# Patient Record
Sex: FEMALE | Race: BLACK OR AFRICAN AMERICAN | Employment: OTHER | URBAN - METROPOLITAN AREA
[De-identification: names, ages, dates, MRNs, and addresses within clinical notes are randomized per-mention and may not be internally consistent; named-entity substitution may affect disease eponyms.]

---

## 2017-01-25 ENCOUNTER — OFFICE VISIT (OUTPATIENT)
Dept: SURGERY | Age: 67
End: 2017-01-25

## 2017-01-25 VITALS
DIASTOLIC BLOOD PRESSURE: 70 MMHG | BODY MASS INDEX: 33.72 KG/M2 | HEART RATE: 76 BPM | WEIGHT: 249 LBS | RESPIRATION RATE: 20 BRPM | SYSTOLIC BLOOD PRESSURE: 130 MMHG | OXYGEN SATURATION: 96 % | HEIGHT: 72 IN | TEMPERATURE: 98 F

## 2017-01-25 DIAGNOSIS — L02.215 PERINEAL ABSCESS: Primary | ICD-10-CM

## 2017-01-25 RX ORDER — METRONIDAZOLE 500 MG/1
500 TABLET ORAL 2 TIMES DAILY
Qty: 20 TAB | Refills: 0 | Status: SHIPPED | OUTPATIENT
Start: 2017-01-25 | End: 2017-02-01 | Stop reason: SDUPTHER

## 2017-01-25 RX ORDER — SULFAMETHOXAZOLE AND TRIMETHOPRIM 400; 80 MG/1; MG/1
TABLET ORAL
Refills: 0 | COMMUNITY
Start: 2017-01-17 | End: 2017-02-10

## 2017-01-25 RX ORDER — OXYCODONE AND ACETAMINOPHEN 7.5; 325 MG/1; MG/1
TABLET ORAL
Refills: 0 | COMMUNITY
Start: 2016-12-30 | End: 2017-02-21 | Stop reason: SDUPTHER
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
## 2017-01-25 NOTE — MR AVS SNAPSHOT
Visit Information Date & Time Provider Department Dept. Phone Encounter #  
 1/25/2017  2:20 PM Celso Coulter [phone] [id number] Your Appointments 2/1/2017  1:40 PM  
POST OP 10 MIN with MD Celso Coulter 137  (Quintin Desaiosh) Appt Note: EP 1 week follow up per Dr. Bradley Leader [address] [facility] [address] [facility] [id number]  
[address] [place] [address] Upcoming [facility] Maintenance Date Due  
 FOOT EXAM Q1 5/8/1959 MICROALBUMIN Q1 5/8/1959 EYE EXAM RETINAL OR DILATED Q1 5/8/1959 DTaP/Tdap/Td series (1 - Tdap) 5/8/1970 BREAST CANCER SCRN MAMMOGRAM 5/8/1999 FOBT Q 1 YEAR AGE 50-75 5/8/1999 ZOSTER VACCINE AGE 60> 5/8/2009 HEMOGLOBIN A1C Q6M 4/6/2010 LIPID PANEL Q1 10/6/2010 GLAUCOMA SCREENING Q2Y 5/8/2014 OSTEOPOROSIS SCREENING (DEXA) 5/8/2014 Pneumococcal 65+ Low/Medium Risk (1 of 2 - PCV13) 5/8/2014 MEDICARE YEARLY EXAM 5/8/2014 INFLUENZA AGE 9 TO ADULT 8/1/2016 Allergies as of 1/25/2017  Review Complete On: 1/25/2017 By: Rima Francois LPN Severity Noted Reaction Type Reactions Latex  01/03/2011    Itching Rash, sometimes difficult to breathe Celebrex [Celecoxib] High 01/06/2011    Anaphylaxis, Swelling TONGUE. LIPS AND EYES Keflex [Cephalexin] High 10/14/2010    Anaphylaxis, Swelling Tongue, lips, and eyes Levaquin [Levofloxacin] High 10/14/2010    Anaphylaxis, Swelling Tongue, lips, and eyes Pcn [Penicillins] High 10/14/2010    Anaphylaxis, Swelling Tongue, lips and eyes Iodine  07/17/2013    Other (comments) IV CONTRAST-LESIONS, AND SKIN SLUFFED OFF Morphine  10/14/2010    Other (comments) PT STATES IS JUST SENSITIVITY, GOT TOO MUCH ONE TIME. Opium  01/06/2011    Other (comments) SENSITIVITY Tylox [Oxycodone-acetaminophen] Low 01/06/2011   Side Effect Nausea Only No anaphlyxis as previously reported. Patient does take percocet Current Immunizations  Never Reviewed No immunizations on file. Not reviewed this visit You Were Diagnosed With   
  
 Codes Comments Perineal abscess    -  Primary ICD-10-CM: L02.215 ICD-9-CM: 579. 2 Vitals BP Pulse Temp Resp Height(growth percentile) Weight(growth percentile) 130/70 (BP 1 Location: Right arm, BP Patient Position: Sitting) 76 98 °F (36.7 °C) (Oral) 20 6' 1\" (1.854 m) 249 lb (112.9 kg) SpO2 BMI OB Status Smoking Status 96% 32.85 kg/m2 Hysterectomy Never Smoker BMI and BSA Data Body Mass Index Body Surface Area  
 32.85 kg/m 2 2.41 m 2 Preferred Pharmacy Pharmacy Name Phone [facility] [address], [address] AT [facility] OF  [facility] [phone] Your Updated Medication List  
  
   
This list is accurate as of: 1/25/17  3:43 PM.  Always use your most recent med list. ALLEGRA 180 mg tablet Generic drug:  fexofenadine Take 60 mg by mouth daily. ASPIRIN PO Take 81 mg by mouth daily. BENADRYL 25 mg capsule Generic drug:  diphenhydrAMINE Take 50 mg by mouth every six (6) hours as needed. CLONIDINE PO Take 0.1 mg by mouth three (3) times daily. COREG PO Take 6.25 mg by mouth two (2) times a day. diazePAM 5 mg tablet Commonly known as:  VALIUM Take 1 Tab by mouth every eight (8) hours as needed for Anxiety. Max Daily Amount: 15 mg.  
  
 DULCOLAX STOOL SOFTENER (DSS) 100 mg capsule Generic drug:  docusate sodium Take 100 mg by mouth two (2) times a day. furosemide 40 mg tablet Commonly known as:  LASIX Take  by mouth every other day. HUMALOG SC  
by SubCUTAneous route Before breakfast, lunch, and dinner. hydrALAZINE 25 mg tablet Commonly known as:  APRESOLINE Take 25 mg by mouth two (2) times a day. LANTUS SC  
by SubCUTAneous route. 11U EVERY MORNING  
  
 metroNIDAZOLE 500 mg tablet Commonly known as:  FLAGYL Take 1 Tab by mouth two (2) times a day for 10 days. MIRALAX 17 gram packet Generic drug:  polyethylene glycol Take 17 g by mouth daily. NORVASC PO Take 10 mg by mouth daily. oxyCODONE-acetaminophen 7.5-325 mg per tablet Commonly known as:  PERCOCET 7.5 TK 1 T PO TID PRN FOR 30 DAYS Prevacid 30 mg disintegrating tablet Generic drug:  lansoprazole Take  by mouth two (2) times a day. PROZAC PO Take 20 mg by mouth daily. RENAL SOFTGELS 1 mg capsule Generic drug:  b complex-vitamin c-folic acid Take 1 Cap by mouth daily. simvastatin 40 mg tablet Commonly known as:  ZOCOR Take 20 mg by mouth nightly. trimethoprim-sulfamethoxazole  mg per tablet Commonly known as:  Alex Edis TK 1 T PO  BID  
  
 VOLTAREN 1 % Gel Generic drug:  diclofenac Apply 4 g to affected area four (4) times daily. Prescriptions Sent to Pharmacy Refills  
 metroNIDAZOLE (FLAGYL) 500 mg tablet 0 Sig: Take 1 Tab by mouth two (2) times a day for 10 days. Class: Normal  
 Pharm[facility] [address] [place] [address] #: [phone] Route: Oral  
  
Introducing [facility] & HEALTH SERVICES! Selene Hughes introduces [employer] patient portal. Now you can access parts of your medical record, email your doctor's office, and request medication refills online. 1. In your internet browser, go to [email or web address] 2. Click on the First Time User? Click Here link in the Sign In box. You will see the New Member Sign Up page. 3. Enter your [employer] Access Code exactly as it appears below. You will not need to use this code after youve completed the sign-up process.  If you do not sign up before the expiration date, you must request a new code. · [employer] Access Code: [email or web address] Expires: 4/25/2017  3:43 PM 
 
4. Enter the last four digits of your Social Security Number (xxxx) and Date of Birth (mm/dd/yyyy) as indicated and click Submit. You will be taken to the next sign-up page. 5. Create a [employer] ID. This will be your [employer] login ID and cannot be changed, so think of one that is secure and easy to remember. 6. Create a [employer] password. You can change your password at any time. 7. Enter your Password Reset Question and Answer. This can be used at a later time if you forget your password. 8. Enter your e-mail address. You will receive e-mail notification when new information is available in [address] 9. Click Sign Up. You can now view and download portions of your medical record. 10. Click the Download Summary menu link to download a portable copy of your medical information. If you have questions, please visit the Frequently Asked Questions section of the [employer] website. Remember, [employer] is NOT to be used for urgent needs. For medical emergencies, dial 911. Now available from your iPhone and Android! Please provide this summary of care documentation to your next provider. Your primary care clinician is listed as Rowdy Ayoub. If you have any questions after today's visit, please call [phone].

## 2017-01-25 NOTE — PROGRESS NOTES
1. Have you been to the ER, urgent care clinic since your last visit? Hospitalized since your last visit? No    2. Have you seen or consulted any other health care providers outside of the [address] [place] [address] since your last visit? Include any pap smears or colon screening. Yes- Dr. Lorena Pringle - lst week to F/U perineal mass surgery on 11-3-16-wound infection.       Dialysis 3 days a week

## 2017-02-01 ENCOUNTER — OFFICE VISIT (OUTPATIENT)
Dept: SURGERY | Age: 67
End: 2017-02-01

## 2017-02-01 VITALS
DIASTOLIC BLOOD PRESSURE: 82 MMHG | WEIGHT: 248.6 LBS | HEIGHT: 72 IN | HEART RATE: 76 BPM | OXYGEN SATURATION: 95 % | TEMPERATURE: 98.6 F | BODY MASS INDEX: 33.67 KG/M2 | SYSTOLIC BLOOD PRESSURE: 150 MMHG | RESPIRATION RATE: 18 BRPM

## 2017-02-01 DIAGNOSIS — L02.215 PERINEAL ABSCESS: Primary | ICD-10-CM

## 2017-02-01 RX ORDER — METRONIDAZOLE 500 MG/1
500 TABLET ORAL 2 TIMES DAILY
Qty: 20 TAB | Refills: 0 | Status: SHIPPED | OUTPATIENT
Start: 2017-02-01 | End: 2017-02-11

## 2017-02-01 NOTE — PROGRESS NOTES
1. Have you been to the ER, urgent care clinic since your last visit? Hospitalized since your last visit? No    2. Have you seen or consulted any other health care providers outside of the [address] [place] [address] since your last visit? Include any pap smears or colon screening.  No

## 2017-02-01 NOTE — MR AVS SNAPSHOT
Visit Information Date & Time Provider Department Dept. Phone Encounter #  
 2/1/2017  1:40 PM David KamaljitCelso 137 [phone] [id number] Upcoming Health Maintenance Date Due  
 FOOT EXAM Q1 5/8/1959 MICROALBUMIN Q1 5/8/1959 EYE EXAM RETINAL OR DILATED Q1 5/8/1959 DTaP/Tdap/Td series (1 - Tdap) 5/8/1970 BREAST CANCER SCRN MAMMOGRAM 5/8/1999 FOBT Q 1 YEAR AGE 50-75 5/8/1999 ZOSTER VACCINE AGE 60> 5/8/2009 HEMOGLOBIN A1C Q6M 4/6/2010 LIPID PANEL Q1 10/6/2010 GLAUCOMA SCREENING Q2Y 5/8/2014 OSTEOPOROSIS SCREENING (DEXA) 5/8/2014 Pneumococcal 65+ Low/Medium Risk (1 of 2 - PCV13) 5/8/2014 MEDICARE YEARLY EXAM 5/8/2014 INFLUENZA AGE 9 TO ADULT 8/1/2016 Allergies as of 2/1/2017  Review Complete On: 2/1/2017 By: David Mari MD  
  
 Severity Noted Reaction Type Reactions Latex  01/03/2011    Itching Rash, sometimes difficult to breathe Celebrex [Celecoxib] High 01/06/2011    Anaphylaxis, Swelling TONGUE. LIPS AND EYES Keflex [Cephalexin] High 10/14/2010    Anaphylaxis, Swelling Tongue, lips, and eyes Levaquin [Levofloxacin] High 10/14/2010    Anaphylaxis, Swelling Tongue, lips, and eyes Pcn [Penicillins] High 10/14/2010    Anaphylaxis, Swelling Tongue, lips and eyes Iodine  07/17/2013    Other (comments) IV CONTRAST-LESIONS, AND SKIN SLUFFED OFF Morphine  10/14/2010    Other (comments) PT STATES IS JUST SENSITIVITY, GOT TOO MUCH ONE TIME. Opium  01/06/2011    Other (comments) SENSITIVITY Tylox [Oxycodone-acetaminophen] Low 01/06/2011   Side Effect Nausea Only No anaphlyxis as previously reported. Patient does take percocet Current Immunizations  Never Reviewed No immunizations on file. Not reviewed this visit You Were Diagnosed With   
  
 Codes Comments Perineal abscess    -  Primary ICD-10-CM: L02.215 ICD-9-CM: 777. 2 Vitals BP Pulse Temp Resp Height(growth percentile) Weight(growth percentile) 150/82 (BP 1 Location: Right arm, BP Patient Position: Sitting) 76 98.6 °F (37 °C) (Oral) 18 6' 1\" (1.854 m) 248 lb 9.6 oz (112.8 kg) SpO2 BMI OB Status Smoking Status 95% 32.8 kg/m2 Hysterectomy Never Smoker BMI and BSA Data Body Mass Index Body Surface Area  
 32.8 kg/m 2 2.41 m 2 Preferred Pharmacy Pharmacy Name Phone [facility] [address] [facility] OF  [place] [address] [phone] Your Updated Medication List  
  
   
This list is accurate as of: 2/1/17  2:35 PM.  Always use your most recent med list. ALLEGRA 180 mg tablet Generic drug:  fexofenadine Take 60 mg by mouth daily. ASPIRIN PO Take 81 mg by mouth daily. BENADRYL 25 mg capsule Generic drug:  diphenhydrAMINE Take 50 mg by mouth every six (6) hours as needed. CLONIDINE PO Take 0.1 mg by mouth three (3) times daily. COREG PO Take 6.25 mg by mouth two (2) times a day. diazePAM 5 mg tablet Commonly known as:  VALIUM Take 1 Tab by mouth every eight (8) hours as needed for Anxiety. Max Daily Amount: 15 mg.  
  
 DULCOLAX STOOL SOFTENER (DSS) 100 mg capsule Generic drug:  docusate sodium Take 100 mg by mouth two (2) times a day. furosemide 40 mg tablet Commonly known as:  LASIX Take  by mouth every other day. HUMALOG SC  
by SubCUTAneous route Before breakfast, lunch, and dinner. hydrALAZINE 25 mg tablet Commonly known as:  APRESOLINE Take 25 mg by mouth two (2) times a day. LANTUS SC  
by SubCUTAneous route. 11U EVERY MORNING  
  
 metroNIDAZOLE 500 mg tablet Commonly known as:  FLAGYL Take 1 Tab by mouth two (2) times a day for 10 days. MIRALAX 17 gram packet Generic drug:  polyethylene glycol Take 17 g by mouth daily. NORVASC PO Take 10 mg by mouth daily. oxyCODONE-acetaminophen 7.5-325 mg per tablet Commonly known as:  PERCOCET 7.5 TK 1 T PO TID PRN FOR 30 DAYS Prevacid 30 mg disintegrating tablet Generic drug:  lansoprazole Take  by mouth two (2) times a day. PROZAC PO Take 20 mg by mouth daily. RENAL SOFTGELS 1 mg capsule Generic drug:  b complex-vitamin c-folic acid Take 1 Cap by mouth daily. simvastatin 40 mg tablet Commonly known as:  ZOCOR Take 20 mg by mouth nightly. trimethoprim-sulfamethoxazole  mg per tablet Commonly known as:  Roney Imam TK 1 T PO  BID  
  
 VOLTAREN 1 % Gel Generic drug:  diclofenac Apply 4 g to affected area four (4) times daily. Prescriptions Sent to Pharmacy Refills  
 metroNIDAZOLE (FLAGYL) 500 mg tablet 0 Sig: Take 1 Tab by mouth two (2) times a day for 10 days. Class: Normal  
 Pharm[facility] [address] [place] [address] #: [phone] Route: Oral  
  
Introducing [facility] & HEALTH SERVICES! Teodoro Blackwell introduces [employer] patient portal. Now you can access parts of your medical record, email your doctor's office, and request medication refills online. 1. In your internet browser, go to [email or web address] 2. Click on the First Time User? Click Here link in the Sign In box. You will see the New Member Sign Up page. 3. Enter your [employer] Access Code exactly as it appears below. You will not need to use this code after youve completed the sign-up process. If you do not sign up before the expiration date, you must request a new code. · [employer] Access Code: [email or web address] Expires: 4/25/2017  3:43 PM 
 
4. Enter the last four digits of your Social Security Number (xxxx) and Date of Birth (mm/dd/yyyy) as indicated and click Submit. You will be taken to the next sign-up page. 5. Create a [employer] ID.  This will be your [employer] login ID and cannot be changed, so think of one that is secure and easy to remember. 6. Create a Palkion password. You can change your password at any time. 7. Enter your Password Reset Question and Answer. This can be used at a later time if you forget your password. 8. Enter your e-mail address. You will receive e-mail notification when new information is available in [address] 9. Click Sign Up. You can now view and download portions of your medical record. 10. Click the Download Summary menu link to download a portable copy of your medical information. If you have questions, please visit the Frequently Asked Questions section of the P[employer] website. Remember, P[employer] is NOT to be used for urgent needs. For medical emergencies, dial 911. Now available from your iPhone and Android! Please provide this summary of care documentation to your next provider. Your primary care clinician is listed as Neisha Christie. If you have any questions after today's visit, please call [phone].
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
## 2017-02-10 ENCOUNTER — HOSPITAL ENCOUNTER (OUTPATIENT)
Age: 67
Setting detail: OBSERVATION
Discharge: HOME | End: 2017-02-11
Attending: EMERGENCY MEDICINE | Admitting: SURGERY
Payer: MEDICARE

## 2017-02-10 ENCOUNTER — APPOINTMENT (OUTPATIENT)
Dept: CT IMAGING | Age: 67
End: 2017-02-10
Attending: EMERGENCY MEDICINE
Payer: MEDICARE

## 2017-02-10 ENCOUNTER — ANESTHESIA (OUTPATIENT)
Dept: SURGERY | Age: 67
End: 2017-02-10
Payer: MEDICARE

## 2017-02-10 ENCOUNTER — ANESTHESIA EVENT (OUTPATIENT)
Dept: SURGERY | Age: 67
End: 2017-02-10
Payer: MEDICARE

## 2017-02-10 DIAGNOSIS — K61.0 PERIANAL CELLULITIS: Primary | ICD-10-CM

## 2017-02-10 LAB
ALBUMIN SERPL BCP-MCNC: 3.3 G/DL (ref 3.5–5)
ALBUMIN/GLOB SERPL: 0.6 {RATIO} (ref 1.1–2.2)
ALP SERPL-CCNC: 185 U/L (ref 45–117)
ALT SERPL-CCNC: 19 U/L (ref 12–78)
ANION GAP BLD CALC-SCNC: 6 MMOL/L (ref 5–15)
AST SERPL W P-5'-P-CCNC: 25 U/L (ref 15–37)
BASOPHILS # BLD AUTO: 0 K/UL (ref 0–0.1)
BASOPHILS # BLD: 0 % (ref 0–1)
BILIRUB SERPL-MCNC: 0.4 MG/DL (ref 0.2–1)
BUN SERPL-MCNC: 25 MG/DL (ref 6–20)
BUN/CREAT SERPL: 6 (ref 12–20)
CALCIUM SERPL-MCNC: 9.3 MG/DL (ref 8.5–10.1)
CHLORIDE SERPL-SCNC: 95 MMOL/L (ref 97–108)
CO2 SERPL-SCNC: 34 MMOL/L (ref 21–32)
CREAT SERPL-MCNC: 4.54 MG/DL (ref 0.55–1.02)
EOSINOPHIL # BLD: 0.1 K/UL (ref 0–0.4)
EOSINOPHIL NFR BLD: 1 % (ref 0–7)
ERYTHROCYTE [DISTWIDTH] IN BLOOD BY AUTOMATED COUNT: 17.3 % (ref 11.5–14.5)
GLOBULIN SER CALC-MCNC: 5.7 G/DL (ref 2–4)
GLUCOSE BLD STRIP.AUTO-MCNC: 138 MG/DL (ref 65–100)
GLUCOSE SERPL-MCNC: 189 MG/DL (ref 65–100)
HCT VFR BLD AUTO: 37.9 % (ref 35–47)
HGB BLD-MCNC: 11.9 G/DL (ref 11.5–16)
LYMPHOCYTES # BLD AUTO: 30 % (ref 12–49)
LYMPHOCYTES # BLD: 2.1 K/UL (ref 0.8–3.5)
MCH RBC QN AUTO: 27.7 PG (ref 26–34)
MCHC RBC AUTO-ENTMCNC: 31.4 G/DL (ref 30–36.5)
MCV RBC AUTO: 88.3 FL (ref 80–99)
MONOCYTES # BLD: 0.5 K/UL (ref 0–1)
MONOCYTES NFR BLD AUTO: 7 % (ref 5–13)
NEUTS SEG # BLD: 4.4 K/UL (ref 1.8–8)
NEUTS SEG NFR BLD AUTO: 62 % (ref 32–75)
PLATELET # BLD AUTO: 311 K/UL (ref 150–400)
POTASSIUM SERPL-SCNC: 3.7 MMOL/L (ref 3.5–5.1)
PROT SERPL-MCNC: 9 G/DL (ref 6.4–8.2)
RBC # BLD AUTO: 4.29 M/UL (ref 3.8–5.2)
SERVICE CMNT-IMP: ABNORMAL
SODIUM SERPL-SCNC: 135 MMOL/L (ref 136–145)
WBC # BLD AUTO: 7 K/UL (ref 3.6–11)

## 2017-02-10 PROCEDURE — 80053 COMPREHEN METABOLIC PANEL: CPT | Performed by: EMERGENCY MEDICINE

## 2017-02-10 PROCEDURE — 77030032490 HC SLV COMPR SCD KNE COVD -B: Performed by: SURGERY

## 2017-02-10 PROCEDURE — 74011250636 HC RX REV CODE- 250/636

## 2017-02-10 PROCEDURE — 96375 TX/PRO/DX INJ NEW DRUG ADDON: CPT

## 2017-02-10 PROCEDURE — 76210000006 HC OR PH I REC 0.5 TO 1 HR: Performed by: SURGERY

## 2017-02-10 PROCEDURE — 76060000032 HC ANESTHESIA 0.5 TO 1 HR: Performed by: SURGERY

## 2017-02-10 PROCEDURE — 82962 GLUCOSE BLOOD TEST: CPT

## 2017-02-10 PROCEDURE — 85025 COMPLETE CBC W/AUTO DIFF WBC: CPT | Performed by: EMERGENCY MEDICINE

## 2017-02-10 PROCEDURE — 74011250636 HC RX REV CODE- 250/636: Performed by: EMERGENCY MEDICINE

## 2017-02-10 PROCEDURE — 96374 THER/PROPH/DIAG INJ IV PUSH: CPT

## 2017-02-10 PROCEDURE — 36415 COLL VENOUS BLD VENIPUNCTURE: CPT | Performed by: EMERGENCY MEDICINE

## 2017-02-10 PROCEDURE — 99218 HC RM OBSERVATION: CPT

## 2017-02-10 PROCEDURE — 74176 CT ABD & PELVIS W/O CONTRAST: CPT

## 2017-02-10 PROCEDURE — 77030018836 HC SOL IRR NACL ICUM -A: Performed by: SURGERY

## 2017-02-10 PROCEDURE — 77030011640 HC PAD GRND REM COVD -A: Performed by: SURGERY

## 2017-02-10 PROCEDURE — 99284 EMERGENCY DEPT VISIT MOD MDM: CPT

## 2017-02-10 PROCEDURE — 87040 BLOOD CULTURE FOR BACTERIA: CPT | Performed by: EMERGENCY MEDICINE

## 2017-02-10 PROCEDURE — 74011250636 HC RX REV CODE- 250/636: Performed by: SURGERY

## 2017-02-10 PROCEDURE — 76010000138 HC OR TIME 0.5 TO 1 HR: Performed by: SURGERY

## 2017-02-10 RX ORDER — ROPIVACAINE HYDROCHLORIDE 5 MG/ML
30 INJECTION, SOLUTION EPIDURAL; INFILTRATION; PERINEURAL AS NEEDED
Status: DISCONTINUED | OUTPATIENT
Start: 2017-02-10 | End: 2017-02-11 | Stop reason: HOSPADM

## 2017-02-10 RX ORDER — MIDAZOLAM HYDROCHLORIDE 1 MG/ML
1 INJECTION, SOLUTION INTRAMUSCULAR; INTRAVENOUS AS NEEDED
Status: DISCONTINUED | OUTPATIENT
Start: 2017-02-10 | End: 2017-02-11 | Stop reason: HOSPADM

## 2017-02-10 RX ORDER — PROCHLORPERAZINE EDISYLATE 5 MG/ML
5 INJECTION INTRAMUSCULAR; INTRAVENOUS
Status: COMPLETED | OUTPATIENT
Start: 2017-02-10 | End: 2017-02-10

## 2017-02-10 RX ORDER — HYDROMORPHONE HYDROCHLORIDE 1 MG/ML
1 INJECTION, SOLUTION INTRAMUSCULAR; INTRAVENOUS; SUBCUTANEOUS
Status: DISCONTINUED | OUTPATIENT
Start: 2017-02-10 | End: 2017-02-11 | Stop reason: HOSPADM

## 2017-02-10 RX ORDER — FENTANYL CITRATE 50 UG/ML
50 INJECTION, SOLUTION INTRAMUSCULAR; INTRAVENOUS AS NEEDED
Status: DISCONTINUED | OUTPATIENT
Start: 2017-02-10 | End: 2017-02-11 | Stop reason: HOSPADM

## 2017-02-10 RX ORDER — GLYCOPYRROLATE 0.2 MG/ML
0.2 INJECTION INTRAMUSCULAR; INTRAVENOUS
Status: DISCONTINUED | OUTPATIENT
Start: 2017-02-10 | End: 2017-02-11 | Stop reason: HOSPADM

## 2017-02-10 RX ORDER — LIDOCAINE HYDROCHLORIDE 10 MG/ML
0.1 INJECTION, SOLUTION EPIDURAL; INFILTRATION; INTRACAUDAL; PERINEURAL AS NEEDED
Status: DISCONTINUED | OUTPATIENT
Start: 2017-02-10 | End: 2017-02-11 | Stop reason: HOSPADM

## 2017-02-10 RX ORDER — HYDROMORPHONE HYDROCHLORIDE 1 MG/ML
0.5 INJECTION, SOLUTION INTRAMUSCULAR; INTRAVENOUS; SUBCUTANEOUS
Status: COMPLETED | OUTPATIENT
Start: 2017-02-10 | End: 2017-02-10

## 2017-02-10 RX ORDER — SODIUM CHLORIDE, SODIUM LACTATE, POTASSIUM CHLORIDE, CALCIUM CHLORIDE 600; 310; 30; 20 MG/100ML; MG/100ML; MG/100ML; MG/100ML
75 INJECTION, SOLUTION INTRAVENOUS CONTINUOUS
Status: DISCONTINUED | OUTPATIENT
Start: 2017-02-10 | End: 2017-02-11 | Stop reason: HOSPADM

## 2017-02-10 RX ORDER — VANCOMYCIN 2 GRAM/500 ML IN 0.9 % SODIUM CHLORIDE INTRAVENOUS
2000 ONCE
Status: COMPLETED | OUTPATIENT
Start: 2017-02-10 | End: 2017-02-10

## 2017-02-10 RX ORDER — CINACALCET 30 MG/1
30 TABLET, FILM COATED ORAL DAILY
COMMUNITY

## 2017-02-10 RX ORDER — CARVEDILOL 6.25 MG/1
12.5 TABLET ORAL 2 TIMES DAILY WITH MEALS
COMMUNITY
End: 2017-04-14

## 2017-02-10 RX ORDER — ONDANSETRON 2 MG/ML
4 INJECTION INTRAMUSCULAR; INTRAVENOUS
Status: DISCONTINUED | OUTPATIENT
Start: 2017-02-10 | End: 2017-02-11 | Stop reason: HOSPADM

## 2017-02-10 RX ORDER — SEVELAMER HYDROCHLORIDE 800 MG/1
1600 TABLET, FILM COATED ORAL
COMMUNITY
End: 2017-04-14

## 2017-02-10 RX ORDER — SODIUM CHLORIDE, SODIUM LACTATE, POTASSIUM CHLORIDE, CALCIUM CHLORIDE 600; 310; 30; 20 MG/100ML; MG/100ML; MG/100ML; MG/100ML
1000 INJECTION, SOLUTION INTRAVENOUS CONTINUOUS
Status: DISCONTINUED | OUTPATIENT
Start: 2017-02-11 | End: 2017-02-11 | Stop reason: HOSPADM

## 2017-02-10 RX ORDER — NORTRIPTYLINE HYDROCHLORIDE 25 MG/1
25 CAPSULE ORAL
COMMUNITY
End: 2018-07-06 | Stop reason: DRUGHIGH

## 2017-02-10 RX ORDER — SODIUM CHLORIDE 9 MG/ML
25 INJECTION, SOLUTION INTRAVENOUS CONTINUOUS
Status: DISCONTINUED | OUTPATIENT
Start: 2017-02-11 | End: 2017-02-11 | Stop reason: HOSPADM

## 2017-02-10 RX ADMIN — VANCOMYCIN HYDROCHLORIDE 2000 MG: 10 INJECTION, POWDER, LYOPHILIZED, FOR SOLUTION INTRAVENOUS at 20:56

## 2017-02-10 RX ADMIN — HYDROMORPHONE HYDROCHLORIDE 0.5 MG: 1 INJECTION, SOLUTION INTRAMUSCULAR; INTRAVENOUS; SUBCUTANEOUS at 16:51

## 2017-02-10 RX ADMIN — SODIUM CHLORIDE, SODIUM LACTATE, POTASSIUM CHLORIDE, AND CALCIUM CHLORIDE 125 ML/HR: 600; 310; 30; 20 INJECTION, SOLUTION INTRAVENOUS at 20:56

## 2017-02-10 RX ADMIN — PROCHLORPERAZINE EDISYLATE 5 MG: 5 INJECTION INTRAMUSCULAR; INTRAVENOUS at 16:54

## 2017-02-10 NOTE — PROGRESS NOTES
Admission Medication Reconciliation:    Information obtained from: Patient and patient's daughter    Significant PMH/Disease States:   Past Medical History   Diagnosis Date    Abscess of abdominal wall     Anal cryptitis 6/4/2012    Arthritis 10/14/2010     back, neck, knees, hands    Asthma      \"TOUCH OF\"    Axillary abscess      right axillary    Blood transfusion 1999     [facility], NO REACTION    Blood transfusion 1980'S     [place]. NO REACTION    Chronic kidney disease      dialysis-[place]    Chronic pain      BACK, NECK, HANDS, KNEES    Depression     Diabetes mellitus type 2, insulin dependent ([address] 10/14/2010    Dialysis patient ([address] Since 3/3/2010     M, W, F    GERD (gastroesophageal reflux disease)     Heart failure ([address] 2004     IN PAST-CHF    HTN (hypertension) 10/14/2010    IBS (irritable bowel syndrome)     Morbid obesity ([address] 10/14/2010    Nausea & vomiting     Neuropathy 10/14/2010    Other ill-defined conditions(799.89)      facial neuropathy STATES PN 1/17/11 HAS NEUROPATHY OF FEET/ LEGS     Other ill-defined conditions(799.89)      glaucoma and cateracts    Other ill-defined conditions(799.89)      ANEMIA    Perineal abscess 1/25/2017    Psychiatric disorder      ANXIETY AND DEPRESSION    s/p debridement of midline abd wound 6/24/2011    Serratia wound infection, 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 6/14/2011    Stroke ([address]      TIA, NO RESIDUAL    Thromboembolus ([address] 2007     LEFT LEG    Thyroid disease     Unspecified adverse effect of anesthesia      DIFFICULTY WAKING    Unspecified sleep apnea      USES C-PAP       Chief Complaint for this Admission:  abscess    Allergies:  Latex; Celebrex [celecoxib]; Keflex [cephalexin]; Levaquin [levofloxacin]; Pcn [penicillins]; Iodine; Morphine; Opium; and Tylox [oxycodone-acetaminophen]    Prior to Admission Medications:   Prior to Admission Medications   Prescriptions Last Dose Informant Patient Reported? Taking?    AMLODIPINE BESYLATE (NORVASC PO) 2/10/2017 at Unknown time Self Yes Yes   Sig: Take 10 mg by mouth daily. ASPIRIN PO 2/10/2017 at Unknown time Self Yes Yes   Sig: Take 81 mg by mouth daily. FLUOXETINE HCL (PROZAC PO) 2/10/2017 at Unknown time Self Yes Yes   Sig: Take 20 mg by mouth daily. INSULIN LISPRO (HUMALOG SC)  Self Yes Yes   Sig: by SubCUTAneous route Before breakfast, lunch, and dinner. b complex-vitamin c-folic acid (RENAL SOFTGELS) 1 mg capsule 2/10/2017 at Unknown time Self Yes Yes   Sig: Take 1 Cap by mouth daily. carvedilol (COREG) 6.25 mg tablet 2/10/2017 at Unknown time  Yes Yes   Sig: Take 12.5 mg by mouth two (2) times daily (with meals). cinacalcet (SENSIPAR) 30 mg tablet 2/10/2017 at Unknown time  Yes Yes   Sig: Take 30 mg by mouth daily. diazepam (VALIUM) 5 mg tablet   No Yes   Sig: Take 1 Tab by mouth every eight (8) hours as needed for Anxiety. Max Daily Amount: 15 mg.   diclofenac (VOLTAREN) 1 % topical gel 2/10/2017 at Unknown time  Yes Yes   Sig: Apply 4 g to affected area four (4) times daily. diphenhydrAMINE (BENADRYL) 25 mg capsule  Self Yes Yes   Sig: Take 50 mg by mouth every six (6) hours as needed. docusate sodium (DULCOLAX STOOL SOFTENER) 100 mg capsule   Yes Yes   Sig: Take 100 mg by mouth two (2) times a day. fexofenadine (ALLEGRA) 180 mg tablet   Yes Yes   Sig: Take 60 mg by mouth daily. insulin detemir (LEVEMIR FLEXPEN) 100 unit/mL (3 mL) inpn   Yes Yes   Si Units by SubCUTAneous route daily. lansoprazole (PREVACID) 30 mg disintegrating tablet 2/10/2017 at Unknown time  Yes Yes   Sig: Take  by mouth two (2) times a day. metroNIDAZOLE (FLAGYL) 500 mg tablet 2/10/2017 at Unknown time  No Yes   Sig: Take 1 Tab by mouth two (2) times a day for 10 days. nortriptyline (PAMELOR) 25 mg capsule 2017 at Unknown time  Yes Yes   Sig: Take 25 mg by mouth nightly.    oxyCODONE-acetaminophen (PERCOCET 7.5) 7.5-325 mg per tablet   Yes Yes   Sig: TK 1 T PO TID PRN FOR 30 DAYS polyethylene glycol (MIRALAX) 17 gram packet   Yes Yes   Sig: Take 17 g by mouth daily. sevelamer (RENAGEL) 800 mg tablet 2/10/2017 at Unknown time  Yes Yes   Sig: Take 1,600 mg by mouth three (3) times daily (with meals). simvastatin (ZOCOR) 40 mg tablet 2/9/2017 at Unknown time  Yes Yes   Sig: Take 20 mg by mouth nightly. Facility-Administered Medications: None         Comments/Recommendations:   Verified PTA medications/allergy with patient and patient's daughter. Patient said she had all her morning medications at noon today after her dialysis. Her dialysis schedule is Monday, Wednesday, and Friday. She recently finished a course of bactrim and is on her second round of flagyl.     Added nortriptyline, sensipar, and renveia  Deleted clonidine, furosemide, hydralazine  Changed: insulin glargin to levemir and 11 unites to 8 units, carvedilol 6.25mg BID to 12.5 mg BID      Thank you for allowing me to participate in the care of this patient  Ovi Scott, PharmD Candidate 2016

## 2017-02-10 NOTE — IP AVS SNAPSHOT
Current Discharge Medication List  
  
Take these medications at their scheduled times Dose & Instructions Dispensing Information Comments Morning Noon Evening Bedtime ALLEGRA 180 mg tablet Generic drug:  fexofenadine Your next dose is: Today, Tomorrow Other:  ____________ Dose:  60 mg Take 60 mg by mouth daily. Refills:  0  
     
   
   
   
  
 ASPIRIN PO Your next dose is: Today, Tomorrow Other:  ____________ Dose:  81 mg Take 81 mg by mouth daily. Refills:  0  
     
   
   
   
  
 carvedilol 6.25 mg tablet Commonly known as:  Cori Steele Your next dose is: Today, Tomorrow Other:  ____________ Dose:  12.5 mg Take 12.5 mg by mouth two (2) times daily (with meals). Refills:  0 DULCOLAX STOOL SOFTENER (DSS) 100 mg capsule Generic drug:  docusate sodium Your next dose is: Today, Tomorrow Other:  ____________ Dose:  100 mg Take 100 mg by mouth two (2) times a day. Refills:  0 HUMALOG SC Your next dose is: Today, Tomorrow Other:  ____________  
   
   
 by SubCUTAneous route Before breakfast, lunch, and dinner. Refills:  0 LEVEMIR FLEXPEN 100 unit/mL (3 mL) Inpn Generic drug:  insulin detemir Your next dose is: Today, Tomorrow Other:  ____________ Dose:  8 Units 8 Units by SubCUTAneous route daily. Refills:  0  
     
   
   
   
  
 metroNIDAZOLE 500 mg tablet Commonly known as:  FLAGYL Your next dose is: Today, Tomorrow Other:  ____________ Dose:  500 mg Take 1 Tab by mouth two (2) times a day for 10 days. Quantity:  20 Tab Refills:  0  
     
   
   
   
  
 mineral oil liquid Your next dose is: Today, Tomorrow Other:  ____________ Dose:  30 mL Take 30 mL by mouth daily. Quantity:  180 mL Refills:  0 MIRALAX 17 gram packet Generic drug:  polyethylene glycol Your next dose is: Today, Tomorrow Other:  ____________ Dose:  17 g Take 17 g by mouth daily. Refills:  0  
     
   
   
   
  
 nortriptyline 25 mg capsule Commonly known as:  PAMELOR Your next dose is: Today, Tomorrow Other:  ____________ Dose:  25 mg Take 25 mg by mouth nightly. Refills:  0 NORVASC PO Your next dose is: Today, Tomorrow Other:  ____________ Dose:  10 mg Take 10 mg by mouth daily. Refills:  0 Prevacid 30 mg disintegrating tablet Generic drug:  lansoprazole Your next dose is: Today, Tomorrow Other:  ____________ Take  by mouth two (2) times a day. Refills:  0 PROZAC PO Your next dose is: Today, Tomorrow Other:  ____________ Dose:  20 mg Take 20 mg by mouth daily. Refills:  0 RENAL SOFTGELS 1 mg capsule Generic drug:  b complex-vitamin c-folic acid Your next dose is: Today, Tomorrow Other:  ____________ Dose:  1 Cap Take 1 Cap by mouth daily. Refills:  0 SENSIPAR 30 mg tablet Generic drug:  cinacalcet Your next dose is: Today, Tomorrow Other:  ____________ Dose:  30 mg Take 30 mg by mouth daily. Refills:  0  
     
   
   
   
  
 sevelamer 800 mg tablet Commonly known as:  RENAGEL Your next dose is: Today, Tomorrow Other:  ____________ Dose:  1600 mg Take 1,600 mg by mouth three (3) times daily (with meals). Refills:  0  
     
   
   
   
  
 simvastatin 40 mg tablet Commonly known as:  ZOCOR Your next dose is: Today, Tomorrow Other:  ____________ Dose:  20 mg Take 20 mg by mouth nightly. Refills:  0 trimethoprim-sulfamethoxazole 160-800 mg per tablet Commonly known as:  BACTRIM DS, SEPTRA DS Your next dose is: Today, Tomorrow Other:  ____________ Dose:  1 Tab Take 1 Tab by mouth daily for 10 days. Quantity:  10 Tab Refills:  0 VOLTAREN 1 % Gel Generic drug:  diclofenac Your next dose is: Today, Tomorrow Other:  ____________ Dose:  4 g Apply 4 g to affected area four (4) times daily. Refills:  0 Take these medications as needed Dose & Instructions Dispensing Information Comments Morning Noon Evening Bedtime BENADRYL 25 mg capsule Generic drug:  diphenhydrAMINE Your next dose is: Today, Tomorrow Other:  ____________ Dose:  50 mg Take 50 mg by mouth every six (6) hours as needed. Refills:  0  
     
   
   
   
  
 diazePAM 5 mg tablet Commonly known as:  VALIUM Your next dose is: Today, Tomorrow Other:  ____________ Dose:  5 mg Take 1 Tab by mouth every eight (8) hours as needed for Anxiety. Max Daily Amount: 15 mg. Quantity:  12 Tab Refills:  0  
     
   
   
   
  
 * oxyCODONE-acetaminophen 7.5-325 mg per tablet Commonly known as:  PERCOCET 7.5 Your next dose is: Today, Tomorrow Other:  ____________ Dose:  1 Tab Take 1 Tab by mouth every four (4) hours as needed. Max Daily Amount: 6 Tabs. Quantity:  30 Tab Refills:  0  
     
   
   
   
  
 * Notice: This list has 1 medication(s) that are the same as other medications prescribed for you. Read the directions carefully, and ask your doctor or other care provider to review them with you. Take these medications as directed Dose & Instructions Dispensing Information Comments Morning Noon Evening Bedtime * oxyCODONE-acetaminophen 7.5-325 mg per tablet Commonly known as:  PERCOCET 7.5 Your next dose is: Today, Tomorrow Other:  ____________ TK 1 T PO TID PRN FOR 30 DAYS Refills:  0  
     
   
   
   
  
 * Notice: This list has 1 medication(s) that are the same as other medications prescribed for you. Read the directions carefully, and ask your doctor or other care provider to review them with you. Where to Get Your Medications Information about where to get these medications is not yet available ! Ask your nurse or doctor about these medications  
  mineral oil liquid  
 oxyCODONE-acetaminophen 7.5-325 mg per tablet  
 trimethoprim-sulfamethoxazole 160-800 mg per tablet

## 2017-02-10 NOTE — IP AVS SNAPSHOT
[address] [facility] access hospital 
[phone] Patient: Ronn La MRN: [id number] [facility]:[birth year] You are allergic to the following Allergen Reactions Latex Itching Rash, sometimes difficult to breathe Celebrex (Celecoxib) Anaphylaxis Swelling TONGUE. LIPS AND EYES Keflex (Cephalexin) Anaphylaxis Swelling Tongue, lips, and eyes Levaquin (Levofloxacin) Anaphylaxis Swelling Tongue, lips, and eyes Pcn (Penicillins) Anaphylaxis Swelling Tongue, lips and eyes Iodine Other (comments) IV CONTRAST-LESIONS, AND SKIN SLUFFED OFF Morphine Other (comments) PT STATES IS JUST SENSITIVITY, GOT TOO MUCH ONE TIME. Opium Other (comments) SENSITIVITY Recent Documentation Height Weight Breastfeeding? BMI OB Status Smoking Status 1.854 m 114 kg No 33.16 kg/m2 Hysterectomy Never Smoker Unresulted Labs Order Current Status CULTURE, ANAEROBIC In process CULTURE, BLOOD Preliminary result CULTURE, WOUND W GRAM STAIN Preliminary result Emergency Contacts Name Discharge Info Relation Home Work Mobile [address] CAREGIVER [3] Child [2] [phone] About your hospitalization You were admitted on:  February 10, 2017 You last received care in the:  [facility] [phone] You were discharged on:  February 11, 2017 Unit phone number:  [phone] Why you were hospitalized Your primary diagnosis was:  Not on File Providers Seen During Your Hospitalizations Provider Role Specialty Primary office phone Barbra Gar MD Attending Provider Emergency Medicine [phone] Laura De Jesus MD Attending Provider General Surgery [phone] Your Primary Care Physician (PCP) Primary Care Physician Office Phone Office Fax Eusebio Ervin [phone] Follow-up Information Follow up With Details Comments Contact Info Taqueria Interiano MD   [facility] [address] 
[phone] Your Appointments Monday February 13, 2017  1:20 PM EST  
ESTABLISHED PATIENT with MD Celso Coulter [phone] ([facility]) [address] [facility] [address] [place] [address] [id number] [phone] Current Discharge Medication List  
  
START taking these medications Dose & Instructions Dispensing Information Comments Morning Noon Evening Bedtime  
 mineral oil liquid Your next dose is: Today, Tomorrow Other:  _________ Dose:  30 mL Take 30 mL by mouth daily. Quantity:  180 mL Refills:  0  
     
   
   
   
  
 trimethoprim-sulfamethoxazole 160-800 mg per tablet Commonly known as:  BACTRIM DS, SEPTRA DS Your next dose is: Today, Tomorrow Other:  _________ Dose:  1 Tab Take 1 Tab by mouth daily for 10 days. Quantity:  10 Tab Refills:  0 CONTINUE these medications which have CHANGED Dose & Instructions Dispensing Information Comments Morning Noon Evening Bedtime * oxyCODONE-acetaminophen 7.5-325 mg per tablet Commonly known as:  PERCOCET 7.5 What changed:  Another medication with the same name was added. Make sure you understand how and when to take each. Your next dose is: Today, Tomorrow Other:  _________ TK 1 T PO TID PRN FOR 30 DAYS Refills:  0  
     
   
   
   
  
 * oxyCODONE-acetaminophen 7.5-325 mg per tablet Commonly known as:  PERCOCET 7.5 What changed: You were already taking a medication with the same name, and this prescription was added. Make sure you understand how and when to take each. Your next dose is: Today, Tomorrow Other:  _________ Dose:  1 Tab Take 1 Tab by mouth every four (4) hours as needed. Max Daily Amount: 6 Tabs. Quantity:  30 Tab Refills:  0  
     
   
   
   
  
 * Notice: This list has 2 medication(s) that are the same as other medications prescribed for you. Read the directions carefully, and ask your doctor or other care provider to review them with you. CONTINUE these medications which have NOT CHANGED Dose & Instructions Dispensing Information Comments Morning Noon Evening Bedtime ALLEGRA 180 mg tablet Generic drug:  fexofenadine Your next dose is: Today, Tomorrow Other:  _________ Dose:  60 mg Take 60 mg by mouth daily. Refills:  0  
     
   
   
   
  
 ASPIRIN PO Your next dose is: Today, Tomorrow Other:  _________ Dose:  81 mg Take 81 mg by mouth daily. Refills:  0  
     
   
   
   
  
 BENADRYL 25 mg capsule Generic drug:  diphenhydrAMINE Your next dose is: Today, Tomorrow Other:  _________ Dose:  50 mg Take 50 mg by mouth every six (6) hours as needed. Refills:  0  
     
   
   
   
  
 carvedilol 6.25 mg tablet Commonly known as:  Kathleene Cherie Your next dose is: Today, Tomorrow Other:  _________ Dose:  12.5 mg Take 12.5 mg by mouth two (2) times daily (with meals). Refills:  0  
     
   
   
   
  
 diazePAM 5 mg tablet Commonly known as:  VALIUM Your next dose is: Today, Tomorrow Other:  _________ Dose:  5 mg Take 1 Tab by mouth every eight (8) hours as needed for Anxiety. Max Daily Amount: 15 mg. Quantity:  12 Tab Refills:  0 DULCOLAX STOOL SOFTENER (DSS) 100 mg capsule Generic drug:  docusate sodium Your next dose is: Today, Tomorrow Other:  _________ Dose:  100 mg Take 100 mg by mouth two (2) times a day. Refills:  0 HUMALOG SC Your next dose is: Today, Tomorrow Other:  _________  
   
   
 by SubCUTAneous route Before breakfast, lunch, and dinner. Refills:  0 LEVEMIR FLEXPEN 100 unit/mL (3 mL) Inpn Generic drug:  insulin detemir Your next dose is: Today, Tomorrow Other:  _________ Dose:  8 Units 8 Units by SubCUTAneous route daily. Refills:  0  
     
   
   
   
  
 metroNIDAZOLE 500 mg tablet Commonly known as:  FLAGYL Your next dose is: Today, Tomorrow Other:  _________ Dose:  500 mg Take 1 Tab by mouth two (2) times a day for 10 days. Quantity:  20 Tab Refills:  0 MIRALAX 17 gram packet Generic drug:  polyethylene glycol Your next dose is: Today, Tomorrow Other:  _________ Dose:  17 g Take 17 g by mouth daily. Refills:  0  
     
   
   
   
  
 nortriptyline 25 mg capsule Commonly known as:  PAMELOR Your next dose is: Today, Tomorrow Other:  _________ Dose:  25 mg Take 25 mg by mouth nightly. Refills:  0 NORVASC PO Your next dose is: Today, Tomorrow Other:  _________ Dose:  10 mg Take 10 mg by mouth daily. Refills:  0 Prevacid 30 mg disintegrating tablet Generic drug:  lansoprazole Your next dose is: Today, Tomorrow Other:  _________ Take  by mouth two (2) times a day. Refills:  0 PROZAC PO Your next dose is: Today, Tomorrow Other:  _________ Dose:  20 mg Take 20 mg by mouth daily. Refills:  0 RENAL SOFTGELS 1 mg capsule Generic drug:  b complex-vitamin c-folic acid Your next dose is: Today, Tomorrow Other:  _________ Dose:  1 Cap Take 1 Cap by mouth daily. Refills:  0 SENSIPAR 30 mg tablet Generic drug:  cinacalcet Your next dose is: Today, Tomorrow Other:  _________ Dose:  30 mg Take 30 mg by mouth daily. Refills:  0  
     
   
   
   
  
 sevelamer 800 mg tablet Commonly known as:  RENAGEL Your next dose is: Today, Tomorrow Other:  _________ Dose:  1600 mg Take 1,600 mg by mouth three (3) times daily (with meals). Refills:  0  
     
   
   
   
  
 simvastatin 40 mg tablet Commonly known as:  ZOCOR Your next dose is: Today, Tomorrow Other:  _________ Dose:  20 mg Take 20 mg by mouth nightly. Refills:  0 VOLTAREN 1 % Gel Generic drug:  diclofenac Your next dose is: Today, Tomorrow Other:  _________ Dose:  4 g Apply 4 g to affected area four (4) times daily. Refills:  0 Where to Get Your Medications Information on where to get these meds will be given to you by the nurse or doctor. ! Ask your nurse or doctor about these medications  
  mineral oil liquid  
 oxyCODONE-acetaminophen 7.5-325 mg per tablet  
 trimethoprim-sulfamethoxazole 160-800 mg per tablet Discharge Instructions 1. Do not drive while on pain medication. You should take a stool softener while on pain medication to prevent constipation. 2.  Do not lift anything greater than 15 pounds for 2 weeks. 3.  You may resume your home medications. 4.  You may shower but do not swim or soak in tub for 2 weeks. 5.  You will need to pack the right buttock wound with saline moistened Nu gauze daily and cover with dry dressing. 6.  Please call [phone] to schedule a follow-up with Dr. Jessica Freeman within 2 weeks. Perineal Abscess: Care Instructions Your Care Instructions A perineal abscess is an infection that causes a painful lump in the perineum. The perineum is the area between the scrotum and the anus in a man. In a woman, it's the area between the vulva and the anus.  The area may look red and feel painful and be swollen. The abscess may form after surgery or after delivery of a baby. It can also be caused by an infection of the prostate gland. The prostate gland is an organ found inside a man's body, just below the bladder. Your doctor may have done minor surgery to open and drain the abscess. You may have had a sedative to help you relax. You may be unsteady after having sedation. It can take a few hours for the medicine's effects to wear off. Common side effects include nausea, vomiting, and feeling sleepy or tired. The doctor has checked you carefully, but problems can develop later. If you notice any problems or new symptoms, get medical treatment right away. Follow-up care is a key part of your treatment and safety. Be sure to make and go to all appointments, and call your doctor if you are having problems. It's also a good idea to know your test results and keep a list of the medicines you take. How can you care for yourself at home? · If your doctor gave you a sedative: ¨ For 24 hours, don't do anything that requires attention to detail. It takes time for the medicine's effects to completely wear off. ¨ For your safety, do not drive or operate any machinery that could be dangerous. Wait until the medicine wears off. You need to be able to think clearly and react easily. · Be safe with medicines. Read and follow all instructions on the label. ¨ If the doctor gave you a prescription medicine for pain, take it as prescribed. ¨ If you are not taking a prescription pain medicine, ask your doctor if you can take an over-the-counter medicine. · If your doctor prescribed antibiotics, take them as directed. Do not stop taking them just because you feel better. You need to take the full course of antibiotics. · Sit in a few inches of warm water (sitz bath) 3 times a day and after bowel movements. The warm water helps the area heal and eases discomfort. When should you call for help? Call 911 anytime you think you may need emergency care. For example, call if: 
· You have trouble breathing. · You passed out (lost consciousness). · You pass maroon or very bloody stools. Call your doctor now or seek immediate medical care if: 
· You have new or worse nausea or vomiting. · Your pain gets worse. · You have a new or higher fever. · You have new or increased swelling. · You have pus in your stool. Watch closely for changes in your health, and be sure to contact your doctor if: 
· You do not get better as expected. Where can you learn more? Go to http://brandan-liang.info/. Enter [id number] in the search box to learn more about \"Perineal Abscess: Care Instructions. \" Current as of: August 9, 2016 Content Version: 11.1 © 9082-1151 [employer]. Care instructions adapted under license by [employer] (which disclaims liability or warranty for this information). If you have questions about a medical condition or this instruction, always ask your healthcare professional. [place] [address] any warranty or liability for your use of this information. DISCHARGE SUMMARY from Nurse The following personal items are in your possession at time of discharge: 
 
Dental Appliances: None Visual Aid: Glasses Home Medications: None Jewelry: Sent home Clothing: Pajamas Other Valuables: None PATIENT INSTRUCTIONS: 
 
After general anesthesia or intravenous sedation, for 24 hours or while taking prescription Narcotics: · Limit your activities · Do not drive and operate hazardous machinery · Do not make important personal or business decisions · Do  not drink alcoholic beverages · If you have not urinated within 8 hours after discharge, please contact your surgeon on call. Report the following to your surgeon: 
· Excessive pain, swelling, redness or odor of or around the surgical area · Temperature over 100.5 · Nausea and vomiting lasting longer than 4 hours or if unable to take medications · Any signs of decreased circulation or nerve impairment to extremity: change in color, persistent  numbness, tingling, coldness or increase pain · Any questions What to do at Home: *  Please give a list of your current medications to your Primary Care Provider. *  Please update this list whenever your medications are discontinued, doses are 
    changed, or new medications (including over-the-counter products) are added. *  Please carry medication information at all times in case of emergency situations. These are general instructions for a healthy lifestyle: No smoking/ No tobacco products/ Avoid exposure to second hand smoke Surgeon General's Warning:  Quitting smoking now greatly reduces serious risk to your health. Obesity, smoking, and sedentary lifestyle greatly increases your risk for illness A healthy diet, regular physical exercise & weight monitoring are important for maintaining a healthy lifestyle You may be retaining fluid if you have a history of heart failure or if you experience any of the following symptoms:  Weight gain of 3 pounds or more overnight or 5 pounds in a week, increased swelling in our hands or feet or shortness of breath while lying flat in bed. Please call your doctor as soon as you notice any of these symptoms; do not wait until your next office visit. Recognize signs and symptoms of STROKE: 
 
F-face looks uneven A-arms unable to move or move unevenly S-speech slurred or non-existent T-time-call 911 as soon as signs and symptoms begin-DO NOT go Back to bed or wait to see if you get better-TIME IS BRAIN. Warning Signs of HEART ATTACK Call 911 if you have these symptoms: 
? Chest discomfort.  Most heart attacks involve discomfort in the center of the chest that lasts more than a few minutes, or that goes away and comes back. It can feel like uncomfortable pressure, squeezing, fullness, or pain. ? Discomfort in other areas of the upper body. Symptoms can include pain or discomfort in one or both arms, the back, neck, jaw, or stomach. ? Shortness of breath with or without chest discomfort. ? Other signs may include breaking out in a cold sweat, nausea, or lightheadedness. Don't wait more than five minutes to call [address]! Fast action can save your life. Calling 911 is almost always the fastest way to get lifesaving treatment. Emergency Medical Services staff can begin treatment when they arrive  up to an hour sooner than if someone gets to the hospital by car. The discharge information has been reviewed with the patient. The patient verbalized understanding. Discharge medications reviewed with the patient and appropriate educational materials and side effects teaching were provided. [employer] Activation Thank you for requesting access to [employer]. Please follow the instructions below to securely access and download your online medical record. [employer] allows you to send messages to your doctor, view your test results, renew your prescriptions, schedule appointments, and more. How Do I Sign Up? 1. In your internet browser, go to [email or web address]. 2. Click on the First Time User? Click Here link in the Sign In box. You will see the New Member Sign Up page. 3. Enter your [employer] Access Code exactly as it appears below. You will not need to use this code after youve completed the sign-up process. If you do not sign up before the expiration date, you must request a new code. [employer] Access Code: [email or web address] Expires: 2017  3:43 PM (This is the date your [employer] access code will ) 4.  Enter the last four digits of your Social Security Number (xxxx) and Date of Birth (mm/dd/yyyy) as indicated and click Submit. You will be taken to the next sign-up page. 5. Create a [employer] ID. This will be your [employer] login ID and cannot be changed, so think of one that is secure and easy to remember. 6. Create a [employer] password. You can change your password at any time. 7. Enter your Password Reset Question and Answer. This can be used at a later time if you forget your password. 8. Enter your e-mail address. You will receive e-mail notification when new information is available in [address] 9. Click Sign Up. You can now view and download portions of your medical record. 10. Click the Download Summary menu link to download a portable copy of your medical information. Additional Information If you have questions, please visit the Frequently Asked Questions section of the [employer] website at [email or web address] Remember, [employer] is NOT to be used for urgent needs. For medical emergencies, dial 911. Discharge Orders None Introducing [facility] SERVICES! Selene Hughes introduces [employer] patient portal. Now you can access parts of your medical record, email your doctor's office, and request medication refills online. 1. In your internet browser, go to [email or web address] 2. Click on the First Time User? Click Here link in the Sign In box. You will see the New Member Sign Up page. 3. Enter your [employer] Access Code exactly as it appears below. You will not need to use this code after youve completed the sign-up process. If you do not sign up before the expiration date, you must request a new code. · [employer] Access Code: [email or web address] Expires: 4/25/2017  3:43 PM 
 
4. Enter the last four digits of your Social Security Number (xxxx) and Date of Birth (mm/dd/yyyy) as indicated and click Submit. You will be taken to the next sign-up page. 5. Create a RxRevu ID. This will be your [employer] login ID and cannot be changed, so think of one that is secure and easy to remember. 6. Create a [employer] password. You can change your password at any time. 7. Enter your Password Reset Question and Answer. This can be used at a later time if you forget your password. 8. Enter your e-mail address. You will receive e-mail notification when new information is available in [address] 9. Click Sign Up. You can now view and download portions of your medical record. 10. Click the Download Summary menu link to download a portable copy of your medical information. If you have questions, please visit the Frequently Asked Questions section of the [employer] website. Remember, [employer] is NOT to be used for urgent needs. For medical emergencies, dial 911. Now available from your iPhone and Android! General Information Please provide this summary of care documentation to your next provider. Patient Signature:  ____________________________________________________________ Date:  ____________________________________________________________  
  
[employer] Provider Signature:  ____________________________________________________________ Date:  ____________________________________________________________

## 2017-02-10 NOTE — ED TRIAGE NOTES
Pt arrives with perineal abscess to bottom that is causing her a lot of pain. Pt had it operated on in November 2016 to drain it @ [facility].

## 2017-02-11 VITALS
RESPIRATION RATE: 18 BRPM | DIASTOLIC BLOOD PRESSURE: 65 MMHG | HEIGHT: 72 IN | TEMPERATURE: 99.1 F | HEART RATE: 80 BPM | BODY MASS INDEX: 34.04 KG/M2 | OXYGEN SATURATION: 95 % | WEIGHT: 251.32 LBS | SYSTOLIC BLOOD PRESSURE: 147 MMHG

## 2017-02-11 LAB
GLUCOSE BLD STRIP.AUTO-MCNC: 155 MG/DL (ref 65–100)
GLUCOSE BLD STRIP.AUTO-MCNC: 167 MG/DL (ref 65–100)
GLUCOSE BLD STRIP.AUTO-MCNC: 276 MG/DL (ref 65–100)
SERVICE CMNT-IMP: ABNORMAL

## 2017-02-11 PROCEDURE — 74011250637 HC RX REV CODE- 250/637: Performed by: NURSE PRACTITIONER

## 2017-02-11 PROCEDURE — 74011636637 HC RX REV CODE- 636/637: Performed by: SURGERY

## 2017-02-11 PROCEDURE — 88304 TISSUE EXAM BY PATHOLOGIST: CPT | Performed by: SURGERY

## 2017-02-11 PROCEDURE — 87075 CULTR BACTERIA EXCEPT BLOOD: CPT | Performed by: SURGERY

## 2017-02-11 PROCEDURE — 99218 HC RM OBSERVATION: CPT

## 2017-02-11 PROCEDURE — 87185 SC STD ENZYME DETCJ PER NZM: CPT | Performed by: SURGERY

## 2017-02-11 PROCEDURE — 77030008684 HC TU ET CUF COVD -B: Performed by: ANESTHESIOLOGY

## 2017-02-11 PROCEDURE — 82962 GLUCOSE BLOOD TEST: CPT

## 2017-02-11 PROCEDURE — 74011250637 HC RX REV CODE- 250/637: Performed by: SURGERY

## 2017-02-11 PROCEDURE — 74011250636 HC RX REV CODE- 250/636

## 2017-02-11 PROCEDURE — 87077 CULTURE AEROBIC IDENTIFY: CPT | Performed by: SURGERY

## 2017-02-11 PROCEDURE — 74011250636 HC RX REV CODE- 250/636: Performed by: SURGERY

## 2017-02-11 PROCEDURE — 74011000250 HC RX REV CODE- 250

## 2017-02-11 PROCEDURE — 74011000250 HC RX REV CODE- 250: Performed by: SURGERY

## 2017-02-11 PROCEDURE — 77030026438 HC STYL ET INTUB CARD -A: Performed by: ANESTHESIOLOGY

## 2017-02-11 PROCEDURE — 87205 SMEAR GRAM STAIN: CPT | Performed by: SURGERY

## 2017-02-11 RX ORDER — ONDANSETRON 2 MG/ML
INJECTION INTRAMUSCULAR; INTRAVENOUS AS NEEDED
Status: DISCONTINUED | OUTPATIENT
Start: 2017-02-11 | End: 2017-02-11 | Stop reason: HOSPADM

## 2017-02-11 RX ORDER — CARVEDILOL 12.5 MG/1
12.5 TABLET ORAL 2 TIMES DAILY WITH MEALS
Status: DISCONTINUED | OUTPATIENT
Start: 2017-02-11 | End: 2017-02-11 | Stop reason: HOSPADM

## 2017-02-11 RX ORDER — SEVELAMER CARBONATE 800 MG/1
1600 TABLET, FILM COATED ORAL
Status: DISCONTINUED | OUTPATIENT
Start: 2017-02-11 | End: 2017-02-11 | Stop reason: HOSPADM

## 2017-02-11 RX ORDER — DIPHENHYDRAMINE HCL 50 MG
50 CAPSULE ORAL
Status: DISCONTINUED | OUTPATIENT
Start: 2017-02-11 | End: 2017-02-11 | Stop reason: HOSPADM

## 2017-02-11 RX ORDER — ALBUTEROL SULFATE 0.83 MG/ML
2.5 SOLUTION RESPIRATORY (INHALATION) AS NEEDED
Status: DISCONTINUED | OUTPATIENT
Start: 2017-02-11 | End: 2017-02-11 | Stop reason: HOSPADM

## 2017-02-11 RX ORDER — MINERAL OIL
30 OIL (ML) ORAL DAILY
Qty: 180 ML | Refills: 0 | Status: SHIPPED | OUTPATIENT
Start: 2017-02-11 | End: 2018-02-13 | Stop reason: DRUGHIGH

## 2017-02-11 RX ORDER — OXYCODONE AND ACETAMINOPHEN 7.5; 325 MG/1; MG/1
1 TABLET ORAL
Status: DISCONTINUED | OUTPATIENT
Start: 2017-02-11 | End: 2017-02-11

## 2017-02-11 RX ORDER — OXYCODONE AND ACETAMINOPHEN 7.5; 325 MG/1; MG/1
1 TABLET ORAL
Qty: 30 TAB | Refills: 0 | Status: SHIPPED | OUTPATIENT
Start: 2017-02-11 | End: 2017-04-19

## 2017-02-11 RX ORDER — AMLODIPINE BESYLATE 5 MG/1
10 TABLET ORAL DAILY
Status: DISCONTINUED | OUTPATIENT
Start: 2017-02-11 | End: 2017-02-11 | Stop reason: HOSPADM

## 2017-02-11 RX ORDER — SODIUM CHLORIDE 9 MG/ML
25 INJECTION, SOLUTION INTRAVENOUS CONTINUOUS
Status: DISCONTINUED | OUTPATIENT
Start: 2017-02-11 | End: 2017-02-11 | Stop reason: HOSPADM

## 2017-02-11 RX ORDER — SIMVASTATIN 20 MG/1
20 TABLET, FILM COATED ORAL
Status: DISCONTINUED | OUTPATIENT
Start: 2017-02-11 | End: 2017-02-11 | Stop reason: HOSPADM

## 2017-02-11 RX ORDER — DIPHENHYDRAMINE HYDROCHLORIDE 50 MG/ML
12.5 INJECTION, SOLUTION INTRAMUSCULAR; INTRAVENOUS AS NEEDED
Status: DISCONTINUED | OUTPATIENT
Start: 2017-02-11 | End: 2017-02-11 | Stop reason: HOSPADM

## 2017-02-11 RX ORDER — LORATADINE 10 MG/1
10 TABLET ORAL EVERY OTHER DAY
Status: DISCONTINUED | OUTPATIENT
Start: 2017-02-11 | End: 2017-02-11 | Stop reason: HOSPADM

## 2017-02-11 RX ORDER — DEXTROSE 50 % IN WATER (D50W) INTRAVENOUS SYRINGE
12.5-25 AS NEEDED
Status: DISCONTINUED | OUTPATIENT
Start: 2017-02-11 | End: 2017-02-11 | Stop reason: HOSPADM

## 2017-02-11 RX ORDER — MINERAL OIL
30 OIL (ML) ORAL DAILY
Status: DISCONTINUED | OUTPATIENT
Start: 2017-02-11 | End: 2017-02-11 | Stop reason: HOSPADM

## 2017-02-11 RX ORDER — SODIUM CHLORIDE, SODIUM LACTATE, POTASSIUM CHLORIDE, CALCIUM CHLORIDE 600; 310; 30; 20 MG/100ML; MG/100ML; MG/100ML; MG/100ML
1000 INJECTION, SOLUTION INTRAVENOUS CONTINUOUS
Status: DISCONTINUED | OUTPATIENT
Start: 2017-02-11 | End: 2017-02-11

## 2017-02-11 RX ORDER — LIDOCAINE HYDROCHLORIDE 20 MG/ML
INJECTION, SOLUTION EPIDURAL; INFILTRATION; INTRACAUDAL; PERINEURAL AS NEEDED
Status: DISCONTINUED | OUTPATIENT
Start: 2017-02-11 | End: 2017-02-11 | Stop reason: HOSPADM

## 2017-02-11 RX ORDER — ONDANSETRON 2 MG/ML
4 INJECTION INTRAMUSCULAR; INTRAVENOUS AS NEEDED
Status: DISCONTINUED | OUTPATIENT
Start: 2017-02-11 | End: 2017-02-11 | Stop reason: HOSPADM

## 2017-02-11 RX ORDER — DEXAMETHASONE SODIUM PHOSPHATE 4 MG/ML
INJECTION, SOLUTION INTRA-ARTICULAR; INTRALESIONAL; INTRAMUSCULAR; INTRAVENOUS; SOFT TISSUE AS NEEDED
Status: DISCONTINUED | OUTPATIENT
Start: 2017-02-11 | End: 2017-02-11 | Stop reason: HOSPADM

## 2017-02-11 RX ORDER — DIAZEPAM 5 MG/1
5 TABLET ORAL
Status: DISCONTINUED | OUTPATIENT
Start: 2017-02-11 | End: 2017-02-11 | Stop reason: HOSPADM

## 2017-02-11 RX ORDER — SULFAMETHOXAZOLE AND TRIMETHOPRIM 800; 160 MG/1; MG/1
1 TABLET ORAL DAILY
Status: DISCONTINUED | OUTPATIENT
Start: 2017-02-11 | End: 2017-02-11 | Stop reason: HOSPADM

## 2017-02-11 RX ORDER — SULFAMETHOXAZOLE AND TRIMETHOPRIM 800; 160 MG/1; MG/1
1 TABLET ORAL EVERY 12 HOURS
Status: DISCONTINUED | OUTPATIENT
Start: 2017-02-11 | End: 2017-02-11

## 2017-02-11 RX ORDER — HYDROMORPHONE HYDROCHLORIDE 1 MG/ML
0.2 INJECTION, SOLUTION INTRAMUSCULAR; INTRAVENOUS; SUBCUTANEOUS
Status: DISCONTINUED | OUTPATIENT
Start: 2017-02-11 | End: 2017-02-11 | Stop reason: HOSPADM

## 2017-02-11 RX ORDER — MIDAZOLAM HYDROCHLORIDE 1 MG/ML
INJECTION, SOLUTION INTRAMUSCULAR; INTRAVENOUS AS NEEDED
Status: DISCONTINUED | OUTPATIENT
Start: 2017-02-11 | End: 2017-02-11 | Stop reason: HOSPADM

## 2017-02-11 RX ORDER — POLYETHYLENE GLYCOL 3350 17 G/17G
17 POWDER, FOR SOLUTION ORAL DAILY
Status: DISCONTINUED | OUTPATIENT
Start: 2017-02-11 | End: 2017-02-11 | Stop reason: HOSPADM

## 2017-02-11 RX ORDER — FENTANYL CITRATE 50 UG/ML
INJECTION, SOLUTION INTRAMUSCULAR; INTRAVENOUS AS NEEDED
Status: DISCONTINUED | OUTPATIENT
Start: 2017-02-11 | End: 2017-02-11 | Stop reason: HOSPADM

## 2017-02-11 RX ORDER — MIDAZOLAM HYDROCHLORIDE 1 MG/ML
0.5 INJECTION, SOLUTION INTRAMUSCULAR; INTRAVENOUS
Status: DISCONTINUED | OUTPATIENT
Start: 2017-02-11 | End: 2017-02-11 | Stop reason: HOSPADM

## 2017-02-11 RX ORDER — BUPIVACAINE HYDROCHLORIDE AND EPINEPHRINE 5; 5 MG/ML; UG/ML
30 INJECTION, SOLUTION EPIDURAL; INTRACAUDAL; PERINEURAL ONCE
Status: COMPLETED | OUTPATIENT
Start: 2017-02-11 | End: 2017-02-11

## 2017-02-11 RX ORDER — SULFAMETHOXAZOLE AND TRIMETHOPRIM 800; 160 MG/1; MG/1
1 TABLET ORAL DAILY
Qty: 10 TAB | Refills: 0 | Status: SHIPPED | OUTPATIENT
Start: 2017-02-11 | End: 2017-04-07 | Stop reason: SDUPTHER

## 2017-02-11 RX ORDER — ROCURONIUM BROMIDE 10 MG/ML
INJECTION, SOLUTION INTRAVENOUS AS NEEDED
Status: DISCONTINUED | OUTPATIENT
Start: 2017-02-11 | End: 2017-02-11 | Stop reason: HOSPADM

## 2017-02-11 RX ORDER — HYDROMORPHONE HYDROCHLORIDE 1 MG/ML
1 INJECTION, SOLUTION INTRAMUSCULAR; INTRAVENOUS; SUBCUTANEOUS
Status: DISCONTINUED | OUTPATIENT
Start: 2017-02-11 | End: 2017-02-11 | Stop reason: HOSPADM

## 2017-02-11 RX ORDER — SUCCINYLCHOLINE CHLORIDE 20 MG/ML
INJECTION INTRAMUSCULAR; INTRAVENOUS AS NEEDED
Status: DISCONTINUED | OUTPATIENT
Start: 2017-02-11 | End: 2017-02-11 | Stop reason: HOSPADM

## 2017-02-11 RX ORDER — NORTRIPTYLINE HYDROCHLORIDE 25 MG/1
25 CAPSULE ORAL
Status: DISCONTINUED | OUTPATIENT
Start: 2017-02-11 | End: 2017-02-11 | Stop reason: HOSPADM

## 2017-02-11 RX ORDER — METRONIDAZOLE 500 MG/100ML
500 INJECTION, SOLUTION INTRAVENOUS EVERY 8 HOURS
Status: DISCONTINUED | OUTPATIENT
Start: 2017-02-11 | End: 2017-02-11 | Stop reason: HOSPADM

## 2017-02-11 RX ORDER — FENTANYL CITRATE 50 UG/ML
25 INJECTION, SOLUTION INTRAMUSCULAR; INTRAVENOUS
Status: DISCONTINUED | OUTPATIENT
Start: 2017-02-11 | End: 2017-02-11 | Stop reason: HOSPADM

## 2017-02-11 RX ORDER — OXYCODONE AND ACETAMINOPHEN 7.5; 325 MG/1; MG/1
1 TABLET ORAL
Status: DISCONTINUED | OUTPATIENT
Start: 2017-02-11 | End: 2017-02-11 | Stop reason: HOSPADM

## 2017-02-11 RX ORDER — FLUOXETINE HYDROCHLORIDE 20 MG/1
20 CAPSULE ORAL DAILY
Status: DISCONTINUED | OUTPATIENT
Start: 2017-02-11 | End: 2017-02-11 | Stop reason: HOSPADM

## 2017-02-11 RX ORDER — GUAIFENESIN 100 MG/5ML
81 LIQUID (ML) ORAL DAILY
Status: DISCONTINUED | OUTPATIENT
Start: 2017-02-11 | End: 2017-02-11 | Stop reason: HOSPADM

## 2017-02-11 RX ORDER — HYDROCODONE BITARTRATE AND ACETAMINOPHEN 5; 325 MG/1; MG/1
1 TABLET ORAL AS NEEDED
Status: DISCONTINUED | OUTPATIENT
Start: 2017-02-11 | End: 2017-02-11 | Stop reason: HOSPADM

## 2017-02-11 RX ORDER — PROPOFOL 10 MG/ML
INJECTION, EMULSION INTRAVENOUS AS NEEDED
Status: DISCONTINUED | OUTPATIENT
Start: 2017-02-11 | End: 2017-02-11 | Stop reason: HOSPADM

## 2017-02-11 RX ORDER — CINACALCET 30 MG/1
30 TABLET, FILM COATED ORAL DAILY
Status: DISCONTINUED | OUTPATIENT
Start: 2017-02-11 | End: 2017-02-11 | Stop reason: HOSPADM

## 2017-02-11 RX ORDER — HYDROCODONE BITARTRATE AND ACETAMINOPHEN 5; 325 MG/1; MG/1
1-2 TABLET ORAL
Status: DISCONTINUED | OUTPATIENT
Start: 2017-02-11 | End: 2017-02-11 | Stop reason: HOSPADM

## 2017-02-11 RX ORDER — MAGNESIUM SULFATE 100 %
4 CRYSTALS MISCELLANEOUS AS NEEDED
Status: DISCONTINUED | OUTPATIENT
Start: 2017-02-11 | End: 2017-02-11 | Stop reason: HOSPADM

## 2017-02-11 RX ADMIN — ONDANSETRON 4 MG: 2 INJECTION INTRAMUSCULAR; INTRAVENOUS at 07:41

## 2017-02-11 RX ADMIN — ONDANSETRON 4 MG: 2 INJECTION INTRAMUSCULAR; INTRAVENOUS at 00:33

## 2017-02-11 RX ADMIN — PROPOFOL 200 MG: 10 INJECTION, EMULSION INTRAVENOUS at 00:13

## 2017-02-11 RX ADMIN — LIDOCAINE HYDROCHLORIDE 100 MG: 20 INJECTION, SOLUTION EPIDURAL; INFILTRATION; INTRACAUDAL; PERINEURAL at 00:09

## 2017-02-11 RX ADMIN — SUCCINYLCHOLINE CHLORIDE 120 MG: 20 INJECTION INTRAMUSCULAR; INTRAVENOUS at 00:13

## 2017-02-11 RX ADMIN — SEVELAMER CARBONATE 1600 MG: 800 TABLET, FILM COATED ORAL at 12:17

## 2017-02-11 RX ADMIN — LORATADINE 10 MG: 10 TABLET ORAL at 08:31

## 2017-02-11 RX ADMIN — HUMAN INSULIN 5 UNITS: 100 INJECTION, SOLUTION SUBCUTANEOUS at 12:29

## 2017-02-11 RX ADMIN — METRONIDAZOLE 500 MG: 500 INJECTION, SOLUTION INTRAVENOUS at 08:32

## 2017-02-11 RX ADMIN — SODIUM CHLORIDE, SODIUM LACTATE, POTASSIUM CHLORIDE, AND CALCIUM CHLORIDE 125 ML/HR: 600; 310; 30; 20 INJECTION, SOLUTION INTRAVENOUS at 07:40

## 2017-02-11 RX ADMIN — SULFAMETHOXAZOLE AND TRIMETHOPRIM 1 TABLET: 800; 160 TABLET ORAL at 09:50

## 2017-02-11 RX ADMIN — HUMAN INSULIN 2 UNITS: 100 INJECTION, SOLUTION SUBCUTANEOUS at 07:09

## 2017-02-11 RX ADMIN — FLUOXETINE 20 MG: 20 CAPSULE ORAL at 09:51

## 2017-02-11 RX ADMIN — POLYETHYLENE GLYCOL 3350 17 G: 17 POWDER, FOR SOLUTION ORAL at 09:49

## 2017-02-11 RX ADMIN — MIDAZOLAM HYDROCHLORIDE 2 MG: 1 INJECTION, SOLUTION INTRAMUSCULAR; INTRAVENOUS at 00:08

## 2017-02-11 RX ADMIN — HYDROMORPHONE HYDROCHLORIDE 1 MG: 1 INJECTION, SOLUTION INTRAMUSCULAR; INTRAVENOUS; SUBCUTANEOUS at 07:17

## 2017-02-11 RX ADMIN — CINACALCET HYDROCHLORIDE 30 MG: 30 TABLET, COATED ORAL at 12:30

## 2017-02-11 RX ADMIN — DEXAMETHASONE SODIUM PHOSPHATE 4 MG: 4 INJECTION, SOLUTION INTRA-ARTICULAR; INTRALESIONAL; INTRAMUSCULAR; INTRAVENOUS; SOFT TISSUE at 00:33

## 2017-02-11 RX ADMIN — AMLODIPINE BESYLATE 10 MG: 5 TABLET ORAL at 09:51

## 2017-02-11 RX ADMIN — ONDANSETRON 4 MG: 2 INJECTION INTRAMUSCULAR; INTRAVENOUS at 12:28

## 2017-02-11 RX ADMIN — CARVEDILOL 12.5 MG: 12.5 TABLET, FILM COATED ORAL at 08:31

## 2017-02-11 RX ADMIN — SEVELAMER CARBONATE 1600 MG: 800 TABLET, FILM COATED ORAL at 08:31

## 2017-02-11 RX ADMIN — ROCURONIUM BROMIDE 10 MG: 10 INJECTION, SOLUTION INTRAVENOUS at 00:13

## 2017-02-11 RX ADMIN — MINERAL OIL 30 ML: 99.9 LIQUID ORAL; TOPICAL at 12:30

## 2017-02-11 RX ADMIN — ASPIRIN 81 MG CHEWABLE TABLET 81 MG: 81 TABLET CHEWABLE at 09:50

## 2017-02-11 RX ADMIN — FENTANYL CITRATE 100 MCG: 50 INJECTION, SOLUTION INTRAMUSCULAR; INTRAVENOUS at 00:06

## 2017-02-11 RX ADMIN — HYDROMORPHONE HYDROCHLORIDE 1 MG: 1 INJECTION, SOLUTION INTRAMUSCULAR; INTRAVENOUS; SUBCUTANEOUS at 12:34

## 2017-02-11 NOTE — DISCHARGE INSTRUCTIONS
1.  Do not drive while on pain medication. You should take a stool softener while on pain medication to prevent constipation. 2.  Do not lift anything greater than 15 pounds for 2 weeks. 3.  You may resume your home medications. 4.  You may shower but do not swim or soak in tub for 2 weeks. 5.  You will need to pack the right buttock wound with saline moistened Nu gauze daily and cover with dry dressing. 6.  Please call [phone] to schedule a follow-up with Dr. Kelly Moran within 2 weeks. Perineal Abscess: Care Instructions  Your Care Instructions  A perineal abscess is an infection that causes a painful lump in the perineum. The perineum is the area between the scrotum and the anus in a man. In a woman, it's the area between the vulva and the anus. The area may look red and feel painful and be swollen. The abscess may form after surgery or after delivery of a baby. It can also be caused by an infection of the prostate gland. The prostate gland is an organ found inside a man's body, just below the bladder. Your doctor may have done minor surgery to open and drain the abscess. You may have had a sedative to help you relax. You may be unsteady after having sedation. It can take a few hours for the medicine's effects to wear off. Common side effects include nausea, vomiting, and feeling sleepy or tired. The doctor has checked you carefully, but problems can develop later. If you notice any problems or new symptoms, get medical treatment right away. Follow-up care is a key part of your treatment and safety. Be sure to make and go to all appointments, and call your doctor if you are having problems. It's also a good idea to know your test results and keep a list of the medicines you take. How can you care for yourself at home? · If your doctor gave you a sedative:  ¨ For 24 hours, don't do anything that requires attention to detail. It takes time for the medicine's effects to completely wear off.   ¨ For your safety, do not drive or operate any machinery that could be dangerous. Wait until the medicine wears off. You need to be able to think clearly and react easily. · Be safe with medicines. Read and follow all instructions on the label. ¨ If the doctor gave you a prescription medicine for pain, take it as prescribed. ¨ If you are not taking a prescription pain medicine, ask your doctor if you can take an over-the-counter medicine. · If your doctor prescribed antibiotics, take them as directed. Do not stop taking them just because you feel better. You need to take the full course of antibiotics. · Sit in a few inches of warm water (sitz bath) 3 times a day and after bowel movements. The warm water helps the area heal and eases discomfort. When should you call for help? Call 911 anytime you think you may need emergency care. For example, call if:  · You have trouble breathing. · You passed out (lost consciousness). · You pass maroon or very bloody stools. Call your doctor now or seek immediate medical care if:  · You have new or worse nausea or vomiting. · Your pain gets worse. · You have a new or higher fever. · You have new or increased swelling. · You have pus in your stool. Watch closely for changes in your health, and be sure to contact your doctor if:  · You do not get better as expected. Where can you learn more? Go to http://brandan-liang.info/. Enter [phone] [id number] in the search box to learn more about \"Perineal Abscess: Care Instructions. \"  Current as of: August 9, 2016  Content Version: 11.1  © 4300-6461 [employer]. Care instructions adapted under license by [employer] (which disclaims liability or warranty for this information). If you have questions about a medical condition or this instruction, always ask your healthcare professional. [place] [address] any warranty or liability for your use of this information.         DISCHARGE SUMMARY from Nurse    The following personal items are in your possession at time of discharge:    Dental Appliances: None  Visual Aid: Glasses     Home Medications: None  Jewelry: Sent home  Clothing: Pajamas  Other Valuables: None             PATIENT INSTRUCTIONS:    After general anesthesia or intravenous sedation, for 24 hours or while taking prescription Narcotics:  · Limit your activities  · Do not drive and operate hazardous machinery  · Do not make important personal or business decisions  · Do  not drink alcoholic beverages  · If you have not urinated within 8 hours after discharge, please contact your surgeon on call. Report the following to your surgeon:  · Excessive pain, swelling, redness or odor of or around the surgical area  · Temperature over 100.5  · Nausea and vomiting lasting longer than 4 hours or if unable to take medications  · Any signs of decreased circulation or nerve impairment to extremity: change in color, persistent  numbness, tingling, coldness or increase pain  · Any questions        What to do at Home:  *  Please give a list of your current medications to your Primary Care Provider. *  Please update this list whenever your medications are discontinued, doses are      changed, or new medications (including over-the-counter products) are added. *  Please carry medication information at all times in case of emergency situations. These are general instructions for a healthy lifestyle:    No smoking/ No tobacco products/ Avoid exposure to second hand smoke    Surgeon General's Warning:  Quitting smoking now greatly reduces serious risk to your health.     Obesity, smoking, and sedentary lifestyle greatly increases your risk for illness    A healthy diet, regular physical exercise & weight monitoring are important for maintaining a healthy lifestyle    You may be retaining fluid if you have a history of heart failure or if you experience any of the following symptoms: Weight gain of 3 pounds or more overnight or 5 pounds in a week, increased swelling in our hands or feet or shortness of breath while lying flat in bed. Please call your doctor as soon as you notice any of these symptoms; do not wait until your next office visit. Recognize signs and symptoms of STROKE:    F-face looks uneven    A-arms unable to move or move unevenly    S-speech slurred or non-existent    T-time-call 911 as soon as signs and symptoms begin-DO NOT go       Back to bed or wait to see if you get better-TIME IS BRAIN. Warning Signs of HEART ATTACK     Call 911 if you have these symptoms:   Chest discomfort. Most heart attacks involve discomfort in the center of the chest that lasts more than a few minutes, or that goes away and comes back. It can feel like uncomfortable pressure, squeezing, fullness, or pain.  Discomfort in other areas of the upper body. Symptoms can include pain or discomfort in one or both arms, the back, neck, jaw, or stomach.  Shortness of breath with or without chest discomfort.  Other signs may include breaking out in a cold sweat, nausea, or lightheadedness. Don't wait more than five minutes to call 911 - MINUTES MATTER! Fast action can save your life. Calling 911 is almost always the fastest way to get lifesaving treatment. Emergency Medical Services staff can begin treatment when they arrive -- up to an hour sooner than if someone gets to the hospital by car. The discharge information has been reviewed with the patient. The patient verbalized understanding. Discharge medications reviewed with the patient and appropriate educational materials and side effects teaching were provided. Haileo Activation    Thank you for requesting access to [facility]. Please follow the instructions below to securely access and download your online medical record.  Haileo allows you to send messages to your doctor, view your test results, renew your prescriptions, schedule appointments, and more. How Do I Sign Up? 1. In your internet browser, go to [email or web address]. 2. Click on the First Time User? Click Here link in the Sign In box. You will see the New Member Sign Up page. 3. Enter your Pickie Access Code exactly as it appears below. You will not need to use this code after youve completed the sign-up process. If you do not sign up before the expiration date, you must request a new code. Pickie Access Code: [email or web address]  Expires: 2017  3:43 PM (This is the date your Pickie access code will )    4. Enter the last four digits of your Social Security Number (xxxx) and Date of Birth (mm/dd/yyyy) as indicated and click Submit. You will be taken to the next sign-up page. 5. Create a Pickie ID. This will be your Pickie login ID and cannot be changed, so think of one that is secure and easy to remember. 6. Create a Pickie password. You can change your password at any time. 7. Enter your Password Reset Question and Answer. This can be used at a later time if you forget your password. 8. Enter your e-mail address. You will receive e-mail notification when new information is available in [address] 9. Click Sign Up. You can now view and download portions of your medical record. 10. Click the Download Summary menu link to download a portable copy of your medical information. Additional Information    If you have questions, please visit the Frequently Asked Questions section of the Pickie website at [email or web address] Remember, Pickie is NOT to be used for urgent needs. For medical emergencies, dial 911.

## 2017-02-11 NOTE — PERIOP NOTES
TRANSFER - OUT REPORT:    Verbal report given to Rossana(name) on Irvin Cao  being transferred to [facility](unit) for routine post - op       Report consisted of patients Situation, Background, Assessment and   Recommendations(SBAR). Information from the following report(s) SBAR, Kardex, OR Summary, Intake/Output and MAR was reviewed with the receiving nurse. Lines:   Peripheral IV 02/10/17 Right Forearm (Active)   Site Assessment Clean, dry, & intact 2/11/2017  1:31 AM   Phlebitis Assessment 0 2/11/2017  1:31 AM   Infiltration Assessment 1 2/11/2017  1:31 AM   Dressing Status Occlusive 2/11/2017  1:31 AM   Dressing Type Transparent 2/11/2017  1:31 AM   Hub Color/Line Status Pink; Infusing 2/11/2017  1:31 AM   Action Taken Blood drawn 2/10/2017  3:25 PM   Alcohol Cap Used Yes 2/10/2017  8:23 PM        Opportunity for questions and clarification was provided.       Patient transported with:   Registered Nurse

## 2017-02-11 NOTE — ROUTINE PROCESS
Patient: Lior Ordonez MRN: [id number]  SSN: [id number]   YOB: 1950  Age: 78 y.o. Sex: female     Patient is status post Procedure(s):  I&D PERIANAL ABSCESS .     Surgeon(s) and Role:     Walter Esteban MD - Primary    Local/Dose/Irrigation:  Marcaine 0.5% with epi 30 ml                  Peripheral IV 02/10/17 Right Forearm (Active)   Site Assessment Clean, dry, & intact 2/10/2017 11:10 PM   Phlebitis Assessment 0 2/10/2017 11:10 PM   Infiltration Assessment 0 2/10/2017 11:10 PM   Dressing Status Clean, dry, & intact 2/10/2017  8:23 PM   Dressing Type Transparent 2/10/2017  8:23 PM   Hub Color/Line Status Infusing 2/10/2017  8:56 PM   Action Taken Blood drawn 2/10/2017  3:25 PM   Alcohol Cap Used Yes 2/10/2017  8:23 PM            Airway - Endotracheal Tube 02/11/17 Oral (Active)                   Dressing/Packing:  Wound Buttocks Right-DRESSING TYPE: 4 x 4;Packing;Special tape (comment) (packing soaked in marcaine 0.5% 10 ml) (02/11/17 0052)  Splint/Cast:  ]    Other:  scd to right leg; skin tear to left leg pre-existing; AV fistula to left upper arm

## 2017-02-11 NOTE — PROGRESS NOTES
Primary Nurse Pelon Aneudy and Ari Guzman RN performed a dual skin assessment on this patient Impairment noted- see wound doc flow sheet    L leg wound  Incisional wound on R buttocks    Rob score is 19

## 2017-02-11 NOTE — ROUTINE PROCESS
TRANSFER - OUT REPORT:    Verbal report given to Corinne, RN(name) on Dock Falling  being transferred toE(unit) for routine progression of care       Report consisted of patients Situation, Background, Assessment and   Recommendations(SBAR). Information from the following report(s) SBAR, ED Summary, [facility] and Recent Results was reviewed with the receiving nurse. Lines:   Peripheral IV 02/10/17 Right Forearm (Active)   Site Assessment Clean, dry, & intact 2/10/2017  3:25 PM   Phlebitis Assessment 0 2/10/2017  3:25 PM   Infiltration Assessment 0 2/10/2017  3:25 PM   Dressing Status Clean, dry, & intact 2/10/2017  3:25 PM   Dressing Type Tape;Transparent 2/10/2017  3:25 PM   Hub Color/Line Status Pink;Capped;Flushed;Patent 2/10/2017  3:25 PM   Action Taken Blood drawn 2/10/2017  3:25 PM        Opportunity for questions and clarification was provided.       Patient transported with:   [employer]

## 2017-02-11 NOTE — ANESTHESIA POSTPROCEDURE EVALUATION
Post-Anesthesia Evaluation and Assessment    Patient: Digna Charles MRN: [id number]  SSN: [id number]    YOB: 1950  Age: 78 y.o. Sex: female       Cardiovascular Function/Vital Signs  Visit Vitals    /81 (BP 1 Location: Right arm, BP Patient Position: At rest)    Pulse 77    Temp 36.4 °C (97.6 °F)    Resp 16    Ht 6' 1\" (1.854 m)    Wt 114 kg (251 lb 5.2 oz)    SpO2 100%    Breastfeeding No    BMI 33.16 kg/m2       Patient is status post general anesthesia for Procedure(s):  I&D PERIANAL ABSCESS . Nausea/Vomiting: None    Postoperative hydration reviewed and adequate. Pain:  Pain Scale 1: Visual (02/11/17 0518)  Pain Intensity 1: 0 (02/11/17 0518)   Managed    Neurological Status:   Neuro (WDL): Exceptions to WDL (02/11/17 0112)  Neuro  Neurologic State: Drowsy (02/11/17 0220)   At baseline    Mental Status and Level of Consciousness: Arousable    Pulmonary Status:   O2 Device: Nasal cannula (02/11/17 0518)   Adequate oxygenation and airway patent    Complications related to anesthesia: None    Post-anesthesia assessment completed.  No concerns    Signed By: Henry Laura MD     February 11, 2017

## 2017-02-11 NOTE — PROGRESS NOTES
Care manager in to see pt and her daughter, per note appears that home care has been set up. Did not actually speak with care manager after she had been in to see pt. Written and verbal instructions given to pt and daughter along with three prescriptions, and written instructions for each. [t will have home care to provide wound care but supplies also provided for pt's daughter that was instructed on wound care by Dr Axel Carr. Daughter verbalizes comfort with instructions given.

## 2017-02-11 NOTE — PROGRESS NOTES
Daily Progress Note  [facility] at [address] Date: 2/10/2017  Post-Operative Day: 1 from Procedure(s):  I&D PERIANAL ABSCESS      Subjective:     Last 24 hrs: Feeling better than she has in 3 months. Pain well controlled with PO meds. C/o hoarse voice and feeling hungry. Has not been OOB yet. Gets dialysis MWF. She did have dialysis yesterday. On bactrim, afebrile. Objective:     Blood pressure 143/81, pulse 77, temperature 97.6 °F (36.4 °C), resp. rate 16, height 6' 1\" (1.854 m), weight 114 kg (251 lb 5.2 oz), SpO2 100 %, not currently breastfeeding. Temp (24hrs), Av.1 °F (36.7 °C), Min:97.5 °F (36.4 °C), Max:98.7 °F (37.1 °C)      _____________________  Physical Exam:     Alert and Oriented, lying in bed, in no acute distress. Cardiovascular: RRR, no peripheral edema  Lungs:CTAB   Perineum: wound with packing, moderate amount of dried ss drainage on dressing. Appropriately tender. Assessment:   Perianal abscess, s/p I&D    ESRD, dialysis dependent on MWF schedule        Plan:     Adjust bactrim q 24 hr dosing due to renal status per pharmacy recs  Diet as tolerated  Continue local wound care          Alfa Deleon, [address]  Surgery at [place] [address],  [facility], [address] [place] [address]  [phone]    Data Review:    Recent Labs      02/10/17   1527   WBC  7.0   HGB  11.9   HCT  37.9   PLT  311     Recent Labs      02/10/17   1527   NA  135*   K  3.7   CL  95*   CO2  34*   GLU  189*   BUN  25*   CREA  4.54*   CA  9.3   ALB  3.3*   TBILI  0.4   SGOT  25   ALT  19     No results for input(s): AML, LPSE in the last 72 hours.         ______________________  Medications:    Current Facility-Administered Medications   Medication Dose Route Frequency    amLODIPine (NORVASC) tablet 10 mg  10 mg Oral DAILY    aspirin chewable tablet 81 mg  81 mg Oral DAILY    carvedilol (COREG) tablet 12.5 mg  12.5 mg Oral BID WITH MEALS    cinacalcet (SENSIPAR) tablet 30 mg  30 mg Oral DAILY    diazePAM (VALIUM) tablet 5 mg  5 mg Oral Q8H PRN    diphenhydrAMINE (BENADRYL) capsule 50 mg  50 mg Oral Q6H PRN    FLUoxetine (PROzac) capsule 20 mg  20 mg Oral DAILY    loratadine (CLARITIN) tablet 10 mg  10 mg Oral EVERY OTHER DAY    . PHARMACY TO SUBSTITUTE PER PROTOCOL    Per Protocol    nortriptyline (PAMELOR) capsule 25 mg  25 mg Oral QHS    polyethylene glycol (MIRALAX) packet 17 g  17 g Oral DAILY    sevelamer carbonate (RENVELA) tab 1,600 mg  1,600 mg Oral TID WITH MEALS    simvastatin (ZOCOR) tablet 20 mg  20 mg Oral QHS    metroNIDAZOLE (FLAGYL) IVPB premix 500 mg  500 mg IntraVENous Q8H    mineral oil 30 mL  30 mL Oral DAILY    HYDROmorphone (PF) (DILAUDID) injection 1 mg  1 mg IntraVENous Q3H PRN    insulin regular (NOVOLIN R, HUMULIN R) injection   SubCUTAneous AC&HS    glucose chewable tablet 16 g  4 Tab Oral PRN    dextrose (D50W) injection syrg 12.5-25 g  12.5-25 g IntraVENous PRN    glucagon (GLUCAGEN) injection 1 mg  1 mg IntraMUSCular PRN    HYDROcodone-acetaminophen (NORCO) 5-325 mg per tablet 1-2 Tab  1-2 Tab Oral Q4H PRN    oxyCODONE-acetaminophen (PERCOCET 7.5) 7.5-325 mg per tablet 1 Tab  1 Tab Oral Q4H PRN    trimethoprim-sulfamethoxazole (BACTRIM DS, SEPTRA DS) 160-800 mg per tablet 1 Tab  1 Tab Oral DAILY    HYDROmorphone (PF) (DILAUDID) injection 1 mg  1 mg IntraVENous Q3H PRN    ondansetron (ZOFRAN) injection 4 mg  4 mg IntraVENous Q4H PRN    lactated ringers infusion  75 mL/hr IntraVENous CONTINUOUS         ADDENDUM:  Renee Kauffman MD    Pt seen and examined. No acute surgical issues. Wound care performed at bedside. Continue local wound care with saline moistened Nu Gauze packing daily. Plan DC home this afternoon.

## 2017-02-11 NOTE — PROGRESS NOTES
Patient has been accepted by [facility] and will be seen on Monday. Have left a vm for patient's daughter, Cole Apodaca. [phone].  Bhavik Hills RN
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
## 2017-02-11 NOTE — PROGRESS NOTES
Care manager paged multiple times as well as paged through the . Charge nurse asking if pt is going home, informed her of multiple pages placed to care management and she states \"oh, they have called back and talked to other nurses\" .  notified of call placed to care manager when first paged.

## 2017-02-11 NOTE — PERIOP NOTES
Received patient  From OR via bed 4A. Turned to left side to check dressing on rectum. VSS,suctioned orally for clear sputum.

## 2017-02-11 NOTE — PROGRESS NOTES
Care Management Interventions  PCP Verified by CM: Yes  Last Visit to PCP: 02/08/17  Palliative Care Consult (Criteria: CHF and RRAT>21): No  Reason for No Palliative Care Consult: Other (see comment) (Patient to return home with daughter and home health for wound care.)  Mode of Transport at Discharge: Other (see comment) (Family to transport.)  Transition of Care Consult (CM Consult): [address]: No  Reason Outside [place]:  (Patient and daughter have already been in contact with [facility].)  Physical Therapy Consult: No  Occupational Therapy Consult: No  Speech Therapy Consult: No  Current Support Network: Own Home  Confirm Follow Up Transport: Family  Plan discussed with Pt/Family/Caregiver: Yes  Freedom of Choice Offered: Yes  Discharge Location  Discharge Placement: Home with home health    Met with patient and her daughter Joycelyn Bueno at the bedside to assist with transition of care. Ms. Jared Chanel will need daily wound care and has a ramp to get into her home. Patient's daughter, Joycelyn Bueno has been in contact with [facility] and has asked CM to send clinical information to this agency. She also plans to reach out to [facility] to inform her of her mother's needs. Address and contact phone numbers have been verified. Referral to be made to [facility] via [employer]. Will update nursing. Left word to have nurse call me as she was in another patient's room.     Laci Mcdonough RN

## 2017-02-11 NOTE — BRIEF OP NOTE
BRIEF OPERATIVE NOTE    Date of Procedure: 2/10/2017   Preoperative Diagnosis: RIGHT PERIANAL ABSCESS   Postoperative Diagnosis: RIGHT PERIANAL ABSCESS     Procedure(s):  I&D PERIANAL ABSCESS   Surgeon(s) and Role:     * Obed Conley MD - Primary            Surgical Staff:  Circ-1: Rhianna Wilson  Circ-Relief: Meron Robbins RN  Scrub Tech-1: Oliviadeking Andria  Event Time In   Incision Start 0034   Incision Close 0056     Anesthesia: General   Estimated Blood Loss: Minimal  Specimens:   ID Type Source Tests Collected by Time Destination   1 : right gluteal ulcer Fresh Buttock  Obed Conley MD 2/11/2017 0041 Pathology   1 : right perianal abscess Wound Anus ANAEROBIC/AEROBIC/GRAM STAIN Obed Conley MD 2/11/2017 0041 Microbiology      Findings: There was a 3 cm abscess pocket with minimal seropurulent fluid and fat necrosis.    Complications: None  Implants: * No implants in log *

## 2017-02-11 NOTE — PROGRESS NOTES
Bedside shift change report given to [facility] (oncoming nurse) by Pavel Sumner RN (offgoing nurse). Report included the following information SBAR, Kardex, Intake/Output and MAR.

## 2017-02-11 NOTE — ANESTHESIA PREPROCEDURE EVALUATION
Anesthetic History     PONV          Review of Systems / Medical History  Patient summary reviewed, nursing notes reviewed and pertinent labs reviewed    Pulmonary            Asthma        Neuro/Psych              Cardiovascular    Hypertension              Exercise tolerance: <4 METS  Comments: Wheelchair bound   GI/Hepatic/Renal     GERD: well controlled    Renal disease: ESRD and dialysis      Comments: HD M,W,F, dialysis today Endo/Other    Diabetes: well controlled, type 2, using insulin  Hypothyroidism  Morbid obesity and arthritis     Other Findings              Physical Exam    Airway  Mallampati: II  TM Distance: > 6 cm  Neck ROM: normal range of motion   Mouth opening: Normal     Cardiovascular  Regular rate and rhythm,  S1 and S2 normal,  no murmur, click, rub, or gallop             Dental    Dentition: Lower partial plate and Upper partial plate     Pulmonary  Breath sounds clear to auscultation               Abdominal  GI exam deferred       Other Findings            Anesthetic Plan    ASA: 3  Anesthesia type: general          Induction: Intravenous  Anesthetic plan and risks discussed with: Patient

## 2017-02-13 LAB
BACTERIA SPEC CULT: NORMAL
GRAM STN SPEC: NORMAL
GRAM STN SPEC: NORMAL
SERVICE CMNT-IMP: NORMAL

## 2017-02-14 LAB
BACTERIA SPEC CULT: ABNORMAL
SERVICE CMNT-IMP: ABNORMAL

## 2017-02-16 LAB
BACTERIA SPEC CULT: NORMAL
SERVICE CMNT-IMP: NORMAL

## 2017-02-20 ENCOUNTER — APPOINTMENT (OUTPATIENT)
Dept: CT IMAGING | Age: 67
End: 2017-02-20
Attending: EMERGENCY MEDICINE
Payer: MEDICARE

## 2017-02-20 ENCOUNTER — HOSPITAL ENCOUNTER (EMERGENCY)
Age: 67
Discharge: HOME | End: 2017-02-21
Attending: EMERGENCY MEDICINE
Payer: MEDICARE

## 2017-02-20 DIAGNOSIS — S96.912A STRAIN OF ANKLE, LEFT, INITIAL ENCOUNTER: ICD-10-CM
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
## 2017-02-20 DIAGNOSIS — S46.912A STRAIN OF UPPER ARM, LEFT, INITIAL ENCOUNTER: ICD-10-CM

## 2017-02-20 DIAGNOSIS — S86.912A STRAIN OF KNEE, LEFT, INITIAL ENCOUNTER: ICD-10-CM

## 2017-02-20 PROCEDURE — 99283 EMERGENCY DEPT VISIT LOW MDM: CPT

## 2017-02-20 PROCEDURE — L4350 ANKLE CONTROL ORTHO PRE OTS: HCPCS

## 2017-02-20 PROCEDURE — L0172 CERV COL SR FOAM 2PC PRE OTS: HCPCS

## 2017-02-20 PROCEDURE — 72125 CT NECK SPINE W/O DYE: CPT

## 2017-02-20 PROCEDURE — 70450 CT HEAD/BRAIN W/O DYE: CPT

## 2017-02-20 NOTE — OP NOTES
[address] [facility] [address] [place] [address], [address]   OP NOTE       Name:  Mark Anthony Davis   MR#:  [id number]   :  1950   Account #:  [de-identified]    Surgery Date:  2017   Date of Adm:  02/10/2017       PREOPERATIVE DIAGNOSIS:   Right perianal abscess. POSTOPERATIVE DIAGNOSIS:   Right perianal abscess. PROCEDURES PERFORMED:   Incision and drainage of right perianal   abscess. PRIMARY SURGEON: Vladimir Garcai MD     ESTIMATED BLOOD LOSS:  Minimal.     SPECIMENS REMOVED:     1. Right gluteal ulcer tissue. 2. Right perianal abscess. ANESTHESIA:   General endotracheal anesthesia. FINDINGS: There was a 3 cm abscess pocket with minimal   seropurulent fluid and fat necrosis. COMPLICATIONS: None. DRAINS AND TUBES: None. INDICATION FOR OPERATION: The is a 59-year-old woman who was   recently seen at an outside hospital and had an incision and drainage   performed. The patient reported since the drainage she has been   continuing to have significant pain to her buttock area and feel   constipated. She was seen by Dr. Jimmy Gilliland, who started her on a course   of antibiotics, which initially improved; however, once the patient had   completed her course of antibiotic, the pain worsened. Given these   findings, the decision was made to take her to the operating room for   an incision and drainage of a recurrent abscess. DESCRIPTION OF PROCEDURE: After informed consent was   obtained from the patient, the patient was taken to the operating room   and placed in supine position on the operating room table. She   underwent general anesthesia with endotracheal intubation without any   complication. She was then turned over to the prone jackknife position. The lower back and buttock, including the perineum was then prepped   and draped in the usual sterile surgical fashion. A surgical time-out   was performed.  The patient received antibiotics 30 minutes prior to   skin incision. After the time-out was performed, a longitudinal incision was made   over the previous incision and drainage site. The incision was   approximately 3 cm in length. We then dissected down using   electrocautery. A tonsil was then used to enter the abscess pocket. There was seropurulent fluid expressed from this site. The patient was   noted to have some ulceration around the previous incision site. This   was sent to Pathology. We then sent cultures of the abscess pocket. The abscess pocket was then copiously irrigated with saline and   bacitracin. It was then infiltrated with 0.25% bupivacaine. The wound   was then packed with 1/4-inch Nu Gauze packing. It was then covered   with a dry dressing and secured with tape. The patient tolerated the procedure well. There were no complications   associated with the operation. Dr. Katarzyna Wick, the attending surgeon,   was present during the entirety of the case. All lap, needle, and   instrument counts were correct x2. The patient was then turned back to   the supine position. She was then extubated without any complication. The patient was transferred to the PACU in stable condition.         Prabhjot Conner MD      SS / MS   D:  02/20/2017   08:58   T:  02/20/2017   10:11   Job #:  [id number]

## 2017-02-21 ENCOUNTER — OFFICE VISIT (OUTPATIENT)
Dept: SURGERY | Age: 67
End: 2017-02-21

## 2017-02-21 ENCOUNTER — APPOINTMENT (OUTPATIENT)
Dept: GENERAL RADIOLOGY | Age: 67
End: 2017-02-21
Attending: PHYSICIAN ASSISTANT
Payer: MEDICARE

## 2017-02-21 VITALS
HEART RATE: 75 BPM | BODY MASS INDEX: 33.83 KG/M2 | RESPIRATION RATE: 18 BRPM | WEIGHT: 249.78 LBS | SYSTOLIC BLOOD PRESSURE: 155 MMHG | HEIGHT: 72 IN | DIASTOLIC BLOOD PRESSURE: 72 MMHG | OXYGEN SATURATION: 95 % | TEMPERATURE: 98.5 F

## 2017-02-21 VITALS
OXYGEN SATURATION: 94 % | HEIGHT: 72 IN | WEIGHT: 249 LBS | SYSTOLIC BLOOD PRESSURE: 130 MMHG | TEMPERATURE: 98.4 F | BODY MASS INDEX: 33.72 KG/M2 | DIASTOLIC BLOOD PRESSURE: 80 MMHG | RESPIRATION RATE: 20 BRPM | HEART RATE: 78 BPM

## 2017-02-21 DIAGNOSIS — L02.215 PERINEAL ABSCESS: Primary | ICD-10-CM

## 2017-02-21 PROCEDURE — 73562 X-RAY EXAM OF KNEE 3: CPT

## 2017-02-21 PROCEDURE — 74011250637 HC RX REV CODE- 250/637: Performed by: PHYSICIAN ASSISTANT

## 2017-02-21 PROCEDURE — 73060 X-RAY EXAM OF HUMERUS: CPT

## 2017-02-21 PROCEDURE — 73610 X-RAY EXAM OF ANKLE: CPT

## 2017-02-21 RX ORDER — DIPHENHYDRAMINE HCL 25 MG
25 CAPSULE ORAL
Status: COMPLETED | OUTPATIENT
Start: 2017-02-21 | End: 2017-02-21

## 2017-02-21 RX ORDER — FLUCONAZOLE 150 MG/1
150 TABLET ORAL DAILY
Qty: 1 TAB | Refills: 0 | Status: SHIPPED | OUTPATIENT
Start: 2017-02-21 | End: 2017-02-22

## 2017-02-21 RX ORDER — OXYCODONE AND ACETAMINOPHEN 7.5; 325 MG/1; MG/1
1 TABLET ORAL
Status: COMPLETED | OUTPATIENT
Start: 2017-02-21 | End: 2017-02-21

## 2017-02-21 RX ORDER — DIPHENHYDRAMINE HCL 25 MG
CAPSULE ORAL
Status: DISCONTINUED
Start: 2017-02-21 | End: 2017-02-21 | Stop reason: HOSPADM

## 2017-02-21 RX ORDER — ONDANSETRON 4 MG/1
TABLET, ORALLY DISINTEGRATING ORAL
Status: DISCONTINUED
Start: 2017-02-21 | End: 2017-02-21 | Stop reason: HOSPADM

## 2017-02-21 RX ORDER — ONDANSETRON 4 MG/1
4 TABLET, ORALLY DISINTEGRATING ORAL ONCE
Status: COMPLETED | OUTPATIENT
Start: 2017-02-21 | End: 2017-02-21

## 2017-02-21 RX ORDER — OXYCODONE AND ACETAMINOPHEN 7.5; 325 MG/1; MG/1
1 TABLET ORAL
Qty: 20 TAB | Refills: 0 | Status: SHIPPED | OUTPATIENT
Start: 2017-02-21 | End: 2017-04-14

## 2017-02-21 RX ADMIN — ONDANSETRON 4 MG: 4 TABLET, ORALLY DISINTEGRATING ORAL at 01:06

## 2017-02-21 RX ADMIN — DIPHENHYDRAMINE HYDROCHLORIDE 25 MG: 25 CAPSULE ORAL at 01:06

## 2017-02-21 RX ADMIN — OXYCODONE HYDROCHLORIDE AND ACETAMINOPHEN 1 TABLET: 7.5; 325 TABLET ORAL at 00:51

## 2017-02-21 NOTE — PROGRESS NOTES
1. Have you been to the ER, urgent care clinic since your last visit? Hospitalized since your last visit? No-not since surgery on 2-11-17 in [facility]    2. Have you seen or consulted any other health care providers outside of the [address] [place] [address] since your last visit? Include any pap smears or colon screening. No     Daughter states she is doing most of the wound changes as home health only comes out sporadically.

## 2017-02-21 NOTE — SENIOR SERVICES NOTE
Physical Therapy Follow Up    After hours referral to [facility] PT received and appreciated. Patient present to ED s/p fall at home with c/o L side pain. XR reveal no fx and patient d/c home with family. Writer contact patient ([phone]) and speak with patient f/b Sarah Price, patient's daughter. Patient appearing somewhat confused over the phone and unable to answer questions, so she passed the phone to her daughter. Patient and spouse live with Sarah Price; pt is never left home alone. She reports patient continued L ankle pain, making mobility difficult. Patient typically amb short household distances using RW  but since the fall early this morning, patient has been using her manual w/c for majority of mobility. \"She is having trouble bearing weight but I've been encouraging her to do so since the XR said it wasn't broken. \"  Sarah Price denies any swelling or discoloration; states patient with air splint from ED. They have not used any modalities because \"we had this appt so early this morning. \"  Patient had f/u appt from perianal abscess removal earlier this month. She reports they are on the way home now and she will elevate patient's LE with ice. This writer recommend notifying PCP if continued difficulty with mobility or swelling/discoloration for CT. Patient's daughter agrees. When asked if they need more assistance in the home, Sarah Price reports they have recently hired an aide for 3x/week due to patient's prior functional decline in status. \"If we find we need more help, we'll set it up. \"  Chart review mentions [facility]. This writer's contact information provided; pt's daughter appreciative of follow up and will contact if desire any further services. Anuja Washington, DPT

## 2017-02-21 NOTE — ED TRIAGE NOTES
TRIAGE NOTE: Pt fell off her platform bed on her head at ~1800. Pt landed on her LEFT side, c/o of neck pain, head pain, shoulder pain, and leg pain. Patient reports numbness in her left leg and a burning sensation on her entire LEFT side. Denies LOC.     C-collar applied in triage
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
## 2017-02-24 NOTE — PROGRESS NOTES
Reason for Visit:  Follow-up incision and drainage of right perianal abscess    Brief History:  77 yo woman with right perianal abscess s/p I&D presented for follow-up. Pt reported she has been doing better since I&D. She is still having some pain to right buttock. She is passing flatus and having BM after taking miralax and mineral oil. Pt denied any fever or chills. Daughter has been performing local wound care because wound care nurse no coming out on regular basis. Physical Exam:    Gen:  NAD  Pulm:  Unlabored  Right perianal wound:  Clean with minimal induration.  Wound base is pink and granulating    AP: 77 yo woman with right perianal abscess    - No acute surgical issues  - Continue local wound care with saline moistened gauze packing  - Bowel regiment as needed  - follow-up prn

## 2017-03-08 ENCOUNTER — OFFICE VISIT (OUTPATIENT)
Dept: SURGERY | Age: 67
End: 2017-03-08

## 2017-03-08 VITALS
DIASTOLIC BLOOD PRESSURE: 88 MMHG | RESPIRATION RATE: 16 BRPM | OXYGEN SATURATION: 96 % | HEART RATE: 82 BPM | TEMPERATURE: 98.9 F | SYSTOLIC BLOOD PRESSURE: 148 MMHG | HEIGHT: 72 IN | WEIGHT: 249 LBS | BODY MASS INDEX: 33.72 KG/M2

## 2017-03-08 DIAGNOSIS — L02.215 PERINEAL ABSCESS: Primary | ICD-10-CM

## 2017-03-08 RX ORDER — METRONIDAZOLE 500 MG/1
500 TABLET ORAL 3 TIMES DAILY
Qty: 30 TAB | Refills: 0 | Status: SHIPPED | OUTPATIENT
Start: 2017-03-08 | End: 2017-04-07 | Stop reason: SDUPTHER
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
## 2017-03-08 NOTE — MR AVS SNAPSHOT
Visit Information Date & Time Provider Department Dept. Phone Encounter #  
 3/8/2017  3:40 PM Salomón Alford, [address] [phone] [id number] Upcoming Health Maintenance Date Due  
 FOOT EXAM Q1 5/8/1959 MICROALBUMIN Q1 5/8/1959 EYE EXAM RETINAL OR DILATED Q1 5/8/1959 DTaP/Tdap/Td series (1 - Tdap) 5/8/1970 BREAST CANCER SCRN MAMMOGRAM 5/8/1999 FOBT Q 1 YEAR AGE 50-75 5/8/1999 ZOSTER VACCINE AGE 60> 5/8/2009 HEMOGLOBIN A1C Q6M 4/6/2010 LIPID PANEL Q1 10/6/2010 GLAUCOMA SCREENING Q2Y 5/8/2014 OSTEOPOROSIS SCREENING (DEXA) 5/8/2014 Pneumococcal 65+ Low/Medium Risk (1 of 2 - PCV13) 5/8/2014 MEDICARE YEARLY EXAM 5/8/2014 INFLUENZA AGE 9 TO ADULT 8/1/2016 Allergies as of 3/8/2017  Review Complete On: 3/8/2017 By: Salomón Alford MD  
  
 Severity Noted Reaction Type Reactions Latex  01/03/2011    Itching Rash, sometimes difficult to breathe Celebrex [Celecoxib] High 01/06/2011    Anaphylaxis, Swelling TONGUE. LIPS AND EYES Keflex [Cephalexin] High 10/14/2010    Anaphylaxis, Swelling Tongue, lips, and eyes Levaquin [Levofloxacin] High 10/14/2010    Anaphylaxis, Swelling Tongue, lips, and eyes Pcn [Penicillins] High 10/14/2010    Anaphylaxis, Swelling Tongue, lips and eyes Iodine  07/17/2013    Other (comments) IV CONTRAST-LESIONS, AND SKIN SLUFFED OFF Morphine  10/14/2010    Other (comments) PT STATES IS JUST SENSITIVITY, GOT TOO MUCH ONE TIME. Opium  01/06/2011    Other (comments) SENSITIVITY Current Immunizations  Never Reviewed No immunizations on file. Not reviewed this visit You Were Diagnosed With   
  
 Codes Comments Perineal abscess    -  Primary ICD-10-CM: L02.215 ICD-9-CM: 883. 2 Vitals BP Pulse Temp Resp Height(growth percentile) Weight(growth percentile)  148/88 (BP 1 Location: Right arm, BP Patient Position: Sitting) 82 98.9 °F (37.2 °C) (Oral) 16 6' (1.829 m) 249 lb (112.9 kg) SpO2 BMI OB Status Smoking Status 96% 33.77 kg/m2 Hysterectomy Never Smoker BMI and BSA Data Body Mass Index Body Surface Area  
 33.77 kg/m 2 2.39 m 2 Preferred Pharmacy Pharmacy Name Phone [facility] [address] AT [facility] OF  [facility] [phone] Your Updated Medication List  
  
   
This list is accurate as of: 3/8/17  4:18 PM.  Always use your most recent med list. ALLEGRA 180 mg tablet Generic drug:  fexofenadine Take 60 mg by mouth daily. ASPIRIN PO Take 81 mg by mouth daily. BENADRYL 25 mg capsule Generic drug:  diphenhydrAMINE Take 50 mg by mouth every six (6) hours as needed. carvedilol 6.25 mg tablet Commonly known as:  Gillermina Begun Take 12.5 mg by mouth two (2) times daily (with meals). diazePAM 5 mg tablet Commonly known as:  VALIUM Take 1 Tab by mouth every eight (8) hours as needed for Anxiety. Max Daily Amount: 15 mg.  
  
 DULCOLAX STOOL SOFTENER (DSS) 100 mg capsule Generic drug:  docusate sodium Take 100 mg by mouth two (2) times a day. HUMALOG SC  
by SubCUTAneous route Before breakfast, lunch, and dinner. LEVEMIR FLEXPEN 100 unit/mL (3 mL) Inpn Generic drug:  insulin detemir 8 Units by SubCUTAneous route daily. metroNIDAZOLE 500 mg tablet Commonly known as:  FLAGYL Take 1 Tab by mouth three (3) times daily for 10 days. mineral oil liquid Take 30 mL by mouth daily. MIRALAX 17 gram packet Generic drug:  polyethylene glycol Take 17 g by mouth daily. nortriptyline 25 mg capsule Commonly known as:  PAMELOR Take 25 mg by mouth nightly. NORVASC PO Take 10 mg by mouth daily. * oxyCODONE-acetaminophen 7.5-325 mg per tablet Commonly known as:  PERCOCET 7.5 Take 1 Tab by mouth every four (4) hours as needed.  Max Daily Amount: 6 Tabs.  
  
 * oxyCODONE-acetaminophen 7.5-325 mg per tablet Commonly known as:  PERCOCET 7.5 Take 1 Tab by mouth every six (6) hours as needed for Pain. Max Daily Amount: 4 Tabs. Prevacid 30 mg disintegrating tablet Generic drug:  lansoprazole Take  by mouth two (2) times a day. PROZAC PO Take 20 mg by mouth daily. RENAL SOFTGELS 1 mg capsule Generic drug:  b complex-vitamin c-folic acid Take 1 Cap by mouth daily. SENSIPAR 30 mg tablet Generic drug:  cinacalcet Take 30 mg by mouth daily. sevelamer 800 mg tablet Commonly known as:  RENAGEL Take 1,600 mg by mouth three (3) times daily (with meals). simvastatin 40 mg tablet Commonly known as:  ZOCOR Take 20 mg by mouth nightly. VOLTAREN 1 % Gel Generic drug:  diclofenac Apply 4 g to affected area four (4) times daily. * Notice: This list has 2 medication(s) that are the same as other medications prescribed for you. Read the directions carefully, and ask your doctor or other care provider to review them with you. Prescriptions Sent to Pharmacy Refills  
 metroNIDAZOLE (FLAGYL) 500 mg tablet 0 Sig: Take 1 Tab by mouth three (3) times daily for 10 days. Class: Normal  
 Pharmacy: [facility] [place], [place] [address] [place] [address] #: [phone] Route: Oral  
  
Introducing [facility] HE[facility] SERVICES! [facility] introduces Re[employer] patient portal. Now you can access parts of your medical record, email your doctor's office, and request medication refills online. 1. In your internet browser, go to [email or web address]". Ash Access Technology/"[employer]" 2. Click on the First Time User? Click Here link in the Sign In box. You will see the New Member Sign Up page. 3. Enter your Reasult Access Code exactly as it appears below. You will not need to use this code after youve completed the sign-up process.  If you do not sign up before the expiration date, you must request a new code. · [employer]oke [employer] Access Code: [email or web address] Expires: 4/25/2017  3:43 PM 
 
4. Enter the last four digits of your Social Security Number (xxxx) and Date of Birth (mm/dd/yyyy) as indicated and click Submit. You will be taken to the next sign-up page. 5. Create a Inv[employer] ID. This will be your [employer] login ID and cannot be changed, so think of one that is secure and easy to remember. 6. Create a [employer] password. You can change your password at any time. 7. Enter your Password Reset Question and Answer. This can be used at a later time if you forget your password. 8. Enter your e-mail address. You will receive e-mail notification when new information is available in [address] 9. Click Sign Up. You can now view and download portions of your medical record. 10. Click the Download Summary menu link to download a portable copy of your medical information. If you have questions, please visit the Frequently Asked Questions section of the [employer] website. Remember, [employer] is NOT to be used for urgent needs. For medical emergencies, dial 911. Now available from your iPhone and Android! Please provide this summary of care documentation to your next provider. Your primary care clinician is listed as German Godinez. If you have any questions after today's visit, please call [phone].

## 2017-03-08 NOTE — PROGRESS NOTES
1. Have you been to the ER, urgent care clinic since your last visit? Hospitalized since your last visit? No    2. Have you seen or consulted any other health care providers outside of the [address] [place] [address] since your last visit? Include any pap smears or colon screening.  No

## 2017-03-09 ENCOUNTER — TELEPHONE (OUTPATIENT)
Dept: SURGERY | Age: 67
End: 2017-03-09

## 2017-03-09 NOTE — TELEPHONE ENCOUNTER
I returned home health nurses call and gave verbal order, per office visit with Dr. Dc Neil yesterday, ok to stop packing wound.

## 2017-04-06 ENCOUNTER — TELEPHONE (OUTPATIENT)
Dept: SURGERY | Age: 67
End: 2017-04-06

## 2017-04-06 NOTE — TELEPHONE ENCOUNTER
I returned daughters phone call as well as home health nurse debra's ([phone]) phone call. They both state about 2 days ago the abscess got hard and drained some purulent, bloody drainage. Daughter is asking for an antibiotic until she can get in to see either Dr. Vinay Brandt or Dr. Yady Kirkland. I spoke with Lulu Joel NP, who is out of the office tomorrow, but said ARNAUD Freeman could see patient tomorrow for evaluation and that patient should be seen before getting an antibiotic. I tried to give daughter appointment for tomorrow but patient has dialysis so daughter said she would call PCP to see if they would give her an antibiotic or an evening appointment. I relayed this message to the home health nurse, Nate, who says they currently come out twice a week to access wound since it had not closed. Dr. Vinay Brandt had stopped the packing when he last saw her in the office on 3-8-17. She will await any further orders from PCP tomorrow. Daughter will make appt. with us for next week or per PCP's instructions. Both daughter and home health nurseNate, were in agreement with this plan of care.

## 2017-04-07 ENCOUNTER — OFFICE VISIT (OUTPATIENT)
Dept: SURGERY | Age: 67
End: 2017-04-07

## 2017-04-07 ENCOUNTER — TELEPHONE (OUTPATIENT)
Dept: SURGERY | Age: 67
End: 2017-04-07

## 2017-04-07 VITALS
WEIGHT: 249 LBS | HEART RATE: 89 BPM | HEIGHT: 72 IN | BODY MASS INDEX: 33.72 KG/M2 | TEMPERATURE: 98.9 F | RESPIRATION RATE: 18 BRPM | SYSTOLIC BLOOD PRESSURE: 124 MMHG | DIASTOLIC BLOOD PRESSURE: 86 MMHG | OXYGEN SATURATION: 96 %

## 2017-04-07 DIAGNOSIS — Z09 SURGICAL FOLLOW-UP CARE: Primary | ICD-10-CM

## 2017-04-07 DIAGNOSIS — L02.215 PERINEAL ABSCESS: ICD-10-CM

## 2017-04-07 DIAGNOSIS — Z51.89 VISIT FOR WOUND CHECK: ICD-10-CM

## 2017-04-07 RX ORDER — METRONIDAZOLE 500 MG/1
500 TABLET ORAL 3 TIMES DAILY
Qty: 30 TAB | Refills: 0 | Status: SHIPPED | OUTPATIENT
Start: 2017-04-07 | End: 2017-04-17

## 2017-04-07 RX ORDER — SULFAMETHOXAZOLE AND TRIMETHOPRIM 800; 160 MG/1; MG/1
1 TABLET ORAL DAILY
Qty: 10 TAB | Refills: 0 | Status: SHIPPED | OUTPATIENT
Start: 2017-04-07 | End: 2017-04-19

## 2017-04-07 NOTE — PATIENT INSTRUCTIONS
Learning About the Safe Use of Antibiotics  Introduction  Antibiotics are drugs used to kill bacteria. Bacteria can cause infections. These include strep throat, ear infections, and pneumonia. These medicines can't cure everything. They don't kill viruses or help with allergies. They don't help illnesses such as the common cold, the flu, or a runny nose. And they can cause side effects. There are many types of antibiotics. Your doctor will decide which one will work best for your infection. Examples include:  · Amoxicillin. · Cephalexin (Keflex). · Ciprofloxacin (Cipro). What are the possible side effects? Side effects can include:  · Nausea. · Diarrhea. · Skin rash. · Yeast infection. · A severe allergic reaction. It may cause itching, swelling, and breathing problems. This is rare. You may have other side effects or reactions not listed here. Check the information that comes with your medicine. Should you take antibiotics just in case? Don't take antibiotics when you don't need them. If you do that, they may not work when you do need them. Each time you take antibiotics, you are more likely to have some bacteria that survive and aren't killed by the medicine. Bacteria that don't die can change and become even harder to kill. These are called antibiotic-resistant bacteria. They can cause longer and more serious infections. To treat them, you may need different, stronger antibiotics that have more side effects and may cost more. So always ask your doctor if antibiotics are the best treatment. Explain that you do not want antibiotics unless you need them. Help protect the community  Using antibiotics when they're not needed leads to the development of antibiotic-resistant bacteria. These tougher bacteria can spread to family members, children, and coworkers. People in your community will have a risk of getting an infection that is harder to cure and that costs more to treat.   How can you take antibiotics safely? Be safe with medicine. Take your antibiotics as directed. Do not stop taking them just because you feel better. You need to take the full course of medicine. This will help make sure your infection is cured. It will also help prevent the growth of antibiotic-resistant bacteria. Always take the exact amount that the label says to take. If the label says to take the medicine at a certain time, follow those directions. You might feel better after you take an antibiotic for a few days. But it is important to keep taking it for as long as prescribed. That will help you get rid of those bacteria that are a bit stronger and that survive the first few days of treatment. Where can you learn more? Go to http://brandan-liang.info/. Enter F695 in the search box to learn more about \"Learning About the Safe Use of Antibiotics. \"  Current as of: November 22, 2016  Content Version: 11.2  © 5903-3941 [employer]. Care instructions adapted under license by [employer] (which disclaims liability or warranty for this information). If you have questions about a medical condition or this instruction, always ask your healthcare professional. [place] [address] any warranty or liability for your use of this information.

## 2017-04-07 NOTE — MR AVS SNAPSHOT
Visit Information Date & Time Provider Department Dept. Phone Encounter #  
 4/7/2017  1:00 PM David Belcher NP [facility] 22 699 [phone] [id number] Your Appointments 4/12/2017  1:40 PM  
POST OP 10 MIN with MD Celso Harrell [phone] ([address]) Appt Note: Po/ eval of the abscess, possible infected / daughter scheduled appt. [address] [facility] [address] [facility] [id number]  
[address] [place] [address] Upcoming [facility] Maintenance Date Due  
 FOOT EXAM Q1 5/8/1960 MICROALBUMIN Q1 5/8/1959 EYE EXAM RETINAL OR DILATED Q1 5/8/1959 DTaP/Tdap/Td series (1 - Tdap) 5/8/1970 BREAST CANCER SCRN MAMMOGRAM 5/8/1999 FOBT Q 1 YEAR AGE 50-75 5/8/1999 ZOSTER VACCINE AGE 60> 5/8/2009 HEMOGLOBIN A1C Q6M 4/6/2010 LIPID PANEL Q1 10/6/2010 GLAUCOMA SCREENING Q2Y 5/8/2014 OSTEOPOROSIS SCREENING (DEXA) 5/8/2014 Pneumococcal 65+ Low/Medium Risk (1 of 2 - PCV13) 5/8/2014 MEDICARE YEARLY EXAM 5/8/2014 INFLUENZA AGE 9 TO ADULT 8/1/2016 Allergies as of 4/7/2017  Review Complete On: 4/7/2017 By: Michelle Wilhelm LPN Severity Noted Reaction Type Reactions Latex  01/03/2011    Itching Rash, sometimes difficult to breathe Celebrex [Celecoxib] High 01/06/2011    Anaphylaxis, Swelling TONGUE. LIPS AND EYES Keflex [Cephalexin] High 10/14/2010    Anaphylaxis, Swelling Tongue, lips, and eyes Levaquin [Levofloxacin] High 10/14/2010    Anaphylaxis, Swelling Tongue, lips, and eyes Pcn [Penicillins] High 10/14/2010    Anaphylaxis, Swelling Tongue, lips and eyes Iodine  07/17/2013    Other (comments) IV CONTRAST-LESIONS, AND SKIN SLUFFED OFF Morphine  10/14/2010    Other (comments) PT STATES IS JUST SENSITIVITY, GOT TOO MUCH ONE TIME. Opium  01/06/2011    Other (comments) SENSITIVITY Current Immunizations  Never Reviewed No immunizations on file. Not reviewed this visit You Were Diagnosed With   
  
 Codes Comments Perineal abscess    -  Primary ICD-10-CM: L02.215 ICD-9-CM: 150. 2 Vitals BP Pulse Temp Resp Height(growth percentile) Weight(growth percentile) 124/86 (BP 1 Location: Right arm, BP Patient Position: Sitting) 89 98.9 °F (37.2 °C) (Oral) 18 6' (1.829 m) 249 lb (112.9 kg) SpO2 BMI OB Status Smoking Status 96% 33.77 kg/m2 Hysterectomy Never Smoker BMI and BSA Data Body Mass Index Body Surface Area  
 33.77 kg/m 2 2.39 m 2 Preferred Pharmacy Pharmacy Name Phone [facility] [address] AT [facility] [phone] Your Updated Medication List  
  
   
This list is accurate as of: 4/7/17  1:51 PM.  Always use your most recent med list. ALLEGRA 180 mg tablet Generic drug:  fexofenadine Take 60 mg by mouth daily. ASPIRIN PO Take 81 mg by mouth daily. BENADRYL 25 mg capsule Generic drug:  diphenhydrAMINE Take 50 mg by mouth every six (6) hours as needed. carvedilol 6.25 mg tablet Commonly known as:  Vashti Spinner Take 12.5 mg by mouth two (2) times daily (with meals). diazePAM 5 mg tablet Commonly known as:  VALIUM Take 1 Tab by mouth every eight (8) hours as needed for Anxiety. Max Daily Amount: 15 mg.  
  
 DULCOLAX STOOL SOFTENER (DSS) 100 mg capsule Generic drug:  docusate sodium Take 100 mg by mouth two (2) times a day. HUMALOG SC  
by SubCUTAneous route Before breakfast, lunch, and dinner. LEVEMIR FLEXPEN 100 unit/mL (3 mL) Inpn Generic drug:  insulin detemir 8 Units by SubCUTAneous route daily. metroNIDAZOLE 500 mg tablet Commonly known as:  FLAGYL Take 1 Tab by mouth three (3) times daily for 10 days. mineral oil liquid Take 30 mL by mouth daily. MIRALAX 17 gram packet Generic drug:  polyethylene glycol Take 17 g by mouth daily. nortriptyline 25 mg capsule Commonly known as:  PAMELOR Take 25 mg by mouth nightly. NORVASC PO Take 10 mg by mouth daily. OTHER  
0.9% Normal saline  Use as needed to affected area  Medical code: L02.215  
  
 * oxyCODONE-acetaminophen 7.5-325 mg per tablet Commonly known as:  PERCOCET 7.5 Take 1 Tab by mouth every four (4) hours as needed. Max Daily Amount: 6 Tabs. * oxyCODONE-acetaminophen 7.5-325 mg per tablet Commonly known as:  PERCOCET 7.5 Take 1 Tab by mouth every six (6) hours as needed for Pain. Max Daily Amount: 4 Tabs. Prevacid 30 mg disintegrating tablet Generic drug:  lansoprazole Take  by mouth two (2) times a day. PROZAC PO Take 20 mg by mouth daily. RENAL SOFTGELS 1 mg capsule Generic drug:  b complex-vitamin c-folic acid Take 1 Cap by mouth daily. SENSIPAR 30 mg tablet Generic drug:  cinacalcet Take 30 mg by mouth daily. sevelamer 800 mg tablet Commonly known as:  RENAGEL Take 1,600 mg by mouth three (3) times daily (with meals). simvastatin 40 mg tablet Commonly known as:  ZOCOR Take 20 mg by mouth nightly. trimethoprim-sulfamethoxazole 160-800 mg per tablet Commonly known as:  BACTRIM DS, SEPTRA DS Take 1 Tab by mouth daily for 10 days. VOLTAREN 1 % Gel Generic drug:  diclofenac Apply 4 g to affected area four (4) times daily. * Notice: This list has 2 medication(s) that are the same as other medications prescribed for you. Read the directions carefully, and ask your doctor or other care provider to review them with you. Prescriptions Printed Refills OTHER 3 Si.9% Normal saline Use as needed to affected area Medical code: L02.215 Class: Print Prescriptions Sent to Pharmacy  Refills  
 metroNIDAZOLE (FLAGYL) 500 mg tablet 0  
 Sig: Take 1 Tab by mouth three (3) times daily for 10 days. Class: Normal  
 [facility] [address] [place] [address] Ph #: [phone] Route: Oral  
 trimethoprim-sulfamethoxazole (BACTRIM DS, SEPTRA DS) 160-800 mg per tablet 0 Sig: Take 1 Tab by mouth daily for 10 days. Class: Normal  
 [facility] [address] Ph #: [phone] Route: Oral  
  
Patient Instructions Learning About the Safe Use of Antibiotics Introduction Antibiotics are drugs used to kill bacteria. Bacteria can cause infections. These include strep throat, ear infections, and pneumonia. These medicines can't cure everything. They don't kill viruses or help with allergies. They don't help illnesses such as the common cold, the flu, or a runny nose. And they can cause side effects. There are many types of antibiotics. Your doctor will decide which one will work best for your infection. Examples include: · Amoxicillin. · Cephalexin (Keflex). · Ciprofloxacin (Cipro). What are the possible side effects? Side effects can include: 
· Nausea. · Diarrhea. · Skin rash. · Yeast infection. · A severe allergic reaction. It may cause itching, swelling, and breathing problems. This is rare. You may have other side effects or reactions not listed here. Check the information that comes with your medicine. Should you take antibiotics just in case? Don't take antibiotics when you don't need them. If you do that, they may not work when you do need them. Each time you take antibiotics, you are more likely to have some bacteria that survive and aren't killed by the medicine. Bacteria that don't die can change and become even harder to kill. These are called antibiotic-resistant bacteria.  They can cause longer and more serious infections. To treat them, you may need different, stronger antibiotics that have more side effects and may cost more. So always ask your doctor if antibiotics are the best treatment. Explain that you do not want antibiotics unless you need them. Help protect the community Using antibiotics when they're not needed leads to the development of antibiotic-resistant bacteria. These tougher bacteria can spread to family members, children, and coworkers. People in your community will have a risk of getting an infection that is harder to cure and that costs more to treat. How can you take antibiotics safely? Be safe with medicine. Take your antibiotics as directed. Do not stop taking them just because you feel better. You need to take the full course of medicine. This will help make sure your infection is cured. It will also help prevent the growth of antibiotic-resistant bacteria. Always take the exact amount that the label says to take. If the label says to take the medicine at a certain time, follow those directions. You might feel better after you take an antibiotic for a few days. But it is important to keep taking it for as long as prescribed. That will help you get rid of those bacteria that are a bit stronger and that survive the first few days of treatment. Where can you learn more? Go to http://brandan-liang.info/. Enter F695 in the search box to learn more about \"Learning About the Safe Use of Antibiotics. \" Current as of: November 22, 2016 Content Version: 11.2 © 6182-6225 Rive Technology, [employer]. Care instructions adapted under license by [employer] (which disclaims liability or warranty for this information). If you have questions about a medical condition or this instruction, always ask your healthcare professional. [place] [address] any warranty or liability for your use of this information. Introducing [facility] & HEALTH SERVICES! [facility] introduces [employer] patient portal. Now you can access parts of your medical record, email your doctor's office, and request medication refills online. 1. In your internet browser, go to [email or web address] 2. Click on the First Time User? Click Here link in the Sign In box. You will see the New Member Sign Up page. 3. Enter your [employer] Access Code exactly as it appears below. You will not need to use this code after youve completed the sign-up process. If you do not sign up before the expiration date, you must request a new code. · [employer] Access Code: [email or web address] Expires: 4/25/2017  4:43 PM 
 
4. Enter the last four digits of your Social Security Number (xxxx) and Date of Birth (mm/dd/yyyy) as indicated and click Submit. You will be taken to the next sign-up page. 5. Create a [employer] ID. This will be your [employer] login ID and cannot be changed, so think of one that is secure and easy to remember. 6. Create a [employer] password. You can change your password at any time. 7. Enter your Password Reset Question and Answer. This can be used at a later time if you forget your password. 8. Enter your e-mail address. You will receive e-mail notification when new information is available in [address] 9. Click Sign Up. You can now view and download portions of your medical record. 10. Click the Download Summary menu link to download a portable copy of your medical information. If you have questions, please visit the Frequently Asked Questions section of the [employer] website. Remember, [employer] is NOT to be used for urgent needs. For medical emergencies, dial 911. Now available from your iPhone and Android! Please provide this summary of care documentation to your next provider. Your primary care clinician is listed as Malcolm De Jesus. If you have any questions after today's visit, please call [phone].

## 2017-04-07 NOTE — PROGRESS NOTES
Subjective:   Sarah Cespedes is a 78year old female that present today for wound check of right buttock. Patient underwent I&D of right perianal abscess in February 2017. Patient was seen in office March 8, 2017   For drainage of wound, patient was placed on antibiotics and followed by home health. Stated completed antibiotic therapy she was given. Stated home health just changed outer dressing, no packing of wound. Patient daughter stated that she will irrigate wound with saline. Patient stated area started to become more pain and started having drainage with sero-sang output. Taking Percocet for pain. Denies fever, no chills. Stated having some odor from area as well. No issues with urination or bowel habits. Objective:     Blood pressure 124/86, pulse 89, temperature 98.9 °F (37.2 °C), temperature source Oral, resp. rate 18, height 6' (1.829 m), weight 249 lb (112.9 kg), SpO2 96 %. Wound:  Location: right buttock, perianal area. Wound with very small open. Area below wound with some swelling. Area probed with q-tip, no tunneling, small approximately 1 inch. Very small amount of pustulant drainage, then presence of clots, then wound bleed. Presence of odor. Area irrigated with normal saline, unable to pack wound, covered with 4x4's, tape. Patient tolerated. Assessment:     Drainage of perianal wound. Plan:     1. Wound care discussed. Continue to irrigate wound with saline once daily and cover with dry dressing  2. Pt is to increase activities as tolerated. 3.Prescriptions for Flagyl and Bactrim given, advised patient to take with food. 4. Patient to follow up in office next week. Patient verbalized understanding and questions were answered to the best of my knowledge and ability. Antibiotic educational materials were provided.

## 2017-04-07 NOTE — PROGRESS NOTES
1. Have you been to the ER, urgent care clinic since your last visit? Hospitalized since your last visit? No    2. Have you seen or consulted any other health care providers outside of the [facility] since your last visit? Include any pap smears or colon screening.  No

## 2017-04-12 ENCOUNTER — OFFICE VISIT (OUTPATIENT)
Dept: SURGERY | Age: 67
End: 2017-04-12

## 2017-04-12 VITALS — HEIGHT: 72 IN | WEIGHT: 249 LBS | BODY MASS INDEX: 33.72 KG/M2

## 2017-04-12 DIAGNOSIS — L02.215 PERINEAL ABSCESS: Primary | ICD-10-CM

## 2017-04-12 NOTE — PROGRESS NOTES
1. Have you been to the ER, urgent care clinic since your last visit? Hospitalized since your last visit? Yes- [facility] left leg and foot swelling    2. Have you seen or consulted any other health care providers outside of the [address] [place] [address] since your last visit? Include any pap smears or colon screening.  No
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
## 2017-04-12 NOTE — MR AVS SNAPSHOT
Visit Information Date & Time Provider Department Dept. Phone Encounter #  
 4/12/2017  1:40 PM Snehal[facility]el[facility]in [phone] [id number] Follow-up Instructions Routing History Upcoming Health Maintenance Date Due  
 FOOT EXAM Q1 5/8/1959 MICROALBUMIN Q1 5/8/1959 EYE EXAM RETINAL OR DILATED Q1 5/8/1959 DTaP/Tdap/Td series (1 - Tdap) 5/8/1970 BREAST CANCER SCRN MAMMOGRAM 5/8/1999 FOBT Q 1 YEAR AGE 50-75 5/8/1999 ZOSTER VACCINE AGE 60> 5/8/2009 HEMOGLOBIN A1C Q6M 4/6/2010 LIPID PANEL Q1 10/6/2010 GLAUCOMA SCREENING Q2Y 5/8/2014 OSTEOPOROSIS SCREENING (DEXA) 5/8/2014 Pneumococcal 65+ Low/Medium Risk (1 of 2 - PCV13) 5/8/2014 MEDICARE YEARLY EXAM 5/8/2014 INFLUENZA AGE 9 TO ADULT 8/1/2016 Allergies as of 4/12/2017  Review Complete On: 4/12/2017 By: Snehal Christy MD  
  
 Severity Noted Reaction Type Reactions Latex  01/03/2011    Itching Rash, sometimes difficult to breathe Celebrex [Celecoxib] High 01/06/2011    Anaphylaxis, Swelling TONGUE. LIPS AND EYES Keflex [Cephalexin] High 10/14/2010    Anaphylaxis, Swelling Tongue, lips, and eyes Levaquin [Levofloxacin] High 10/14/2010    Anaphylaxis, Swelling Tongue, lips, and eyes Pcn [Penicillins] High 10/14/2010    Anaphylaxis, Swelling Tongue, lips and eyes Iodine  07/17/2013    Other (comments) IV CONTRAST-LESIONS, AND SKIN SLUFFED OFF Morphine  10/14/2010    Other (comments) PT STATES IS JUST SENSITIVITY, GOT TOO MUCH ONE TIME. Opium  01/06/2011    Other (comments) SENSITIVITY Current Immunizations  Never Reviewed No immunizations on file. Not reviewed this visit You Were Diagnosed With   
  
 Codes Comments Perineal abscess    -  Primary ICD-10-CM: L02.215 ICD-9-CM: 552. 2 Vitals Height(growth percentile) Weight(growth percentile) BMI OB Status Smoking Status 6' (1.829 m) 249 lb (112.9 kg) 33.77 kg/m2 Hysterectomy Never Smoker BMI and BSA Data Body Mass Index Body Surface Area  
 33.77 kg/m 2 2.39 m 2 Preferred Pharmacy Pharmacy Name Phone [facility] [address] [facility]  [place] [phone] Your Updated Medication List  
  
   
This list is accurate as of: 4/12/17  3:15 PM.  Always use your most recent med list. ALLEGRA 180 mg tablet Generic drug:  fexofenadine Take 60 mg by mouth daily. ASPIRIN PO Take 81 mg by mouth daily. BENADRYL 25 mg capsule Generic drug:  diphenhydrAMINE Take 50 mg by mouth every six (6) hours as needed. carvedilol 6.25 mg tablet Commonly known as:  Urban Ericka Take 12.5 mg by mouth two (2) times daily (with meals). diazePAM 5 mg tablet Commonly known as:  VALIUM Take 1 Tab by mouth every eight (8) hours as needed for Anxiety. Max Daily Amount: 15 mg.  
  
 DULCOLAX STOOL SOFTENER (DSS) 100 mg capsule Generic drug:  docusate sodium Take 100 mg by mouth two (2) times a day. HUMALOG SC  
by SubCUTAneous route Before breakfast, lunch, and dinner. LEVEMIR FLEXPEN 100 unit/mL (3 mL) Inpn Generic drug:  insulin detemir 8 Units by SubCUTAneous route daily. metroNIDAZOLE 500 mg tablet Commonly known as:  FLAGYL Take 1 Tab by mouth three (3) times daily for 10 days. mineral oil liquid Take 30 mL by mouth daily. MIRALAX 17 gram packet Generic drug:  polyethylene glycol Take 17 g by mouth daily. nortriptyline 25 mg capsule Commonly known as:  PAMELOR Take 25 mg by mouth nightly. NORVASC PO Take 10 mg by mouth daily. OTHER  
0.9% Normal saline  Use as needed to affected area  Medical code: L02.215  
  
 * oxyCODONE-acetaminophen 7.5-325 mg per tablet Commonly known as:  PERCOCET 7.5 Take 1 Tab by mouth every four (4) hours as needed. Max Daily Amount: 6 Tabs. * oxyCODONE-acetaminophen 7.5-325 mg per tablet Commonly known as:  PERCOCET 7.5 Take 1 Tab by mouth every six (6) hours as needed for Pain. Max Daily Amount: 4 Tabs. Prevacid 30 mg disintegrating tablet Generic drug:  lansoprazole Take  by mouth two (2) times a day. PROZAC PO Take 20 mg by mouth daily. RENAL SOFTGELS 1 mg capsule Generic drug:  b complex-vitamin c-folic acid Take 1 Cap by mouth daily. RENVELA PO Take  by mouth. SENSIPAR 30 mg tablet Generic drug:  cinacalcet Take 30 mg by mouth daily. sevelamer 800 mg tablet Commonly known as:  RENAGEL Take 1,600 mg by mouth three (3) times daily (with meals). simvastatin 40 mg tablet Commonly known as:  ZOCOR Take 20 mg by mouth nightly. trimethoprim-sulfamethoxazole 160-800 mg per tablet Commonly known as:  BACTRIM DS, SEPTRA DS Take 1 Tab by mouth daily for 10 days. VOLTAREN 1 % Gel Generic drug:  diclofenac Apply 4 g to affected area four (4) times daily. * Notice: This list has 2 medication(s) that are the same as other medications prescribed for you. Read the directions carefully, and ask your doctor or other care provider to review them with you. Introducing [facility]! Deo Vargas introduces [employer] patient portal. Now you can access parts of your medical record, email your doctor's office, and request medication refills online. 1. In your internet browser, go to [email or web address] 2. Click on the First Time User? Click Here link in the Sign In box. You will see the New Member Sign Up page. 3. Enter your [employer] Access Code exactly as it appears below. You will not need to use this code after youve completed the sign-up process. If you do not sign up before the expiration date, you must request a new code. · [employer] Access Code: [email or web address] Expires: 4/25/2017  4:43 PM 
 
4. Enter the last four digits of your Social Security Number (xxxx) and Date of Birth (mm/dd/yyyy) as indicated and click Submit. You will be taken to the next sign-up page. 5. Create a [employer] ID. This will be your [employer] login ID and cannot be changed, so think of one that is secure and easy to remember. 6. Create a [employer] password. You can change your password at any time. 7. Enter your Password Reset Question and Answer. This can be used at a later time if you forget your password. 8. Enter your e-mail address. You will receive e-mail notification when new information is available in [address] 9. Click Sign Up. You can now view and download portions of your medical record. 10. Click the Download Summary menu link to download a portable copy of your medical information. If you have questions, please visit the Frequently Asked Questions section of the [employer] website. Remember, [employer] is NOT to be used for urgent needs. For medical emergencies, dial 911. Now available from your iPhone and Android! Please provide this summary of care documentation to your next provider. Your primary care clinician is listed as Yeison July. If you have any questions after today's visit, please call [phone].

## 2017-04-12 NOTE — Clinical Note
Incision and drainage and debridement of chronic perineal abscess; general anesthesia; 1 hour; admit

## 2017-04-14 ENCOUNTER — TELEPHONE (OUTPATIENT)
Dept: SURGERY | Age: 67
End: 2017-04-14

## 2017-04-14 ENCOUNTER — HOSPITAL ENCOUNTER (OUTPATIENT)
Dept: GENERAL RADIOLOGY | Age: 67
Discharge: HOME | End: 2017-04-14
Payer: MEDICARE

## 2017-04-14 ENCOUNTER — HOSPITAL ENCOUNTER (OUTPATIENT)
Dept: PREADMISSION TESTING | Age: 67
Discharge: HOME | End: 2017-04-14
Payer: MEDICARE

## 2017-04-14 VITALS
SYSTOLIC BLOOD PRESSURE: 180 MMHG | WEIGHT: 249.12 LBS | TEMPERATURE: 99 F | BODY MASS INDEX: 33.74 KG/M2 | OXYGEN SATURATION: 96 % | HEIGHT: 72 IN | DIASTOLIC BLOOD PRESSURE: 82 MMHG

## 2017-04-14 DIAGNOSIS — Z01.811 PRE-OP CHEST EXAM: ICD-10-CM

## 2017-04-14 LAB
ALBUMIN SERPL BCP-MCNC: 3.7 G/DL (ref 3.5–5)
ALBUMIN/GLOB SERPL: 0.7 {RATIO} (ref 1.1–2.2)
ALP SERPL-CCNC: 160 U/L (ref 45–117)
ALT SERPL-CCNC: 35 U/L (ref 12–78)
ANION GAP BLD CALC-SCNC: 7 MMOL/L (ref 5–15)
AST SERPL W P-5'-P-CCNC: 29 U/L (ref 15–37)
ATRIAL RATE: 82 BPM
BASOPHILS # BLD AUTO: 0 K/UL (ref 0–0.1)
BASOPHILS # BLD: 0 % (ref 0–1)
BILIRUB SERPL-MCNC: 0.3 MG/DL (ref 0.2–1)
BUN SERPL-MCNC: 28 MG/DL (ref 6–20)
BUN/CREAT SERPL: 6 (ref 12–20)
CALCIUM SERPL-MCNC: 8.9 MG/DL (ref 8.5–10.1)
CALCULATED P AXIS, ECG09: 44 DEGREES
CALCULATED R AXIS, ECG10: -31 DEGREES
CALCULATED T AXIS, ECG11: 47 DEGREES
CHLORIDE SERPL-SCNC: 93 MMOL/L (ref 97–108)
CO2 SERPL-SCNC: 33 MMOL/L (ref 21–32)
CREAT SERPL-MCNC: 4.76 MG/DL (ref 0.55–1.02)
DIAGNOSIS, 93000: NORMAL
EOSINOPHIL # BLD: 0.1 K/UL (ref 0–0.4)
EOSINOPHIL NFR BLD: 1 % (ref 0–7)
ERYTHROCYTE [DISTWIDTH] IN BLOOD BY AUTOMATED COUNT: 14.8 % (ref 11.5–14.5)
GLOBULIN SER CALC-MCNC: 5.1 G/DL (ref 2–4)
GLUCOSE SERPL-MCNC: 170 MG/DL (ref 65–100)
HCT VFR BLD AUTO: 38.9 % (ref 35–47)
HGB BLD-MCNC: 12.4 G/DL (ref 11.5–16)
LYMPHOCYTES # BLD AUTO: 40 % (ref 12–49)
LYMPHOCYTES # BLD: 2.8 K/UL (ref 0.8–3.5)
MCH RBC QN AUTO: 28 PG (ref 26–34)
MCHC RBC AUTO-ENTMCNC: 31.9 G/DL (ref 30–36.5)
MCV RBC AUTO: 87.8 FL (ref 80–99)
MONOCYTES # BLD: 0.7 K/UL (ref 0–1)
MONOCYTES NFR BLD AUTO: 9 % (ref 5–13)
NEUTS SEG # BLD: 3.6 K/UL (ref 1.8–8)
NEUTS SEG NFR BLD AUTO: 50 % (ref 32–75)
P-R INTERVAL, ECG05: 184 MS
PLATELET # BLD AUTO: 221 K/UL (ref 150–400)
POTASSIUM SERPL-SCNC: 4.2 MMOL/L (ref 3.5–5.1)
PROT SERPL-MCNC: 8.8 G/DL (ref 6.4–8.2)
Q-T INTERVAL, ECG07: 420 MS
QRS DURATION, ECG06: 96 MS
QTC CALCULATION (BEZET), ECG08: 490 MS
RBC # BLD AUTO: 4.43 M/UL (ref 3.8–5.2)
SODIUM SERPL-SCNC: 133 MMOL/L (ref 136–145)
VENTRICULAR RATE, ECG03: 82 BPM
WBC # BLD AUTO: 7.2 K/UL (ref 3.6–11)

## 2017-04-14 PROCEDURE — 36415 COLL VENOUS BLD VENIPUNCTURE: CPT | Performed by: SURGERY

## 2017-04-14 PROCEDURE — 85025 COMPLETE CBC W/AUTO DIFF WBC: CPT | Performed by: SURGERY

## 2017-04-14 PROCEDURE — 71020 XR CHEST PA LAT: CPT

## 2017-04-14 PROCEDURE — 93005 ELECTROCARDIOGRAM TRACING: CPT

## 2017-04-14 PROCEDURE — 80053 COMPREHEN METABOLIC PANEL: CPT | Performed by: SURGERY

## 2017-04-14 RX ORDER — CARVEDILOL 12.5 MG/1
25 TABLET ORAL
COMMUNITY
End: 2017-10-25

## 2017-04-14 RX ORDER — CALC/MAG/B COMPLEX/D3/HERB 61
15 TABLET ORAL 2 TIMES DAILY
COMMUNITY
End: 2018-04-09

## 2017-04-14 RX ORDER — CARVEDILOL 25 MG/1
12.5 TABLET ORAL
COMMUNITY
End: 2017-10-25

## 2017-04-14 NOTE — PERIOP NOTES
INTERVIEW INCOMPLETE DUE TO LATENESS OF HOUR AND COMPLEXITY OF HISTORY. PT SENT TO RADIOLOGY FOR CXR AT 4:45pm.  WILL TRY TO COMPLETE HISTORY BY PHONE ON 4/17/16. INSTRUCTIONS FOR MEDICATIONS, GENERAL INSTRUCTION, AND CHG WIPES WITH INSTRUCTIONS GIVEN TO PT AND DAUGHTER.

## 2017-04-14 NOTE — TELEPHONE ENCOUNTER
Called to see if she needs to stop taking Asprin for surgery. Please call patients daughter back Baron Moran. Messaged can be left on phone if not reached.

## 2017-04-14 NOTE — TELEPHONE ENCOUNTER
Left message informing daughter listed on PHI form per ARNAUD Velazquez patient can hold aspirin until after surgery 4/18/17.

## 2017-04-17 ENCOUNTER — DOCUMENTATION ONLY (OUTPATIENT)
Dept: SURGERY | Age: 67
End: 2017-04-17

## 2017-04-17 ENCOUNTER — ANESTHESIA EVENT (OUTPATIENT)
Dept: SURGERY | Age: 67
End: 2017-04-17
Payer: MEDICARE

## 2017-04-17 NOTE — PERIOP NOTES
EMAIL SENT TO Ilene Hinton LPN IN DR. العراقي'S OFFICE ALERTING THEM TO PT'S MULTIPLE ABNORMALS ON CMP.

## 2017-04-18 ENCOUNTER — HOSPITAL ENCOUNTER (OUTPATIENT)
Age: 67
Setting detail: OBSERVATION
Discharge: HOME HEALTH | End: 2017-04-19
Attending: SURGERY | Admitting: SURGERY
Payer: MEDICARE

## 2017-04-18 ENCOUNTER — ANESTHESIA (OUTPATIENT)
Dept: SURGERY | Age: 67
End: 2017-04-18
Payer: MEDICARE

## 2017-04-18 ENCOUNTER — TELEPHONE (OUTPATIENT)
Dept: SURGERY | Age: 67
End: 2017-04-18

## 2017-04-18 LAB
ANION GAP BLD CALC-SCNC: 20 MMOL/L (ref 5–15)
BUN BLD-MCNC: 37 MG/DL (ref 9–20)
CA-I BLD-MCNC: 0.98 MMOL/L (ref 1.12–1.32)
CHLORIDE BLD-SCNC: 94 MMOL/L (ref 98–107)
CO2 BLD-SCNC: 30 MMOL/L (ref 21–32)
CREAT BLD-MCNC: 5.6 MG/DL (ref 0.6–1.3)
GLUCOSE BLD STRIP.AUTO-MCNC: 121 MG/DL (ref 65–100)
GLUCOSE BLD-MCNC: 142 MG/DL (ref 65–100)
HCT VFR BLD CALC: 38 % (ref 35–47)
HGB BLD-MCNC: 12.9 GM/DL (ref 11.5–16)
POTASSIUM BLD-SCNC: 4 MMOL/L (ref 3.5–5.1)
SERVICE CMNT-IMP: ABNORMAL
SERVICE CMNT-IMP: ABNORMAL
SODIUM BLD-SCNC: 138 MMOL/L (ref 136–145)

## 2017-04-18 PROCEDURE — 76010000138 HC OR TIME 0.5 TO 1 HR: Performed by: SURGERY

## 2017-04-18 PROCEDURE — 77030008684 HC TU ET CUF COVD -B: Performed by: ANESTHESIOLOGY

## 2017-04-18 PROCEDURE — 82962 GLUCOSE BLOOD TEST: CPT

## 2017-04-18 PROCEDURE — 74011250637 HC RX REV CODE- 250/637: Performed by: SURGERY

## 2017-04-18 PROCEDURE — 74011250636 HC RX REV CODE- 250/636

## 2017-04-18 PROCEDURE — 77030018836 HC SOL IRR NACL ICUM -A: Performed by: SURGERY

## 2017-04-18 PROCEDURE — 76210000016 HC OR PH I REC 1 TO 1.5 HR: Performed by: SURGERY

## 2017-04-18 PROCEDURE — 77030026438 HC STYL ET INTUB CARD -A: Performed by: ANESTHESIOLOGY

## 2017-04-18 PROCEDURE — 80047 BASIC METABLC PNL IONIZED CA: CPT

## 2017-04-18 PROCEDURE — 77030031139 HC SUT VCRL2 J&J -A: Performed by: SURGERY

## 2017-04-18 PROCEDURE — 76060000032 HC ANESTHESIA 0.5 TO 1 HR: Performed by: SURGERY

## 2017-04-18 PROCEDURE — 74011250636 HC RX REV CODE- 250/636: Performed by: SURGERY

## 2017-04-18 PROCEDURE — 77030002933 HC SUT MCRYL J&J -A: Performed by: SURGERY

## 2017-04-18 PROCEDURE — 74011000250 HC RX REV CODE- 250

## 2017-04-18 PROCEDURE — 77030011640 HC PAD GRND REM COVD -A: Performed by: SURGERY

## 2017-04-18 PROCEDURE — 74011250636 HC RX REV CODE- 250/636: Performed by: ANESTHESIOLOGY

## 2017-04-18 PROCEDURE — 99218 HC RM OBSERVATION: CPT

## 2017-04-18 RX ORDER — FENTANYL CITRATE 50 UG/ML
50 INJECTION, SOLUTION INTRAMUSCULAR; INTRAVENOUS AS NEEDED
Status: COMPLETED | OUTPATIENT
Start: 2017-04-18 | End: 2017-04-18

## 2017-04-18 RX ORDER — CARVEDILOL 12.5 MG/1
12.5 TABLET ORAL
Status: DISCONTINUED | OUTPATIENT
Start: 2017-04-18 | End: 2017-04-19 | Stop reason: HOSPADM

## 2017-04-18 RX ORDER — MIDAZOLAM HYDROCHLORIDE 1 MG/ML
0.5 INJECTION, SOLUTION INTRAMUSCULAR; INTRAVENOUS
Status: DISCONTINUED | OUTPATIENT
Start: 2017-04-18 | End: 2017-04-18 | Stop reason: HOSPADM

## 2017-04-18 RX ORDER — ALBUTEROL SULFATE 0.83 MG/ML
2.5 SOLUTION RESPIRATORY (INHALATION) AS NEEDED
Status: DISCONTINUED | OUTPATIENT
Start: 2017-04-18 | End: 2017-04-18 | Stop reason: HOSPADM

## 2017-04-18 RX ORDER — HYDROMORPHONE HYDROCHLORIDE 2 MG/1
1 TABLET ORAL
Status: DISCONTINUED | OUTPATIENT
Start: 2017-04-18 | End: 2017-04-19 | Stop reason: HOSPADM

## 2017-04-18 RX ORDER — FLUOXETINE HYDROCHLORIDE 20 MG/1
20 CAPSULE ORAL DAILY
Status: DISCONTINUED | OUTPATIENT
Start: 2017-04-19 | End: 2017-04-19 | Stop reason: HOSPADM

## 2017-04-18 RX ORDER — OXYCODONE AND ACETAMINOPHEN 7.5; 325 MG/1; MG/1
1 TABLET ORAL
Status: DISCONTINUED | OUTPATIENT
Start: 2017-04-18 | End: 2017-04-19 | Stop reason: HOSPADM

## 2017-04-18 RX ORDER — MIDAZOLAM HYDROCHLORIDE 1 MG/ML
1 INJECTION, SOLUTION INTRAMUSCULAR; INTRAVENOUS AS NEEDED
Status: DISCONTINUED | OUTPATIENT
Start: 2017-04-18 | End: 2017-04-18 | Stop reason: HOSPADM

## 2017-04-18 RX ORDER — PROPOFOL 10 MG/ML
INJECTION, EMULSION INTRAVENOUS AS NEEDED
Status: DISCONTINUED | OUTPATIENT
Start: 2017-04-18 | End: 2017-04-18 | Stop reason: HOSPADM

## 2017-04-18 RX ORDER — SULFAMETHOXAZOLE AND TRIMETHOPRIM 800; 160 MG/1; MG/1
1 TABLET ORAL EVERY 12 HOURS
Status: DISCONTINUED | OUTPATIENT
Start: 2017-04-18 | End: 2017-04-18 | Stop reason: DRUGHIGH

## 2017-04-18 RX ORDER — FENTANYL CITRATE 50 UG/ML
25 INJECTION, SOLUTION INTRAMUSCULAR; INTRAVENOUS
Status: DISCONTINUED | OUTPATIENT
Start: 2017-04-18 | End: 2017-04-18 | Stop reason: HOSPADM

## 2017-04-18 RX ORDER — GUAIFENESIN 100 MG/5ML
81 LIQUID (ML) ORAL DAILY
Status: DISCONTINUED | OUTPATIENT
Start: 2017-04-19 | End: 2017-04-19 | Stop reason: HOSPADM

## 2017-04-18 RX ORDER — DEXTROSE, SODIUM CHLORIDE, AND POTASSIUM CHLORIDE 5; .45; .075 G/100ML; G/100ML; G/100ML
50 INJECTION INTRAVENOUS CONTINUOUS
Status: DISCONTINUED | OUTPATIENT
Start: 2017-04-18 | End: 2017-04-19

## 2017-04-18 RX ORDER — CINACALCET 30 MG/1
30 TABLET, FILM COATED ORAL DAILY
Status: DISCONTINUED | OUTPATIENT
Start: 2017-04-19 | End: 2017-04-19 | Stop reason: HOSPADM

## 2017-04-18 RX ORDER — METRONIDAZOLE 250 MG/1
500 TABLET ORAL 3 TIMES DAILY
Status: DISCONTINUED | OUTPATIENT
Start: 2017-04-18 | End: 2017-04-19 | Stop reason: HOSPADM

## 2017-04-18 RX ORDER — NORTRIPTYLINE HYDROCHLORIDE 25 MG/1
25 CAPSULE ORAL
Status: DISCONTINUED | OUTPATIENT
Start: 2017-04-18 | End: 2017-04-19 | Stop reason: HOSPADM

## 2017-04-18 RX ORDER — DIPHENHYDRAMINE HYDROCHLORIDE 50 MG/ML
12.5 INJECTION, SOLUTION INTRAMUSCULAR; INTRAVENOUS AS NEEDED
Status: DISCONTINUED | OUTPATIENT
Start: 2017-04-18 | End: 2017-04-18 | Stop reason: HOSPADM

## 2017-04-18 RX ORDER — GLYCOPYRROLATE 0.2 MG/ML
0.2 INJECTION INTRAMUSCULAR; INTRAVENOUS
Status: DISCONTINUED | OUTPATIENT
Start: 2017-04-18 | End: 2017-04-18 | Stop reason: HOSPADM

## 2017-04-18 RX ORDER — SODIUM CHLORIDE 9 MG/ML
25 INJECTION, SOLUTION INTRAVENOUS CONTINUOUS
Status: DISCONTINUED | OUTPATIENT
Start: 2017-04-18 | End: 2017-04-18 | Stop reason: HOSPADM

## 2017-04-18 RX ORDER — MIDAZOLAM HYDROCHLORIDE 1 MG/ML
INJECTION, SOLUTION INTRAMUSCULAR; INTRAVENOUS AS NEEDED
Status: DISCONTINUED | OUTPATIENT
Start: 2017-04-18 | End: 2017-04-18 | Stop reason: HOSPADM

## 2017-04-18 RX ORDER — SODIUM CHLORIDE, SODIUM LACTATE, POTASSIUM CHLORIDE, CALCIUM CHLORIDE 600; 310; 30; 20 MG/100ML; MG/100ML; MG/100ML; MG/100ML
1000 INJECTION, SOLUTION INTRAVENOUS CONTINUOUS
Status: DISCONTINUED | OUTPATIENT
Start: 2017-04-18 | End: 2017-04-18 | Stop reason: HOSPADM

## 2017-04-18 RX ORDER — HYDROMORPHONE HYDROCHLORIDE 1 MG/ML
0.2 INJECTION, SOLUTION INTRAMUSCULAR; INTRAVENOUS; SUBCUTANEOUS
Status: DISCONTINUED | OUTPATIENT
Start: 2017-04-18 | End: 2017-04-18 | Stop reason: HOSPADM

## 2017-04-18 RX ORDER — SULFAMETHOXAZOLE AND TRIMETHOPRIM 800; 160 MG/1; MG/1
1 TABLET ORAL
Status: DISCONTINUED | OUTPATIENT
Start: 2017-04-18 | End: 2017-04-19 | Stop reason: HOSPADM

## 2017-04-18 RX ORDER — ROPIVACAINE HYDROCHLORIDE 5 MG/ML
30 INJECTION, SOLUTION EPIDURAL; INFILTRATION; PERINEURAL AS NEEDED
Status: DISCONTINUED | OUTPATIENT
Start: 2017-04-18 | End: 2017-04-18 | Stop reason: HOSPADM

## 2017-04-18 RX ORDER — HYDROCODONE BITARTRATE AND ACETAMINOPHEN 5; 325 MG/1; MG/1
1 TABLET ORAL AS NEEDED
Status: DISCONTINUED | OUTPATIENT
Start: 2017-04-18 | End: 2017-04-18 | Stop reason: HOSPADM

## 2017-04-18 RX ORDER — AMLODIPINE BESYLATE 5 MG/1
10 TABLET ORAL DAILY
Status: DISCONTINUED | OUTPATIENT
Start: 2017-04-19 | End: 2017-04-19 | Stop reason: HOSPADM

## 2017-04-18 RX ORDER — LIDOCAINE HYDROCHLORIDE 10 MG/ML
0.1 INJECTION, SOLUTION EPIDURAL; INFILTRATION; INTRACAUDAL; PERINEURAL AS NEEDED
Status: DISCONTINUED | OUTPATIENT
Start: 2017-04-18 | End: 2017-04-18 | Stop reason: HOSPADM

## 2017-04-18 RX ORDER — SUCCINYLCHOLINE CHLORIDE 20 MG/ML
INJECTION INTRAMUSCULAR; INTRAVENOUS AS NEEDED
Status: DISCONTINUED | OUTPATIENT
Start: 2017-04-18 | End: 2017-04-18 | Stop reason: HOSPADM

## 2017-04-18 RX ORDER — ONDANSETRON 2 MG/ML
INJECTION INTRAMUSCULAR; INTRAVENOUS AS NEEDED
Status: DISCONTINUED | OUTPATIENT
Start: 2017-04-18 | End: 2017-04-18 | Stop reason: HOSPADM

## 2017-04-18 RX ORDER — FENTANYL CITRATE 50 UG/ML
INJECTION, SOLUTION INTRAMUSCULAR; INTRAVENOUS AS NEEDED
Status: DISCONTINUED | OUTPATIENT
Start: 2017-04-18 | End: 2017-04-18 | Stop reason: HOSPADM

## 2017-04-18 RX ORDER — DIAZEPAM 5 MG/1
5 TABLET ORAL
Status: DISCONTINUED | OUTPATIENT
Start: 2017-04-18 | End: 2017-04-19 | Stop reason: HOSPADM

## 2017-04-18 RX ORDER — LIDOCAINE HYDROCHLORIDE 20 MG/ML
INJECTION, SOLUTION EPIDURAL; INFILTRATION; INTRACAUDAL; PERINEURAL AS NEEDED
Status: DISCONTINUED | OUTPATIENT
Start: 2017-04-18 | End: 2017-04-18 | Stop reason: HOSPADM

## 2017-04-18 RX ORDER — CARVEDILOL 12.5 MG/1
50 TABLET ORAL
Status: DISCONTINUED | OUTPATIENT
Start: 2017-04-19 | End: 2017-04-19 | Stop reason: HOSPADM

## 2017-04-18 RX ORDER — ROCURONIUM BROMIDE 10 MG/ML
INJECTION, SOLUTION INTRAVENOUS AS NEEDED
Status: DISCONTINUED | OUTPATIENT
Start: 2017-04-18 | End: 2017-04-18 | Stop reason: HOSPADM

## 2017-04-18 RX ORDER — ONDANSETRON 2 MG/ML
4 INJECTION INTRAMUSCULAR; INTRAVENOUS AS NEEDED
Status: DISCONTINUED | OUTPATIENT
Start: 2017-04-18 | End: 2017-04-18 | Stop reason: HOSPADM

## 2017-04-18 RX ORDER — SODIUM CHLORIDE, SODIUM LACTATE, POTASSIUM CHLORIDE, CALCIUM CHLORIDE 600; 310; 30; 20 MG/100ML; MG/100ML; MG/100ML; MG/100ML
INJECTION, SOLUTION INTRAVENOUS
Status: DISCONTINUED | OUTPATIENT
Start: 2017-04-18 | End: 2017-04-18 | Stop reason: HOSPADM

## 2017-04-18 RX ADMIN — SODIUM CHLORIDE 25 ML/HR: 900 INJECTION, SOLUTION INTRAVENOUS at 13:37

## 2017-04-18 RX ADMIN — SUCCINYLCHOLINE CHLORIDE 140 MG: 20 INJECTION INTRAMUSCULAR; INTRAVENOUS at 14:33

## 2017-04-18 RX ADMIN — MIDAZOLAM HYDROCHLORIDE 2 MG: 1 INJECTION, SOLUTION INTRAMUSCULAR; INTRAVENOUS at 14:22

## 2017-04-18 RX ADMIN — ONDANSETRON 4 MG: 2 INJECTION INTRAMUSCULAR; INTRAVENOUS at 15:00

## 2017-04-18 RX ADMIN — METRONIDAZOLE 500 MG: 250 TABLET ORAL at 17:47

## 2017-04-18 RX ADMIN — FENTANYL CITRATE 100 MCG: 50 INJECTION, SOLUTION INTRAMUSCULAR; INTRAVENOUS at 14:33

## 2017-04-18 RX ADMIN — PROPOFOL 150 MG: 10 INJECTION, EMULSION INTRAVENOUS at 14:33

## 2017-04-18 RX ADMIN — FENTANYL CITRATE 25 MCG: 50 INJECTION, SOLUTION INTRAMUSCULAR; INTRAVENOUS at 13:54

## 2017-04-18 RX ADMIN — SULFAMETHOXAZOLE AND TRIMETHOPRIM 1 TABLET: 800; 160 TABLET ORAL at 21:22

## 2017-04-18 RX ADMIN — ROCURONIUM BROMIDE 25 MG: 10 INJECTION, SOLUTION INTRAVENOUS at 14:42

## 2017-04-18 RX ADMIN — HYDROMORPHONE HYDROCHLORIDE 1 MG: 2 TABLET ORAL at 18:15

## 2017-04-18 RX ADMIN — SODIUM CHLORIDE, SODIUM LACTATE, POTASSIUM CHLORIDE, CALCIUM CHLORIDE: 600; 310; 30; 20 INJECTION, SOLUTION INTRAVENOUS at 14:22

## 2017-04-18 RX ADMIN — HYDROMORPHONE HYDROCHLORIDE 1 MG: 2 TABLET ORAL at 22:23

## 2017-04-18 RX ADMIN — CARVEDILOL 12.5 MG: 12.5 TABLET, FILM COATED ORAL at 21:22

## 2017-04-18 RX ADMIN — BENZOCAINE AND MENTHOL 1 LOZENGE: 15; 3.6 LOZENGE ORAL at 21:33

## 2017-04-18 RX ADMIN — LIDOCAINE HYDROCHLORIDE 60 MG: 20 INJECTION, SOLUTION EPIDURAL; INFILTRATION; INTRACAUDAL; PERINEURAL at 14:33

## 2017-04-18 RX ADMIN — DEXTROSE MONOHYDRATE, SODIUM CHLORIDE, AND POTASSIUM CHLORIDE 50 ML/HR: 50; 4.5; .745 INJECTION, SOLUTION INTRAVENOUS at 15:39

## 2017-04-18 RX ADMIN — ROCURONIUM BROMIDE 5 MG: 10 INJECTION, SOLUTION INTRAVENOUS at 14:33

## 2017-04-18 RX ADMIN — FENTANYL CITRATE 25 MCG: 50 INJECTION, SOLUTION INTRAMUSCULAR; INTRAVENOUS at 13:42

## 2017-04-18 RX ADMIN — NORTRIPTYLINE HYDROCHLORIDE 25 MG: 25 CAPSULE ORAL at 21:22

## 2017-04-18 RX ADMIN — METRONIDAZOLE 500 MG: 250 TABLET ORAL at 21:22

## 2017-04-18 RX ADMIN — DIPHENHYDRAMINE HYDROCHLORIDE 12.5 MG: 50 INJECTION, SOLUTION INTRAMUSCULAR; INTRAVENOUS at 15:44

## 2017-04-18 NOTE — PROGRESS NOTES
Note pt's arrival to [facility] post surgery. Met with pt's two adult daughters this pm. Pt soundly sleeping. Pt lives with her spouse and one adult daughter (works outside the home). Pt is currently receiving home health services via [facility]. They are requesting to change agencies due to inconstancy. [facility] is the preferred agency per order. Daughter confirmed this agency. She is aware they will need to contact [facility] in the am to release her services from this agency per medicare guidelines. Daughter will call. Referral sent to [facility]. Left message with referral coordinator re: above info. Dr. Eva Zapata is pt's PCP. Upon discharge, family plans to transport via car. Pt receives dialysis; thus, plan for treatment prior to discharge. Will follow. Laxmi Sadler LCSW    Care Management Interventions  PCP Verified by CM: Yes (Dr. Eva Zapata)  Mode of Transport at Discharge:  Other (see comment) (family)  Transition of Care Consult (CM Consult): [address]: Yes  MyChart Signup: No  Discharge Durable Medical Equipment: No  Physical Therapy Consult: No  Occupational Therapy Consult: No  Speech Therapy Consult: No  Current Support Network: Lives with Spouse, Relative's Home  Confirm Follow Up Transport: Family  Plan discussed with Pt/Family/Caregiver: Yes  Freedom of Choice Offered: Yes  Discharge Location  Discharge Placement: Home with home health

## 2017-04-18 NOTE — IP AVS SNAPSHOT
[address] [place] [address] Box [facility] 
[phone] Patient: Taco Geiger MRN: [id number] UXP:4/9/5289 You are allergic to the following Allergen Reactions Latex Itching Rash, sometimes difficult to breathe Celebrex (Celecoxib) Anaphylaxis Swelling TONGUE. LIPS AND EYES Iodine Other (comments) IV CONTRAST-LESIONS, AND SKIN SLUFFED OFF Keflex (Cephalexin) Anaphylaxis Swelling Tongue, lips, and eyes Levaquin (Levofloxacin) Anaphylaxis Swelling Tongue, lips, and eyes Pcn (Penicillins) Anaphylaxis Swelling Tongue, lips and eyes Morphine Other (comments) PT STATES IS JUST SENSITIVITY, GOT TOO MUCH ONE TIME. Opioids - Morphine Analogues Other (comments) SENSITIVE TO OPIOIDS Recent Documentation Height Weight BMI OB Status Smoking Status 1.854 m 124.6 kg 36.24 kg/m2 Hysterectomy Never Smoker Emergency Contacts Name Discharge Info Relation Home Work Mobile [address] CAREGIVER [3] Daughter [21] [phone] About your hospitalization You were admitted on:  April 18, 2017 You last received care in the:  [address] MED SURG You were discharged on:  April 19, 2017 Unit phone number:  [phone] Why you were hospitalized Your primary diagnosis was:  Not on File Providers Seen During Your Hospitalizations Provider Role Specialty Primary office phone Kaelyn Yeboah MD Attending Provider General Surgery [phone] Your Primary Care Physician (PCP) Primary Care Physician Office Phone Office Fax Briseida Moon [phone] Follow-up Information Follow up With Details Comments Contact Info Socrates Martinez MD   [address] [place] [address] [facility] [address] [place] [address] 
[phone] Jeana Calvin Batson 227 On 4/20/2017 skilled nursing care [address] [facility] [address] 
[phone] Current Discharge Medication List  
  
START taking these medications Dose & Instructions Dispensing Information Comments Morning Noon Evening Bedtime * metroNIDAZOLE 500 mg tablet Commonly known as:  FLAGYL Your last dose was: Your next dose is:    
   
   
 Dose:  500 mg Take 1 Tab by mouth three (3) times daily for 10 days. Quantity:  30 Tab Refills:  0  
     
   
   
   
  
 * Notice: This list has 1 medication(s) that are the same as other medications prescribed for you. Read the directions carefully, and ask your doctor or other care provider to review them with you. CONTINUE these medications which have CHANGED Dose & Instructions Dispensing Information Comments Morning Noon Evening Bedtime  
 oxyCODONE-acetaminophen 7.5-325 mg per tablet Commonly known as:  PERCOCET 7.5 What changed:  how much to take Your last dose was: Your next dose is:    
   
   
 Dose:  1-2 Tab Take 1-2 Tabs by mouth every four (4) hours as needed. Max Daily Amount: 12 Tabs. Quantity:  30 Tab Refills:  0  
     
   
   
   
  
 trimethoprim-sulfamethoxazole 160-800 mg per tablet Commonly known as:  BACTRIM DS, SEPTRA DS What changed:  when to take this Your last dose was: Your next dose is:    
   
   
 Dose:  1 Tab Take 1 Tab by mouth nightly for 10 days. Quantity:  10 Tab Refills:  0 CONTINUE these medications which have NOT CHANGED Dose & Instructions Dispensing Information Comments Morning Noon Evening Bedtime ALLEGRA 180 mg tablet Generic drug:  fexofenadine Your last dose was: Your next dose is:    
   
   
 Dose:  180 mg Take 180 mg by mouth daily. Refills:  0  
     
   
   
   
  
 ASPIRIN PO Your last dose was: Your next dose is:    
   
   
 Dose:  81 mg Take 81 mg by mouth daily. Refills:  0  
     
   
   
   
  
 BENADRYL 25 mg capsule Generic drug:  diphenhydrAMINE Your last dose was: Your next dose is:    
   
   
 Dose:  50 mg Take 50 mg by mouth every six (6) hours as needed for Itching. Refills:  0  
     
   
   
   
  
 * carvedilol 25 mg tablet Commonly known as:  Merry Schimke Your last dose was: Your next dose is:    
   
   
 Dose:  50 mg Take 50 mg by mouth every morning. Refills:  0  
     
   
   
   
  
 * carvedilol 12.5 mg tablet Commonly known as:  Merry Schimke Your last dose was: Your next dose is: Take  by mouth nightly. Refills:  0  
     
   
   
   
  
 diazePAM 5 mg tablet Commonly known as:  VALIUM Your last dose was: Your next dose is:    
   
   
 Dose:  5 mg Take 1 Tab by mouth every eight (8) hours as needed for Anxiety. Max Daily Amount: 15 mg. Quantity:  12 Tab Refills:  0 DULCOLAX STOOL SOFTENER (DSS) 100 mg capsule Generic drug:  docusate sodium Your last dose was: Your next dose is:    
   
   
 Dose:  100 mg Take 100 mg by mouth two (2) times a day. Refills:  0 HUMALOG SC Your last dose was: Your next dose is:    
   
   
 by SubCUTAneous route Before breakfast, lunch, and dinner. SLIDING SCALE 100-150-4u 151-200-5u ect (1 UNIT INCREASE FOR EVERY 50 POINTS) Refills:  0 LEVEMIR FLEXPEN 100 unit/mL (3 mL) Inpn Generic drug:  insulin detemir Your last dose was: Your next dose is:    
   
   
 Dose:  8 Units 8 Units by SubCUTAneous route daily. NOON DAILY Refills:  0  
     
   
   
   
  
 mineral oil liquid Your last dose was: Your next dose is:    
   
   
 Dose:  30 mL Take 30 mL by mouth daily. Quantity:  180 mL Refills:  0 MIRALAX 17 gram packet Generic drug:  polyethylene glycol Your last dose was: Your next dose is:    
   
   
 Dose:  17 g Take 17 g by mouth two (2) times a day. Refills:  0  
     
   
   
   
  
 nortriptyline 25 mg capsule Commonly known as:  PAMELOR Your last dose was: Your next dose is:    
   
   
 Dose:  25 mg Take 25 mg by mouth nightly. Refills:  0 NORVASC PO Your last dose was: Your next dose is:    
   
   
 Dose:  10 mg Take 10 mg by mouth daily. Refills:  0  
     
   
   
   
  
 OTHER Your last dose was: Your next dose is:    
   
   
 0.9% Normal saline  Use as needed to affected area  Medical code: L02.215 Quantity:  1 Bottle Refills:  3 OTHER(NON-FORMULARY) Your last dose was: Your next dose is:    
   
   
 Dose:  1 Tab 1 Tab three (3) times daily (with meals). [facility] Refills:  0 PREVACID 15 mg capsule Generic drug:  lansoprazole Your last dose was: Your next dose is: Take  by mouth two (2) times a day. Refills:  0 PROZAC PO Your last dose was: Your next dose is:    
   
   
 Dose:  20 mg Take 20 mg by mouth daily. Refills:  0 RENAL SOFTGELS 1 mg capsule Generic drug:  b complex-vitamin c-folic acid Your last dose was: Your next dose is:    
   
   
 Dose:  1 Cap Take 1 Cap by mouth daily. Refills:  0  
     
   
   
   
  
 RENVELA PO Your last dose was: Your next dose is:    
   
   
 Dose:  800 mg Take 800 mg by mouth three (3) times daily (with meals). Refills:  0 SENSIPAR 30 mg tablet Generic drug:  cinacalcet Your last dose was: Your next dose is:    
   
   
 Dose:  30 mg Take 30 mg by mouth daily. Refills:  0  
     
   
   
   
  
 simvastatin 40 mg tablet Commonly known as:  ZOCOR Your last dose was: Your next dose is:    
   
   
 Dose:  40 mg Take 40 mg by mouth nightly. Refills:  0 VOLTAREN 1 % Gel Generic drug:  diclofenac Your last dose was: Your next dose is:    
   
   
 Dose:  4 g Apply 4 g to affected area four (4) times daily. Refills:  0  
     
   
   
   
  
 * Notice: This list has 2 medication(s) that are the same as other medications prescribed for you. Read the directions carefully, and ask your doctor or other care provider to review them with you. ASK your doctor about these medications Dose & Instructions Dispensing Information Comments Morning Noon Evening Bedtime * metroNIDAZOLE 500 mg tablet Commonly known as:  FLAGYL Ask about: Should I take this medication? Your last dose was: Your next dose is:    
   
   
 Dose:  500 mg Take 1 Tab by mouth three (3) times daily for 10 days. Quantity:  30 Tab Refills:  0  
     
   
   
   
  
 * Notice: This list has 1 medication(s) that are the same as other medications prescribed for you. Read the directions carefully, and ask your doctor or other care provider to review them with you. Where to Get Your Medications Information on where to get these meds will be given to you by the nurse or doctor. ! Ask your nurse or doctor about these medications  
  metroNIDAZOLE 500 mg tablet  
 oxyCODONE-acetaminophen 7.5-325 mg per tablet  
 trimethoprim-sulfamethoxazole 160-800 mg per tablet Discharge Instructions [address] [facility] at [address] [place] [address] Discharge Instructions Patient ID: Stephen Britton [id number] 
78 y.o. 
1950 Admit Date: 4/18/2017 Discharge Date: 4/19/2017 Last Procedure: Procedure(s): 
INCISION AND DRAINAGE  AND DEBRIDEMENT CHRONIC PERINEAL ABSCESS     
 
 Patient Instructions:  
 
 
 
 
FOLLOW-UP:  Follow-up with Dr. Rody Valdes or Larisa Barahona NP as scheduled, or call office at [phone] to schedule an appointment at your convenience. Please arrive by 20 minutes before scheduled appointment time to complete paperwork. Make sure to bring your ID card and insurance information. We are located at [facility] in the [facility], [address] [facility]. Call your physician immediately if you have any fevers greater than 101, drainage from your wound that looks like pus or green/brown fluid, persistent bleeding, or worsening pain and the pain medication is no longer working. ACTIVITY:    You are encouraged to walk and engage in light activity for the next two weeks. · Most people are able to return to work within 1 to 2 weeks after surgery. · You may shower 24 hours after surgery. You may remove packing to take shower or bath, then replace packing and dry gauze top. · No driving while you are taking narcotics. DIET:  Resume your normal diet. It is a good idea to eat a high fiber diet and take in plenty of fluids to prevent constipation. If you do become constipated you may want to take a mild laxative or take ducolax tablets on a daily basis until your bowel habits are regular. Constipation can be very uncomfortable, along with straining, after recent abdominal surgery. Wound care: Per home health, change dressing daily and more often if needed. QUESTIONS:  Please feel free to call the office ([phone]) if you have any questions, and they will be glad to assist you. Signed: 
Benny Whittington NP 
4/19/2017 
11:15 AM 
 
 
What to Expect at [facility] Your Recovery You are likely to have pain for the next few days. You may also feel like you have the flu, and you may have a low fever and feel tired and nauseated. This is common. You should feel better after a few days and will probably feel much better in 7 days. For several weeks you may feel twinges or pulling in the surgical area when you move. This is normal.  
This care sheet gives you a general idea about how long it will take for you to recover. But each person recovers at a different pace. Follow the steps below to get better as quickly as possible. How can you care for yourself at home? Activity · Rest when you feel tired. Getting enough sleep will help you recover. Sleep with your head up by using three or four pillows. You can also try to sleep with your head up in a recliner chair. Do not sleep on your side or stomach. · Try to walk each day. Start by walking a little more than you did the day before. Bit by bit, increase the amount you walk. Walking boosts blood flow and helps prevent pneumonia and constipation. · Avoid strenuous activities, such as biking, jogging, weight lifting, or aerobic exercise, until your doctor says it is okay. · You may drive when you are no longer taking pain medicine and can quickly move your foot from the gas pedal to the brake. You must also be able to sit comfortably for a long period of time, even if you do not plan to go far. You might get caught in traffic. · You may shower 24 to 48 hours after surgery, if your doctor okays it. Diet · You can eat your normal diet. If your stomach is upset, try bland, low-fat foods like plain rice, broiled chicken, toast, and yogurt. · Drink plenty of fluids (unless your doctor tells you not to). · You may notice that your bowel movements are not regular right after your surgery. This is common. Avoid constipation and straining with bowel movements. You may want to take a fiber supplement every day. If you have not had a bowel movement after a couple of days, ask your doctor about taking a mild laxative. Medicines · Take pain medicines exactly as directed. ¨ If the doctor gave you a prescription medicine for pain, take it as prescribed. ¨ If you are not taking a prescription pain medicine, take an over-the-counter medicine such as acetaminophen (Tylenol), ibuprofen (Advil, Motrin), or naproxen (Aleve). Read and follow all instructions on the label. ¨ Do not take two or more pain medicines at the same time unless the doctor told you to. Many pain medicines have acetaminophen, which is Tylenol. Too much acetaminophen (Tylenol) can be harmful. · If your doctor prescribed antibiotics, take them as directed. Do not stop taking them just because you feel better. You need to take the full course of antibiotics. · If you think your pain medicine is making you sick to your stomach: 
¨ Take your medicine after meals (unless your doctor has told you not to). ¨ Ask your doctor for a different pain medicine. Follow-up care is a key part of your treatment and safety. Be sure to make and go to all appointments, and call your doctor if you are having problems. Its also a good idea to know your test results and keep a list of the medicines you take. When should you call for help? Call 911 anytime you think you may need emergency care. For example, call if: 
· You passed out (lost consciousness). · You have sudden chest pain and shortness of breath, or you cough up blood. · You have severe pain in your belly. Call your doctor now or seek immediate medical care if: 
· You are sick to your stomach and cannot keep fluids down. · You have signs of a blood clot, such as: 
¨ Pain in your calf, back of the knee, thigh, or groin. ¨ Redness and swelling in your leg or groin. · You have trouble passing urine or stool, especially if you have mild pain or swelling in your lower belly. · Bright red blood has soaked through the bandage over your incision. Watch closely for any changes in your health, and be sure to contact your doctor if: 
· Your swelling is getting worse. · Your swelling is not going down. Where can you learn more? Go to DealExplorer.be Enter Y512 in the search box to learn more about \"Hernia Repair: What to Expect at Home. \"  
© 4740-7237 [employer], [employer]. Care instructions adapted under license by Rosa Jacob (which disclaims liability or warranty for this information). This care instruction is for use with your licensed healthcare professional. If you have questions about a medical condition or this instruction, always ask your healthcare professional. [address] any warranty or liability for your use of this information. Content Version: 5.1.238554; Last Revised: January 21, 2011 Discharge Orders None Introducing [facility]! Rosa Jacob introduces [employer] patient portal. Now you can access parts of your medical record, email your doctor's office, and request medication refills online. 1. In your internet browser, go to [email or web address] 2. Click on the First Time User? Click Here link in the Sign In box. You will see the New Member Sign Up page. 3. Enter your Efield Access Code exactly as it appears below. You will not need to use this code after youve completed the sign-up process. If you do not sign up before the expiration date, you must request a new code. · [employer] Access Code: [email or web address] Expires: 4/25/2017  4:43 PM 
 
4. Enter the last four digits of your Social Security Number (xxxx) and Date of Birth (mm/dd/yyyy) as indicated and click Submit. You will be taken to the next sign-up page. 5. Create a Efield ID. This will be your Efield login ID and cannot be changed, so think of one that is secure and easy to remember. 6. Create a Efield password. You can change your password at any time. 7. Enter your Password Reset Question and Answer. This can be used at a later time if you forget your password. 8. Enter your e-mail address.  You will receive e-mail notification when new information is available in [employer]ang[employer]. 9. Click Sign Up. You can now view and download portions of your medical record. 10. Click the Download Summary menu link to download a portable copy of your medical information. If you have questions, please visit the Frequently Asked Questions section of the [employer] website. Remember, [employer] is NOT to be used for urgent needs. For medical emergencies, dial 911. Now available from your iPhone and Android! General Information Please provide this summary of care documentation to your next provider. Patient Signature:  ____________________________________________________________ Date:  ____________________________________________________________  
  
Anil Mariajose Provider Signature:  ____________________________________________________________ Date:  ____________________________________________________________

## 2017-04-18 NOTE — BRIEF OP NOTE
BRIEF OPERATIVE NOTE    Date of Procedure: 4/18/2017   Preoperative Diagnosis: PERINEAL ABSCESS  Postoperative Diagnosis: PERINEAL ABSCESS    Procedure(s):  INCISION AND DRAINAGE  AND DEBRIDEMENT CHRONIC PERINEAL ABSCESS    Surgeon(s) and Role:     * Jazmine Winkler MD - Primary            Surgical Staff:  Circ-1: Blayne Taylor RN  Circ-Relief: Isai Pedroza RN  Scrub RN-1: Hannah Burks  Scrub RN - Intern: Sola Razo RN  Surg Asst-1: Filiberto [place] RN  Event Time In   Incision Start 1449   Incision Close 1459     Anesthesia: General   Estimated Blood Loss: minimal  Specimens: * No specimens in log *   Findings: 5 cm abscess cavity   Complications: none  Implants: * No implants in log *

## 2017-04-18 NOTE — ANESTHESIA PREPROCEDURE EVALUATION
Anesthetic History     PONV          Review of Systems / Medical History  Patient summary reviewed, nursing notes reviewed and pertinent labs reviewed    Pulmonary            Asthma        Neuro/Psych              Cardiovascular    Hypertension              Exercise tolerance: <4 METS  Comments: Wheelchair bound   GI/Hepatic/Renal     GERD: well controlled    Renal disease: ESRD and dialysis      Comments: HD M,W,F,  Endo/Other    Diabetes: well controlled, type 2, using insulin  Hypothyroidism  Morbid obesity and arthritis     Other Findings              Physical Exam    Airway  Mallampati: II  TM Distance: > 6 cm  Neck ROM: normal range of motion   Mouth opening: Normal     Cardiovascular  Regular rate and rhythm,  S1 and S2 normal,  no murmur, click, rub, or gallop             Dental    Dentition: Lower partial plate and Upper partial plate     Pulmonary  Breath sounds clear to auscultation               Abdominal  GI exam deferred       Other Findings            Anesthetic Plan    ASA: 3  Anesthesia type: general          Induction: Intravenous  Anesthetic plan and risks discussed with: Patient

## 2017-04-18 NOTE — PROGRESS NOTES
TRANSFER - OUT REPORT:    Verbal report given to LONNY Melendez (name) on Sydelle Marking  being transferred to (unit) for routine post - op       Report consisted of patients Situation, Background, Assessment and   Recommendations(SBAR). Information from the following report(s) SBAR, Kardex, OR Summary, Procedure Summary, Intake/Output and MAR was reviewed with the receiving nurse. Lines:   Peripheral IV 04/18/17 Right Arm (Active)   Site Assessment Clean, dry, & intact 4/18/2017  3:25 PM   Phlebitis Assessment 0 4/18/2017  3:25 PM   Infiltration Assessment 0 4/18/2017  3:25 PM   Dressing Status Clean, dry, & intact 4/18/2017  3:25 PM   Dressing Type Tape;Transparent 4/18/2017  3:25 PM   Hub Color/Line Status Blue 4/18/2017  3:25 PM   Action Taken Open ports on tubing capped 4/18/2017  3:25 PM   Alcohol Cap Used Yes 4/18/2017  3:25 PM        Opportunity for questions and clarification was provided.       Patient transported with:   O2 @ 2 liters

## 2017-04-18 NOTE — IP AVS SNAPSHOT
Current Discharge Medication List  
  
START taking these medications Dose & Instructions Dispensing Information Comments Morning Noon Evening Bedtime * metroNIDAZOLE 500 mg tablet Commonly known as:  FLAGYL Your last dose was: Your next dose is:    
   
   
 Dose:  500 mg Take 1 Tab by mouth three (3) times daily for 10 days. Quantity:  30 Tab Refills:  0  
     
   
   
   
  
 * Notice: This list has 1 medication(s) that are the same as other medications prescribed for you. Read the directions carefully, and ask your doctor or other care provider to review them with you. CONTINUE these medications which have CHANGED Dose & Instructions Dispensing Information Comments Morning Noon Evening Bedtime  
 oxyCODONE-acetaminophen 7.5-325 mg per tablet Commonly known as:  PERCOCET 7.5 What changed:  how much to take Your last dose was: Your next dose is:    
   
   
 Dose:  1-2 Tab Take 1-2 Tabs by mouth every four (4) hours as needed. Max Daily Amount: 12 Tabs. Quantity:  30 Tab Refills:  0  
     
   
   
   
  
 trimethoprim-sulfamethoxazole 160-800 mg per tablet Commonly known as:  BACTRIM DS, SEPTRA DS What changed:  when to take this Your last dose was: Your next dose is:    
   
   
 Dose:  1 Tab Take 1 Tab by mouth nightly for 10 days. Quantity:  10 Tab Refills:  0 CONTINUE these medications which have NOT CHANGED Dose & Instructions Dispensing Information Comments Morning Noon Evening Bedtime ALLEGRA 180 mg tablet Generic drug:  fexofenadine Your last dose was: Your next dose is:    
   
   
 Dose:  180 mg Take 180 mg by mouth daily. Refills:  0  
     
   
   
   
  
 ASPIRIN PO Your last dose was: Your next dose is:    
   
   
 Dose:  81 mg Take 81 mg by mouth daily. Refills:  0 BENADRYL 25 mg capsule Generic drug:  diphenhydrAMINE Your last dose was: Your next dose is:    
   
   
 Dose:  50 mg Take 50 mg by mouth every six (6) hours as needed for Itching. Refills:  0  
     
   
   
   
  
 * carvedilol 25 mg tablet Commonly known as:  Nita Parhamer Your last dose was: Your next dose is:    
   
   
 Dose:  50 mg Take 50 mg by mouth every morning. Refills:  0  
     
   
   
   
  
 * carvedilol 12.5 mg tablet Commonly known as:  Nita Cocker Your last dose was: Your next dose is: Take  by mouth nightly. Refills:  0  
     
   
   
   
  
 diazePAM 5 mg tablet Commonly known as:  VALIUM Your last dose was: Your next dose is:    
   
   
 Dose:  5 mg Take 1 Tab by mouth every eight (8) hours as needed for Anxiety. Max Daily Amount: 15 mg. Quantity:  12 Tab Refills:  0 DULCOLAX STOOL SOFTENER (DSS) 100 mg capsule Generic drug:  docusate sodium Your last dose was: Your next dose is:    
   
   
 Dose:  100 mg Take 100 mg by mouth two (2) times a day. Refills:  0 HUMALOG SC Your last dose was: Your next dose is:    
   
   
 by SubCUTAneous route Before breakfast, lunch, and dinner. SLIDING SCALE 100-150-4u 151-200-5u ect (1 UNIT INCREASE FOR EVERY 50 POINTS) Refills:  0 LEVEMIR FLEXPEN 100 unit/mL (3 mL) Inpn Generic drug:  insulin detemir Your last dose was: Your next dose is:    
   
   
 Dose:  8 Units 8 Units by SubCUTAneous route daily. NOON DAILY Refills:  0  
     
   
   
   
  
 mineral oil liquid Your last dose was: Your next dose is:    
   
   
 Dose:  30 mL Take 30 mL by mouth daily. Quantity:  180 mL Refills:  0 MIRALAX 17 gram packet Generic drug:  polyethylene glycol Your last dose was: Your next dose is:    
   
   
 Dose:  17 g Take 17 g by mouth two (2) times a day. Refills:  0  
     
   
   
   
  
 nortriptyline 25 mg capsule Commonly known as:  PAMELOR Your last dose was: Your next dose is:    
   
   
 Dose:  25 mg Take 25 mg by mouth nightly. Refills:  0 NORVASC PO Your last dose was: Your next dose is:    
   
   
 Dose:  10 mg Take 10 mg by mouth daily. Refills:  0  
     
   
   
   
  
 OTHER Your last dose was: Your next dose is:    
   
   
 0.9% Normal saline  Use as needed to affected area  Medical code: L02.215 Quantity:  1 Bottle Refills:  3 OTHER(NON-FORMULARY) Your last dose was: Your next dose is:    
   
   
 Dose:  1 Tab 1 Tab three (3) times daily (with meals). [facility] Refills:  0 PREVACID 15 mg capsule Generic drug:  lansoprazole Your last dose was: Your next dose is: Take  by mouth two (2) times a day. Refills:  0 PROZAC PO Your last dose was: Your next dose is:    
   
   
 Dose:  20 mg Take 20 mg by mouth daily. Refills:  0 RENAL SOFTGELS 1 mg capsule Generic drug:  b complex-vitamin c-folic acid Your last dose was: Your next dose is:    
   
   
 Dose:  1 Cap Take 1 Cap by mouth daily. Refills:  0  
     
   
   
   
  
 RENVELA PO Your last dose was: Your next dose is:    
   
   
 Dose:  800 mg Take 800 mg by mouth three (3) times daily (with meals). Refills:  0 SENSIPAR 30 mg tablet Generic drug:  cinacalcet Your last dose was: Your next dose is:    
   
   
 Dose:  30 mg Take 30 mg by mouth daily. Refills:  0  
     
   
   
   
  
 simvastatin 40 mg tablet Commonly known as:  ZOCOR  
   
 Your last dose was: Your next dose is:    
   
   
 Dose:  40 mg Take 40 mg by mouth nightly. Refills:  0 VOLTAREN 1 % Gel Generic drug:  diclofenac Your last dose was: Your next dose is:    
   
   
 Dose:  4 g Apply 4 g to affected area four (4) times daily. Refills:  0  
     
   
   
   
  
 * Notice: This list has 2 medication(s) that are the same as other medications prescribed for you. Read the directions carefully, and ask your doctor or other care provider to review them with you. ASK your doctor about these medications Dose & Instructions Dispensing Information Comments Morning Noon Evening Bedtime * metroNIDAZOLE 500 mg tablet Commonly known as:  FLAGYL Ask about: Should I take this medication? Your last dose was: Your next dose is:    
   
   
 Dose:  500 mg Take 1 Tab by mouth three (3) times daily for 10 days. Quantity:  30 Tab Refills:  0  
     
   
   
   
  
 * Notice: This list has 1 medication(s) that are the same as other medications prescribed for you. Read the directions carefully, and ask your doctor or other care provider to review them with you. Where to Get Your Medications Information on where to get these meds will be given to you by the nurse or doctor. ! Ask your nurse or doctor about these medications  
  metroNIDAZOLE 500 mg tablet  
 oxyCODONE-acetaminophen 7.5-325 mg per tablet  
 trimethoprim-sulfamethoxazole 160-800 mg per tablet

## 2017-04-19 ENCOUNTER — HOME HEALTH ADMISSION (OUTPATIENT)
Dept: HOME HEALTH SERVICES | Facility: HOME HEALTH | Age: 67
End: 2017-04-19
Payer: MEDICARE

## 2017-04-19 VITALS
TEMPERATURE: 98.2 F | BODY MASS INDEX: 37.21 KG/M2 | WEIGHT: 274.69 LBS | SYSTOLIC BLOOD PRESSURE: 134 MMHG | HEART RATE: 65 BPM | OXYGEN SATURATION: 95 % | DIASTOLIC BLOOD PRESSURE: 58 MMHG | RESPIRATION RATE: 16 BRPM | HEIGHT: 72 IN

## 2017-04-19 LAB
ANION GAP BLD CALC-SCNC: 7 MMOL/L (ref 5–15)
BUN SERPL-MCNC: 44 MG/DL (ref 6–20)
BUN/CREAT SERPL: 6 (ref 12–20)
CALCIUM SERPL-MCNC: 8.2 MG/DL (ref 8.5–10.1)
CHLORIDE SERPL-SCNC: 96 MMOL/L (ref 97–108)
CO2 SERPL-SCNC: 31 MMOL/L (ref 21–32)
CREAT SERPL-MCNC: 7.04 MG/DL (ref 0.55–1.02)
GLUCOSE BLD STRIP.AUTO-MCNC: 209 MG/DL (ref 65–100)
GLUCOSE SERPL-MCNC: 160 MG/DL (ref 65–100)
POTASSIUM SERPL-SCNC: 4.1 MMOL/L (ref 3.5–5.1)
SERVICE CMNT-IMP: ABNORMAL
SODIUM SERPL-SCNC: 134 MMOL/L (ref 136–145)

## 2017-04-19 PROCEDURE — 90935 HEMODIALYSIS ONE EVALUATION: CPT

## 2017-04-19 PROCEDURE — 80048 BASIC METABOLIC PNL TOTAL CA: CPT | Performed by: SURGERY

## 2017-04-19 PROCEDURE — 74011250636 HC RX REV CODE- 250/636: Performed by: SURGERY

## 2017-04-19 PROCEDURE — 36415 COLL VENOUS BLD VENIPUNCTURE: CPT | Performed by: SURGERY

## 2017-04-19 PROCEDURE — 99218 HC RM OBSERVATION: CPT

## 2017-04-19 PROCEDURE — 74011250637 HC RX REV CODE- 250/637: Performed by: SURGERY

## 2017-04-19 PROCEDURE — 82962 GLUCOSE BLOOD TEST: CPT

## 2017-04-19 RX ORDER — METRONIDAZOLE 500 MG/1
500 TABLET ORAL 3 TIMES DAILY
Qty: 30 TAB | Refills: 0 | Status: SHIPPED | OUTPATIENT
Start: 2017-04-19 | End: 2017-04-29

## 2017-04-19 RX ORDER — SULFAMETHOXAZOLE AND TRIMETHOPRIM 800; 160 MG/1; MG/1
1 TABLET ORAL
Qty: 10 TAB | Refills: 0 | Status: SHIPPED | OUTPATIENT
Start: 2017-04-19 | End: 2017-04-29

## 2017-04-19 RX ORDER — ONDANSETRON 2 MG/ML
4 INJECTION INTRAMUSCULAR; INTRAVENOUS
Status: DISCONTINUED | OUTPATIENT
Start: 2017-04-19 | End: 2017-04-19 | Stop reason: HOSPADM

## 2017-04-19 RX ORDER — OXYCODONE AND ACETAMINOPHEN 7.5; 325 MG/1; MG/1
1-2 TABLET ORAL
Qty: 30 TAB | Refills: 0 | Status: SHIPPED | OUTPATIENT
Start: 2017-04-19 | End: 2018-02-13 | Stop reason: SDUPTHER

## 2017-04-19 RX ADMIN — METRONIDAZOLE 500 MG: 250 TABLET ORAL at 08:41

## 2017-04-19 RX ADMIN — OXYCODONE HYDROCHLORIDE AND ACETAMINOPHEN 1 TABLET: 7.5; 325 TABLET ORAL at 18:30

## 2017-04-19 RX ADMIN — ASPIRIN 81 MG CHEWABLE TABLET 81 MG: 81 TABLET CHEWABLE at 08:41

## 2017-04-19 RX ADMIN — DEXTROSE MONOHYDRATE, SODIUM CHLORIDE, AND POTASSIUM CHLORIDE 50 ML/HR: 50; 4.5; .745 INJECTION, SOLUTION INTRAVENOUS at 08:43

## 2017-04-19 RX ADMIN — BENZOCAINE AND MENTHOL 1 LOZENGE: 15; 3.6 LOZENGE ORAL at 07:19

## 2017-04-19 RX ADMIN — FLUOXETINE 20 MG: 20 CAPSULE ORAL at 08:41

## 2017-04-19 RX ADMIN — ONDANSETRON 4 MG: 2 INJECTION INTRAMUSCULAR; INTRAVENOUS at 13:52

## 2017-04-19 RX ADMIN — OXYCODONE HYDROCHLORIDE AND ACETAMINOPHEN 1 TABLET: 7.5; 325 TABLET ORAL at 10:49

## 2017-04-19 RX ADMIN — CARVEDILOL 25 MG: 12.5 TABLET, FILM COATED ORAL at 07:09

## 2017-04-19 RX ADMIN — AMLODIPINE BESYLATE 10 MG: 5 TABLET ORAL at 08:41

## 2017-04-19 RX ADMIN — ONDANSETRON 4 MG: 2 INJECTION INTRAMUSCULAR; INTRAVENOUS at 07:09

## 2017-04-19 RX ADMIN — CINACALCET HYDROCHLORIDE 30 MG: 30 TABLET, COATED ORAL at 08:41

## 2017-04-19 RX ADMIN — BENZOCAINE AND MENTHOL 1 LOZENGE: 15; 3.6 LOZENGE ORAL at 02:30

## 2017-04-19 RX ADMIN — HYDROMORPHONE HYDROCHLORIDE 1 MG: 2 TABLET ORAL at 02:11

## 2017-04-19 RX ADMIN — HYDROMORPHONE HYDROCHLORIDE 1 MG: 2 TABLET ORAL at 07:09

## 2017-04-19 NOTE — PROGRESS NOTES
Primary Nurse Jeff Holloway RN and Deisy Welch RN performed a dual skin assessment on this patient Impairment noted- see wound doc flow sheet (s/p I&D of perianal abscess).   Rob score is 19

## 2017-04-19 NOTE — CONSULTS
Nephrology Progress Note  Virginia Frank  Date of Admission : 4/18/2017    CC: Follow up for ESRD       Assessment and Plan     ESRD on HD:  - normally MWF  - HD planned for today    Volume:  - d/c IVF  - UF as tolerated    HTN:  - BP controlled  - cont current meds    Anemia:  - hgb stable  - hold ELTON today    Secondary HPT:  - cont sensipar    Perineal Abscess s/p I&D 4/18       Interval History: This is a 77 yo AAF with a hx of ESRD on HD MWF at [facility]. She was admitted for I&D of her perineal abscess. She is due for HD today. Last HD was Monday w/o issues. C/o some pain at her surgical site. No cp, sob, n/v/d reported. Current Medications: all current  Medications have been eviewed in EPIC  Review of Systems: Pertinent items are noted in HPI. Objective:  Vitals:    Vitals:    04/19/17 0709 04/19/17 0710 04/19/17 0850 04/19/17 0926   BP: 139/65  127/68 127/68   Pulse: 64  76 76   Resp:   16 16   Temp:   98.6 °F (37 °C) 98.6 °F (37 °C)   SpO2:   95% 95%   Weight:  124.6 kg (274 lb 11.1 oz)     Height:         Intake and Output:  04/19 0701 - 04/19 1900  In: 480 [P.O.:480]  Out: -   04/17 1901 - 04/19 0700  In: 490 [P.O.:240; I.V.:250]  Out: -     Physical Examination:  General: NAD,Conversant   Neck:  Supple, no mass  Resp:  Lungs CTA B/L, no wheezing , normal respiratory effort  CV:  RRR,  no murmur or rub, trace LE edema  GI:  Soft, NT, + Bowel sounds, no hepatosplenomegaly  Neurologic:  Non focal  Psych:             AAO x 3 appropriate affect   Skin:  No Rash  Access:           Left AVF +thrill/bruit    []    High complexity decision making was performed  []    Patient is at high-risk of decompensation with multiple organ involvement    Lab Data Personally Reviewed: I have reviewed all the pertinent labs, microbiology data and radiology studies during assessment.     Recent Labs      04/19/17   0216   NA  134*   K  4.1   CL  96*   CO2  31   GLU  160*   BUN  44*   CREA  7.04*   CA 8.2*     No results for input(s): WBC, HGB, HCT, PLT, HGBEXT, HCTEXT, PLTEXT in the last 72 hours. Lab Results   Component Value Date/Time    Specimen Description: URINE 10/27/2012 08:15 PM    Specimen Description: BLOOD 06/07/2011 07:40 AM    Specimen Description: URINE 08/30/2010 07:10 AM     Lab Results   Component Value Date/Time    Culture result: LIGHT  MIXED COMMENSAL PARIS   02/11/2017 12:40 AM    Culture result:  02/11/2017 12:40 AM     LIGHT  ANAEROBIC GRAM NEGATIVE RODS  BETA LACTAMASE POSITIVE      Culture result: NO GROWTH 6 DAYS 02/10/2017 04:43 PM     Recent Results (from the past 24 hour(s))   POC CHEM8    Collection Time: 04/18/17  1:36 PM   Result Value Ref Range    Calcium, ionized (POC) 0.98 (L) 1.12 - 1.32 MMOL/L    Sodium (POC) 138 136 - 145 MMOL/L    Potassium (POC) 4.0 3.5 - 5.1 MMOL/L    Chloride (POC) 94 (L) 98 - 107 MMOL/L    CO2 (POC) 30 21 - 32 MMOL/L    Anion gap (POC) 20 (H) 5 - 15 mmol/L    Glucose (POC) 142 (H) 65 - 100 MG/DL    BUN (POC) 37 (H) 9 - 20 MG/DL    Creatinine (POC) 5.6 (H) 0.6 - 1.3 MG/DL    GFR-AA (POC) 9 (L) >60 ml/min/1.73m2    GFR, non-AA (POC) 8 (L) >60 ml/min/1.73m2    Hemoglobin (POC) 12.9 11.5 - 16.0 GM/DL    Hematocrit (POC) 38 35.0 - 47.0 %    Comment Comment Not Indicated.      GLUCOSE, POC    Collection Time: 04/18/17  3:27 PM   Result Value Ref Range    Glucose (POC) 121 (H) 65 - 100 mg/dL    Performed by [address] [place] [address], [facility]    Collection Time: 04/19/17  2:16 AM   Result Value Ref Range    Sodium 134 (L) 136 - 145 mmol/L    Potassium 4.1 3.5 - 5.1 mmol/L    Chloride 96 (L) 97 - 108 mmol/L    CO2 31 21 - 32 mmol/L    Anion gap 7 5 - 15 mmol/L    Glucose 160 (H) 65 - 100 mg/dL    BUN 44 (H) 6 - 20 MG/DL    Creatinine 7.04 (H) 0.55 - 1.02 MG/DL    BUN/Creatinine ratio 6 (L) 12 - 20      GFR est AA 7 (L) >60 ml/min/1.73m2    GFR est non-AA 6 (L) >60 ml/min/1.73m2    Calcium 8.2 (L) 8.5 - 10.1 MG/DL             Libertad Donaldson MD  [facility]   [address] [facility] [address] [place] [address]  Phone - [phone]   Fax - [phone]  [email or web address]

## 2017-04-19 NOTE — PROGRESS NOTES
Daily Progress Note  [facility] General Surgery at [address] Date: 2017  Post-Operative Day: 1 from Procedure(s):  INCISION AND DRAINAGE  AND DEBRIDEMENT CHRONIC PERINEAL ABSCESS       Subjective:     Last 24 hrs: A lot of tenderness around surgical site. Otherwise doing well and anticipating going home later today. Consult placed for Nephrology to eval for next dialysis treatment while she is here. Eating well, + BM. On flagyl and bactrim. Objective:     Blood pressure 127/68, pulse 76, temperature 98.6 °F (37 °C), resp. rate 16, height 6' 1\" (1.854 m), weight 124.6 kg (274 lb 11.1 oz), SpO2 95 %. Temp (24hrs), Av.9 °F (36.6 °C), Min:97.3 °F (36.3 °C), Max:98.6 °F (37 °C)      _____________________  Physical Exam:     Alert and Oriented, lying in bed, in no acute distress. Cardiovascular: RRR, no peripheral edema  Lungs:CTAB   Surgical wound left perineum with surgical dressing - saturated with dried bloody drainage. Some slow bloody oozing upon removal of dressing, well controlled once packing replaced. Mild surrounding induration, no odor or purulence. Appropriately tender. Assessment:   S/p I&D and debridement of chronic perianal abscess    ESRD, dialysis dependent. Plan:     Await input from Nephrology regarding timing of dialysis  CM working on home health arrangements  Abx for 10 more days  Plan for DC later today  F/U in office         Marcio Munoz, [address] [facility] Surgery at [place] [address], [address] [place] [address]  [phone]    Data Review:    No results for input(s): WBC, HGB, HCT, PLT, HGBEXT, HCTEXT, PLTEXT in the last 72 hours. Recent Labs      17   0216   NA  134*   K  4.1   CL  96*   CO2  31   GLU  160*   BUN  44*   CREA  7.04*   CA  8.2*     No results for input(s): AML, LPSE in the last 72 hours.         ______________________  Medications:    Current Facility-Administered Medications   Medication Dose Route Frequency    ondansetron (ZOFRAN) injection 4 mg  4 mg IntraVENous Q6H PRN    amLODIPine (NORVASC) tablet 10 mg  10 mg Oral DAILY    aspirin chewable tablet 81 mg  81 mg Oral DAILY    carvedilol (COREG) tablet 12.5 mg  12.5 mg Oral QHS    carvedilol (COREG) tablet 50 mg  50 mg Oral 7am    cinacalcet (SENSIPAR) tablet 30 mg  30 mg Oral DAILY    diazePAM (VALIUM) tablet 5 mg  5 mg Oral Q8H PRN    FLUoxetine (PROzac) capsule 20 mg  20 mg Oral DAILY    nortriptyline (PAMELOR) capsule 25 mg  25 mg Oral QHS    oxyCODONE-acetaminophen (PERCOCET 7.5) 7.5-325 mg per tablet 1 Tab  1 Tab Oral Q4H PRN    HYDROmorphone (DILAUDID) tablet 1 mg  1 mg Oral Q4H PRN    dextrose 5% - 0.45% NaCl with KCl 10 mEq/L infusion  50 mL/hr IntraVENous CONTINUOUS    metroNIDAZOLE (FLAGYL) tablet 500 mg  500 mg Oral TID    trimethoprim-sulfamethoxazole (BACTRIM DS, SEPTRA DS) 160-800 mg per tablet 1 Tab  1 Tab Oral QHS    benzocaine-menthol (CEPACOL) lozenge 1 Lozenge  1 Lozenge Mucous Membrane Q2H PRN

## 2017-04-19 NOTE — PROGRESS NOTES
CM reviewed chart. [facility] has accepted patient for services at time of discharge. Home Health order was placed for nurse for wound care.   SHANNON Jo, SHAWNM

## 2017-04-19 NOTE — DIALYSIS
[facility]                         [phone]  Vitals Pre Post Assessment Pre Post   /61 134/58 LOC A/O x 3 A/O x 3   HR 66 64 Lungs Clear, denies SOB clear   Temp 98.2 98.4 Cardiac HRR HRR   Resp 16 16 Skin WDI WDI   Weight 125.5kg 123.5kg Edema Gen Puffiness None noted      Pain C/O nausea 7/10 Nausea 2/10     Orders   Duration: Start: 1230 End: 1600 Total: 3.5 hrs   Dialyzer: revaclear   K Bath: 2   Ca Bath: 2.5   Na / Bicarb: 140/35   Target Fluid Removal: 2000 ml     Access   Type & Location: LARRY-AVF   Comments:    +T/B, no redness or drainage noted. Cannulated w/ 15g x 1\" needles x 2.  +flashes/ aspir// NS flushes. Secured well w/ paper tape                             Labs   Hep B status / date: Hep B Ag neg 4/12/17   Obtained/Reviewed  Critical Results Called reviewed  NA     Meds Given   Name Dose Route        No meds ordered            Total Liters Process: 78   Net Fluid Removed: 2000 ml      Comments   Time Out Done: Yes, MJB at 1225   Primary Nurse Rpt Pre: Paul Guo RN   Primary Nurse Rpt Post: Paul Guo RN   Pt Education: Dicussed tx compliance and procedure. Care Plan: Continue to to HD tx as ordered by neephhrologists. Tx Summary: Tolerated tx well. At end, blood in circuit returned w/ 300ml NS. Cannulas removed x 3. Presure held to each site till no bleeding noted, approx 8 mins. Drsgs applied and secured well. +T/B noted.

## 2017-04-19 NOTE — ANESTHESIA POSTPROCEDURE EVALUATION
Post-Anesthesia Evaluation and Assessment    Patient: Lacey Bhakta MRN: [id number]  SSN: [id number]    YOB: 1950  Age: 78 y.o. Sex: female       Cardiovascular Function/Vital Signs  Visit Vitals    /65    Pulse 64    Temp 36.8 °C (98.2 °F)    Resp 16    Ht 6' 1\" (1.854 m)    Wt 112.9 kg (249 lb)    SpO2 95%    BMI 32.85 kg/m2       Patient is status post general anesthesia for Procedure(s):  INCISION AND DRAINAGE  AND DEBRIDEMENT CHRONIC PERINEAL ABSCESS  . Nausea/Vomiting: None    Postoperative hydration reviewed and adequate. Pain:  Pain Scale 1: Numeric (0 - 10) (04/19/17 0200)  Pain Intensity 1: 10 (04/19/17 0200)   Managed    Neurological Status:   Neuro (WDL): Exceptions to WDL (04/18/17 1550)  Neuro  Neurologic State: Alert (04/18/17 2000)  Orientation Level: Oriented X4 (04/18/17 1701)  Cognition: Appropriate decision making; Appropriate for age attention/concentration; Appropriate safety awareness; Follows commands (04/18/17 1701)  Speech: Clear (04/18/17 1701)  LUE Motor Response: Purposeful (04/18/17 2000)  LLE Motor Response: Purposeful (04/18/17 2000)  RUE Motor Response: Purposeful (04/18/17 2000)  RLE Motor Response: Purposeful (04/18/17 2000)   At baseline    Mental Status and Level of Consciousness: Arousable    Pulmonary Status:   O2 Device: Nasal cannula (04/19/17 5810)   Adequate oxygenation and airway patent    Complications related to anesthesia: None    Post-anesthesia assessment completed.  No concerns    Signed By: Alberto [place] MD     April 19, 2017

## 2017-04-19 NOTE — DISCHARGE INSTRUCTIONS
[facility] Surgery at [facility]  Discharge Instructions    Patient ID:  Ayala Paul  [id number]  78 y.o.  1950    Admit Date: 4/18/2017    Discharge Date: 4/19/2017    Last Procedure: Procedure(s):  INCISION AND DRAINAGE  AND DEBRIDEMENT CHRONIC PERINEAL ABSCESS        Patient Instructions:           FOLLOW-UP:  Follow-up with Dr. Racquel Billy or Starlet Sandhoff, NP as scheduled, or call office at [phone] to schedule an appointment at your convenience. Please arrive by 20 minutes before scheduled appointment time to complete paperwork. Make sure to bring your ID card and insurance information. We are located at [facility] in the [facility], [address] [facility]. Call your physician immediately if you have any fevers greater than 101, drainage from your wound that looks like pus or green/brown fluid, persistent bleeding, or worsening pain and the pain medication is no longer working. ACTIVITY:    You are encouraged to walk and engage in light activity for the next two weeks. · Most people are able to return to work within 1 to 2 weeks after surgery. · You may shower 24 hours after surgery. You may remove packing to take shower or bath, then replace packing and dry gauze top. · No driving while you are taking narcotics. DIET:  Resume your normal diet. It is a good idea to eat a high fiber diet and take in plenty of fluids to prevent constipation. If you do become constipated you may want to take a mild laxative or take ducolax tablets on a daily basis until your bowel habits are regular. Constipation can be very uncomfortable, along with straining, after recent abdominal surgery. Wound care: Per home health, change dressing daily and more often if needed. QUESTIONS:  Please feel free to call the office ([phone]) if you have any questions, and they will be glad to assist you.     Signed:  Penny Crump NP  4/19/2017  11:15 AM      What to Expect at [facility] Recovery  You are likely to have pain for the next few days. You may also feel like you have the flu, and you may have a low fever and feel tired and nauseated. This is common. You should feel better after a few days and will probably feel much better in 7 days. For several weeks you may feel twinges or pulling in the surgical area when you move. This is normal.   This care sheet gives you a general idea about how long it will take for you to recover. But each person recovers at a different pace. Follow the steps below to get better as quickly as possible. How can you care for yourself at home? Activity  · Rest when you feel tired. Getting enough sleep will help you recover. Sleep with your head up by using three or four pillows. You can also try to sleep with your head up in a recliner chair. Do not sleep on your side or stomach. · Try to walk each day. Start by walking a little more than you did the day before. Bit by bit, increase the amount you walk. Walking boosts blood flow and helps prevent pneumonia and constipation. · Avoid strenuous activities, such as biking, jogging, weight lifting, or aerobic exercise, until your doctor says it is okay. · You may drive when you are no longer taking pain medicine and can quickly move your foot from the gas pedal to the brake. You must also be able to sit comfortably for a long period of time, even if you do not plan to go far. You might get caught in traffic. · You may shower 24 to 48 hours after surgery, if your doctor okays it. Diet  · You can eat your normal diet. If your stomach is upset, try bland, low-fat foods like plain rice, broiled chicken, toast, and yogurt. · Drink plenty of fluids (unless your doctor tells you not to). · You may notice that your bowel movements are not regular right after your surgery. This is common. Avoid constipation and straining with bowel movements. You may want to take a fiber supplement every day.  If you have not had a bowel movement after a couple of days, ask your doctor about taking a mild laxative. Medicines  · Take pain medicines exactly as directed. ¨ If the doctor gave you a prescription medicine for pain, take it as prescribed. ¨ If you are not taking a prescription pain medicine, take an over-the-counter medicine such as acetaminophen (Tylenol), ibuprofen (Advil, Motrin), or naproxen (Aleve). Read and follow all instructions on the label. ¨ Do not take two or more pain medicines at the same time unless the doctor told you to. Many pain medicines have acetaminophen, which is Tylenol. Too much acetaminophen (Tylenol) can be harmful. · If your doctor prescribed antibiotics, take them as directed. Do not stop taking them just because you feel better. You need to take the full course of antibiotics. · If you think your pain medicine is making you sick to your stomach:  ¨ Take your medicine after meals (unless your doctor has told you not to). ¨ Ask your doctor for a different pain medicine. Follow-up care is a key part of your treatment and safety. Be sure to make and go to all appointments, and call your doctor if you are having problems. Its also a good idea to know your test results and keep a list of the medicines you take. When should you call for help? Call 911 anytime you think you may need emergency care. For example, call if:  · You passed out (lost consciousness). · You have sudden chest pain and shortness of breath, or you cough up blood. · You have severe pain in your belly. Call your doctor now or seek immediate medical care if:  · You are sick to your stomach and cannot keep fluids down. · You have signs of a blood clot, such as:  ¨ Pain in your calf, back of the knee, thigh, or groin. ¨ Redness and swelling in your leg or groin. · You have trouble passing urine or stool, especially if you have mild pain or swelling in your lower belly.   · Bright red blood has soaked through the bandage over your incision. Watch closely for any changes in your health, and be sure to contact your doctor if:  · Your swelling is getting worse. · Your swelling is not going down. Where can you learn more? Go to [employer]  Enter P355 in the search box to learn more about \"Hernia Rep[employer]: What to Expect at Home. \"   © 6403-3149 [employer]. Care instructions adapted under license by [employer] (which disclaims liability or warranty for this information). This care instruction is for use with your licensed healthcare professional. If you have questions about a medical condition or this instruction, always ask your healthcare professional. [place] [address] any warranty or liability for your use of this information.   Content Version: 2.5.181876; Last Revised: January 21, 2011

## 2017-04-19 NOTE — PROGRESS NOTES
Care Management-Received call from nursing supervisor to come to unit to speak with patient. CM, nursing supervisor and charge nurse met with patient, her daughter Jacquline Mohs ([phone]), and her daughter \"V\". This CM acknowledged that they had some concerns about patient going home. Daughter said her concern was what would happen in the dressing needed to be changed when home care was not there. Patient lives with  (disabled and cannot help) and her daughter Rocco Parada (who works during the day). Explained that home care would initially come daily to change the packing and would teach someone to do the dressing change as well. Explained that the person being taught would then need to change the dressing in between times nurse came. Explained other option would be SNF for wound care if patient wanted someone with her 24 hours a day. Patient is adamant about not going to a SNF. They are paying a care giver to come to the home for 3  hours in the morning, but this person cannot do wound changes. After understanding both options, daughter is ready for patient to be discharged. Nurse will give daughter phone number to call [facility] [facility] in the morning in case they do not hear from them right away. Paged on call RN-Mai Vega to ask them to call patient first thing in the morning. Awaiting her return call.    JERRY Cote

## 2017-04-19 NOTE — PROGRESS NOTES
Bedside shift change report given to Sunday Franki (oncoming nurse) by Rosa Elena Swenson RN (offgoing nurse). Report included the following information SBAR, Kardex, Intake/Output, MAR and Med Rec Status.

## 2017-04-19 NOTE — PROGRESS NOTES
Bedside shift change report given to Juan Ramirez RN (oncoming nurse) by Anastasiia Sheriff RN (offgoing nurse). Report included the following information SBAR, Kardex and MAR.

## 2017-04-19 NOTE — PROGRESS NOTES
CM called by Sheryl Pettit RN on floor saying the family doesn't want the patient to go home. They want to know how often wound care will be coming out to the home, because now the patient is having diarrhea. I gave Sheryl Pettit the Intake number [phone] to see if she could get some resolution for the family tonight because treatment is suppose to start tomorrow 4/20/17. Sheryl Pettit is calling physician.   Ellie Tavares RN CRM

## 2017-04-20 ENCOUNTER — HOME CARE VISIT (OUTPATIENT)
Dept: SCHEDULING | Facility: HOME HEALTH | Age: 67
End: 2017-04-20
Payer: MEDICARE

## 2017-04-20 ENCOUNTER — HOME CARE VISIT (OUTPATIENT)
Dept: HOME HEALTH SERVICES | Facility: HOME HEALTH | Age: 67
End: 2017-04-20
Payer: MEDICARE

## 2017-04-20 ENCOUNTER — TELEPHONE (OUTPATIENT)
Dept: SURGERY | Age: 67
End: 2017-04-20

## 2017-04-20 VITALS
DIASTOLIC BLOOD PRESSURE: 86 MMHG | SYSTOLIC BLOOD PRESSURE: 138 MMHG | WEIGHT: 249 LBS | OXYGEN SATURATION: 98 % | HEIGHT: 72 IN | HEART RATE: 74 BPM | TEMPERATURE: 99 F | BODY MASS INDEX: 33.72 KG/M2 | RESPIRATION RATE: 18 BRPM

## 2017-04-20 PROCEDURE — A6446 CONFORM BAND S W>=3" <5"/YD: HCPCS

## 2017-04-20 PROCEDURE — A9270 NON-COVERED ITEM OR SERVICE: HCPCS

## 2017-04-20 PROCEDURE — A6216 NON-STERILE GAUZE<=16 SQ IN: HCPCS

## 2017-04-20 PROCEDURE — 3331090001 HH PPS REVENUE CREDIT

## 2017-04-20 PROCEDURE — G0299 HHS/HOSPICE OF RN EA 15 MIN: HCPCS

## 2017-04-20 PROCEDURE — 3331090002 HH PPS REVENUE DEBIT

## 2017-04-20 PROCEDURE — 400013 HH SOC

## 2017-04-20 PROCEDURE — A6252 ABSORPT DRG >16 <=48 W/O BDR: HCPCS

## 2017-04-20 PROCEDURE — A4216 STERILE WATER/SALINE, 10 ML: HCPCS

## 2017-04-20 NOTE — TELEPHONE ENCOUNTER
[facility] admitted to [facility] for wound care. She states plan is to teach and observe daughter with wound care, then go to 3 days a week for about 3 weeks. She states she will provide wound care supplies for daughter for in between visits. She will send paperwork over for signature.

## 2017-04-21 ENCOUNTER — HOME CARE VISIT (OUTPATIENT)
Dept: SCHEDULING | Facility: HOME HEALTH | Age: 67
End: 2017-04-21
Payer: MEDICARE

## 2017-04-21 PROCEDURE — 3331090001 HH PPS REVENUE CREDIT

## 2017-04-21 PROCEDURE — 3331090002 HH PPS REVENUE DEBIT

## 2017-04-21 PROCEDURE — G0300 HHS/HOSPICE OF LPN EA 15 MIN: HCPCS

## 2017-04-21 NOTE — OP NOTES
[address], [facility]   OP NOTE       Name:  Rosa Isela Koch   MR#:  [id number]   :  1950   Account #:  [de-identified]    Surgery Date:  2017   Date of Adm:  2017       PREOPERATIVE DIAGNOSIS: Persistent perineal abscess. POSTOPERATIVE DIAGNOSIS: Persistent perineal abscess. PROCEDURES PERFORMED: Incision and drainage and   debridement of chronic perineal abscess on the right side. SURGEON: Shobha Blanco MD     ANESTHESIA: General.    FINDINGS: A longstanding abscess with previous drainages but the   area just needed to be opened up further. SPECIMENS REMOVED: None. ESTIMATED BLOOD LOSS: Minimal.    COMPLICATIONS: None. DRAINS: None. CONDITION: Good to the PACU. DESCRIPTION OF PROCEDURE: The patient was placed under   satisfactory general anesthesia and then placed in lithotomy position. The perineum and perianal areas were prepared with Betadine paint   and draped as a field. The area of the drainage was in the lower   peritoneum actually is almost in the perianal region, although there is   no communication with the perirectal space. The area of concern was   opened for a distance of about 4 or 5 cm and then using mostly   mechanical debridement with the cautery and also grasping of tissue   fragments with the forceps, the area was cleaned up. Little bleeders   were coagulated. The resulting defect was then packed with Betadine-  soaked 2-inch Cassandra. Dressing was applied over this. At the termination   of the procedure, all counts were correct. The patient tolerated this well   and was brought to the PACU in satisfactory condition. MD Joss Fitzgerald / Radha Jones   D:  2017   09:20   T:  2017   10:15   Job #:  [id number]

## 2017-04-22 PROCEDURE — 3331090001 HH PPS REVENUE CREDIT

## 2017-04-22 PROCEDURE — 3331090002 HH PPS REVENUE DEBIT

## 2017-04-23 VITALS
DIASTOLIC BLOOD PRESSURE: 72 MMHG | HEART RATE: 82 BPM | TEMPERATURE: 98.4 F | SYSTOLIC BLOOD PRESSURE: 142 MMHG | RESPIRATION RATE: 18 BRPM | OXYGEN SATURATION: 97 %

## 2017-04-23 PROCEDURE — 3331090001 HH PPS REVENUE CREDIT

## 2017-04-23 PROCEDURE — 3331090002 HH PPS REVENUE DEBIT

## 2017-04-24 ENCOUNTER — HOME CARE VISIT (OUTPATIENT)
Dept: SCHEDULING | Facility: HOME HEALTH | Age: 67
End: 2017-04-24
Payer: MEDICARE

## 2017-04-24 PROCEDURE — 3331090001 HH PPS REVENUE CREDIT

## 2017-04-24 PROCEDURE — 3331090002 HH PPS REVENUE DEBIT

## 2017-04-24 PROCEDURE — G0300 HHS/HOSPICE OF LPN EA 15 MIN: HCPCS

## 2017-04-25 ENCOUNTER — OFFICE VISIT (OUTPATIENT)
Dept: SURGERY | Age: 67
End: 2017-04-25

## 2017-04-25 VITALS
WEIGHT: 249 LBS | HEIGHT: 72 IN | RESPIRATION RATE: 18 BRPM | HEART RATE: 68 BPM | BODY MASS INDEX: 33.72 KG/M2 | TEMPERATURE: 97.9 F | OXYGEN SATURATION: 95 % | DIASTOLIC BLOOD PRESSURE: 70 MMHG | SYSTOLIC BLOOD PRESSURE: 160 MMHG

## 2017-04-25 DIAGNOSIS — Z09 POSTOPERATIVE EXAMINATION: Primary | ICD-10-CM

## 2017-04-25 PROCEDURE — 3331090002 HH PPS REVENUE DEBIT

## 2017-04-25 PROCEDURE — 3331090001 HH PPS REVENUE CREDIT

## 2017-04-25 NOTE — PROGRESS NOTES
Subjective:      Michael Serra is a 78 y.o. female who presents today for wound check. She is status post I&d of perineal abscess on 11/2016 by Dr. Serenity Alford, 2/11/2017 by Dr. David Santa and 4/17/2017 by Dr. Fatou Bautista. Home health is packing the wound. Patient states that the area superior to the wound has become more tender. . The swelling has gone down. It is very tender to touch. Currently taking Flagyl and Bactrim without issue. Objective:     Visit Vitals    /70 (BP 1 Location: Left arm, BP Patient Position: Sitting)    Pulse 68    Temp 97.9 °F (36.6 °C) (Oral)    Resp 18    Ht 6' 2\" (1.88 m)    Wt 249 lb (112.9 kg)    SpO2 95%    BMI 31.97 kg/m2       Wound:   good granulation tissue, tender above wound. Repacked wound with kerlix, covered with guaze and tape. Serous anginous drainage. No pus noted. Dr. Osvaldo Cordero- evaluated wound. Assessment:     Wound check. Plan:     Continue antibiotics and packing wound daily. May do sitz bath, shower with warm water, warm compresses. Phone follow up on Friday- daughter will call. Follow up on Monday with Dr. Fatou Bautista. Pt verbalized understanding and questions were answered to the best of my knowledge and ability.

## 2017-04-25 NOTE — PROGRESS NOTES
1. Have you been to the ER, urgent care clinic since your last visit? Hospitalized since your last visit? No    2. Have you seen or consulted any other health care providers outside of the [address] [place] [address] since your last visit? Include any pap smears or colon screening.  No

## 2017-04-26 ENCOUNTER — HOME CARE VISIT (OUTPATIENT)
Dept: SCHEDULING | Facility: HOME HEALTH | Age: 67
End: 2017-04-26
Payer: MEDICARE

## 2017-04-26 VITALS
OXYGEN SATURATION: 95 % | RESPIRATION RATE: 18 BRPM | RESPIRATION RATE: 18 BRPM | HEART RATE: 63 BPM | HEART RATE: 74 BPM | TEMPERATURE: 97.7 F | DIASTOLIC BLOOD PRESSURE: 72 MMHG | SYSTOLIC BLOOD PRESSURE: 142 MMHG | DIASTOLIC BLOOD PRESSURE: 82 MMHG | SYSTOLIC BLOOD PRESSURE: 132 MMHG | OXYGEN SATURATION: 95 % | TEMPERATURE: 98.3 F

## 2017-04-26 PROCEDURE — 3331090002 HH PPS REVENUE DEBIT

## 2017-04-26 PROCEDURE — G0300 HHS/HOSPICE OF LPN EA 15 MIN: HCPCS

## 2017-04-26 PROCEDURE — 3331090001 HH PPS REVENUE CREDIT

## 2017-04-27 PROCEDURE — 3331090001 HH PPS REVENUE CREDIT

## 2017-04-27 PROCEDURE — 3331090002 HH PPS REVENUE DEBIT

## 2017-04-28 ENCOUNTER — HOME CARE VISIT (OUTPATIENT)
Dept: SCHEDULING | Facility: HOME HEALTH | Age: 67
End: 2017-04-28
Payer: MEDICARE

## 2017-04-28 PROCEDURE — 3331090002 HH PPS REVENUE DEBIT

## 2017-04-28 PROCEDURE — 3331090001 HH PPS REVENUE CREDIT

## 2017-04-28 PROCEDURE — G0300 HHS/HOSPICE OF LPN EA 15 MIN: HCPCS

## 2017-04-29 PROCEDURE — 3331090002 HH PPS REVENUE DEBIT

## 2017-04-29 PROCEDURE — 3331090001 HH PPS REVENUE CREDIT

## 2017-04-30 VITALS
HEART RATE: 78 BPM | TEMPERATURE: 97.7 F | OXYGEN SATURATION: 98 % | DIASTOLIC BLOOD PRESSURE: 78 MMHG | RESPIRATION RATE: 20 BRPM | SYSTOLIC BLOOD PRESSURE: 136 MMHG

## 2017-04-30 PROCEDURE — 3331090002 HH PPS REVENUE DEBIT

## 2017-04-30 PROCEDURE — 3331090001 HH PPS REVENUE CREDIT

## 2017-05-01 ENCOUNTER — OFFICE VISIT (OUTPATIENT)
Dept: SURGERY | Age: 67
End: 2017-05-01

## 2017-05-01 ENCOUNTER — HOME CARE VISIT (OUTPATIENT)
Dept: HOME HEALTH SERVICES | Facility: HOME HEALTH | Age: 67
End: 2017-05-01
Payer: MEDICARE

## 2017-05-01 VITALS — WEIGHT: 249 LBS | BODY MASS INDEX: 33.72 KG/M2 | HEIGHT: 72 IN

## 2017-05-01 DIAGNOSIS — Z09 POSTOPERATIVE EXAMINATION: Primary | ICD-10-CM

## 2017-05-01 DIAGNOSIS — L02.215 PERINEAL ABSCESS: ICD-10-CM

## 2017-05-01 PROCEDURE — 3331090001 HH PPS REVENUE CREDIT

## 2017-05-01 PROCEDURE — 3331090002 HH PPS REVENUE DEBIT
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
## 2017-05-01 NOTE — PROGRESS NOTES
1. Have you been to the ER, urgent care clinic since your last visit? Hospitalized since your last visit? No       2. Have you seen or consulted any other health care providers outside of the [address] [place] [address] since your last visit? Include any pap smears or colon screening.   No

## 2017-05-01 NOTE — MR AVS SNAPSHOT
Visit Information Date & Time Provider Department Dept. Phone Encounter #  
 5/1/2017  3:50 PM Snehal WestleyCelso [address] [phone] [id number] Follow-up Instructions Return in about 1 week (around 5/8/2017). Upcoming Health Maintenance Date Due  
 FOOT EXAM Q1 5/8/1960 MICROALBUMIN Q1 5/8/1959 EYE EXAM RETINAL OR DILATED Q1 5/8/1959 DTaP/Tdap/Td series (1 - Tdap) 5/8/1970 BREAST CANCER SCRN MAMMOGRAM 5/8/1999 FOBT Q 1 YEAR AGE 50-75 5/8/1999 ZOSTER VACCINE AGE 60> 5/8/2009 HEMOGLOBIN A1C Q6M 4/6/2010 LIPID PANEL Q1 10/6/2010 GLAUCOMA SCREENING Q2Y 5/8/2014 OSTEOPOROSIS SCREENING (DEXA) 5/8/2014 Pneumococcal 65+ Low/Medium Risk (1 of 2 - PCV13) 5/8/2014 MEDICARE YEARLY EXAM 5/8/2014 INFLUENZA AGE 9 TO ADULT 8/1/2017 Allergies as of 5/1/2017  Review Complete On: 5/1/2017 By: Snehal Christy MD  
  
 Severity Noted Reaction Type Reactions Latex  01/03/2011    Itching Rash, sometimes difficult to breathe Celebrex [Celecoxib] High 01/06/2011    Anaphylaxis, Swelling TONGUE. LIPS AND EYES Iodine High 07/17/2013    Other (comments) IV CONTRAST-LESIONS, AND SKIN SLUFFED OFF Keflex [Cephalexin] High 10/14/2010    Anaphylaxis, Swelling Tongue, lips, and eyes Levaquin [Levofloxacin] High 10/14/2010    Anaphylaxis, Swelling Tongue, lips, and eyes Pcn [Penicillins] High 10/14/2010    Anaphylaxis, Swelling Tongue, lips and eyes Morphine  10/14/2010    Other (comments) PT STATES IS JUST SENSITIVITY, GOT TOO MUCH ONE TIME. Opioids - Morphine Analogues  04/14/2017    Other (comments) SENSITIVE TO OPIOIDS Current Immunizations  Never Reviewed No immunizations on file. Not reviewed this visit You Were Diagnosed With   
  
 Codes Comments Postoperative examination    -  Primary ICD-10-CM: M45 ICD-9-CM: V67.00 Vitals Height(growth percentile) Weight(growth percentile) BMI OB Status Smoking Status 6' 2\" (1.88 m) 249 lb (112.9 kg) 31.97 kg/m2 Hysterectomy Never Smoker BMI and BSA Data Body Mass Index Body Surface Area  
 31.97 kg/m 2 2.43 m 2 Preferred Pharmacy Pharmacy Name Phone [facility], [address] AT [facility] OF  [facility] [phone] Your Updated Medication List  
  
   
This list is accurate as of: 5/1/17  5:08 PM.  Always use your most recent med list. ALLEGRA 180 mg tablet Generic drug:  fexofenadine Take 180 mg by mouth daily. ASPIRIN PO Take 81 mg by mouth daily. BENADRYL 25 mg capsule Generic drug:  diphenhydrAMINE Take 50 mg by mouth every six (6) hours as needed for Itching. * carvedilol 25 mg tablet Commonly known as:  Gia Sylva Take 50 mg by mouth every morning. * carvedilol 12.5 mg tablet Commonly known as:  Gia Sylva Take  by mouth nightly. diazePAM 5 mg tablet Commonly known as:  VALIUM Take 1 Tab by mouth every eight (8) hours as needed for Anxiety. Max Daily Amount: 15 mg.  
  
 DULCOLAX STOOL SOFTENER (DSS) 100 mg capsule Generic drug:  docusate sodium Take 100 mg by mouth two (2) times a day. HUMALOG SC  
by SubCUTAneous route Before breakfast, lunch, and dinner. SLIDING SCALE 100-150-4u 151-200-5u ect (1 UNIT INCREASE FOR EVERY 50 POINTS) latanoprost 0.005 % ophthalmic solution Commonly known as:  Melody Saha Administer 1 Drop to both eyes nightly. LEVEMIR FLEXPEN 100 unit/mL (3 mL) Inpn Generic drug:  insulin detemir 8 Units by SubCUTAneous route daily. NOON DAILY  
  
 mineral oil liquid Take 30 mL by mouth daily. MIRALAX 17 gram packet Generic drug:  polyethylene glycol Take 17 g by mouth two (2) times a day. nortriptyline 25 mg capsule Commonly known as:  PAMELOR Take 25 mg by mouth nightly. NORVASC PO Take 10 mg by mouth daily. OTHER  
0.9% Normal saline  Use as needed to affected area  Medical code: L02.215 OTHER(NON-FORMULARY) 1 Tab three (3) times daily (with meals). [facility]  
  
 oxyCODONE-acetaminophen 7.5-325 mg per tablet Commonly known as:  PERCOCET 7.5 Take 1-2 Tabs by mouth every four (4) hours as needed. Max Daily Amount: 12 Tabs. PREVACID 15 mg capsule Generic drug:  lansoprazole Take  by mouth two (2) times a day. PROZAC PO Take 20 mg by mouth daily. RENAL SOFTGELS 1 mg capsule Generic drug:  b complex-vitamin c-folic acid Take 1 Cap by mouth daily. RENVELA PO Take 800 mg by mouth three (3) times daily (with meals). SENSIPAR 30 mg tablet Generic drug:  cinacalcet Take 30 mg by mouth daily. simvastatin 40 mg tablet Commonly known as:  ZOCOR Take 40 mg by mouth nightly. VOLTAREN 1 % Gel Generic drug:  diclofenac Apply 4 g to affected area four (4) times daily. * Notice: This list has 2 medication(s) that are the same as other medications prescribed for you. Read the directions carefully, and ask your doctor or other care provider to review them with you. Follow-up Instructions Return in about 1 week (around 5/8/2017). To-Do List   
 05/03/2017 To Be Determined Appointment with Neisha Sun RN at [address] [place] [address]  
  
 05/05/2017 To Be Determined Appointment with Jerrell Mullins LPN at [address] [place] [address]  
  
 05/08/2017 To Be Determined Appointment with Jerrell Mullins LPN at [address] [place] [address]  
  
 05/10/2017 To Be Determined Appointment with Jerrell Mullins LPN at [address] [place] [address]  
  
 05/12/2017 To Be Determined Appointment with Jerrell Mullins LPN at [address] [place] [address]  
  
 05/15/2017 To Be Determined Appointment with César Lorenz LPN at [place] [address]  
  
 05/17/2017 To Be Determined Appointment with César Lorenz LPN at [place] [address]  
  
 05/19/2017 To Be Determined Appointment with Ruthie Alvarado RN at [place] [address] Introducing [facility]! Rosa Jacob introduces [employer] patient portal. Now you can access parts of your medical record, email your doctor's office, and request medication refills online. 1. In your internet browser, go to [email or web address] 2. Click on the First Time User? Click Here link in the Sign In box. You will see the New Member Sign Up page. 3. Enter your [employer] Access Code exactly as it appears below. You will not need to use this code after youve completed the sign-up process. If you do not sign up before the expiration date, you must request a new code. · [employer] Access Code: [email or web address] Expires: 7/29/2017  5:44 PM 
 
4. Enter the last four digits of your Social Security Number (xxxx) and Date of Birth (mm/dd/yyyy) as indicated and click Submit. You will be taken to the next sign-up page. 5. Create a [employer] ID. This will be your [employer] login ID and cannot be changed, so think of one that is secure and easy to remember. 6. Create a [employer] password. You can change your password at any time. 7. Enter your Password Reset Question and Answer. This can be used at a later time if you forget your password. 8. Enter your e-mail address. You will receive e-mail notification when new information is available in [address] 9. Click Sign Up. You can now view and download portions of your medical record. 10. Click the Download Summary menu link to download a portable copy of your medical information.  
 
If you have questions, please visit the Frequently Asked Questions section of the [employer]. Remember, [employer] is NOT to be used for urgent needs. For medical emergencies, dial 911. Now available from your iPhone and Android! Please provide this summary of care documentation to your next provider. Your primary care clinician is listed as Yeison July. If you have any questions after today's visit, please call [phone].

## 2017-05-02 PROCEDURE — 3331090002 HH PPS REVENUE DEBIT

## 2017-05-02 PROCEDURE — 3331090001 HH PPS REVENUE CREDIT

## 2017-05-03 ENCOUNTER — HOME CARE VISIT (OUTPATIENT)
Dept: SCHEDULING | Facility: HOME HEALTH | Age: 67
End: 2017-05-03
Payer: MEDICARE

## 2017-05-03 PROCEDURE — G0300 HHS/HOSPICE OF LPN EA 15 MIN: HCPCS

## 2017-05-03 PROCEDURE — 3331090002 HH PPS REVENUE DEBIT

## 2017-05-03 PROCEDURE — 3331090001 HH PPS REVENUE CREDIT

## 2017-05-04 VITALS
TEMPERATURE: 98 F | HEART RATE: 80 BPM | SYSTOLIC BLOOD PRESSURE: 142 MMHG | OXYGEN SATURATION: 98 % | DIASTOLIC BLOOD PRESSURE: 86 MMHG | RESPIRATION RATE: 20 BRPM

## 2017-05-04 PROCEDURE — 3331090001 HH PPS REVENUE CREDIT

## 2017-05-04 PROCEDURE — 3331090002 HH PPS REVENUE DEBIT

## 2017-05-05 ENCOUNTER — HOME CARE VISIT (OUTPATIENT)
Dept: SCHEDULING | Facility: HOME HEALTH | Age: 67
End: 2017-05-05
Payer: MEDICARE

## 2017-05-05 LAB — BACTERIA SPEC AEROBE CULT: NORMAL

## 2017-05-05 PROCEDURE — 3331090002 HH PPS REVENUE DEBIT

## 2017-05-05 PROCEDURE — 3331090001 HH PPS REVENUE CREDIT

## 2017-05-05 PROCEDURE — G0300 HHS/HOSPICE OF LPN EA 15 MIN: HCPCS

## 2017-05-05 PROCEDURE — A4649 SURGICAL SUPPLIES: HCPCS

## 2017-05-05 PROCEDURE — A4456 ADHESIVE REMOVER, WIPES: HCPCS

## 2017-05-06 VITALS
SYSTOLIC BLOOD PRESSURE: 166 MMHG | DIASTOLIC BLOOD PRESSURE: 80 MMHG | HEART RATE: 85 BPM | OXYGEN SATURATION: 95 % | RESPIRATION RATE: 18 BRPM | TEMPERATURE: 97.3 F

## 2017-05-06 PROCEDURE — 3331090001 HH PPS REVENUE CREDIT

## 2017-05-06 PROCEDURE — 3331090002 HH PPS REVENUE DEBIT

## 2017-05-07 PROCEDURE — 3331090002 HH PPS REVENUE DEBIT

## 2017-05-07 PROCEDURE — 3331090001 HH PPS REVENUE CREDIT

## 2017-05-08 ENCOUNTER — HOME CARE VISIT (OUTPATIENT)
Dept: HOME HEALTH SERVICES | Facility: HOME HEALTH | Age: 67
End: 2017-05-08
Payer: MEDICARE

## 2017-05-08 PROCEDURE — 3331090002 HH PPS REVENUE DEBIT

## 2017-05-08 PROCEDURE — 3331090001 HH PPS REVENUE CREDIT

## 2017-05-09 PROCEDURE — 3331090001 HH PPS REVENUE CREDIT

## 2017-05-09 PROCEDURE — 3331090002 HH PPS REVENUE DEBIT

## 2017-05-10 ENCOUNTER — OFFICE VISIT (OUTPATIENT)
Dept: SURGERY | Age: 67
End: 2017-05-10

## 2017-05-10 ENCOUNTER — HOME CARE VISIT (OUTPATIENT)
Dept: HOME HEALTH SERVICES | Facility: HOME HEALTH | Age: 67
End: 2017-05-10
Payer: MEDICARE

## 2017-05-10 VITALS
HEIGHT: 72 IN | SYSTOLIC BLOOD PRESSURE: 120 MMHG | WEIGHT: 249 LBS | DIASTOLIC BLOOD PRESSURE: 70 MMHG | TEMPERATURE: 98.2 F | HEART RATE: 82 BPM | BODY MASS INDEX: 33.72 KG/M2 | RESPIRATION RATE: 18 BRPM

## 2017-05-10 DIAGNOSIS — K61.0 PERIANAL CELLULITIS: Primary | ICD-10-CM

## 2017-05-10 PROCEDURE — 3331090002 HH PPS REVENUE DEBIT

## 2017-05-10 PROCEDURE — 3331090001 HH PPS REVENUE CREDIT
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
## 2017-05-10 NOTE — PROGRESS NOTES
1. Have you been to the ER, urgent care clinic since your last visit? Hospitalized since your last visit? No    2. Have you seen or consulted any other health care providers outside of the [address] [place] [address] since your last visit? Include any pap smears or colon screening.  No

## 2017-05-10 NOTE — MR AVS SNAPSHOT
Visit Information Date & Time Provider Department Dept. Phone Encounter #  
 5/10/2017  3:40 PM Saarh Suarez, [address] [phone] [id number] Your Appointments 5/24/2017  3:50 PM  
POST OP 10 MIN with Sarah Suarez MD  
[address] ([facility]) Appt Note: post op 2weeks. per  always needs late apt. $0cp$0pb.fs  
 [address] [facility] [id number]  
[address] [place] [address] Upcoming [facility] Maintenance Date Due  
 FOOT EXAM Q1 5/8/1960 MICROALBUMIN Q1 5/8/1959 EYE EXAM RETINAL OR DILATED Q1 5/8/1959 DTaP/Tdap/Td series (1 - Tdap) 5/8/1970 BREAST CANCER SCRN MAMMOGRAM 5/8/1999 FOBT Q 1 YEAR AGE 50-75 5/8/1999 ZOSTER VACCINE AGE 60> 5/8/2009 HEMOGLOBIN A1C Q6M 4/6/2010 LIPID PANEL Q1 10/6/2010 GLAUCOMA SCREENING Q2Y 5/8/2014 OSTEOPOROSIS SCREENING (DEXA) 5/8/2014 Pneumococcal 65+ Low/Medium Risk (1 of 2 - PCV13) 5/8/2014 MEDICARE YEARLY EXAM 5/8/2014 INFLUENZA AGE 9 TO ADULT 8/1/2017 Allergies as of 5/10/2017  Review Complete On: 5/10/2017 By: Sarah Suarez MD  
  
 Severity Noted Reaction Type Reactions Latex  01/03/2011    Itching Rash, sometimes difficult to breathe Celebrex [Celecoxib] High 01/06/2011    Anaphylaxis, Swelling TONGUE. LIPS AND EYES Iodine High 07/17/2013    Other (comments) IV CONTRAST-LESIONS, AND SKIN SLUFFED OFF Keflex [Cephalexin] High 10/14/2010    Anaphylaxis, Swelling Tongue, lips, and eyes Levaquin [Levofloxacin] High 10/14/2010    Anaphylaxis, Swelling Tongue, lips, and eyes Pcn [Penicillins] High 10/14/2010    Anaphylaxis, Swelling Tongue, lips and eyes Morphine  10/14/2010    Other (comments) PT STATES IS JUST SENSITIVITY, GOT TOO MUCH ONE TIME. Opioids - Morphine Analogues  04/14/2017    Other (comments) SENSITIVE TO OPIOIDS Current Immunizations  Never Reviewed No immunizations on file. Not reviewed this visit You Were Diagnosed With   
  
 Codes Comments Perianal cellulitis    -  Primary ICD-10-CM: K61.1 ICD-9-CM: 048 Vitals BP Pulse Temp Resp Height(growth percentile) Weight(growth percentile) 120/70 (BP 1 Location: Right arm, BP Patient Position: Sitting) 82 98.2 °F (36.8 °C) (Oral) 18 6' 2\" (1.88 m) 249 lb (112.9 kg) BMI OB Status Smoking Status 31.97 kg/m2 Hysterectomy Never Smoker BMI and BSA Data Body Mass Index Body Surface Area  
 31.97 kg/m 2 2.43 m 2 Preferred Pharmacy Pharmacy Name Phone [facility], [address] AT [facility] OF  [facility] [phone] Your Updated Medication List  
  
   
This list is accurate as of: 5/10/17  4:45 PM.  Always use your most recent med list. ALLEGRA 180 mg tablet Generic drug:  fexofenadine Take 180 mg by mouth daily. ASPIRIN PO Take 81 mg by mouth daily. BENADRYL 25 mg capsule Generic drug:  diphenhydrAMINE Take 50 mg by mouth every six (6) hours as needed for Itching. * carvedilol 25 mg tablet Commonly known as:  Colby Reef Take 50 mg by mouth every morning. * carvedilol 12.5 mg tablet Commonly known as:  Colby Reef Take  by mouth nightly. diazePAM 5 mg tablet Commonly known as:  VALIUM Take 1 Tab by mouth every eight (8) hours as needed for Anxiety. Max Daily Amount: 15 mg.  
  
 DULCOLAX STOOL SOFTENER (DSS) 100 mg capsule Generic drug:  docusate sodium Take 100 mg by mouth two (2) times a day. HUMALOG SC  
by SubCUTAneous route Before breakfast, lunch, and dinner. SLIDING SCALE 100-150-4u 151-200-5u ect (1 UNIT INCREASE FOR EVERY 50 POINTS) latanoprost 0.005 % ophthalmic solution Commonly known as:  Stef Fields Administer 1 Drop to both eyes nightly. LEVEMIR FLEXPEN 100 unit/mL (3 mL) Inpn Generic drug:  insulin detemir 8 Units by SubCUTAneous route daily. NOON DAILY  
  
 mineral oil liquid Take 30 mL by mouth daily. MIRALAX 17 gram packet Generic drug:  polyethylene glycol Take 17 g by mouth two (2) times a day. nortriptyline 25 mg capsule Commonly known as:  PAMELOR Take 25 mg by mouth nightly. NORVASC PO Take 10 mg by mouth daily. OTHER  
0.9% Normal saline  Use as needed to affected area  Medical code: L02.215 OTHER(NON-FORMULARY) 1 Tab three (3) times daily (with meals). [facility]  
  
 oxyCODONE-acetaminophen 7.5-325 mg per tablet Commonly known as:  PERCOCET 7.5 Take 1-2 Tabs by mouth every four (4) hours as needed. Max Daily Amount: 12 Tabs. PREVACID 15 mg capsule Generic drug:  lansoprazole Take  by mouth two (2) times a day. PROZAC PO Take 20 mg by mouth daily. RENAL SOFTGELS 1 mg capsule Generic drug:  b complex-vitamin c-folic acid Take 1 Cap by mouth daily. RENVELA PO Take 800 mg by mouth three (3) times daily (with meals). SENSIPAR 30 mg tablet Generic drug:  cinacalcet Take 30 mg by mouth daily. simvastatin 40 mg tablet Commonly known as:  ZOCOR Take 40 mg by mouth nightly. VOLTAREN 1 % Gel Generic drug:  diclofenac Apply 4 g to affected area four (4) times daily. * Notice: This list has 2 medication(s) that are the same as other medications prescribed for you. Read the directions carefully, and ask your doctor or other care provider to review them with you. To-Do List   
 05/12/2017 To Be Determined [facility]   
  
 05/15/2017 To Be Determined Appointment with Gulshan Blanco LPN at [facility]   
  
 05/17/2017 To Be Determined Appointment with Matt Dalal LPN at [place] [address]  
  
 05/19/2017 To Be Determined Appointment with Brenda Beard RN at [place] [address] Introducing [facility] SERVICES! Olga Jeet introduces [employer] patient portal. Now you can access parts of your medical record, email your doctor's office, and request medication refills online. 1. In your internet browser, go to [email or web address] 2. Click on the First Time User? Click Here link in the Sign In box. You will see the New Member Sign Up page. 3. Enter your [employer] Access Code exactly as it appears below. You will not need to use this code after youve completed the sign-up process. If you do not sign up before the expiration date, you must request a new code. · [employer] Access Code: [email or web address] Expires: 7/29/2017  5:44 PM 
 
4. Enter the last four digits of your Social Security Number (xxxx) and Date of Birth (mm/dd/yyyy) as indicated and click Submit. You will be taken to the next sign-up page. 5. Create a [employer] ID. This will be your [employer] login ID and cannot be changed, so think of one that is secure and easy to remember. 6. Create a [employer] password. You can change your password at any time. 7. Enter your Password Reset Question and Answer. This can be used at a later time if you forget your password. 8. Enter your e-mail address. You will receive e-mail notification when new information is available in [address] 9. Click Sign Up. You can now view and download portions of your medical record. 10. Click the Download Summary menu link to download a portable copy of your medical information. If you have questions, please visit the Frequently Asked Questions section of the [employer] website. Remember, [employer] is NOT to be used for urgent needs. For medical emergencies, dial 911. Now available from your iPhone and Android! Please provide this summary of care documentation to your next provider. Your primary care clinician is listed as Charles Heading. If you have any questions after today's visit, please call [phone].

## 2017-05-11 PROCEDURE — 3331090001 HH PPS REVENUE CREDIT

## 2017-05-11 PROCEDURE — 3331090002 HH PPS REVENUE DEBIT

## 2017-05-12 ENCOUNTER — HOME CARE VISIT (OUTPATIENT)
Dept: SCHEDULING | Facility: HOME HEALTH | Age: 67
End: 2017-05-12
Payer: MEDICARE

## 2017-05-12 ENCOUNTER — TELEPHONE (OUTPATIENT)
Dept: SURGERY | Age: 67
End: 2017-05-12

## 2017-05-12 PROCEDURE — G0300 HHS/HOSPICE OF LPN EA 15 MIN: HCPCS

## 2017-05-12 PROCEDURE — 3331090001 HH PPS REVENUE CREDIT

## 2017-05-12 PROCEDURE — 3331090002 HH PPS REVENUE DEBIT

## 2017-05-12 NOTE — TELEPHONE ENCOUNTER
Returned nurses call and left a message that Dr. Kiaa Gtuiérrez note says he wants to try Duoderm to wound to see if that will help healing.

## 2017-05-13 PROCEDURE — 3331090002 HH PPS REVENUE DEBIT

## 2017-05-13 PROCEDURE — 3331090001 HH PPS REVENUE CREDIT

## 2017-05-14 PROCEDURE — 3331090002 HH PPS REVENUE DEBIT

## 2017-05-14 PROCEDURE — 3331090001 HH PPS REVENUE CREDIT

## 2017-05-15 ENCOUNTER — HOME CARE VISIT (OUTPATIENT)
Dept: SCHEDULING | Facility: HOME HEALTH | Age: 67
End: 2017-05-15
Payer: MEDICARE

## 2017-05-15 PROCEDURE — G0300 HHS/HOSPICE OF LPN EA 15 MIN: HCPCS

## 2017-05-15 PROCEDURE — 3331090002 HH PPS REVENUE DEBIT

## 2017-05-15 PROCEDURE — 3331090001 HH PPS REVENUE CREDIT

## 2017-05-16 VITALS
SYSTOLIC BLOOD PRESSURE: 138 MMHG | SYSTOLIC BLOOD PRESSURE: 110 MMHG | RESPIRATION RATE: 18 BRPM | HEART RATE: 80 BPM | RESPIRATION RATE: 18 BRPM | OXYGEN SATURATION: 98 % | OXYGEN SATURATION: 96 % | TEMPERATURE: 97.6 F | DIASTOLIC BLOOD PRESSURE: 86 MMHG | TEMPERATURE: 97.1 F | HEART RATE: 84 BPM | DIASTOLIC BLOOD PRESSURE: 70 MMHG

## 2017-05-16 PROCEDURE — 3331090002 HH PPS REVENUE DEBIT

## 2017-05-16 PROCEDURE — 3331090001 HH PPS REVENUE CREDIT

## 2017-05-17 ENCOUNTER — HOME CARE VISIT (OUTPATIENT)
Dept: HOME HEALTH SERVICES | Facility: HOME HEALTH | Age: 67
End: 2017-05-17
Payer: MEDICARE

## 2017-05-17 PROCEDURE — 3331090001 HH PPS REVENUE CREDIT

## 2017-05-17 PROCEDURE — 3331090002 HH PPS REVENUE DEBIT

## 2017-05-18 ENCOUNTER — HOME CARE VISIT (OUTPATIENT)
Dept: HOME HEALTH SERVICES | Facility: HOME HEALTH | Age: 67
End: 2017-05-18
Payer: MEDICARE

## 2017-05-18 ENCOUNTER — TELEPHONE (OUTPATIENT)
Dept: SURGERY | Age: 67
End: 2017-05-18

## 2017-05-18 PROCEDURE — G0299 HHS/HOSPICE OF RN EA 15 MIN: HCPCS

## 2017-05-18 PROCEDURE — 3331090001 HH PPS REVENUE CREDIT

## 2017-05-18 PROCEDURE — 3331090002 HH PPS REVENUE DEBIT

## 2017-05-19 ENCOUNTER — HOME CARE VISIT (OUTPATIENT)
Dept: SCHEDULING | Facility: HOME HEALTH | Age: 67
End: 2017-05-19
Payer: MEDICARE

## 2017-05-19 ENCOUNTER — TELEPHONE (OUTPATIENT)
Dept: SURGERY | Age: 67
End: 2017-05-19

## 2017-05-19 PROCEDURE — 3331090001 HH PPS REVENUE CREDIT

## 2017-05-19 PROCEDURE — 3331090002 HH PPS REVENUE DEBIT

## 2017-05-19 PROCEDURE — G0299 HHS/HOSPICE OF RN EA 15 MIN: HCPCS

## 2017-05-19 NOTE — TELEPHONE ENCOUNTER
I returned Lynn's call-home health nurse with [facility] [phone]. She is requesting continuation of home health visits. Their order ends on the 20th and patient has an appt. 5/24/17 with Dr. Maryana Cheney . She states the are out there 3 times a week and measure it once a week. The daughter changes the Duoderm dressing on the end between days. She states the wound measures 5X1 with a tunnel at the 4 oclock position. She states there is a pinhole sore area above the wound that looks to her like it may be another abscess forming. They is keeping an eye on it. I told her she could send the orders over and Dr. Maryana Cheney would sign them.

## 2017-05-20 PROCEDURE — 3331090002 HH PPS REVENUE DEBIT

## 2017-05-20 PROCEDURE — 3331090001 HH PPS REVENUE CREDIT

## 2017-05-21 PROCEDURE — 3331090001 HH PPS REVENUE CREDIT

## 2017-05-21 PROCEDURE — 3331090002 HH PPS REVENUE DEBIT

## 2017-05-22 ENCOUNTER — HOME CARE VISIT (OUTPATIENT)
Dept: HOME HEALTH SERVICES | Facility: HOME HEALTH | Age: 67
End: 2017-05-22
Payer: MEDICARE

## 2017-05-22 PROCEDURE — 3331090002 HH PPS REVENUE DEBIT

## 2017-05-22 PROCEDURE — 3331090001 HH PPS REVENUE CREDIT

## 2017-05-23 PROCEDURE — 3331090002 HH PPS REVENUE DEBIT

## 2017-05-23 PROCEDURE — 3331090001 HH PPS REVENUE CREDIT

## 2017-05-23 NOTE — TELEPHONE ENCOUNTER
Has Your Skin Lesion Been Treated?: not been treated Returned call and left message. Is This A New Presentation, Or A Follow-Up?: Skin Lesions

## 2017-05-24 ENCOUNTER — OFFICE VISIT (OUTPATIENT)
Dept: SURGERY | Age: 67
End: 2017-05-24

## 2017-05-24 ENCOUNTER — HOME CARE VISIT (OUTPATIENT)
Dept: HOME HEALTH SERVICES | Facility: HOME HEALTH | Age: 67
End: 2017-05-24
Payer: MEDICARE

## 2017-05-24 DIAGNOSIS — L02.215 PERINEAL ABSCESS: Primary | ICD-10-CM

## 2017-05-24 PROCEDURE — 3331090001 HH PPS REVENUE CREDIT

## 2017-05-24 PROCEDURE — 3331090002 HH PPS REVENUE DEBIT

## 2017-05-24 RX ORDER — SULFAMETHOXAZOLE AND TRIMETHOPRIM 800; 160 MG/1; MG/1
1 TABLET ORAL 2 TIMES DAILY
Qty: 20 TAB | Refills: 0 | Status: SHIPPED | OUTPATIENT
Start: 2017-05-24 | End: 2017-06-03

## 2017-05-24 RX ORDER — METRONIDAZOLE 500 MG/1
500 TABLET ORAL 3 TIMES DAILY
Qty: 30 TAB | Refills: 0 | Status: SHIPPED | OUTPATIENT
Start: 2017-05-24 | End: 2017-06-03

## 2017-05-24 NOTE — MR AVS SNAPSHOT
Visit Information Date & Time Provider Department Dept. Phone Encounter #  
 5/24/2017  3:50 PM Sunny Chua, [address] [address] [phone] [id number] Upcoming Health Maintenance Date Due  
 FOOT EXAM Q1 5/8/1960 MICROALBUMIN Q1 5/8/1959 EYE EXAM RETINAL OR DILATED Q1 5/8/1959 DTaP/Tdap/Td series (1 - Tdap) 5/8/1970 BREAST CANCER SCRN MAMMOGRAM 5/8/1999 FOBT Q 1 YEAR AGE 50-75 5/8/1999 ZOSTER VACCINE AGE 60> 5/8/2009 HEMOGLOBIN A1C Q6M 4/6/2010 LIPID PANEL Q1 10/6/2010 GLAUCOMA SCREENING Q2Y 5/8/2014 OSTEOPOROSIS SCREENING (DEXA) 5/8/2014 Pneumococcal 65+ Low/Medium Risk (1 of 2 - PCV13) 5/8/2014 MEDICARE YEARLY EXAM 5/8/2014 INFLUENZA AGE 9 TO ADULT 8/1/2017 Allergies as of 5/24/2017  Review Complete On: 5/24/2017 By: Sunny Chua MD  
  
 Severity Noted Reaction Type Reactions Latex  01/03/2011    Itching Rash, sometimes difficult to breathe Celebrex [Celecoxib] High 01/06/2011    Anaphylaxis, Swelling TONGUE. LIPS AND EYES Iodine High 07/17/2013    Other (comments) IV CONTRAST-LESIONS, AND SKIN SLUFFED OFF Keflex [Cephalexin] High 10/14/2010    Anaphylaxis, Swelling Tongue, lips, and eyes Levaquin [Levofloxacin] High 10/14/2010    Anaphylaxis, Swelling Tongue, lips, and eyes Pcn [Penicillins] High 10/14/2010    Anaphylaxis, Swelling Tongue, lips and eyes Morphine  10/14/2010    Other (comments) PT STATES IS JUST SENSITIVITY, GOT TOO MUCH ONE TIME. Opioids - Morphine Analogues  04/14/2017    Other (comments) SENSITIVE TO OPIOIDS Current Immunizations  Never Reviewed No immunizations on file. Not reviewed this visit You Were Diagnosed With   
  
 Codes Comments Perineal abscess    -  Primary ICD-10-CM: L02.215 ICD-9-CM: 070. 2 Vitals BP Pulse Temp Resp Height(growth percentile) Weight(growth percentile) (P) 122/80 (BP 1 Location: Right arm, BP Patient Position: Sitting) (P) 78 (P) 98.5 °F (36.9 °C) (Oral) (P) 16 (P) 6' 2\" (1.88 m) (P) 249 lb (112.9 kg) BMI OB Status Smoking Status (P) 31.97 kg/m2 Hysterectomy Never Smoker BMI and BSA Data Body Mass Index Body Surface Area (P) 31.97 kg/m 2 (P) 2.43 m 2 Preferred Pharmacy Pharmacy Name Phone [phone], [address] AT [facility] [phone] Your Updated Medication List  
  
   
This list is accurate as of: 5/24/17  4:56 PM.  Always use your most recent med list. ALLEGRA 180 mg tablet Generic drug:  fexofenadine Take 180 mg by mouth daily. ASPIRIN PO Take 81 mg by mouth daily. BENADRYL 25 mg capsule Generic drug:  diphenhydrAMINE Take 50 mg by mouth every six (6) hours as needed for Itching. * carvedilol 25 mg tablet Commonly known as:  Geannie Clos Take 50 mg by mouth every morning. * carvedilol 12.5 mg tablet Commonly known as:  Geannie Clos Take  by mouth nightly. diazePAM 5 mg tablet Commonly known as:  VALIUM Take 1 Tab by mouth every eight (8) hours as needed for Anxiety. Max Daily Amount: 15 mg.  
  
 DULCOLAX STOOL SOFTENER (DSS) 100 mg capsule Generic drug:  docusate sodium Take 100 mg by mouth two (2) times a day. HUMALOG SC  
by SubCUTAneous route Before breakfast, lunch, and dinner. SLIDING SCALE 100-150-4u 151-200-5u ect (1 UNIT INCREASE FOR EVERY 50 POINTS) latanoprost 0.005 % ophthalmic solution Commonly known as:  Mac Doan Administer 1 Drop to both eyes nightly. LEVEMIR FLEXPEN 100 unit/mL (3 mL) Inpn Generic drug:  insulin detemir 8 Units by SubCUTAneous route daily. NOON DAILY  
  
 metroNIDAZOLE 500 mg tablet Commonly known as:  FLAGYL Take 1 Tab by mouth three (3) times daily for 10 days. mineral oil liquid Take 30 mL by mouth daily. MIRALAX 17 gram packet Generic drug:  polyethylene glycol Take 17 g by mouth two (2) times a day.  
  
 naloxegol 25 mg Tab tablet Commonly known as:  Sherrilyn Raheem Take 25 mg by mouth daily. Take 1 tablet every day on empty stomach 1 hr before or 2 hrs after first meal.  
  
 nortriptyline 25 mg capsule Commonly known as:  PAMELOR Take 25 mg by mouth nightly. NORVASC PO Take 10 mg by mouth daily. OTHER  
0.9% Normal saline  Use as needed to affected area  Medical code: L02.215 OTHER(NON-FORMULARY) 1 Tab three (3) times daily (with meals). [facility]  
  
 oxyCODONE-acetaminophen 7.5-325 mg per tablet Commonly known as:  PERCOCET 7.5 Take 1-2 Tabs by mouth every four (4) hours as needed. Max Daily Amount: 12 Tabs. PREVACID 15 mg capsule Generic drug:  lansoprazole Take  by mouth two (2) times a day. PROZAC PO Take 20 mg by mouth daily. RENAL SOFTGELS 1 mg capsule Generic drug:  b complex-vitamin c-folic acid Take 1 Cap by mouth daily. RENVELA PO Take 800 mg by mouth three (3) times daily (with meals). SENSIPAR 30 mg tablet Generic drug:  cinacalcet Take 30 mg by mouth daily. simvastatin 40 mg tablet Commonly known as:  ZOCOR Take 40 mg by mouth nightly. trimethoprim-sulfamethoxazole 160-800 mg per tablet Commonly known as:  BACTRIM DS, SEPTRA DS Take 1 Tab by mouth two (2) times a day for 10 days. VOLTAREN 1 % Gel Generic drug:  diclofenac Apply 4 g to affected area four (4) times daily. * Notice: This list has 2 medication(s) that are the same as other medications prescribed for you. Read the directions carefully, and ask your doctor or other care provider to review them with you. Prescriptions Sent to Pharmacy Refills  
 metroNIDAZOLE (FLAGYL) 500 mg tablet 0 Sig: Take 1 Tab by mouth three (3) times daily for 10 days.   
 Class: Normal  
 Pharmacy[facility] [address] [facility] Ph #: [phone] Route: Oral  
 trimethoprim-sulfamethoxazole (BACTRIM DS, SEPTRA DS) 160-800 mg per tablet 0 Sig: Take 1 Tab by mouth two (2) times a day for 10 days. Class: Normal  
 Pharm[facility] [address] [facility] Ph #: [phone] Route: Oral  
  
To-Do List   
 05/26/2017 To Be Determined Appointment with Perry Grooms, LPN at [place] [address]  
  
 05/29/2017 To Be Determined Appointment with Perry Grooms, LPN at [place] [address]  
  
 05/31/2017 To Be Determined Appointment with Perry Grooms, LPN at [place] [address]  
  
 06/02/2017 To Be Determined Appointment with Perry Grooms, LPN at [place] [address]  
  
 06/05/2017 To Be Determined Appointment with Perry Grooms, LPN at [place] [address]  
  
 06/07/2017 To Be Determined Appointment with Perry Grooms, LPN at [place] [address]  
  
 06/09/2017 To Be Determined Appointment with Perry Grooms, LPN at [place] [address]  
  
 06/12/2017 To Be Determined Appointment with Perry Grooms, LPN at [place] [address]  
  
 06/14/2017 To Be Determined Appointment with Perry Grooms, LPN at [place] [address]  
  
 06/16/2017 To Be Determined Appointment with Fay Vazquez RN at [place] [address] Introducing [facility]! [facility] introduces [employer] patient portal. Now you can access parts of your medical record, email your doctor's office, and request medication refills online. 1. In your internet browser, go to [email or web address]://[email or web address]. [employer] 2. Click on the First Time User? Click Here link in the Sign In box. You will see the New Member Sign Up page. 3. Enter your [employer] Access Code exactly as it appears below. You will not need to use this code after youve completed the sign-up process. If you do not sign up before the expiration date, you must request a new code. · [employer] Access Code: [email or web address] Expires: 7/29/2017  5:44 PM 
 
4. Enter the last four digits of your Social Security Number (xxxx) and Date of Birth (mm/dd/yyyy) as indicated and click Submit. You will be taken to the next sign-up page. 5. Create a [employer] ID. This will be your [employer] login ID and cannot be changed, so think of one that is secure and easy to remember. 6. Create a [employer] password. You can change your password at any time. 7. Enter your Password Reset Question and Answer. This can be used at a later time if you forget your password. 8. Enter your e-mail address. You will receive e-mail notification when new information is available in [address] 9. Click Sign Up. You can now view and download portions of your medical record. 10. Click the Download Summary menu link to download a portable copy of your medical information. If you have questions, please visit the Frequently Asked Questions section of the [employer] website. Remember, [employer] is NOT to be used for urgent needs. For medical emergencies, dial 911. Now available from your iPhone and Android! Please provide this summary of care documentation to your next provider. Your primary care clinician is listed as Marlin Later. If you have any questions after today's visit, please call [phone].
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
## 2017-05-25 PROCEDURE — 3331090001 HH PPS REVENUE CREDIT

## 2017-05-25 PROCEDURE — 3331090002 HH PPS REVENUE DEBIT

## 2017-05-26 ENCOUNTER — HOME CARE VISIT (OUTPATIENT)
Dept: SCHEDULING | Facility: HOME HEALTH | Age: 67
End: 2017-05-26
Payer: MEDICARE

## 2017-05-26 VITALS
OXYGEN SATURATION: 98 % | RESPIRATION RATE: 20 BRPM | TEMPERATURE: 97.5 F | HEART RATE: 79 BPM | SYSTOLIC BLOOD PRESSURE: 146 MMHG | DIASTOLIC BLOOD PRESSURE: 78 MMHG

## 2017-05-26 PROCEDURE — 3331090002 HH PPS REVENUE DEBIT

## 2017-05-26 PROCEDURE — G0300 HHS/HOSPICE OF LPN EA 15 MIN: HCPCS

## 2017-05-26 PROCEDURE — 3331090001 HH PPS REVENUE CREDIT

## 2017-05-27 PROCEDURE — 3331090002 HH PPS REVENUE DEBIT

## 2017-05-27 PROCEDURE — 3331090001 HH PPS REVENUE CREDIT

## 2017-05-28 PROCEDURE — 3331090002 HH PPS REVENUE DEBIT

## 2017-05-28 PROCEDURE — 3331090001 HH PPS REVENUE CREDIT

## 2017-05-29 ENCOUNTER — HOME CARE VISIT (OUTPATIENT)
Dept: SCHEDULING | Facility: HOME HEALTH | Age: 67
End: 2017-05-29
Payer: MEDICARE

## 2017-05-29 VITALS
TEMPERATURE: 97 F | RESPIRATION RATE: 18 BRPM | DIASTOLIC BLOOD PRESSURE: 82 MMHG | OXYGEN SATURATION: 98 % | HEART RATE: 81 BPM | SYSTOLIC BLOOD PRESSURE: 130 MMHG

## 2017-05-29 PROCEDURE — G0300 HHS/HOSPICE OF LPN EA 15 MIN: HCPCS

## 2017-05-29 PROCEDURE — 3331090001 HH PPS REVENUE CREDIT

## 2017-05-29 PROCEDURE — 3331090002 HH PPS REVENUE DEBIT

## 2017-05-30 PROCEDURE — 3331090001 HH PPS REVENUE CREDIT

## 2017-05-30 PROCEDURE — 3331090002 HH PPS REVENUE DEBIT

## 2017-05-31 ENCOUNTER — HOME CARE VISIT (OUTPATIENT)
Dept: SCHEDULING | Facility: HOME HEALTH | Age: 67
End: 2017-05-31
Payer: MEDICARE

## 2017-05-31 VITALS
TEMPERATURE: 97.2 F | SYSTOLIC BLOOD PRESSURE: 126 MMHG | HEART RATE: 72 BPM | OXYGEN SATURATION: 98 % | DIASTOLIC BLOOD PRESSURE: 66 MMHG | RESPIRATION RATE: 18 BRPM

## 2017-05-31 PROCEDURE — G0300 HHS/HOSPICE OF LPN EA 15 MIN: HCPCS

## 2017-05-31 PROCEDURE — 3331090001 HH PPS REVENUE CREDIT

## 2017-05-31 PROCEDURE — 3331090002 HH PPS REVENUE DEBIT

## 2017-06-01 PROCEDURE — 3331090001 HH PPS REVENUE CREDIT

## 2017-06-01 PROCEDURE — 3331090002 HH PPS REVENUE DEBIT

## 2017-06-02 ENCOUNTER — HOME CARE VISIT (OUTPATIENT)
Dept: SCHEDULING | Facility: HOME HEALTH | Age: 67
End: 2017-06-02
Payer: MEDICARE

## 2017-06-02 VITALS
DIASTOLIC BLOOD PRESSURE: 72 MMHG | RESPIRATION RATE: 20 BRPM | TEMPERATURE: 97.6 F | OXYGEN SATURATION: 97 % | SYSTOLIC BLOOD PRESSURE: 130 MMHG | HEART RATE: 91 BPM

## 2017-06-02 VITALS
RESPIRATION RATE: 18 BRPM | TEMPERATURE: 97.8 F | DIASTOLIC BLOOD PRESSURE: 80 MMHG | OXYGEN SATURATION: 97 % | HEART RATE: 81 BPM | SYSTOLIC BLOOD PRESSURE: 158 MMHG

## 2017-06-02 PROCEDURE — G0300 HHS/HOSPICE OF LPN EA 15 MIN: HCPCS

## 2017-06-02 PROCEDURE — 3331090001 HH PPS REVENUE CREDIT

## 2017-06-02 PROCEDURE — 3331090002 HH PPS REVENUE DEBIT

## 2017-06-03 PROCEDURE — 3331090001 HH PPS REVENUE CREDIT

## 2017-06-03 PROCEDURE — 3331090002 HH PPS REVENUE DEBIT

## 2017-06-04 PROCEDURE — 3331090001 HH PPS REVENUE CREDIT

## 2017-06-04 PROCEDURE — 3331090002 HH PPS REVENUE DEBIT

## 2017-06-05 ENCOUNTER — OFFICE VISIT (OUTPATIENT)
Dept: SURGERY | Age: 67
End: 2017-06-05

## 2017-06-05 ENCOUNTER — HOME CARE VISIT (OUTPATIENT)
Dept: HOME HEALTH SERVICES | Facility: HOME HEALTH | Age: 67
End: 2017-06-05
Payer: MEDICARE

## 2017-06-05 VITALS
BODY MASS INDEX: 33.72 KG/M2 | HEART RATE: 84 BPM | DIASTOLIC BLOOD PRESSURE: 80 MMHG | RESPIRATION RATE: 16 BRPM | SYSTOLIC BLOOD PRESSURE: 138 MMHG | OXYGEN SATURATION: 97 % | TEMPERATURE: 97.8 F | WEIGHT: 249 LBS | HEIGHT: 72 IN

## 2017-06-05 DIAGNOSIS — L02.215 PERINEAL ABSCESS: Primary | ICD-10-CM

## 2017-06-05 PROCEDURE — 3331090001 HH PPS REVENUE CREDIT

## 2017-06-05 PROCEDURE — 3331090002 HH PPS REVENUE DEBIT
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
## 2017-06-05 NOTE — PROGRESS NOTES
1. Have you been to the ER, urgent care clinic since your last visit? Hospitalized since your last visit? No    2. Have you seen or consulted any other health care providers outside of the [facility] since your last visit? Include any pap smears or colon screening.  No

## 2017-06-05 NOTE — MR AVS SNAPSHOT
Visit Information Date & Time Provider Department Dept. Phone Encounter #  
 6/5/2017  3:20 PM Lexy Brewer, [address] [phone] [id number] Follow-up Instructions Routing History Upcoming Health Maintenance Date Due  
 FOOT EXAM Q1 5/8/1959 MICROALBUMIN Q1 5/8/1959 EYE EXAM RETINAL OR DILATED Q1 5/8/1959 DTaP/Tdap/Td series (1 - Tdap) 5/8/1970 BREAST CANCER SCRN MAMMOGRAM 5/8/1999 FOBT Q 1 YEAR AGE 50-75 5/8/1999 ZOSTER VACCINE AGE 60> 5/8/2009 HEMOGLOBIN A1C Q6M 4/6/2010 LIPID PANEL Q1 10/6/2010 GLAUCOMA SCREENING Q2Y 5/8/2014 OSTEOPOROSIS SCREENING (DEXA) 5/8/2014 Pneumococcal 65+ Low/Medium Risk (1 of 2 - PCV13) 5/8/2014 MEDICARE YEARLY EXAM 5/8/2014 INFLUENZA AGE 9 TO ADULT 8/1/2017 Allergies as of 6/5/2017  Review Complete On: 6/5/2017 By: Lexy Brewer MD  
  
 Severity Noted Reaction Type Reactions Latex  01/03/2011    Itching Rash, sometimes difficult to breathe Celebrex [Celecoxib] High 01/06/2011    Anaphylaxis, Swelling TONGUE. LIPS AND EYES Iodine High 07/17/2013    Other (comments) IV CONTRAST-LESIONS, AND SKIN SLUFFED OFF Keflex [Cephalexin] High 10/14/2010    Anaphylaxis, Swelling Tongue, lips, and eyes Levaquin [Levofloxacin] High 10/14/2010    Anaphylaxis, Swelling Tongue, lips, and eyes Pcn [Penicillins] High 10/14/2010    Anaphylaxis, Swelling Tongue, lips and eyes Morphine  10/14/2010    Other (comments) PT STATES IS JUST SENSITIVITY, GOT TOO MUCH ONE TIME. Opioids - Morphine Analogues  04/14/2017    Other (comments) SENSITIVE TO OPIOIDS Current Immunizations  Never Reviewed No immunizations on file. Not reviewed this visit You Were Diagnosed With   
  
 Codes Comments Perineal abscess    -  Primary ICD-10-CM: L02.215 ICD-9-CM: 009. 2 Vitals BP Pulse Temp Resp Height(growth percentile) Weight(growth percentile) 138/80 (BP 1 Location: Right arm, BP Patient Position: Sitting) 84 97.8 °F (36.6 °C) (Oral) 16 6' 2\" (1.88 m) 249 lb (112.9 kg) SpO2 BMI OB Status Smoking Status 97% 31.97 kg/m2 Hysterectomy Never Smoker BMI and BSA Data Body Mass Index Body Surface Area  
 31.97 kg/m 2 2.43 m 2 Preferred Pharmacy Pharmacy Name Phone [facility], [address] AT [facility]  [place] [address] [phone] Your Updated Medication List  
  
   
This list is accurate as of: 6/5/17  4:42 PM.  Always use your most recent med list. ALLEGRA 180 mg tablet Generic drug:  fexofenadine Take 180 mg by mouth daily. ASPIRIN PO Take 81 mg by mouth daily. BENADRYL 25 mg capsule Generic drug:  diphenhydrAMINE Take 50 mg by mouth every six (6) hours as needed for Itching. * carvedilol 25 mg tablet Commonly known as:  Loi Ludwig Take 50 mg by mouth every morning. * carvedilol 12.5 mg tablet Commonly known as:  Loi Ludwig Take  by mouth nightly. diazePAM 5 mg tablet Commonly known as:  VALIUM Take 1 Tab by mouth every eight (8) hours as needed for Anxiety. Max Daily Amount: 15 mg.  
  
 DULCOLAX STOOL SOFTENER (DSS) 100 mg capsule Generic drug:  docusate sodium Take 100 mg by mouth two (2) times a day. HUMALOG SC  
by SubCUTAneous route Before breakfast, lunch, and dinner. SLIDING SCALE 100-150-4u 151-200-5u ect (1 UNIT INCREASE FOR EVERY 50 POINTS) latanoprost 0.005 % ophthalmic solution Commonly known as:  Shamika Rubalcava Administer 1 Drop to both eyes nightly. LEVEMIR FLEXPEN 100 unit/mL (3 mL) Inpn Generic drug:  insulin detemir 8 Units by SubCUTAneous route daily. NOON DAILY  
  
 mineral oil liquid Take 30 mL by mouth daily. MIRALAX 17 gram packet Generic drug:  polyethylene glycol Take 17 g by mouth two (2) times a day.  
  
 naloxegol 25 mg Tab tablet Commonly known as:  Geraldean Piper Take 25 mg by mouth daily. Take 1 tablet every day on empty stomach 1 hr before or 2 hrs after first meal.  
  
 nortriptyline 25 mg capsule Commonly known as:  PAMELOR Take 25 mg by mouth nightly. NORVASC PO Take 10 mg by mouth daily. OTHER  
0.9% Normal saline  Use as needed to affected area  Medical code: L02.215 OTHER(NON-FORMULARY) 1 Tab three (3) times daily (with meals). [address] [facility]  
  
 oxyCODONE-acetaminophen 7.5-325 mg per tablet Commonly known as:  PERCOCET 7.5 Take 1-2 Tabs by mouth every four (4) hours as needed. Max Daily Amount: 12 Tabs. PREVACID 15 mg capsule Generic drug:  lansoprazole Take  by mouth two (2) times a day. PROZAC PO Take 20 mg by mouth daily. RENAL SOFTGELS 1 mg capsule Generic drug:  b complex-vitamin c-folic acid Take 1 Cap by mouth daily. RENVELA PO Take 800 mg by mouth three (3) times daily (with meals). SENSIPAR 30 mg tablet Generic drug:  cinacalcet Take 30 mg by mouth daily. simvastatin 40 mg tablet Commonly known as:  ZOCOR Take 40 mg by mouth nightly. VOLTAREN 1 % Gel Generic drug:  diclofenac Apply 4 g to affected area four (4) times daily. * Notice: This list has 2 medication(s) that are the same as other medications prescribed for you. Read the directions carefully, and ask your doctor or other care provider to review them with you. To-Do List   
 06/07/2017 To Be Determined Appointment with Blas Person LPN [facility] [place]   
  
 06/09/2017 To Be [facility]   
  
 06/12/2017 To Be Determined [facility]   
  
 06/14/2017 To Be Determined Appointment with Saint Harvey, LPN at [address] [facility],[address]  
  
 06/16/2017 To Be Determined Appointment with Roney Guerra RN at [address] [facility],[address] Introducing [facility] SERVICES! Sami Campbell introduces [employer] patient portal. Now you can access parts of your medical record, email your doctor's office, and request medication refills online. 1. In your internet browser, go to [email or web address] 2. Click on the First Time User? Click Here link in the Sign In box. You will see the New Member Sign Up page. 3. Enter your [employer] Access Code exactly as it appears below. You will not need to use this code after youve completed the sign-up process. If you do not sign up before the expiration date, you must request a new code. · [employer] Access Code: [email or web address] Expires: 7/29/2017  5:44 PM 
 
4. Enter the last four digits of your Social Security Number (xxxx) and Date of Birth (mm/dd/yyyy) as indicated and click Submit. You will be taken to the next sign-up page. 5. Create a [employer] ID. This will be your [employer] login ID and cannot be changed, so think of one that is secure and easy to remember. 6. Create a [employer] password. You can change your password at any time. 7. Enter your Password Reset Question and Answer. This can be used at a later time if you forget your password. 8. Enter your e-mail address. You will receive e-mail notification when new information is available in [address] 9. Click Sign Up. You can now view and download portions of your medical record. 10. Click the Download Summary menu link to download a portable copy of your medical information. If you have questions, please visit the Frequently Asked Questions section of the [employer] website. Remember, [employer] is NOT to be used for urgent needs. For medical emergencies, dial 911. Now available from your iPhone and Android! Please provide this summary of care documentation to your next provider. Your primary care clinician is listed as Joan Aiken. If you have any questions after today's visit, please call [phone].

## 2017-06-06 PROCEDURE — 3331090002 HH PPS REVENUE DEBIT

## 2017-06-06 PROCEDURE — 3331090001 HH PPS REVENUE CREDIT

## 2017-06-07 ENCOUNTER — HOME CARE VISIT (OUTPATIENT)
Dept: SCHEDULING | Facility: HOME HEALTH | Age: 67
End: 2017-06-07
Payer: MEDICARE

## 2017-06-07 PROCEDURE — 3331090002 HH PPS REVENUE DEBIT

## 2017-06-07 PROCEDURE — 3331090001 HH PPS REVENUE CREDIT

## 2017-06-07 PROCEDURE — G0300 HHS/HOSPICE OF LPN EA 15 MIN: HCPCS

## 2017-06-08 ENCOUNTER — HOME CARE VISIT (OUTPATIENT)
Dept: SCHEDULING | Facility: HOME HEALTH | Age: 67
End: 2017-06-08
Payer: MEDICARE

## 2017-06-08 PROCEDURE — G0152 HHCP-SERV OF OT,EA 15 MIN: HCPCS

## 2017-06-08 PROCEDURE — 3331090002 HH PPS REVENUE DEBIT

## 2017-06-08 PROCEDURE — 3331090001 HH PPS REVENUE CREDIT

## 2017-06-09 ENCOUNTER — HOME CARE VISIT (OUTPATIENT)
Dept: SCHEDULING | Facility: HOME HEALTH | Age: 67
End: 2017-06-09
Payer: MEDICARE

## 2017-06-09 VITALS
HEART RATE: 74 BPM | OXYGEN SATURATION: 97 % | OXYGEN SATURATION: 96 % | RESPIRATION RATE: 18 BRPM | SYSTOLIC BLOOD PRESSURE: 160 MMHG | TEMPERATURE: 97.4 F | TEMPERATURE: 97.1 F | DIASTOLIC BLOOD PRESSURE: 80 MMHG | RESPIRATION RATE: 18 BRPM | DIASTOLIC BLOOD PRESSURE: 80 MMHG | SYSTOLIC BLOOD PRESSURE: 142 MMHG | HEART RATE: 78 BPM

## 2017-06-09 PROCEDURE — 3331090001 HH PPS REVENUE CREDIT

## 2017-06-09 PROCEDURE — G0300 HHS/HOSPICE OF LPN EA 15 MIN: HCPCS

## 2017-06-09 PROCEDURE — 3331090002 HH PPS REVENUE DEBIT

## 2017-06-10 PROCEDURE — 3331090002 HH PPS REVENUE DEBIT

## 2017-06-10 PROCEDURE — 3331090001 HH PPS REVENUE CREDIT

## 2017-06-11 PROCEDURE — 3331090001 HH PPS REVENUE CREDIT

## 2017-06-11 PROCEDURE — 3331090002 HH PPS REVENUE DEBIT

## 2017-06-12 ENCOUNTER — HOME CARE VISIT (OUTPATIENT)
Dept: SCHEDULING | Facility: HOME HEALTH | Age: 67
End: 2017-06-12
Payer: MEDICARE

## 2017-06-12 VITALS
DIASTOLIC BLOOD PRESSURE: 60 MMHG | HEART RATE: 60 BPM | TEMPERATURE: 96.8 F | SYSTOLIC BLOOD PRESSURE: 140 MMHG | OXYGEN SATURATION: 96 %

## 2017-06-12 VITALS
SYSTOLIC BLOOD PRESSURE: 148 MMHG | TEMPERATURE: 97 F | HEART RATE: 78 BPM | DIASTOLIC BLOOD PRESSURE: 84 MMHG | OXYGEN SATURATION: 96 % | RESPIRATION RATE: 18 BRPM

## 2017-06-12 PROCEDURE — G0300 HHS/HOSPICE OF LPN EA 15 MIN: HCPCS

## 2017-06-12 PROCEDURE — 3331090001 HH PPS REVENUE CREDIT

## 2017-06-12 PROCEDURE — 3331090002 HH PPS REVENUE DEBIT

## 2017-06-13 ENCOUNTER — HOSPITAL ENCOUNTER (OUTPATIENT)
Dept: PREADMISSION TESTING | Age: 67
Discharge: HOME | End: 2017-06-13
Payer: MEDICARE

## 2017-06-13 ENCOUNTER — HOME CARE VISIT (OUTPATIENT)
Dept: SCHEDULING | Facility: HOME HEALTH | Age: 67
End: 2017-06-13
Payer: MEDICARE

## 2017-06-13 VITALS
BODY MASS INDEX: 34.04 KG/M2 | SYSTOLIC BLOOD PRESSURE: 159 MMHG | WEIGHT: 251.32 LBS | DIASTOLIC BLOOD PRESSURE: 78 MMHG | TEMPERATURE: 98.1 F | HEART RATE: 73 BPM | HEIGHT: 72 IN | RESPIRATION RATE: 18 BRPM

## 2017-06-13 LAB
ANION GAP BLD CALC-SCNC: 13 MMOL/L (ref 5–15)
BASOPHILS # BLD AUTO: 0 K/UL (ref 0–0.1)
BASOPHILS # BLD: 0 % (ref 0–1)
BUN SERPL-MCNC: 67 MG/DL (ref 6–20)
BUN/CREAT SERPL: 8 (ref 12–20)
CALCIUM SERPL-MCNC: 8.4 MG/DL (ref 8.5–10.1)
CHLORIDE SERPL-SCNC: 90 MMOL/L (ref 97–108)
CO2 SERPL-SCNC: 26 MMOL/L (ref 21–32)
CREAT SERPL-MCNC: 8.06 MG/DL (ref 0.55–1.02)
EOSINOPHIL # BLD: 0.1 K/UL (ref 0–0.4)
EOSINOPHIL NFR BLD: 2 % (ref 0–7)
ERYTHROCYTE [DISTWIDTH] IN BLOOD BY AUTOMATED COUNT: 17.4 % (ref 11.5–14.5)
GLUCOSE SERPL-MCNC: 284 MG/DL (ref 65–100)
HCT VFR BLD AUTO: 36.2 % (ref 35–47)
HGB BLD-MCNC: 11.4 G/DL (ref 11.5–16)
LYMPHOCYTES # BLD AUTO: 36 % (ref 12–49)
LYMPHOCYTES # BLD: 2.6 K/UL (ref 0.8–3.5)
MCH RBC QN AUTO: 28.6 PG (ref 26–34)
MCHC RBC AUTO-ENTMCNC: 31.5 G/DL (ref 30–36.5)
MCV RBC AUTO: 90.7 FL (ref 80–99)
MONOCYTES # BLD: 0.9 K/UL (ref 0–1)
MONOCYTES NFR BLD AUTO: 12 % (ref 5–13)
NEUTS SEG # BLD: 3.6 K/UL (ref 1.8–8)
NEUTS SEG NFR BLD AUTO: 50 % (ref 32–75)
PLATELET # BLD AUTO: 276 K/UL (ref 150–400)
POTASSIUM SERPL-SCNC: 4.3 MMOL/L (ref 3.5–5.1)
RBC # BLD AUTO: 3.99 M/UL (ref 3.8–5.2)
SODIUM SERPL-SCNC: 129 MMOL/L (ref 136–145)
WBC # BLD AUTO: 7.2 K/UL (ref 3.6–11)

## 2017-06-13 PROCEDURE — G0152 HHCP-SERV OF OT,EA 15 MIN: HCPCS

## 2017-06-13 PROCEDURE — 85025 COMPLETE CBC W/AUTO DIFF WBC: CPT | Performed by: SURGERY

## 2017-06-13 PROCEDURE — 80048 BASIC METABOLIC PNL TOTAL CA: CPT | Performed by: SURGERY

## 2017-06-13 PROCEDURE — 3331090002 HH PPS REVENUE DEBIT

## 2017-06-13 PROCEDURE — 36415 COLL VENOUS BLD VENIPUNCTURE: CPT | Performed by: SURGERY

## 2017-06-13 PROCEDURE — 3331090001 HH PPS REVENUE CREDIT

## 2017-06-14 ENCOUNTER — HOME CARE VISIT (OUTPATIENT)
Dept: SCHEDULING | Facility: HOME HEALTH | Age: 67
End: 2017-06-14
Payer: MEDICARE

## 2017-06-14 VITALS
HEART RATE: 81 BPM | SYSTOLIC BLOOD PRESSURE: 116 MMHG | TEMPERATURE: 97.8 F | OXYGEN SATURATION: 97 % | DIASTOLIC BLOOD PRESSURE: 74 MMHG | RESPIRATION RATE: 18 BRPM

## 2017-06-14 PROCEDURE — G0300 HHS/HOSPICE OF LPN EA 15 MIN: HCPCS

## 2017-06-14 PROCEDURE — 3331090001 HH PPS REVENUE CREDIT

## 2017-06-14 PROCEDURE — 3331090002 HH PPS REVENUE DEBIT

## 2017-06-14 NOTE — PERIOP NOTES
MESSAGE SENT VIA CC TO Cristi Morton TO DR. العراقي:    \"PLEASE REVIEW PTS PAT TEST RESULTS.  YOU MAY REACH ME [phone], IF YOU HAVE ANY QUESTIONS.   Onelia Bonilla RN\"

## 2017-06-15 ENCOUNTER — ANESTHESIA (OUTPATIENT)
Dept: SURGERY | Age: 67
DRG: 579 | End: 2017-06-15
Payer: MEDICARE

## 2017-06-15 ENCOUNTER — ANESTHESIA EVENT (OUTPATIENT)
Dept: SURGERY | Age: 67
DRG: 579 | End: 2017-06-15
Payer: MEDICARE

## 2017-06-15 ENCOUNTER — HOSPITAL ENCOUNTER (INPATIENT)
Age: 67
LOS: 19 days | Discharge: HOME HEALTH | DRG: 579 | End: 2017-07-06
Attending: SURGERY | Admitting: SURGERY
Payer: MEDICARE

## 2017-06-15 LAB
GLUCOSE BLD STRIP.AUTO-MCNC: 126 MG/DL (ref 65–100)
GLUCOSE BLD STRIP.AUTO-MCNC: 186 MG/DL (ref 65–100)
GLUCOSE BLD STRIP.AUTO-MCNC: 217 MG/DL (ref 65–100)
GLUCOSE BLD STRIP.AUTO-MCNC: 235 MG/DL (ref 65–100)
GLUCOSE BLD STRIP.AUTO-MCNC: 238 MG/DL (ref 65–100)
GLUCOSE BLD STRIP.AUTO-MCNC: 273 MG/DL (ref 65–100)
GLUCOSE BLD STRIP.AUTO-MCNC: 292 MG/DL (ref 65–100)
SERVICE CMNT-IMP: ABNORMAL

## 2017-06-15 PROCEDURE — 77030026438 HC STYL ET INTUB CARD -A: Performed by: NURSE ANESTHETIST, CERTIFIED REGISTERED

## 2017-06-15 PROCEDURE — 77030008684 HC TU ET CUF COVD -B: Performed by: NURSE ANESTHETIST, CERTIFIED REGISTERED

## 2017-06-15 PROCEDURE — 74011250636 HC RX REV CODE- 250/636

## 2017-06-15 PROCEDURE — 77030018836 HC SOL IRR NACL ICUM -A: Performed by: SURGERY

## 2017-06-15 PROCEDURE — 74011000250 HC RX REV CODE- 250: Performed by: SURGERY

## 2017-06-15 PROCEDURE — 76060000033 HC ANESTHESIA 1 TO 1.5 HR: Performed by: SURGERY

## 2017-06-15 PROCEDURE — 88304 TISSUE EXAM BY PATHOLOGIST: CPT | Performed by: SURGERY

## 2017-06-15 PROCEDURE — 74011250636 HC RX REV CODE- 250/636: Performed by: SURGERY

## 2017-06-15 PROCEDURE — 74011000250 HC RX REV CODE- 250: Performed by: ANESTHESIOLOGY

## 2017-06-15 PROCEDURE — 82962 GLUCOSE BLOOD TEST: CPT

## 2017-06-15 PROCEDURE — 3331090001 HH PPS REVENUE CREDIT

## 2017-06-15 PROCEDURE — 76010000149 HC OR TIME 1 TO 1.5 HR: Performed by: SURGERY

## 2017-06-15 PROCEDURE — 74011250637 HC RX REV CODE- 250/637: Performed by: SURGERY

## 2017-06-15 PROCEDURE — 74011000258 HC RX REV CODE- 258: Performed by: ANESTHESIOLOGY

## 2017-06-15 PROCEDURE — 3331090002 HH PPS REVENUE DEBIT

## 2017-06-15 PROCEDURE — 0JBB0ZX EXCISION OF PERINEUM SUBCUTANEOUS TISSUE AND FASCIA, OPEN APPROACH, DIAGNOSTIC: ICD-10-PCS | Performed by: SURGERY

## 2017-06-15 PROCEDURE — 77030013079 HC BLNKT BAIR HGGR 3M -A: Performed by: NURSE ANESTHETIST, CERTIFIED REGISTERED

## 2017-06-15 PROCEDURE — 99218 HC RM OBSERVATION: CPT

## 2017-06-15 PROCEDURE — 74011000250 HC RX REV CODE- 250

## 2017-06-15 PROCEDURE — 74011636637 HC RX REV CODE- 636/637: Performed by: SURGERY

## 2017-06-15 PROCEDURE — 74011000258 HC RX REV CODE- 258: Performed by: SURGERY

## 2017-06-15 PROCEDURE — 76210000004 HC OR PH I REC 4.5 TO 5 HR: Performed by: SURGERY

## 2017-06-15 PROCEDURE — 74011636637 HC RX REV CODE- 636/637

## 2017-06-15 PROCEDURE — 77030011640 HC PAD GRND REM COVD -A: Performed by: SURGERY

## 2017-06-15 PROCEDURE — 0J9B0ZZ DRAINAGE OF PERINEUM SUBCUTANEOUS TISSUE AND FASCIA, OPEN APPROACH: ICD-10-PCS | Performed by: SURGERY

## 2017-06-15 PROCEDURE — 74011250636 HC RX REV CODE- 250/636: Performed by: ANESTHESIOLOGY

## 2017-06-15 PROCEDURE — 76210000020 HC REC RM PH II FIRST 0.5 HR: Performed by: SURGERY

## 2017-06-15 RX ORDER — OXYCODONE AND ACETAMINOPHEN 7.5; 325 MG/1; MG/1
1-2 TABLET ORAL
Status: DISCONTINUED | OUTPATIENT
Start: 2017-06-15 | End: 2017-07-06 | Stop reason: HOSPADM

## 2017-06-15 RX ORDER — FENTANYL CITRATE 50 UG/ML
INJECTION, SOLUTION INTRAMUSCULAR; INTRAVENOUS AS NEEDED
Status: DISCONTINUED | OUTPATIENT
Start: 2017-06-15 | End: 2017-06-15 | Stop reason: HOSPADM

## 2017-06-15 RX ORDER — METRONIDAZOLE 500 MG/100ML
500 INJECTION, SOLUTION INTRAVENOUS ONCE
Status: COMPLETED | OUTPATIENT
Start: 2017-06-15 | End: 2017-06-15

## 2017-06-15 RX ORDER — CARVEDILOL 12.5 MG/1
25 TABLET ORAL DAILY
Status: DISCONTINUED | OUTPATIENT
Start: 2017-06-16 | End: 2017-06-27

## 2017-06-15 RX ORDER — DEXTROSE 50 % IN WATER (D50W) INTRAVENOUS SYRINGE
12.5-25 AS NEEDED
Status: DISCONTINUED | OUTPATIENT
Start: 2017-06-15 | End: 2017-06-15

## 2017-06-15 RX ORDER — FENTANYL CITRATE 50 UG/ML
25 INJECTION, SOLUTION INTRAMUSCULAR; INTRAVENOUS
Status: COMPLETED | OUTPATIENT
Start: 2017-06-15 | End: 2017-06-15

## 2017-06-15 RX ORDER — DIPHENHYDRAMINE HYDROCHLORIDE 50 MG/ML
12.5 INJECTION, SOLUTION INTRAMUSCULAR; INTRAVENOUS AS NEEDED
Status: DISCONTINUED | OUTPATIENT
Start: 2017-06-15 | End: 2017-06-15 | Stop reason: HOSPADM

## 2017-06-15 RX ORDER — NORTRIPTYLINE HYDROCHLORIDE 25 MG/1
25 CAPSULE ORAL
Status: DISCONTINUED | OUTPATIENT
Start: 2017-06-15 | End: 2017-07-06 | Stop reason: HOSPADM

## 2017-06-15 RX ORDER — MAGNESIUM SULFATE 100 %
4 CRYSTALS MISCELLANEOUS AS NEEDED
Status: DISCONTINUED | OUTPATIENT
Start: 2017-06-15 | End: 2017-07-06 | Stop reason: HOSPADM

## 2017-06-15 RX ORDER — SODIUM CHLORIDE 9 MG/ML
25 INJECTION, SOLUTION INTRAVENOUS CONTINUOUS
Status: DISCONTINUED | OUTPATIENT
Start: 2017-06-15 | End: 2017-06-15 | Stop reason: HOSPADM

## 2017-06-15 RX ORDER — LIDOCAINE HYDROCHLORIDE 10 MG/ML
0.1 INJECTION, SOLUTION EPIDURAL; INFILTRATION; INTRACAUDAL; PERINEURAL AS NEEDED
Status: DISCONTINUED | OUTPATIENT
Start: 2017-06-15 | End: 2017-06-15 | Stop reason: HOSPADM

## 2017-06-15 RX ORDER — SODIUM CHLORIDE 0.9 % (FLUSH) 0.9 %
5-10 SYRINGE (ML) INJECTION EVERY 8 HOURS
Status: DISCONTINUED | OUTPATIENT
Start: 2017-06-15 | End: 2017-06-15 | Stop reason: HOSPADM

## 2017-06-15 RX ORDER — GUAIFENESIN 100 MG/5ML
81 LIQUID (ML) ORAL DAILY
Status: DISCONTINUED | OUTPATIENT
Start: 2017-06-16 | End: 2017-07-06 | Stop reason: HOSPADM

## 2017-06-15 RX ORDER — HYDROMORPHONE HYDROCHLORIDE 1 MG/ML
0.2 INJECTION, SOLUTION INTRAMUSCULAR; INTRAVENOUS; SUBCUTANEOUS
Status: DISCONTINUED | OUTPATIENT
Start: 2017-06-15 | End: 2017-06-15 | Stop reason: HOSPADM

## 2017-06-15 RX ORDER — PHENYLEPHRINE HCL IN 0.9% NACL 0.4MG/10ML
SYRINGE (ML) INTRAVENOUS AS NEEDED
Status: DISCONTINUED | OUTPATIENT
Start: 2017-06-15 | End: 2017-06-15 | Stop reason: HOSPADM

## 2017-06-15 RX ORDER — ROCURONIUM BROMIDE 10 MG/ML
INJECTION, SOLUTION INTRAVENOUS AS NEEDED
Status: DISCONTINUED | OUTPATIENT
Start: 2017-06-15 | End: 2017-06-15 | Stop reason: HOSPADM

## 2017-06-15 RX ORDER — SODIUM CHLORIDE 0.9 % (FLUSH) 0.9 %
5-10 SYRINGE (ML) INJECTION AS NEEDED
Status: DISCONTINUED | OUTPATIENT
Start: 2017-06-15 | End: 2017-06-15 | Stop reason: HOSPADM

## 2017-06-15 RX ORDER — ONDANSETRON 2 MG/ML
4 INJECTION INTRAMUSCULAR; INTRAVENOUS AS NEEDED
Status: DISCONTINUED | OUTPATIENT
Start: 2017-06-15 | End: 2017-06-15 | Stop reason: HOSPADM

## 2017-06-15 RX ORDER — BUPIVACAINE HYDROCHLORIDE AND EPINEPHRINE 5; 5 MG/ML; UG/ML
30 INJECTION, SOLUTION EPIDURAL; INTRACAUDAL; PERINEURAL ONCE
Status: COMPLETED | OUTPATIENT
Start: 2017-06-15 | End: 2017-06-15

## 2017-06-15 RX ORDER — DEXTROSE MONOHYDRATE AND SODIUM CHLORIDE 5; .45 G/100ML; G/100ML
50 INJECTION, SOLUTION INTRAVENOUS CONTINUOUS
Status: DISCONTINUED | OUTPATIENT
Start: 2017-06-15 | End: 2017-06-15

## 2017-06-15 RX ORDER — FENTANYL CITRATE 50 UG/ML
50 INJECTION, SOLUTION INTRAMUSCULAR; INTRAVENOUS AS NEEDED
Status: DISCONTINUED | OUTPATIENT
Start: 2017-06-15 | End: 2017-06-15 | Stop reason: HOSPADM

## 2017-06-15 RX ORDER — FLUOXETINE 10 MG/1
20 CAPSULE ORAL DAILY
Status: DISCONTINUED | OUTPATIENT
Start: 2017-06-16 | End: 2017-07-06 | Stop reason: HOSPADM

## 2017-06-15 RX ORDER — HYDROMORPHONE HYDROCHLORIDE 1 MG/ML
1 INJECTION, SOLUTION INTRAMUSCULAR; INTRAVENOUS; SUBCUTANEOUS
Status: DISCONTINUED | OUTPATIENT
Start: 2017-06-15 | End: 2017-06-16

## 2017-06-15 RX ORDER — PROPOFOL 10 MG/ML
INJECTION, EMULSION INTRAVENOUS AS NEEDED
Status: DISCONTINUED | OUTPATIENT
Start: 2017-06-15 | End: 2017-06-15 | Stop reason: HOSPADM

## 2017-06-15 RX ORDER — MIDAZOLAM HYDROCHLORIDE 1 MG/ML
0.5 INJECTION, SOLUTION INTRAMUSCULAR; INTRAVENOUS
Status: DISCONTINUED | OUTPATIENT
Start: 2017-06-15 | End: 2017-06-15 | Stop reason: HOSPADM

## 2017-06-15 RX ORDER — DIPHENHYDRAMINE HYDROCHLORIDE 50 MG/ML
12.5 INJECTION, SOLUTION INTRAMUSCULAR; INTRAVENOUS
Status: DISCONTINUED | OUTPATIENT
Start: 2017-06-15 | End: 2017-06-17

## 2017-06-15 RX ORDER — INSULIN LISPRO 100 [IU]/ML
INJECTION, SOLUTION INTRAVENOUS; SUBCUTANEOUS
Status: DISCONTINUED | OUTPATIENT
Start: 2017-06-15 | End: 2017-06-18 | Stop reason: SDUPTHER

## 2017-06-15 RX ORDER — SODIUM CHLORIDE 9 MG/ML
50 INJECTION, SOLUTION INTRAVENOUS CONTINUOUS
Status: DISCONTINUED | OUTPATIENT
Start: 2017-06-15 | End: 2017-06-19

## 2017-06-15 RX ORDER — LIDOCAINE HYDROCHLORIDE 20 MG/ML
INJECTION, SOLUTION EPIDURAL; INFILTRATION; INTRACAUDAL; PERINEURAL AS NEEDED
Status: DISCONTINUED | OUTPATIENT
Start: 2017-06-15 | End: 2017-06-15 | Stop reason: HOSPADM

## 2017-06-15 RX ORDER — ROPIVACAINE HYDROCHLORIDE 5 MG/ML
30 INJECTION, SOLUTION EPIDURAL; INFILTRATION; PERINEURAL ONCE
Status: DISCONTINUED | OUTPATIENT
Start: 2017-06-15 | End: 2017-06-15 | Stop reason: HOSPADM

## 2017-06-15 RX ORDER — SODIUM CHLORIDE 0.9 % (FLUSH) 0.9 %
5-10 SYRINGE (ML) INJECTION AS NEEDED
Status: DISCONTINUED | OUTPATIENT
Start: 2017-06-15 | End: 2017-07-06

## 2017-06-15 RX ORDER — SUCCINYLCHOLINE CHLORIDE 20 MG/ML
INJECTION INTRAMUSCULAR; INTRAVENOUS AS NEEDED
Status: DISCONTINUED | OUTPATIENT
Start: 2017-06-15 | End: 2017-06-15 | Stop reason: HOSPADM

## 2017-06-15 RX ORDER — MIDAZOLAM HYDROCHLORIDE 1 MG/ML
1 INJECTION, SOLUTION INTRAMUSCULAR; INTRAVENOUS AS NEEDED
Status: DISCONTINUED | OUTPATIENT
Start: 2017-06-15 | End: 2017-06-15 | Stop reason: HOSPADM

## 2017-06-15 RX ORDER — MIDAZOLAM HYDROCHLORIDE 1 MG/ML
INJECTION, SOLUTION INTRAMUSCULAR; INTRAVENOUS AS NEEDED
Status: DISCONTINUED | OUTPATIENT
Start: 2017-06-15 | End: 2017-06-15 | Stop reason: HOSPADM

## 2017-06-15 RX ORDER — SODIUM CHLORIDE 0.9 % (FLUSH) 0.9 %
5-10 SYRINGE (ML) INJECTION EVERY 8 HOURS
Status: DISCONTINUED | OUTPATIENT
Start: 2017-06-15 | End: 2017-07-06

## 2017-06-15 RX ORDER — ONDANSETRON 2 MG/ML
INJECTION INTRAMUSCULAR; INTRAVENOUS AS NEEDED
Status: DISCONTINUED | OUTPATIENT
Start: 2017-06-15 | End: 2017-06-15 | Stop reason: HOSPADM

## 2017-06-15 RX ORDER — SODIUM CHLORIDE, SODIUM LACTATE, POTASSIUM CHLORIDE, CALCIUM CHLORIDE 600; 310; 30; 20 MG/100ML; MG/100ML; MG/100ML; MG/100ML
125 INJECTION, SOLUTION INTRAVENOUS CONTINUOUS
Status: DISCONTINUED | OUTPATIENT
Start: 2017-06-15 | End: 2017-06-15 | Stop reason: HOSPADM

## 2017-06-15 RX ORDER — AMLODIPINE BESYLATE 5 MG/1
10 TABLET ORAL DAILY
Status: DISCONTINUED | OUTPATIENT
Start: 2017-06-16 | End: 2017-06-29

## 2017-06-15 RX ORDER — ONDANSETRON 2 MG/ML
4 INJECTION INTRAMUSCULAR; INTRAVENOUS
Status: DISCONTINUED | OUTPATIENT
Start: 2017-06-15 | End: 2017-07-06 | Stop reason: HOSPADM

## 2017-06-15 RX ORDER — SODIUM CHLORIDE, SODIUM LACTATE, POTASSIUM CHLORIDE, CALCIUM CHLORIDE 600; 310; 30; 20 MG/100ML; MG/100ML; MG/100ML; MG/100ML
75 INJECTION, SOLUTION INTRAVENOUS CONTINUOUS
Status: DISCONTINUED | OUTPATIENT
Start: 2017-06-15 | End: 2017-06-15 | Stop reason: HOSPADM

## 2017-06-15 RX ORDER — MORPHINE SULFATE 10 MG/ML
2 INJECTION, SOLUTION INTRAMUSCULAR; INTRAVENOUS
Status: DISCONTINUED | OUTPATIENT
Start: 2017-06-15 | End: 2017-06-15 | Stop reason: HOSPADM

## 2017-06-15 RX ADMIN — INSULIN LISPRO 4 UNITS: 100 INJECTION, SOLUTION INTRAVENOUS; SUBCUTANEOUS at 16:30

## 2017-06-15 RX ADMIN — DIPHENHYDRAMINE HYDROCHLORIDE 12.5 MG: 50 INJECTION, SOLUTION INTRAMUSCULAR; INTRAVENOUS at 21:46

## 2017-06-15 RX ADMIN — MIDAZOLAM HYDROCHLORIDE 1 MG: 1 INJECTION, SOLUTION INTRAMUSCULAR; INTRAVENOUS at 11:02

## 2017-06-15 RX ADMIN — ONDANSETRON 4 MG: 2 INJECTION INTRAMUSCULAR; INTRAVENOUS at 11:46

## 2017-06-15 RX ADMIN — SODIUM CHLORIDE: 900 INJECTION, SOLUTION INTRAVENOUS at 10:49

## 2017-06-15 RX ADMIN — ROCURONIUM BROMIDE 30 MG: 10 INJECTION, SOLUTION INTRAVENOUS at 11:08

## 2017-06-15 RX ADMIN — HYDROMORPHONE HYDROCHLORIDE 0.2 MG: 1 INJECTION, SOLUTION INTRAMUSCULAR; INTRAVENOUS; SUBCUTANEOUS at 16:25

## 2017-06-15 RX ADMIN — METRONIDAZOLE 500 MG: 500 INJECTION, SOLUTION INTRAVENOUS at 11:05

## 2017-06-15 RX ADMIN — FENTANYL CITRATE 25 MCG: 50 INJECTION, SOLUTION INTRAMUSCULAR; INTRAVENOUS at 13:00

## 2017-06-15 RX ADMIN — HUMAN INSULIN 6 UNITS: 100 INJECTION, SOLUTION SUBCUTANEOUS at 14:34

## 2017-06-15 RX ADMIN — LIDOCAINE HYDROCHLORIDE 60 MG: 20 INJECTION, SOLUTION EPIDURAL; INFILTRATION; INTRACAUDAL; PERINEURAL at 11:05

## 2017-06-15 RX ADMIN — LIDOCAINE HYDROCHLORIDE 0.1 ML: 10 INJECTION, SOLUTION EPIDURAL; INFILTRATION; INTRACAUDAL; PERINEURAL at 10:00

## 2017-06-15 RX ADMIN — Medication 80 MCG: at 11:42

## 2017-06-15 RX ADMIN — NORTRIPTYLINE HYDROCHLORIDE 25 MG: 25 CAPSULE ORAL at 21:46

## 2017-06-15 RX ADMIN — SODIUM CHLORIDE 25 ML/HR: 900 INJECTION, SOLUTION INTRAVENOUS at 09:49

## 2017-06-15 RX ADMIN — DEXTROSE MONOHYDRATE AND SODIUM CHLORIDE 50 ML/HR: 5; .45 INJECTION, SOLUTION INTRAVENOUS at 12:59

## 2017-06-15 RX ADMIN — ONDANSETRON 4 MG: 2 INJECTION INTRAMUSCULAR; INTRAVENOUS at 18:16

## 2017-06-15 RX ADMIN — SODIUM CHLORIDE 50 ML/HR: 900 INJECTION, SOLUTION INTRAVENOUS at 16:00

## 2017-06-15 RX ADMIN — DIPHENHYDRAMINE HYDROCHLORIDE 12.5 MG: 50 INJECTION, SOLUTION INTRAMUSCULAR; INTRAVENOUS at 12:41

## 2017-06-15 RX ADMIN — FENTANYL CITRATE 25 MCG: 50 INJECTION, SOLUTION INTRAMUSCULAR; INTRAVENOUS at 12:27

## 2017-06-15 RX ADMIN — SUCCINYLCHOLINE CHLORIDE 100 MG: 20 INJECTION INTRAMUSCULAR; INTRAVENOUS at 11:05

## 2017-06-15 RX ADMIN — HYDROMORPHONE HYDROCHLORIDE 1 MG: 1 INJECTION, SOLUTION INTRAMUSCULAR; INTRAVENOUS; SUBCUTANEOUS at 18:13

## 2017-06-15 RX ADMIN — FENTANYL CITRATE 25 MCG: 50 INJECTION, SOLUTION INTRAMUSCULAR; INTRAVENOUS at 13:24

## 2017-06-15 RX ADMIN — ONDANSETRON 4 MG: 2 INJECTION INTRAMUSCULAR; INTRAVENOUS at 22:23

## 2017-06-15 RX ADMIN — PROPOFOL 25 MG: 10 INJECTION, EMULSION INTRAVENOUS at 11:31

## 2017-06-15 RX ADMIN — FENTANYL CITRATE 25 MCG: 50 INJECTION, SOLUTION INTRAMUSCULAR; INTRAVENOUS at 13:10

## 2017-06-15 RX ADMIN — HYDROMORPHONE HYDROCHLORIDE 1 MG: 1 INJECTION, SOLUTION INTRAMUSCULAR; INTRAVENOUS; SUBCUTANEOUS at 22:23

## 2017-06-15 RX ADMIN — PROPOFOL 150 MG: 10 INJECTION, EMULSION INTRAVENOUS at 11:05

## 2017-06-15 NOTE — IP AVS SNAPSHOT
Current Discharge Medication List  
  
CONTINUE these medications which have NOT CHANGED Dose & Instructions Dispensing Information Comments Morning Noon Evening Bedtime ALLEGRA 180 mg tablet Generic drug:  fexofenadine Your last dose was: Your next dose is:    
   
   
 Dose:  180 mg Take 180 mg by mouth nightly. Refills:  0  
     
   
   
   
  
 ASPIRIN PO Your last dose was: Your next dose is:    
   
   
 Dose:  81 mg Take 81 mg by mouth daily. Refills:  0  
     
   
   
   
  
 BENADRYL 25 mg capsule Generic drug:  diphenhydrAMINE Your last dose was: Your next dose is:    
   
   
 Dose:  50 mg Take 50 mg by mouth every six (6) hours as needed for Itching. Refills:  0  
     
   
   
   
  
 * carvedilol 25 mg tablet Commonly known as:  Marilyn Nayana Your last dose was: Your next dose is:    
   
   
 Dose:  12.5 mg Take 12.5 mg by mouth nightly. Refills:  0  
     
   
   
   
  
 * carvedilol 12.5 mg tablet Commonly known as:  Marilyn Nayana Your last dose was: Your next dose is:    
   
   
 Dose:  25 mg Take 25 mg by mouth daily. Indications: takes 25 mg in AM and 12.5 mg in pm  
 Refills:  0  
     
   
   
   
  
 diazePAM 5 mg tablet Commonly known as:  VALIUM Your last dose was: Your next dose is:    
   
   
 Dose:  5 mg Take 1 Tab by mouth every eight (8) hours as needed for Anxiety. Max Daily Amount: 15 mg. Quantity:  12 Tab Refills:  0 DULCOLAX STOOL SOFTENER (DSS) 100 mg capsule Generic drug:  docusate sodium Your last dose was: Your next dose is:    
   
   
 Dose:  100 mg Take 100 mg by mouth two (2) times a day. Refills:  0 HUMALOG SC Your last dose was: Your next dose is:    
   
   
 by SubCUTAneous route Before breakfast, lunch, and dinner.  SLIDING SCALE 100-150-4u 151-200-5u ect (1 UNIT INCREASE FOR EVERY 50 POINTS) Refills:  0  
     
   
   
   
  
 latanoprost 0.005 % ophthalmic solution Commonly known as:  [employer]ro[employer] Your last dose was: Your next dose is:    
   
   
 Dose:  1 Drop Administer 1 Drop to both eyes nightly. Refills:  0 LEVEMIR FLEXPEN 100 unit/mL (3 mL) Inpn Generic drug:  insulin detemir Your last dose was: Your next dose is:    
   
   
 Dose:  8 Units 8 Units by SubCUTAneous route daily. NOON DAILY Refills:  0  
     
   
   
   
  
 mineral oil liquid Your last dose was: Your next dose is:    
   
   
 Dose:  30 mL Take 30 mL by mouth daily. Quantity:  180 mL Refills:  0 MIRALAX 17 gram packet Generic drug:  polyethylene glycol Your last dose was: Your next dose is:    
   
   
 Dose:  17 g Take 17 g by mouth two (2) times a day. Refills:  0  
     
   
   
   
  
 naloxegol 25 mg Tab tablet Commonly known as:  [place] Your last dose was: Your next dose is:    
   
   
 Dose:  25 mg Take 25 mg by mouth daily. Take 1 tablet every day on empty stomach 1 hr before or 2 hrs after first meal.  
 Refills:  0  
     
   
   
   
  
 nortriptyline 25 mg capsule Commonly known as:  PAMELOR Your last dose was: Your next dose is:    
   
   
 Dose:  25 mg Take 25 mg by mouth nightly. Refills:  0 NORVASC PO Your last dose was: Your next dose is:    
   
   
 Dose:  10 mg Take 10 mg by mouth daily. Refills:  0  
     
   
   
   
  
 OTHER Your last dose was: Your next dose is:    
   
   
 0.9% Normal saline  Use as needed to affected area  Medical code: L02.215 Quantity:  1 Bottle Refills:  3 OTHER(NON-FORMULARY) Your last dose was: Your next dose is:    
   
   
 Dose:  1 Tab 1 Tab three (3) times daily (with meals). [facility] Refills:  0  
     
   
   
   
  
 oxyCODONE-acetaminophen 7.5-325 mg per tablet Commonly known as:  PERCOCET 7.5 Your last dose was: Your next dose is:    
   
   
 Dose:  1-2 Tab Take 1-2 Tabs by mouth every four (4) hours as needed. Max Daily Amount: 12 Tabs. Quantity:  30 Tab Refills:  0 PREVACID 15 mg capsule Generic drug:  lansoprazole Your last dose was: Your next dose is: Take  by mouth two (2) times a day. Refills:  0 PROZAC PO Your last dose was: Your next dose is:    
   
   
 Dose:  20 mg Take 20 mg by mouth daily. Refills:  0 RENAL SOFTGELS 1 mg capsule Generic drug:  b complex-vitamin c-folic acid Your last dose was: Your next dose is:    
   
   
 Dose:  1 Cap Take 1 Cap by mouth daily. Refills:  0  
     
   
   
   
  
 RENVELA PO Your last dose was: Your next dose is:    
   
   
 Dose:  800 mg Take 800 mg by mouth three (3) times daily (with meals). Refills:  0 SENSIPAR 30 mg tablet Generic drug:  cinacalcet Your last dose was: Your next dose is:    
   
   
 Dose:  30 mg Take 30 mg by mouth daily. Refills:  0  
     
   
   
   
  
 simvastatin 40 mg tablet Commonly known as:  ZOCOR Your last dose was: Your next dose is:    
   
   
 Dose:  20 mg Take 20 mg by mouth nightly. Refills:  0 VOLTAREN 1 % Gel Generic drug:  diclofenac Your last dose was: Your next dose is:    
   
   
 Dose:  4 g Apply 4 g to affected area four (4) times daily as needed. Refills:  0  
     
   
   
   
  
 * Notice: This list has 2 medication(s) that are the same as other medications prescribed for you.  Read the directions carefully, and ask your doctor or other care provider to review them with you.

## 2017-06-15 NOTE — ANESTHESIA PREPROCEDURE EVALUATION
Anesthetic History               Review of Systems / Medical History      Pulmonary        Sleep apnea: CPAP    Asthma : well controlled       Neuro/Psych              Cardiovascular    Hypertension                   GI/Hepatic/Renal                Endo/Other    Diabetes: well controlled, type 2  Hypothyroidism: well controlled       Other Findings            Physical Exam    Airway  Mallampati: II  TM Distance: > 6 cm  Neck ROM: normal range of motion   Mouth opening: Normal     Cardiovascular  Regular rate and rhythm,  S1 and S2 normal,  no murmur, click, rub, or gallop             Dental  No notable dental hx       Pulmonary  Breath sounds clear to auscultation               Abdominal  GI exam deferred       Other Findings            Anesthetic Plan    ASA: 2  Anesthesia type: general          Induction: Intravenous  Anesthetic plan and risks discussed with: Patient

## 2017-06-15 NOTE — ROUTINE PROCESS
Patient: Sowmya Velazco MRN: [id number]  SSN: [id number]   YOB: 1950  Age: 75 y.o. Sex: female     Patient is status post Procedure(s):  PERINEAL ABSCESS INCISION AND DRAINAGE.     Surgeon(s) and Role:     * Margaret Escamilla MD - Primary    Local/Dose/Irrigation: Marcaine 0.5% with Epi- 20 ml                  Peripheral IV 06/15/17 Right Arm (Active)   Site Assessment Clean, dry, & intact 6/15/2017  9:48 AM   Phlebitis Assessment 0 6/15/2017  9:48 AM   Dressing Status Clean, dry, & intact 6/15/2017  9:48 AM   Hub Color/Line Status Infusing 6/15/2017  9:48 AM            Airway - Endotracheal Tube 06/15/17 Oral (Active)   Line Sorin Lips 6/15/2017 12:00 AM       Airway - Endotracheal Tube 06/15/17 Oral (Active)                   Dressing/Packing:  Wound Buttocks Right-DRESSING TYPE: 4 x 4;ABD pad;Packing;Special tape (comment) (2\" MARYANA SOAKED IN BETADINE FOR PACKING) (06/15/17 1151)  Splint/Cast:  ]    Other:
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
## 2017-06-15 NOTE — ANESTHESIA POSTPROCEDURE EVALUATION
Post-Anesthesia Evaluation and Assessment    Patient: Robert Barnett MRN: [id number]  SSN: [id number]    YOB: 1950  Age: 75 y.o. Sex: female       Cardiovascular Function/Vital Signs  Visit Vitals    /49    Pulse 73    Temp 36.9 °C (98.5 °F)    Resp 15    Ht 6' 1.75\" (1.873 m)    Wt 113.9 kg (251 lb)    SpO2 99%    BMI 32.45 kg/m2       Patient is status post general anesthesia for Procedure(s):  PERINEAL ABSCESS INCISION AND DRAINAGE. Nausea/Vomiting: None    Postoperative hydration reviewed and adequate. Pain:  Pain Scale 1: Numeric (0 - 10) (06/15/17 1227)  Pain Intensity 1: 8 (06/15/17 1227)   Managed    Neurological Status:   Neuro (WDL): Within Defined Limits (06/15/17 1208)   At baseline    Mental Status and Level of Consciousness: Arousable    Pulmonary Status:   O2 Device: Nasal cannula (06/15/17 1208)   Adequate oxygenation and airway patent    Complications related to anesthesia: None    Post-anesthesia assessment completed.  No concerns    Signed By: Juana Haynes MD     Dana 15, 2017

## 2017-06-15 NOTE — INTERVAL H&P NOTE
H&P Update:  Joes Weldon was seen and examined. History and physical has been reviewed. The patient has been examined.  There have been no significant clinical changes since the completion of the originally dated History and Physical.  Heart and lung exams are normal    Signed By: Estefany Summers MD     Dana 15, 2017 6:43 AM

## 2017-06-15 NOTE — IP AVS SNAPSHOT
[address] 
[phone] Patient: Dayan Ratliff MRN: [id number] TXY:9/4/[birth year]95 You are allergic to the following Allergen Reactions Latex Itching Rash, sometimes difficult to breathe Celebrex (Celecoxib) Anaphylaxis Swelling TONGUE. LIPS AND EYES Iodine Other (comments) IV CONTRAST-LESIONS, AND SKIN SLUFFED OFF Keflex (Cephalexin) Anaphylaxis Swelling Tongue, lips, and eyes Levaquin (Levofloxacin) Anaphylaxis Swelling Tongue, lips, and eyes Pcn (Penicillins) Anaphylaxis Swelling Tongue, lips and eyes Morphine Other (comments) PT STATES IS JUST SENSITIVITY, GOT TOO MUCH ONE TIME. Recent Documentation Height Weight Breastfeeding? BMI OB Status Smoking Status 1.873 m 135.9 kg No 38.71 kg/m2 Hysterectomy Never Smoker Emergency Contacts Name Discharge Info Relation Home Work Mobile [address] CAREGIVER [3] Daughter [21] [phone] About your hospitalization You were admitted on:  Dana 15, 2017 You last received care in the:  [address] You were discharged on:  July 6, 2017 Unit phone number:  [phone] Why you were hospitalized Your primary diagnosis was:  Perineal Abscess Your diagnoses also included:  Abscess Of Deep Perineal Space, Diabetes Mellitus Type 2, Insulin Dependent (Hcc), Htn (Hypertension), Esrd (End Stage Renal Disease) On Dialysis (Hcc) Providers Seen During Your Hospitalizations Provider Role Specialty Primary office phone Tammy Naqvi MD Attending Provider General Surgery [phone] Your Primary Care Physician (PCP) Primary Care Physician Office Phone Office Fax [email or web address]  [phone] Follow-up Information Follow up With Details Comments Contact Info [address] [facility] [address] 
[phone] Jordan Wharton MD On 7/18/2017 11:00 am follow up appt 53 [facility] [address] [facility] 
[phone] [address] [place] [address] On 7/7/2017 resumption of orders for wound care [address] 
1st Floor [facility] [address] 
[phone] Your Appointments Saturday July 08, 2017 To Be Determined START OF CARE with MARVA Collins [address] ([address]) [address] ([address]) Wednesday July 26, 2017  3:40 PM EDT  
POST OP 10 MIN with MD Celso Benavides [phone] ([facility]) [address] [facility] [address] [id number] [phone] Current Discharge Medication List  
  
CONTINUE these medications which have NOT CHANGED Dose & Instructions Dispensing Information Comments Morning Noon Evening Bedtime ALLEGRA 180 mg tablet Generic drug:  fexofenadine Your last dose was: Your next dose is:    
   
   
 Dose:  180 mg Take 180 mg by mouth nightly. Refills:  0  
     
   
   
   
  
 ASPIRIN PO Your last dose was: Your next dose is:    
   
   
 Dose:  81 mg Take 81 mg by mouth daily. Refills:  0  
     
   
   
   
  
 BENADRYL 25 mg capsule Generic drug:  diphenhydrAMINE Your last dose was: Your next dose is:    
   
   
 Dose:  50 mg Take 50 mg by mouth every six (6) hours as needed for Itching. Refills:  0  
     
   
   
   
  
 * carvedilol 25 mg tablet Commonly known as:  Tu Mood Your last dose was: Your next dose is:    
   
   
 Dose:  12.5 mg Take 12.5 mg by mouth nightly. Refills:  0  
     
   
   
   
  
 * carvedilol 12.5 mg tablet Commonly known as:  Tu Mood  
   
 Your last dose was: Your next dose is:    
   
   
 Dose:  25 mg Take 25 mg by mouth daily. Indications: takes 25 mg in AM and 12.5 mg in pm  
 Refills:  0  
     
   
   
   
  
 diazePAM 5 mg tablet Commonly known as:  VALIUM Your last dose was: Your next dose is:    
   
   
 Dose:  5 mg Take 1 Tab by mouth every eight (8) hours as needed for Anxiety. Max Daily Amount: 15 mg. Quantity:  12 Tab Refills:  0 DULCOLAX STOOL SOFTENER (DSS) 100 mg capsule Generic drug:  docusate sodium Your last dose was: Your next dose is:    
   
   
 Dose:  100 mg Take 100 mg by mouth two (2) times a day. Refills:  0 HUMALOG SC Your last dose was: Your next dose is:    
   
   
 by SubCUTAneous route Before breakfast, lunch, and dinner. SLIDING SCALE 100-150-4u 151-200-5u ect (1 UNIT INCREASE FOR EVERY 50 POINTS) Refills:  0  
     
   
   
   
  
 latanoprost 0.005 % ophthalmic solution Commonly known as:  Jesusita Caballero Your last dose was: Your next dose is:    
   
   
 Dose:  1 Drop Administer 1 Drop to both eyes nightly. Refills:  0 LEVEMIR FLEXPEN 100 unit/mL (3 mL) Inpn Generic drug:  insulin detemir Your last dose was: Your next dose is:    
   
   
 Dose:  8 Units 8 Units by SubCUTAneous route daily. NOON DAILY Refills:  0  
     
   
   
   
  
 mineral oil liquid Your last dose was: Your next dose is:    
   
   
 Dose:  30 mL Take 30 mL by mouth daily. Quantity:  180 mL Refills:  0 MIRALAX 17 gram packet Generic drug:  polyethylene glycol Your last dose was: Your next dose is:    
   
   
 Dose:  17 g Take 17 g by mouth two (2) times a day. Refills:  0  
     
   
   
   
  
 naloxegol 25 mg Tab tablet Commonly known as:  Maci Barbone Your last dose was: Your next dose is:    
   
   
 Dose:  25 mg Take 25 mg by mouth daily. Take 1 tablet every day on empty stomach 1 hr before or 2 hrs after first meal.  
 Refills:  0  
     
   
   
   
  
 nortriptyline 25 mg capsule Commonly known as:  PAMELOR Your last dose was: Your next dose is:    
   
   
 Dose:  25 mg Take 25 mg by mouth nightly. Refills:  0 NORVASC PO Your last dose was: Your next dose is:    
   
   
 Dose:  10 mg Take 10 mg by mouth daily. Refills:  0  
     
   
   
   
  
 OTHER Your last dose was: Your next dose is:    
   
   
 0.9% Normal saline  Use as needed to affected area  Medical code: L02.215 Quantity:  1 Bottle Refills:  3 OTHER(NON-FORMULARY) Your last dose was: Your next dose is:    
   
   
 Dose:  1 Tab 1 Tab three (3) times daily (with meals). [address] [facility] Refills:  0  
     
   
   
   
  
 oxyCODONE-acetaminophen 7.5-325 mg per tablet Commonly known as:  PERCOCET 7.5 Your last dose was: Your next dose is:    
   
   
 Dose:  1-2 Tab Take 1-2 Tabs by mouth every four (4) hours as needed. Max Daily Amount: 12 Tabs. Quantity:  30 Tab Refills:  0 PREVACID 15 mg capsule Generic drug:  lansoprazole Your last dose was: Your next dose is: Take  by mouth two (2) times a day. Refills:  0 PROZAC PO Your last dose was: Your next dose is:    
   
   
 Dose:  20 mg Take 20 mg by mouth daily. Refills:  0 RENAL SOFTGELS 1 mg capsule Generic drug:  b complex-vitamin c-folic acid Your last dose was: Your next dose is:    
   
   
 Dose:  1 Cap Take 1 Cap by mouth daily. Refills:  0  
     
   
   
   
  
 RENVELA PO Your last dose was: Your next dose is: Dose:  800 mg Take 800 mg by mouth three (3) times daily (with meals). Refills:  0 SENSIPAR 30 mg tablet Generic drug:  cinacalcet Your last dose was: Your next dose is:    
   
   
 Dose:  30 mg Take 30 mg by mouth daily. Refills:  0  
     
   
   
   
  
 simvastatin 40 mg tablet Commonly known as:  ZOCOR Your last dose was: Your next dose is:    
   
   
 Dose:  20 mg Take 20 mg by mouth nightly. Refills:  0 VOLTAREN 1 % Gel Generic drug:  diclofenac Your last dose was: Your next dose is:    
   
   
 Dose:  4 g Apply 4 g to affected area four (4) times daily as needed. Refills:  0  
     
   
   
   
  
 * Notice: This list has 2 medication(s) that are the same as other medications prescribed for you. Read the directions carefully, and ask your doctor or other care provider to review them with you. Discharge Instructions [address] [facility] Surgery at [facility] Discharge Instructions Patient ID: Héctor Dick [id number] 
38 y.o. 
1950 Admit Date: 6/15/2017 Discharge Date: 7/6/2017 Last Procedure:  
7/6/17: LEFT ARM DIALYSIS FISTULA ANGIOGRAM WITH BALLOON ANGIOPLASTY 6/15/17: Incision and drainage of recurring perineal abscess. Patient Instructions:  
 
FOLLOW-UP:  Follow-up with Dr. Angel Luis Garcias on July 26, 2017 at 3:40pm.  Please call office if you need to make changes to this appointment at [phone]. Please arrive by 20 minutes before scheduled appointment time to complete paperwork. Make sure to bring your ID card and insurance information. We are located at [facility] in the [facility], [address] [place] [address]. Resume Dialysis MW.   
 
Call your physician immediately if you have any fevers greater than 101, drainage from your wound that is not clear or looks infected, persistent bleeding, or increasing pain that does not respond to medication. ACTIVITY:  As tolerated. DIET:  Renal diet. You may eat any foods that you can tolerate. You may find it helpful to eat smaller sized, light meals more frequently for the first few days following surgery. It is a good idea to eat a high fiber diet and take in plenty of fluids to prevent constipation. If you do become constipated you may want to take a mild laxative or take ducolax tablets on a daily basis until your bowel habits are regular. WOUND CARE INSTRUCTIONS:   As directed by Home Health. QUESTIONS:  Please feel free to call the office ([phone]) if you have any questions, and they will be glad to assist you. Kidney Dialysis: Care Instructions Your Care Instructions Dialysis is a process that filters wastes from the blood when your kidneys can no longer do the job. It is not a cure, but it can help you live longer and feel better. It is a lifesaving treatment when you have kidney failure. Normal kidneys work 24 hours a day to clean wastes from your blood. Your kidneys are not able to do this job, so a process called dialysis will do some of the work for your kidneys. You and your doctor will decide which type of dialysis you should have. Peritoneal dialysis uses the lining of your belly (peritoneum) to filter your blood. You can do it at home, on a daily basis. Hemodialysis uses a man-made filter called a dialyzer to clean your blood. Most people need to go to a hospital or clinic 3 days a week for several hours each time. Sometimes hemodialysis can be done at home. It is normal to have questions about your treatment, and you have a right to know what is happening to you. Learning about dialysis can help you take an active role in your treatment. Dialysis does not cure kidney disease, but it can help you live longer and feel better. You will need to follow your diet and treatment schedule carefully. Follow-up care is a key part of your treatment and safety. Be sure to make and go to all appointments, and call your doctor if you are having problems. It's also a good idea to know your test results and keep a list of the medicines you take. What do you need to know about peritoneal dialysis? Peritoneal dialysis uses the lining of your belly (or peritoneal membrane) to filter your blood. Before you can begin peritoneal dialysis, your doctor will need to place a thin tube called a catheter in your belly. This is the dialysis access. · Peritoneal dialysis can be done at home or in any clean place. You may be able to do it while you sleep. · You can do it by yourself. You do not have to rely on help from others. · You can do it at the times you choose as long as you do the right number of treatments. · It has to be done every day of the week. · Some people find it hard to do all the required steps. · It increases your chance for a serious infection of the lining of the belly (peritoneum). What do you need to know about hemodialysis? Hemodialysis uses a man-made membrane called a dialyzer to clean your blood. You are connected to the dialyzer by tubes attached to your blood vessels. Before you start hemodialysis, your doctor will create a site where the blood can flow in and out of your body during your dialysis sessions. This site is called the vascular access. It may be a fistula, made by connecting an artery and a vein. Or it may be a graft, which is a tube implanted under your skin. · Hemodialysis is done mainly by trained health workers who can watch for any problems. · It allows you to be in contact with other people having dialysis. This can help provide emotional support. · You can schedule your treatments in the evenings so you can keep working. · You may be able to do home hemodialysis, which gives you more control over your schedule. · It usually needs to be done on a set schedule 3 times a week. · It can cause side effects. The most common side effects are low blood pressure and muscle cramps. These can often be treated easily. · It requires needle sticks during every treatment, which bothers some people. Others get used to it and even do the needle sticks themselves. How can you care for yourself at home? · Be sure to have all of your dialysis sessions. Do not try to shorten or skip your sessions. You have a better chance of a longer and healthier life by getting your full treatment. · Your doctor or health care team will show you the steps you need to go through each day before, during, and after dialysis. Be sure to follow these steps. If you do not understand a step, talk to your team. 
· Your doctor and dietitian will help you design menus that follow your diet. Be sure to follow your diet guidelines. ¨ You will need to limit fluids and certain foods that contain salt (sodium), potassium, and phosphorus. ¨ You may need to follow a heart-healthy diet to keep the fat (cholesterol) in your blood under control. ¨ You may need higher levels of protein in your diet. · Your doctor may recommend certain vitamins. But do not take any other medicine, including over-the-counter medicines, vitamins, and herbal products, without talking to your doctor first. 
· Do not smoke. Smoking raises your risk of many health problems, including more kidney damage. If you need help quitting, talk to your doctor about stop-smoking programs and medicines. These can increase your chances of quitting for good. · Do not take ibuprofen (Advil, Motrin), naproxen (Aleve), or similar medicines, unless your doctor tells you to. These medicines may make kidney problems worse. When should you call for help? Call 911 anytime you think you may need emergency care. For example, call if: 
· You passed out (lost consciousness). · You have severe shortness of breath. Call your doctor now or seek immediate medical care if: 
· You have swelling in your hands or feet. · You are dizzy or lightheaded, or you feel like you may faint. · You have an unusual weight gain. · You have trembling or trouble thinking clearly. · You have a fever. Watch closely for changes in your health, and be sure to contact your doctor if: 
· You have any problems with dialysis or your diet. Where can you learn more? Go to http://brandan-liang.info/. Enter M368 in the search box to learn more about \"Kidney Dialysis: Care Instructions. \" Current as of: April 3, 2017 Content Version: 11.3 © 9990-3745 [employer]. Care instructions adapted under license by [employer] (which disclaims liability or warranty for this information). If you have questions about a medical condition or this instruction, always ask your healthcare professional. [place] [address] any warranty or liability for your use of this information. Discharge Orders None Introducing [facility]! [facility] introduces [employer] patient portal. Now you can access parts of your medical record, email your doctor's office, and request medication refills online. 1. In your internet browser, go to [email or web address] 2. Click on the First Time User? Click Here link in the Sign In box. You will see the New Member Sign Up page. 3. Enter your [employer] Access Code exactly as it appears below. You will not need to use this code after youve completed the sign-up process. If you do not sign up before the expiration date, you must request a new code. · [employer] Access Code: [email or web address] Expires: 7/29/2017  5:44 PM 
 
4. Enter the last four digits of your Social Security Number (xxxx) and Date of Birth (mm/dd/yyyy) as indicated and click Submit. You will be taken to the next sign-up page. 5. Create a Vidiowiki ID. This will be your V[employer] login ID and cannot be changed, so think of one that is secure and easy to remember. 6. Create a [employer] password. You can change your password at any time. 7. Enter your Password Reset Question and Answer. This can be used at a later time if you forget your password. 8. Enter your e-mail address. You will receive e-mail notification when new information is available in [address] 9. Click Sign Up. You can now view and download portions of your medical record. 10. Click the Download Summary menu link to download a portable copy of your medical information. If you have questions, please visit the Frequently Asked Questions section of the [employer] website. Remember, [employer] is NOT to be used for urgent needs. For medical emergencies, dial 911. Now available from your iPhone and Android! General Information Please provide this summary of care documentation to your next provider. Patient Signature:  ____________________________________________________________ Date:  ____________________________________________________________  
  
Fayrene Retort Provider Signature:  ____________________________________________________________ Date:  ____________________________________________________________

## 2017-06-15 NOTE — OP NOTES
[address]   [address] [place] [address]   OP NOTE       Name:  Micheline Sahu   MR#:  [id number]   :  1950   Account #:  [de-identified]    Surgery Date:  06/15/2017   Date of Adm:  06/15/2017       PREOPERATIVE DIAGNOSIS: Recurring perineal and perianal   abscess. POSTOPERATIVE DIAGNOSIS: Recurring perineal and perianal   abscess. PROCEDURES PERFORMED: Incision and drainage of recurring   perineal abscess. SURGEON: Marcy Pastrana. Jcarlos Garvin MD    ANESTHESIA: General, supplemented with 0.5% Marcaine with   epinephrine. FINDINGS: A granulating wound anteriorly right at the level of the   ischial tuberosity, and then proceeding just posterior to that, was   another area of indurated skin with pus draining out of it. SPECIMENS REMOVED: Piece of the pus-draining skin. ESTIMATED BLOOD LOSS: Minimal.    COMPLICATIONS: None. DRAINS: None. CONDITION: Good to the PACU. DESCRIPTION OF PROCEDURE: With the patient under satisfactory   general anesthesia, she was placed in the left lateral decubitus   position. The perianal and perineal region was prepared with a layer of   Betadine solution and draped as a field. The area of drainage, which   was about 5 cm posterior to a granulating wound, was reopened. From the  granulating wound out through the area of drainage, and I found   what seemed like a furuncle there, which I excised most of and sent   that to Pathology just for confirmation. I debrided the edges   everywhere, including the granulation tissue in the anterior portion of   the incision, and then packed it with 2-inch Cassandra permeated with   Betadine. Dressing was then applied over is. At the termination of   procedure, all counts were correct. The patient tolerated this well and   was brought to the PACU in satisfactory condition. DIANE Martinez MD      [facility]   D:  06/15/2017   11:52   T:  06/15/2017   12:11   Job #:  [id number]

## 2017-06-15 NOTE — BRIEF OP NOTE
BRIEF OPERATIVE NOTE    Date of Procedure: 6/15/2017   Preoperative Diagnosis: PERINEAL ABSCESS  Postoperative Diagnosis: * No post-op diagnosis entered *    Procedure(s):  PERINEAL ABSCESS EXCISION  (LATEX ALLERGY)  Surgeon(s) and Role:     * Alexandria Vernon MD - Primary         Assistant Staff:       Surgical Staff:  Circ-1: Huan Simental RN  Scrub Tech-1: Rody Hale  Scrub RN-Relief: Key [place] Manila  Surg Asst-1: Elizabeth Sheriff  Event Time In   Incision Start 1130   Incision Close 4599     Anesthesia: General   Estimated Blood Loss: minimal  Specimens:   ID Type Source Tests Collected by Time Destination   1 : infected perineal abcess wall Fresh Buttock  Alexandria Vernon MD 6/15/2017 1133 Pathology      Findings: 5 cm area of infected skin and subcutaneous tissue   Complications: none  Implants: * No implants in log *    [id number]

## 2017-06-16 ENCOUNTER — HOME CARE VISIT (OUTPATIENT)
Dept: SCHEDULING | Facility: HOME HEALTH | Age: 67
End: 2017-06-16
Payer: MEDICARE

## 2017-06-16 VITALS
OXYGEN SATURATION: 98 % | SYSTOLIC BLOOD PRESSURE: 158 MMHG | TEMPERATURE: 97.4 F | HEART RATE: 66 BPM | DIASTOLIC BLOOD PRESSURE: 78 MMHG

## 2017-06-16 LAB
GLUCOSE BLD STRIP.AUTO-MCNC: 159 MG/DL (ref 65–100)
GLUCOSE BLD STRIP.AUTO-MCNC: 179 MG/DL (ref 65–100)
GLUCOSE BLD STRIP.AUTO-MCNC: 186 MG/DL (ref 65–100)
GLUCOSE BLD STRIP.AUTO-MCNC: 240 MG/DL (ref 65–100)
SERVICE CMNT-IMP: ABNORMAL

## 2017-06-16 PROCEDURE — 82962 GLUCOSE BLOOD TEST: CPT

## 2017-06-16 PROCEDURE — 77030027138 HC INCENT SPIROMETER -A

## 2017-06-16 PROCEDURE — 74011636637 HC RX REV CODE- 636/637: Performed by: PHYSICIAN ASSISTANT

## 2017-06-16 PROCEDURE — 74011250637 HC RX REV CODE- 250/637: Performed by: SURGERY

## 2017-06-16 PROCEDURE — 3331090002 HH PPS REVENUE DEBIT

## 2017-06-16 PROCEDURE — 90935 HEMODIALYSIS ONE EVALUATION: CPT

## 2017-06-16 PROCEDURE — 74011636637 HC RX REV CODE- 636/637: Performed by: SURGERY

## 2017-06-16 PROCEDURE — 74011250636 HC RX REV CODE- 250/636: Performed by: SURGERY

## 2017-06-16 PROCEDURE — 99218 HC RM OBSERVATION: CPT

## 2017-06-16 PROCEDURE — 3331090001 HH PPS REVENUE CREDIT

## 2017-06-16 PROCEDURE — 74011250636 HC RX REV CODE- 250/636: Performed by: PHYSICIAN ASSISTANT

## 2017-06-16 RX ORDER — MAGNESIUM SULFATE 100 %
4 CRYSTALS MISCELLANEOUS AS NEEDED
Status: DISCONTINUED | OUTPATIENT
Start: 2017-06-16 | End: 2017-06-16 | Stop reason: SDUPTHER

## 2017-06-16 RX ORDER — HYDROMORPHONE HYDROCHLORIDE 1 MG/ML
1 INJECTION, SOLUTION INTRAMUSCULAR; INTRAVENOUS; SUBCUTANEOUS ONCE
Status: COMPLETED | OUTPATIENT
Start: 2017-06-16 | End: 2017-06-16

## 2017-06-16 RX ORDER — POLYETHYLENE GLYCOL 3350 17 G/17G
17 POWDER, FOR SOLUTION ORAL DAILY
Status: DISCONTINUED | OUTPATIENT
Start: 2017-06-17 | End: 2017-06-18

## 2017-06-16 RX ORDER — HYDROMORPHONE HYDROCHLORIDE 1 MG/ML
2 INJECTION, SOLUTION INTRAMUSCULAR; INTRAVENOUS; SUBCUTANEOUS
Status: DISCONTINUED | OUTPATIENT
Start: 2017-06-16 | End: 2017-07-06 | Stop reason: HOSPADM

## 2017-06-16 RX ORDER — DEXTROSE 50 % IN WATER (D50W) INTRAVENOUS SYRINGE
12.5-25 AS NEEDED
Status: DISCONTINUED | OUTPATIENT
Start: 2017-06-16 | End: 2017-06-16 | Stop reason: SDUPTHER

## 2017-06-16 RX ADMIN — ONDANSETRON 4 MG: 2 INJECTION INTRAMUSCULAR; INTRAVENOUS at 09:18

## 2017-06-16 RX ADMIN — OXYCODONE HYDROCHLORIDE AND ACETAMINOPHEN 2 TABLET: 7.5; 325 TABLET ORAL at 12:57

## 2017-06-16 RX ADMIN — HYDROMORPHONE HYDROCHLORIDE 1 MG: 1 INJECTION, SOLUTION INTRAMUSCULAR; INTRAVENOUS; SUBCUTANEOUS at 11:47

## 2017-06-16 RX ADMIN — HYDROMORPHONE HYDROCHLORIDE 1 MG: 1 INJECTION, SOLUTION INTRAMUSCULAR; INTRAVENOUS; SUBCUTANEOUS at 10:52

## 2017-06-16 RX ADMIN — DIPHENHYDRAMINE HYDROCHLORIDE 12.5 MG: 50 INJECTION, SOLUTION INTRAMUSCULAR; INTRAVENOUS at 23:39

## 2017-06-16 RX ADMIN — OXYCODONE HYDROCHLORIDE AND ACETAMINOPHEN 1 TABLET: 7.5; 325 TABLET ORAL at 20:41

## 2017-06-16 RX ADMIN — NORTRIPTYLINE HYDROCHLORIDE 25 MG: 25 CAPSULE ORAL at 21:10

## 2017-06-16 RX ADMIN — ONDANSETRON 4 MG: 2 INJECTION INTRAMUSCULAR; INTRAVENOUS at 02:24

## 2017-06-16 RX ADMIN — OXYCODONE HYDROCHLORIDE AND ACETAMINOPHEN 1 TABLET: 7.5; 325 TABLET ORAL at 04:47

## 2017-06-16 RX ADMIN — HYDROMORPHONE HYDROCHLORIDE 1 MG: 1 INJECTION, SOLUTION INTRAMUSCULAR; INTRAVENOUS; SUBCUTANEOUS at 02:24

## 2017-06-16 RX ADMIN — HUMAN INSULIN 2 UNITS: 100 INJECTION, SOLUTION SUBCUTANEOUS at 19:32

## 2017-06-16 RX ADMIN — OXYCODONE HYDROCHLORIDE AND ACETAMINOPHEN 1 TABLET: 7.5; 325 TABLET ORAL at 20:48

## 2017-06-16 RX ADMIN — OXYCODONE HYDROCHLORIDE AND ACETAMINOPHEN 1 TABLET: 7.5; 325 TABLET ORAL at 09:18

## 2017-06-16 RX ADMIN — INSULIN LISPRO 3 UNITS: 100 INJECTION, SOLUTION INTRAVENOUS; SUBCUTANEOUS at 07:19

## 2017-06-16 RX ADMIN — HYDROMORPHONE HYDROCHLORIDE 2 MG: 1 INJECTION, SOLUTION INTRAMUSCULAR; INTRAVENOUS; SUBCUTANEOUS at 15:57

## 2017-06-16 RX ADMIN — DIPHENHYDRAMINE HYDROCHLORIDE 12.5 MG: 50 INJECTION, SOLUTION INTRAMUSCULAR; INTRAVENOUS at 19:42

## 2017-06-16 NOTE — DIABETES MGMT
DTC Progress Note    Recommendations/ Comments:  Chart reviewed on Amanda Amin due to hyperglycemia. Noted patient has two types of insulin ordered for correction and no basal insulin. If appropriate, consider:  -  Adding home dose of basal insulin , Lantus 8 units given at noon to continue patient's home regimen. - Discontinue regular insulin for correction and continue Humalog for correction   - Change Humalog correction scale to normal sensitivity scale if/when Lantus is added. Patient is a 75 y.o. female with hx Type 2 Diabetes on Levemir 8 units once daily ( at noon) and Humalog SS before meals at home. A1c:   Lab Results   Component Value Date/Time    Hemoglobin A1c 7.2 10/06/2009 04:00 AM       Recent Glucose Results: Lab Results   Component Value Date/Time    GLUCPOC 240 (H) 06/16/2017 11:27 AM    GLUCPOC 159 (H) 06/16/2017 07:08 AM    GLUCPOC 186 (H) 06/15/2017 10:25 PM        Lab Results   Component Value Date/Time    Creatinine 8.06 06/13/2017 03:44 PM     Estimated Creatinine Clearance: 9.7 mL/min (based on Cr of 8.06). Active Orders   Diet    DIET DIABETIC CONSISTENT CARB Regular        PO intake: Patient Vitals for the past 72 hrs:   % Diet Eaten   06/15/17 1959 10 %       Current hospital DM medication: Humalog for correction, insulin resistant scale and regular insulin for correction, normal sensitivity. Will continue to follow as needed.     Thank you    Nayeli Weston RD

## 2017-06-16 NOTE — PROGRESS NOTES
[facility]                         [phone]  Vitals Pre Post Assessment Pre Post   /55 158/71 LOC Alert and Oriented x 3 Alert and Oriented x 3   HR 70 80 Lungs Even and Unlabored Even and Unlabored   Temp 98.6 98.6 Cardiac S1 S2 S1 S2   Resp 18 18 Skin Dry and Intact Dry and Intact   Weight 120.3 kg. 119.3 kg. Edema None noted None noted      Pain 8/10  Staff RN aware  Pt medicated for pain 8/10 Staff RN aware  Pt medicated for pain     Orders   Duration: Start: 1903 End: 2030 Total: 1.5 hours   Dialyzer: Revaclear   K Bath: 3   Ca Bath: 2.5   Na / Bicarb: 140/35   Target Fluid Removal: 3500 ml     Access   Type & Location: LARRY AVF   Comments:                            +Thrill and Bruit. No redness, no swelling, no heat noted. Cannulated easily with 15 g needles x 2. Labs   Hep B status / date: Hep B:  Negative 6-4-17   Obtained/Reviewed  Critical Results Called Reviewed       Meds Given   Name Dose Route   N/A                 Total Liters Process: 26.3   Net Fluid Removed: 830 ml. Comments   Time Out Done: Yes 4316   Primary Nurse Rpt Pre: Mauro Ugalde RN   Primary Nurse Rpt Post: Clayton Grullon RN   Pt Education:    Care Plan:    Tx Summary: Patient tolerated Hemodialysis treatment very well, however patient's LARRY AV Fistula infiltrated 1.5 hours into the treatment. All possible blood returned with NS 0.9%. Needles removed x 2. Pressure applied to sites until bleeding stopped. Secured with gauze and tape. Daughter of patient at bedside. Patient and daughter of patient stated that LARRY AV Fistula has infiltrated many times in the past and that an appointment for Monday June 12 th with Dr Destiny Agustin (Vascular surgeon) was missed due to patient being hospitalized.   Patient and daughter of patient stated that the Fistula has been cleaned out many times and there was currently discussion about placing a new Fistula access, and discontinuing use of current LARRY AV Fistula. Dr Aranza Balderas contacted. New orders received for [facility] nurse to see patient tomorrow, assess acces and attempt to complete another 2 hours of Hemodialysis, due to only being able to complete 1.5 hours of  the 3.5 hour HD treatment which was ordered by Dr Janene Vazquez. Report given to Nidhi Benitez RN.----------Raquel Baugh RN.

## 2017-06-16 NOTE — CONSULTS
[facility]   [address] [address], [address], [facility]  Phone: [phone]   [phone]       Nephrology Progress Note  Pawel Rowell     1950     [id number]  Date of Admission : 6/15/2017  06/16/17    CC: Follow up for ESRD       Assessment and Plan   ESRD- HD :  - HD MWF at [facility]   - HD today and plan for 3-3.5 kg UF    Hyponatremia :  - 2/2 Hypervolemia   - should improve w/ HD    Anemia of CKD:  - hold epogen     Recurrent Perianal abscess    - s/p ID x4. Last one yesterday   - ? Is she heading for temporary Colostomy ? HTN :  - resume home meds    Type II DM     Sec HPTH     Interval History:  Ms Lang Rodriguez has been admitted from surgery post op follow office visit  due to worsening perianal pain   She underwent I +D of perianal abscess in April and reportedly developed more pain and drainage in the last few days   She had to undergo I+D again yesterday   She has been dialyzing in a bed and has LUE AVF that has had several angioplasties in the last 3 months   She may be heading for a new access or catheter per hx   BP stable   Feels a little better today     Review of Systems: A comprehensive review of systems was negative. Current Medications:   Current Facility-Administered Medications   Medication Dose Route Frequency    amLODIPine (NORVASC) tablet 10 mg  10 mg Oral DAILY    aspirin chewable tablet 81 mg  81 mg Oral DAILY    carvedilol (COREG) tablet 25 mg  25 mg Oral DAILY    FLUoxetine (PROzac) capsule 20 mg  20 mg Oral DAILY    . PHARMACY TO SUBSTITUTE PER PROTOCOL    Per Protocol    nortriptyline (PAMELOR) capsule 25 mg  25 mg Oral QHS    oxyCODONE-acetaminophen (PERCOCET 7.5) 7.5-325 mg per tablet 1-2 Tab  1-2 Tab Oral Q4H PRN    sodium chloride (NS) flush 5-10 mL  5-10 mL IntraVENous Q8H    sodium chloride (NS) flush 5-10 mL  5-10 mL IntraVENous PRN    HYDROmorphone (PF) (DILAUDID) injection 1 mg  1 mg IntraVENous Q4H PRN    ondansetron (ZOFRAN) injection 4 mg  4 mg IntraVENous Q4H PRN    0.9% sodium chloride infusion  50 mL/hr IntraVENous CONTINUOUS    insulin lispro (HUMALOG) injection   SubCUTAneous ACB&D    glucose chewable tablet 16 g  4 Tab Oral PRN    glucagon (GLUCAGEN) injection 1 mg  1 mg IntraMUSCular PRN    dextrose 10 % infusion 125-250 mL  125-250 mL IntraVENous PRN    diphenhydrAMINE (BENADRYL) injection 12.5 mg  12.5 mg IntraVENous Q4H PRN      Allergies   Allergen Reactions    Latex Itching     Rash, sometimes difficult to breathe    Celebrex [Celecoxib] Anaphylaxis and Swelling     TONGUE. LIPS AND EYES    Iodine Other (comments)     IV CONTRAST-LESIONS, AND SKIN SLUFFED OFF    Keflex [Cephalexin] Anaphylaxis and Swelling     Tongue, lips, and eyes    Levaquin [Levofloxacin] Anaphylaxis and Swelling     Tongue, lips, and eyes    Pcn [Penicillins] Anaphylaxis and Swelling     Tongue, lips and eyes    Morphine Other (comments)       PT STATES IS JUST SENSITIVITY, GOT TOO MUCH ONE TIME.     Opioids - Morphine Analogues Other (comments)     SENSITIVE TO OPIOIDS       Objective:  Vitals:    Vitals:    06/15/17 2226 06/15/17 2312 06/16/17 0555 06/16/17 0810   BP: 141/62 109/50 129/81 128/71   Pulse: 71 70 74 72   Resp: 16 16 16 16   Temp: 98.5 °F (36.9 °C) 98.5 °F (36.9 °C) 99 °F (37.2 °C) 99 °F (37.2 °C)   SpO2: 97% 95% 96% 95%   Weight:       Height:         Intake and Output:     06/14 1901 - 06/16 0700  In: 3225.8 [P.O.:2010; I.V.:1215.8]  Out: 100 [Urine:100]    Physical Examination:    General: In mild distress  Neck:  Supple, no mass  Resp:  Lungs CTA   CV:  RRR,  no murmur or rub, no LE edema  GI:  Soft, NT, + Bowel sounds, no hepatosplenomegaly  Neurologic:  Non focal  Ext                   LUE AVF w/ good thrill     []    High complexity decision making was performed  []    Patient is at high-risk of decompensation with multiple organ involvement    Lab Data Personally Reviewed: I have reviewed all the pertinent labs, microbiology data and radiology studies during assessment.     Recent Labs      06/13/17   1544   NA  129*   K  4.3   CL  90*   CO2  26   GLU  284*   BUN  67*   CREA  8.06*   CA  8.4*     Recent Labs      06/13/17   1544   WBC  7.2   HGB  11.4*   HCT  36.2   PLT  276     Lab Results   Component Value Date/Time    Specimen Description: URINE 10/27/2012 08:15 PM    Specimen Description: BLOOD 06/07/2011 07:40 AM    Specimen Description: URINE 08/30/2010 07:10 AM    Specimen Description: URINE 08/09/2010 11:30 AM    Specimen Description: URINE 06/22/2010 11:50 PM     Lab Results   Component Value Date/Time    Culture result: LIGHT  MIXED COMMENSAL PARIS   02/11/2017 12:40 AM    Culture result:  02/11/2017 12:40 AM     LIGHT  ANAEROBIC GRAM NEGATIVE RODS  BETA LACTAMASE POSITIVE      Culture result: NO GROWTH 6 DAYS 02/10/2017 04:43 PM    Culture result: MIXED SKIN PARIS ISOLATED 10/27/2012 08:15 PM    Culture result: NO GROWTH 5 DAYS 06/07/2011 07:40 AM     Recent Results (from the past 24 hour(s))   GLUCOSE, POC    Collection Time: 06/15/17  9:51 AM   Result Value Ref Range    Glucose (POC) 235 (H) 65 - 100 mg/dL    Performed by Stephani Maya, POC    Collection Time: 06/15/17  2:24 PM   Result Value Ref Range    Glucose (POC) 238 (H) 65 - 100 mg/dL    Performed by Rasta Pac, POC    Collection Time: 06/15/17  3:14 PM   Result Value Ref Range    Glucose (POC) 273 (H) 65 - 100 mg/dL    Performed by Rasta Pac, POC    Collection Time: 06/15/17  4:09 PM   Result Value Ref Range    Glucose (POC) 292 (H) 65 - 100 mg/dL    Performed by Migdalia Carmona    GLUCOSE, POC    Collection Time: 06/15/17  4:48 PM   Result Value Ref Range    Glucose (POC) 217 (H) 65 - 100 mg/dL    Performed by Rasta Pac, POC    Collection Time: 06/15/17  6:54 PM   Result Value Ref Range    Glucose (POC) 126 (H) 65 - 100 mg/dL    Performed by [address] [place] [address], POC Collection Time: 06/15/17 10:25 PM   Result Value Ref Range    Glucose (POC) 186 (H) 65 - 100 mg/dL    Performed by Griffin Boston (PCT)    GLUCOSE, POC    Collection Time: 06/16/17  7:08 AM   Result Value Ref Range    Glucose (POC) 159 (H) 65 - 100 mg/dL    Performed by Griffin Boston (PCT)                Michell Osorio MD

## 2017-06-16 NOTE — PROGRESS NOTES
Bedside shift change report given to [address] (oncoming nurse) by Nicolas Cameron (offgoing nurse). Report included the following information SBAR, Kardex, OR Summary, Intake/Output, MAR, Accordion, Recent Results and Med Rec Status.

## 2017-06-17 PROBLEM — N34.0 ABSCESS OF DEEP PERINEAL SPACE: Status: ACTIVE | Noted: 2017-06-17

## 2017-06-17 LAB
GLUCOSE BLD STRIP.AUTO-MCNC: 136 MG/DL (ref 65–100)
GLUCOSE BLD STRIP.AUTO-MCNC: 194 MG/DL (ref 65–100)
GLUCOSE BLD STRIP.AUTO-MCNC: 209 MG/DL (ref 65–100)
GLUCOSE BLD STRIP.AUTO-MCNC: 213 MG/DL (ref 65–100)
SERVICE CMNT-IMP: ABNORMAL

## 2017-06-17 PROCEDURE — 65210000002 HC RM PRIVATE GYN

## 2017-06-17 PROCEDURE — 74011250636 HC RX REV CODE- 250/636: Performed by: PHYSICIAN ASSISTANT

## 2017-06-17 PROCEDURE — 3331090001 HH PPS REVENUE CREDIT

## 2017-06-17 PROCEDURE — 99218 HC RM OBSERVATION: CPT

## 2017-06-17 PROCEDURE — 74011250637 HC RX REV CODE- 250/637: Performed by: INTERNAL MEDICINE

## 2017-06-17 PROCEDURE — 74011250636 HC RX REV CODE- 250/636: Performed by: SURGERY

## 2017-06-17 PROCEDURE — 90935 HEMODIALYSIS ONE EVALUATION: CPT

## 2017-06-17 PROCEDURE — 74011636637 HC RX REV CODE- 636/637: Performed by: PHYSICIAN ASSISTANT

## 2017-06-17 PROCEDURE — 3331090002 HH PPS REVENUE DEBIT

## 2017-06-17 PROCEDURE — 74011250637 HC RX REV CODE- 250/637: Performed by: SURGERY

## 2017-06-17 PROCEDURE — 82962 GLUCOSE BLOOD TEST: CPT

## 2017-06-17 RX ORDER — DIPHENHYDRAMINE HYDROCHLORIDE 50 MG/ML
25 INJECTION, SOLUTION INTRAMUSCULAR; INTRAVENOUS
Status: DISCONTINUED | OUTPATIENT
Start: 2017-06-17 | End: 2017-07-06 | Stop reason: HOSPADM

## 2017-06-17 RX ADMIN — OXYCODONE HYDROCHLORIDE AND ACETAMINOPHEN 2 TABLET: 7.5; 325 TABLET ORAL at 16:53

## 2017-06-17 RX ADMIN — DIPHENHYDRAMINE HYDROCHLORIDE 12.5 MG: 50 INJECTION, SOLUTION INTRAMUSCULAR; INTRAVENOUS at 16:54

## 2017-06-17 RX ADMIN — DIPHENHYDRAMINE HYDROCHLORIDE 25 MG: 50 INJECTION, SOLUTION INTRAMUSCULAR; INTRAVENOUS at 02:00

## 2017-06-17 RX ADMIN — DIPHENHYDRAMINE HYDROCHLORIDE 12.5 MG: 50 INJECTION, SOLUTION INTRAMUSCULAR; INTRAVENOUS at 12:40

## 2017-06-17 RX ADMIN — NORTRIPTYLINE HYDROCHLORIDE 25 MG: 25 CAPSULE ORAL at 21:09

## 2017-06-17 RX ADMIN — HUMAN INSULIN 3 UNITS: 100 INJECTION, SOLUTION SUBCUTANEOUS at 23:50

## 2017-06-17 RX ADMIN — CARVEDILOL 25 MG: 12.5 TABLET, FILM COATED ORAL at 18:18

## 2017-06-17 RX ADMIN — SODIUM CHLORIDE 50 ML/HR: 900 INJECTION, SOLUTION INTRAVENOUS at 04:30

## 2017-06-17 RX ADMIN — HUMAN INSULIN 2 UNITS: 100 INJECTION, SOLUTION SUBCUTANEOUS at 17:08

## 2017-06-17 RX ADMIN — Medication 10 ML: at 06:30

## 2017-06-17 RX ADMIN — POLYETHYLENE GLYCOL 3350 17 G: 17 POWDER, FOR SOLUTION ORAL at 16:53

## 2017-06-17 RX ADMIN — OXYCODONE HYDROCHLORIDE AND ACETAMINOPHEN 2 TABLET: 7.5; 325 TABLET ORAL at 21:01

## 2017-06-17 RX ADMIN — HYDROMORPHONE HYDROCHLORIDE 2 MG: 1 INJECTION, SOLUTION INTRAMUSCULAR; INTRAVENOUS; SUBCUTANEOUS at 11:27

## 2017-06-17 RX ADMIN — HUMAN INSULIN 3 UNITS: 100 INJECTION, SOLUTION SUBCUTANEOUS at 12:45

## 2017-06-17 RX ADMIN — ASPIRIN 81 MG CHEWABLE TABLET 81 MG: 81 TABLET CHEWABLE at 18:18

## 2017-06-17 RX ADMIN — DIPHENHYDRAMINE HYDROCHLORIDE 12.5 MG: 50 INJECTION, SOLUTION INTRAMUSCULAR; INTRAVENOUS at 06:13

## 2017-06-17 RX ADMIN — FLUOXETINE 20 MG: 10 CAPSULE ORAL at 18:17

## 2017-06-17 RX ADMIN — ONDANSETRON 4 MG: 2 INJECTION INTRAMUSCULAR; INTRAVENOUS at 21:00

## 2017-06-17 RX ADMIN — DIPHENHYDRAMINE HYDROCHLORIDE 12.5 MG: 50 INJECTION, SOLUTION INTRAMUSCULAR; INTRAVENOUS at 09:19

## 2017-06-17 RX ADMIN — OXYCODONE HYDROCHLORIDE AND ACETAMINOPHEN 2 TABLET: 7.5; 325 TABLET ORAL at 12:42

## 2017-06-17 RX ADMIN — OXYCODONE HYDROCHLORIDE AND ACETAMINOPHEN 2 TABLET: 7.5; 325 TABLET ORAL at 03:27

## 2017-06-17 RX ADMIN — AMLODIPINE BESYLATE 10 MG: 5 TABLET ORAL at 18:17

## 2017-06-17 RX ADMIN — OXYCODONE HYDROCHLORIDE AND ACETAMINOPHEN 2 TABLET: 7.5; 325 TABLET ORAL at 09:13

## 2017-06-17 NOTE — PROGRESS NOTES
Called Dr. Raciel Alvarez office to see when doctor or assistance would be rounding on pt. Pt had no orders for wound care at present time was told in report the doctor was going to change the first dressing. Talked with Keenan THOMAS she said Dr. Raciel Alvarez would be in to see pt and would look at incision .  No orders received for dressing change at present time. polo huerta rn

## 2017-06-17 NOTE — DIALYSIS
[facility]                         [phone]  Vitals Pre Post Assessment Pre Post   /62 123/63 LOC A/O x 3 A/O x 3   HR 63 63 Lungs CTA CTA   Temp 97.7 97.6 Cardiac s1s2 s1s2   Resp 16 16 Skin AVF: Intact Intact AVF   Weight 123 kg 120 kg Edema None None     -3 kg Pain Denies Denies     Orders   Duration: Start: 1400 End: 1600 Total: 2 hours   Dialyzer: Revaclear   K Bath: 3   rC a Bath: 2.5   Na / Bicarb: 140 / 35   Target Fluid Removal: 2000 - 2500 ml     Access   Type & Location: Left upper AVF. + B/T. Prepped per protocol. Comments:                            Cannulated x 2 # 15 gauge 1\" needles without difficulty     Labs   Hep B status / date: Negative on 6/14/17   Obtained/Reviewed  Critical Results Called Noted  MD reviewd     Meds Given   Name Dose Route   None ordered                 Total Liters Process: 47.2   Net Fluid Removed: 3000 - 500 = 2500 ml (-3 kg)      Comments   Time Out Done: 1340   Primary Nurse Rpt Pre: Jann Anglin RN   Primary Nurse Rpt Post: Jann Anglin RN   Pt Education: Procedure   Care Plan: Continue HD as ordered by Nephrologist   Tx Summary: Treatment initiated via left AVF. Tolerated treatment well. All possible blood returned. Fistula needles removed. Hemostasis achieved post hand held pressure. Clean dressing applied.  Endorsed to primary, Dayan Espino RN

## 2017-06-17 NOTE — PROGRESS NOTES
Progress Note    Patient: Dotty Stephens MRN: [id number]  SSN: [id number]    YOB: 1950  Age: 75 y.o. Sex: female      Admit Date: 6/15/2017    2 Days Post-Op    Procedure:  Procedure(s):  PERINEAL ABSCESS INCISION AND DRAINAGE    Subjective:     Patient stated she is need of dressing to wound, still has original surgical dressing. State top of dressing become dirty by bowel movement and urine. No nausea, no vomiting, no chest pain, no shortness of breath, and no abdominal pain. Complaint of pain at surgical wound. Also patient was unable to receive dialysis yesterday due to functioning of shunt. Objective:     Visit Vitals    /61 (BP 1 Location: Right arm, BP Patient Position: At rest)    Pulse 68    Temp 98.2 °F (36.8 °C)    Resp 16    Ht 6' 1.75\" (1.873 m)    Wt 268 lb 9.6 oz (121.8 kg)    SpO2 96%    Breastfeeding No    BMI 34.72 kg/m2       Temp (24hrs), Av.3 °F (36.8 °C), Min:97.5 °F (36.4 °C), Max:98.7 °F (37.1 °C)      Physical Exam:    LUNG: clear to auscultation bilaterally, HEART: regular rate and rhythm, S1, S2 normal, no murmur, click, rub or gallop, ABDOMEN: soft, non-tender. Bowel sounds normal. EXTREMITIES:  extremities normal, atraumatic, no cyanosis or edema. Perianal wound, right buttock with old dressing, presence of dry old blood. Dressing and packing completely removed. Presence of bleeding. Wound cleanser used for clean wound, wound then packed with saline soak sukh, covered with 4x4's, ABD, and tape.         Lab Review:   BMP: No results found for: NA, K, CL, CO2, AGAP, GLU, BUN, CREA, GFRAA, GFRNA  CMP: No results found for: NA, K, CL, CO2, AGAP, GLU, BUN, CREA, GFRAA, GFRNA, CA, MG, PHOS, ALB, TBIL, TP, ALB, GLOB, AGRAT, SGOT, ALT, GPT  CBC: No results found for: WBC, HGB, HGBEXT, HCT, HCTEXT, PLT, PLTEXT, HGBEXT, HCTEXT, PLTEXT    Assessment:     Hospital Problems  Date Reviewed: 6/15/2017          Codes Class Noted POA    * (Principal)Perineal abscess ICD-10-CM: L02.215  ICD-9-CM: 682.2  1/25/2017 Yes              Plan/Recommendations/Medical Decision Making:     Continue present treatment  Start once daily wound care with packing saline soak gauze and cover with 4x4's and tape  Wound Care Team consult, for perianal wound recurrent soiling with bowel movements. Dialysis. Continue to encourage PO intake and ambulation.    Signed By: Muriel Fothergill, NP     June 17, 2017

## 2017-06-18 LAB
GLUCOSE BLD STRIP.AUTO-MCNC: 136 MG/DL (ref 65–100)
GLUCOSE BLD STRIP.AUTO-MCNC: 145 MG/DL (ref 65–100)
GLUCOSE BLD STRIP.AUTO-MCNC: 214 MG/DL (ref 65–100)
GLUCOSE BLD STRIP.AUTO-MCNC: 220 MG/DL (ref 65–100)
SERVICE CMNT-IMP: ABNORMAL

## 2017-06-18 PROCEDURE — 3331090001 HH PPS REVENUE CREDIT

## 2017-06-18 PROCEDURE — 74011250636 HC RX REV CODE- 250/636: Performed by: PHYSICIAN ASSISTANT

## 2017-06-18 PROCEDURE — 74011636637 HC RX REV CODE- 636/637: Performed by: SURGERY

## 2017-06-18 PROCEDURE — 74011250636 HC RX REV CODE- 250/636: Performed by: SURGERY

## 2017-06-18 PROCEDURE — 65210000002 HC RM PRIVATE GYN

## 2017-06-18 PROCEDURE — 74011250637 HC RX REV CODE- 250/637: Performed by: SURGERY

## 2017-06-18 PROCEDURE — 3331090002 HH PPS REVENUE DEBIT

## 2017-06-18 PROCEDURE — 82962 GLUCOSE BLOOD TEST: CPT

## 2017-06-18 PROCEDURE — 74011636637 HC RX REV CODE- 636/637: Performed by: PHYSICIAN ASSISTANT

## 2017-06-18 PROCEDURE — 74011250637 HC RX REV CODE- 250/637: Performed by: INTERNAL MEDICINE

## 2017-06-18 RX ORDER — POLYETHYLENE GLYCOL 3350 17 G/17G
17 POWDER, FOR SOLUTION ORAL 2 TIMES DAILY
Status: DISCONTINUED | OUTPATIENT
Start: 2017-06-18 | End: 2017-06-23

## 2017-06-18 RX ADMIN — POLYETHYLENE GLYCOL 3350 17 G: 17 POWDER, FOR SOLUTION ORAL at 21:38

## 2017-06-18 RX ADMIN — ONDANSETRON 4 MG: 2 INJECTION INTRAMUSCULAR; INTRAVENOUS at 12:39

## 2017-06-18 RX ADMIN — ONDANSETRON 4 MG: 2 INJECTION INTRAMUSCULAR; INTRAVENOUS at 23:40

## 2017-06-18 RX ADMIN — OXYCODONE HYDROCHLORIDE AND ACETAMINOPHEN 2 TABLET: 7.5; 325 TABLET ORAL at 19:19

## 2017-06-18 RX ADMIN — DIPHENHYDRAMINE HYDROCHLORIDE 25 MG: 50 INJECTION, SOLUTION INTRAMUSCULAR; INTRAVENOUS at 10:58

## 2017-06-18 RX ADMIN — OXYCODONE HYDROCHLORIDE AND ACETAMINOPHEN 2 TABLET: 7.5; 325 TABLET ORAL at 01:12

## 2017-06-18 RX ADMIN — HUMAN INSULIN 3 UNITS: 100 INJECTION, SOLUTION SUBCUTANEOUS at 17:51

## 2017-06-18 RX ADMIN — DIPHENHYDRAMINE HYDROCHLORIDE 25 MG: 50 INJECTION, SOLUTION INTRAMUSCULAR; INTRAVENOUS at 19:17

## 2017-06-18 RX ADMIN — ASPIRIN 81 MG CHEWABLE TABLET 81 MG: 81 TABLET CHEWABLE at 10:58

## 2017-06-18 RX ADMIN — FLUOXETINE 20 MG: 10 CAPSULE ORAL at 10:59

## 2017-06-18 RX ADMIN — AMLODIPINE BESYLATE 10 MG: 5 TABLET ORAL at 11:00

## 2017-06-18 RX ADMIN — DIPHENHYDRAMINE HYDROCHLORIDE 25 MG: 50 INJECTION, SOLUTION INTRAMUSCULAR; INTRAVENOUS at 01:12

## 2017-06-18 RX ADMIN — INSULIN LISPRO 3 UNITS: 100 INJECTION, SOLUTION INTRAVENOUS; SUBCUTANEOUS at 06:35

## 2017-06-18 RX ADMIN — HUMAN INSULIN 2 UNITS: 100 INJECTION, SOLUTION SUBCUTANEOUS at 06:34

## 2017-06-18 RX ADMIN — OXYCODONE HYDROCHLORIDE AND ACETAMINOPHEN 2 TABLET: 7.5; 325 TABLET ORAL at 15:23

## 2017-06-18 RX ADMIN — POLYETHYLENE GLYCOL 3350 17 G: 17 POWDER, FOR SOLUTION ORAL at 10:58

## 2017-06-18 RX ADMIN — DIPHENHYDRAMINE HYDROCHLORIDE 25 MG: 50 INJECTION, SOLUTION INTRAMUSCULAR; INTRAVENOUS at 23:40

## 2017-06-18 RX ADMIN — DIPHENHYDRAMINE HYDROCHLORIDE 25 MG: 50 INJECTION, SOLUTION INTRAMUSCULAR; INTRAVENOUS at 15:19

## 2017-06-18 RX ADMIN — OXYCODONE HYDROCHLORIDE AND ACETAMINOPHEN 2 TABLET: 7.5; 325 TABLET ORAL at 10:59

## 2017-06-18 RX ADMIN — Medication 10 ML: at 21:38

## 2017-06-18 RX ADMIN — CARVEDILOL 25 MG: 12.5 TABLET, FILM COATED ORAL at 10:59

## 2017-06-18 RX ADMIN — NORTRIPTYLINE HYDROCHLORIDE 25 MG: 25 CAPSULE ORAL at 21:38

## 2017-06-18 RX ADMIN — DIPHENHYDRAMINE HYDROCHLORIDE 25 MG: 50 INJECTION, SOLUTION INTRAMUSCULAR; INTRAVENOUS at 06:40

## 2017-06-18 RX ADMIN — HYDROMORPHONE HYDROCHLORIDE 2 MG: 1 INJECTION, SOLUTION INTRAMUSCULAR; INTRAVENOUS; SUBCUTANEOUS at 12:33

## 2017-06-18 RX ADMIN — OXYCODONE HYDROCHLORIDE AND ACETAMINOPHEN 2 TABLET: 7.5; 325 TABLET ORAL at 23:40

## 2017-06-18 RX ADMIN — OXYCODONE HYDROCHLORIDE AND ACETAMINOPHEN 2 TABLET: 7.5; 325 TABLET ORAL at 06:40

## 2017-06-18 NOTE — PROGRESS NOTES
Progress Note    Patient: Hakeem Lomax MRN: [id number]  SSN: [id number]    YOB: 1950  Age: 75 y.o. Sex: female      Admit Date: 6/15/2017    3 Days Post-Op    Procedure:  Procedure(s):  PERINEAL ABSCESS INCISION AND DRAINAGE    Subjective:     No acute surgical issues. Pt still with significant pain to right buttock wound. Tolerating diet. No nausea or vomiting. Objective:     Visit Vitals    /63    Pulse 66    Temp 97.4 °F (36.3 °C)    Resp 16    Ht 6' 1.75\" (1.873 m)    Wt 268 lb 9.6 oz (121.8 kg)    SpO2 96%    Breastfeeding No    BMI 34.72 kg/m2       Temp (24hrs), Av.6 °F (36.4 °C), Min:97.4 °F (36.3 °C), Max:97.8 °F (36.6 °C)        Physical Exam:    Gen:  NAD  Pulm:  Unlabored  Abd:  S/ND/Non- TTP  Right gluteal abscess:  Wound with minimal serosanguinous drainage. No purulent drainage.   Tender with induration    Assessment:     Hospital Problems  Date Reviewed: 6/15/2017          Codes Class Noted POA    'Light-for-dates' infant with signs of fetal malnutrition ICD-10-CM: P05.00  ICD-9-CM: 764.10  2017 Unknown        Abscess of deep perineal space ICD-10-CM: N34.0  ICD-9-CM: 597.0  2017 Unknown        * (Principal)Perineal abscess ICD-10-CM: L02.215  ICD-9-CM: 682.2  2017 Yes              Plan/Recommendations/Medical Decision Making:     - Gluteal abscess:  Continue local wound care with saline moistened gauze packing  - Consult wound care to evaluate for possible wound vac placement  - Continue antibiotic    Signed By: Jerry Vuong MD     2017

## 2017-06-18 NOTE — PROGRESS NOTES
Bedside shift change report given to [address] (oncoming nurse) by Megan Phillip rn (offgoing nurse). Report included the following information SBAR.

## 2017-06-19 ENCOUNTER — HOME CARE VISIT (OUTPATIENT)
Dept: HOME HEALTH SERVICES | Facility: HOME HEALTH | Age: 67
End: 2017-06-19

## 2017-06-19 LAB
GLUCOSE BLD STRIP.AUTO-MCNC: 112 MG/DL (ref 65–100)
GLUCOSE BLD STRIP.AUTO-MCNC: 148 MG/DL (ref 65–100)
GLUCOSE BLD STRIP.AUTO-MCNC: 221 MG/DL (ref 65–100)
SERVICE CMNT-IMP: ABNORMAL

## 2017-06-19 PROCEDURE — 74011250636 HC RX REV CODE- 250/636: Performed by: SURGERY

## 2017-06-19 PROCEDURE — 74011250637 HC RX REV CODE- 250/637: Performed by: SURGERY

## 2017-06-19 PROCEDURE — 94640 AIRWAY INHALATION TREATMENT: CPT

## 2017-06-19 PROCEDURE — 90935 HEMODIALYSIS ONE EVALUATION: CPT

## 2017-06-19 PROCEDURE — 82962 GLUCOSE BLOOD TEST: CPT

## 2017-06-19 PROCEDURE — 65210000002 HC RM PRIVATE GYN

## 2017-06-19 PROCEDURE — 74011000250 HC RX REV CODE- 250: Performed by: PHYSICIAN ASSISTANT

## 2017-06-19 PROCEDURE — 74011250636 HC RX REV CODE- 250/636: Performed by: PHYSICIAN ASSISTANT

## 2017-06-19 PROCEDURE — 77030018836 HC SOL IRR NACL ICUM -A

## 2017-06-19 PROCEDURE — 74011636637 HC RX REV CODE- 636/637: Performed by: PHYSICIAN ASSISTANT

## 2017-06-19 RX ORDER — ALBUTEROL SULFATE 0.83 MG/ML
2.5 SOLUTION RESPIRATORY (INHALATION)
Status: DISCONTINUED | OUTPATIENT
Start: 2017-06-19 | End: 2017-06-21

## 2017-06-19 RX ADMIN — HUMAN INSULIN 2 UNITS: 100 INJECTION, SOLUTION SUBCUTANEOUS at 19:33

## 2017-06-19 RX ADMIN — DAKIN'S SOLUTION 0.125% (QUARTER STRENGTH): 0.12 SOLUTION at 22:16

## 2017-06-19 RX ADMIN — DIPHENHYDRAMINE HYDROCHLORIDE 25 MG: 50 INJECTION, SOLUTION INTRAMUSCULAR; INTRAVENOUS at 05:13

## 2017-06-19 RX ADMIN — POLYETHYLENE GLYCOL 3350 17 G: 17 POWDER, FOR SOLUTION ORAL at 16:52

## 2017-06-19 RX ADMIN — OXYCODONE HYDROCHLORIDE AND ACETAMINOPHEN 2 TABLET: 7.5; 325 TABLET ORAL at 05:14

## 2017-06-19 RX ADMIN — POLYETHYLENE GLYCOL 3350 17 G: 17 POWDER, FOR SOLUTION ORAL at 21:40

## 2017-06-19 RX ADMIN — Medication 10 ML: at 16:55

## 2017-06-19 RX ADMIN — ASPIRIN 81 MG CHEWABLE TABLET 81 MG: 81 TABLET CHEWABLE at 16:52

## 2017-06-19 RX ADMIN — HYDROMORPHONE HYDROCHLORIDE 2 MG: 1 INJECTION, SOLUTION INTRAMUSCULAR; INTRAVENOUS; SUBCUTANEOUS at 11:16

## 2017-06-19 RX ADMIN — AMLODIPINE BESYLATE 10 MG: 5 TABLET ORAL at 16:52

## 2017-06-19 RX ADMIN — FLUOXETINE 20 MG: 10 CAPSULE ORAL at 16:52

## 2017-06-19 RX ADMIN — DIPHENHYDRAMINE HYDROCHLORIDE 25 MG: 50 INJECTION, SOLUTION INTRAMUSCULAR; INTRAVENOUS at 22:16

## 2017-06-19 RX ADMIN — CARVEDILOL 25 MG: 12.5 TABLET, FILM COATED ORAL at 16:52

## 2017-06-19 RX ADMIN — OXYCODONE HYDROCHLORIDE AND ACETAMINOPHEN 2 TABLET: 7.5; 325 TABLET ORAL at 21:31

## 2017-06-19 RX ADMIN — ALBUTEROL SULFATE 2.5 MG: 2.5 SOLUTION RESPIRATORY (INHALATION) at 21:01

## 2017-06-19 RX ADMIN — HUMAN INSULIN 3 UNITS: 100 INJECTION, SOLUTION SUBCUTANEOUS at 00:02

## 2017-06-19 RX ADMIN — OXYCODONE HYDROCHLORIDE AND ACETAMINOPHEN 2 TABLET: 7.5; 325 TABLET ORAL at 17:08

## 2017-06-19 RX ADMIN — OXYCODONE HYDROCHLORIDE AND ACETAMINOPHEN 1 TABLET: 7.5; 325 TABLET ORAL at 09:38

## 2017-06-19 RX ADMIN — NORTRIPTYLINE HYDROCHLORIDE 25 MG: 25 CAPSULE ORAL at 21:33

## 2017-06-19 RX ADMIN — Medication 10 ML: at 21:40

## 2017-06-19 RX ADMIN — DIPHENHYDRAMINE HYDROCHLORIDE 25 MG: 50 INJECTION, SOLUTION INTRAMUSCULAR; INTRAVENOUS at 09:42

## 2017-06-19 RX ADMIN — ONDANSETRON 4 MG: 2 INJECTION INTRAMUSCULAR; INTRAVENOUS at 23:11

## 2017-06-19 NOTE — PROGRESS NOTES
TRANSFER - IN REPORT:    Verbal report received from Harish (name) on Sowmyainna Velazco  being received from 3N (unit) for ordered procedure (dialysis)    Report consisted of patients Situation, Background, Assessment and   Recommendations(SBAR). Information from the following report(s) SBAR was reviewed with the receiving nurse. Opportunity for questions and clarification was provided. Assessment completed upon patients arrival to unit and care assumed. Called and received report for patient coming to be dialyzed.

## 2017-06-19 NOTE — DIALYSIS
[facility]                         [phone]  Vitals Pre Post Assessment Pre Post   /85 136/66 LOC A&O X3 No Change   HR 64 66 Lungs Diminshed No change   Temp 98.4 98.4 Cardiac R,R,R No change   Resp 22 22 Skin W,D,I No change   Weight 127.1 kgs 126.9 kgs Edema General No Change      Pain Back side Backside and arm     Orders   Duration: Start: 1440 End: 1535 Total: 55 mins   Dialyzer: Revaclear   K Bath: 3.0   Ca Bath: 2.5   Na / Bicarb: 140/35   Target Fluid Removal: [id number]     Access   Type & Location: Left upper avf   Comments:                            Swollen, sluggish aspersion/flush. Needles adjusted flow somewhat better. Labs   Hep B status / date: 6/14/17 Neg   Obtained/Reviewed  Critical Results Called Labs,Meds, Tx orders  MD     Meds Given   Name Dose Route   None                 Total Liters Process: 14.1   Net Fluid Removed: [phone]      Comments   Time Out Done: yes   Primary Nurse Rpt Pre: Katty Gibson Rn   Primary Nurse Rpt Post: Katty Gibson Rn   Pt Education: process   Care Plan: Cont on hd as ordered   Tx Summary: tx ended early do to infiltration of pt venous access needle. Pt venous pressure were running high at times. Nurse at bedside adjusted the needle to help with flow. After 55 mins of venous pressure spiked to >340. Nurse stop tx. Look at pt site and felt very large knot at site. Dr Kaitlin Carson was called and order to staop tx and rest arm with ice and elevation for the night. Pt arm to be re evaluated in the am. All was return via the art needle at slower pump speed to not risk losing that needle too. Report called to floor post tx.

## 2017-06-19 NOTE — PROGRESS NOTES
DR Jcarlos Garvin rounded on pt this evening, after he left [place] Rear the daughter stated about pt's wheezing Dr Jcarlos Garvin notified.

## 2017-06-19 NOTE — PROGRESS NOTES
General Surgery Daily Progress Note    Admit Date: 6/15/2017  Post-Operative Day: 4 Days Post-Op from Procedure(s):  PERINEAL ABSCESS INCISION AND DRAINAGE     Subjective:     Last 24 hrs: Patient doing well this afternoon and sitting up bedisde eating lunch with daughter in room. Wound Care just performed wound care & dressing changes. Patient reports increased pain following recent wound care, otherwise pain is tolerable. She is more bothered by constant itching for which she normally takes Benadryl 50mg at home. By visual inspection, it was presumed that wound is growing Pseudomonas. Wound Care recommends 2x/day dressing changes with Dakins for next couple of days then hopeful that a wound vac can be applied. Pt denies fevers or chills. Scheduled for dialysis this afternoon. Discussed wound appearance & treatment plan with Debby Torres RN. Objective:     Blood pressure 133/70, pulse 62, temperature 98.2 °F (36.8 °C), resp. rate 18, height 6' 1.75\" (1.873 m), weight 280 lb 3.3 oz (127.1 kg), SpO2 95 %, not currently breastfeeding. Temp (24hrs), Av.9 °F (36.6 °C), Min:97.7 °F (36.5 °C), Max:98.2 °F (36.8 °C)      _____________________  Physical Exam:     Alert and Oriented, conversant, in no acute distress. Cardiovascular: RRR  Pulm: no resp distress  Wound: recently cleaned and redressed--tan odorous discharge noted      Assessment:   Principal Problem:    Perineal abscess (2017)    Active Problems:    'Light-for-dates' infant with signs of fetal malnutrition (2017)      Abscess of deep perineal space (2017)            Plan:     Continue BID wound care & dressing changes. Will plan to be present for morning dressing change to evaluate wound appearance. Continue analgesics. Further treatment per Medicine Team.    Data Review:    No results for input(s): WBC, HGB, HCT, PLT, HGBEXT, HCTEXT, PLTEXT in the last 72 hours.   No results for input(s): NA, K, CL, CO2, GLU, BUN, CREA, CA, MG, PHOS, ALB, TBIL, SGOT, ALT, INR in the last 72 hours. No lab exists for component: INREXT  No results for input(s): AML, LPSE in the last 72 hours. ______________________  Medications:    Current Facility-Administered Medications   Medication Dose Route Frequency    polyethylene glycol (MIRALAX) packet 17 g  17 g Oral BID    diphenhydrAMINE (BENADRYL) injection 25 mg  25 mg IntraVENous Q4H PRN    HYDROmorphone (PF) (DILAUDID) injection 2 mg  2 mg IntraVENous Q4H PRN    insulin regular (NOVOLIN R, HUMULIN R) injection   SubCUTAneous Q6H    amLODIPine (NORVASC) tablet 10 mg  10 mg Oral DAILY    aspirin chewable tablet 81 mg  81 mg Oral DAILY    carvedilol (COREG) tablet 25 mg  25 mg Oral DAILY    FLUoxetine (PROzac) capsule 20 mg  20 mg Oral DAILY    . PHARMACY TO SUBSTITUTE PER PROTOCOL    Per Protocol    nortriptyline (PAMELOR) capsule 25 mg  25 mg Oral QHS    oxyCODONE-acetaminophen (PERCOCET 7.5) 7.5-325 mg per tablet 1-2 Tab  1-2 Tab Oral Q4H PRN    sodium chloride (NS) flush 5-10 mL  5-10 mL IntraVENous Q8H    sodium chloride (NS) flush 5-10 mL  5-10 mL IntraVENous PRN    ondansetron (ZOFRAN) injection 4 mg  4 mg IntraVENous Q4H PRN    glucose chewable tablet 16 g  4 Tab Oral PRN    glucagon (GLUCAGEN) injection 1 mg  1 mg IntraMUSCular PRN    dextrose 10 % infusion 125-250 mL  125-250 mL IntraVENous PRN       Meredith Menendez PA-C  6/19/2017

## 2017-06-19 NOTE — DIABETES MGMT
DTC Progress Note    Recommendations/ Comments:  Chart reviewed on Melinda Erickson due to hyperglycemia. Pt received 11 units of correction insulin in the past 24 hours. Renal status noted. If appropriate, consider:  -  Adding home dose of basal insulin , Lantus 8 units give at noon to continue patient's home regimen. - Discontinue regular insulin for correction   - Add Humalog for correction high sensitivity scale     Patient is a 75 y.o. female with hx Type 2 Diabetes on Levemir 8 units once daily (at noon) and Humalog SS before meals at home. A1c:   Lab Results   Component Value Date/Time    Hemoglobin A1c 7.2 10/06/2009 04:00 AM       Recent Glucose Results:   Lab Results   Component Value Date/Time    GLUCPOC 148 (H) 06/19/2017 04:48 PM    GLUCPOC 221 (H) 06/19/2017 11:50 AM    GLUCPOC 112 (H) 06/19/2017 06:14 AM        Lab Results   Component Value Date/Time    Creatinine 8.06 06/13/2017 03:44 PM     Estimated Creatinine Clearance: 10.2 mL/min (based on Cr of 8.06). Active Orders   Diet    DIET DIABETIC CONSISTENT CARB Regular        PO intake: No data found. Current hospital DM medication:  regular insulin for correction, normal sensitivity. Will continue to follow as needed.     Thank you    Marquetta Leventhal Serra-Valentin, RD

## 2017-06-19 NOTE — PROGRESS NOTES
Chart reviewed for transitions of care needs. Pt admitted on 6/17/17 for perineal abscess. Met with pt at the bedside this PM to introduce role of CM and discuss potential disposition needs. Pt lives in a one level home with a ramp to entrance with her spouse and adult daughter who is present at the bedside. Pt ambulates with a wheelchair which is also present at bedside. Pt also has a cane, walker, and grab bars at home. Pt uses home O2 as needed which is provided by [employer]. Pt is HD dependent- receives dialysis on MWF @ [facility]. Pt currently receives  care through [facility] (three hours in the morning). Pt states her caregiver takes her to dialysis and her  picks her up. Pt expressed concerns regarding cost of  care and stated she previously applied for Medicaid to relieve concerns, however did not qualify for services. Pt would like to re-apply for Medicaid services during hospitalization. CM to contact [facility] to request financial screening. Verified PCP is Dr. Tenzin Phillip who pt last saw a month ago. Preferred pharmacy is [facility] or [facility] on [address]. Transportation at time of discharge to be provided by family via car. Pt also has [employer]  Babs Varela). Pt inquiring about other means of transportation to HD as  care is becoming too expensive (daughter has been paying for care). CM suggested [facility] however pt stated this is not a feasible option because she is too tall. Pt is seeking transportation at low-cost.     Pt has also used [address] for wound care/skilled nursing and social work. Pt would like to resume care with same agency post discharge. Will need new home health orders prior to discharge for skilled nursing and social work. No barriers to discharge identified at this time. Anticipate pt will return home with family support, home health,  care, DME, and outpatient services.  Pt will need follow up appointment with PCP prior to discharge. CM provided opportunity for questions/concerns. Pt inquiring about a cushion or seat to use at home. CM advised pt to discuss with wound care nurse and physician. Will continue to follow to confirm final disposition services are in place. Care Management Interventions  PCP Verified by CM: Yes (Dr. Lucy Albright)  Mode of Transport at Discharge:  Other (see comment) (family by car)  [place]: No  Discharge Durable Medical Equipment: No  Health Maintenance Reviewed: Yes  Physical Therapy Consult: No  Occupational Therapy Consult: No  Speech Therapy Consult: No  Current Support Network: Own Home, Lives with Spouse, Other (lives with spouse and daughter)  Confirm Follow Up Transport: Family  Plan discussed with Pt/Family/Caregiver: Yes  Freedom of Choice Offered: Yes  Discharge Location  Discharge Placement: Home with home health (need new home health orders [facility]))    Budd Sandifer, MSW

## 2017-06-19 NOTE — ADT AUTH CERT NOTES
Utilization Review           General Surgery or Procedure GRG - Care Day 5 (6/19/2017) by Yadira Cruz RN        Review Status Review Entered       Completed 6/19/2017       Details              Care Day: 5 Care Date: 6/19/2017 Level of Care: Inpatient Floor       Guideline Day 3        Level Of Care       (X) * Activity level acceptable              Clinical Status       (X) * Operative site and other wounds acceptable       (X) * Pain and nausea absent or adequately managed       (X) * Temperature status acceptable       (X) * No infection, or status acceptable       (X) * No blood loss, or problem resolved       (X) * Abdominal status acceptable       (X) * Hepatic and biliary abnormalities absent or acceptable       (X) * Inflammation absent or stable (eg, colitis)       (X) * No transplant done, or status acceptable       ( ) * General Discharge Criteria met              Interventions       (X) * Intake acceptable       ( ) * No inpatient interventions needed       6/19/2017 2:11 PM EDT by Eleni Merlin         iv benadryl 25mg q4h prn--x2. ..iv dilaudid 2mg q4h prn---x1. ...insulin coverage. .. Michaelyn Daily percocet po q4h prn---x2. ..                     6/19/2017 2:11 PM EDT by Eleni Merlin       Subject: Additional Clinical Information       6-19-17: clinical review:VS: 98.2, 133/70, 62, 18POD #4Last 24 hrs: Patient doing well this afternoon and sitting up bedisde eating lunch. Wound Care just performed wound care & dressing changes. By visual inspection, it was presumed that wound is growing Pseudomonas. Patient reports increased pain following wound care. Will begin 2x/day wound care with Dakins for next couple of days. Denies fevers or chills. Scheduled for dialysis this afternoon. Wound: recently cleaned and redressed--tan odorous discharge noted      Plan:      Continue BID wound care & dressing changes  Continue analgesics.   Further treatment per Medicine Team.                                       * Milestone                  General Surgery or Procedure GRG - Care Day 4 (6/18/2017) by Jose Nichols RN        Review Status Review Entered       Completed 6/19/2017       Details              Care Day: 4 Care Date: 6/18/2017 Level of Care: Inpatient Floor       Guideline Day 3        Level Of Care       (X) * Activity level acceptable              Clinical Status       (X) * Operative site and other wounds acceptable       (X) * Pain and nausea absent or adequately managed       (X) * Temperature status acceptable       (X) * No infection, or status acceptable       (X) * No blood loss, or problem resolved       (X) * Abdominal status acceptable       (X) * Hepatic and biliary abnormalities absent or acceptable       (X) * Inflammation absent or stable (eg, colitis)       (X) * No transplant done, or status acceptable       ( ) * General Discharge Criteria met              Interventions       (X) * Intake acceptable       ( ) * No inpatient interventions needed       6/19/2017 12:48 PM EDT by Eron Mckeon         iv benadryl 25mg q4h prn---given x6. ..iv dilaudid 2mg q4h prn---x1. ..insulin coverage. ..iv zofran 4mg q4h prn---x2. [place] [place] [facility] [place] [facility] [place] percocet po q4h prn---x6. [place] [place] [facility] [place] .                     6/19/2017 12:48 PM EDT by Eron Mckeon       Subject: Additional Clinical Information       6-18-17: clinical review:VS: 97.7, 124/65, 61, 16POD #3Pt still with significant pain to right buttock wound. Tolerating diet.  No nausea or vomiting.    Right gluteal abscess:   Wound with minimal serosanguinous drainage.     No purulent drainage.   Tender with indurationGluteal abscess: Continue local wound care with saline moistened gauze packing  - Consult wound care to evaluate for possible wound vac placement  - Continue antibiotic                                       * Milestone

## 2017-06-19 NOTE — CDMP QUERY
Dear Dr. Raciel Alvarez,    Patient is noted to have a BMI of 36.22. Please clarify if this patient is:     =>Morbidly obese (BMI ³ 40)  =>Obese (BMI 30 - 39.9)  =>Overweight (BMI 25 - 29.9)  =>Other Explanation of clinical findings  =>Unable to Determine (no explanation of clinical findings)    The medical record reflects the following clinical findings, treatment, and risk factors:    Presentation- BMI 36.22, Ht: 6' 1.75\" (1.873 m),  Wt: 127.1 kg (280 lb 3.3 oz)    REFERENCE:  The [address] [place] [address] has issued a statement indicating that, \"Individuals who are overweight, obese, or morbidly obese are at an increased risk for certain medical conditions when compared to persons of normal weight. Therefore, these conditions are always clinically significant and reportable when documented by the provider. Please clarify and document your clinical opinion in the progress notes and discharge summary including the definitive and/or presumptive diagnosis, (suspected or probable), related to the above clinical findings. Please include clinical findings supporting your diagnosis.     Thank You  Maria E Parra,MSN,BSN,RN,[facility]  [phone]

## 2017-06-19 NOTE — PROGRESS NOTES
[facility]   [address], [facility] [address], [facility]  Phone: [phone]   [phone]       Nephrology Progress Note  Candelaria Sanford     1950     [id number]  Date of Admission : 6/15/2017  06/19/17    CC: Follow up for ESRD       Assessment and Plan   ESRD- HD :  - HD MWF at [facility], [place]   - HD for later today    Hyponatremia :  - 2/2 Hypervolemia   - should improve w/ HD    Anemia of CKD:  - no ELTON for now    Recurrent Perianal abscess:   - s/p ID 6/15  - abx per primary team    HTN :  - cont current meds    Type II DM     Sec HPTH     Interval History:  Seen and examined. C/o itching. No cp, sob, n/v/d. Not eating much. For HD later today. Review of Systems: A comprehensive review of systems was negative. Current Medications:   Current Facility-Administered Medications   Medication Dose Route Frequency    polyethylene glycol (MIRALAX) packet 17 g  17 g Oral BID    diphenhydrAMINE (BENADRYL) injection 25 mg  25 mg IntraVENous Q4H PRN    HYDROmorphone (PF) (DILAUDID) injection 2 mg  2 mg IntraVENous Q4H PRN    insulin regular (NOVOLIN R, HUMULIN R) injection   SubCUTAneous Q6H    amLODIPine (NORVASC) tablet 10 mg  10 mg Oral DAILY    aspirin chewable tablet 81 mg  81 mg Oral DAILY    carvedilol (COREG) tablet 25 mg  25 mg Oral DAILY    FLUoxetine (PROzac) capsule 20 mg  20 mg Oral DAILY    . PHARMACY TO SUBSTITUTE PER PROTOCOL    Per Protocol    nortriptyline (PAMELOR) capsule 25 mg  25 mg Oral QHS    oxyCODONE-acetaminophen (PERCOCET 7.5) 7.5-325 mg per tablet 1-2 Tab  1-2 Tab Oral Q4H PRN    sodium chloride (NS) flush 5-10 mL  5-10 mL IntraVENous Q8H    sodium chloride (NS) flush 5-10 mL  5-10 mL IntraVENous PRN    ondansetron (ZOFRAN) injection 4 mg  4 mg IntraVENous Q4H PRN    0.9% sodium chloride infusion  50 mL/hr IntraVENous CONTINUOUS    glucose chewable tablet 16 g  4 Tab Oral PRN    glucagon (GLUCAGEN) injection 1 mg  1 mg IntraMUSCular PRN    dextrose 10 % infusion 125-250 mL  125-250 mL IntraVENous PRN      Allergies   Allergen Reactions    Latex Itching     Rash, sometimes difficult to breathe    Celebrex [Celecoxib] Anaphylaxis and Swelling     TONGUE. LIPS AND EYES    Iodine Other (comments)     IV CONTRAST-LESIONS, AND SKIN SLUFFED OFF    Keflex [Cephalexin] Anaphylaxis and Swelling     Tongue, lips, and eyes    Levaquin [Levofloxacin] Anaphylaxis and Swelling     Tongue, lips, and eyes    Pcn [Penicillins] Anaphylaxis and Swelling     Tongue, lips and eyes    Morphine Other (comments)     PT STATES IS JUST SENSITIVITY, GOT TOO MUCH ONE TIME. Objective:  Vitals:    Vitals:    06/18/17 1645 06/18/17 2050 06/19/17 0448 06/19/17 0808   BP: 132/68 124/65 113/58 133/70   Pulse: 63 61 (!) 55 62   Resp: 16 16 18 18   Temp: 97.8 °F (36.6 °C) 97.7 °F (36.5 °C) 97.7 °F (36.5 °C) 98.2 °F (36.8 °C)   TempSrc:       SpO2: 96% 96% 96% 95%   Weight:  127.1 kg (280 lb 3.3 oz)     Height:         Intake and Output:     06/17 1901 - 06/19 0700  In: -   Out: 150 [Urine:150]    Physical Examination:    General: In mild distress  Neck:  Supple, no mass  Resp:  Lungs CTA   CV:  RRR,  no murmur or rub, no LE edema  GI:  Soft, NT, + Bowel sounds, no hepatosplenomegaly  Neurologic:  Non focal  Ext                   LUE AVF w/ good thrill     []    High complexity decision making was performed  []    Patient is at high-risk of decompensation with multiple organ involvement    Lab Data Personally Reviewed: I have reviewed all the pertinent labs, microbiology data and radiology studies during assessment. No results for input(s): NA, K, CL, CO2, GLU, BUN, CREA, CA, MG, PHOS, ALB, TBIL, SGOT, ALT, INR in the last 72 hours. No lab exists for component: INREXT, INREXT  No results for input(s): WBC, HGB, HCT, PLT, HGBEXT, HCTEXT, PLTEXT, HGBEXT, HCTEXT, PLTEXT in the last 72 hours.   Lab Results   Component Value Date/Time    Specimen Description: URINE 10/27/2012 08:15 PM    Specimen Description: BLOOD 06/07/2011 07:40 AM    Specimen Description: URINE 08/30/2010 07:10 AM    Specimen Description: URINE 08/09/2010 11:30 AM    Specimen Description: URINE 06/22/2010 11:50 PM     Lab Results   Component Value Date/Time    Culture result: LIGHT  MIXED COMMENSAL PARIS   02/11/2017 12:40 AM    Culture result:  02/11/2017 12:40 AM     LIGHT  ANAEROBIC GRAM NEGATIVE RODS  BETA LACTAMASE POSITIVE      Culture result: NO GROWTH 6 DAYS 02/10/2017 04:43 PM    Culture result: MIXED SKIN PARIS ISOLATED 10/27/2012 08:15 PM    Culture result: NO GROWTH 5 DAYS 06/07/2011 07:40 AM     Recent Results (from the past 24 hour(s))   GLUCOSE, POC    Collection Time: 06/18/17 12:41 PM   Result Value Ref Range    Glucose (POC) 136 (H) 65 - 100 mg/dL    Performed by Jannet Castro, POC    Collection Time: 06/18/17  5:17 PM   Result Value Ref Range    Glucose (POC) 220 (H) 65 - 100 mg/dL    Performed by Jannet Castro, POC    Collection Time: 06/18/17 11:53 PM   Result Value Ref Range    Glucose (POC) 214 (H) 65 - 100 mg/dL    Performed by Milly Rice Dr, POC    Collection Time: 06/19/17  6:14 AM   Result Value Ref Range    Glucose (POC) 112 (H) 65 - 100 mg/dL    Performed by Shayy García MD

## 2017-06-19 NOTE — WOUND CARE
WOCN Note:     New consult placed by Dr. David Harman for possible VAC placement. Chart shows:  Admitted for perineal abscess; history of previous perineal abscesses, DM, HD, HTN, neuropathy, sleep apnea. WBC = 7.2  Admitted from home. Daughter in room during visit. Assessment:   Patient is A&O x 4, continent (used BSC) and mobile. Pre-medicated for pain by RN. 1. Right medial buttock I&D site 6/15/17 by Dr. Willoughby Ee = 9 x 3.5 x 2 cm with small pockets into indurated tissue laterally - able to put q-tip into skip areas that go ~ 1.3cm. Visible base is 80% moist red, 10% yellow glaze, 10% deep burgundy (on lateral margin which leads to area of induration0. Small sanguinous exudate. Periwound intact. She has a dark skin tone and erythema would be hard to appreciate. However, there is a 7 x 5 cm field of elevated, hard, induration with calor on lateral margin of open wound. No odor but dressing with aqua staining consistent with pseudomonas. Cleaned with saline and moist packing applied. Recommendations:    2-3 days of  1/4 Dakin's moist packing with dry cover dressing. Wound appears to be capable to be managed by a VAC in a few days unless further debridement is needed. Discussed above plan with patient & RNRadha. Transition of Care: Plan to follow later in the week to evaluate effectiveness of 1/4 Dakin's.      MARK Velázquez, RN, [facility]  Certified Wound, Ostomy, Continence Nurse  office [phone]  pager [phone] or call  to page

## 2017-06-20 PROBLEM — Z99.2 ESRD (END STAGE RENAL DISEASE) ON DIALYSIS (HCC): Status: ACTIVE | Noted: 2017-06-20

## 2017-06-20 PROBLEM — N18.6 ESRD (END STAGE RENAL DISEASE) ON DIALYSIS (HCC): Status: ACTIVE | Noted: 2017-06-20

## 2017-06-20 LAB
GLUCOSE BLD STRIP.AUTO-MCNC: 108 MG/DL (ref 65–100)
GLUCOSE BLD STRIP.AUTO-MCNC: 116 MG/DL (ref 65–100)
GLUCOSE BLD STRIP.AUTO-MCNC: 144 MG/DL (ref 65–100)
GLUCOSE BLD STRIP.AUTO-MCNC: 153 MG/DL (ref 65–100)
GLUCOSE BLD STRIP.AUTO-MCNC: 93 MG/DL (ref 65–100)
SERVICE CMNT-IMP: ABNORMAL
SERVICE CMNT-IMP: NORMAL

## 2017-06-20 PROCEDURE — 74011250636 HC RX REV CODE- 250/636: Performed by: PHYSICIAN ASSISTANT

## 2017-06-20 PROCEDURE — 65210000002 HC RM PRIVATE GYN

## 2017-06-20 PROCEDURE — 74011000250 HC RX REV CODE- 250: Performed by: SURGERY

## 2017-06-20 PROCEDURE — 74011000250 HC RX REV CODE- 250: Performed by: PHYSICIAN ASSISTANT

## 2017-06-20 PROCEDURE — 74011250636 HC RX REV CODE- 250/636: Performed by: SURGERY

## 2017-06-20 PROCEDURE — 74011250637 HC RX REV CODE- 250/637: Performed by: SURGERY

## 2017-06-20 PROCEDURE — 94640 AIRWAY INHALATION TREATMENT: CPT

## 2017-06-20 PROCEDURE — 82962 GLUCOSE BLOOD TEST: CPT

## 2017-06-20 PROCEDURE — 74011636637 HC RX REV CODE- 636/637: Performed by: PHYSICIAN ASSISTANT

## 2017-06-20 RX ORDER — METRONIDAZOLE 500 MG/100ML
500 INJECTION, SOLUTION INTRAVENOUS EVERY 8 HOURS
Status: DISCONTINUED | OUTPATIENT
Start: 2017-06-20 | End: 2017-06-23 | Stop reason: ALTCHOICE

## 2017-06-20 RX ADMIN — ALBUTEROL SULFATE 2.5 MG: 2.5 SOLUTION RESPIRATORY (INHALATION) at 20:46

## 2017-06-20 RX ADMIN — DIPHENHYDRAMINE HYDROCHLORIDE 25 MG: 50 INJECTION, SOLUTION INTRAMUSCULAR; INTRAVENOUS at 05:33

## 2017-06-20 RX ADMIN — FLUOXETINE 20 MG: 10 CAPSULE ORAL at 09:10

## 2017-06-20 RX ADMIN — ASPIRIN 81 MG CHEWABLE TABLET 81 MG: 81 TABLET CHEWABLE at 09:10

## 2017-06-20 RX ADMIN — DIPHENHYDRAMINE HYDROCHLORIDE 25 MG: 50 INJECTION, SOLUTION INTRAMUSCULAR; INTRAVENOUS at 01:37

## 2017-06-20 RX ADMIN — METRONIDAZOLE 500 MG: 500 INJECTION, SOLUTION INTRAVENOUS at 10:55

## 2017-06-20 RX ADMIN — DIPHENHYDRAMINE HYDROCHLORIDE 25 MG: 50 INJECTION, SOLUTION INTRAMUSCULAR; INTRAVENOUS at 18:03

## 2017-06-20 RX ADMIN — NORTRIPTYLINE HYDROCHLORIDE 25 MG: 25 CAPSULE ORAL at 21:34

## 2017-06-20 RX ADMIN — HYDROMORPHONE HYDROCHLORIDE 2 MG: 1 INJECTION, SOLUTION INTRAMUSCULAR; INTRAVENOUS; SUBCUTANEOUS at 09:15

## 2017-06-20 RX ADMIN — ALBUTEROL SULFATE 2.5 MG: 2.5 SOLUTION RESPIRATORY (INHALATION) at 04:03

## 2017-06-20 RX ADMIN — POLYETHYLENE GLYCOL 3350 17 G: 17 POWDER, FOR SOLUTION ORAL at 09:10

## 2017-06-20 RX ADMIN — METRONIDAZOLE 500 MG: 500 INJECTION, SOLUTION INTRAVENOUS at 18:03

## 2017-06-20 RX ADMIN — ALBUTEROL SULFATE 2.5 MG: 2.5 SOLUTION RESPIRATORY (INHALATION) at 15:42

## 2017-06-20 RX ADMIN — ALBUTEROL SULFATE 2.5 MG: 2.5 SOLUTION RESPIRATORY (INHALATION) at 00:17

## 2017-06-20 RX ADMIN — AMLODIPINE BESYLATE 10 MG: 5 TABLET ORAL at 09:10

## 2017-06-20 RX ADMIN — Medication 10 ML: at 14:10

## 2017-06-20 RX ADMIN — DAKIN'S SOLUTION 0.125% (QUARTER STRENGTH): 0.12 SOLUTION at 09:10

## 2017-06-20 RX ADMIN — OXYCODONE HYDROCHLORIDE AND ACETAMINOPHEN 2 TABLET: 7.5; 325 TABLET ORAL at 01:37

## 2017-06-20 RX ADMIN — Medication 10 ML: at 05:33

## 2017-06-20 RX ADMIN — DAKIN'S SOLUTION 0.125% (QUARTER STRENGTH): 0.12 SOLUTION at 22:23

## 2017-06-20 RX ADMIN — ALBUTEROL SULFATE 2.5 MG: 2.5 SOLUTION RESPIRATORY (INHALATION) at 08:34

## 2017-06-20 RX ADMIN — ALBUTEROL SULFATE 2.5 MG: 2.5 SOLUTION RESPIRATORY (INHALATION) at 12:11

## 2017-06-20 RX ADMIN — DIPHENHYDRAMINE HYDROCHLORIDE 25 MG: 50 INJECTION, SOLUTION INTRAMUSCULAR; INTRAVENOUS at 22:47

## 2017-06-20 RX ADMIN — OXYCODONE HYDROCHLORIDE AND ACETAMINOPHEN 2 TABLET: 7.5; 325 TABLET ORAL at 20:08

## 2017-06-20 RX ADMIN — HUMAN INSULIN 2 UNITS: 100 INJECTION, SOLUTION SUBCUTANEOUS at 06:31

## 2017-06-20 RX ADMIN — POLYETHYLENE GLYCOL 3350 17 G: 17 POWDER, FOR SOLUTION ORAL at 22:20

## 2017-06-20 RX ADMIN — CARVEDILOL 25 MG: 12.5 TABLET, FILM COATED ORAL at 09:10

## 2017-06-20 RX ADMIN — OXYCODONE HYDROCHLORIDE AND ACETAMINOPHEN 2 TABLET: 7.5; 325 TABLET ORAL at 23:45

## 2017-06-20 RX ADMIN — OXYCODONE HYDROCHLORIDE AND ACETAMINOPHEN 2 TABLET: 7.5; 325 TABLET ORAL at 05:33

## 2017-06-20 NOTE — PROGRESS NOTES
Solomon DURHAM called report regarding pt's venous fistula infiltrated and has been reported to Dr La Koenig, to elevate RA and apply ice,

## 2017-06-20 NOTE — PROGRESS NOTES
Bedside shift change report given to Ivy Mccray (oncoming nurse) by Conchita Beltran (offgoing nurse). Report included the following information SBAR and Kardex.

## 2017-06-20 NOTE — PROGRESS NOTES
[facility]   [address], [facility] [address], [facility]  Phone: [phone]   [phone]       Nephrology Progress Note  Susie Farias     1950     [id number]  Date of Admission : 6/15/2017  06/20/17    CC: Follow up for ESRD       Assessment and Plan   ESRD- HD :  - HD MWF at [facility], [place]   - HD x 2 hours today - access appears ok  - then back on MWF schedule tomorrow    Hyponatremia :  - 2/2 Hypervolemia   - should improve w/ HD    Anemia of CKD:  - no ELTON for now    Recurrent Perianal abscess:   - s/p ID 6/15  - abx per primary team    HTN :  - cont current meds    Type II DM     Sec HPTH     Interval History:  Seen and examined. Hd access infiltrated last night after 1 hr of treatment. C/o buttocks pain. No cp or sob at this time. Review of Systems: A comprehensive review of systems was negative. Current Medications:   Current Facility-Administered Medications   Medication Dose Route Frequency    metroNIDAZOLE (FLAGYL) IVPB premix 500 mg  500 mg IntraVENous Q8H    sodium hypochlorite (QUARTER STRENGTH DAKIN'S) 0.125% irrigation (bottle)   Topical BID    albuterol (PROVENTIL VENTOLIN) nebulizer solution 2.5 mg  2.5 mg Nebulization Q4H RT    polyethylene glycol (MIRALAX) packet 17 g  17 g Oral BID    diphenhydrAMINE (BENADRYL) injection 25 mg  25 mg IntraVENous Q4H PRN    HYDROmorphone (PF) (DILAUDID) injection 2 mg  2 mg IntraVENous Q4H PRN    insulin regular (NOVOLIN R, HUMULIN R) injection   SubCUTAneous Q6H    amLODIPine (NORVASC) tablet 10 mg  10 mg Oral DAILY    aspirin chewable tablet 81 mg  81 mg Oral DAILY    carvedilol (COREG) tablet 25 mg  25 mg Oral DAILY    FLUoxetine (PROzac) capsule 20 mg  20 mg Oral DAILY    . PHARMACY TO SUBSTITUTE PER PROTOCOL    Per Protocol    nortriptyline (PAMELOR) capsule 25 mg  25 mg Oral QHS    oxyCODONE-acetaminophen (PERCOCET 7.5) 7.5-325 mg per tablet 1-2 Tab  1-2 Tab Oral Q4H PRN    sodium chloride (NS) flush 5-10 mL  5-10 mL IntraVENous Q8H    sodium chloride (NS) flush 5-10 mL  5-10 mL IntraVENous PRN    ondansetron (ZOFRAN) injection 4 mg  4 mg IntraVENous Q4H PRN    glucose chewable tablet 16 g  4 Tab Oral PRN    glucagon (GLUCAGEN) injection 1 mg  1 mg IntraMUSCular PRN    dextrose 10 % infusion 125-250 mL  125-250 mL IntraVENous PRN      Allergies   Allergen Reactions    Latex Itching     Rash, sometimes difficult to breathe    Celebrex [Celecoxib] Anaphylaxis and Swelling     TONGUE. LIPS AND EYES    Iodine Other (comments)     IV CONTRAST-LESIONS, AND SKIN SLUFFED OFF    Keflex [Cephalexin] Anaphylaxis and Swelling     Tongue, lips, and eyes    Levaquin [Levofloxacin] Anaphylaxis and Swelling     Tongue, lips, and eyes    Pcn [Penicillins] Anaphylaxis and Swelling     Tongue, lips and eyes    Morphine Other (comments)     PT STATES IS JUST SENSITIVITY, GOT TOO MUCH ONE TIME. Objective:  Vitals:    Vitals:    06/20/17 0106 06/20/17 0631 06/20/17 0812 06/20/17 0835   BP: 115/58  115/58    Pulse: 66  (!) 52    Resp: 18  18    Temp: 98.4 °F (36.9 °C)  98.5 °F (36.9 °C)    TempSrc:       SpO2: 93%  97% 96%   Weight:  127.7 kg (281 lb 9.6 oz)     Height:         Intake and Output:     06/18 1901 - 06/20 0700  In: 850 [P.O.:850]  Out: 475 [Urine:200]    Physical Examination:    General: In mild distress  Neck:  Supple, no mass  Resp:  Lungs CTA   CV:  RRR,  no murmur or rub, no LE edema  GI:  Soft, NT, + Bowel sounds, no hepatosplenomegaly  Neurologic:  Non focal  Ext                   LUE AVF w/ good thrill and bruit    []    High complexity decision making was performed  []    Patient is at high-risk of decompensation with multiple organ involvement    Lab Data Personally Reviewed: I have reviewed all the pertinent labs, microbiology data and radiology studies during assessment.     No results for input(s): NA, K, CL, CO2, GLU, BUN, CREA, CA, MG, PHOS, ALB, TBIL, SGOT, ALT, INR in the last 72 hours. No lab exists for component: INREXT, INREXT  No results for input(s): WBC, HGB, HCT, PLT, HGBEXT, HCTEXT, PLTEXT, HGBEXT, HCTEXT, PLTEXT in the last 72 hours.   Lab Results   Component Value Date/Time    Specimen Description: URINE 10/27/2012 08:15 PM    Specimen Description: BLOOD 06/07/2011 07:40 AM    Specimen Description: URINE 08/30/2010 07:10 AM    Specimen Description: URINE 08/09/2010 11:30 AM    Specimen Description: URINE 06/22/2010 11:50 PM     Lab Results   Component Value Date/Time    Culture result: LIGHT  MIXED COMMENSAL PARIS   02/11/2017 12:40 AM    Culture result:  02/11/2017 12:40 AM     LIGHT  ANAEROBIC GRAM NEGATIVE RODS  BETA LACTAMASE POSITIVE      Culture result: NO GROWTH 6 DAYS 02/10/2017 04:43 PM    Culture result: MIXED SKIN PARIS ISOLATED 10/27/2012 08:15 PM    Culture result: NO GROWTH 5 DAYS 06/07/2011 07:40 AM     Recent Results (from the past 24 hour(s))   GLUCOSE, POC    Collection Time: 06/19/17 11:50 AM   Result Value Ref Range    Glucose (POC) 221 (H) 65 - 100 mg/dL    Performed by Jaydon Beans    GLUCOSE, POC    Collection Time: 06/19/17  4:48 PM   Result Value Ref Range    Glucose (POC) 148 (H) 65 - 100 mg/dL    Performed by Jaydon Beans    GLUCOSE, POC    Collection Time: 06/20/17 12:05 AM   Result Value Ref Range    Glucose (POC) 108 (H) 65 - 100 mg/dL    Performed by [facility] PCT    GLUCOSE, POC    Collection Time: 06/20/17  6:13 AM   Result Value Ref Range    Glucose (POC) 153 (H) 65 - 100 mg/dL    Performed by [facility] PCT                Marilyn Erickson MD

## 2017-06-20 NOTE — PROGRESS NOTES
General Surgery Daily Progress Note    Admit Date: 6/15/2017  Post-Operative Day: 5 Days Post-Op from Procedure(s):  PERINEAL ABSCESS INCISION AND DRAINAGE     Subjective:     Last 24 hrs: Continues with pain in left gluteal/perineal area. Undergoing BID dressing changes with Dakins. Denies fever or chills. Eating well. Did not receive dialysis yesterday as fistula was not patent. Objective:     Blood pressure 115/58, pulse (!) 52, temperature 98.5 °F (36.9 °C), resp. rate 18, height 6' 1.75\" (1.873 m), weight 281 lb 9.6 oz (127.7 kg), SpO2 96 %, not currently breastfeeding. Temp (24hrs), Av.3 °F (36.8 °C), Min:97.7 °F (36.5 °C), Max:98.5 °F (36.9 °C)      _____________________  Physical Exam:     Alert and Oriented, in no acute distress. Cardiovascular: RRR  Lungs:no resp distress  Perineal Wound: 3x4cm oval wound with pink granulation tissue along base, lateral boarder swelling & tenderness to light palpation, nonodorous      Assessment:   Principal Problem:    Perineal abscess (2017)    Active Problems:    'Light-for-dates' infant with signs of fetal malnutrition (2017)      Abscess of deep perineal space (2017)            Plan:     Continue BID wound care & dressing changes. Continue analgesics. Will start antibiotics per Dr. Jcarlos Garvin. Hopefully dialysis soon. Further treatment per Dr. Jcarlos Garvin. Data Review:    No results for input(s): WBC, HGB, HCT, PLT, HGBEXT, HCTEXT, PLTEXT in the last 72 hours. No results for input(s): NA, K, CL, CO2, GLU, BUN, CREA, CA, MG, PHOS, ALB, TBIL, SGOT, ALT, INR in the last 72 hours. No lab exists for component: INREXT  No results for input(s): AML, LPSE in the last 72 hours.         ______________________  Medications:    Current Facility-Administered Medications   Medication Dose Route Frequency    metroNIDAZOLE (FLAGYL) IVPB premix 500 mg  500 mg IntraVENous Q8H    sodium hypochlorite (QUARTER STRENGTH DAKIN'S) 0.125% irrigation (bottle) Topical BID    albuterol (PROVENTIL VENTOLIN) nebulizer solution 2.5 mg  2.5 mg Nebulization Q4H RT    polyethylene glycol (MIRALAX) packet 17 g  17 g Oral BID    diphenhydrAMINE (BENADRYL) injection 25 mg  25 mg IntraVENous Q4H PRN    HYDROmorphone (PF) (DILAUDID) injection 2 mg  2 mg IntraVENous Q4H PRN    insulin regular (NOVOLIN R, HUMULIN R) injection   SubCUTAneous Q6H    amLODIPine (NORVASC) tablet 10 mg  10 mg Oral DAILY    aspirin chewable tablet 81 mg  81 mg Oral DAILY    carvedilol (COREG) tablet 25 mg  25 mg Oral DAILY    FLUoxetine (PROzac) capsule 20 mg  20 mg Oral DAILY    . PHARMACY TO SUBSTITUTE PER PROTOCOL    Per Protocol    nortriptyline (PAMELOR) capsule 25 mg  25 mg Oral QHS    oxyCODONE-acetaminophen (PERCOCET 7.5) 7.5-325 mg per tablet 1-2 Tab  1-2 Tab Oral Q4H PRN    sodium chloride (NS) flush 5-10 mL  5-10 mL IntraVENous Q8H    sodium chloride (NS) flush 5-10 mL  5-10 mL IntraVENous PRN    ondansetron (ZOFRAN) injection 4 mg  4 mg IntraVENous Q4H PRN    glucose chewable tablet 16 g  4 Tab Oral PRN    glucagon (GLUCAGEN) injection 1 mg  1 mg IntraMUSCular PRN    dextrose 10 % infusion 125-250 mL  125-250 mL IntraVENous PRN       Mar Sandoval PA-C  6/20/2017

## 2017-06-21 LAB
GLUCOSE BLD STRIP.AUTO-MCNC: 128 MG/DL (ref 65–100)
GLUCOSE BLD STRIP.AUTO-MCNC: 141 MG/DL (ref 65–100)
GLUCOSE BLD STRIP.AUTO-MCNC: 178 MG/DL (ref 65–100)
SERVICE CMNT-IMP: ABNORMAL

## 2017-06-21 PROCEDURE — 74011636637 HC RX REV CODE- 636/637: Performed by: PHYSICIAN ASSISTANT

## 2017-06-21 PROCEDURE — 82962 GLUCOSE BLOOD TEST: CPT

## 2017-06-21 PROCEDURE — 74011250637 HC RX REV CODE- 250/637: Performed by: SURGERY

## 2017-06-21 PROCEDURE — 74011250636 HC RX REV CODE- 250/636: Performed by: SURGERY

## 2017-06-21 PROCEDURE — 65210000002 HC RM PRIVATE GYN

## 2017-06-21 PROCEDURE — 74011250636 HC RX REV CODE- 250/636: Performed by: PHYSICIAN ASSISTANT

## 2017-06-21 PROCEDURE — 74011000250 HC RX REV CODE- 250: Performed by: SURGERY

## 2017-06-21 PROCEDURE — 90935 HEMODIALYSIS ONE EVALUATION: CPT

## 2017-06-21 RX ORDER — ALBUTEROL SULFATE 0.83 MG/ML
2.5 SOLUTION RESPIRATORY (INHALATION)
Status: DISCONTINUED | OUTPATIENT
Start: 2017-06-21 | End: 2017-06-24

## 2017-06-21 RX ADMIN — FLUOXETINE 20 MG: 10 CAPSULE ORAL at 10:24

## 2017-06-21 RX ADMIN — ASPIRIN 81 MG CHEWABLE TABLET 81 MG: 81 TABLET CHEWABLE at 10:24

## 2017-06-21 RX ADMIN — METRONIDAZOLE 500 MG: 500 INJECTION, SOLUTION INTRAVENOUS at 05:41

## 2017-06-21 RX ADMIN — ONDANSETRON 4 MG: 2 INJECTION INTRAMUSCULAR; INTRAVENOUS at 11:08

## 2017-06-21 RX ADMIN — DIPHENHYDRAMINE HYDROCHLORIDE 25 MG: 50 INJECTION, SOLUTION INTRAMUSCULAR; INTRAVENOUS at 06:34

## 2017-06-21 RX ADMIN — DIPHENHYDRAMINE HYDROCHLORIDE 25 MG: 50 INJECTION, SOLUTION INTRAMUSCULAR; INTRAVENOUS at 22:58

## 2017-06-21 RX ADMIN — POLYETHYLENE GLYCOL 3350 17 G: 17 POWDER, FOR SOLUTION ORAL at 10:23

## 2017-06-21 RX ADMIN — DIPHENHYDRAMINE HYDROCHLORIDE 25 MG: 50 INJECTION, SOLUTION INTRAMUSCULAR; INTRAVENOUS at 16:24

## 2017-06-21 RX ADMIN — OXYCODONE HYDROCHLORIDE AND ACETAMINOPHEN 2 TABLET: 7.5; 325 TABLET ORAL at 11:04

## 2017-06-21 RX ADMIN — AMLODIPINE BESYLATE 10 MG: 5 TABLET ORAL at 10:24

## 2017-06-21 RX ADMIN — HYDROMORPHONE HYDROCHLORIDE 2 MG: 1 INJECTION, SOLUTION INTRAMUSCULAR; INTRAVENOUS; SUBCUTANEOUS at 21:13

## 2017-06-21 RX ADMIN — METRONIDAZOLE 500 MG: 500 INJECTION, SOLUTION INTRAVENOUS at 10:26

## 2017-06-21 RX ADMIN — NORTRIPTYLINE HYDROCHLORIDE 25 MG: 25 CAPSULE ORAL at 21:18

## 2017-06-21 RX ADMIN — DAKIN'S SOLUTION 0.125% (QUARTER STRENGTH): 0.12 SOLUTION at 10:25

## 2017-06-21 RX ADMIN — OXYCODONE HYDROCHLORIDE AND ACETAMINOPHEN 2 TABLET: 7.5; 325 TABLET ORAL at 15:27

## 2017-06-21 RX ADMIN — Medication 10 ML: at 14:00

## 2017-06-21 RX ADMIN — METRONIDAZOLE 500 MG: 500 INJECTION, SOLUTION INTRAVENOUS at 21:48

## 2017-06-21 RX ADMIN — CARVEDILOL 25 MG: 12.5 TABLET, FILM COATED ORAL at 10:24

## 2017-06-21 RX ADMIN — DAKIN'S SOLUTION 0.125% (QUARTER STRENGTH): 0.12 SOLUTION at 19:40

## 2017-06-21 RX ADMIN — OXYCODONE HYDROCHLORIDE AND ACETAMINOPHEN 2 TABLET: 7.5; 325 TABLET ORAL at 19:39

## 2017-06-21 RX ADMIN — HUMAN INSULIN 2 UNITS: 100 INJECTION, SOLUTION SUBCUTANEOUS at 12:57

## 2017-06-21 NOTE — PROGRESS NOTES
[facility]   [address] [facility], [address] [place] [address], [facility]  Phone: [phone]   [phone]       Nephrology Progress Note  Tavon Johnson     1950     [id number]  Date of Admission : 6/15/2017  06/21/17    CC: Follow up for ESRD       Assessment and Plan   ESRD- HD :  - HD MWF at [facility]   - HD today to get back on MWF schedule    Hyponatremia :  - 2/2 Hypervolemia   - should improve w/ HD    Anemia of CKD:  - no ELTON for now    Recurrent Perianal abscess:   - s/p ID 6/15  - abx per primary team    HTN :  - cont current meds    Type II DM     Sec HPTH    Repeat labs with HD today     Interval History:  Seen and examined. HD late last night/early AM.  Pain controlled. No cp or sob at this time. Review of Systems: A comprehensive review of systems was negative. Current Medications:   Current Facility-Administered Medications   Medication Dose Route Frequency    albuterol (PROVENTIL VENTOLIN) nebulizer solution 2.5 mg  2.5 mg Nebulization Q4H PRN    metroNIDAZOLE (FLAGYL) IVPB premix 500 mg  500 mg IntraVENous Q8H    sodium hypochlorite (QUARTER STRENGTH DAKIN'S) 0.125% irrigation (bottle)   Topical BID    polyethylene glycol (MIRALAX) packet 17 g  17 g Oral BID    diphenhydrAMINE (BENADRYL) injection 25 mg  25 mg IntraVENous Q4H PRN    HYDROmorphone (PF) (DILAUDID) injection 2 mg  2 mg IntraVENous Q4H PRN    insulin regular (NOVOLIN R, HUMULIN R) injection   SubCUTAneous Q6H    amLODIPine (NORVASC) tablet 10 mg  10 mg Oral DAILY    aspirin chewable tablet 81 mg  81 mg Oral DAILY    carvedilol (COREG) tablet 25 mg  25 mg Oral DAILY    FLUoxetine (PROzac) capsule 20 mg  20 mg Oral DAILY    . PHARMACY TO SUBSTITUTE PER PROTOCOL    Per Protocol    nortriptyline (PAMELOR) capsule 25 mg  25 mg Oral QHS    oxyCODONE-acetaminophen (PERCOCET 7.5) 7.5-325 mg per tablet 1-2 Tab  1-2 Tab Oral Q4H PRN    sodium chloride (NS) flush 5-10 mL  5-10 mL IntraVENous Q8H    sodium chloride (NS) flush 5-10 mL  5-10 mL IntraVENous PRN    ondansetron (ZOFRAN) injection 4 mg  4 mg IntraVENous Q4H PRN    glucose chewable tablet 16 g  4 Tab Oral PRN    glucagon (GLUCAGEN) injection 1 mg  1 mg IntraMUSCular PRN    dextrose 10 % infusion 125-250 mL  125-250 mL IntraVENous PRN      Allergies   Allergen Reactions    Latex Itching     Rash, sometimes difficult to breathe    Celebrex [Celecoxib] Anaphylaxis and Swelling     TONGUE. LIPS AND EYES    Iodine Other (comments)     IV CONTRAST-LESIONS, AND SKIN SLUFFED OFF    Keflex [Cephalexin] Anaphylaxis and Swelling     Tongue, lips, and eyes    Levaquin [Levofloxacin] Anaphylaxis and Swelling     Tongue, lips, and eyes    Pcn [Penicillins] Anaphylaxis and Swelling     Tongue, lips and eyes    Morphine Other (comments)     PT STATES IS JUST SENSITIVITY, GOT TOO MUCH ONE TIME. Objective:  Vitals:    Vitals:    06/21/17 0200 06/21/17 0230 06/21/17 0300 06/21/17 0822   BP: 108/77 114/63 108/55 125/73   Pulse: 67 62 64 70   Resp:   18 17   Temp:   97.9 °F (36.6 °C) 97 °F (36.1 °C)   TempSrc:   Oral    SpO2:    97%   Weight:       Height:         Intake and Output:  06/21 0701 - 06/21 1900  In: -   Out: 100 [Urine:100]  06/19 1901 - 06/21 0700  In: 440 [P.O.:340; I.V.:100]  Out: 3400 [Urine:400]    Physical Examination:    General: In mild distress  Neck:  Supple, no mass  Resp:  Lungs CTA   CV:  RRR,  no murmur or rub, no LE edema  GI:  Soft, NT, + Bowel sounds, no hepatosplenomegaly  Neurologic:  Non focal  Ext                   LUE AVF w/ good thrill and bruit    []    High complexity decision making was performed  []    Patient is at high-risk of decompensation with multiple organ involvement    Lab Data Personally Reviewed: I have reviewed all the pertinent labs, microbiology data and radiology studies during assessment.     No results for input(s): NA, K, CL, CO2, GLU, BUN, CREA, CA, MG, PHOS, ALB, TBIL, SGOT, ALT, INR in the last 72 hours. No lab exists for component: INREXT, INREXT  No results for input(s): WBC, HGB, HCT, PLT, HGBEXT, HCTEXT, PLTEXT, HGBEXT, HCTEXT, PLTEXT in the last 72 hours.   Lab Results   Component Value Date/Time    Specimen Description: URINE 10/27/2012 08:15 PM    Specimen Description: BLOOD 06/07/2011 07:40 AM    Specimen Description: URINE 08/30/2010 07:10 AM    Specimen Description: URINE 08/09/2010 11:30 AM    Specimen Description: URINE 06/22/2010 11:50 PM     Lab Results   Component Value Date/Time    Culture result: LIGHT  MIXED COMMENSAL PARIS   02/11/2017 12:40 AM    Culture result:  02/11/2017 12:40 AM     LIGHT  ANAEROBIC GRAM NEGATIVE RODS  BETA LACTAMASE POSITIVE      Culture result: NO GROWTH 6 DAYS 02/10/2017 04:43 PM    Culture result: MIXED SKIN PARIS ISOLATED 10/27/2012 08:15 PM    Culture result: NO GROWTH 5 DAYS 06/07/2011 07:40 AM     Recent Results (from the past 24 hour(s))   GLUCOSE, POC    Collection Time: 06/20/17 11:36 AM   Result Value Ref Range    Glucose (POC) 93 65 - 100 mg/dL    Performed by Jason Perera    GLUCOSE, POC    Collection Time: 06/20/17  4:53 PM   Result Value Ref Range    Glucose (POC) 116 (H) 65 - 100 mg/dL    Performed by Aimee Weaver, POC    Collection Time: 06/20/17 11:52 PM   Result Value Ref Range    Glucose (POC) 144 (H) 65 - 100 mg/dL    Performed by Diane Monsalve PCT    GLUCOSE, POC    Collection Time: 06/21/17  6:06 AM   Result Value Ref Range    Glucose (POC) 128 (H) 65 - 100 mg/dL    Performed by Diane Russell MD

## 2017-06-21 NOTE — PROGRESS NOTES
Bedside shift change report given to [facility] (oncoming nurse) by Stefano Plaza (offgoing nurse). Report included the following information SBAR, Kardex, Procedure Summary, Intake/Output, MAR and Accordion.

## 2017-06-21 NOTE — PROGRESS NOTES
Bedside and Verbal shift change report given to [id number] (oncoming nurse) by Rafael Roberts RN (offgoing nurse). Report included the following information SBAR, Intake/Output, MAR, Accordion and Recent Results.
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
## 2017-06-21 NOTE — PROGRESS NOTES
Bedside shift change report given to Gwen Palacios RN (oncoming nurse) by Candace Rodriguez RN (offgoing nurse). Report included the following information SBAR and Kardex.

## 2017-06-21 NOTE — PROGRESS NOTES
Bedside and Verbal shift change report given to Betsy Maciel RN (oncoming nurse) by Christa Reyna RN (offgoing nurse). Report included the following information SBAR, Kardex, OR Summary, Procedure Summary, Intake/Output, MAR, Accordion and Recent Results.  Kushal Hernandez

## 2017-06-21 NOTE — DIALYSIS
[facility]                         [phone]  Vitals Pre Post Assessment Pre Post   /69 114/57 LOC AOx3 AOx3   HR 63 64 Lungs Diminished Diminished   Temp 98.6 97.9 Cardiac Reegular Regular   Resp 18 18 Skin Warm, dry Warm, dry   Weight 134 kg 131 kg Edema None None      Pain Denies Denies     Orders   Duration: Start: 0100 End: 0300 Total: 2 hours   Dialyzer: Revaclear   K Bath: 3.0   Ca Bath: 2.5   Na / Bicarb: 140/35   Target Fluid Removal: 3 kg     Access   Type & Location: L AVF   Comments:   +T/+B, prepped and cannulated per protocol with 15G x2 needles. No s/s of infection noted. Post HD, both needles pulled, hemostasis achieved through hand held pressure with dressing applied. Labs   Hep B status / date: Negative 6/14/17   Obtained/Reviewed  Critical Results Called Reviewed  n/a     Meds Given   Name Dose Route   None                 Total Liters Process: 44.2   Net Fluid Removed: 3000 ml      Comments   Time Out Done: Yes   Primary Nurse Rpt Pre: Mehul Hardy RN   Primary Nurse Rpt Post: Mehul Hardy RN   Pt Education: Procedural   Care Plan: Continue HD as prescribed   Tx Summary: Patient tolerated the 2 hour run well. All possible blood returned without any complications. Report given to primary nurse. Left patient in bed sleeping and in no acute distress.

## 2017-06-21 NOTE — DIALYSIS
[facility]                         [phone]  Vitals Pre Post Assessment Pre Post   /65 124/70 LOC A/O x3 A/O x3   HR 62 63 Lungs clear clear   Temp 98 97.7 Cardiac Reg. rate Reg. rate   Resp 16 16 Skin Intact, warm Intact, warm   Weight 129kg n/a Edema none none      Pain 5/10 10/10     Orders   Duration: Start: 1631 End: 1935 Total: 2:10min   Dialyzer: revaclear   K Bath: 3   Ca Bath: 2.5   Na / Bicarb: 140/35   Target Fluid Removal: 2000ml     Access   Type & Location: Left avf   Comments:                            + thrill and bruit     Labs   Hep B status / date: Neg. 6/14/17   Obtained/Reviewed  Critical Results Called noted       Meds Given   Name Dose Route                    Total Liters Process: 47   Net Fluid Removed: 1117ml      Comments   Time Out Done: yes   Primary Nurse Rpt Pre: Aury Nugent RN   Primary Nurse Rpt Post: Aury Nugent RN   Pt Education: yes   Care Plan: HD today, comfort   Tx Summary: HD cut short due pain movement of her gluteal folds. Arterial starts clotting. All possible blood returned. V/s stable. Pt refused to continue HD. A/O x3. Endorsed to [facility]. MD notified.

## 2017-06-21 NOTE — WOUND CARE
WOCN Note:     Follow-up visit for right buttocks I&D site. Chart shows:  Admitted for perineal abscess; history of previous perineal abscesses, DM, HD, HTN, neuropathy, sleep apnea. WBC = 7.2  Admitted from home. Daughter in room during visit.      Assessment:   Patient is A&O x 4, continent (used BSC) and mobile. The dressing was soiled after having a BM and RN changed the dressing already today.      1. Right medial buttock I&D site 6/15/17 by Dr. Melvin Nix with small pockets into indurated tissue laterally. Able to lift cover dressing away and peek into lateral side of wound. Some degradation of base noted today on lateral side which was previously deep burgundy and leads to area of induration. The tissue is now tan. No odor  Unable to appreciate exudate color on dressing since this is a fresh dressing recently applied. She has a dark skin tone and erythema would be hard to appreciate. However, there is a 7 x 5 cm field of elevated, hard, induration with calor on lateral margin of open wound with possibility of further degradation. Recommendations:    Continue Dakin's moist packing twice daily given contamination with stool and some devitalized tissue. Watch lateral margin and area of induration for possible progression. Discussed above plan with patient.     Transition of Care: Plan to follow as needed while admitted to hospital.    MARK Trimble, RN, [facility]  Certified Wound, Ostomy, Continence Nurse  office [phone]  pager [phone] or call  to page

## 2017-06-21 NOTE — PROGRESS NOTES
ADULT PROTOCOL: JET AEROSOL ASSESSMENT    Patient  Jose Weldon     75 y.o.   female     6/21/2017  12:22 AM    Breath Sounds Pre Procedure: Right Breath Sounds: Diminished                               Left Breath Sounds: Diminished    Breath Sounds Post Procedure: Right Breath Sounds: Diminished                                 Left Breath Sounds: Diminished    Breathing pattern: Pre procedure Breathing Pattern: Regular          Post procedure      Heart Rate: Pre procedure Pulse: 85           Post procedure      Resp Rate: Pre procedure Respirations: 17           Post procedure      Peak Flow: Pre bronchodilator             Post bronchodilator       FVC/FEV1:  n/a    Incentive Spirometry:  Actual Volume (ml): 1000 ml          Cough: Pre procedure Cough: Non-productive, Weak               Post procedure      Suctioned: NO    Sputum: Pre procedure                   Post procedure      Oxygen: O2 Device: Room air   FiO2 (%) 21     Changed: NO    SpO2: Pre procedure     without oxygen              Post procedure    without oxygen    Nebulizer Therapy: Current medications Aerosolized Medications: Albuterol      Changed: YES    Smoking History:     Problem List:   Patient Active Problem List   Diagnosis Code    [facility] E66.01    Diabetes mellitus type 2, insulin dependent ([facility] E11.9, Z79.4    HTN (hypertension) I10    Neuropathy G62.9    Arthritis M19.90    Abdominal pain, chronic, epigastric R10.13, G89.29    Serratia wound infection, 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

## 2017-06-21 NOTE — PROGRESS NOTES
Daily Progress Note  [facility] General Surgery at [address] Date: 6/15/2017  Post-Operative Day: 6 from Incision and drainage of recurring   perineal abscess. Subjective:     Last 24 hrs: Continues to have pain and discomfort in area of swelling adjacent to surgical site. Objective:     Blood pressure 125/73, pulse 70, temperature 97 °F (36.1 °C), resp. rate 17, height 6' 1.75\" (1.873 m), weight 127.7 kg (281 lb 9.6 oz), SpO2 97 %, not currently breastfeeding. Temp (24hrs), Av.7 °F (36.5 °C), Min:97 °F (36.1 °C), Max:98.6 °F (37 °C)      _____________________  Physical Exam:     Alert and Oriented, lying in bed, in no acute distress. Cardiovascular: RRR  Perineum:  Left buttock adjacent to rectum with swelling and significant tenderness. Wound with packing in place. Assessment:   Principal Problem:    Perineal abscess (2017)    Active Problems:    Diabetes mellitus type 2, insulin dependent ([facility] (10/14/2010)      HTN (hypertension) (10/14/2010)      Abscess of deep perineal space (2017)      ESRD (end stage renal disease) on dialysis [facility]) (2017)        Recurrent perineal abscess    Plan:     Continue local wound care   Robles Fleming for opinion      Rody Mccracken, [address] [facility] Surgery at [facility],   [address]  [phone]    Data Review:    No results for input(s): WBC, HGB, HCT, PLT, HGBEXT, HCTEXT, PLTEXT in the last 72 hours. No results for input(s): NA, K, CL, CO2, GLU, BUN, CREA, CA, MG, PHOS, ALB, TBIL, TBILI, SGOT, ALT, INR in the last 72 hours. No lab exists for component: INREXT  No results for input(s): AML, LPSE in the last 72 hours.         ______________________  Medications:    Current Facility-Administered Medications   Medication Dose Route Frequency    albuterol (PROVENTIL VENTOLIN) nebulizer solution 2.5 mg  2.5 mg Nebulization Q4H PRN    metroNIDAZOLE (FLAGYL) IVPB premix 500 mg  500 mg IntraVENous Q8H    sodium hypochlorite (QUARTER STRENGTH DAKIN'S) 0.125% irrigation (bottle)   Topical BID    polyethylene glycol (MIRALAX) packet 17 g  17 g Oral BID    diphenhydrAMINE (BENADRYL) injection 25 mg  25 mg IntraVENous Q4H PRN    HYDROmorphone (PF) (DILAUDID) injection 2 mg  2 mg IntraVENous Q4H PRN    insulin regular (NOVOLIN R, HUMULIN R) injection   SubCUTAneous Q6H    amLODIPine (NORVASC) tablet 10 mg  10 mg Oral DAILY    aspirin chewable tablet 81 mg  81 mg Oral DAILY    carvedilol (COREG) tablet 25 mg  25 mg Oral DAILY    FLUoxetine (PROzac) capsule 20 mg  20 mg Oral DAILY    . PHARMACY TO SUBSTITUTE PER PROTOCOL    Per Protocol    nortriptyline (PAMELOR) capsule 25 mg  25 mg Oral QHS    oxyCODONE-acetaminophen (PERCOCET 7.5) 7.5-325 mg per tablet 1-2 Tab  1-2 Tab Oral Q4H PRN    sodium chloride (NS) flush 5-10 mL  5-10 mL IntraVENous Q8H    sodium chloride (NS) flush 5-10 mL  5-10 mL IntraVENous PRN    ondansetron (ZOFRAN) injection 4 mg  4 mg IntraVENous Q4H PRN    glucose chewable tablet 16 g  4 Tab Oral PRN    glucagon (GLUCAGEN) injection 1 mg  1 mg IntraMUSCular PRN    dextrose 10 % infusion 125-250 mL  125-250 mL IntraVENous PRN                                                                                               ATTENDING ADDENDUM  I supervised the APC and reviewed the note. We discussed the plan of care  I spoke with Dr Trish King by alejandra . He will see her tomorrow. Otherwise, continue as is.

## 2017-06-22 ENCOUNTER — APPOINTMENT (OUTPATIENT)
Dept: CT IMAGING | Age: 67
DRG: 579 | End: 2017-06-22
Attending: NURSE PRACTITIONER
Payer: MEDICARE

## 2017-06-22 LAB
GLUCOSE BLD STRIP.AUTO-MCNC: 124 MG/DL (ref 65–100)
GLUCOSE BLD STRIP.AUTO-MCNC: 143 MG/DL (ref 65–100)
GLUCOSE BLD STRIP.AUTO-MCNC: 143 MG/DL (ref 65–100)
GLUCOSE BLD STRIP.AUTO-MCNC: 172 MG/DL (ref 65–100)
GLUCOSE BLD STRIP.AUTO-MCNC: 94 MG/DL (ref 65–100)
SERVICE CMNT-IMP: ABNORMAL
SERVICE CMNT-IMP: NORMAL

## 2017-06-22 PROCEDURE — 74011250636 HC RX REV CODE- 250/636: Performed by: PHYSICIAN ASSISTANT

## 2017-06-22 PROCEDURE — 74011250637 HC RX REV CODE- 250/637: Performed by: SURGERY

## 2017-06-22 PROCEDURE — 74011636637 HC RX REV CODE- 636/637: Performed by: PHYSICIAN ASSISTANT

## 2017-06-22 PROCEDURE — 74011250636 HC RX REV CODE- 250/636: Performed by: SURGERY

## 2017-06-22 PROCEDURE — 82962 GLUCOSE BLOOD TEST: CPT

## 2017-06-22 PROCEDURE — 74011000250 HC RX REV CODE- 250: Performed by: PHYSICIAN ASSISTANT

## 2017-06-22 PROCEDURE — 74011000250 HC RX REV CODE- 250: Performed by: SURGERY

## 2017-06-22 PROCEDURE — 65210000002 HC RM PRIVATE GYN

## 2017-06-22 RX ORDER — DIPHENHYDRAMINE HYDROCHLORIDE 50 MG/ML
50 INJECTION, SOLUTION INTRAMUSCULAR; INTRAVENOUS ONCE
Status: COMPLETED | OUTPATIENT
Start: 2017-06-23 | End: 2017-06-23

## 2017-06-22 RX ADMIN — ONDANSETRON 4 MG: 2 INJECTION INTRAMUSCULAR; INTRAVENOUS at 10:09

## 2017-06-22 RX ADMIN — HUMAN INSULIN 2 UNITS: 100 INJECTION, SOLUTION SUBCUTANEOUS at 00:37

## 2017-06-22 RX ADMIN — METRONIDAZOLE 500 MG: 500 INJECTION, SOLUTION INTRAVENOUS at 17:36

## 2017-06-22 RX ADMIN — OXYCODONE HYDROCHLORIDE AND ACETAMINOPHEN 2 TABLET: 7.5; 325 TABLET ORAL at 03:43

## 2017-06-22 RX ADMIN — METRONIDAZOLE 500 MG: 500 INJECTION, SOLUTION INTRAVENOUS at 01:58

## 2017-06-22 RX ADMIN — DIPHENHYDRAMINE HYDROCHLORIDE 25 MG: 50 INJECTION, SOLUTION INTRAMUSCULAR; INTRAVENOUS at 16:11

## 2017-06-22 RX ADMIN — METRONIDAZOLE 500 MG: 500 INJECTION, SOLUTION INTRAVENOUS at 10:11

## 2017-06-22 RX ADMIN — NORTRIPTYLINE HYDROCHLORIDE 25 MG: 25 CAPSULE ORAL at 22:09

## 2017-06-22 RX ADMIN — CARVEDILOL 25 MG: 12.5 TABLET, FILM COATED ORAL at 10:08

## 2017-06-22 RX ADMIN — HUMAN INSULIN 2 UNITS: 100 INJECTION, SOLUTION SUBCUTANEOUS at 18:27

## 2017-06-22 RX ADMIN — HYDROMORPHONE HYDROCHLORIDE 2 MG: 1 INJECTION, SOLUTION INTRAMUSCULAR; INTRAVENOUS; SUBCUTANEOUS at 18:27

## 2017-06-22 RX ADMIN — ASPIRIN 81 MG CHEWABLE TABLET 81 MG: 81 TABLET CHEWABLE at 10:08

## 2017-06-22 RX ADMIN — Medication 10 ML: at 22:09

## 2017-06-22 RX ADMIN — DAKIN'S SOLUTION 0.125% (QUARTER STRENGTH): 0.12 SOLUTION at 19:00

## 2017-06-22 RX ADMIN — DIPHENHYDRAMINE HYDROCHLORIDE 25 MG: 50 INJECTION, SOLUTION INTRAMUSCULAR; INTRAVENOUS at 10:53

## 2017-06-22 RX ADMIN — DAKIN'S SOLUTION 0.125% (QUARTER STRENGTH): 0.12 SOLUTION at 10:12

## 2017-06-22 RX ADMIN — DIPHENHYDRAMINE HYDROCHLORIDE 25 MG: 50 INJECTION, SOLUTION INTRAMUSCULAR; INTRAVENOUS at 22:09

## 2017-06-22 RX ADMIN — HYDROMORPHONE HYDROCHLORIDE 2 MG: 1 INJECTION, SOLUTION INTRAMUSCULAR; INTRAVENOUS; SUBCUTANEOUS at 09:58

## 2017-06-22 RX ADMIN — FLUOXETINE 20 MG: 10 CAPSULE ORAL at 10:07

## 2017-06-22 RX ADMIN — DIPHENHYDRAMINE HYDROCHLORIDE 25 MG: 50 INJECTION, SOLUTION INTRAMUSCULAR; INTRAVENOUS at 03:42

## 2017-06-22 RX ADMIN — HYDROMORPHONE HYDROCHLORIDE 2 MG: 1 INJECTION, SOLUTION INTRAMUSCULAR; INTRAVENOUS; SUBCUTANEOUS at 14:16

## 2017-06-22 RX ADMIN — METHYLPREDNISOLONE SODIUM SUCCINATE 32 MG: 40 INJECTION, POWDER, FOR SOLUTION INTRAMUSCULAR; INTRAVENOUS at 19:07

## 2017-06-22 RX ADMIN — HUMAN INSULIN 2 UNITS: 100 INJECTION, SOLUTION SUBCUTANEOUS at 13:22

## 2017-06-22 RX ADMIN — Medication 10 ML: at 14:00

## 2017-06-22 RX ADMIN — AMLODIPINE BESYLATE 10 MG: 5 TABLET ORAL at 10:07

## 2017-06-22 NOTE — PROGRESS NOTES
[facility]   [address] [facility], [facility]  Phone: [phone]   [phone]       Nephrology Progress Note  Jw Rader     1950     [id number]  Date of Admission : 6/15/2017  06/22/17    CC: Follow up for ESRD       Assessment and Plan   ESRD- HD :  - HD MWF at [facility], [place]   - HD tomorrow  - labs with HD    Hyponatremia :  - 2/2 Hypervolemia     Anemia of CKD:  - no ELTON for now    Recurrent Perianal abscess:   - s/p ID 6/15  - abx per primary team    HTN :  - cont current meds    Type II DM     Sec HPTH     Interval History:  Seen and examined. HD yesterday evening w/o issues. Pain controlled. No cp or sob at this time. Review of Systems: A comprehensive review of systems was negative. Current Medications:   Current Facility-Administered Medications   Medication Dose Route Frequency    albuterol (PROVENTIL VENTOLIN) nebulizer solution 2.5 mg  2.5 mg Nebulization Q4H PRN    metroNIDAZOLE (FLAGYL) IVPB premix 500 mg  500 mg IntraVENous Q8H    sodium hypochlorite (QUARTER STRENGTH DAKIN'S) 0.125% irrigation (bottle)   Topical BID    polyethylene glycol (MIRALAX) packet 17 g  17 g Oral BID    diphenhydrAMINE (BENADRYL) injection 25 mg  25 mg IntraVENous Q4H PRN    HYDROmorphone (PF) (DILAUDID) injection 2 mg  2 mg IntraVENous Q4H PRN    insulin regular (NOVOLIN R, HUMULIN R) injection   SubCUTAneous Q6H    amLODIPine (NORVASC) tablet 10 mg  10 mg Oral DAILY    aspirin chewable tablet 81 mg  81 mg Oral DAILY    carvedilol (COREG) tablet 25 mg  25 mg Oral DAILY    FLUoxetine (PROzac) capsule 20 mg  20 mg Oral DAILY    . PHARMACY TO SUBSTITUTE PER PROTOCOL    Per Protocol    nortriptyline (PAMELOR) capsule 25 mg  25 mg Oral QHS    oxyCODONE-acetaminophen (PERCOCET 7.5) 7.5-325 mg per tablet 1-2 Tab  1-2 Tab Oral Q4H PRN    sodium chloride (NS) flush 5-10 mL  5-10 mL IntraVENous Q8H    sodium chloride (NS) flush 5-10 mL  5-10 mL IntraVENous PRN    ondansetron (ZOFRAN) injection 4 mg  4 mg IntraVENous Q4H PRN    glucose chewable tablet 16 g  4 Tab Oral PRN    glucagon (GLUCAGEN) injection 1 mg  1 mg IntraMUSCular PRN    dextrose 10 % infusion 125-250 mL  125-250 mL IntraVENous PRN      Allergies   Allergen Reactions    Latex Itching     Rash, sometimes difficult to breathe    Celebrex [Celecoxib] Anaphylaxis and Swelling     TONGUE. LIPS AND EYES    Iodine Other (comments)     IV CONTRAST-LESIONS, AND SKIN SLUFFED OFF    Keflex [Cephalexin] Anaphylaxis and Swelling     Tongue, lips, and eyes    Levaquin [Levofloxacin] Anaphylaxis and Swelling     Tongue, lips, and eyes    Pcn [Penicillins] Anaphylaxis and Swelling     Tongue, lips and eyes    Morphine Other (comments)     PT STATES IS JUST SENSITIVITY, GOT TOO MUCH ONE TIME. Objective:  Vitals:    Vitals:    06/21/17 1847 06/21/17 1902 06/21/17 1935 06/22/17 0200   BP: 115/57 110/79 124/70 129/70   Pulse: 61 60 63 60   Resp: 16 16 16 16   Temp:   98.4 °F (36.9 °C) 98.1 °F (36.7 °C)   TempSrc:       SpO2:    96%   Weight:       Height:         Intake and Output:     06/20 1901 - 06/22 0700  In: 1952 [P.O.:980; I.V.:972]  Out: 1554 [Urine:350]    Physical Examination:    General: In mild distress  Neck:  Supple, no mass  Resp:  Lungs CTA   CV:  RRR,  no murmur or rub, no LE edema  GI:  Soft, NT, + Bowel sounds, no hepatosplenomegaly  Neurologic:  Non focal  Ext                   LUE AVF w/ good thrill and bruit    []    High complexity decision making was performed  []    Patient is at high-risk of decompensation with multiple organ involvement    Lab Data Personally Reviewed: I have reviewed all the pertinent labs, microbiology data and radiology studies during assessment. No results for input(s): NA, K, CL, CO2, GLU, BUN, CREA, CA, MG, PHOS, ALB, TBIL, SGOT, ALT, INR in the last 72 hours.     No lab exists for component: INREXT, INREXT  No results for input(s): WBC, HGB, HCT, PLT, HGBEXT, HCTEXT, PLTEXT, HGBEXT, HCTEXT, PLTEXT in the last 72 hours.   Lab Results   Component Value Date/Time    Specimen Description: URINE 10/27/2012 08:15 PM    Specimen Description: BLOOD 06/07/2011 07:40 AM    Specimen Description: URINE 08/30/2010 07:10 AM    Specimen Description: URINE 08/09/2010 11:30 AM    Specimen Description: URINE 06/22/2010 11:50 PM     Lab Results   Component Value Date/Time    Culture result: LIGHT  MIXED COMMENSAL PARIS   02/11/2017 12:40 AM    Culture result:  02/11/2017 12:40 AM     LIGHT  ANAEROBIC GRAM NEGATIVE RODS  BETA LACTAMASE POSITIVE      Culture result: NO GROWTH 6 DAYS 02/10/2017 04:43 PM    Culture result: MIXED SKIN PARIS ISOLATED 10/27/2012 08:15 PM    Culture result: NO GROWTH 5 DAYS 06/07/2011 07:40 AM     Recent Results (from the past 24 hour(s))   GLUCOSE, POC    Collection Time: 06/21/17 11:29 AM   Result Value Ref Range    Glucose (POC) 178 (H) 65 - 100 mg/dL    Performed by Johnnie Jones, POC    Collection Time: 06/21/17  5:34 PM   Result Value Ref Range    Glucose (POC) 141 (H) 65 - 100 mg/dL    Performed by Roseanne Laguerre, POC    Collection Time: 06/22/17 12:16 AM   Result Value Ref Range    Glucose (POC) 143 (H) 65 - 100 mg/dL    Performed by Usha Cano, POC    Collection Time: 06/22/17  5:57 AM   Result Value Ref Range    Glucose (POC) 94 65 - 100 mg/dL    Performed by Mihaela Almaraz MD

## 2017-06-22 NOTE — PROGRESS NOTES
Bedside and Verbal shift change report given to Charito Jenkins RN (oncoming nurse) by Ottoniel Lo RN (offgoing nurse). Report included the following information SBAR, Kardex, ED Summary, Procedure Summary, Intake/Output, MAR, Accordion and Recent Results.

## 2017-06-22 NOTE — PROGRESS NOTES
Daily Progress Note  [facility] General Surgery at [address] Date: 6/15/2017  Post-Operative Day: 7 from Procedure(s):  PERINEAL ABSCESS INCISION AND DRAINAGE     Subjective:     Last 24 hrs: She is tired due to getting dialysis in the middle of the night. C/o itching and tenderness at wound site, otherwise doing well. Appreciate Dr. Christina Pisano input. Objective:     Blood pressure 126/63, pulse 60, temperature 98 °F (36.7 °C), resp. rate 16, height 6' 1.75\" (1.873 m), weight 127.9 kg (282 lb), SpO2 96 %, not currently breastfeeding. Temp (24hrs), Av.9 °F (36.6 °C), Min:97.1 °F (36.2 °C), Max:98.4 °F (36.9 °C)      _____________________  Physical Exam:     Alert and Oriented, lying in bed, in no acute distress. Cardiovascular: RRR, no peripheral edema  Lungs:CTAB   Abdomen: soft, NT  Sacrum: wound to left of perineum with packing and and area of swelling and tenderness adjacent to this, unchanged. Assessment:   Principal Problem:    Perineal abscess (2017)    Active Problems:    Diabetes mellitus type 2, insulin dependent ([facility] (10/14/2010)      HTN (hypertension) (10/14/2010)      Abscess of deep perineal space (2017)      ESRD (end stage renal disease) on dialysis [facility]) (2017)    recurrent perineal abscess        Plan:     Agree with Dr. Christina Pisano recommendations  Consult ID for assistance with antibiotic management  CT pelvis  CBC in [facility], [address] [facility] Surgery at [place] [address], [address] [place] [address]  [phone]    Data Review:    No results for input(s): WBC, HGB, HCT, PLT, HGBEXT, HCTEXT, PLTEXT in the last 72 hours. No results for input(s): NA, K, CL, CO2, GLU, BUN, CREA, CA, MG, PHOS, ALB, TBIL, TBILI, SGOT, ALT, INR in the last 72 hours. No lab exists for component: INREXT  No results for input(s): AML, LPSE in the last 72 hours.         ______________________  Medications:    Current Facility-Administered Medications   Medication Dose Route Frequency    albuterol (PROVENTIL VENTOLIN) nebulizer solution 2.5 mg  2.5 mg Nebulization Q4H PRN    metroNIDAZOLE (FLAGYL) IVPB premix 500 mg  500 mg IntraVENous Q8H    sodium hypochlorite (QUARTER STRENGTH DAKIN'S) 0.125% irrigation (bottle)   Topical BID    polyethylene glycol (MIRALAX) packet 17 g  17 g Oral BID    diphenhydrAMINE (BENADRYL) injection 25 mg  25 mg IntraVENous Q4H PRN    HYDROmorphone (PF) (DILAUDID) injection 2 mg  2 mg IntraVENous Q4H PRN    insulin regular (NOVOLIN R, HUMULIN R) injection   SubCUTAneous Q6H    amLODIPine (NORVASC) tablet 10 mg  10 mg Oral DAILY    aspirin chewable tablet 81 mg  81 mg Oral DAILY    carvedilol (COREG) tablet 25 mg  25 mg Oral DAILY    FLUoxetine (PROzac) capsule 20 mg  20 mg Oral DAILY    . PHARMACY TO SUBSTITUTE PER PROTOCOL    Per Protocol    nortriptyline (PAMELOR) capsule 25 mg  25 mg Oral QHS    oxyCODONE-acetaminophen (PERCOCET 7.5) 7.5-325 mg per tablet 1-2 Tab  1-2 Tab Oral Q4H PRN    sodium chloride (NS) flush 5-10 mL  5-10 mL IntraVENous Q8H    sodium chloride (NS) flush 5-10 mL  5-10 mL IntraVENous PRN    ondansetron (ZOFRAN) injection 4 mg  4 mg IntraVENous Q4H PRN    glucose chewable tablet 16 g  4 Tab Oral PRN    glucagon (GLUCAGEN) injection 1 mg  1 mg IntraMUSCular PRN    dextrose 10 % infusion 125-250 mL  125-250 mL IntraVENous PRN                                                                                               ATTENDING ADDENDUM  I supervised the APC and reviewed the note. We discussed the plan of care  Will get CT and input from Infectious Disease to see if we can get any closer to an answer.

## 2017-06-22 NOTE — PROGRESS NOTES
6/22/17 1136: FER s/w [facility], who reports completion of Medicaid financial screening. Patient OVER-INCOME for ABD Medicaid, not eligible for services at this time. Must continue paying privately for home private-duty services. JERRY Thomas/SIERRA    -----------------------  6/22/17 0849  CM following for discharge planning. Presently awaiting colorectal surgery opinion (consult ordered by general surgery 6/21/17). Note already on chart requesting new [facility] orders (Nurse for wound care, JERRY through [facility] - INPATIENT). Per previous CM note (Yuval Ralph 6/19/17), case was referred to [facility] for medicaid screening per patient's daughter's request. FER sent email to [address] [place] [address], requesting update. CM will continue to follow for discharge planning needs.  JERRY Thomas/SIERRA

## 2017-06-22 NOTE — PROGRESS NOTES
NUTRITION  Pt seen for:       []           Supplements  []             PO intake check   []             Food Allergies  []             Food Preferences/tolerances    [x]             Rescreen   []             Education    []             Diet order clarification []             Other            RECOMMENDATIONS:   Continue diet as ordered with daily weights    SUBJECTIVE/OBJECTIVE:   Chart reviewed. Pt eating 100% of all meals. She is s/p ID of perianal abscess on 6/15 and may need additional surgery. Plan is pending. Mild risk identified at this time. Will rescreen as indicated and add gladly add ONS to aid in post-op healing as needed. Diet:  Diabetic Consistent Carb    Intake: [x]           Good     []           Fair      []           Poor   Patient Vitals for the past 100 hrs:   % Diet Eaten   06/22/17 1326 95 %   06/22/17 1005 100 %   06/21/17 1530 100 %   06/21/17 1257 100 %   06/19/17 1708 100 %   06/19/17 1440 100 %   06/19/17 0808 90 %     Weight Changes:   Last 3 Recorded Weights in this Encounter    06/20/17 0631 06/21/17 1611 06/22/17 1217   Weight: 127.7 kg (281 lb 9.6 oz) 127.4 kg (280 lb 12.8 oz) 127.9 kg (282 lb)     Nutrition Problems Identified  [x]      Wound care    PLAN:     [x]            Rescreen per screening protocol    Rescreen:           [x]            Mild Nutrition Risk secondary to wound; however, she is eating 100% of meals. Will rescreen per screening protocol.     [address]

## 2017-06-23 ENCOUNTER — APPOINTMENT (OUTPATIENT)
Dept: CT IMAGING | Age: 67
DRG: 579 | End: 2017-06-23
Attending: NURSE PRACTITIONER
Payer: MEDICARE

## 2017-06-23 LAB
ALBUMIN SERPL BCP-MCNC: 2.5 G/DL (ref 3.5–5)
ANION GAP BLD CALC-SCNC: 10 MMOL/L (ref 5–15)
BUN SERPL-MCNC: 52 MG/DL (ref 6–20)
BUN/CREAT SERPL: 9 (ref 12–20)
CALCIUM SERPL-MCNC: 8 MG/DL (ref 8.5–10.1)
CHLORIDE SERPL-SCNC: 92 MMOL/L (ref 97–108)
CO2 SERPL-SCNC: 22 MMOL/L (ref 21–32)
CREAT SERPL-MCNC: 6.02 MG/DL (ref 0.55–1.02)
ERYTHROCYTE [DISTWIDTH] IN BLOOD BY AUTOMATED COUNT: 16.7 % (ref 11.5–14.5)
ERYTHROCYTE [DISTWIDTH] IN BLOOD BY AUTOMATED COUNT: 17 % (ref 11.5–14.5)
GLUCOSE BLD STRIP.AUTO-MCNC: 115 MG/DL (ref 65–100)
GLUCOSE BLD STRIP.AUTO-MCNC: 228 MG/DL (ref 65–100)
GLUCOSE BLD STRIP.AUTO-MCNC: 262 MG/DL (ref 65–100)
GLUCOSE SERPL-MCNC: 204 MG/DL (ref 65–100)
HCT VFR BLD AUTO: 29.1 % (ref 35–47)
HCT VFR BLD AUTO: 33.4 % (ref 35–47)
HGB BLD-MCNC: 10.9 G/DL (ref 11.5–16)
HGB BLD-MCNC: 9.4 G/DL (ref 11.5–16)
MCH RBC QN AUTO: 28.2 PG (ref 26–34)
MCH RBC QN AUTO: 28.9 PG (ref 26–34)
MCHC RBC AUTO-ENTMCNC: 32.3 G/DL (ref 30–36.5)
MCHC RBC AUTO-ENTMCNC: 32.6 G/DL (ref 30–36.5)
MCV RBC AUTO: 87.4 FL (ref 80–99)
MCV RBC AUTO: 88.6 FL (ref 80–99)
PHOSPHATE SERPL-MCNC: 5.9 MG/DL (ref 2.6–4.7)
PLATELET # BLD AUTO: 228 K/UL (ref 150–400)
PLATELET # BLD AUTO: 255 K/UL (ref 150–400)
POTASSIUM SERPL-SCNC: 5.3 MMOL/L (ref 3.5–5.1)
RBC # BLD AUTO: 3.33 M/UL (ref 3.8–5.2)
RBC # BLD AUTO: 3.77 M/UL (ref 3.8–5.2)
SERVICE CMNT-IMP: ABNORMAL
SODIUM SERPL-SCNC: 124 MMOL/L (ref 136–145)
WBC # BLD AUTO: 4.3 K/UL (ref 3.6–11)
WBC # BLD AUTO: 4.7 K/UL (ref 3.6–11)

## 2017-06-23 PROCEDURE — 36415 COLL VENOUS BLD VENIPUNCTURE: CPT | Performed by: INTERNAL MEDICINE

## 2017-06-23 PROCEDURE — 80069 RENAL FUNCTION PANEL: CPT | Performed by: INTERNAL MEDICINE

## 2017-06-23 PROCEDURE — 74011636320 HC RX REV CODE- 636/320: Performed by: SURGERY

## 2017-06-23 PROCEDURE — 74011000258 HC RX REV CODE- 258: Performed by: SURGERY

## 2017-06-23 PROCEDURE — 90935 HEMODIALYSIS ONE EVALUATION: CPT

## 2017-06-23 PROCEDURE — 77030018836 HC SOL IRR NACL ICUM -A

## 2017-06-23 PROCEDURE — 74011000250 HC RX REV CODE- 250: Performed by: SURGERY

## 2017-06-23 PROCEDURE — 85027 COMPLETE CBC AUTOMATED: CPT | Performed by: INTERNAL MEDICINE

## 2017-06-23 PROCEDURE — 74011636637 HC RX REV CODE- 636/637: Performed by: PHYSICIAN ASSISTANT

## 2017-06-23 PROCEDURE — 74011000258 HC RX REV CODE- 258: Performed by: INTERNAL MEDICINE

## 2017-06-23 PROCEDURE — 82962 GLUCOSE BLOOD TEST: CPT

## 2017-06-23 PROCEDURE — 74011250636 HC RX REV CODE- 250/636: Performed by: SURGERY

## 2017-06-23 PROCEDURE — 65210000002 HC RM PRIVATE GYN

## 2017-06-23 PROCEDURE — 74011000250 HC RX REV CODE- 250: Performed by: INTERNAL MEDICINE

## 2017-06-23 PROCEDURE — 74011250636 HC RX REV CODE- 250/636: Performed by: INTERNAL MEDICINE

## 2017-06-23 PROCEDURE — 74011250637 HC RX REV CODE- 250/637: Performed by: SURGERY

## 2017-06-23 PROCEDURE — 74011250636 HC RX REV CODE- 250/636: Performed by: PHYSICIAN ASSISTANT

## 2017-06-23 PROCEDURE — 72193 CT PELVIS W/DYE: CPT

## 2017-06-23 RX ORDER — METRONIDAZOLE 250 MG/1
500 TABLET ORAL 3 TIMES DAILY
Status: DISCONTINUED | OUTPATIENT
Start: 2017-06-23 | End: 2017-07-05

## 2017-06-23 RX ORDER — SODIUM CHLORIDE 0.9 % (FLUSH) 0.9 %
10 SYRINGE (ML) INJECTION
Status: COMPLETED | OUTPATIENT
Start: 2017-06-23 | End: 2017-06-23

## 2017-06-23 RX ORDER — POLYETHYLENE GLYCOL 3350 17 G/17G
17 POWDER, FOR SOLUTION ORAL DAILY
Status: DISCONTINUED | OUTPATIENT
Start: 2017-06-24 | End: 2017-07-06 | Stop reason: HOSPADM

## 2017-06-23 RX ADMIN — IOHEXOL 50 ML: 240 INJECTION, SOLUTION INTRATHECAL; INTRAVASCULAR; INTRAVENOUS; ORAL at 05:04

## 2017-06-23 RX ADMIN — AZTREONAM 500 MG: 1 INJECTION, POWDER, LYOPHILIZED, FOR SOLUTION INTRAMUSCULAR; INTRAVENOUS at 19:45

## 2017-06-23 RX ADMIN — ASPIRIN 81 MG CHEWABLE TABLET 81 MG: 81 TABLET CHEWABLE at 15:33

## 2017-06-23 RX ADMIN — HYDROMORPHONE HYDROCHLORIDE 2 MG: 1 INJECTION, SOLUTION INTRAMUSCULAR; INTRAVENOUS; SUBCUTANEOUS at 21:11

## 2017-06-23 RX ADMIN — NORTRIPTYLINE HYDROCHLORIDE 25 MG: 25 CAPSULE ORAL at 21:13

## 2017-06-23 RX ADMIN — DAKIN'S SOLUTION 0.125% (QUARTER STRENGTH): 0.12 SOLUTION at 15:36

## 2017-06-23 RX ADMIN — Medication 10 ML: at 05:10

## 2017-06-23 RX ADMIN — HUMAN INSULIN 5 UNITS: 100 INJECTION, SOLUTION SUBCUTANEOUS at 19:03

## 2017-06-23 RX ADMIN — HYDROMORPHONE HYDROCHLORIDE 2 MG: 1 INJECTION, SOLUTION INTRAMUSCULAR; INTRAVENOUS; SUBCUTANEOUS at 01:27

## 2017-06-23 RX ADMIN — FLUOXETINE 20 MG: 10 CAPSULE ORAL at 15:33

## 2017-06-23 RX ADMIN — Medication 10 ML: at 21:13

## 2017-06-23 RX ADMIN — HUMAN INSULIN 3 UNITS: 100 INJECTION, SOLUTION SUBCUTANEOUS at 06:38

## 2017-06-23 RX ADMIN — DIPHENHYDRAMINE HYDROCHLORIDE 25 MG: 50 INJECTION, SOLUTION INTRAMUSCULAR; INTRAVENOUS at 15:25

## 2017-06-23 RX ADMIN — METRONIDAZOLE 500 MG: 250 TABLET ORAL at 21:13

## 2017-06-23 RX ADMIN — HYDROMORPHONE HYDROCHLORIDE 2 MG: 1 INJECTION, SOLUTION INTRAMUSCULAR; INTRAVENOUS; SUBCUTANEOUS at 15:25

## 2017-06-23 RX ADMIN — IOPAMIDOL 100 ML: 612 INJECTION, SOLUTION INTRAVENOUS at 07:03

## 2017-06-23 RX ADMIN — ONDANSETRON 4 MG: 2 INJECTION INTRAMUSCULAR; INTRAVENOUS at 21:24

## 2017-06-23 RX ADMIN — SODIUM CHLORIDE 100 ML: 900 INJECTION, SOLUTION INTRAVENOUS at 07:03

## 2017-06-23 RX ADMIN — AMLODIPINE BESYLATE 10 MG: 5 TABLET ORAL at 15:34

## 2017-06-23 RX ADMIN — Medication 10 ML: at 15:35

## 2017-06-23 RX ADMIN — DIPHENHYDRAMINE HYDROCHLORIDE 50 MG: 50 INJECTION, SOLUTION INTRAMUSCULAR; INTRAVENOUS at 06:06

## 2017-06-23 RX ADMIN — METRONIDAZOLE 500 MG: 250 TABLET ORAL at 15:33

## 2017-06-23 RX ADMIN — ERYTHROPOIETIN 10000 UNITS: 10000 INJECTION, SOLUTION INTRAVENOUS; SUBCUTANEOUS at 21:30

## 2017-06-23 RX ADMIN — CARVEDILOL 25 MG: 12.5 TABLET, FILM COATED ORAL at 15:34

## 2017-06-23 RX ADMIN — METRONIDAZOLE 500 MG: 500 INJECTION, SOLUTION INTRAVENOUS at 01:31

## 2017-06-23 RX ADMIN — DIPHENHYDRAMINE HYDROCHLORIDE 25 MG: 50 INJECTION, SOLUTION INTRAMUSCULAR; INTRAVENOUS at 11:21

## 2017-06-23 RX ADMIN — OXYCODONE HYDROCHLORIDE AND ACETAMINOPHEN 2 TABLET: 7.5; 325 TABLET ORAL at 06:00

## 2017-06-23 RX ADMIN — METHYLPREDNISOLONE SODIUM SUCCINATE 32 MG: 40 INJECTION, POWDER, FOR SOLUTION INTRAMUSCULAR; INTRAVENOUS at 05:07

## 2017-06-23 RX ADMIN — DAKIN'S SOLUTION 0.125% (QUARTER STRENGTH): 0.12 SOLUTION at 22:24

## 2017-06-23 RX ADMIN — ONDANSETRON 4 MG: 2 INJECTION INTRAMUSCULAR; INTRAVENOUS at 16:21

## 2017-06-23 RX ADMIN — Medication 10 ML: at 07:03

## 2017-06-23 NOTE — PROGRESS NOTES
Bedside and Verbal shift change report given to April, MARVA (oncoming nurse) by Robbie Miramontes RN (offgoing nurse). Report included the following information SBAR, Kardex, ED Summary, OR Summary, Procedure Summary, Intake/Output, MAR, Accordion and Recent Results.

## 2017-06-23 NOTE — PROGRESS NOTES
Daily Progress Note  [facility] at [address] Date: 6/15/2017  Post-Operative Day: 8 from Procedure(s):  PERINEAL ABSCESS INCISION AND DRAINAGE     Subjective:     Last 24 hrs: Pt sleeping soundly, plan discussed with her daughter. Had CT this morning, results pending. ID consult was called this am.      Objective:     Blood pressure 149/81, pulse 76, temperature 97.7 °F (36.5 °C), resp. rate 17, height 6' 1.75\" (1.873 m), weight 131.6 kg (290 lb 3.2 oz), SpO2 97 %, not currently breastfeeding. Temp (24hrs), Av.3 °F (36.3 °C), Min:95.5 °F (35.3 °C), Max:98 °F (36.7 °C)      _____________________  Physical Exam:     Sleeping. Remainder of exam deferred. Assessment:   Principal Problem:    Perineal abscess (2017)    Active Problems:    Diabetes mellitus type 2, insulin dependent ([facility]) (10/14/2010)      HTN (hypertension) (10/14/2010)      Abscess of deep perineal space (2017)      ESRD (end stage renal disease) on dialysis [facility]) (2017)            Plan:     F/U CT results  Await ID input  Continue local wound care        Jennie Ardon, ACNP - [address] [facility] [address],  [place], [address] [place], [place]  [phone]    Data Review:    Recent Labs      17   0431   WBC  4.7   HGB  10.9*   HCT  33.4*   PLT  255     No results for input(s): NA, K, CL, CO2, GLU, BUN, CREA, CA, MG, PHOS, ALB, TBIL, TBILI, SGOT, ALT, INR in the last 72 hours. No lab exists for component: INREXT  No results for input(s): AML, LPSE in the last 72 hours.         ______________________  Medications:    Current Facility-Administered Medications   Medication Dose Route Frequency    [START ON 2017] polyethylene glycol (MIRALAX) packet 17 g  17 g Oral DAILY    albuterol (PROVENTIL VENTOLIN) nebulizer solution 2.5 mg  2.5 mg Nebulization Q4H PRN    metroNIDAZOLE (FLAGYL) IVPB premix 500 mg  500 mg IntraVENous Q8H    sodium hypochlorite (QUARTER STRENGTH DAKIN'S) 0.125% irrigation (bottle)   Topical BID    diphenhydrAMINE (BENADRYL) injection 25 mg  25 mg IntraVENous Q4H PRN    HYDROmorphone (PF) (DILAUDID) injection 2 mg  2 mg IntraVENous Q4H PRN    insulin regular (NOVOLIN R, HUMULIN R) injection   SubCUTAneous Q6H    amLODIPine (NORVASC) tablet 10 mg  10 mg Oral DAILY    aspirin chewable tablet 81 mg  81 mg Oral DAILY    carvedilol (COREG) tablet 25 mg  25 mg Oral DAILY    FLUoxetine (PROzac) capsule 20 mg  20 mg Oral DAILY    . PHARMACY TO SUBSTITUTE PER PROTOCOL    Per Protocol    nortriptyline (PAMELOR) capsule 25 mg  25 mg Oral QHS    oxyCODONE-acetaminophen (PERCOCET 7.5) 7.5-325 mg per tablet 1-2 Tab  1-2 Tab Oral Q4H PRN    sodium chloride (NS) flush 5-10 mL  5-10 mL IntraVENous Q8H    sodium chloride (NS) flush 5-10 mL  5-10 mL IntraVENous PRN    ondansetron (ZOFRAN) injection 4 mg  4 mg IntraVENous Q4H PRN    glucose chewable tablet 16 g  4 Tab Oral PRN    glucagon (GLUCAGEN) injection 1 mg  1 mg IntraMUSCular PRN    dextrose 10 % infusion 125-250 mL  125-250 mL IntraVENous PRN       I have independently examined the patient and have reviewed the chart. I agree with the above plan. CT done today that showed no residual abscess. Wound improving with dressing changes currently. ID consult pending. Will continue current care and await ID recommendations.      Vandana Wheeler MD  6/23/2017  4:55 PM

## 2017-06-23 NOTE — WOUND CARE
Attempted to see but currently in dialysis. Aware of comments by Kim Mercado NP, with consult to ID. Will continue to follow while admitted.   Aimee Francois, THEEN

## 2017-06-23 NOTE — WOUND CARE
WOCN Note:     Follow-up visit for right buttocks I&D site.      Chart shows:  Admitted for perineal abscess; history of previous perineal abscesses, DM, HD, HTN, neuropathy, sleep apnea. Admitted from home. Aretha Nichols in room during visit.       Assessment:   Patient is A&O x 4, continent (used BSC) and mobile with minimal assist back to bed.     The dressing was soiled after having a BM.       1. Right medial buttock I&D site 6/15/17 by Dr. Garber Honour = 8.5 x 3 x 1.2 cm (smaller) with undermining from 4-5 o'clock = 1 cm. Improvement in quality of base  Which is now 90% moist red and granular 10% scattered yellow. Field of elevated, hard, induration now lessened and smaller. Overall it appears improved. 1/4 Dakin's moist packing applied, dry topper and secured. Recommendations:    Continue Dakin's moist packing twice daily given contamination with stool. Watch lateral margin and area of induration for possible progression. Discussed above plan with patient & RN, April.     Transition of Care: Plan to follow as needed while admitted to hospital.    Rahele Palencia, ILENEN, RN, [facility]  Certified Wound, Ostomy, Continence Nurse  office [phone]  pager [phone] or call  to page
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
## 2017-06-23 NOTE — DIALYSIS
[facility]                         [phone]  Vitals Pre Post Assessment Pre Post   /73 156/82 LOC A&O X3 No chasnge   HR 71 90 Lungs Diminshed No change   Temp 98.6 98.4 Cardiac R,R,R No change   Resp 22 20   Skin W,D,I No change   Weight 131.6 kgs 129.9 kgs Edema General  No change      Pain Back Side 6/10 No change     Orders   Duration: Start: 1115 End: 1345 Total: 2.5   Dialyzer: Revaclear   K Bath: 3.0   Ca Bath: 2.5   Na / Bicarb: 140/35   Target Fluid Removal: 2000 mls     Access   Type & Location: [facility]    Comments:                            +T/B , canulated x2 with 15 gauge needles. Labs   Hep B status / date: Neg 6/14/17   Obtained/Reviewed  Critical Results Called Labs, meds, tx orders       Meds Given   Name Dose Route   Diphenhydramine  25 mgs Ivp               Total Liters Process:    Net Fluid Removed:       Comments   Time Out Done: yes   Primary Nurse Rpt Pre: April Rn   Primary Nurse Rpt Post: April Rn   Pt Education: [address]: Cont on HD as ordered    Tx Summary: Pt tolerated tx well. Pt want to end tx early to return to floor. All blood was returned, needles pulled at bedside and site held by hand for 10 mins on each site.  Report called to nurse post tx

## 2017-06-23 NOTE — PROGRESS NOTES
Day #1 of Aztreonam  Indication:  Recurrent buttock/perianal abscesses with history of multiple I&D procedures  Current regimen:  500 mg Q8hr  Abx regimen: aztreonam  Recent Labs      17   1119  17   0431   WBC  4.3  4.7   CREA  6.02*   --    BUN  52*   --      Est CrCl: ESRD, HD M/W/F; Temp (24hrs), Av.5 °F (36.4 °C), Min:95.5 °F (35.3 °C), Max:98.6 °F (37 °C)    Cultures: none    Plan: Change to 500 mg Q12hr     **close i-vent after initial review. Remove this text prior to posting to note.

## 2017-06-23 NOTE — PROGRESS NOTES
[facility]   [address] [facility], [address] [place] [address], [facility]  Phone: [phone]   [phone]       Nephrology Progress Note  Norah Wilson     1950     [id number]  Date of Admission : 6/15/2017  06/23/17    CC: Follow up for ESRD       Assessment and Plan   ESRD- HD :  - HD MWF at [facility]   - HD now  - continue MWF schedule    Hyponatremia :  - 2/2 Hypervolemia     Anemia of CKD:  - no ELTON for now    Recurrent Perianal abscess:   - s/p ID 6/15  - CT scan from today noted  - ID to evaluate    HTN :  - cont current meds    Type II DM     Sec HPTH     Interval History:  Seen and examined on HD. CT pelvis with contrast done this am and shows diffuse subcutaneous edema without drainable abscess collection or evidence of osteomyelitis and chronic arachnoiditis ossificans  Pain controlled. No cp or sob at this time. BP stable on HD. Review of Systems: A comprehensive review of systems was negative. Current Medications:   Current Facility-Administered Medications   Medication Dose Route Frequency    [START ON 6/24/2017] polyethylene glycol (MIRALAX) packet 17 g  17 g Oral DAILY    metroNIDAZOLE (FLAGYL) tablet 500 mg  500 mg Oral TID    epoetin joi (EPOGEN;PROCRIT) injection 10,000 Units  10,000 Units SubCUTAneous DIALYSIS MON, WED & FRI    albuterol (PROVENTIL VENTOLIN) nebulizer solution 2.5 mg  2.5 mg Nebulization Q4H PRN    sodium hypochlorite (QUARTER STRENGTH DAKIN'S) 0.125% irrigation (bottle)   Topical BID    diphenhydrAMINE (BENADRYL) injection 25 mg  25 mg IntraVENous Q4H PRN    HYDROmorphone (PF) (DILAUDID) injection 2 mg  2 mg IntraVENous Q4H PRN    insulin regular (NOVOLIN R, HUMULIN R) injection   SubCUTAneous Q6H    amLODIPine (NORVASC) tablet 10 mg  10 mg Oral DAILY    aspirin chewable tablet 81 mg  81 mg Oral DAILY    carvedilol (COREG) tablet 25 mg  25 mg Oral DAILY    FLUoxetine (PROzac) capsule 20 mg  20 mg Oral DAILY    . PHARMACY TO SUBSTITUTE PER PROTOCOL    Per Protocol    nortriptyline (PAMELOR) capsule 25 mg  25 mg Oral QHS    oxyCODONE-acetaminophen (PERCOCET 7.5) 7.5-325 mg per tablet 1-2 Tab  1-2 Tab Oral Q4H PRN    sodium chloride (NS) flush 5-10 mL  5-10 mL IntraVENous Q8H    sodium chloride (NS) flush 5-10 mL  5-10 mL IntraVENous PRN    ondansetron (ZOFRAN) injection 4 mg  4 mg IntraVENous Q4H PRN    glucose chewable tablet 16 g  4 Tab Oral PRN    glucagon (GLUCAGEN) injection 1 mg  1 mg IntraMUSCular PRN    dextrose 10 % infusion 125-250 mL  125-250 mL IntraVENous PRN      Allergies   Allergen Reactions    Latex Itching     Rash, sometimes difficult to breathe    Celebrex [Celecoxib] Anaphylaxis and Swelling     TONGUE. LIPS AND EYES    Iodine Other (comments)     IV CONTRAST-LESIONS, AND SKIN SLUFFED OFF    Keflex [Cephalexin] Anaphylaxis and Swelling     Tongue, lips, and eyes    Levaquin [Levofloxacin] Anaphylaxis and Swelling     Tongue, lips, and eyes    Pcn [Penicillins] Anaphylaxis and Swelling     Tongue, lips and eyes    Morphine Other (comments)     PT STATES IS JUST SENSITIVITY, GOT TOO MUCH ONE TIME. Objective:  Vitals:    Vitals:    06/23/17 1115 06/23/17 1145 06/23/17 1215 06/23/17 1245   BP: 140/72 149/75 126/50 136/66   Pulse: 73 71 71 75   Resp: 22 22 22 20   Temp: 98.6 °F (37 °C)      TempSrc: Oral      SpO2:       Weight:       Height:         Intake and Output:     06/21 1901 - 06/23 0700  In: 3067 [P.O.:2100;  I.V.:967]  Out: 1264 [Urine:150]    Physical Examination:    General: In mild distress  Neck:  Supple, no mass  Resp:  Lungs CTA   CV:  RRR,  no murmur or rub, no LE edema  GI:  Soft, NT, + Bowel sounds, no hepatosplenomegaly  Neurologic:  Non focal  Ext                   LUE AVF w/ good thrill and bruit    []    High complexity decision making was performed  []    Patient is at high-risk of decompensation with multiple organ involvement    Lab Data Personally Reviewed: I have reviewed all the pertinent labs, microbiology data and radiology studies during assessment.     Recent Labs      06/23/17   1119   NA  124*   K  5.3*   CL  92*   CO2  22   GLU  204*   BUN  52*   CREA  6.02*   CA  8.0*   PHOS  5.9*   ALB  2.5*     Recent Labs      06/23/17   1119  06/23/17   0431   WBC  4.3  4.7   HGB  9.4*  10.9*   HCT  29.1*  33.4*   PLT  228  255     Lab Results   Component Value Date/Time    Specimen Description: URINE 10/27/2012 08:15 PM    Specimen Description: BLOOD 06/07/2011 07:40 AM    Specimen Description: URINE 08/30/2010 07:10 AM    Specimen Description: URINE 08/09/2010 11:30 AM    Specimen Description: URINE 06/22/2010 11:50 PM     Lab Results   Component Value Date/Time    Culture result: LIGHT  MIXED COMMENSAL PARIS   02/11/2017 12:40 AM    Culture result:  02/11/2017 12:40 AM     LIGHT  ANAEROBIC GRAM NEGATIVE RODS  BETA LACTAMASE POSITIVE      Culture result: NO GROWTH 6 DAYS 02/10/2017 04:43 PM    Culture result: MIXED SKIN PARIS ISOLATED 10/27/2012 08:15 PM    Culture result: NO GROWTH 5 DAYS 06/07/2011 07:40 AM     Recent Results (from the past 24 hour(s))   GLUCOSE, POC    Collection Time: 06/22/17  6:02 PM   Result Value Ref Range    Glucose (POC) 143 (H) 65 - 100 mg/dL    Performed by Isela Calderon    GLUCOSE, POC    Collection Time: 06/22/17 11:33 PM   Result Value Ref Range    Glucose (POC) 124 (H) 65 - 100 mg/dL    Performed by Jae Puente (PCT)    CBC W/O DIFF    Collection Time: 06/23/17  4:31 AM   Result Value Ref Range    WBC 4.7 3.6 - 11.0 K/uL    RBC 3.77 (L) 3.80 - 5.20 M/uL    HGB 10.9 (L) 11.5 - 16.0 g/dL    HCT 33.4 (L) 35.0 - 47.0 %    MCV 88.6 80.0 - 99.0 FL    MCH 28.9 26.0 - 34.0 PG    MCHC 32.6 30.0 - 36.5 g/dL    RDW 17.0 (H) 11.5 - 14.5 %    PLATELET 419 102 - 195 K/uL   GLUCOSE, POC    Collection Time: 06/23/17  6:02 AM   Result Value Ref Range    Glucose (POC) 228 (H) 65 - 100 mg/dL    Performed by Jae Puente (PCT)    RENAL FUNCTION PANEL    Collection Time: 06/23/17 11:19 AM   Result Value Ref Range    Sodium 124 (L) 136 - 145 mmol/L    Potassium 5.3 (H) 3.5 - 5.1 mmol/L    Chloride 92 (L) 97 - 108 mmol/L    CO2 22 21 - 32 mmol/L    Anion gap 10 5 - 15 mmol/L    Glucose 204 (H) 65 - 100 mg/dL    BUN 52 (H) 6 - 20 MG/DL    Creatinine 6.02 (H) 0.55 - 1.02 MG/DL    BUN/Creatinine ratio 9 (L) 12 - 20      GFR est AA 8 (L) >60 ml/min/1.73m2    GFR est non-AA 7 (L) >60 ml/min/1.73m2    Calcium 8.0 (L) 8.5 - 10.1 MG/DL    Phosphorus 5.9 (H) 2.6 - 4.7 MG/DL    Albumin 2.5 (L) 3.5 - 5.0 g/dL   CBC W/O DIFF    Collection Time: 06/23/17 11:19 AM   Result Value Ref Range    WBC 4.3 3.6 - 11.0 K/uL    RBC 3.33 (L) 3.80 - 5.20 M/uL    HGB 9.4 (L) 11.5 - 16.0 g/dL    HCT 29.1 (L) 35.0 - 47.0 %    MCV 87.4 80.0 - 99.0 FL    MCH 28.2 26.0 - 34.0 PG    MCHC 32.3 30.0 - 36.5 g/dL    RDW 16.7 (H) 11.5 - 14.5 %    PLATELET 754 014 - 891 K/uL               Sachin Jose MD

## 2017-06-24 LAB
GLUCOSE BLD STRIP.AUTO-MCNC: 143 MG/DL (ref 65–100)
GLUCOSE BLD STRIP.AUTO-MCNC: 195 MG/DL (ref 65–100)
GLUCOSE BLD STRIP.AUTO-MCNC: 211 MG/DL (ref 65–100)
SERVICE CMNT-IMP: ABNORMAL

## 2017-06-24 PROCEDURE — 74011250636 HC RX REV CODE- 250/636: Performed by: SURGERY

## 2017-06-24 PROCEDURE — 74011250637 HC RX REV CODE- 250/637: Performed by: NURSE PRACTITIONER

## 2017-06-24 PROCEDURE — 74011000258 HC RX REV CODE- 258: Performed by: INTERNAL MEDICINE

## 2017-06-24 PROCEDURE — 74011636637 HC RX REV CODE- 636/637: Performed by: PHYSICIAN ASSISTANT

## 2017-06-24 PROCEDURE — 74011000250 HC RX REV CODE- 250: Performed by: INTERNAL MEDICINE

## 2017-06-24 PROCEDURE — 65210000002 HC RM PRIVATE GYN

## 2017-06-24 PROCEDURE — 74011250637 HC RX REV CODE- 250/637: Performed by: INTERNAL MEDICINE

## 2017-06-24 PROCEDURE — 74011250636 HC RX REV CODE- 250/636: Performed by: PHYSICIAN ASSISTANT

## 2017-06-24 PROCEDURE — 74011250637 HC RX REV CODE- 250/637: Performed by: SURGERY

## 2017-06-24 PROCEDURE — 82962 GLUCOSE BLOOD TEST: CPT

## 2017-06-24 RX ORDER — FLUCONAZOLE 100 MG/1
200 TABLET ORAL DAILY
Status: COMPLETED | OUTPATIENT
Start: 2017-06-24 | End: 2017-06-26

## 2017-06-24 RX ADMIN — ONDANSETRON 4 MG: 2 INJECTION INTRAMUSCULAR; INTRAVENOUS at 11:45

## 2017-06-24 RX ADMIN — HYDROMORPHONE HYDROCHLORIDE 2 MG: 1 INJECTION, SOLUTION INTRAMUSCULAR; INTRAVENOUS; SUBCUTANEOUS at 16:21

## 2017-06-24 RX ADMIN — Medication 10 ML: at 13:12

## 2017-06-24 RX ADMIN — AMLODIPINE BESYLATE 10 MG: 5 TABLET ORAL at 10:15

## 2017-06-24 RX ADMIN — HUMAN INSULIN 3 UNITS: 100 INJECTION, SOLUTION SUBCUTANEOUS at 06:18

## 2017-06-24 RX ADMIN — DIPHENHYDRAMINE HYDROCHLORIDE 25 MG: 50 INJECTION, SOLUTION INTRAMUSCULAR; INTRAVENOUS at 17:01

## 2017-06-24 RX ADMIN — HUMAN INSULIN 2 UNITS: 100 INJECTION, SOLUTION SUBCUTANEOUS at 13:07

## 2017-06-24 RX ADMIN — HYDROMORPHONE HYDROCHLORIDE 2 MG: 1 INJECTION, SOLUTION INTRAMUSCULAR; INTRAVENOUS; SUBCUTANEOUS at 21:12

## 2017-06-24 RX ADMIN — OXYCODONE HYDROCHLORIDE AND ACETAMINOPHEN 2 TABLET: 7.5; 325 TABLET ORAL at 14:48

## 2017-06-24 RX ADMIN — HYDROMORPHONE HYDROCHLORIDE 2 MG: 1 INJECTION, SOLUTION INTRAMUSCULAR; INTRAVENOUS; SUBCUTANEOUS at 11:44

## 2017-06-24 RX ADMIN — CARVEDILOL 25 MG: 12.5 TABLET, FILM COATED ORAL at 10:15

## 2017-06-24 RX ADMIN — FLUCONAZOLE 200 MG: 100 TABLET ORAL at 20:55

## 2017-06-24 RX ADMIN — METRONIDAZOLE 500 MG: 250 TABLET ORAL at 10:15

## 2017-06-24 RX ADMIN — AZTREONAM 500 MG: 1 INJECTION, POWDER, LYOPHILIZED, FOR SOLUTION INTRAMUSCULAR; INTRAVENOUS at 07:06

## 2017-06-24 RX ADMIN — ASPIRIN 81 MG CHEWABLE TABLET 81 MG: 81 TABLET CHEWABLE at 10:15

## 2017-06-24 RX ADMIN — DAKIN'S SOLUTION 0.125% (QUARTER STRENGTH): 0.12 SOLUTION at 17:03

## 2017-06-24 RX ADMIN — ONDANSETRON 4 MG: 2 INJECTION INTRAMUSCULAR; INTRAVENOUS at 21:23

## 2017-06-24 RX ADMIN — DAKIN'S SOLUTION 0.125% (QUARTER STRENGTH): 0.12 SOLUTION at 11:00

## 2017-06-24 RX ADMIN — DIPHENHYDRAMINE HYDROCHLORIDE 25 MG: 50 INJECTION, SOLUTION INTRAMUSCULAR; INTRAVENOUS at 21:21

## 2017-06-24 RX ADMIN — METRONIDAZOLE 500 MG: 250 TABLET ORAL at 16:21

## 2017-06-24 RX ADMIN — DIPHENHYDRAMINE HYDROCHLORIDE 25 MG: 50 INJECTION, SOLUTION INTRAMUSCULAR; INTRAVENOUS at 11:45

## 2017-06-24 RX ADMIN — POLYETHYLENE GLYCOL 3350 17 G: 17 POWDER, FOR SOLUTION ORAL at 10:15

## 2017-06-24 RX ADMIN — AZTREONAM 500 MG: 1 INJECTION, POWDER, LYOPHILIZED, FOR SOLUTION INTRAMUSCULAR; INTRAVENOUS at 20:55

## 2017-06-24 RX ADMIN — FLUOXETINE 20 MG: 10 CAPSULE ORAL at 10:15

## 2017-06-24 NOTE — PROGRESS NOTES
Dialyzed yesterday   Na should have improved   Labs again Monday prior to HD  She needs to be on water restriction 1L /day   I discontinued D10 orders  Will see her if any renal issues over the weekend .  pls call    Nandini Sharma MD, [facility] Nephrology [facility].   Office :[phone]  Fax: [phone]

## 2017-06-24 NOTE — PROGRESS NOTES
General Surgery Daily Progress Note    Admit Date: 6/15/2017  Post-Operative Day: 9 Days Post-Op from Procedure(s):  PERINEAL ABSCESS INCISION AND DRAINAGE     Subjective:     Last 24 hrs: Doing well this afternoon. Receiving wound care. Eating well. Sleeping well. Denies fevers or chills. Persistent pain in area of wound. + BM. Objective:     Blood pressure 141/69, pulse 65, temperature 98.3 °F (36.8 °C), resp. rate 16, height 6' 1.75\" (1.873 m), weight 290 lb 3.2 oz (131.6 kg), SpO2 99 %, not currently breastfeeding. Temp (24hrs), Av.1 °F (36.7 °C), Min:97.5 °F (36.4 °C), Max:98.6 °F (37 °C)      _____________________  Physical Exam:     Asleep, in no acute distress. Cardiovascular: RRR  Lungs:CTAB, shallow BS  Abdomen: soft, rounds + BS  Wound: beefy red base with decreased swelling around lateral boarder. Nonodorous. Assessment:   Principal Problem:    Perineal abscess (2017)    Active Problems:    Diabetes mellitus type 2, insulin dependent ([facility] [address] (10/14/2010)      HTN (hypertension) (10/14/2010)      Abscess of deep perineal space (2017)      ESRD (end stage renal disease) on dialysis ([facility] [address] (2017)            Plan:     Continue BID wound care & dressing changes. Continue antibiotics. Continue bowel regimen. Continue IS & OOB to chair if possible. Monitor blood sugars. Data Review:    Recent Labs      17   1119  17   0431   WBC  4.3  4.7   HGB  9.4*  10.9*   HCT  29.1*  33.4*   PLT  228  255     Recent Labs      17   1119   NA  124*   K  5.3*   CL  92*   CO2  22   GLU  204*   BUN  52*   CREA  6.02*   CA  8.0*   PHOS  5.9*   ALB  2.5*     No results for input(s): AML, LPSE in the last 72 hours.         ______________________  Medications:    Current Facility-Administered Medications   Medication Dose Route Frequency    polyethylene glycol (MIRALAX) packet 17 g  17 g Oral DAILY    metroNIDAZOLE (FLAGYL) tablet 500 mg  500 mg Oral TID    epoetin joi (EPOGEN;PROCRIT) injection 10,000 Units  10,000 Units SubCUTAneous DIALYSIS MON, WED & FRI    aztreonam (AZACTAM) 500 mg in 0.9% sodium chloride 100 mL IVPB  500 mg IntraVENous Q12H    albuterol (PROVENTIL VENTOLIN) nebulizer solution 2.5 mg  2.5 mg Nebulization Q4H PRN    sodium hypochlorite (QUARTER STRENGTH DAKIN'S) 0.125% irrigation (bottle)   Topical BID    diphenhydrAMINE (BENADRYL) injection 25 mg  25 mg IntraVENous Q4H PRN    HYDROmorphone (PF) (DILAUDID) injection 2 mg  2 mg IntraVENous Q4H PRN    insulin regular (NOVOLIN R, HUMULIN R) injection   SubCUTAneous Q6H    amLODIPine (NORVASC) tablet 10 mg  10 mg Oral DAILY    aspirin chewable tablet 81 mg  81 mg Oral DAILY    carvedilol (COREG) tablet 25 mg  25 mg Oral DAILY    FLUoxetine (PROzac) capsule 20 mg  20 mg Oral DAILY    . PHARMACY TO SUBSTITUTE PER PROTOCOL    Per Protocol    nortriptyline (PAMELOR) capsule 25 mg  25 mg Oral QHS    oxyCODONE-acetaminophen (PERCOCET 7.5) 7.5-325 mg per tablet 1-2 Tab  1-2 Tab Oral Q4H PRN    sodium chloride (NS) flush 5-10 mL  5-10 mL IntraVENous Q8H    sodium chloride (NS) flush 5-10 mL  5-10 mL IntraVENous PRN    ondansetron (ZOFRAN) injection 4 mg  4 mg IntraVENous Q4H PRN    glucose chewable tablet 16 g  4 Tab Oral PRN    glucagon (GLUCAGEN) injection 1 mg  1 mg IntraMUSCular PRN       Ruthie Mcgrath PA-C  6/24/2017      Pt seen and examined  Complains of burning pain and itching    Wound- excellent granulation tissue and no sign of active infection    S/p Debridement of perineal wound-   Doing well  Continue dressing changes and abx.,

## 2017-06-24 NOTE — PROGRESS NOTES
ID Progress Note  2017    Subjective:     No new complaints, pain about the same    Objective:     Antibiotics:  1. Azactam  2. Flagyl      Vitals:   Visit Vitals    /61 (BP 1 Location: Right arm, BP Patient Position: At rest)    Pulse 64    Temp 97.8 °F (36.6 °C)    Resp 16    Ht 6' 1.75\" (1.873 m)    Wt 131.6 kg (290 lb 3.2 oz)    SpO2 98%    Breastfeeding No    BMI 37.51 kg/m2        Tmax:  Temp (24hrs), Av.9 °F (36.6 °C), Min:97.5 °F (36.4 °C), Max:98.3 °F (36.8 °C)      Exam:  Lungs:  clear to auscultation bilaterally  Heart:  regular rate and rhythm  Abdomen:  soft, non-tender. Bowel sounds normal. No masses,  no organomegaly    Labs:      Recent Labs      17   1119  17   0431   WBC  4.3  4.7   HGB  9.4*  10.9*   PLT  228  255   BUN  52*   --    CREA  6.02*   --        Cultures:     Lab Results   Component Value Date/Time    Specimen Description: URINE 10/27/2012 08:15 PM    Specimen Description: BLOOD 2011 07:40 AM    Specimen Description: URINE 2010 07:10 AM     Lab Results   Component Value Date/Time    Culture result: LIGHT  MIXED COMMENSAL PARIS   2017 12:40 AM    Culture result:  2017 12:40 AM     LIGHT  ANAEROBIC GRAM NEGATIVE RODS  BETA LACTAMASE POSITIVE      Culture result: NO GROWTH 6 DAYS 02/10/2017 04:43 PM       Radiology:     Line/Insert Date:           Assessment:     1. Perianal abscess  2. ESRD  3. Debility     Objective:     1. Continue current therapy  2.  Course of fluconazole    Ashley Dykes MD

## 2017-06-25 LAB
GLUCOSE BLD STRIP.AUTO-MCNC: 128 MG/DL (ref 65–100)
GLUCOSE BLD STRIP.AUTO-MCNC: 140 MG/DL (ref 65–100)
GLUCOSE BLD STRIP.AUTO-MCNC: 152 MG/DL (ref 65–100)
GLUCOSE BLD STRIP.AUTO-MCNC: 155 MG/DL (ref 65–100)
GLUCOSE BLD STRIP.AUTO-MCNC: 162 MG/DL (ref 65–100)
SERVICE CMNT-IMP: ABNORMAL

## 2017-06-25 PROCEDURE — 74011000258 HC RX REV CODE- 258: Performed by: INTERNAL MEDICINE

## 2017-06-25 PROCEDURE — 74011250636 HC RX REV CODE- 250/636: Performed by: SURGERY

## 2017-06-25 PROCEDURE — 74011636637 HC RX REV CODE- 636/637: Performed by: PHYSICIAN ASSISTANT

## 2017-06-25 PROCEDURE — 74011250637 HC RX REV CODE- 250/637: Performed by: SURGERY

## 2017-06-25 PROCEDURE — 74011000250 HC RX REV CODE- 250: Performed by: INTERNAL MEDICINE

## 2017-06-25 PROCEDURE — 74011250637 HC RX REV CODE- 250/637: Performed by: INTERNAL MEDICINE

## 2017-06-25 PROCEDURE — 74011250637 HC RX REV CODE- 250/637: Performed by: NURSE PRACTITIONER

## 2017-06-25 PROCEDURE — 65210000002 HC RM PRIVATE GYN

## 2017-06-25 PROCEDURE — 74011250636 HC RX REV CODE- 250/636: Performed by: PHYSICIAN ASSISTANT

## 2017-06-25 PROCEDURE — 82962 GLUCOSE BLOOD TEST: CPT

## 2017-06-25 RX ADMIN — ONDANSETRON 4 MG: 2 INJECTION INTRAMUSCULAR; INTRAVENOUS at 21:31

## 2017-06-25 RX ADMIN — METRONIDAZOLE 500 MG: 250 TABLET ORAL at 00:02

## 2017-06-25 RX ADMIN — OXYCODONE HYDROCHLORIDE AND ACETAMINOPHEN 2 TABLET: 7.5; 325 TABLET ORAL at 21:32

## 2017-06-25 RX ADMIN — DAKIN'S SOLUTION 0.125% (QUARTER STRENGTH): 0.12 SOLUTION at 14:50

## 2017-06-25 RX ADMIN — AMLODIPINE BESYLATE 10 MG: 5 TABLET ORAL at 09:06

## 2017-06-25 RX ADMIN — Medication 10 ML: at 05:48

## 2017-06-25 RX ADMIN — Medication 10 ML: at 21:32

## 2017-06-25 RX ADMIN — DIPHENHYDRAMINE HYDROCHLORIDE 25 MG: 50 INJECTION, SOLUTION INTRAMUSCULAR; INTRAVENOUS at 05:47

## 2017-06-25 RX ADMIN — ASPIRIN 81 MG CHEWABLE TABLET 81 MG: 81 TABLET CHEWABLE at 09:06

## 2017-06-25 RX ADMIN — AZTREONAM 500 MG: 1 INJECTION, POWDER, LYOPHILIZED, FOR SOLUTION INTRAMUSCULAR; INTRAVENOUS at 21:12

## 2017-06-25 RX ADMIN — HUMAN INSULIN 2 UNITS: 100 INJECTION, SOLUTION SUBCUTANEOUS at 23:39

## 2017-06-25 RX ADMIN — AZTREONAM 500 MG: 1 INJECTION, POWDER, LYOPHILIZED, FOR SOLUTION INTRAMUSCULAR; INTRAVENOUS at 06:31

## 2017-06-25 RX ADMIN — POLYETHYLENE GLYCOL 3350 17 G: 17 POWDER, FOR SOLUTION ORAL at 09:07

## 2017-06-25 RX ADMIN — HYDROMORPHONE HYDROCHLORIDE 2 MG: 1 INJECTION, SOLUTION INTRAMUSCULAR; INTRAVENOUS; SUBCUTANEOUS at 04:15

## 2017-06-25 RX ADMIN — Medication 10 ML: at 14:49

## 2017-06-25 RX ADMIN — DIPHENHYDRAMINE HYDROCHLORIDE 25 MG: 50 INJECTION, SOLUTION INTRAMUSCULAR; INTRAVENOUS at 21:31

## 2017-06-25 RX ADMIN — FLUOXETINE 20 MG: 10 CAPSULE ORAL at 09:06

## 2017-06-25 RX ADMIN — METRONIDAZOLE 500 MG: 250 TABLET ORAL at 09:06

## 2017-06-25 RX ADMIN — OXYCODONE HYDROCHLORIDE AND ACETAMINOPHEN 1 TABLET: 7.5; 325 TABLET ORAL at 00:11

## 2017-06-25 RX ADMIN — METRONIDAZOLE 500 MG: 250 TABLET ORAL at 21:32

## 2017-06-25 RX ADMIN — DAKIN'S SOLUTION 0.125% (QUARTER STRENGTH): 0.12 SOLUTION at 17:00

## 2017-06-25 RX ADMIN — ONDANSETRON 4 MG: 2 INJECTION INTRAMUSCULAR; INTRAVENOUS at 09:06

## 2017-06-25 RX ADMIN — DIPHENHYDRAMINE HYDROCHLORIDE 25 MG: 50 INJECTION, SOLUTION INTRAMUSCULAR; INTRAVENOUS at 17:58

## 2017-06-25 RX ADMIN — METRONIDAZOLE 500 MG: 250 TABLET ORAL at 17:00

## 2017-06-25 RX ADMIN — FLUCONAZOLE 200 MG: 100 TABLET ORAL at 17:00

## 2017-06-25 RX ADMIN — CARVEDILOL 25 MG: 12.5 TABLET, FILM COATED ORAL at 09:06

## 2017-06-25 RX ADMIN — HYDROMORPHONE HYDROCHLORIDE 2 MG: 1 INJECTION, SOLUTION INTRAMUSCULAR; INTRAVENOUS; SUBCUTANEOUS at 17:57

## 2017-06-25 RX ADMIN — HUMAN INSULIN 2 UNITS: 100 INJECTION, SOLUTION SUBCUTANEOUS at 06:30

## 2017-06-25 RX ADMIN — Medication 10 ML: at 00:02

## 2017-06-25 RX ADMIN — NORTRIPTYLINE HYDROCHLORIDE 25 MG: 25 CAPSULE ORAL at 00:01

## 2017-06-25 RX ADMIN — HUMAN INSULIN 2 UNITS: 100 INJECTION, SOLUTION SUBCUTANEOUS at 12:00

## 2017-06-25 RX ADMIN — NORTRIPTYLINE HYDROCHLORIDE 25 MG: 25 CAPSULE ORAL at 21:31

## 2017-06-25 RX ADMIN — HYDROMORPHONE HYDROCHLORIDE 2 MG: 1 INJECTION, SOLUTION INTRAMUSCULAR; INTRAVENOUS; SUBCUTANEOUS at 09:06

## 2017-06-25 RX ADMIN — DIPHENHYDRAMINE HYDROCHLORIDE 25 MG: 50 INJECTION, SOLUTION INTRAMUSCULAR; INTRAVENOUS at 01:42

## 2017-06-25 NOTE — PROGRESS NOTES
Subjective  Still sore but less so  Dressing changed earlier by nursing    Temp:  [95.5 °F (35.3 °C)-98.6 °F (37 °C)]   Pulse (Heart Rate):  [60-81]   BP: (118-154)/(50-81)   Resp Rate:  [16-22]   O2 Sat (%):  [93 %-99 %]   Weight:  [289 lb 11 oz (131.4 kg)-290 lb 3.2 oz (131.6 kg)]      06/23 1901 - 06/25 0700  In: -   Out: 200 [Urine:200]      Objective  Gen- Alert in NAD  Lungs- CTA  H-RRR  Abd- soft nontender  Dressing intact- photo shows no purulence  Mild tenderness periwound      Recent Results (from the past 12 hour(s))   GLUCOSE, POC    Collection Time: 06/25/17  5:57 AM   Result Value Ref Range    Glucose (POC) 140 (H) 65 - 100 mg/dL    Performed by Sushila Guo (PCT)    GLUCOSE, POC    Collection Time: 06/25/17  1:33 PM   Result Value Ref Range    Glucose (POC) 155 (H) 65 - 100 mg/dL    Performed by Kristina Bhakta          Principal Problem:    Perineal abscess (1/25/2017)    Active Problems:    Diabetes mellitus type 2, insulin dependent ([facility] [address] (10/14/2010)      HTN (hypertension) (10/14/2010)      Abscess of deep perineal space (6/17/2017)      ESRD (end stage renal disease) on dialysis ([facility] [address] (6/20/2017)          Assessment & Plan  S/p debridement of perineal wound   Doing well  Abx- per ID  Continue dressing changes.

## 2017-06-25 NOTE — ROUTINE PROCESS
Bedside shift change report given to April (oncoming nurse) by Louie Pond (offgoing nurse). Report included the following information SBAR, Kardex and Intake/Output.

## 2017-06-26 ENCOUNTER — HOME HEALTH ADMISSION (OUTPATIENT)
Dept: HOME HEALTH SERVICES | Facility: HOME HEALTH | Age: 67
End: 2017-06-26
Payer: MEDICARE

## 2017-06-26 LAB
ALBUMIN SERPL BCP-MCNC: 2.5 G/DL (ref 3.5–5)
ANION GAP BLD CALC-SCNC: 10 MMOL/L (ref 5–15)
BASOPHILS # BLD AUTO: 0 K/UL (ref 0–0.1)
BASOPHILS # BLD: 0 % (ref 0–1)
BUN SERPL-MCNC: 45 MG/DL (ref 6–20)
BUN/CREAT SERPL: 8 (ref 12–20)
CALCIUM SERPL-MCNC: 8.2 MG/DL (ref 8.5–10.1)
CHLORIDE SERPL-SCNC: 93 MMOL/L (ref 97–108)
CO2 SERPL-SCNC: 23 MMOL/L (ref 21–32)
CREAT SERPL-MCNC: 5.4 MG/DL (ref 0.55–1.02)
EOSINOPHIL # BLD: 0.2 K/UL (ref 0–0.4)
EOSINOPHIL NFR BLD: 3 % (ref 0–7)
ERYTHROCYTE [DISTWIDTH] IN BLOOD BY AUTOMATED COUNT: 16.6 % (ref 11.5–14.5)
GLUCOSE BLD STRIP.AUTO-MCNC: 125 MG/DL (ref 65–100)
GLUCOSE BLD STRIP.AUTO-MCNC: 133 MG/DL (ref 65–100)
GLUCOSE SERPL-MCNC: 82 MG/DL (ref 65–100)
HCT VFR BLD AUTO: 27.1 % (ref 35–47)
HGB BLD-MCNC: 8.7 G/DL (ref 11.5–16)
LYMPHOCYTES # BLD AUTO: 41 % (ref 12–49)
LYMPHOCYTES # BLD: 2.1 K/UL (ref 0.8–3.5)
MCH RBC QN AUTO: 27.7 PG (ref 26–34)
MCHC RBC AUTO-ENTMCNC: 32.1 G/DL (ref 30–36.5)
MCV RBC AUTO: 86.3 FL (ref 80–99)
MONOCYTES # BLD: 0.6 K/UL (ref 0–1)
MONOCYTES NFR BLD AUTO: 12 % (ref 5–13)
NEUTS SEG # BLD: 2.3 K/UL (ref 1.8–8)
NEUTS SEG NFR BLD AUTO: 44 % (ref 32–75)
PHOSPHATE SERPL-MCNC: 5.2 MG/DL (ref 2.6–4.7)
PLATELET # BLD AUTO: 264 K/UL (ref 150–400)
POTASSIUM SERPL-SCNC: 4.6 MMOL/L (ref 3.5–5.1)
RBC # BLD AUTO: 3.14 M/UL (ref 3.8–5.2)
SERVICE CMNT-IMP: ABNORMAL
SERVICE CMNT-IMP: ABNORMAL
SODIUM SERPL-SCNC: 126 MMOL/L (ref 136–145)
WBC # BLD AUTO: 5.2 K/UL (ref 3.6–11)

## 2017-06-26 PROCEDURE — 74011250637 HC RX REV CODE- 250/637: Performed by: INTERNAL MEDICINE

## 2017-06-26 PROCEDURE — 80069 RENAL FUNCTION PANEL: CPT | Performed by: INTERNAL MEDICINE

## 2017-06-26 PROCEDURE — 74011250636 HC RX REV CODE- 250/636: Performed by: PHYSICIAN ASSISTANT

## 2017-06-26 PROCEDURE — 74011250636 HC RX REV CODE- 250/636: Performed by: SURGERY

## 2017-06-26 PROCEDURE — 82962 GLUCOSE BLOOD TEST: CPT

## 2017-06-26 PROCEDURE — 90935 HEMODIALYSIS ONE EVALUATION: CPT | Performed by: NURSE PRACTITIONER

## 2017-06-26 PROCEDURE — 36415 COLL VENOUS BLD VENIPUNCTURE: CPT | Performed by: SURGERY

## 2017-06-26 PROCEDURE — 74011250637 HC RX REV CODE- 250/637: Performed by: NURSE PRACTITIONER

## 2017-06-26 PROCEDURE — 74011000258 HC RX REV CODE- 258: Performed by: INTERNAL MEDICINE

## 2017-06-26 PROCEDURE — 74011000250 HC RX REV CODE- 250: Performed by: INTERNAL MEDICINE

## 2017-06-26 PROCEDURE — 85025 COMPLETE CBC W/AUTO DIFF WBC: CPT | Performed by: INTERNAL MEDICINE

## 2017-06-26 PROCEDURE — 65210000002 HC RM PRIVATE GYN

## 2017-06-26 PROCEDURE — 74011250637 HC RX REV CODE- 250/637: Performed by: SURGERY

## 2017-06-26 RX ADMIN — DIPHENHYDRAMINE HYDROCHLORIDE 25 MG: 50 INJECTION, SOLUTION INTRAMUSCULAR; INTRAVENOUS at 03:02

## 2017-06-26 RX ADMIN — DAKIN'S SOLUTION 0.125% (QUARTER STRENGTH): 0.12 SOLUTION at 15:00

## 2017-06-26 RX ADMIN — METRONIDAZOLE 500 MG: 250 TABLET ORAL at 17:22

## 2017-06-26 RX ADMIN — AZTREONAM 500 MG: 1 INJECTION, POWDER, LYOPHILIZED, FOR SOLUTION INTRAMUSCULAR; INTRAVENOUS at 20:41

## 2017-06-26 RX ADMIN — FLUOXETINE 20 MG: 10 CAPSULE ORAL at 13:49

## 2017-06-26 RX ADMIN — HYDROMORPHONE HYDROCHLORIDE 2 MG: 1 INJECTION, SOLUTION INTRAMUSCULAR; INTRAVENOUS; SUBCUTANEOUS at 22:27

## 2017-06-26 RX ADMIN — DAKIN'S SOLUTION 0.125% (QUARTER STRENGTH): 0.12 SOLUTION at 23:38

## 2017-06-26 RX ADMIN — FLUCONAZOLE 200 MG: 100 TABLET ORAL at 18:00

## 2017-06-26 RX ADMIN — NORTRIPTYLINE HYDROCHLORIDE 25 MG: 25 CAPSULE ORAL at 22:26

## 2017-06-26 RX ADMIN — HYDROMORPHONE HYDROCHLORIDE 2 MG: 1 INJECTION, SOLUTION INTRAMUSCULAR; INTRAVENOUS; SUBCUTANEOUS at 13:50

## 2017-06-26 RX ADMIN — AMLODIPINE BESYLATE 10 MG: 5 TABLET ORAL at 13:49

## 2017-06-26 RX ADMIN — CARVEDILOL 25 MG: 12.5 TABLET, FILM COATED ORAL at 13:49

## 2017-06-26 RX ADMIN — DIPHENHYDRAMINE HYDROCHLORIDE 25 MG: 50 INJECTION, SOLUTION INTRAMUSCULAR; INTRAVENOUS at 13:50

## 2017-06-26 RX ADMIN — DIPHENHYDRAMINE HYDROCHLORIDE 25 MG: 50 INJECTION, SOLUTION INTRAMUSCULAR; INTRAVENOUS at 20:41

## 2017-06-26 RX ADMIN — OXYCODONE HYDROCHLORIDE AND ACETAMINOPHEN 2 TABLET: 7.5; 325 TABLET ORAL at 17:22

## 2017-06-26 RX ADMIN — AZTREONAM 500 MG: 1 INJECTION, POWDER, LYOPHILIZED, FOR SOLUTION INTRAMUSCULAR; INTRAVENOUS at 06:36

## 2017-06-26 RX ADMIN — POLYETHYLENE GLYCOL 3350 17 G: 17 POWDER, FOR SOLUTION ORAL at 13:50

## 2017-06-26 RX ADMIN — ONDANSETRON 4 MG: 2 INJECTION INTRAMUSCULAR; INTRAVENOUS at 13:50

## 2017-06-26 RX ADMIN — ASPIRIN 81 MG CHEWABLE TABLET 81 MG: 81 TABLET CHEWABLE at 13:49

## 2017-06-26 RX ADMIN — METRONIDAZOLE 500 MG: 250 TABLET ORAL at 22:26

## 2017-06-26 NOTE — PROGRESS NOTES
Bedside shift change report given to April RN (oncoming nurse) by Forrest Raymond (offgoing nurse). Report included the following information SBAR.

## 2017-06-26 NOTE — PROGRESS NOTES
General Surgery Daily Progress Note    Admit Date: 6/15/2017  Post-Operative Day: 11 Days Post-Op from Procedure(s):  PERINEAL ABSCESS INCISION AND DRAINAGE     Subjective:     Last 24 hrs: Pt is s/p dialysis and pre-medicated for wound care  No complaints   Nl WBC and no fevers    Objective:     Blood pressure 155/70, pulse 77, temperature 98.2 °F (36.8 °C), temperature source Oral, resp. rate 18, height 6' 1.75\" (1.873 m), weight 314 lb 13.1 oz (142.8 kg), SpO2 98 %, not currently breastfeeding. Temp (24hrs), Av.3 °F (36.8 °C), Min:98.1 °F (36.7 °C), Max:98.6 °F (37 °C)      _____________________  Physical Exam:     Alert and Oriented, x3, in no acute distress. Cardiovascular: RRR, no peripheral edema  Lungs:CTAB   Perineal area w/ wound w/ pink tissue - minimal depth - tracks 2 cm up in upper portion of wound; upper area indurated but not erythematous      Assessment:   Principal Problem:    Perineal abscess (2017)    Active Problems:    Diabetes mellitus type 2, insulin dependent ([facility] [address] (10/14/2010)      HTN (hypertension) (10/14/2010)      Abscess of deep perineal space (2017)      ESRD (end stage renal disease) on dialysis ([facility] (2017)            Plan:     Cont LWC  Cont abx per ID  OOB as she is doing  Pain mgmt    Data Review:    Recent Labs      17   0940   WBC  5.2   HGB  8.7*   HCT  27.1*   PLT  264     Recent Labs      17   0940   NA  126*   K  4.6   CL  93*   CO2  23   GLU  82   BUN  45*   CREA  5.40*   CA  8.2*   PHOS  5.2*   ALB  2.5*     No results for input(s): AML, LPSE in the last 72 hours.         ______________________  Medications:    Current Facility-Administered Medications   Medication Dose Route Frequency    fluconazole (DIFLUCAN) tablet 200 mg  200 mg Oral DAILY    polyethylene glycol (MIRALAX) packet 17 g  17 g Oral DAILY    metroNIDAZOLE (FLAGYL) tablet 500 mg  500 mg Oral TID    epoetin joi (EPOGEN;PROCRIT) injection 10,000 Units  10,000 Units SubCUTAneous DIALYSIS MON, WED & FRI    aztreonam (AZACTAM) 500 mg in 0.9% sodium chloride 100 mL IVPB  500 mg IntraVENous Q12H    sodium hypochlorite (QUARTER STRENGTH DAKIN'S) 0.125% irrigation (bottle)   Topical BID    diphenhydrAMINE (BENADRYL) injection 25 mg  25 mg IntraVENous Q4H PRN    HYDROmorphone (PF) (DILAUDID) injection 2 mg  2 mg IntraVENous Q4H PRN    insulin regular (NOVOLIN R, HUMULIN R) injection   SubCUTAneous Q6H    amLODIPine (NORVASC) tablet 10 mg  10 mg Oral DAILY    aspirin chewable tablet 81 mg  81 mg Oral DAILY    carvedilol (COREG) tablet 25 mg  25 mg Oral DAILY    FLUoxetine (PROzac) capsule 20 mg  20 mg Oral DAILY    nortriptyline (PAMELOR) capsule 25 mg  25 mg Oral QHS    oxyCODONE-acetaminophen (PERCOCET 7.5) 7.5-325 mg per tablet 1-2 Tab  1-2 Tab Oral Q4H PRN    sodium chloride (NS) flush 5-10 mL  5-10 mL IntraVENous Q8H    sodium chloride (NS) flush 5-10 mL  5-10 mL IntraVENous PRN    ondansetron (ZOFRAN) injection 4 mg  4 mg IntraVENous Q4H PRN    glucose chewable tablet 16 g  4 Tab Oral PRN    glucagon (GLUCAGEN) injection 1 mg  1 mg IntraMUSCular PRN       Crow Vargas NP  6/26/2017                    ATTENDING ADDENDUM  I supervised the APC and reviewed the note. We discussed the plan of care  Stable so far.  Hoping Azactam will do the trick

## 2017-06-26 NOTE — PROGRESS NOTES
Met with pt this pm along with her daughter. They are aware of need for home health orders for re certification of services. Continue to follow.   Michael Campbell LCSW

## 2017-06-26 NOTE — DIALYSIS
[facility]                         [phone]  Vitals Pre Post Assessment Pre Post   /64 152/64 LOC A&Ox3 A&Ox3   HR 63 75 Lungs CTA CTA   Temp 98.2 98.2 Cardiac RRR RRR   Resp 18 18 Skin Warm and dry Warm and dry   Weight 149 kg 146.5kg Edema general general      Pain denies denies     Orders   Duration: Start: 0900 End: 1200 Total: 3 hr   Dialyzer: revaclear   K Bath: 3   Ca Bath: 2.5   Na / Bicarb: 140/35   Target Fluid Removal: 2000 mL / goal changed to 2500 mL per Dr. Cheryl Aguilar     Access   Type & Location: LUE AVF +b/+t   Comments:                            Cannulated with 15g 1\" needles x 2, lines secured     Labs   Hep B status / date: Negative 6/14/17   Obtained/Reviewed  Critical Results Called reviewed  na     Meds Given   Name Dose Route   none                 Total Liters Process: 57.8 L   Net Fluid Removed: 2000 ml      Comments   Time Out Done: yes   Primary Nurse Rpt Pre: FAVIO Coley RN    Primary Nurse Rpt Post: FAVIO Coley RN    Pt Education: procedure   Care Plan: HD per nephrology   Tx Summary: Treatment completed. Pt tolerated. All possible blood returned. Needles out x 2. Hemostasis via handheld pressure, a=5min, v=5min. Clean dressing applied. +B/+T.

## 2017-06-26 NOTE — PROGRESS NOTES
[facility]   [address] [facility], [address] [place] [address], [facility]  Phone: [phone]   [phone]       Nephrology Progress Note  Lewis Hodges     1950     [id number]  Date of Admission : 6/15/2017  06/26/17    CC: Follow up for ESRD       Assessment and Plan   [facility]- HD :  [facility]   - continue MWF schedule    Hyponatremia :  - 2/2 Hypervolemia     Anemia of CKD:  - labs pending     Recurrent Perianal abscess:   - per Dr Collins Moder and ID    HTN :  - cont current meds    Type II DM     Sec HPTH     Interval History:  Seen and examined on HD. No CP/N/V/SOB    Review of Systems: A comprehensive review of systems was negative.     Current Medications:   Current Facility-Administered Medications   Medication Dose Route Frequency    fluconazole (DIFLUCAN) tablet 200 mg  200 mg Oral DAILY    polyethylene glycol (MIRALAX) packet 17 g  17 g Oral DAILY    metroNIDAZOLE (FLAGYL) tablet 500 mg  500 mg Oral TID    epoetin joi (EPOGEN;PROCRIT) injection 10,000 Units  10,000 Units SubCUTAneous DIALYSIS MON, WED & FRI    aztreonam (AZACTAM) 500 mg in 0.9% sodium chloride 100 mL IVPB  500 mg IntraVENous Q12H    sodium hypochlorite (QUARTER STRENGTH DAKIN'S) 0.125% irrigation (bottle)   Topical BID    diphenhydrAMINE (BENADRYL) injection 25 mg  25 mg IntraVENous Q4H PRN    HYDROmorphone (PF) (DILAUDID) injection 2 mg  2 mg IntraVENous Q4H PRN    insulin regular (NOVOLIN R, HUMULIN R) injection   SubCUTAneous Q6H    amLODIPine (NORVASC) tablet 10 mg  10 mg Oral DAILY    aspirin chewable tablet 81 mg  81 mg Oral DAILY    carvedilol (COREG) tablet 25 mg  25 mg Oral DAILY    FLUoxetine (PROzac) capsule 20 mg  20 mg Oral DAILY    nortriptyline (PAMELOR) capsule 25 mg  25 mg Oral QHS    oxyCODONE-acetaminophen (PERCOCET 7.5) 7.5-325 mg per tablet 1-2 Tab  1-2 Tab Oral Q4H PRN    sodium chloride (NS) flush 5-10 mL  5-10 mL IntraVENous Q8H    sodium chloride (NS) flush 5-10 mL  5-10 mL IntraVENous PRN    ondansetron (ZOFRAN) injection 4 mg  4 mg IntraVENous Q4H PRN    glucose chewable tablet 16 g  4 Tab Oral PRN    glucagon (GLUCAGEN) injection 1 mg  1 mg IntraMUSCular PRN      Allergies   Allergen Reactions    Latex Itching     Rash, sometimes difficult to breathe    Celebrex [Celecoxib] Anaphylaxis and Swelling     TONGUE. LIPS AND EYES    Iodine Other (comments)     IV CONTRAST-LESIONS, AND SKIN SLUFFED OFF    Keflex [Cephalexin] Anaphylaxis and Swelling     Tongue, lips, and eyes    Levaquin [Levofloxacin] Anaphylaxis and Swelling     Tongue, lips, and eyes    Pcn [Penicillins] Anaphylaxis and Swelling     Tongue, lips and eyes    Morphine Other (comments)     PT STATES IS JUST SENSITIVITY, GOT TOO MUCH ONE TIME. Objective:  Vitals:    Vitals:    06/26/17 1030 06/26/17 1100 06/26/17 1130 06/26/17 1200   BP: 147/67 147/67 143/63 155/70   Pulse: 67 72 71 77   Resp:       Temp:       TempSrc:       SpO2:       Weight:       Height:         Intake and Output:  06/26 0701 - 06/26 1900  In: -   Out: 2500   06/24 1901 - 06/26 0700  In: 120 [P.O.:120]  Out: -     Physical Examination:    General: In mild distress  Neck:  Supple, no mass  Resp:  Lungs CTA   CV:  RRR,  no murmur or rub, no LE edema  GI:  Soft, NT, + Bowel sounds, no hepatosplenomegaly  Neurologic:  Non focal  Ext                   LUE AVF w/ good thrill and bruit    []    High complexity decision making was performed  []    Patient is at high-risk of decompensation with multiple organ involvement    Lab Data Personally Reviewed: I have reviewed all the pertinent labs, microbiology data and radiology studies during assessment.     Recent Labs      06/26/17   0940   NA  126*   K  4.6   CL  93*   CO2  23   GLU  82   BUN  45*   CREA  5.40*   CA  8.2*   PHOS  5.2*   ALB  2.5*     Recent Labs      06/26/17   0940   WBC  5.2   HGB  8.7*   HCT  27.1*   PLT  264     Lab Results   Component Value Date/Time Specimen Description: URINE 10/27/2012 08:15 PM    Specimen Description: BLOOD 06/07/2011 07:40 AM    Specimen Description: URINE 08/30/2010 07:10 AM    Specimen Description: URINE 08/09/2010 11:30 AM    Specimen Description: URINE 06/22/2010 11:50 PM     Lab Results   Component Value Date/Time    Culture result: LIGHT  MIXED COMMENSAL PARIS   02/11/2017 12:40 AM    Culture result:  02/11/2017 12:40 AM     LIGHT  ANAEROBIC GRAM NEGATIVE RODS  BETA LACTAMASE POSITIVE      Culture result: NO GROWTH 6 DAYS 02/10/2017 04:43 PM    Culture result: MIXED SKIN PARIS ISOLATED 10/27/2012 08:15 PM    Culture result: NO GROWTH 5 DAYS 06/07/2011 07:40 AM     Recent Results (from the past 24 hour(s))   GLUCOSE, POC    Collection Time: 06/25/17  1:33 PM   Result Value Ref Range    Glucose (POC) 155 (H) 65 - 100 mg/dL    Performed by Imer Horne    GLUCOSE, POC    Collection Time: 06/25/17  5:05 PM   Result Value Ref Range    Glucose (POC) 128 (H) 65 - 100 mg/dL    Performed by Hawa NOVOA    GLUCOSE, POC    Collection Time: 06/25/17 11:18 PM   Result Value Ref Range    Glucose (POC) 162 (H) 65 - 100 mg/dL    Performed by Milly Rice Dr, POC    Collection Time: 06/26/17  6:32 AM   Result Value Ref Range    Glucose (POC) 125 (H) 65 - 100 mg/dL    Performed by Larry Larios    RENAL FUNCTION PANEL    Collection Time: 06/26/17  9:40 AM   Result Value Ref Range    Sodium 126 (L) 136 - 145 mmol/L    Potassium 4.6 3.5 - 5.1 mmol/L    Chloride 93 (L) 97 - 108 mmol/L    CO2 23 21 - 32 mmol/L    Anion gap 10 5 - 15 mmol/L    Glucose 82 65 - 100 mg/dL    BUN 45 (H) 6 - 20 MG/DL    Creatinine 5.40 (H) 0.55 - 1.02 MG/DL    BUN/Creatinine ratio 8 (L) 12 - 20      GFR est AA 10 (L) >60 ml/min/1.73m2    GFR est non-AA 8 (L) >60 ml/min/1.73m2    Calcium 8.2 (L) 8.5 - 10.1 MG/DL    Phosphorus 5.2 (H) 2.6 - 4.7 MG/DL    Albumin 2.5 (L) 3.5 - 5.0 g/dL   CBC WITH AUTOMATED DIFF    Collection Time: 06/26/17  9:40 AM   Result Value Ref Range    WBC 5.2 3.6 - 11.0 K/uL    RBC 3.14 (L) 3.80 - 5.20 M/uL    HGB 8.7 (L) 11.5 - 16.0 g/dL    HCT 27.1 (L) 35.0 - 47.0 %    MCV 86.3 80.0 - 99.0 FL    MCH 27.7 26.0 - 34.0 PG    MCHC 32.1 30.0 - 36.5 g/dL    RDW 16.6 (H) 11.5 - 14.5 %    PLATELET 347 479 - 040 K/uL    NEUTROPHILS 44 32 - 75 %    LYMPHOCYTES 41 12 - 49 %    MONOCYTES 12 5 - 13 %    EOSINOPHILS 3 0 - 7 %    BASOPHILS 0 0 - 1 %    ABS. NEUTROPHILS 2.3 1.8 - 8.0 K/UL    ABS. LYMPHOCYTES 2.1 0.8 - 3.5 K/UL    ABS. MONOCYTES 0.6 0.0 - 1.0 K/UL    ABS. EOSINOPHILS 0.2 0.0 - 0.4 K/UL    ABS.  BASOPHILS 0.0 0.0 - 0.1 K/UL               Dorothea Chin MD

## 2017-06-26 NOTE — PROGRESS NOTES
ID Progress Note  2017    Subjective:     No new complaints, pain about the same    Objective:     Antibiotics:  1. Azactam  2. Flagyl      Vitals:   Visit Vitals    /70    Pulse 77    Temp 98.2 °F (36.8 °C) (Oral)    Resp 18    Ht 6' 1.75\" (1.873 m)    Wt 142.8 kg (314 lb 13.1 oz)  Comment: pt ha 3 blankets and a sheet on    SpO2 98%    Breastfeeding No    BMI 40.69 kg/m2        Tmax:  Temp (24hrs), Av.3 °F (36.8 °C), Min:98.1 °F (36.7 °C), Max:98.6 °F (37 °C)      Exam:  Lungs:  clear to auscultation bilaterally  Heart:  regular rate and rhythm  Abdomen:  soft, non-tender. Bowel sounds normal. No masses,  no organomegaly  Carmenza-wound area is softer    Labs:      Recent Labs      17   0940   WBC  5.2   HGB  8.7*   PLT  264   BUN  45*   CREA  5.40*       Cultures:     Lab Results   Component Value Date/Time    Specimen Description: URINE 10/27/2012 08:15 PM    Specimen Description: BLOOD 2011 07:40 AM    Specimen Description: URINE 2010 07:10 AM     Lab Results   Component Value Date/Time    Culture result: LIGHT  MIXED COMMENSAL PARIS   2017 12:40 AM    Culture result:  2017 12:40 AM     LIGHT  ANAEROBIC GRAM NEGATIVE RODS  BETA LACTAMASE POSITIVE      Culture result: NO GROWTH 6 DAYS 02/10/2017 04:43 PM       Radiology:     Line/Insert Date:           Assessment:     1. Perianal abscess  2. ESRD  3. Debility     Objective:     1.  Continue current therapy    Juan Fenton MD

## 2017-06-27 ENCOUNTER — APPOINTMENT (OUTPATIENT)
Dept: GENERAL RADIOLOGY | Age: 67
DRG: 579 | End: 2017-06-27
Attending: INTERNAL MEDICINE
Payer: MEDICARE

## 2017-06-27 LAB
ANION GAP BLD CALC-SCNC: 8 MMOL/L (ref 5–15)
BUN SERPL-MCNC: 43 MG/DL (ref 6–20)
BUN/CREAT SERPL: 8 (ref 12–20)
CALCIUM SERPL-MCNC: 8 MG/DL (ref 8.5–10.1)
CHLORIDE SERPL-SCNC: 90 MMOL/L (ref 97–108)
CO2 SERPL-SCNC: 25 MMOL/L (ref 21–32)
CREAT SERPL-MCNC: 5.64 MG/DL (ref 0.55–1.02)
GLUCOSE BLD STRIP.AUTO-MCNC: 109 MG/DL (ref 65–100)
GLUCOSE BLD STRIP.AUTO-MCNC: 145 MG/DL (ref 65–100)
GLUCOSE BLD STRIP.AUTO-MCNC: 157 MG/DL (ref 65–100)
GLUCOSE SERPL-MCNC: 168 MG/DL (ref 65–100)
POTASSIUM SERPL-SCNC: 5.1 MMOL/L (ref 3.5–5.1)
SERVICE CMNT-IMP: ABNORMAL
SODIUM SERPL-SCNC: 123 MMOL/L (ref 136–145)

## 2017-06-27 PROCEDURE — 74011250636 HC RX REV CODE- 250/636: Performed by: SURGERY

## 2017-06-27 PROCEDURE — 36415 COLL VENOUS BLD VENIPUNCTURE: CPT | Performed by: INTERNAL MEDICINE

## 2017-06-27 PROCEDURE — 74011000258 HC RX REV CODE- 258: Performed by: INTERNAL MEDICINE

## 2017-06-27 PROCEDURE — 74011250637 HC RX REV CODE- 250/637: Performed by: NURSE PRACTITIONER

## 2017-06-27 PROCEDURE — 65210000002 HC RM PRIVATE GYN

## 2017-06-27 PROCEDURE — 74011250637 HC RX REV CODE- 250/637: Performed by: INTERNAL MEDICINE

## 2017-06-27 PROCEDURE — 74011636637 HC RX REV CODE- 636/637: Performed by: PHYSICIAN ASSISTANT

## 2017-06-27 PROCEDURE — 90935 HEMODIALYSIS ONE EVALUATION: CPT

## 2017-06-27 PROCEDURE — 71010 XR CHEST PORT: CPT

## 2017-06-27 PROCEDURE — 77010033678 HC OXYGEN DAILY

## 2017-06-27 PROCEDURE — 74011250637 HC RX REV CODE- 250/637: Performed by: SURGERY

## 2017-06-27 PROCEDURE — 74011250636 HC RX REV CODE- 250/636: Performed by: PHYSICIAN ASSISTANT

## 2017-06-27 PROCEDURE — 74011000250 HC RX REV CODE- 250: Performed by: INTERNAL MEDICINE

## 2017-06-27 PROCEDURE — 80048 BASIC METABOLIC PNL TOTAL CA: CPT | Performed by: INTERNAL MEDICINE

## 2017-06-27 RX ORDER — CARVEDILOL 12.5 MG/1
25 TABLET ORAL 2 TIMES DAILY WITH MEALS
Status: DISCONTINUED | OUTPATIENT
Start: 2017-06-27 | End: 2017-06-27

## 2017-06-27 RX ORDER — CARVEDILOL 12.5 MG/1
25 TABLET ORAL DAILY
Status: DISCONTINUED | OUTPATIENT
Start: 2017-06-28 | End: 2017-07-06 | Stop reason: HOSPADM

## 2017-06-27 RX ORDER — ALBUMIN HUMAN 250 G/1000ML
12.5 SOLUTION INTRAVENOUS
Status: DISCONTINUED | OUTPATIENT
Start: 2017-06-27 | End: 2017-07-06 | Stop reason: HOSPADM

## 2017-06-27 RX ORDER — CARVEDILOL 12.5 MG/1
12.5 TABLET ORAL EVERY EVENING
Status: DISCONTINUED | OUTPATIENT
Start: 2017-06-27 | End: 2017-07-06 | Stop reason: HOSPADM

## 2017-06-27 RX ORDER — SEVELAMER CARBONATE 800 MG/1
1600 TABLET, FILM COATED ORAL
Status: DISCONTINUED | OUTPATIENT
Start: 2017-06-27 | End: 2017-07-06 | Stop reason: HOSPADM

## 2017-06-27 RX ORDER — HYDROXYZINE 25 MG/1
25 TABLET, FILM COATED ORAL
Status: DISCONTINUED | OUTPATIENT
Start: 2017-06-27 | End: 2017-07-06 | Stop reason: HOSPADM

## 2017-06-27 RX ADMIN — HYDROXYZINE HYDROCHLORIDE 25 MG: 25 TABLET, FILM COATED ORAL at 18:15

## 2017-06-27 RX ADMIN — HYDROXYZINE HYDROCHLORIDE 25 MG: 25 TABLET, FILM COATED ORAL at 23:38

## 2017-06-27 RX ADMIN — ASPIRIN 81 MG CHEWABLE TABLET 81 MG: 81 TABLET CHEWABLE at 10:35

## 2017-06-27 RX ADMIN — CARVEDILOL 25 MG: 12.5 TABLET, FILM COATED ORAL at 11:55

## 2017-06-27 RX ADMIN — AZTREONAM 500 MG: 1 INJECTION, POWDER, LYOPHILIZED, FOR SOLUTION INTRAMUSCULAR; INTRAVENOUS at 19:01

## 2017-06-27 RX ADMIN — Medication 10 ML: at 14:00

## 2017-06-27 RX ADMIN — DIPHENHYDRAMINE HYDROCHLORIDE 25 MG: 50 INJECTION, SOLUTION INTRAMUSCULAR; INTRAVENOUS at 08:50

## 2017-06-27 RX ADMIN — POLYETHYLENE GLYCOL 3350 17 G: 17 POWDER, FOR SOLUTION ORAL at 10:35

## 2017-06-27 RX ADMIN — NORTRIPTYLINE HYDROCHLORIDE 25 MG: 25 CAPSULE ORAL at 23:38

## 2017-06-27 RX ADMIN — HYDROMORPHONE HYDROCHLORIDE 2 MG: 1 INJECTION, SOLUTION INTRAMUSCULAR; INTRAVENOUS; SUBCUTANEOUS at 08:50

## 2017-06-27 RX ADMIN — HUMAN INSULIN 2 UNITS: 100 INJECTION, SOLUTION SUBCUTANEOUS at 02:17

## 2017-06-27 RX ADMIN — DIPHENHYDRAMINE HYDROCHLORIDE 25 MG: 50 INJECTION, SOLUTION INTRAMUSCULAR; INTRAVENOUS at 12:51

## 2017-06-27 RX ADMIN — SEVELAMER CARBONATE 1600 MG: 800 TABLET, FILM COATED ORAL at 18:09

## 2017-06-27 RX ADMIN — AMLODIPINE BESYLATE 10 MG: 5 TABLET ORAL at 10:35

## 2017-06-27 RX ADMIN — METRONIDAZOLE 500 MG: 250 TABLET ORAL at 10:35

## 2017-06-27 RX ADMIN — METRONIDAZOLE 500 MG: 250 TABLET ORAL at 23:38

## 2017-06-27 RX ADMIN — CARVEDILOL 12.5 MG: 12.5 TABLET, FILM COATED ORAL at 18:09

## 2017-06-27 RX ADMIN — METRONIDAZOLE 500 MG: 250 TABLET ORAL at 18:09

## 2017-06-27 RX ADMIN — OXYCODONE HYDROCHLORIDE AND ACETAMINOPHEN 2 TABLET: 7.5; 325 TABLET ORAL at 02:16

## 2017-06-27 RX ADMIN — SEVELAMER CARBONATE 1600 MG: 800 TABLET, FILM COATED ORAL at 11:52

## 2017-06-27 RX ADMIN — FLUOXETINE 20 MG: 10 CAPSULE ORAL at 10:35

## 2017-06-27 RX ADMIN — HUMAN INSULIN 2 UNITS: 100 INJECTION, SOLUTION SUBCUTANEOUS at 18:06

## 2017-06-27 RX ADMIN — AZTREONAM 500 MG: 1 INJECTION, POWDER, LYOPHILIZED, FOR SOLUTION INTRAMUSCULAR; INTRAVENOUS at 06:39

## 2017-06-27 RX ADMIN — Medication 10 ML: at 23:39

## 2017-06-27 RX ADMIN — HYDROXYZINE HYDROCHLORIDE 25 MG: 25 TABLET, FILM COATED ORAL at 11:55

## 2017-06-27 RX ADMIN — ONDANSETRON 4 MG: 2 INJECTION INTRAMUSCULAR; INTRAVENOUS at 06:57

## 2017-06-27 NOTE — PROGRESS NOTES
Progress Note    Patient: Lupe Almanzar MRN: [id number]  SSN: [id number]    YOB: 1950  Age: 75 y.o. Sex: female      Admit Date: 6/15/2017    12 Days Post-Op    Procedure:  Procedure(s):  PERINEAL ABSCESS INCISION AND DRAINAGE    Subjective:     Patient with complaint of itching all over. No nausea, no vomiting, no chest pain, no shortness of breath, and no abdominal pain. Complaint of pain at perianal wound site. Wound care performed this morning. Patient ambulating with assistance, voiding, + flatus and bowel movements. Objective:     Visit Vitals    /72 (BP 1 Location: Right arm, BP Patient Position: At rest;Lying left side)    Pulse 68    Temp 98 °F (36.7 °C)    Resp 16    Ht 6' 1.75\" (1.873 m)    Wt 306 lb (138.8 kg)    SpO2 96%    Breastfeeding No    BMI 39.55 kg/m2       Temp (24hrs), Av.8 °F (36.6 °C), Min:97.4 °F (36.3 °C), Max:98.2 °F (36.8 °C)      Physical Exam:    LUNG: clear to auscultation bilaterally, HEART: regular rate and rhythm, S1, S2 normal,  ABDOMEN: soft, non-tender. Bowel sounds present. Dressing to perianal area dry and intact. EXTREMITIES:  extremities normal, atraumatic, no cyanosis, swelling to left upper extremity.       Lab Review:   BMP: No results found for: NA, K, CL, CO2, AGAP, GLU, BUN, CREA, GFRAA, GFRNA  CMP: No results found for: NA, K, CL, CO2, AGAP, GLU, BUN, CREA, GFRAA, GFRNA, CA, MG, PHOS, ALB, TBIL, TP, ALB, GLOB, AGRAT, SGOT, ALT, GPT  CBC: No results found for: WBC, HGB, HGBEXT, HCT, HCTEXT, PLT, PLTEXT, HGBEXT, HCTEXT, PLTEXT    Assessment:     Hospital Problems  Date Reviewed: 6/15/2017          Codes Class Noted POA    ESRD (end stage renal disease) on dialysis [facility]) ICD-10-CM: N18.6, Z99.2  ICD-9-CM: 585.6, V45.11  2017 Yes        Abscess of deep perineal space ICD-10-CM: N34.0  ICD-9-CM: 597.0  2017 Yes        * (Principal)Perineal abscess ICD-10-CM: L02.215  ICD-9-CM: 682.2  2017 Yes        Diabetes mellitus type 2, insulin dependent ([facility] ICD-10-CM: E11.9, Z79.4  ICD-9-CM: 250.00, V58.67  10/14/2010 Yes        HTN (hypertension) ICD-10-CM: I10  ICD-9-CM: 401.9  10/14/2010 Yes              Plan/Recommendations/Medical Decision Making:     Continue present treatment  Will try Atarax as needed for itching. Continue wound care regiment. Continue to encourage PO intake and ambulation. Continue IV antibiotics per ID. Further plans per Dr. Zina Archer. Signed By: Lata Miranda NP     June 27, 2017       ATTENDING ADDENDUM  I supervised the APC and reviewed the note. We discussed the plan of care  Feels a lot better with less pain. Fluid balance is being addressed.

## 2017-06-27 NOTE — WOUND CARE
WOCN Note:     Follow-up visit for right buttocks I&D site.       Chart shows:  Admitted for perineal abscess; history of previous perineal abscesses, DM, HD, HTN, neuropathy, sleep apnea. Admitted from home. Assessment:   Patient is A&O x 4, continent and mobile. Pre-medicated for pain by RN. 1. Right buttock surgical I&D site = 7.5 x 3.5 x 0.8 cm  With tunnel @ 4 o'clock = 1.5 and softening induration extending outward laterally = 4 cm . Overall improved. Base is 100% moist red granulation. Scant serosanguinous exudate. Periwound intact & with dyschromia over induration. No odor or gross S&S of inflammation other than induration which is reduced. Cleaned with 1/4 Dakin's, moist dressing applied with Carrasyn gel added, dry topper and secured. Patient repositioned herself onto left side. Recommendations:    Buttocks: please change from Dakin's packing to saline-moist gauze with Carrasyn gel added, dry topper and secure. Change daily and as needed with soiling. Discussed above plan with patient & RN, Nely Skelton.     Transition of Care: Plan to follow weekly and as needed while admitted to hospital.    MARK Reyna, RN, [facility]  Certified Wound, Ostomy, Continence Nurse  office [phone]  pager [phone] or call  to page

## 2017-06-27 NOTE — PROGRESS NOTES
ID Progress Note  2017    Subjective:     No new complaints, pain about the same    Objective:     Antibiotics:  1. Azactam  2. Flagyl      Vitals:   Visit Vitals    /72 (BP 1 Location: Right arm, BP Patient Position: At rest;Lying left side)    Pulse 68    Temp 98 °F (36.7 °C)    Resp 16    Ht 6' 1.75\" (1.873 m)    Wt 138.8 kg (306 lb)  Comment: 2 pillows on bed    SpO2 96%    Breastfeeding No    BMI 39.55 kg/m2        Tmax:  Temp (24hrs), Av.6 °F (36.4 °C), Min:97.4 °F (36.3 °C), Max:98 °F (36.7 °C)      Exam:  Lungs:  clear to auscultation bilaterally  Heart:  regular rate and rhythm  Abdomen:  soft, non-tender. Bowel sounds normal. No masses,  no organomegaly  Carmenza-wound area is softer    Labs:      Recent Labs      17   1406  17   0940   WBC   --   5.2   HGB   --   8.7*   PLT   --   264   BUN  43*  45*   CREA  5.64*  5.40*       Cultures:     Lab Results   Component Value Date/Time    Specimen Description: URINE 10/27/2012 08:15 PM    Specimen Description: BLOOD 2011 07:40 AM    Specimen Description: URINE 2010 07:10 AM     Lab Results   Component Value Date/Time    Culture result: LIGHT  MIXED COMMENSAL PARIS   2017 12:40 AM    Culture result:  2017 12:40 AM     LIGHT  ANAEROBIC GRAM NEGATIVE RODS  BETA LACTAMASE POSITIVE      Culture result: NO GROWTH 6 DAYS 02/10/2017 04:43 PM       Radiology:     Line/Insert Date:           Assessment:     1. Perianal abscess  2. ESRD  3. Debility     Objective:     1.  Continue current therapy    William Ross MD

## 2017-06-27 NOTE — PROGRESS NOTES
[facility]   [address] [facility], [facility] [address], [facility]  Phone: [phone]   [phone]       Nephrology Progress Note  Norah Wilson     1950     [id number]  Date of Admission : 6/15/2017  06/27/17    CC: Follow up for ESRD       Assessment and Plan   ESRD- HD :  - HD MWF at [facility]   - continue MWF schedule    HyperPhosphatemia :  - started Renvela 2 tabs w/ meals   - Auryxia not available in hospital     Anemia of CKD:  - Doubled Epo dose   - epo resistant from infection     Recurrent Perianal abscess:   - per Dr Rupinder Soni and ID  - on aztreonam     HTN :  - changed coreg to BID    Type II DM     Sec HPTH     Interval History:  Seen and examined . Reports itching. Not been getting her binders . No CP/N/V/SOB    Review of Systems: A comprehensive review of systems was negative.     Current Medications:   Current Facility-Administered Medications   Medication Dose Route Frequency    sevelamer carbonate (RENVELA) tab 1,600 mg  1,600 mg Oral TID WITH MEALS    carvedilol (COREG) tablet 25 mg  25 mg Oral BID WITH MEALS    polyethylene glycol (MIRALAX) packet 17 g  17 g Oral DAILY    metroNIDAZOLE (FLAGYL) tablet 500 mg  500 mg Oral TID    epoetin joi (EPOGEN;PROCRIT) injection 10,000 Units  10,000 Units SubCUTAneous DIALYSIS MON, WED & FRI    aztreonam (AZACTAM) 500 mg in 0.9% sodium chloride 100 mL IVPB  500 mg IntraVENous Q12H    diphenhydrAMINE (BENADRYL) injection 25 mg  25 mg IntraVENous Q4H PRN    HYDROmorphone (PF) (DILAUDID) injection 2 mg  2 mg IntraVENous Q4H PRN    insulin regular (NOVOLIN R, HUMULIN R) injection   SubCUTAneous Q6H    amLODIPine (NORVASC) tablet 10 mg  10 mg Oral DAILY    aspirin chewable tablet 81 mg  81 mg Oral DAILY    FLUoxetine (PROzac) capsule 20 mg  20 mg Oral DAILY    nortriptyline (PAMELOR) capsule 25 mg  25 mg Oral QHS    oxyCODONE-acetaminophen (PERCOCET 7.5) 7.5-325 mg per tablet 1-2 Tab  1-2 Tab Oral Q4H PRN    sodium chloride (NS) flush 5-10 mL  5-10 mL IntraVENous Q8H    sodium chloride (NS) flush 5-10 mL  5-10 mL IntraVENous PRN    ondansetron (ZOFRAN) injection 4 mg  4 mg IntraVENous Q4H PRN    glucose chewable tablet 16 g  4 Tab Oral PRN    glucagon (GLUCAGEN) injection 1 mg  1 mg IntraMUSCular PRN      Allergies   Allergen Reactions    Latex Itching     Rash, sometimes difficult to breathe    Celebrex [Celecoxib] Anaphylaxis and Swelling     TONGUE. LIPS AND EYES    Iodine Other (comments)     IV CONTRAST-LESIONS, AND SKIN SLUFFED OFF    Keflex [Cephalexin] Anaphylaxis and Swelling     Tongue, lips, and eyes    Levaquin [Levofloxacin] Anaphylaxis and Swelling     Tongue, lips, and eyes    Pcn [Penicillins] Anaphylaxis and Swelling     Tongue, lips and eyes    Morphine Other (comments)     PT STATES IS JUST SENSITIVITY, GOT TOO MUCH ONE TIME. Objective:  Vitals:    Vitals:    06/26/17 1347 06/26/17 2007 06/26/17 2359 06/27/17 0628   BP:  152/73 153/72    Pulse:  63 64    Resp:  18 18    Temp:  97.4 °F (36.3 °C) 97.5 °F (36.4 °C)    TempSrc:       SpO2:  98% 99%    Weight: 142.8 kg (314 lb 13.1 oz)   138.8 kg (306 lb)   Height:         Intake and Output:     06/25 1901 - 06/27 0700  In: 740 [P.O.:415; I.V.:325]  Out: 2500     Physical Examination:    General: In mild distress  Neck:  Supple, no mass  Resp:  Lungs CTA   CV:  RRR,  no murmur or rub, no LE edema  GI:  Soft, NT, + Bowel sounds, no hepatosplenomegaly  Neurologic:  Non focal  Ext                   LUE AVF w/ good thrill and bruit    []    High complexity decision making was performed  []    Patient is at high-risk of decompensation with multiple organ involvement    Lab Data Personally Reviewed: I have reviewed all the pertinent labs, microbiology data and radiology studies during assessment.     Recent Labs      06/26/17   0940   NA  126*   K  4.6   CL  93*   CO2  23   GLU  82   BUN  45*   CREA  5.40*   CA  8.2*   PHOS  5.2*   ALB  2.5* Recent Labs      06/26/17   0940   WBC  5.2   HGB  8.7*   HCT  27.1*   PLT  264     Lab Results   Component Value Date/Time    Specimen Description: URINE 10/27/2012 08:15 PM    Specimen Description: BLOOD 06/07/2011 07:40 AM    Specimen Description: URINE 08/30/2010 07:10 AM    Specimen Description: URINE 08/09/2010 11:30 AM    Specimen Description: URINE 06/22/2010 11:50 PM     Lab Results   Component Value Date/Time    Culture result: LIGHT  MIXED COMMENSAL PARIS   02/11/2017 12:40 AM    Culture result:  02/11/2017 12:40 AM     LIGHT  ANAEROBIC GRAM NEGATIVE RODS  BETA LACTAMASE POSITIVE      Culture result: NO GROWTH 6 DAYS 02/10/2017 04:43 PM    Culture result: MIXED SKIN PARIS ISOLATED 10/27/2012 08:15 PM    Culture result: NO GROWTH 5 DAYS 06/07/2011 07:40 AM     Recent Results (from the past 24 hour(s))   GLUCOSE, POC    Collection Time: 06/26/17  5:28 PM   Result Value Ref Range    Glucose (POC) 133 (H) 65 - 100 mg/dL    Performed by Maik NOVOA    GLUCOSE, POC    Collection Time: 06/26/17 11:58 PM   Result Value Ref Range    Glucose (POC) 157 (H) 65 - 100 mg/dL    Performed by Mora Share PCT    GLUCOSE, POC    Collection Time: 06/27/17  6:20 AM   Result Value Ref Range    Glucose (POC) 109 (H) 65 - 100 mg/dL    Performed by Bsmark PCT                Marco Rehman MD

## 2017-06-27 NOTE — PROGRESS NOTES
Bedside shift change report given to  [address] (oncoming nurse) by Bill Rajan (offgoing nurse). Report included the following information SBAR.

## 2017-06-27 NOTE — PROGRESS NOTES
Patricia Marques NP paged d/t pt increase complaint of generalize swelling in BLE, left side of face, left neck; per [facility] I am now paging Dr. Walter Santos; order received to draw labs/BMP and stat chest xray; message left with Dr. Walter Santos when labs & xray were done & resulted.

## 2017-06-27 NOTE — PROGRESS NOTES
Bedside and Verbal shift change report given to MARVA Keating (oncoming nurse) by Radha Martinez RN (offgoing nurse). Report included the following information SBAR, Kardex, ED Summary, Procedure Summary, Intake/Output, MAR, Accordion and Recent Results.

## 2017-06-28 LAB
GLUCOSE BLD STRIP.AUTO-MCNC: 100 MG/DL (ref 65–100)
GLUCOSE BLD STRIP.AUTO-MCNC: 120 MG/DL (ref 65–100)
GLUCOSE BLD STRIP.AUTO-MCNC: 147 MG/DL (ref 65–100)
SERVICE CMNT-IMP: ABNORMAL
SERVICE CMNT-IMP: ABNORMAL
SERVICE CMNT-IMP: NORMAL

## 2017-06-28 PROCEDURE — 74011250636 HC RX REV CODE- 250/636: Performed by: PHYSICIAN ASSISTANT

## 2017-06-28 PROCEDURE — 74011250636 HC RX REV CODE- 250/636: Performed by: SURGERY

## 2017-06-28 PROCEDURE — 74011000250 HC RX REV CODE- 250: Performed by: INTERNAL MEDICINE

## 2017-06-28 PROCEDURE — 74011250637 HC RX REV CODE- 250/637: Performed by: SURGERY

## 2017-06-28 PROCEDURE — 74011250637 HC RX REV CODE- 250/637: Performed by: INTERNAL MEDICINE

## 2017-06-28 PROCEDURE — 74011250637 HC RX REV CODE- 250/637: Performed by: NURSE PRACTITIONER

## 2017-06-28 PROCEDURE — 90935 HEMODIALYSIS ONE EVALUATION: CPT

## 2017-06-28 PROCEDURE — 74011636637 HC RX REV CODE- 636/637: Performed by: PHYSICIAN ASSISTANT

## 2017-06-28 PROCEDURE — 74011000258 HC RX REV CODE- 258: Performed by: INTERNAL MEDICINE

## 2017-06-28 PROCEDURE — 65210000002 HC RM PRIVATE GYN

## 2017-06-28 PROCEDURE — 82962 GLUCOSE BLOOD TEST: CPT

## 2017-06-28 RX ADMIN — Medication 10 ML: at 22:19

## 2017-06-28 RX ADMIN — HYDROMORPHONE HYDROCHLORIDE 2 MG: 1 INJECTION, SOLUTION INTRAMUSCULAR; INTRAVENOUS; SUBCUTANEOUS at 13:17

## 2017-06-28 RX ADMIN — NORTRIPTYLINE HYDROCHLORIDE 25 MG: 25 CAPSULE ORAL at 22:16

## 2017-06-28 RX ADMIN — OXYCODONE HYDROCHLORIDE AND ACETAMINOPHEN 2 TABLET: 7.5; 325 TABLET ORAL at 16:45

## 2017-06-28 RX ADMIN — FLUOXETINE 20 MG: 10 CAPSULE ORAL at 19:20

## 2017-06-28 RX ADMIN — AMLODIPINE BESYLATE 10 MG: 5 TABLET ORAL at 19:20

## 2017-06-28 RX ADMIN — SEVELAMER CARBONATE 1600 MG: 800 TABLET, FILM COATED ORAL at 19:20

## 2017-06-28 RX ADMIN — AZTREONAM 500 MG: 1 INJECTION, POWDER, LYOPHILIZED, FOR SOLUTION INTRAMUSCULAR; INTRAVENOUS at 07:44

## 2017-06-28 RX ADMIN — HYDROXYZINE HYDROCHLORIDE 25 MG: 25 TABLET, FILM COATED ORAL at 06:38

## 2017-06-28 RX ADMIN — METRONIDAZOLE 500 MG: 250 TABLET ORAL at 22:16

## 2017-06-28 RX ADMIN — ASPIRIN 81 MG CHEWABLE TABLET 81 MG: 81 TABLET CHEWABLE at 19:21

## 2017-06-28 RX ADMIN — AZTREONAM 500 MG: 1 INJECTION, POWDER, LYOPHILIZED, FOR SOLUTION INTRAMUSCULAR; INTRAVENOUS at 19:24

## 2017-06-28 RX ADMIN — HYDROMORPHONE HYDROCHLORIDE 2 MG: 1 INJECTION, SOLUTION INTRAMUSCULAR; INTRAVENOUS; SUBCUTANEOUS at 05:49

## 2017-06-28 RX ADMIN — HYDROMORPHONE HYDROCHLORIDE 2 MG: 1 INJECTION, SOLUTION INTRAMUSCULAR; INTRAVENOUS; SUBCUTANEOUS at 19:20

## 2017-06-28 RX ADMIN — CARVEDILOL 12.5 MG: 12.5 TABLET, FILM COATED ORAL at 19:19

## 2017-06-28 RX ADMIN — SEVELAMER CARBONATE 1600 MG: 800 TABLET, FILM COATED ORAL at 07:49

## 2017-06-28 RX ADMIN — ONDANSETRON 4 MG: 2 INJECTION INTRAMUSCULAR; INTRAVENOUS at 13:14

## 2017-06-28 RX ADMIN — Medication 10 ML: at 07:45

## 2017-06-28 RX ADMIN — HUMAN INSULIN 2 UNITS: 100 INJECTION, SOLUTION SUBCUTANEOUS at 19:43

## 2017-06-28 RX ADMIN — HYDROXYZINE HYDROCHLORIDE 25 MG: 25 TABLET, FILM COATED ORAL at 19:21

## 2017-06-28 NOTE — PROGRESS NOTES
Spiritual Care Assessment/Progress Notes    Candace Sorensen [id number]  [id number]    1950  75 y.o.  female    Patient Telephone Number: [phone] (home)   Amish Affiliation: Sikh   Language: English   Extended Emergency Contact Information  Primary Emergency Contact: Via Hari Romero Phone: [phone]  Mobile Phone: [phone]  Relation: Daughter   Patient Active Problem List    Diagnosis Date Noted    ESRD (end stage renal disease) on dialysis ([facility] 06/20/2017    Abscess of deep perineal space 06/17/2017    Perineal abscess 01/25/2017    Anal cryptitis 06/04/2012    Obstructive sleep apnea (adult) (pediatric) 12/13/2011    s/p debridement of midline abd wound 06/24/2011    Serratia wound infection, 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 06/14/2011    Abdominal pain, chronic, epigastric 01/12/2011    Morbid obesity ([facility] 10/14/2010    Diabetes mellitus type 2, insulin dependent ([facility] 10/14/2010    HTN (hypertension) 10/14/2010    Neuropathy 10/14/2010    Arthritis 10/14/2010        Date: 6/28/2017       Level of Amish/Spiritual Activity:  []         Involved in kwadwo tradition/spiritual practice    []         Not involved in kwadwo tradition/spiritual practice  []         Spiritually oriented    []         Claims no spiritual orientation    []         seeking spiritual identity  []         Feels alienated from Taoist practice/tradition  []         Feels angry about Taoist practice/tradition  []         Spirituality/Taoist tradition a resource for coping at this time.   [x]         Not able to assess due to medical condition    Services Provided Today:  []         crisis intervention    []         reading Scriptures  []         spiritual assessment    []         prayer  []         empathic listening/emotional support  []         rites and rituals (cite in comments)  []         life review     []         Taoist support  []         theological development   [] advocacy  []         ethical dialog     []         blessing  []         bereavement support    []         support to family  []         anticipatory grief support   []         help with AMD  []         spiritual guidance    []         meditation      Spiritual Care Needs  []         Emotional Support  []         Spiritual/Church Care  []         Loss/Adjustment  []         Advocacy/Referral                /Ethics  []         No needs expressed at               this time  []         Other: (note in               comments)  [address]  []         Follow up visits with               pt/family  []         Provide materials  []         Schedule sacraments  []         Contact Community               Clergy  [x]         Follow up as needed  []         Other: (note in               comments)     Attempted LOS visit and Initial Spiritual Assessment in 355. Patient sleeping on both occasions. Left personalized card on tray table after silent prayer on behalf of patient. Consulted with RN. Will follow as able/needed. LONNY [place] [facility]

## 2017-06-28 NOTE — PROGRESS NOTES
Bedside shift change report given to Pina Taylor (oncoming nurse) by Day Nielson RN (offgoing nurse). Report included the following information SBAR and Kardex.

## 2017-06-28 NOTE — PROGRESS NOTES
[facility]   [address] [facility], [facility] [address], [facility]  Phone: [phone]   [phone]       Nephrology Progress Note  Michelle Brink     1950     [id number]  Date of Admission : 6/15/2017  06/28/17    CC: Follow up for ESRD       Assessment and Plan   ESRD- HD :  - HD MWF at [facility]   - HD today     Hypervolemia / Hyponatremia :  - restrict water intake . Avoid IVF   - UF : 4 kg again today     HyperPhosphatemia :  - started Renvela 2 tabs w/ meals   - Auryxia not available in hospital     Anemia of CKD:  - Doubled Epo dose   - epo resistant from infection     Recurrent Perianal abscess:   - per Dr Vedia Dance and ID  - on aztreonam     HTN :  - changed coreg to BID    Type II DM     Sec HPTH     Interval History:  Seen and examined . Needed urgent UF yesterday for fluid overload   Still volume overloaded     Review of Systems: A comprehensive review of systems was negative.     Current Medications:   Current Facility-Administered Medications   Medication Dose Route Frequency    sevelamer carbonate (RENVELA) tab 1,600 mg  1,600 mg Oral TID WITH MEALS    epoetin joi (EPOGEN;PROCRIT) injection 20,000 Units  20,000 Units SubCUTAneous DIALYSIS MON, WED & FRI    hydrOXYzine HCl (ATARAX) tablet 25 mg  25 mg Oral Q4H PRN    albumin human 25% (BUMINATE) solution 12.5 g  12.5 g IntraVENous DIALYSIS PRN    carvedilol (COREG) tablet 25 mg  25 mg Oral DAILY    carvedilol (COREG) tablet 12.5 mg  12.5 mg Oral QPM    polyethylene glycol (MIRALAX) packet 17 g  17 g Oral DAILY    metroNIDAZOLE (FLAGYL) tablet 500 mg  500 mg Oral TID    aztreonam (AZACTAM) 500 mg in 0.9% sodium chloride 100 mL IVPB  500 mg IntraVENous Q12H    diphenhydrAMINE (BENADRYL) injection 25 mg  25 mg IntraVENous Q4H PRN    HYDROmorphone (PF) (DILAUDID) injection 2 mg  2 mg IntraVENous Q4H PRN    insulin regular (NOVOLIN R, HUMULIN R) injection   SubCUTAneous Q6H    amLODIPine (NORVASC) tablet 10 mg  10 mg Oral DAILY    aspirin chewable tablet 81 mg  81 mg Oral DAILY    FLUoxetine (PROzac) capsule 20 mg  20 mg Oral DAILY    nortriptyline (PAMELOR) capsule 25 mg  25 mg Oral QHS    oxyCODONE-acetaminophen (PERCOCET 7.5) 7.5-325 mg per tablet 1-2 Tab  1-2 Tab Oral Q4H PRN    sodium chloride (NS) flush 5-10 mL  5-10 mL IntraVENous Q8H    sodium chloride (NS) flush 5-10 mL  5-10 mL IntraVENous PRN    ondansetron (ZOFRAN) injection 4 mg  4 mg IntraVENous Q4H PRN    glucose chewable tablet 16 g  4 Tab Oral PRN    glucagon (GLUCAGEN) injection 1 mg  1 mg IntraMUSCular PRN      Allergies   Allergen Reactions    Latex Itching     Rash, sometimes difficult to breathe    Celebrex [Celecoxib] Anaphylaxis and Swelling     TONGUE. LIPS AND EYES    Iodine Other (comments)     IV CONTRAST-LESIONS, AND SKIN SLUFFED OFF    Keflex [Cephalexin] Anaphylaxis and Swelling     Tongue, lips, and eyes    Levaquin [Levofloxacin] Anaphylaxis and Swelling     Tongue, lips, and eyes    Pcn [Penicillins] Anaphylaxis and Swelling     Tongue, lips and eyes    Morphine Other (comments)     PT STATES IS JUST SENSITIVITY, GOT TOO MUCH ONE TIME.        Objective:  Vitals:    Vitals:    06/27/17 2245 06/28/17 0553 06/28/17 0748 06/28/17 0812   BP: 144/66 142/70  143/70   Pulse: 62 70  79   Resp: 14 16  16   Temp:    98.3 °F (36.8 °C)   TempSrc:       SpO2:  100%  97%   Weight:   137.4 kg (303 lb)    Height:         Intake and Output:     06/26 1901 - 06/28 0700  In: 740 [P.O.:415; I.V.:325]  Out: 4000     Physical Examination:    General: In mild distress  Neck:  Supple, no mass  Resp:  Lungs CTA   CV:  RRR,  no murmur or rub, 4+ LE edema  GI:  Soft, NT, + Bowel sounds, no hepatosplenomegaly  Neurologic:  Non focal  Ext                   LUE AVF w/ good thrill and bruit    []    High complexity decision making was performed  []    Patient is at high-risk of decompensation with multiple organ involvement    Lab Data Personally Reviewed: I have reviewed all the pertinent labs, microbiology data and radiology studies during assessment.     Recent Labs      06/27/17   1406  06/26/17   0940   NA  123*  126*   K  5.1  4.6   CL  90*  93*   CO2  25  23   GLU  168*  82   BUN  43*  45*   CREA  5.64*  5.40*   CA  8.0*  8.2*   PHOS   --   5.2*   ALB   --   2.5*     Recent Labs      06/26/17   0940   WBC  5.2   HGB  8.7*   HCT  27.1*   PLT  264     Lab Results   Component Value Date/Time    Specimen Description: URINE 10/27/2012 08:15 PM    Specimen Description: BLOOD 06/07/2011 07:40 AM    Specimen Description: URINE 08/30/2010 07:10 AM    Specimen Description: URINE 08/09/2010 11:30 AM    Specimen Description: URINE 06/22/2010 11:50 PM     Lab Results   Component Value Date/Time    Culture result: LIGHT  MIXED COMMENSAL PARIS   02/11/2017 12:40 AM    Culture result:  02/11/2017 12:40 AM     LIGHT  ANAEROBIC GRAM NEGATIVE RODS  BETA LACTAMASE POSITIVE      Culture result: NO GROWTH 6 DAYS 02/10/2017 04:43 PM    Culture result: MIXED SKIN PARIS ISOLATED 10/27/2012 08:15 PM    Culture result: NO GROWTH 5 DAYS 06/07/2011 07:40 AM     Recent Results (from the past 24 hour(s))   METABOLIC PANEL, BASIC    Collection Time: 06/27/17  2:06 PM   Result Value Ref Range    Sodium 123 (L) 136 - 145 mmol/L    Potassium 5.1 3.5 - 5.1 mmol/L    Chloride 90 (L) 97 - 108 mmol/L    CO2 25 21 - 32 mmol/L    Anion gap 8 5 - 15 mmol/L    Glucose 168 (H) 65 - 100 mg/dL    BUN 43 (H) 6 - 20 MG/DL    Creatinine 5.64 (H) 0.55 - 1.02 MG/DL    BUN/Creatinine ratio 8 (L) 12 - 20      GFR est AA 9 (L) >60 ml/min/1.73m2    GFR est non-AA 7 (L) >60 ml/min/1.73m2    Calcium 8.0 (L) 8.5 - 10.1 MG/DL   GLUCOSE, POC    Collection Time: 06/27/17  5:44 PM   Result Value Ref Range    Glucose (POC) 145 (H) 65 - 100 mg/dL    Performed by Chang Cade    GLUCOSE, POC    Collection Time: 06/28/17 12:05 AM   Result Value Ref Range    Glucose (POC) 120 (H) 65 - 100 mg/dL    Performed by Arcelia Mercedes Chelo    GLUCOSE, POC    Collection Time: 06/28/17  7:04 AM   Result Value Ref Range    Glucose (POC) 100 65 - 100 mg/dL    Performed by Santa Hong MD

## 2017-06-28 NOTE — PROGRESS NOTES
ID Progress Note  2017    Subjective:     No new complaints, pain about the same    Objective:     Antibiotics:  1. Azactam  2. Flagyl      Vitals:   Visit Vitals    /67    Pulse 70  Comment: watching tv    Temp 97.6 °F (36.4 °C) (Oral)    Resp 16    Ht 6' 1.75\" (1.873 m)    Wt 137.4 kg (303 lb)    SpO2 97%    Breastfeeding No    BMI 39.17 kg/m2        Tmax:  Temp (24hrs), Av.8 °F (36.6 °C), Min:97.5 °F (36.4 °C), Max:98.3 °F (36.8 °C)      Exam:  Lungs:  clear to auscultation bilaterally  Heart:  regular rate and rhythm  Abdomen:  soft, non-tender. Bowel sounds normal. No masses,  no organomegaly  Carmenza-wound area is softer    Labs:      Recent Labs      17   1406  17   0940   WBC   --   5.2   HGB   --   8.7*   PLT   --   264   BUN  43*  45*   CREA  5.64*  5.40*       Cultures:     Lab Results   Component Value Date/Time    Specimen Description: URINE 10/27/2012 08:15 PM    Specimen Description: BLOOD 2011 07:40 AM    Specimen Description: URINE 2010 07:10 AM     Lab Results   Component Value Date/Time    Culture result: LIGHT  MIXED COMMENSAL PARIS   2017 12:40 AM    Culture result:  2017 12:40 AM     LIGHT  ANAEROBIC GRAM NEGATIVE RODS  BETA LACTAMASE POSITIVE      Culture result: NO GROWTH 6 DAYS 02/10/2017 04:43 PM       Radiology:     Line/Insert Date:           Assessment:     1. Perianal abscess  2. ESRD  3. Debility     Objective:     1.  Continue current therapy    Memo Estrella MD

## 2017-06-28 NOTE — DIALYSIS
[facility]                         [phone]  Vitals Pre Post Assessment Pre Post   /66 144/68 LOC A/O x 3 A/O x 3   HR 74 80 Lungs Clear, denies SOB clear   Temp 97.6 97.5 Cardiac HRR/ monitored TELE Monitored remotely   Resp 18 16 Skin WDI WDI   Weight 142.5kg 138Kg Edema gen puffiness, face VERY swollen Still puffy, but face looks better. Can move left leg more easily      Pain Denies, states she just got med from primary RN before leaving unit. Pain 8/10in bottom and head, down from 10/10 from 30 mins age     Orders   Duration: Start: 1400 End: 1700 Total: 3 hrs   Dialyzer: revaclear   K Bath: 3   Ca Bath: 2.5   Na / Bicarb: 140/35   Target Fluid Removal: 4500ml     Access   Type & Location: LARRY/AVF   Comments:  +T/B no drainage, but has some swelling in area. Cannulated w/ 15g x 1\" needles x 2.  +flashes/ aspir/ NS flashes. Secured well. Tolerated tx well. At end, blood in circuit returned w/ 300ml NS. Cannulas removed x 2. Pressure held to each site for 8 mins till no bleeding noted. +T/B noted.                              Labs   Hep B status / date: Hep B Ag neg 6/15/17   Obtained/Reviewed  Critical Results Called reviewed  No critical results seen     Meds Given   Name Dose Route        No meds ordered            Total Liters Process: 70   Net Fluid Removed: 4500 ml      Comments   Time Out Done: Yes, by SUDEEP at [address] [facility]   Primary Nurse Rpt Pre: Helen Mcardle, RN   Primary Nurse Rpt Post: Helen Mcardle, RN   Pt Education: Discussed fluid limits, access care   Care Plan: Continue to do HD txs as ordered   Tx Summary: Returned to  [phone] by hosp staff

## 2017-06-28 NOTE — DIALYSIS
[facility]                         [phone]  Vitals Pre Post Assessment Pre Post   /69 144/66 LOC AAOX3 AAOX3   HR 65 62 Lungs Diminished; 2L NC Diminished; 2L NC   Temp 36.4 97.5 Cardiac Denies CP/SOB Denies CP/SOB   Resp 18 18 Skin Dry/clean Dry/clean   Weight 147.2kg 143.7 Edema generalized generalized      Pain denies denies     Orders   Duration: Start:  End:  Total: 2hrs   Dialyzer: revaclear (lot [id number]) nipro tubing (lot [id number])   K Bath: n/a   Ca Bath: n/a   Na / Bicarb: Na/na   Target Fluid Removal: 4000ml     Access   Type & Location: AVF   Comments: LUE AVF cannulated with 15g needles and taped per policy     Labs   Hep B status / date: Surface    Obtained/Reviewed  Critical Results Called Reviewed       Meds Given   Name Dose Route                    Total Liters Process: 0   Net Fluid Removed: 4000ML      Comments   Time Out Done: Yes per policy   Primary Nurse Rpt Pre: Elizabeth Theodore RN   Primary Nurse Rpt Post: Elizabeth Theodore RN   Pt Education: Procedural, access care, fluid management   Care Plan: Per primary team   Tx Summary: Orders, labs, notes reviewed. Consent on file. Pt to received 2hr UF tx. LUE AVF assessed for good bruit/thrill. Accessed with 15G needles and taped in place per policy. Pt tolerated 2hr tx for net UF 4kg. x1 filter change necessary at start s/t TMP alarm frequency. Post tx all possible blood was rinsed back and avf was decannulated, hemostasis achived <5 min, and site bandaged/taped per policy. Pt left in room in bed in low position, side rails up x2 and CB in reach, dtr at bedside.      Signed: Anastacia Valera RN

## 2017-06-28 NOTE — PROGRESS NOTES
General Surgery Daily Progress Note    Admit Date: 6/15/2017  Post-Operative Day: 13 Days Post-Op from Procedure(s):  PERINEAL ABSCESS INCISION AND DRAINAGE     Subjective:     Last 24 hrs: Pt go acutely SOB yesterday and required UF last night. Feels better today. Still has some LUE and RLE swelling. Na trending down  No cp or SOB today. Perineal wound cont to be painful but relieved w/ pain med. Objective:     Blood pressure 143/70, pulse 79, temperature 98.3 °F (36.8 °C), resp. rate 16, height 6' 1.75\" (1.873 m), weight 303 lb (137.4 kg), SpO2 97 %, not currently breastfeeding. Temp (24hrs), Av.9 °F (36.6 °C), Min:97.5 °F (36.4 °C), Max:98.3 °F (36.8 °C)      _____________________  Physical Exam:     Alert and Oriented, x3, in no acute distress. Cardiovascular: RRR, has tr LUE swelling and AV access, 1+ pitting RLE edema  Lungs: diminished in R base   Abdomen: soft, nl BS  Wound not examined      Assessment:   Principal Problem:    Perineal abscess (2017)    Active Problems:    Diabetes mellitus type 2, insulin dependent ([facility] [address] (10/14/2010)      HTN (hypertension) (10/14/2010)      Abscess of deep perineal space (2017)      ESRD (end stage renal disease) on dialysis [facility]) (2017)            Plan:     HD today per renal  No IVF  Pain mgmt  Cont abx  Daily labs    Data Review:    Recent Labs      17   0940   WBC  5.2   HGB  8.7*   HCT  27.1*   PLT  264     Recent Labs      17   1406  17   0940   NA  123*  126*   K  5.1  4.6   CL  90*  93*   CO2  25  23   GLU  168*  82   BUN  43*  45*   CREA  5.64*  5.40*   CA  8.0*  8.2*   PHOS   --   5.2*   ALB   --   2.5*     No results for input(s): AML, LPSE in the last 72 hours.         ______________________  Medications:    Current Facility-Administered Medications   Medication Dose Route Frequency    sevelamer carbonate (RENVELA) tab 1,600 mg  1,600 mg Oral TID WITH MEALS    epoetin joi (EPOGEN;PROCRIT) injection 20,000 Units 20,000 Units SubCUTAneous DIALYSIS MON, WED & FRI    hydrOXYzine HCl (ATARAX) tablet 25 mg  25 mg Oral Q4H PRN    albumin human 25% (BUMINATE) solution 12.5 g  12.5 g IntraVENous DIALYSIS PRN    carvedilol (COREG) tablet 25 mg  25 mg Oral DAILY    carvedilol (COREG) tablet 12.5 mg  12.5 mg Oral QPM    polyethylene glycol (MIRALAX) packet 17 g  17 g Oral DAILY    metroNIDAZOLE (FLAGYL) tablet 500 mg  500 mg Oral TID    aztreonam (AZACTAM) 500 mg in 0.9% sodium chloride 100 mL IVPB  500 mg IntraVENous Q12H    diphenhydrAMINE (BENADRYL) injection 25 mg  25 mg IntraVENous Q4H PRN    HYDROmorphone (PF) (DILAUDID) injection 2 mg  2 mg IntraVENous Q4H PRN    insulin regular (NOVOLIN R, HUMULIN R) injection   SubCUTAneous Q6H    amLODIPine (NORVASC) tablet 10 mg  10 mg Oral DAILY    aspirin chewable tablet 81 mg  81 mg Oral DAILY    FLUoxetine (PROzac) capsule 20 mg  20 mg Oral DAILY    nortriptyline (PAMELOR) capsule 25 mg  25 mg Oral QHS    oxyCODONE-acetaminophen (PERCOCET 7.5) 7.5-325 mg per tablet 1-2 Tab  1-2 Tab Oral Q4H PRN    sodium chloride (NS) flush 5-10 mL  5-10 mL IntraVENous Q8H    sodium chloride (NS) flush 5-10 mL  5-10 mL IntraVENous PRN    ondansetron (ZOFRAN) injection 4 mg  4 mg IntraVENous Q4H PRN    glucose chewable tablet 16 g  4 Tab Oral PRN    glucagon (GLUCAGEN) injection 1 mg  1 mg IntraMUSCular PRN       Matthew Schneider NP  6/28/2017                    ATTENDING ADDENDUM  I supervised the APC and reviewed the note. We discussed the plan of care  Fluid status better after dialysis. Will evaluate the wound tomorrow in light of possible wound vac.

## 2017-06-29 LAB
GLUCOSE BLD STRIP.AUTO-MCNC: 124 MG/DL (ref 65–100)
GLUCOSE BLD STRIP.AUTO-MCNC: 134 MG/DL (ref 65–100)
GLUCOSE BLD STRIP.AUTO-MCNC: 163 MG/DL (ref 65–100)
GLUCOSE BLD STRIP.AUTO-MCNC: 172 MG/DL (ref 65–100)
SERVICE CMNT-IMP: ABNORMAL
TROPONIN I SERPL-MCNC: <0.04 NG/ML

## 2017-06-29 PROCEDURE — 93005 ELECTROCARDIOGRAM TRACING: CPT

## 2017-06-29 PROCEDURE — 74011250637 HC RX REV CODE- 250/637: Performed by: SURGERY

## 2017-06-29 PROCEDURE — 77030018836 HC SOL IRR NACL ICUM -A

## 2017-06-29 PROCEDURE — 36415 COLL VENOUS BLD VENIPUNCTURE: CPT | Performed by: SURGERY

## 2017-06-29 PROCEDURE — 84484 ASSAY OF TROPONIN QUANT: CPT | Performed by: SURGERY

## 2017-06-29 PROCEDURE — 74011636637 HC RX REV CODE- 636/637: Performed by: PHYSICIAN ASSISTANT

## 2017-06-29 PROCEDURE — 77030009526 HC GEL CARSYN MDII -A

## 2017-06-29 PROCEDURE — 74011250636 HC RX REV CODE- 250/636: Performed by: PHYSICIAN ASSISTANT

## 2017-06-29 PROCEDURE — 74011000250 HC RX REV CODE- 250: Performed by: INTERNAL MEDICINE

## 2017-06-29 PROCEDURE — 65210000002 HC RM PRIVATE GYN

## 2017-06-29 PROCEDURE — 74011000258 HC RX REV CODE- 258: Performed by: INTERNAL MEDICINE

## 2017-06-29 PROCEDURE — 74011250637 HC RX REV CODE- 250/637: Performed by: INTERNAL MEDICINE

## 2017-06-29 PROCEDURE — 82962 GLUCOSE BLOOD TEST: CPT

## 2017-06-29 PROCEDURE — 74011250636 HC RX REV CODE- 250/636: Performed by: SURGERY

## 2017-06-29 PROCEDURE — 74011250637 HC RX REV CODE- 250/637: Performed by: NURSE PRACTITIONER

## 2017-06-29 RX ORDER — AMLODIPINE BESYLATE 5 MG/1
2.5 TABLET ORAL DAILY
Status: DISCONTINUED | OUTPATIENT
Start: 2017-06-30 | End: 2017-07-06 | Stop reason: HOSPADM

## 2017-06-29 RX ADMIN — CARVEDILOL 12.5 MG: 12.5 TABLET, FILM COATED ORAL at 17:03

## 2017-06-29 RX ADMIN — Medication 10 ML: at 22:06

## 2017-06-29 RX ADMIN — Medication 10 ML: at 06:14

## 2017-06-29 RX ADMIN — METRONIDAZOLE 500 MG: 250 TABLET ORAL at 09:59

## 2017-06-29 RX ADMIN — METRONIDAZOLE 500 MG: 250 TABLET ORAL at 16:51

## 2017-06-29 RX ADMIN — SEVELAMER CARBONATE 1600 MG: 800 TABLET, FILM COATED ORAL at 09:58

## 2017-06-29 RX ADMIN — CARVEDILOL 25 MG: 12.5 TABLET, FILM COATED ORAL at 09:58

## 2017-06-29 RX ADMIN — AZTREONAM 500 MG: 1 INJECTION, POWDER, LYOPHILIZED, FOR SOLUTION INTRAMUSCULAR; INTRAVENOUS at 19:09

## 2017-06-29 RX ADMIN — FLUOXETINE 20 MG: 10 CAPSULE ORAL at 09:59

## 2017-06-29 RX ADMIN — ASPIRIN 81 MG CHEWABLE TABLET 81 MG: 81 TABLET CHEWABLE at 09:59

## 2017-06-29 RX ADMIN — HYDROMORPHONE HYDROCHLORIDE 2 MG: 1 INJECTION, SOLUTION INTRAMUSCULAR; INTRAVENOUS; SUBCUTANEOUS at 14:06

## 2017-06-29 RX ADMIN — AZTREONAM 500 MG: 1 INJECTION, POWDER, LYOPHILIZED, FOR SOLUTION INTRAMUSCULAR; INTRAVENOUS at 06:35

## 2017-06-29 RX ADMIN — METRONIDAZOLE 500 MG: 250 TABLET ORAL at 22:03

## 2017-06-29 RX ADMIN — HUMAN INSULIN 2 UNITS: 100 INJECTION, SOLUTION SUBCUTANEOUS at 12:00

## 2017-06-29 RX ADMIN — ONDANSETRON 4 MG: 2 INJECTION INTRAMUSCULAR; INTRAVENOUS at 18:16

## 2017-06-29 RX ADMIN — Medication 10 ML: at 14:00

## 2017-06-29 RX ADMIN — NORTRIPTYLINE HYDROCHLORIDE 25 MG: 25 CAPSULE ORAL at 22:04

## 2017-06-29 RX ADMIN — HYDROXYZINE HYDROCHLORIDE 25 MG: 25 TABLET, FILM COATED ORAL at 16:38

## 2017-06-29 RX ADMIN — SEVELAMER CARBONATE 1600 MG: 800 TABLET, FILM COATED ORAL at 16:51

## 2017-06-29 RX ADMIN — HYDROMORPHONE HYDROCHLORIDE 2 MG: 1 INJECTION, SOLUTION INTRAMUSCULAR; INTRAVENOUS; SUBCUTANEOUS at 10:15

## 2017-06-29 RX ADMIN — SEVELAMER CARBONATE 1600 MG: 800 TABLET, FILM COATED ORAL at 12:00

## 2017-06-29 RX ADMIN — HYDROMORPHONE HYDROCHLORIDE 2 MG: 1 INJECTION, SOLUTION INTRAMUSCULAR; INTRAVENOUS; SUBCUTANEOUS at 00:02

## 2017-06-29 RX ADMIN — HUMAN INSULIN 2 UNITS: 100 INJECTION, SOLUTION SUBCUTANEOUS at 17:04

## 2017-06-29 NOTE — PROGRESS NOTES
Bedside and Verbal shift change report given to 1101 Rogerio Morgan RN (oncoming nurse) by Adri Lockett RN (offgoing nurse). Report included the following information SBAR, Kardex, ED Summary, Procedure Summary, Intake/Output, MAR, Accordion and Recent Results.

## 2017-06-29 NOTE — PROGRESS NOTES
[facility]   [address] [facility] [facility] [address], [facility]  Phone: [phone]   [phone]       Nephrology Progress Note  Dennie Nim     1950     [id number]  Date of Admission : 6/15/2017  06/29/17    CC: Follow up for ESRD       Assessment and Plan   ESRD- HD :  - HD MWF at [facility]   - HD tomorrow      Hypervolemia / Hyponatremia :  - UF : 4 kg tomorrow     HyperPhosphatemia :  - started Renvela 2 tabs w/ meals   - Auryxia not available in hospital     Anemia of CKD:  - Doubled Epo dose   - epo resistant from infection     Recurrent Perianal abscess:   - per Dr Yeyo Dasilva and ID  - on aztreonam     HTN :  - reduced     Type II DM     Sec HPTH     Interval History:  Seen and examined . Doing better   BP low   Reduced norrvasc   Review of Systems: A comprehensive review of systems was negative.     Current Medications:   Current Facility-Administered Medications   Medication Dose Route Frequency    [START ON 6/30/2017] amLODIPine (NORVASC) tablet 2.5 mg  2.5 mg Oral DAILY    sevelamer carbonate (RENVELA) tab 1,600 mg  1,600 mg Oral TID WITH MEALS    epoetin joi (EPOGEN;PROCRIT) injection 20,000 Units  20,000 Units SubCUTAneous DIALYSIS MON, WED & FRI    hydrOXYzine HCl (ATARAX) tablet 25 mg  25 mg Oral Q4H PRN    albumin human 25% (BUMINATE) solution 12.5 g  12.5 g IntraVENous DIALYSIS PRN    carvedilol (COREG) tablet 25 mg  25 mg Oral DAILY    carvedilol (COREG) tablet 12.5 mg  12.5 mg Oral QPM    polyethylene glycol (MIRALAX) packet 17 g  17 g Oral DAILY    metroNIDAZOLE (FLAGYL) tablet 500 mg  500 mg Oral TID    aztreonam (AZACTAM) 500 mg in 0.9% sodium chloride 100 mL IVPB  500 mg IntraVENous Q12H    diphenhydrAMINE (BENADRYL) injection 25 mg  25 mg IntraVENous Q4H PRN    HYDROmorphone (PF) (DILAUDID) injection 2 mg  2 mg IntraVENous Q4H PRN    insulin regular (NOVOLIN R, HUMULIN R) injection   SubCUTAneous Q6H    aspirin chewable tablet 81 mg  81 mg Oral DAILY    FLUoxetine (PROzac) capsule 20 mg  20 mg Oral DAILY    nortriptyline (PAMELOR) capsule 25 mg  25 mg Oral QHS    oxyCODONE-acetaminophen (PERCOCET 7.5) 7.5-325 mg per tablet 1-2 Tab  1-2 Tab Oral Q4H PRN    sodium chloride (NS) flush 5-10 mL  5-10 mL IntraVENous Q8H    sodium chloride (NS) flush 5-10 mL  5-10 mL IntraVENous PRN    ondansetron (ZOFRAN) injection 4 mg  4 mg IntraVENous Q4H PRN    glucose chewable tablet 16 g  4 Tab Oral PRN    glucagon (GLUCAGEN) injection 1 mg  1 mg IntraMUSCular PRN      Allergies   Allergen Reactions    Latex Itching     Rash, sometimes difficult to breathe    Celebrex [Celecoxib] Anaphylaxis and Swelling     TONGUE. LIPS AND EYES    Iodine Other (comments)     IV CONTRAST-LESIONS, AND SKIN SLUFFED OFF    Keflex [Cephalexin] Anaphylaxis and Swelling     Tongue, lips, and eyes    Levaquin [Levofloxacin] Anaphylaxis and Swelling     Tongue, lips, and eyes    Pcn [Penicillins] Anaphylaxis and Swelling     Tongue, lips and eyes    Morphine Other (comments)     PT STATES IS JUST SENSITIVITY, GOT TOO MUCH ONE TIME.        Objective:  Vitals:    Vitals:    06/29/17 0016 06/29/17 0602 06/29/17 0812 06/29/17 0958   BP: 120/64  119/59 133/62   Pulse: 64  65 66   Resp: 18  18    Temp: 98.2 °F (36.8 °C)  98.3 °F (36.8 °C)    TempSrc:       SpO2: 95%  97%    Weight:  131.2 kg (289 lb 3.9 oz)     Height:         Intake and Output:     06/27 1901 - 06/29 0700  In: 350 [P.O.:350]  Out: 8600 [Urine:100]    Physical Examination:    General: In mild distress  Neck:  Supple, no mass  Resp:  Lungs CTA   CV:  RRR,  no murmur or rub, 4+ LE edema  GI:  Soft, NT, + Bowel sounds, no hepatosplenomegaly  Neurologic:  Non focal  Ext                   LUE AVF w/ good thrill and bruit    []    High complexity decision making was performed  []    Patient is at high-risk of decompensation with multiple organ involvement    Lab Data Personally Reviewed: I have reviewed all the pertinent labs, microbiology data and radiology studies during assessment. Recent Labs      06/27/17   1406   NA  123*   K  5.1   CL  90*   CO2  25   GLU  168*   BUN  43*   CREA  5.64*   CA  8.0*     No results for input(s): WBC, HGB, HCT, PLT, HGBEXT, HCTEXT, PLTEXT, HGBEXT, HCTEXT, PLTEXT in the last 72 hours.   Lab Results   Component Value Date/Time    Specimen Description: URINE 10/27/2012 08:15 PM    Specimen Description: BLOOD 06/07/2011 07:40 AM    Specimen Description: URINE 08/30/2010 07:10 AM    Specimen Description: URINE 08/09/2010 11:30 AM    Specimen Description: URINE 06/22/2010 11:50 PM     Lab Results   Component Value Date/Time    Culture result: LIGHT  MIXED COMMENSAL PARIS   02/11/2017 12:40 AM    Culture result:  02/11/2017 12:40 AM     LIGHT  ANAEROBIC GRAM NEGATIVE RODS  BETA LACTAMASE POSITIVE      Culture result: NO GROWTH 6 DAYS 02/10/2017 04:43 PM    Culture result: MIXED SKIN PARIS ISOLATED 10/27/2012 08:15 PM    Culture result: NO GROWTH 5 DAYS 06/07/2011 07:40 AM     Recent Results (from the past 24 hour(s))   GLUCOSE, POC    Collection Time: 06/28/17  7:18 PM   Result Value Ref Range    Glucose (POC) 147 (H) 65 - 100 mg/dL    Performed by Za Thao, POC    Collection Time: 06/29/17 12:00 AM   Result Value Ref Range    Glucose (POC) 134 (H) 65 - 100 mg/dL    Performed by Kevin Ditch PCT    GLUCOSE, POC    Collection Time: 06/29/17  6:00 AM   Result Value Ref Range    Glucose (POC) 124 (H) 65 - 100 mg/dL    Performed by Kevin Ditch PCT                Barb Caruso MD

## 2017-06-29 NOTE — PROGRESS NOTES
ID Progress Note  2017    Subjective:     No new complaints, pain about the same    Objective:     Antibiotics:  1. Azactam  2. Flagyl      Vitals:   Visit Vitals    /62    Pulse 63    Temp 98.4 °F (36.9 °C)    Resp 18    Ht 6' 1.75\" (1.873 m)    Wt 131.2 kg (289 lb 3.9 oz)  Comment: 1 pillow on bed    SpO2 97%    Breastfeeding No    BMI 37.39 kg/m2        Tmax:  Temp (24hrs), Av.3 °F (36.8 °C), Min:98.1 °F (36.7 °C), Max:98.4 °F (36.9 °C)      Exam:  Lungs:  clear to auscultation bilaterally  Heart:  regular rate and rhythm  Abdomen:  soft, non-tender. Bowel sounds normal. No masses,  no organomegaly  Carmenza-wound area is softer    Labs:      Recent Labs      17   1406   BUN  43*   CREA  5.64*       Cultures:     Lab Results   Component Value Date/Time    Specimen Description: URINE 10/27/2012 08:15 PM    Specimen Description: BLOOD 2011 07:40 AM    Specimen Description: URINE 2010 07:10 AM     Lab Results   Component Value Date/Time    Culture result: LIGHT  MIXED COMMENSAL PARIS   2017 12:40 AM    Culture result:  2017 12:40 AM     LIGHT  ANAEROBIC GRAM NEGATIVE RODS  BETA LACTAMASE POSITIVE      Culture result: NO GROWTH 6 DAYS 02/10/2017 04:43 PM       Radiology:     Line/Insert Date:           Assessment:     1. Perianal abscess  2. ESRD  3. Debility     Objective:     1.  Continue current therapy    Lily Gutierrez MD

## 2017-06-29 NOTE — PROGRESS NOTES
Daily Progress Note  [facility]rea [facility] at [address] Date: 6/15/2017  Post-Operative Day: 14 from Procedure(s):  PERINEAL ABSCESS INCISION AND DRAINAGE     Subjective:     Last 24 hrs: In good spirits. Hoping to have wound vac placed. Feeling better since dialysis pulled off more fluid and maintenance IV fluid stopped. On Flagyl and Azactam.       Objective:     Blood pressure 133/62, pulse 66, temperature 98.3 °F (36.8 °C), resp. rate 18, height 6' 1.75\" (1.873 m), weight 131.2 kg (289 lb 3.9 oz), SpO2 97 %, not currently breastfeeding. Temp (24hrs), Av.2 °F (36.8 °C), Min:98.1 °F (36.7 °C), Max:98.3 °F (36.8 °C)      _____________________  Physical Exam:     Alert and Oriented, up in wheelchair. Remainder of exam deferred. Assessment:   Principal Problem:    Perineal abscess (2017)    Active Problems:    Diabetes mellitus type 2, insulin dependent ([facility] [address] (10/14/2010)      HTN (hypertension) (10/14/2010)      Abscess of deep perineal space (2017)      ESRD (end stage renal disease) on dialysis [facility]) (2017)            Plan:     Continue abx per ID  Hopefully able to have wound vac placed  Further plan per Dr. Lorenzo Charlton, [address] [facility] at [place] [address], [address] [place], [place]  [phone]    Data Review:    No results for input(s): WBC, HGB, HCT, PLT, HGBEXT, HCTEXT, PLTEXT in the last 72 hours. Recent Labs      17   1406   NA  123*   K  5.1   CL  90*   CO2  25   GLU  168*   BUN  43*   CREA  5.64*   CA  8.0*     No results for input(s): AML, LPSE in the last 72 hours.         ______________________  Medications:    Current Facility-Administered Medications   Medication Dose Route Frequency    [START ON 2017] amLODIPine (NORVASC) tablet 2.5 mg  2.5 mg Oral DAILY    sevelamer carbonate (RENVELA) tab 1,600 mg  1,600 mg Oral TID WITH MEALS    epoetin joi (EPOGEN;PROCRIT) injection 20,000 Units 20,000 Units SubCUTAneous DIALYSIS MON, WED & FRI    hydrOXYzine HCl (ATARAX) tablet 25 mg  25 mg Oral Q4H PRN    albumin human 25% (BUMINATE) solution 12.5 g  12.5 g IntraVENous DIALYSIS PRN    carvedilol (COREG) tablet 25 mg  25 mg Oral DAILY    carvedilol (COREG) tablet 12.5 mg  12.5 mg Oral QPM    polyethylene glycol (MIRALAX) packet 17 g  17 g Oral DAILY    metroNIDAZOLE (FLAGYL) tablet 500 mg  500 mg Oral TID    aztreonam (AZACTAM) 500 mg in 0.9% sodium chloride 100 mL IVPB  500 mg IntraVENous Q12H    diphenhydrAMINE (BENADRYL) injection 25 mg  25 mg IntraVENous Q4H PRN    HYDROmorphone (PF) (DILAUDID) injection 2 mg  2 mg IntraVENous Q4H PRN    insulin regular (NOVOLIN R, HUMULIN R) injection   SubCUTAneous Q6H    aspirin chewable tablet 81 mg  81 mg Oral DAILY    FLUoxetine (PROzac) capsule 20 mg  20 mg Oral DAILY    nortriptyline (PAMELOR) capsule 25 mg  25 mg Oral QHS    oxyCODONE-acetaminophen (PERCOCET 7.5) 7.5-325 mg per tablet 1-2 Tab  1-2 Tab Oral Q4H PRN    sodium chloride (NS) flush 5-10 mL  5-10 mL IntraVENous Q8H    sodium chloride (NS) flush 5-10 mL  5-10 mL IntraVENous PRN    ondansetron (ZOFRAN) injection 4 mg  4 mg IntraVENous Q4H PRN    glucose chewable tablet 16 g  4 Tab Oral PRN    glucagon (GLUCAGEN) injection 1 mg  1 mg IntraMUSCular PRN                                                                                               ATTENDING ADDENDUM  I supervised the APC and reviewed the note. We discussed the plan of care  Really does look better. Wound vac tomorrow? ??

## 2017-06-30 LAB
ANION GAP BLD CALC-SCNC: 10 MMOL/L (ref 5–15)
ATRIAL RATE: 69 BPM
BUN SERPL-MCNC: 40 MG/DL (ref 6–20)
BUN/CREAT SERPL: 7 (ref 12–20)
CALCIUM SERPL-MCNC: 8 MG/DL (ref 8.5–10.1)
CALCULATED P AXIS, ECG09: 50 DEGREES
CALCULATED R AXIS, ECG10: -10 DEGREES
CALCULATED T AXIS, ECG11: 37 DEGREES
CHLORIDE SERPL-SCNC: 94 MMOL/L (ref 97–108)
CO2 SERPL-SCNC: 24 MMOL/L (ref 21–32)
CREAT SERPL-MCNC: 5.92 MG/DL (ref 0.55–1.02)
DIAGNOSIS, 93000: NORMAL
GLUCOSE BLD STRIP.AUTO-MCNC: 107 MG/DL (ref 65–100)
GLUCOSE BLD STRIP.AUTO-MCNC: 111 MG/DL (ref 65–100)
GLUCOSE BLD STRIP.AUTO-MCNC: 112 MG/DL (ref 65–100)
GLUCOSE BLD STRIP.AUTO-MCNC: 128 MG/DL (ref 65–100)
GLUCOSE SERPL-MCNC: 94 MG/DL (ref 65–100)
P-R INTERVAL, ECG05: 184 MS
POTASSIUM SERPL-SCNC: 5.5 MMOL/L (ref 3.5–5.1)
Q-T INTERVAL, ECG07: 452 MS
QRS DURATION, ECG06: 100 MS
QTC CALCULATION (BEZET), ECG08: 484 MS
SERVICE CMNT-IMP: ABNORMAL
SODIUM SERPL-SCNC: 128 MMOL/L (ref 136–145)
VENTRICULAR RATE, ECG03: 69 BPM

## 2017-06-30 PROCEDURE — 36415 COLL VENOUS BLD VENIPUNCTURE: CPT | Performed by: NURSE PRACTITIONER

## 2017-06-30 PROCEDURE — 74011000250 HC RX REV CODE- 250: Performed by: INTERNAL MEDICINE

## 2017-06-30 PROCEDURE — 65210000002 HC RM PRIVATE GYN

## 2017-06-30 PROCEDURE — 74011000258 HC RX REV CODE- 258: Performed by: INTERNAL MEDICINE

## 2017-06-30 PROCEDURE — 74011250637 HC RX REV CODE- 250/637: Performed by: SURGERY

## 2017-06-30 PROCEDURE — 74011250636 HC RX REV CODE- 250/636: Performed by: PHYSICIAN ASSISTANT

## 2017-06-30 PROCEDURE — 74011250636 HC RX REV CODE- 250/636: Performed by: SURGERY

## 2017-06-30 PROCEDURE — 74011250636 HC RX REV CODE- 250/636: Performed by: INTERNAL MEDICINE

## 2017-06-30 PROCEDURE — 90935 HEMODIALYSIS ONE EVALUATION: CPT

## 2017-06-30 PROCEDURE — 74011250637 HC RX REV CODE- 250/637: Performed by: NURSE PRACTITIONER

## 2017-06-30 PROCEDURE — 82962 GLUCOSE BLOOD TEST: CPT

## 2017-06-30 PROCEDURE — 74011250637 HC RX REV CODE- 250/637: Performed by: INTERNAL MEDICINE

## 2017-06-30 PROCEDURE — 80048 BASIC METABOLIC PNL TOTAL CA: CPT | Performed by: NURSE PRACTITIONER

## 2017-06-30 RX ADMIN — CARVEDILOL 12.5 MG: 12.5 TABLET, FILM COATED ORAL at 17:40

## 2017-06-30 RX ADMIN — EPOETIN ALFA 20000 UNITS: 20000 SOLUTION INTRAVENOUS; SUBCUTANEOUS at 17:54

## 2017-06-30 RX ADMIN — Medication 10 ML: at 19:23

## 2017-06-30 RX ADMIN — NORTRIPTYLINE HYDROCHLORIDE 25 MG: 25 CAPSULE ORAL at 21:59

## 2017-06-30 RX ADMIN — Medication 10 ML: at 17:55

## 2017-06-30 RX ADMIN — AZTREONAM 500 MG: 1 INJECTION, POWDER, LYOPHILIZED, FOR SOLUTION INTRAMUSCULAR; INTRAVENOUS at 19:30

## 2017-06-30 RX ADMIN — HYDROXYZINE HYDROCHLORIDE 25 MG: 25 TABLET, FILM COATED ORAL at 01:16

## 2017-06-30 RX ADMIN — HYDROMORPHONE HYDROCHLORIDE 2 MG: 1 INJECTION, SOLUTION INTRAMUSCULAR; INTRAVENOUS; SUBCUTANEOUS at 22:00

## 2017-06-30 RX ADMIN — METRONIDAZOLE 500 MG: 250 TABLET ORAL at 17:39

## 2017-06-30 RX ADMIN — DIPHENHYDRAMINE HYDROCHLORIDE 25 MG: 50 INJECTION, SOLUTION INTRAMUSCULAR; INTRAVENOUS at 21:29

## 2017-06-30 RX ADMIN — METRONIDAZOLE 500 MG: 250 TABLET ORAL at 21:59

## 2017-06-30 RX ADMIN — HYDROMORPHONE HYDROCHLORIDE 2 MG: 1 INJECTION, SOLUTION INTRAMUSCULAR; INTRAVENOUS; SUBCUTANEOUS at 17:55

## 2017-06-30 RX ADMIN — HYDROMORPHONE HYDROCHLORIDE 2 MG: 1 INJECTION, SOLUTION INTRAMUSCULAR; INTRAVENOUS; SUBCUTANEOUS at 03:23

## 2017-06-30 RX ADMIN — HYDROMORPHONE HYDROCHLORIDE 2 MG: 1 INJECTION, SOLUTION INTRAMUSCULAR; INTRAVENOUS; SUBCUTANEOUS at 12:30

## 2017-06-30 RX ADMIN — SEVELAMER CARBONATE 1600 MG: 800 TABLET, FILM COATED ORAL at 17:39

## 2017-06-30 RX ADMIN — Medication 10 ML: at 22:01

## 2017-06-30 RX ADMIN — Medication 10 ML: at 03:23

## 2017-06-30 NOTE — PROGRESS NOTES
ID Progress Note  2017    Subjective:     No new complaints, pain about the same    Objective:     Antibiotics:  1. Azactam  2. Flagyl      Vitals:   Visit Vitals    /71    Pulse 64  Comment: talking with daughter at bedside    Temp 97.9 °F (36.6 °C) (Oral)    Resp 11    Ht 6' 1.75\" (1.873 m)    Wt 135.9 kg (299 lb 9.8 oz)    SpO2 100%    Breastfeeding No    BMI 38.73 kg/m2        Tmax:  Temp (24hrs), Av.4 °F (36.9 °C), Min:97.9 °F (36.6 °C), Max:99.4 °F (37.4 °C)      Exam:  Lungs:  clear to auscultation bilaterally  Heart:  regular rate and rhythm  Abdomen:  soft, non-tender. Bowel sounds normal. No masses,  no organomegaly  [place]-wound area is softer    Labs:      Recent Labs      17   0100   BUN  40*   CREA  5.92*       Cultures:     Lab Results   Component Value Date/Time    Specimen Description: URINE 10/27/2012 08:15 PM    Specimen Description: BLOOD 2011 07:40 AM    Specimen Description: URINE 2010 07:10 AM     Lab Results   Component Value Date/Time    Culture result: LIGHT  MIXED COMMENSAL PARIS   2017 12:40 AM    Culture result:  2017 12:40 AM     LIGHT  ANAEROBIC GRAM NEGATIVE RODS  BETA LACTAMASE POSITIVE      Culture result: NO GROWTH 6 DAYS 02/10/2017 04:43 PM       Radiology:     Line/Insert Date:           Assessment:     1. Perianal abscess  2. ESRD  3. Debility     Objective:     1. Continue current therapy--can stop tomorrow--will not need antibiotic therapy at discharge  2. Group will see over the weekend if asked  3.  OK for discharge from my standpoint    Sarah Palacios MD

## 2017-06-30 NOTE — PROGRESS NOTES
Pt had received zofran for nausea earlier and when asking about her pain and nausea she said it was much better.  Will wait for results from blood work and update doctor. Marylee Dada rn

## 2017-06-30 NOTE — WOUND CARE
Discussed NPWT with Sean Vee NP. Last measured depth was 0.8 with granular tissue. Recommend continuing with daily wound care rather than VAC therapy given the proximity to the anus. A seal would be possible but would likely be lost with BM's. Daily wound care ensures the ability to thoroughly clean the wound if contaminated as needed and appears to be managing the wound well.    STEFANI Velázquez

## 2017-06-30 NOTE — DIALYSIS
[facility]                         [phone]  Vitals Pre Post Assessment Pre Post   /80 150/71 LOC A & O x4 A & O x4   HR 80 68 Lungs Complaints of SOB and \"tightness\" in lungs. Respirations regular, even, and appear unlabored. No adventitious sounds auscultated. No change   Temp 97.9 96.0 ax Cardiac Regular rate and rhythm No change   Resp 11 12 Skin Warm and dry Warm and dry   Weight 140.4 kg 134.5 kg Edema 2-3+ generalized 1-2+ LUE and RLE; 2+ LLE      Pain 10/10; pain medication to b given by primary RN States pain medication effective     Orders   Duration: Start: 6896 End: 1608 Total: 3.5 hours   Dialyzer: Revaclear   K Bath: 3   Ca Bath: 2.5   Na / Bicarb: Sodium profiling 144-138 linear;  Bicarb 35   Target Fluid Removal: 4,500 - 5,000 mL     Access   Type & Location: LARRY AVF   Comments:                            Site with no s/s of infection. ++B/T. Aseptic prep done. Cannulated x 2 with 15 g needles. Both sites with + flash, aspirated and flushed easily. Needles and tubing secured well. Labs   Hep B status / date: HBsAg negative 6/14/17   Obtained/Reviewed  Critical Results Called Reviewed   n/a     Meds Given   Name Dose Route   None ordered                 Total Liters Process: 78.5 L   Net Fluid Removed: 5,000 mL      Comments   Time Out Done: Ty Alvarenga   Primary Nurse Rpt Pre: April ThMARVA moore   Primary Nurse Rpt Post: Tatum Galloway RN, for Angel Roland RN   Pt Education: Albumin for BP ; Incapacitated nurse education completed   Care Plan: Continue HD treatments as ordered by nephrologists   Tx Summary: Tolerated HD treatment well. At end of treatment, all possible blood rinsed back from circuit. Needles removed, and pressure applied to each cannulation site until all bleeding stopped. Hemostasis achieved within 12 minutes for venous site, and within 17 minutes for arterial site. Pressure dressings applied and secured well.   Remains in room 355.  Bed in low position, rails up. Call light in reach. Daughter at bedside. In NAD.

## 2017-06-30 NOTE — PROGRESS NOTES
Daily Progress Note  [facility]rea [facility] at [address] Date: 6/15/2017  Post-Operative Day: 15 from Procedure(s):  PERINEAL ABSCESS INCISION AND DRAINAGE     Subjective:     Last 24 hrs: Currently having dialysis. Still feels fluid overloaded. Denies pain at wound. Appreciate wound care input. Objective:     Blood pressure 134/62, pulse 65, temperature 97.9 °F (36.6 °C), temperature source Oral, resp. rate 12, height 6' 1.75\" (1.873 m), weight 135.9 kg (299 lb 9.8 oz), SpO2 100 %, not currently breastfeeding. Temp (24hrs), Av.4 °F (36.9 °C), Min:97.9 °F (36.6 °C), Max:99.4 °F (37.4 °C)      _____________________  Physical Exam:     Alert and Oriented, lying in bed, no distress. Cardiovascular: RRR, +3 bilat LE peripheral edema  Remainder of exam deferred as she is getting dialysis      Assessment:   Principal Problem:    Perineal abscess (2017)    Active Problems:    Diabetes mellitus type 2, insulin dependent ([facility] [address] (10/14/2010)      HTN (hypertension) (10/14/2010)      Abscess of deep perineal space (2017)      ESRD (end stage renal disease) on dialysis [facility]) (2017)            Plan:     Doing well from surgical perspective  Abx per ID  Hopefully can dc to home once fluid status is stabilized. ___ADDENDUM_______  Per Nephrology, she is stable for discharge after dialysis is completed today. However pt feeling quite overloaded and concerned about recurrence of SOB. Will re-assess for dc in am if doing well. Will discuss abx with Dr. Darius Weber, [address] [facility] at [place] [address], [address] [place] [address]  [phone]    Data Review:    No results for input(s): WBC, HGB, HCT, PLT, HGBEXT, HCTEXT, PLTEXT in the last 72 hours.   Recent Labs      17   0100   NA  128*   K  5.5*   CL  94*   CO2  24   GLU  94   BUN  40*   CREA  5.92*   CA  8.0*     No results for input(s): AML, LPSE in the last 72 hours.        ______________________  Medications:    Current Facility-Administered Medications   Medication Dose Route Frequency    amLODIPine (NORVASC) tablet 2.5 mg  2.5 mg Oral DAILY    sevelamer carbonate (RENVELA) tab 1,600 mg  1,600 mg Oral TID WITH MEALS    epoetin joi (EPOGEN;PROCRIT) injection 20,000 Units  20,000 Units SubCUTAneous DIALYSIS MON, WED & FRI    hydrOXYzine HCl (ATARAX) tablet 25 mg  25 mg Oral Q4H PRN    albumin human 25% (BUMINATE) solution 12.5 g  12.5 g IntraVENous DIALYSIS PRN    carvedilol (COREG) tablet 25 mg  25 mg Oral DAILY    carvedilol (COREG) tablet 12.5 mg  12.5 mg Oral QPM    polyethylene glycol (MIRALAX) packet 17 g  17 g Oral DAILY    metroNIDAZOLE (FLAGYL) tablet 500 mg  500 mg Oral TID    aztreonam (AZACTAM) 500 mg in 0.9% sodium chloride 100 mL IVPB  500 mg IntraVENous Q12H    diphenhydrAMINE (BENADRYL) injection 25 mg  25 mg IntraVENous Q4H PRN    HYDROmorphone (PF) (DILAUDID) injection 2 mg  2 mg IntraVENous Q4H PRN    insulin regular (NOVOLIN R, HUMULIN R) injection   SubCUTAneous Q6H    aspirin chewable tablet 81 mg  81 mg Oral DAILY    FLUoxetine (PROzac) capsule 20 mg  20 mg Oral DAILY    nortriptyline (PAMELOR) capsule 25 mg  25 mg Oral QHS    oxyCODONE-acetaminophen (PERCOCET 7.5) 7.5-325 mg per tablet 1-2 Tab  1-2 Tab Oral Q4H PRN    sodium chloride (NS) flush 5-10 mL  5-10 mL IntraVENous Q8H    sodium chloride (NS) flush 5-10 mL  5-10 mL IntraVENous PRN    ondansetron (ZOFRAN) injection 4 mg  4 mg IntraVENous Q4H PRN    glucose chewable tablet 16 g  4 Tab Oral PRN    glucagon (GLUCAGEN) injection 1 mg  1 mg IntraMUSCular PRN                                                                                               ATTENDING ADDENDUM  I supervised the APC and reviewed the note. We discussed the plan of care  Hopefully will be able to get out of here soon, all depending on the dialysis suituation.

## 2017-06-30 NOTE — PROGRESS NOTES
[facility] dialysis tech told day shift nurse on 6/29/17 that they would  patient at 0630 and take her to the dialysis center. I called and spoke with someone int Dialysis center on 5W at 0810. I was instructed to hold pt's 0700 antibiotic until after dialysis. I also called [facility] at [facility] and spoke with Elisa Black. Elisa Black informed me that she did not know when Aly Arellano (the dialysis tech) would come to perform dialysis and that she would contact us with an update. I gave her the contact information for the day shift nurse (April). I have informed the patient's day shift nurse and the charge nurse of the current situation at hand.

## 2017-06-30 NOTE — PROGRESS NOTES
[facility]   [address] [facility], [address] [place] [address], Formerly named [facility]  Phone: [phone]   [phone]       Nephrology Progress Note  Tavon Johnson     1950     [id number]  Date of Admission : 6/15/2017  06/30/17    CC: Follow up for ESRD       Assessment and Plan   ESRD- HD :  - HD MWF at [facility]   - HD today and 4-5 kg UF planned     Hypervolemia / Hyponatremia :  - UFw/ HD    HyperPhosphatemia :  - started Renvela 2 tabs w/ meals   - Auryxia not available in hospital     Anemia of CKD:  - Doubled Epo dose   - epo resistant from infection     Recurrent Perianal abscess:   - per Dr Keith Ceron and ID  - on aztreonam     HTN :  - reduced     Type II DM     Sec HPTH     Interval History:  Seen and examined . Doing better   She feels fluid overloaded  BP stable     Review of Systems: A comprehensive review of systems was negative.     Current Medications:   Current Facility-Administered Medications   Medication Dose Route Frequency    amLODIPine (NORVASC) tablet 2.5 mg  2.5 mg Oral DAILY    sevelamer carbonate (RENVELA) tab 1,600 mg  1,600 mg Oral TID WITH MEALS    epoetin joi (EPOGEN;PROCRIT) injection 20,000 Units  20,000 Units SubCUTAneous DIALYSIS MON, WED & FRI    hydrOXYzine HCl (ATARAX) tablet 25 mg  25 mg Oral Q4H PRN    albumin human 25% (BUMINATE) solution 12.5 g  12.5 g IntraVENous DIALYSIS PRN    carvedilol (COREG) tablet 25 mg  25 mg Oral DAILY    carvedilol (COREG) tablet 12.5 mg  12.5 mg Oral QPM    polyethylene glycol (MIRALAX) packet 17 g  17 g Oral DAILY    metroNIDAZOLE (FLAGYL) tablet 500 mg  500 mg Oral TID    aztreonam (AZACTAM) 500 mg in 0.9% sodium chloride 100 mL IVPB  500 mg IntraVENous Q12H    diphenhydrAMINE (BENADRYL) injection 25 mg  25 mg IntraVENous Q4H PRN    HYDROmorphone (PF) (DILAUDID) injection 2 mg  2 mg IntraVENous Q4H PRN    insulin regular (NOVOLIN R, HUMULIN R) injection   SubCUTAneous Q6H    aspirin chewable tablet 81 mg  81 mg Oral DAILY    FLUoxetine (PROzac) capsule 20 mg  20 mg Oral DAILY    nortriptyline (PAMELOR) capsule 25 mg  25 mg Oral QHS    oxyCODONE-acetaminophen (PERCOCET 7.5) 7.5-325 mg per tablet 1-2 Tab  1-2 Tab Oral Q4H PRN    sodium chloride (NS) flush 5-10 mL  5-10 mL IntraVENous Q8H    sodium chloride (NS) flush 5-10 mL  5-10 mL IntraVENous PRN    ondansetron (ZOFRAN) injection 4 mg  4 mg IntraVENous Q4H PRN    glucose chewable tablet 16 g  4 Tab Oral PRN    glucagon (GLUCAGEN) injection 1 mg  1 mg IntraMUSCular PRN      Allergies   Allergen Reactions    Latex Itching     Rash, sometimes difficult to breathe    Celebrex [Celecoxib] Anaphylaxis and Swelling     TONGUE. LIPS AND EYES    Iodine Other (comments)     IV CONTRAST-LESIONS, AND SKIN SLUFFED OFF    Keflex [Cephalexin] Anaphylaxis and Swelling     Tongue, lips, and eyes    Levaquin [Levofloxacin] Anaphylaxis and Swelling     Tongue, lips, and eyes    Pcn [Penicillins] Anaphylaxis and Swelling     Tongue, lips and eyes    Morphine Other (comments)     PT STATES IS JUST SENSITIVITY, GOT TOO MUCH ONE TIME. Objective:  Vitals:    Vitals:    06/29/17 2059 06/30/17 0319 06/30/17 0645 06/30/17 0821   BP: 136/67 155/72  162/76   Pulse: 67 76  73   Resp: 18 18  18   Temp: 98.1 °F (36.7 °C) 99.4 °F (37.4 °C)  98.1 °F (36.7 °C)   TempSrc:       SpO2: 100% 92%  100%   Weight:   135.9 kg (299 lb 9.8 oz)    Height:         Intake and Output:  06/30 0701 - 06/30 1900  In: 120 [P.O.:120]  Out: 300 [Urine:300]  06/28 1901 - 06/30 0700  In: 550 [P.O.:350;  I.V.:200]  Out: 450 [Urine:450]    Physical Examination:    General: In mild distress  Neck:  Supple, no mass  Resp:  Lungs CTA   CV:  RRR,  no murmur or rub, 4+ LE edema  GI:  Soft, NT, + Bowel sounds, no hepatosplenomegaly  Neurologic:  Non focal  Ext                   LUE AVF w/ good thrill and bruit    []    High complexity decision making was performed  []    Patient is at high-risk of decompensation with multiple organ involvement    Lab Data Personally Reviewed: I have reviewed all the pertinent labs, microbiology data and radiology studies during assessment. Recent Labs      06/30/17   0100  06/27/17   1406   NA  128*  123*   K  5.5*  5.1   CL  94*  90*   CO2  24  25   GLU  94  168*   BUN  40*  43*   CREA  5.92*  5.64*   CA  8.0*  8.0*     No results for input(s): WBC, HGB, HCT, PLT, HGBEXT, HCTEXT, PLTEXT, HGBEXT, HCTEXT, PLTEXT in the last 72 hours.   Lab Results   Component Value Date/Time    Specimen Description: URINE 10/27/2012 08:15 PM    Specimen Description: BLOOD 06/07/2011 07:40 AM    Specimen Description: URINE 08/30/2010 07:10 AM    Specimen Description: URINE 08/09/2010 11:30 AM    Specimen Description: URINE 06/22/2010 11:50 PM     Lab Results   Component Value Date/Time    Culture result: LIGHT  MIXED COMMENSAL PARIS   02/11/2017 12:40 AM    Culture result:  02/11/2017 12:40 AM     LIGHT  ANAEROBIC GRAM NEGATIVE RODS  BETA LACTAMASE POSITIVE      Culture result: NO GROWTH 6 DAYS 02/10/2017 04:43 PM    Culture result: MIXED SKIN PARIS ISOLATED 10/27/2012 08:15 PM    Culture result: NO GROWTH 5 DAYS 06/07/2011 07:40 AM     Recent Results (from the past 24 hour(s))   GLUCOSE, POC    Collection Time: 06/29/17 12:36 PM   Result Value Ref Range    Glucose (POC) 172 (H) 65 - 100 mg/dL    Performed by Ivan Smallwood    GLUCOSE, POC    Collection Time: 06/29/17  4:35 PM   Result Value Ref Range    Glucose (POC) 163 (H) 65 - 100 mg/dL    Performed by Kathrine Simental    EKG, 12 LEAD, INITIAL    Collection Time: 06/29/17  6:48 PM   Result Value Ref Range    Ventricular Rate 69 BPM    Atrial Rate 69 BPM    P-R Interval 184 ms    QRS Duration 100 ms    Q-T Interval 452 ms    QTC Calculation (Bezet) 484 ms    Calculated P Axis 50 degrees    Calculated R Axis -10 degrees    Calculated T Axis 37 degrees    Diagnosis       Normal sinus rhythm  Normal ECG  When compared with ECG of 14-APR-2017 16:22,  QRS voltage has decreased     TROPONIN I    Collection Time: 06/29/17  7:05 PM   Result Value Ref Range    Troponin-I, Qt. <0.04 <0.05 ng/mL   GLUCOSE, POC    Collection Time: 06/30/17 12:27 AM   Result Value Ref Range    Glucose (POC) 112 (H) 65 - 100 mg/dL    Performed by Rosemary Blancas (PCT)    METABOLIC PANEL, BASIC    Collection Time: 06/30/17  1:00 AM   Result Value Ref Range    Sodium 128 (L) 136 - 145 mmol/L    Potassium 5.5 (H) 3.5 - 5.1 mmol/L    Chloride 94 (L) 97 - 108 mmol/L    CO2 24 21 - 32 mmol/L    Anion gap 10 5 - 15 mmol/L    Glucose 94 65 - 100 mg/dL    BUN 40 (H) 6 - 20 MG/DL    Creatinine 5.92 (H) 0.55 - 1.02 MG/DL    BUN/Creatinine ratio 7 (L) 12 - 20      GFR est AA 9 (L) >60 ml/min/1.73m2    GFR est non-AA 7 (L) >60 ml/min/1.73m2    Calcium 8.0 (L) 8.5 - 10.1 MG/DL   GLUCOSE, POC    Collection Time: 06/30/17  6:05 AM   Result Value Ref Range    Glucose (POC) 111 (H) 65 - 100 mg/dL    Performed by Rosemary Blancas (PCT)                Nandini Sharma MD

## 2017-06-30 NOTE — PROGRESS NOTES
Pt c/o upper reflex chest pain that hurt down to her stomach pt c/o having some nausea denies it pain or numbness in arms or jaw vss 151/86, 76, 20, 100% on 2l of oxygen n/c. Lungs diminished in bases no crackles or rhonchi noted in lung field. Pt states has hx of reflux and pain started after she ate dinner and lye down in bed. Called and talked with Dr. Checo Grigsby orders received ekg being done and tropin will be drawn. Ask pt if she had a heart doctor she said she use to see Dr. Tad Mora but he as since retired and at present time has not followed with another doctor for cardiac.

## 2017-07-01 LAB
GLUCOSE BLD STRIP.AUTO-MCNC: 126 MG/DL (ref 65–100)
GLUCOSE BLD STRIP.AUTO-MCNC: 143 MG/DL (ref 65–100)
GLUCOSE BLD STRIP.AUTO-MCNC: 149 MG/DL (ref 65–100)
GLUCOSE BLD STRIP.AUTO-MCNC: 166 MG/DL (ref 65–100)
GLUCOSE BLD STRIP.AUTO-MCNC: 169 MG/DL (ref 65–100)
SERVICE CMNT-IMP: ABNORMAL

## 2017-07-01 PROCEDURE — 74011636637 HC RX REV CODE- 636/637: Performed by: PHYSICIAN ASSISTANT

## 2017-07-01 PROCEDURE — 65210000002 HC RM PRIVATE GYN

## 2017-07-01 PROCEDURE — 74011250637 HC RX REV CODE- 250/637: Performed by: SURGERY

## 2017-07-01 PROCEDURE — 74011000250 HC RX REV CODE- 250: Performed by: INTERNAL MEDICINE

## 2017-07-01 PROCEDURE — 82962 GLUCOSE BLOOD TEST: CPT

## 2017-07-01 PROCEDURE — 74011000258 HC RX REV CODE- 258: Performed by: INTERNAL MEDICINE

## 2017-07-01 PROCEDURE — 74011250636 HC RX REV CODE- 250/636: Performed by: SURGERY

## 2017-07-01 PROCEDURE — 77010033678 HC OXYGEN DAILY

## 2017-07-01 PROCEDURE — 74011250637 HC RX REV CODE- 250/637: Performed by: INTERNAL MEDICINE

## 2017-07-01 PROCEDURE — 74011250636 HC RX REV CODE- 250/636: Performed by: PHYSICIAN ASSISTANT

## 2017-07-01 RX ADMIN — NORTRIPTYLINE HYDROCHLORIDE 25 MG: 25 CAPSULE ORAL at 21:08

## 2017-07-01 RX ADMIN — FLUOXETINE 20 MG: 10 CAPSULE ORAL at 09:24

## 2017-07-01 RX ADMIN — CARVEDILOL 25 MG: 12.5 TABLET, FILM COATED ORAL at 09:24

## 2017-07-01 RX ADMIN — SEVELAMER CARBONATE 1600 MG: 800 TABLET, FILM COATED ORAL at 08:14

## 2017-07-01 RX ADMIN — HYDROMORPHONE HYDROCHLORIDE 2 MG: 1 INJECTION, SOLUTION INTRAMUSCULAR; INTRAVENOUS; SUBCUTANEOUS at 12:58

## 2017-07-01 RX ADMIN — SEVELAMER CARBONATE 1600 MG: 800 TABLET, FILM COATED ORAL at 12:58

## 2017-07-01 RX ADMIN — ONDANSETRON 4 MG: 2 INJECTION INTRAMUSCULAR; INTRAVENOUS at 19:48

## 2017-07-01 RX ADMIN — SEVELAMER CARBONATE 1600 MG: 800 TABLET, FILM COATED ORAL at 18:29

## 2017-07-01 RX ADMIN — Medication 10 ML: at 22:00

## 2017-07-01 RX ADMIN — METRONIDAZOLE 500 MG: 250 TABLET ORAL at 09:24

## 2017-07-01 RX ADMIN — DIPHENHYDRAMINE HYDROCHLORIDE 25 MG: 50 INJECTION, SOLUTION INTRAMUSCULAR; INTRAVENOUS at 13:14

## 2017-07-01 RX ADMIN — DIPHENHYDRAMINE HYDROCHLORIDE 25 MG: 50 INJECTION, SOLUTION INTRAMUSCULAR; INTRAVENOUS at 06:39

## 2017-07-01 RX ADMIN — ASPIRIN 81 MG CHEWABLE TABLET 81 MG: 81 TABLET CHEWABLE at 09:25

## 2017-07-01 RX ADMIN — DIPHENHYDRAMINE HYDROCHLORIDE 25 MG: 50 INJECTION, SOLUTION INTRAMUSCULAR; INTRAVENOUS at 23:28

## 2017-07-01 RX ADMIN — Medication 10 ML: at 06:39

## 2017-07-01 RX ADMIN — HYDROMORPHONE HYDROCHLORIDE 2 MG: 1 INJECTION, SOLUTION INTRAMUSCULAR; INTRAVENOUS; SUBCUTANEOUS at 02:02

## 2017-07-01 RX ADMIN — HUMAN INSULIN 2 UNITS: 100 INJECTION, SOLUTION SUBCUTANEOUS at 13:12

## 2017-07-01 RX ADMIN — SEVELAMER CARBONATE 1600 MG: 800 TABLET, FILM COATED ORAL at 08:00

## 2017-07-01 RX ADMIN — HUMAN INSULIN 2 UNITS: 100 INJECTION, SOLUTION SUBCUTANEOUS at 06:38

## 2017-07-01 RX ADMIN — HYDROMORPHONE HYDROCHLORIDE 2 MG: 1 INJECTION, SOLUTION INTRAMUSCULAR; INTRAVENOUS; SUBCUTANEOUS at 18:29

## 2017-07-01 RX ADMIN — HYDROMORPHONE HYDROCHLORIDE 2 MG: 1 INJECTION, SOLUTION INTRAMUSCULAR; INTRAVENOUS; SUBCUTANEOUS at 23:28

## 2017-07-01 RX ADMIN — CARVEDILOL 12.5 MG: 12.5 TABLET, FILM COATED ORAL at 18:31

## 2017-07-01 RX ADMIN — METRONIDAZOLE 500 MG: 250 TABLET ORAL at 15:52

## 2017-07-01 RX ADMIN — METRONIDAZOLE 500 MG: 250 TABLET ORAL at 21:09

## 2017-07-01 RX ADMIN — AZTREONAM 500 MG: 1 INJECTION, POWDER, LYOPHILIZED, FOR SOLUTION INTRAMUSCULAR; INTRAVENOUS at 19:48

## 2017-07-01 RX ADMIN — AZTREONAM 500 MG: 1 INJECTION, POWDER, LYOPHILIZED, FOR SOLUTION INTRAMUSCULAR; INTRAVENOUS at 07:07

## 2017-07-01 RX ADMIN — DIPHENHYDRAMINE HYDROCHLORIDE 25 MG: 50 INJECTION, SOLUTION INTRAMUSCULAR; INTRAVENOUS at 18:29

## 2017-07-01 RX ADMIN — Medication 10 ML: at 13:14

## 2017-07-01 RX ADMIN — HYDROMORPHONE HYDROCHLORIDE 2 MG: 1 INJECTION, SOLUTION INTRAMUSCULAR; INTRAVENOUS; SUBCUTANEOUS at 06:13

## 2017-07-01 RX ADMIN — AMLODIPINE BESYLATE 2.5 MG: 5 TABLET ORAL at 09:25

## 2017-07-01 RX ADMIN — HUMAN INSULIN 2 UNITS: 100 INJECTION, SOLUTION SUBCUTANEOUS at 18:00

## 2017-07-01 NOTE — PROGRESS NOTES
Progress Note    Patient: Lewis Hodges MRN: [id number]  SSN: [id number]    YOB: 1950  Age: 75 y.o. Sex: female      Admit Date: 6/15/2017    16 Days Post-Op    Procedure:  Procedure(s):  PERINEAL ABSCESS INCISION AND DRAINAGE    Subjective:     No acute surgical issues. Pt still having some perianal pain. She reported lower extremity edema    Objective:     Visit Vitals    /69    Pulse 63    Temp 98.1 °F (36.7 °C)    Resp 16    Ht 6' 1.75\" (1.873 m)    Wt 299 lb 4.8 oz (135.8 kg)    SpO2 97%    Breastfeeding No    BMI 38.69 kg/m2       Temp (24hrs), Av.6 °F (36.4 °C), Min:96 °F (35.6 °C), Max:98.1 °F (36.7 °C)        Physical Exam:    Gen:  NAD  Pulm:  Unlabored  Perianal wound:  Edema with mild induration.   Moderate TTP    Recent Results (from the past 24 hour(s))   GLUCOSE, POC    Collection Time: 17 12:44 PM   Result Value Ref Range    Glucose (POC) 107 (H) 65 - 100 mg/dL    Performed by Brandy Byrne    GLUCOSE, POC    Collection Time: 17  6:14 PM   Result Value Ref Range    Glucose (POC) 128 (H) 65 - 100 mg/dL    Performed by Claudeen Dilling    GLUCOSE, POC    Collection Time: 17 12:09 AM   Result Value Ref Range    Glucose (POC) 143 (H) 65 - 100 mg/dL    Performed by Maggie Chang (PCT)    GLUCOSE, POC    Collection Time: 17  6:13 AM   Result Value Ref Range    Glucose (POC) 166 (H) 65 - 100 mg/dL    Performed by Terrance Marlow        Assessment:     Hospital Problems  Date Reviewed: 6/15/2017          Codes Class Noted POA    ESRD (end stage renal disease) on dialysis [facility]) ICD-10-CM: N18.6, Z99.2  ICD-9-CM: 585.6, V45.11  2017 Yes        Abscess of deep perineal space ICD-10-CM: N34.0  ICD-9-CM: 597.0  2017 Yes        * (Principal)Perineal abscess ICD-10-CM: L02.215  ICD-9-CM: 682.2  2017 Yes        Diabetes mellitus type 2, insulin [facility] ([facility] ICD-10-CM: E11.9, Z79.4  ICD-9-CM: 250.00, V58.67  10/14/2010 Yes        HTN (hypertension) ICD-10-CM: I10  ICD-9-CM: 401.9  10/14/2010 Yes              Plan/Recommendations/Medical Decision Making:     - Continue IV antibiotic  - continue local wound care  - Edema:  Diuresis as tolerated.   Defer to nephrology    Signed By: Floyd Bautista MD     July 1, 2017

## 2017-07-02 LAB
GLUCOSE BLD STRIP.AUTO-MCNC: 104 MG/DL (ref 65–100)
GLUCOSE BLD STRIP.AUTO-MCNC: 113 MG/DL (ref 65–100)
GLUCOSE BLD STRIP.AUTO-MCNC: 144 MG/DL (ref 65–100)
GLUCOSE BLD STRIP.AUTO-MCNC: 151 MG/DL (ref 65–100)
SERVICE CMNT-IMP: ABNORMAL

## 2017-07-02 PROCEDURE — 65210000002 HC RM PRIVATE GYN

## 2017-07-02 PROCEDURE — 74011250637 HC RX REV CODE- 250/637: Performed by: SURGERY

## 2017-07-02 PROCEDURE — 74011250637 HC RX REV CODE- 250/637: Performed by: NURSE PRACTITIONER

## 2017-07-02 PROCEDURE — 74011000250 HC RX REV CODE- 250: Performed by: INTERNAL MEDICINE

## 2017-07-02 PROCEDURE — 74011000258 HC RX REV CODE- 258: Performed by: INTERNAL MEDICINE

## 2017-07-02 PROCEDURE — 74011250637 HC RX REV CODE- 250/637: Performed by: INTERNAL MEDICINE

## 2017-07-02 PROCEDURE — 77010033678 HC OXYGEN DAILY

## 2017-07-02 PROCEDURE — 74011250636 HC RX REV CODE- 250/636: Performed by: PHYSICIAN ASSISTANT

## 2017-07-02 PROCEDURE — 82962 GLUCOSE BLOOD TEST: CPT

## 2017-07-02 PROCEDURE — 74011250636 HC RX REV CODE- 250/636: Performed by: SURGERY

## 2017-07-02 PROCEDURE — 74011636637 HC RX REV CODE- 636/637: Performed by: PHYSICIAN ASSISTANT

## 2017-07-02 RX ORDER — FUROSEMIDE 10 MG/ML
40 INJECTION INTRAMUSCULAR; INTRAVENOUS ONCE
Status: COMPLETED | OUTPATIENT
Start: 2017-07-02 | End: 2017-07-02

## 2017-07-02 RX ADMIN — HUMAN INSULIN 2 UNITS: 100 INJECTION, SOLUTION SUBCUTANEOUS at 06:00

## 2017-07-02 RX ADMIN — CARVEDILOL 12.5 MG: 12.5 TABLET, FILM COATED ORAL at 17:09

## 2017-07-02 RX ADMIN — HYDROXYZINE HYDROCHLORIDE 25 MG: 25 TABLET, FILM COATED ORAL at 22:44

## 2017-07-02 RX ADMIN — METRONIDAZOLE 500 MG: 250 TABLET ORAL at 22:44

## 2017-07-02 RX ADMIN — NORTRIPTYLINE HYDROCHLORIDE 25 MG: 25 CAPSULE ORAL at 21:26

## 2017-07-02 RX ADMIN — METRONIDAZOLE 500 MG: 250 TABLET ORAL at 09:16

## 2017-07-02 RX ADMIN — FUROSEMIDE 40 MG: 10 INJECTION, SOLUTION INTRAMUSCULAR; INTRAVENOUS at 12:48

## 2017-07-02 RX ADMIN — SEVELAMER CARBONATE 1600 MG: 800 TABLET, FILM COATED ORAL at 09:15

## 2017-07-02 RX ADMIN — AMLODIPINE BESYLATE 2.5 MG: 5 TABLET ORAL at 09:16

## 2017-07-02 RX ADMIN — SEVELAMER CARBONATE 1600 MG: 800 TABLET, FILM COATED ORAL at 11:43

## 2017-07-02 RX ADMIN — DIPHENHYDRAMINE HYDROCHLORIDE 25 MG: 50 INJECTION, SOLUTION INTRAMUSCULAR; INTRAVENOUS at 23:29

## 2017-07-02 RX ADMIN — OXYCODONE HYDROCHLORIDE AND ACETAMINOPHEN 2 TABLET: 7.5; 325 TABLET ORAL at 23:29

## 2017-07-02 RX ADMIN — HYDROMORPHONE HYDROCHLORIDE 2 MG: 1 INJECTION, SOLUTION INTRAMUSCULAR; INTRAVENOUS; SUBCUTANEOUS at 11:43

## 2017-07-02 RX ADMIN — ASPIRIN 81 MG CHEWABLE TABLET 81 MG: 81 TABLET CHEWABLE at 09:16

## 2017-07-02 RX ADMIN — SEVELAMER CARBONATE 1600 MG: 800 TABLET, FILM COATED ORAL at 17:08

## 2017-07-02 RX ADMIN — FLUOXETINE 20 MG: 10 CAPSULE ORAL at 09:16

## 2017-07-02 RX ADMIN — Medication 10 ML: at 21:25

## 2017-07-02 RX ADMIN — HYDROMORPHONE HYDROCHLORIDE 2 MG: 1 INJECTION, SOLUTION INTRAMUSCULAR; INTRAVENOUS; SUBCUTANEOUS at 17:09

## 2017-07-02 RX ADMIN — Medication 10 ML: at 06:13

## 2017-07-02 RX ADMIN — DIPHENHYDRAMINE HYDROCHLORIDE 25 MG: 50 INJECTION, SOLUTION INTRAMUSCULAR; INTRAVENOUS at 18:56

## 2017-07-02 RX ADMIN — HYDROXYZINE HYDROCHLORIDE 25 MG: 25 TABLET, FILM COATED ORAL at 01:11

## 2017-07-02 RX ADMIN — HUMAN INSULIN 2 UNITS: 100 INJECTION, SOLUTION SUBCUTANEOUS at 17:08

## 2017-07-02 RX ADMIN — AZTREONAM 500 MG: 1 INJECTION, POWDER, LYOPHILIZED, FOR SOLUTION INTRAMUSCULAR; INTRAVENOUS at 06:13

## 2017-07-02 RX ADMIN — AZTREONAM 500 MG: 1 INJECTION, POWDER, LYOPHILIZED, FOR SOLUTION INTRAMUSCULAR; INTRAVENOUS at 20:59

## 2017-07-02 RX ADMIN — ONDANSETRON 4 MG: 2 INJECTION INTRAMUSCULAR; INTRAVENOUS at 11:50

## 2017-07-02 RX ADMIN — HYDROXYZINE HYDROCHLORIDE 25 MG: 25 TABLET, FILM COATED ORAL at 17:08

## 2017-07-02 RX ADMIN — CARVEDILOL 25 MG: 12.5 TABLET, FILM COATED ORAL at 09:16

## 2017-07-02 RX ADMIN — METRONIDAZOLE 500 MG: 250 TABLET ORAL at 15:13

## 2017-07-02 RX ADMIN — HYDROXYZINE HYDROCHLORIDE 25 MG: 25 TABLET, FILM COATED ORAL at 11:43

## 2017-07-02 RX ADMIN — Medication 10 ML: at 14:39

## 2017-07-02 RX ADMIN — POLYETHYLENE GLYCOL 3350 17 G: 17 POWDER, FOR SOLUTION ORAL at 09:15

## 2017-07-02 RX ADMIN — HYDROMORPHONE HYDROCHLORIDE 2 MG: 1 INJECTION, SOLUTION INTRAMUSCULAR; INTRAVENOUS; SUBCUTANEOUS at 22:44

## 2017-07-02 NOTE — PROGRESS NOTES
Bedside shift change report given to [facility] OBINNA (oncoming nurse) by Maicol Merino (offgoing nurse). Report included the following information SBAR.

## 2017-07-02 NOTE — PROGRESS NOTES
Bedside shift change report given to [address] [place] [address] Rd (oncoming nurse) by Edlima DURHAM (offgoing nurse). Report included the following information SBAR, MAR, Recent Results and Med Rec Status.

## 2017-07-02 NOTE — PROGRESS NOTES
Patient's IV infiltrated. Nurse attempted and could not get. Paged NICU. They are attempting now. Bedside shift change report given to Moody Ruiz RN (oncoming nurse) by Lloyd Clancy RN (offgoing nurse). Report included the following information SBAR, Kardex and Recent Results.

## 2017-07-02 NOTE — PROGRESS NOTES
Progress Note    Patient: Luz Maria Scherer MRN: [id number]  SSN: [id number]    YOB: 1950  Age: 75 y.o. Sex: female      Admit Date: 6/15/2017    17 Days Post-Op    Procedure:  Procedure(s):  PERINEAL ABSCESS INCISION AND DRAINAGE    Subjective:     No acute surgical issues. Pt still having some perianal pain. She reported lower extremity edema is not getting better. She is scheduled for HD tomorrow. Pt does urinate so will give trial of lasix today. Objective:     Visit Vitals    /80 (BP 1 Location: Right arm, BP Patient Position: At rest)    Pulse 72    Temp 97.6 °F (36.4 °C)    Resp 18    Ht 6' 1.75\" (1.873 m)    Wt 304 lb (137.9 kg)    SpO2 98%    Breastfeeding No    BMI 39.3 kg/m2       Temp (24hrs), Av.7 °F (36.5 °C), Min:97.5 °F (36.4 °C), Max:98.1 °F (36.7 °C)        Physical Exam:    Gen:  NAD  Pulm:  Unlabored  Perianal wound:  Edema with mild induration.   Moderate TTP  Ext:  3+ bilateral LE edema    Recent Results (from the past 24 hour(s))   GLUCOSE, POC    Collection Time: 17 12:55 PM   Result Value Ref Range    Glucose (POC) 149 (H) 65 - 100 mg/dL    Performed by Keith Wade, POC    Collection Time: 17  5:36 PM   Result Value Ref Range    Glucose (POC) 169 (H) 65 - 100 mg/dL    Performed by Keith Wade, POC    Collection Time: 17 11:38 PM   Result Value Ref Range    Glucose (POC) 126 (H) 65 - 100 mg/dL    Performed by Nhi Tinoco    GLUCOSE, POC    Collection Time: 17  6:08 AM   Result Value Ref Range    Glucose (POC) 144 (H) 65 - 100 mg/dL    Performed by Rosemary Blancas (RADHA)    GLUCOSE, POC    Collection Time: 17 11:41 AM   Result Value Ref Range    Glucose (POC) 104 (H) 65 - 100 mg/dL    Performed by Fuller Lesch        Assessment:     Hospital Problems  Date Reviewed: 6/15/2017          Codes Class Noted POA    ESRD (end stage renal disease) [facility] ([facility] ICD-10-CM: N18.6, Z99.2  ICD-9-CM: 585.6, V45.11  6/20/2017 Yes        Abscess of deep perineal space ICD-10-CM: N34.0  ICD-9-CM: 597.0  6/17/2017 Yes        * (Principal)Perineal abscess ICD-10-CM: L02.215  ICD-9-CM: 682.2  1/25/2017 Yes        Diabetes mellitus type 2, insulin dependent ([facility] ICD-10-CM: E11.9, Z79.4  ICD-9-CM: 250.00, V58.67  10/14/2010 Yes        HTN (hypertension) ICD-10-CM: I10  ICD-9-CM: 401.9  10/14/2010 Yes              Plan/Recommendations/Medical Decision Making:     - Continue IV antibiotic  - continue local wound care  - Edema:  Diuresis as tolerated. HD tomorrow.   Will give trial of lasix today    Signed By: Floyd Bautista MD     July 2, 2017

## 2017-07-03 ENCOUNTER — APPOINTMENT (OUTPATIENT)
Dept: INTERVENTIONAL RADIOLOGY/VASCULAR | Age: 67
DRG: 579 | End: 2017-07-03
Attending: INTERNAL MEDICINE
Payer: MEDICARE

## 2017-07-03 LAB
GLUCOSE BLD STRIP.AUTO-MCNC: 101 MG/DL (ref 65–100)
GLUCOSE BLD STRIP.AUTO-MCNC: 122 MG/DL (ref 65–100)
GLUCOSE BLD STRIP.AUTO-MCNC: 130 MG/DL (ref 65–100)
SERVICE CMNT-IMP: ABNORMAL

## 2017-07-03 PROCEDURE — 74011250636 HC RX REV CODE- 250/636: Performed by: SURGERY

## 2017-07-03 PROCEDURE — C1752 CATH,HEMODIALYSIS,SHORT-TERM: HCPCS

## 2017-07-03 PROCEDURE — 74011250636 HC RX REV CODE- 250/636

## 2017-07-03 PROCEDURE — 74011000258 HC RX REV CODE- 258: Performed by: INTERNAL MEDICINE

## 2017-07-03 PROCEDURE — C1892 INTRO/SHEATH,FIXED,PEEL-AWAY: HCPCS

## 2017-07-03 PROCEDURE — 74011000250 HC RX REV CODE- 250: Performed by: RADIOLOGY

## 2017-07-03 PROCEDURE — 74011250637 HC RX REV CODE- 250/637: Performed by: SURGERY

## 2017-07-03 PROCEDURE — 36556 INSERT NON-TUNNEL CV CATH: CPT

## 2017-07-03 PROCEDURE — 74011250636 HC RX REV CODE- 250/636: Performed by: PHYSICIAN ASSISTANT

## 2017-07-03 PROCEDURE — 82962 GLUCOSE BLOOD TEST: CPT

## 2017-07-03 PROCEDURE — 74011250637 HC RX REV CODE- 250/637: Performed by: NURSE PRACTITIONER

## 2017-07-03 PROCEDURE — 74011250637 HC RX REV CODE- 250/637: Performed by: INTERNAL MEDICINE

## 2017-07-03 PROCEDURE — 65210000002 HC RM PRIVATE GYN

## 2017-07-03 PROCEDURE — 74011000250 HC RX REV CODE- 250: Performed by: INTERNAL MEDICINE

## 2017-07-03 PROCEDURE — 77010033678 HC OXYGEN DAILY

## 2017-07-03 PROCEDURE — 74011000250 HC RX REV CODE- 250

## 2017-07-03 PROCEDURE — 77030018719 HC DRSG PTCH ANTIMIC J&J -A

## 2017-07-03 RX ORDER — HEPARIN SODIUM 1000 [USP'U]/ML
INJECTION, SOLUTION INTRAVENOUS; SUBCUTANEOUS
Status: COMPLETED
Start: 2017-07-03 | End: 2017-07-03

## 2017-07-03 RX ORDER — LIDOCAINE HYDROCHLORIDE 20 MG/ML
INJECTION, SOLUTION INFILTRATION; PERINEURAL
Status: COMPLETED
Start: 2017-07-03 | End: 2017-07-03

## 2017-07-03 RX ORDER — HEPARIN 100 UNIT/ML
300 SYRINGE INTRAVENOUS AS NEEDED
Status: DISCONTINUED | OUTPATIENT
Start: 2017-07-03 | End: 2017-07-06

## 2017-07-03 RX ORDER — LIDOCAINE HYDROCHLORIDE 20 MG/ML
INJECTION, SOLUTION INFILTRATION; PERINEURAL
Status: DISPENSED
Start: 2017-07-03 | End: 2017-07-04

## 2017-07-03 RX ORDER — LIDOCAINE HYDROCHLORIDE 20 MG/ML
10 INJECTION, SOLUTION INFILTRATION; PERINEURAL ONCE
Status: COMPLETED | OUTPATIENT
Start: 2017-07-03 | End: 2017-07-03

## 2017-07-03 RX ORDER — HEPARIN SODIUM 1000 [USP'U]/ML
INJECTION, SOLUTION INTRAVENOUS; SUBCUTANEOUS
Status: DISPENSED
Start: 2017-07-03 | End: 2017-07-04

## 2017-07-03 RX ADMIN — HYDROMORPHONE HYDROCHLORIDE 2 MG: 1 INJECTION, SOLUTION INTRAMUSCULAR; INTRAVENOUS; SUBCUTANEOUS at 21:41

## 2017-07-03 RX ADMIN — Medication 5 ML: at 06:00

## 2017-07-03 RX ADMIN — OXYCODONE HYDROCHLORIDE AND ACETAMINOPHEN 2 TABLET: 7.5; 325 TABLET ORAL at 20:50

## 2017-07-03 RX ADMIN — DIPHENHYDRAMINE HYDROCHLORIDE 25 MG: 50 INJECTION, SOLUTION INTRAMUSCULAR; INTRAVENOUS at 07:29

## 2017-07-03 RX ADMIN — NORTRIPTYLINE HYDROCHLORIDE 25 MG: 25 CAPSULE ORAL at 21:38

## 2017-07-03 RX ADMIN — LIDOCAINE HYDROCHLORIDE 100 MG: 20 INJECTION, SOLUTION INFILTRATION; PERINEURAL at 16:04

## 2017-07-03 RX ADMIN — HEPARIN SODIUM 2000 UNITS: 1000 INJECTION, SOLUTION INTRAVENOUS; SUBCUTANEOUS at 16:04

## 2017-07-03 RX ADMIN — METRONIDAZOLE 500 MG: 250 TABLET ORAL at 21:38

## 2017-07-03 RX ADMIN — AZTREONAM 500 MG: 1 INJECTION, POWDER, LYOPHILIZED, FOR SOLUTION INTRAMUSCULAR; INTRAVENOUS at 19:19

## 2017-07-03 RX ADMIN — HYDROMORPHONE HYDROCHLORIDE 2 MG: 1 INJECTION, SOLUTION INTRAMUSCULAR; INTRAVENOUS; SUBCUTANEOUS at 07:29

## 2017-07-03 RX ADMIN — LIDOCAINE HYDROCHLORIDE 100 MG: 20 INJECTION, SOLUTION INFILTRATION; PERINEURAL at 13:34

## 2017-07-03 RX ADMIN — DIPHENHYDRAMINE HYDROCHLORIDE 25 MG: 50 INJECTION, SOLUTION INTRAMUSCULAR; INTRAVENOUS at 16:45

## 2017-07-03 RX ADMIN — CARVEDILOL 12.5 MG: 12.5 TABLET, FILM COATED ORAL at 19:19

## 2017-07-03 RX ADMIN — HYDROMORPHONE HYDROCHLORIDE 2 MG: 1 INJECTION, SOLUTION INTRAMUSCULAR; INTRAVENOUS; SUBCUTANEOUS at 12:06

## 2017-07-03 RX ADMIN — HYDROXYZINE HYDROCHLORIDE 25 MG: 25 TABLET, FILM COATED ORAL at 11:25

## 2017-07-03 RX ADMIN — SEVELAMER CARBONATE 1600 MG: 800 TABLET, FILM COATED ORAL at 19:19

## 2017-07-03 RX ADMIN — HYDROXYZINE HYDROCHLORIDE 25 MG: 25 TABLET, FILM COATED ORAL at 20:42

## 2017-07-03 RX ADMIN — ONDANSETRON 4 MG: 2 INJECTION INTRAMUSCULAR; INTRAVENOUS at 16:45

## 2017-07-03 RX ADMIN — DIPHENHYDRAMINE HYDROCHLORIDE 25 MG: 50 INJECTION, SOLUTION INTRAMUSCULAR; INTRAVENOUS at 03:18

## 2017-07-03 RX ADMIN — METRONIDAZOLE 500 MG: 250 TABLET ORAL at 16:51

## 2017-07-03 RX ADMIN — OXYCODONE HYDROCHLORIDE AND ACETAMINOPHEN 2 TABLET: 7.5; 325 TABLET ORAL at 16:51

## 2017-07-03 RX ADMIN — HYDROMORPHONE HYDROCHLORIDE 2 MG: 1 INJECTION, SOLUTION INTRAMUSCULAR; INTRAVENOUS; SUBCUTANEOUS at 13:45

## 2017-07-03 RX ADMIN — AZTREONAM 500 MG: 1 INJECTION, POWDER, LYOPHILIZED, FOR SOLUTION INTRAMUSCULAR; INTRAVENOUS at 07:54

## 2017-07-03 NOTE — PROGRESS NOTES
NUTRITION- DIETETIC tECHnICIAN    Pt seen for:       [x]                  Rescreen  []                  Food preferences/tolerances  []                  Food Allergies  []                  PO intake check  []                  Supplements  []                  Diet order clarification  []                  Education  []                  Other     Rescreen:    [x]                  Not at Nutrition Risk, rescreen per screening protocol  []                  At Nutrition Risk- RD referral         SUBJECTIVE/OBJECTIVE:     Information obtained from:  patient      Diet:  Regular Consistent Carbohydrate 1800 Kcal    Intake: Good    Patient Vitals for the past 100 hrs:   % Diet Eaten   07/02/17 1415 100 %   07/02/17 0909 100 %   07/01/17 1758 25 %   06/30/17 1930 100 %   06/30/17 0821 75 %       Weight Changes: Wt Readings from Last 3 Encounters:   07/02/17 137.9 kg (304 lb)   06/13/17 114 kg (251 lb 5.2 oz)   06/05/17 112.9 kg (249 lb)       Problems Identified      [x]                  Patient admitted with perineal abscess, with hx including DM and ESRD. Intake per patient is sometimes variable. She drinks Glucerna at home and would like to receive while here to help with would healing.      []                  Specified food preferences   []                  Dislikes supplements              []                  Allergies:   []                  Difficulty chewing      []                  Dentition    []                  Nausea/Vomiting   []                  Constipation   []                  Diarrhea    PLAN:     [x]                   Continue current diet and encourage intake  []                   Obtained/adjusted food preferences/tolerances and/or snacks options   []                   Dislikes supplements will try a substitution  []                   Modify diet for food allergies  []                   Adjust texture due to difficulty chewing   []                   Educated patient  []                   RD Referral  [x]                   Rescreen per screening protocol          Yosef Brandt DTR

## 2017-07-03 NOTE — PROGRESS NOTES
[facility]   [address] [facility], [address] [place] [address], [facility]  Phone: [phone]   [phone]       Nephrology Progress Note  Lewis Hodges     1950     [id number]  Date of Admission : 6/15/2017  07/03/17    CC: Follow up for ESRD       Assessment and Plan   ESRD- HD :  - HD MWF at [facility]   - consult vascular today for fistulogram  - will attempt HD after access is evaluated    Hypervolemia / Hyponatremia :  - UFw/ HD    HyperPhosphatemia :  - cont renvela with meals    Anemia of CKD:  - cont ELTON with dialysis    Recurrent Perianal abscess:   - per Dr Karina Whitlock and ID  - on aztreonam     HTN :  - reduced     Type II DM     Sec HPTH     Interval History:  Seen and examined. Unable to cannulate AVF this AM.   Having some pain in her should. She does have a hx of stenosis with a stent in place. No cp, sob, n/v/d. Review of Systems: A comprehensive review of systems was negative.     Current Medications:   Current Facility-Administered Medications   Medication Dose Route Frequency    amLODIPine (NORVASC) tablet 2.5 mg  2.5 mg Oral DAILY    sevelamer carbonate (RENVELA) tab 1,600 mg  1,600 mg Oral TID WITH MEALS    epoetin joi (EPOGEN;PROCRIT) injection 20,000 Units  20,000 Units SubCUTAneous DIALYSIS MON, WED & FRI    hydrOXYzine HCl (ATARAX) tablet 25 mg  25 mg Oral Q4H PRN    albumin human 25% (BUMINATE) solution 12.5 g  12.5 g IntraVENous DIALYSIS PRN    carvedilol (COREG) tablet 25 mg  25 mg Oral DAILY    carvedilol (COREG) tablet 12.5 mg  12.5 mg Oral QPM    polyethylene glycol (MIRALAX) packet 17 g  17 g Oral DAILY    metroNIDAZOLE (FLAGYL) tablet 500 mg  500 mg Oral TID    aztreonam (AZACTAM) 500 mg in 0.9% sodium chloride 100 mL IVPB  500 mg IntraVENous Q12H    diphenhydrAMINE (BENADRYL) injection 25 mg  25 mg IntraVENous Q4H PRN    HYDROmorphone (PF) (DILAUDID) injection 2 mg  2 mg IntraVENous Q4H PRN    insulin regular (NOVOLIN R, HUMULIN R) injection   SubCUTAneous Q6H    aspirin chewable tablet 81 mg  81 mg Oral DAILY    FLUoxetine (PROzac) capsule 20 mg  20 mg Oral DAILY    nortriptyline (PAMELOR) capsule 25 mg  25 mg Oral QHS    oxyCODONE-acetaminophen (PERCOCET 7.5) 7.5-325 mg per tablet 1-2 Tab  1-2 Tab Oral Q4H PRN    sodium chloride (NS) flush 5-10 mL  5-10 mL IntraVENous Q8H    sodium chloride (NS) flush 5-10 mL  5-10 mL IntraVENous PRN    ondansetron (ZOFRAN) injection 4 mg  4 mg IntraVENous Q4H PRN    glucose chewable tablet 16 g  4 Tab Oral PRN    glucagon (GLUCAGEN) injection 1 mg  1 mg IntraMUSCular PRN      Allergies   Allergen Reactions    Latex Itching     Rash, sometimes difficult to breathe    Celebrex [Celecoxib] Anaphylaxis and Swelling     TONGUE. LIPS AND EYES    Iodine Other (comments)     IV CONTRAST-LESIONS, AND SKIN SLUFFED OFF    Keflex [Cephalexin] Anaphylaxis and Swelling     Tongue, lips, and eyes    Levaquin [Levofloxacin] Anaphylaxis and Swelling     Tongue, lips, and eyes    Pcn [Penicillins] Anaphylaxis and Swelling     Tongue, lips and eyes    Morphine Other (comments)     PT STATES IS JUST SENSITIVITY, GOT TOO MUCH ONE TIME.        Objective:  Vitals:    Vitals:    07/02/17 1453 07/02/17 2122 07/03/17 0318 07/03/17 0856   BP: 171/80 149/73 157/72 137/64   Pulse: 77 67 65 67   Resp: 18 18 18 17   Temp: 97.4 °F (36.3 °C) 98.8 °F (37.1 °C) 97.5 °F (36.4 °C) 98.1 °F (36.7 °C)   TempSrc:       SpO2: 100% 100% 94% 94%   Weight:       Height:         Intake and Output:     07/01 1901 - 07/03 0700  In: 480 [P.O.:480]  Out: -     Physical Examination:    General: In mild distress  Neck:  Supple, no mass  Resp:  Lungs CTA   CV:  RRR,  no murmur or rub, 4+ LE edema  GI:  Soft, NT, + Bowel sounds, no hepatosplenomegaly  Neurologic:  Non focal  Ext                   LUE AVF w/ good thrill and bruit    []    High complexity decision making was performed  []    Patient is at high-risk of decompensation with multiple organ involvement    Lab Data Personally Reviewed: I have reviewed all the pertinent labs, microbiology data and radiology studies during assessment. No results for input(s): NA, K, CL, CO2, GLU, BUN, CREA, CA, MG, PHOS, ALB, TBIL, SGOT, ALT, INR in the last 72 hours. No lab exists for component: INREXT, INREXT  No results for input(s): WBC, HGB, HCT, PLT, HGBEXT, HCTEXT, PLTEXT, HGBEXT, HCTEXT, PLTEXT in the last 72 hours.   Lab Results   Component Value Date/Time    Specimen Description: URINE 10/27/2012 08:15 PM    Specimen Description: BLOOD 06/07/2011 07:40 AM    Specimen Description: URINE 08/30/2010 07:10 AM    Specimen Description: URINE 08/09/2010 11:30 AM    Specimen Description: URINE 06/22/2010 11:50 PM     Lab Results   Component Value Date/Time    Culture result: LIGHT  MIXED COMMENSAL PARIS   02/11/2017 12:40 AM    Culture result:  02/11/2017 12:40 AM     LIGHT  ANAEROBIC GRAM NEGATIVE RODS  BETA LACTAMASE POSITIVE      Culture result: NO GROWTH 6 DAYS 02/10/2017 04:43 PM    Culture result: MIXED SKIN PARIS ISOLATED 10/27/2012 08:15 PM    Culture result: NO GROWTH 5 DAYS 06/07/2011 07:40 AM     Recent Results (from the past 24 hour(s))   GLUCOSE, POC    Collection Time: 07/02/17 11:41 AM   Result Value Ref Range    Glucose (POC) 104 (H) 65 - 100 mg/dL    Performed by Amalia, POC    Collection Time: 07/02/17  4:21 PM   Result Value Ref Range    Glucose (POC) 151 (H) 65 - 100 mg/dL    Performed by Amalia, POC    Collection Time: 07/02/17 10:55 PM   Result Value Ref Range    Glucose (POC) 113 (H) 65 - 100 mg/dL    Performed by Rizwana Guerrero    GLUCOSE, POC    Collection Time: 07/03/17  6:39 AM   Result Value Ref Range    Glucose (POC) 101 (H) 65 - 100 mg/dL    Performed by Manuel Mcdaniel MD

## 2017-07-03 NOTE — PROGRESS NOTES
Perianal wound continues to heal well. Major problems with dialysis access. Hopefully addressed soon.

## 2017-07-03 NOTE — PROGRESS NOTES
TRANSFER - IN REPORT:    Verbal report received from [facility] (name) on Jose Weldon  being received from Angio(unit) for routine post - op      Report consisted of patients Situation, Background, Assessment and   Recommendations(SBAR). Information from the following report(s) Procedure Summary and Accordion was reviewed with the receiving nurse. Opportunity for questions and clarification was provided. Assessment completed upon patients arrival to unit and care assumed.

## 2017-07-03 NOTE — PROGRESS NOTES
General Surgery Daily Progress Note    Admit Date: 6/15/2017  Post-Operative Day: 18 Days Post-Op from Procedure(s):  PERINEAL ABSCESS INCISION AND DRAINAGE     Subjective:      Last 24 hrs: Doing well this morning and currently receiving dialysis bedside. Reports \"stuffiness\" overnight and requited O2. Denies fevers or chills. Recived Lasix yesterday and states that she urinated a lot, but still c/o swelling & tightness in legs. States that when she sits up or tries to elevate her legs, they feel \"tighter\". Wound vac not yet applied--still receiving BID wound care. Objective:     Blood pressure 157/72, pulse 65, temperature 97.5 °F (36.4 °C), resp. rate 18, height 6' 1.75\" (1.873 m), weight 304 lb (137.9 kg), SpO2 94 %, not currently breastfeeding. Temp (24hrs), Av.8 °F (36.6 °C), Min:97.4 °F (36.3 °C), Max:98.8 °F (37.1 °C)      _____________________  Physical Exam:     Alert and Oriented, conversant, cooperative, in no acute distress. Cardiovascular: RRR, 2+ peripheral edema  Lungs:CTAB, No wheezes  Inscision: dressed      Assessment:   Principal Problem:    Perineal abscess (2017)    Active Problems:    Diabetes mellitus type 2, insulin dependent ([facility] (10/14/2010)      HTN (hypertension) (10/14/2010)      Abscess of deep perineal space (2017)      ES[facility] (end stage renal disease) [facility] (2017)            Plan:     Continue wound care with wound vac to be placed prior to discharge, hopefully. Continue antibiotivs.  Continue pain management. Further treatment per Dr. Vedia Dance. Data Review:    No results for input(s): WBC, HGB, HCT, PLT, HGBEXT, HCTEXT, PLTEXT in the last 72 hours. No results for input(s): NA, K, CL, CO2, GLU, BUN, CREA, CA, MG, PHOS, ALB, TBIL, SGOT, ALT, INR in the last 72 hours. No lab exists for component: INREXT  No results for input(s): AML, LPSE in the last 72 hours.         ______________________  Medications:    Current Facility-Administered Medications   Medication Dose Route Frequency    amLODIPine (NORVASC) tablet 2.5 mg  2.5 mg Oral DAILY    sevelamer carbonate (RENVELA) tab 1,600 mg  1,600 mg Oral TID WITH MEALS    epoetin joi (EPOGEN;PROCRIT) injection 20,000 Units  20,000 Units SubCUTAneous DIALYSIS MON, WED & FRI    hydrOXYzine HCl (ATARAX) tablet 25 mg  25 mg Oral Q4H PRN    albumin human 25% (BUMINATE) solution 12.5 g  12.5 g IntraVENous DIALYSIS PRN    carvedilol (COREG) tablet 25 mg  25 mg Oral DAILY    carvedilol (COREG) tablet 12.5 mg  12.5 mg Oral QPM    polyethylene glycol (MIRALAX) packet 17 g  17 g Oral DAILY    metroNIDAZOLE (FLAGYL) tablet 500 mg  500 mg Oral TID    aztreonam (AZACTAM) 500 mg in 0.9% sodium chloride 100 mL IVPB  500 mg IntraVENous Q12H    diphenhydrAMINE (BENADRYL) injection 25 mg  25 mg IntraVENous Q4H PRN    HYDROmorphone (PF) (DILAUDID) injection 2 mg  2 mg IntraVENous Q4H PRN    insulin regular (NOVOLIN R, HUMULIN R) injection   SubCUTAneous Q6H    aspirin chewable tablet 81 mg  81 mg Oral DAILY    FLUoxetine (PROzac) capsule 20 mg  20 mg Oral DAILY    nortriptyline (PAMELOR) capsule 25 mg  25 mg Oral QHS    oxyCODONE-acetaminophen (PERCOCET 7.5) 7.5-325 mg per tablet 1-2 Tab  1-2 Tab Oral Q4H PRN    sodium chloride (NS) flush 5-10 mL  5-10 mL IntraVENous Q8H    sodium chloride (NS) flush 5-10 mL  5-10 mL IntraVENous PRN    ondansetron (ZOFRAN) injection 4 mg  4 mg IntraVENous Q4H PRN    glucose chewable tablet 16 g  4 Tab Oral PRN    glucagon (GLUCAGEN) injection 1 mg  1 mg IntraMUSCular PRN       Luke Saldaña PA-C  7/3/2017

## 2017-07-03 NOTE — ROUTINE PROCESS
TRANSFER - OUT REPORT:    Verbal report given to [address]Nicole Sawyer (name) on Shweta Eli  being transferred to [phone] (unit) for routine progression of care       Report consisted of patients Situation, Background, Assessment and   Recommendations(SBAR). Information from the following report(s) Procedure Summary was reviewed with the receiving nurse. Lines:   Peripheral IV 07/02/17 Right (Active)   Site Assessment Clean, dry, & intact 7/2/2017  9:22 PM   Phlebitis Assessment 0 7/2/2017  9:22 PM   Infiltration Assessment 0 7/2/2017  9:22 PM   Dressing Status Clean, dry, & intact; New 7/2/2017  9:22 PM   Dressing Type Transparent 7/2/2017  9:22 PM   Hub Color/Line Status Flushed;Patent;Capped 7/2/2017  9:22 PM   Action Taken Open ports on tubing capped 7/2/2017  9:22 PM   Alcohol Cap Used Yes 7/2/2017  9:22 PM        Opportunity for questions and clarification was provided, post left IJ jojo catheter placement in IR.      Patient transported with:   O2 @ 2 liters

## 2017-07-03 NOTE — CONSULTS
Brief Vascular Surgery  Consult Note    Impression: Patent left upper arm fistula with presumed outflow stenosis at level of clavicle.        Plan: Angio with angioplasty on Thurs if she remains in hospital          Full note dictated    Hannah Zamudio MD

## 2017-07-03 NOTE — ROUTINE PROCESS
TRANSFER - OUT REPORT:    Verbal report given to [address]Nicole Mendoza Silverio (name) on Adiel Sherman  being transferred to [phone] (unit) for routine progression of care       Report consisted of patients Situation, Background, Assessment and   Recommendations(SBAR). Information from the following report(s) Procedure Summary was reviewed with the receiving nurse. Lines:   Peripheral IV 07/02/17 Right (Active)   Site Assessment Clean, dry, & intact 7/2/2017  9:22 PM   Phlebitis Assessment 0 7/2/2017  9:22 PM   Infiltration Assessment 0 7/2/2017  9:22 PM   Dressing Status Clean, dry, & intact; New 7/2/2017  9:22 PM   Dressing Type Transparent 7/2/2017  9:22 PM   Hub Color/Line Status Flushed;Patent;Capped 7/2/2017  9:22 PM   Action Taken Open ports on tubing capped 7/2/2017  9:22 PM   Alcohol Cap Used Yes 7/2/2017  9:22 PM        Opportunity for questions and clarification was provided post attempted jojo catheter placement in IR. Procedure postponed until proper size catheter is obtained.     Patient transported with:   O2 @ 2 liters

## 2017-07-03 NOTE — WOUND CARE
WOCN Note:     Follow-up visit for right buttocks I&D site. Chart shows:  Admitted for perineal abscess; history of previous perineal abscesses, DM, HD, HTN, neuropathy, sleep apnea. Admitted from home.      Assessment:   Patient is A&O x 4, continent and mobile. Patient repositioned herself onto left side. Pre-medicated for pain by RN.      1. Right buttock surgical I&D site = 7 x 3 x 0.7 cm (smaller) With tunnel @ 4 o'clock = 1.5 and softening induration extending outward laterally = 4 cm (unchanged). Base is 100% moist red granulation. Scant serosanguinous exudate. Periwound intact & with dyschromia over induration. No odor or gross S&S of inflammation other than induration which is reduced. Moist dressing applied with Carrasyn gel added, dry topper and secured. Right posterior heel burgundy/purple bullae = 3.5 x 3 x 0 cm   Appears deflating, not taut but is fluid-filled. Prevalon boots applied. Recommendations:    Venelex to right heel. Prevalon boots while in bed. Continue wound care as ordered to right buttock. Discussed above plan with patient, her daughter & RN, Glen Storey. Brenda, manager, not in today but made clinical lead, Tristin Mai, aware of heel.      Transition of Care: Plan to follow weekly and as needed while admitted to hospital.    ILENE ToddN, RN, [facility]  Certified Wound, Ostomy, Continence Nurse  office [phone]  pager [phone] or call  to page

## 2017-07-03 NOTE — PROGRESS NOTES
Unable to get fistulogram today.   Not likely to get done until Wednesday  Will have IR place [facility] for temp HD access  HD later today  Discussed with patient and son

## 2017-07-03 NOTE — PROGRESS NOTES
Care Management Interventions  PCP Verified by CM: Yes (Dr. Wilian Bauer)  Mode of Transport at Discharge: Other (see comment) (family by car)  Transition of Care Consult (CM Consult): [address]: Yes  MyChart Signup: No  Discharge Durable Medical Equipment: No  Health Maintenance Reviewed: Yes  Physical Therapy Consult: No  Occupational Therapy Consult: No  Speech Therapy Consult: No  Current Support Network: Own Home, Lives with Spouse, Other (lives with spouse and daughter)  Confirm Follow Up Transport: Family  Plan discussed with Pt/Family/Caregiver: Yes  Freedom of Choice Offered: Yes  Discharge Location  Discharge Placement: Home with home health (need new home health orders [facility]))    CM reviewed chart and received home health consult for wound care. Pt was open to [facility]. Referral sent through [facility] to [facility]. CM will continue to follow.   Caleb Gupta BSW, ACM

## 2017-07-03 NOTE — CONSULTS
[facility] [facility]   [address] [place] [address]       Name:  Maddy Byrne   MR#:  [id number]   :  1950   Account #:  [de-identified]    Date of Consultation:  2017   Date of Adm:  06/15/2017       REASON FOR CONSULTATION: Poorly functioning left arm dialysis   fistula. HISTORY: The patient is a 26-year-old female who was hospitalized in   mid  for a perineal abscess. She has undergone surgical   intervention and is being treated with antibiotics. She has end-stage   renal disease on hemodialysis. She has a functioning left upper arm   dialysis fistula that has been difficult to use over the last few   treatments. We were asked to see her to evaluate the fistula further   and intervene as appropriate. From her history, it sounds as though   she has had stenoses in the fistula at the level of the clavicle and has   had stents placed in the past. She had a temporary catheter placed   today for dialysis. She has had a previous fistula in her right arm which   is now nonfunctional.     PAST MEDICAL HISTORY: Renal failure on dialysis, hypertension,   diabetes. MEDICATIONS    1. Amlodipine. 2. Carvedilol. 3. Prozac. 4. Nortriptyline. 5. Insulin. ALLERGIES    1. LATEX. 2. CELEBREX.   3. IODINE. [facility]. 5. LEVAQUIN. 6. PENICILLIN. 7. MORPHINE. SOCIAL HISTORY: The patient lives at home with her family. FAMILY HISTORY: Unremarkable. REVIEW OF SYSTEMS: She has had no recent cardiac, respiratory,   GI, , neurologic, hematologic or psychiatric complaints. PHYSICAL EXAMINATION   GENERAL: She is a heavyset female in no distress. She is awake,   alert and responsive. LUNGS: Bilateral breath sounds. HEART: Regular rate and rhythm. ABDOMEN: Soft and nontender. EXTREMITIES: She has a functioning left upper arm fistula that   appears to be well developed in the upper arm.  It is somewhat pulsatile   suggesting outflow obstruction. NEUROLOGIC: Grossly normal with intact motor and sensory function. IMPRESSION: This patient has a functioning left upper arm fistula with   presumed venous outflow obstruction. We will make arrangement for   an angiogram of her fistula with possible intervention later this week.          MD Aleksandr Power [facility] / Rebecca Spivey   D:  07/03/2017   16:57   T:  07/03/2017   18:48   Job #:  [id number]

## 2017-07-04 LAB
GLUCOSE BLD STRIP.AUTO-MCNC: 109 MG/DL (ref 65–100)
GLUCOSE BLD STRIP.AUTO-MCNC: 112 MG/DL (ref 65–100)
GLUCOSE BLD STRIP.AUTO-MCNC: 120 MG/DL (ref 65–100)
GLUCOSE BLD STRIP.AUTO-MCNC: 146 MG/DL (ref 65–100)
GLUCOSE BLD STRIP.AUTO-MCNC: 85 MG/DL (ref 65–100)
SERVICE CMNT-IMP: ABNORMAL
SERVICE CMNT-IMP: NORMAL

## 2017-07-04 PROCEDURE — 74011250636 HC RX REV CODE- 250/636: Performed by: PHYSICIAN ASSISTANT

## 2017-07-04 PROCEDURE — 65210000002 HC RM PRIVATE GYN

## 2017-07-04 PROCEDURE — 74011250637 HC RX REV CODE- 250/637: Performed by: SURGERY

## 2017-07-04 PROCEDURE — 74011250636 HC RX REV CODE- 250/636: Performed by: SURGERY

## 2017-07-04 PROCEDURE — 74011250637 HC RX REV CODE- 250/637: Performed by: INTERNAL MEDICINE

## 2017-07-04 PROCEDURE — 74011250636 HC RX REV CODE- 250/636: Performed by: INTERNAL MEDICINE

## 2017-07-04 PROCEDURE — 90935 HEMODIALYSIS ONE EVALUATION: CPT

## 2017-07-04 PROCEDURE — 74011250637 HC RX REV CODE- 250/637: Performed by: PHYSICIAN ASSISTANT

## 2017-07-04 PROCEDURE — 74011636637 HC RX REV CODE- 636/637: Performed by: PHYSICIAN ASSISTANT

## 2017-07-04 PROCEDURE — 74011250637 HC RX REV CODE- 250/637: Performed by: NURSE PRACTITIONER

## 2017-07-04 PROCEDURE — P9047 ALBUMIN (HUMAN), 25%, 50ML: HCPCS | Performed by: INTERNAL MEDICINE

## 2017-07-04 PROCEDURE — 77010033678 HC OXYGEN DAILY

## 2017-07-04 PROCEDURE — 74011000250 HC RX REV CODE- 250: Performed by: INTERNAL MEDICINE

## 2017-07-04 PROCEDURE — 82962 GLUCOSE BLOOD TEST: CPT

## 2017-07-04 PROCEDURE — 74011000258 HC RX REV CODE- 258: Performed by: INTERNAL MEDICINE

## 2017-07-04 RX ADMIN — HYDROMORPHONE HYDROCHLORIDE 2 MG: 1 INJECTION, SOLUTION INTRAMUSCULAR; INTRAVENOUS; SUBCUTANEOUS at 15:21

## 2017-07-04 RX ADMIN — SEVELAMER CARBONATE 1600 MG: 800 TABLET, FILM COATED ORAL at 18:58

## 2017-07-04 RX ADMIN — POLYETHYLENE GLYCOL 3350 17 G: 17 POWDER, FOR SOLUTION ORAL at 14:20

## 2017-07-04 RX ADMIN — ALBUMIN (HUMAN) 12.5 G: 0.25 INJECTION, SOLUTION INTRAVENOUS at 08:05

## 2017-07-04 RX ADMIN — HYDROXYZINE HYDROCHLORIDE 25 MG: 25 TABLET, FILM COATED ORAL at 12:18

## 2017-07-04 RX ADMIN — HYDROXYZINE HYDROCHLORIDE 25 MG: 25 TABLET, FILM COATED ORAL at 20:57

## 2017-07-04 RX ADMIN — CARVEDILOL 12.5 MG: 12.5 TABLET, FILM COATED ORAL at 18:58

## 2017-07-04 RX ADMIN — Medication 10 ML: at 14:00

## 2017-07-04 RX ADMIN — METRONIDAZOLE 500 MG: 250 TABLET ORAL at 22:36

## 2017-07-04 RX ADMIN — Medication 10 ML: at 21:29

## 2017-07-04 RX ADMIN — HYDROXYZINE HYDROCHLORIDE 25 MG: 25 TABLET, FILM COATED ORAL at 01:23

## 2017-07-04 RX ADMIN — CASTOR OIL AND BALSAM, PERU: 788; 87 OINTMENT TOPICAL at 18:00

## 2017-07-04 RX ADMIN — SEVELAMER CARBONATE 1600 MG: 800 TABLET, FILM COATED ORAL at 12:24

## 2017-07-04 RX ADMIN — SEVELAMER CARBONATE 1600 MG: 800 TABLET, FILM COATED ORAL at 07:02

## 2017-07-04 RX ADMIN — OXYCODONE HYDROCHLORIDE AND ACETAMINOPHEN 2 TABLET: 7.5; 325 TABLET ORAL at 12:18

## 2017-07-04 RX ADMIN — AZTREONAM 500 MG: 1 INJECTION, POWDER, LYOPHILIZED, FOR SOLUTION INTRAMUSCULAR; INTRAVENOUS at 18:58

## 2017-07-04 RX ADMIN — ALBUMIN (HUMAN) 12.5 G: 0.25 INJECTION, SOLUTION INTRAVENOUS at 09:25

## 2017-07-04 RX ADMIN — DIPHENHYDRAMINE HYDROCHLORIDE 25 MG: 50 INJECTION, SOLUTION INTRAMUSCULAR; INTRAVENOUS at 03:24

## 2017-07-04 RX ADMIN — HUMAN INSULIN 2 UNITS: 100 INJECTION, SOLUTION SUBCUTANEOUS at 00:14

## 2017-07-04 RX ADMIN — NORTRIPTYLINE HYDROCHLORIDE 25 MG: 25 CAPSULE ORAL at 21:28

## 2017-07-04 RX ADMIN — Medication 10 ML: at 06:00

## 2017-07-04 RX ADMIN — EPOETIN ALFA 20000 UNITS: 20000 SOLUTION INTRAVENOUS; SUBCUTANEOUS at 11:39

## 2017-07-04 RX ADMIN — HYDROXYZINE HYDROCHLORIDE 25 MG: 25 TABLET, FILM COATED ORAL at 06:10

## 2017-07-04 RX ADMIN — CASTOR OIL AND BALSAM, PERU: 788; 87 OINTMENT TOPICAL at 12:27

## 2017-07-04 RX ADMIN — AZTREONAM 500 MG: 1 INJECTION, POWDER, LYOPHILIZED, FOR SOLUTION INTRAMUSCULAR; INTRAVENOUS at 07:02

## 2017-07-04 RX ADMIN — ONDANSETRON 4 MG: 2 INJECTION INTRAMUSCULAR; INTRAVENOUS at 21:28

## 2017-07-04 RX ADMIN — OXYCODONE HYDROCHLORIDE AND ACETAMINOPHEN 2 TABLET: 7.5; 325 TABLET ORAL at 20:57

## 2017-07-04 RX ADMIN — METRONIDAZOLE 500 MG: 250 TABLET ORAL at 15:55

## 2017-07-04 NOTE — PROGRESS NOTES
Bedside and Verbal shift change report given to [address] (oncoming nurse) by Rozina Jones (offgoing nurse). Report included the following information SBAR, Kardex, Intake/Output, MAR, Accordion and Recent Results.

## 2017-07-04 NOTE — DIALYSIS
[facility]  [phone]    Vitals   Pre   Post   Assessment   Pre   Post     Temp  Temp: 97.1 °F (36.2 °C) (07/04/17 0002)  1222  Temp 97.9   LOC  Patient AXOX4 Patient AXOX4   HR   Pulse (Heart Rate): (!) 55 (07/04/17 0807) 1222  HR 73 Lungs   Lungs sound diminished in bases  Lung sounds diminished   B/P   BP: 126/69 (07/04/17 0807) 1222  /75   Cardiac   HR regular  HR Regular   Resp   Resp Rate: 14 (07/04/17 0807) 1222   RR 18   Skin   Warm, dry, and intact  Warm, dry, and intact   Pain level  Pain Intensity 1: 0 (07/04/17 0629) Nurse administer pain medication  Edema  generalized edema bilateral upper extremities and lowe extremities      Generalized edema   Orders:    Duration:   Start:   Procedure Start Time: 0805 End:   End time 1210 Total:   4 hours   Dialyzer:   Dialyzer/Set Up Inspection: Revaclear (07/04/17 0807)   Frank Lavender Bath:   Dialysate K (mEq/L): 3 (07/04/17 0807)   Ca Bath:   Dialysate CA (mEq/L): 2.5 (07/04/17 0807)   Na/Bicarb:   Dialysate NA (mEq/L): 144 (07/04/17 0807)   Target Fluid Removal:   Goal. Amount to remove: 5000   Access  Right Central venous catheter   Type & Location:      Labs     Obtained/Reviewed   Critical Results Called   Date when labs were drawn-  Hgb-    HGB   Date Value Ref Range Status   06/26/2017 8.7 (L) 11.5 - 16.0 g/dL Final     K-    Potassium   Date Value Ref Range Status   06/30/2017 5.5 (H) 3.5 - 5.1 mmol/L Final     Ca-   Calcium   Date Value Ref Range Status   06/30/2017 8.0 (L) 8.5 - 10.1 MG/DL Final     Bun-   BUN   Date Value Ref Range Status   06/30/2017 40 (H) 6 - 20 MG/DL Final     Creat-   Creatinine   Date Value Ref Range Status   06/30/2017 5.92 (H) 0.55 - 1.02 MG/DL Final        Medications/ Blood Products Given     Name   Dose   Route and Time     Procrit  20,000 Unit Dialysis cath, 1139              Blood Volume Processed (BVP):    86.9 L   Net Fluid   Removed:  5000   Comments   Time Out Done: yes  Primary Nurse Rpt Pre:   Wanda Diaz RN  Primary Nurse Rpt Post: Wanda Diaz RN  Pt Education: Patient educated about dialysis, diet, cath care, and transplant. Care Plan:  Tx Summary: Patient tolerated 4 hours of HD without difficulty. 5 kgs removed via left central venous cath. All possible blood returned. Report given to primary RN. Admiting Diagnosis:  Pt's previous clinic-  Consent signed - Informed Consent Verified: Yes (07/04/17 0807)  Sinaita Consent - Consent verified  Hepatitis Status- Negative 6/14/2017  Machine #- Machine Number: [id number] (07/04/17 1091)  Telemetry status-  Pre-dialysis wt. - Pre-Dialysis Weight: 142.3 kg (313 lb 11.4 oz) (07/04/17 0807)

## 2017-07-04 NOTE — PROGRESS NOTES
[facility]   [address] [address], [facility] [address], [facility]  Phone: [phone]   [phone]       Nephrology Progress Note  Héctor Dick     1950     [id number]  Date of Admission : 6/15/2017  07/04/17    CC: Follow up for ESRD       Assessment and Plan   ESRD- HD :  - HD MWF at [facility], [place]   - HD now then tomorrow to get back on schedule  - for fistulogram Thursday  - use jojo for now    Hypervolemia / Hyponatremia :  - UF 5kg on HD today    HyperPhosphatemia :  - cont renvela with meals    Anemia of CKD:  - cont ELTON with dialysis    Recurrent Perianal abscess:   - per Dr Angel Luis Garcias and ID  - on aztreonam     HTN :  - reduced     Type II DM     Sec HPTH     Interval History:  Seen and examined on HD. Unable to receive her HD yesterday due to scheduling issues. C/o pain at Duff site. No cp, sob, n/v/d reported now. Review of Systems: A comprehensive review of systems was negative.     Current Medications:   Current Facility-Administered Medications   Medication Dose Route Frequency    heparin (porcine) pf 300 Units  300 Units InterCATHeter PRN    balsam peru-castor oil (VENELEX)  mg/gram ointment   Topical BID    amLODIPine (NORVASC) tablet 2.5 mg  2.5 mg Oral DAILY    sevelamer carbonate (RENVELA) tab 1,600 mg  1,600 mg Oral TID WITH MEALS    epoetin joi (EPOGEN;PROCRIT) injection 20,000 Units  20,000 Units SubCUTAneous DIALYSIS MON, WED & FRI    hydrOXYzine HCl (ATARAX) tablet 25 mg  25 mg Oral Q4H PRN    albumin human 25% (BUMINATE) solution 12.5 g  12.5 g IntraVENous DIALYSIS PRN    carvedilol (COREG) tablet 25 mg  25 mg Oral DAILY    carvedilol (COREG) tablet 12.5 mg  12.5 mg Oral QPM    polyethylene glycol (MIRALAX) packet 17 g  17 g Oral DAILY    metroNIDAZOLE (FLAGYL) tablet 500 mg  500 mg Oral TID    aztreonam (AZACTAM) 500 mg in 0.9% sodium chloride 100 mL IVPB  500 mg IntraVENous Q12H    diphenhydrAMINE (BENADRYL) injection 25 mg 25 mg IntraVENous Q4H PRN    HYDROmorphone (PF) (DILAUDID) injection 2 mg  2 mg IntraVENous Q4H PRN    insulin regular (NOVOLIN R, HUMULIN R) injection   SubCUTAneous Q6H    aspirin chewable tablet 81 mg  81 mg Oral DAILY    FLUoxetine (PROzac) capsule 20 mg  20 mg Oral DAILY    nortriptyline (PAMELOR) capsule 25 mg  25 mg Oral QHS    oxyCODONE-acetaminophen (PERCOCET 7.5) 7.5-325 mg per tablet 1-2 Tab  1-2 Tab Oral Q4H PRN    sodium chloride (NS) flush 5-10 mL  5-10 mL IntraVENous Q8H    sodium chloride (NS) flush 5-10 mL  5-10 mL IntraVENous PRN    ondansetron (ZOFRAN) injection 4 mg  4 mg IntraVENous Q4H PRN    glucose chewable tablet 16 g  4 Tab Oral PRN    glucagon (GLUCAGEN) injection 1 mg  1 mg IntraMUSCular PRN      Allergies   Allergen Reactions    Latex Itching     Rash, sometimes difficult to breathe    Celebrex [Celecoxib] Anaphylaxis and Swelling     TONGUE. LIPS AND EYES    Iodine Other (comments)     IV CONTRAST-LESIONS, AND SKIN SLUFFED OFF    Keflex [Cephalexin] Anaphylaxis and Swelling     Tongue, lips, and eyes    Levaquin [Levofloxacin] Anaphylaxis and Swelling     Tongue, lips, and eyes    Pcn [Penicillins] Anaphylaxis and Swelling     Tongue, lips and eyes    Morphine Other (comments)     PT STATES IS JUST SENSITIVITY, GOT TOO MUCH ONE TIME.        Objective:  Vitals:    Vitals:    07/04/17 0834 07/04/17 0903 07/04/17 0937 07/04/17 1005   BP: 118/60 135/63 148/68 154/77   Pulse: (!) 55 (!) 56     Resp: 14      Temp:       TempSrc:       SpO2:       Weight:       Height:         Intake and Output:     07/02 1901 - 07/04 0700  In: 200 [P.O.:200]  Out: -     Physical Examination:    General: In mild distress  Neck:  Supple, no mass  Resp:  Lungs CTA   CV:  RRR,  no murmur or rub, 4+ LE edema  GI:  Soft, NT, + Bowel sounds, no hepatosplenomegaly  Neurologic:  Non focal  Ext                   LUE AVF w/ good thrill and bruit    []    High complexity decision making was performed  [] Patient is at high-risk of decompensation with multiple organ involvement    Lab Data Personally Reviewed: I have reviewed all the pertinent labs, microbiology data and radiology studies during assessment. No results for input(s): NA, K, CL, CO2, GLU, BUN, CREA, CA, MG, PHOS, ALB, TBIL, SGOT, ALT, INR in the last 72 hours. No lab exists for component: INREXT, INREXT  No results for input(s): WBC, HGB, HCT, PLT, HGBEXT, HCTEXT, PLTEXT, HGBEXT, HCTEXT, PLTEXT in the last 72 hours.   Lab Results   Component Value Date/Time    Specimen Description: URINE 10/27/2012 08:15 PM    Specimen Description: BLOOD 06/07/2011 07:40 AM    Specimen Description: URINE 08/30/2010 07:10 AM    Specimen Description: URINE 08/09/2010 11:30 AM    Specimen Description: URINE 06/22/2010 11:50 PM     Lab Results   Component Value Date/Time    Culture result: LIGHT  MIXED COMMENSAL PARIS   02/11/2017 12:40 AM    Culture result:  02/11/2017 12:40 AM     LIGHT  ANAEROBIC GRAM NEGATIVE RODS  BETA LACTAMASE POSITIVE      Culture result: NO GROWTH 6 DAYS 02/10/2017 04:43 PM    Culture result: MIXED SKIN PARIS ISOLATED 10/27/2012 08:15 PM    Culture result: NO GROWTH 5 DAYS 06/07/2011 07:40 AM     Recent Results (from the past 24 hour(s))   GLUCOSE, POC    Collection Time: 07/03/17  6:05 PM   Result Value Ref Range    Glucose (POC) 130 (H) 65 - 100 mg/dL    Performed by Jude Booker, POC    Collection Time: 07/04/17 12:08 AM   Result Value Ref Range    Glucose (POC) 146 (H) 65 - 100 mg/dL    Performed by Corettabari Monet PCT    GLUCOSE, POC    Collection Time: 07/04/17  5:59 AM   Result Value Ref Range    Glucose (POC) 85 65 - 100 mg/dL    Performed by Coretta Kalldeisi PCT                Levi Galvez MD

## 2017-07-04 NOTE — PROGRESS NOTES
Progress Note    Patient: Kristy Burrell MRN: [id number]  SSN: [id number]    YOB: 1950  Age: 75 y.o. Sex: female      Admit Date: 6/15/2017    19 Days Post-Op    Procedure:  Procedure(s):  PERINEAL ABSCESS INCISION AND DRAINAGE    Subjective:     No acute surgical issues. Pt still having some perianal pain and extremity pain. Had HD today but still feeling a bit edematous. Objective:     Visit Vitals    /75    Pulse 73    Temp 97.9 °F (36.6 °C)    Resp 18    Ht 6' 1.75\" (1.873 m)    Wt 304 lb 0.2 oz (137.9 kg)    SpO2 100%    Breastfeeding No    BMI 39.3 kg/m2       Temp (24hrs), Av.7 °F (36.5 °C), Min:97.1 °F (36.2 °C), Max:98 °F (36.7 °C)        Physical Exam:    Gen:  NAD  Pulm:  Unlabored  Perianal wound:  Edema with mild induration.   Moderate TTP  Ext:  3+ bilateral LE edema    Recent Results (from the past 24 hour(s))   GLUCOSE, POC    Collection Time: 17  6:05 PM   Result Value Ref Range    Glucose (POC) 130 (H) 65 - 100 mg/dL    Performed by Amandeep Smart    GLUCOSE, POC    Collection Time: 17 12:08 AM   Result Value Ref Range    Glucose (POC) 146 (H) 65 - 100 mg/dL    Performed by Luz Maria Aloe PCT    GLUCOSE, POC    Collection Time: 17  5:59 AM   Result Value Ref Range    Glucose (POC) 85 65 - 100 mg/dL    Performed by Luz Maria Aloe PCT    GLUCOSE, POC    Collection Time: 17 12:22 PM   Result Value Ref Range    Glucose (POC) 112 (H) 65 - 100 mg/dL    Performed by Fior:     Hospital Problems  Date Reviewed: 6/15/2017          Codes Class Noted POA    ESRD (end stage renal disease) on dialysis [facility]) ICD-10-CM: N18.6, Z99.2  ICD-9-CM: 585.6, V45.11  2017 Yes        Abscess of deep perineal space ICD-10-CM: N34.0  ICD-9-CM: 597.0  2017 Yes        * (Principal)Perineal abscess ICD-10-CM: L02.215  ICD-9-CM: 682.2  2017 Yes        Di[facility] mellitus type 2, insulin dependent [facility] ICD-10-CM: E11.9, Z79.4  ICD-9-CM: 250.00, V58.67  10/14/2010 Yes        HTN (hypertension) ICD-10-CM: I10  ICD-9-CM: 401.9  10/14/2010 Yes              Plan/Recommendations/Medical Decision Making:     - Continue IV antibiotic  - continue local wound care  - Edema:  Diuresis as tolerated. HD tomorrow.     - Out of bed    Signed By: Jarred Qiu MD     July 4, 2017

## 2017-07-04 NOTE — PROGRESS NOTES
Bedside, Verbal and Written shift change report given to Iris Isaac (oncoming nurse) by Christa Morataya (offgoing nurse). Report included the following information SBAR, Kardex, OR Summary, Procedure Summary, Intake/Output, MAR and Accordion.

## 2017-07-05 ENCOUNTER — ANESTHESIA EVENT (OUTPATIENT)
Dept: SURGERY | Age: 67
DRG: 579 | End: 2017-07-05
Payer: MEDICARE

## 2017-07-05 LAB
ALBUMIN SERPL BCP-MCNC: 2.6 G/DL (ref 3.5–5)
ANION GAP BLD CALC-SCNC: 7 MMOL/L (ref 5–15)
BUN SERPL-MCNC: 30 MG/DL (ref 6–20)
BUN/CREAT SERPL: 6 (ref 12–20)
CALCIUM SERPL-MCNC: 8.6 MG/DL (ref 8.5–10.1)
CHLORIDE SERPL-SCNC: 98 MMOL/L (ref 97–108)
CO2 SERPL-SCNC: 28 MMOL/L (ref 21–32)
CREAT SERPL-MCNC: 5.17 MG/DL (ref 0.55–1.02)
ERYTHROCYTE [DISTWIDTH] IN BLOOD BY AUTOMATED COUNT: 16.2 % (ref 11.5–14.5)
GLUCOSE BLD STRIP.AUTO-MCNC: 109 MG/DL (ref 65–100)
GLUCOSE BLD STRIP.AUTO-MCNC: 123 MG/DL (ref 65–100)
GLUCOSE BLD STRIP.AUTO-MCNC: 129 MG/DL (ref 65–100)
GLUCOSE BLD STRIP.AUTO-MCNC: 131 MG/DL (ref 65–100)
GLUCOSE SERPL-MCNC: 126 MG/DL (ref 65–100)
HCT VFR BLD AUTO: 24.6 % (ref 35–47)
HGB BLD-MCNC: 7.8 G/DL (ref 11.5–16)
MCH RBC QN AUTO: 28.8 PG (ref 26–34)
MCHC RBC AUTO-ENTMCNC: 31.7 G/DL (ref 30–36.5)
MCV RBC AUTO: 90.8 FL (ref 80–99)
PHOSPHATE SERPL-MCNC: 4.2 MG/DL (ref 2.6–4.7)
PLATELET # BLD AUTO: 164 K/UL (ref 150–400)
POTASSIUM SERPL-SCNC: 4.4 MMOL/L (ref 3.5–5.1)
RBC # BLD AUTO: 2.71 M/UL (ref 3.8–5.2)
SERVICE CMNT-IMP: ABNORMAL
SODIUM SERPL-SCNC: 133 MMOL/L (ref 136–145)
WBC # BLD AUTO: 5.1 K/UL (ref 3.6–11)

## 2017-07-05 PROCEDURE — 82962 GLUCOSE BLOOD TEST: CPT

## 2017-07-05 PROCEDURE — 65210000002 HC RM PRIVATE GYN

## 2017-07-05 PROCEDURE — 74011250637 HC RX REV CODE- 250/637: Performed by: NURSE PRACTITIONER

## 2017-07-05 PROCEDURE — P9016 RBC LEUKOCYTES REDUCED: HCPCS | Performed by: INTERNAL MEDICINE

## 2017-07-05 PROCEDURE — 74011000258 HC RX REV CODE- 258: Performed by: INTERNAL MEDICINE

## 2017-07-05 PROCEDURE — 77030029131 HC ADMN ST IV BLD N DEHP ICUM -B

## 2017-07-05 PROCEDURE — 80069 RENAL FUNCTION PANEL: CPT | Performed by: INTERNAL MEDICINE

## 2017-07-05 PROCEDURE — 85027 COMPLETE CBC AUTOMATED: CPT | Performed by: INTERNAL MEDICINE

## 2017-07-05 PROCEDURE — 36415 COLL VENOUS BLD VENIPUNCTURE: CPT | Performed by: INTERNAL MEDICINE

## 2017-07-05 PROCEDURE — 74011250636 HC RX REV CODE- 250/636: Performed by: PHYSICIAN ASSISTANT

## 2017-07-05 PROCEDURE — 86900 BLOOD TYPING SEROLOGIC ABO: CPT | Performed by: INTERNAL MEDICINE

## 2017-07-05 PROCEDURE — 74011250637 HC RX REV CODE- 250/637: Performed by: INTERNAL MEDICINE

## 2017-07-05 PROCEDURE — 74011000250 HC RX REV CODE- 250: Performed by: INTERNAL MEDICINE

## 2017-07-05 PROCEDURE — 74011250636 HC RX REV CODE- 250/636: Performed by: SURGERY

## 2017-07-05 PROCEDURE — 90935 HEMODIALYSIS ONE EVALUATION: CPT

## 2017-07-05 PROCEDURE — 86923 COMPATIBILITY TEST ELECTRIC: CPT | Performed by: INTERNAL MEDICINE

## 2017-07-05 PROCEDURE — 77030009526 HC GEL CARSYN MDII -A

## 2017-07-05 PROCEDURE — 77030018836 HC SOL IRR NACL ICUM -A

## 2017-07-05 PROCEDURE — 74011250637 HC RX REV CODE- 250/637: Performed by: SURGERY

## 2017-07-05 RX ADMIN — ONDANSETRON 4 MG: 2 INJECTION INTRAMUSCULAR; INTRAVENOUS at 21:43

## 2017-07-05 RX ADMIN — HYDROMORPHONE HYDROCHLORIDE 2 MG: 1 INJECTION, SOLUTION INTRAMUSCULAR; INTRAVENOUS; SUBCUTANEOUS at 00:38

## 2017-07-05 RX ADMIN — HYDROMORPHONE HYDROCHLORIDE 2 MG: 1 INJECTION, SOLUTION INTRAMUSCULAR; INTRAVENOUS; SUBCUTANEOUS at 09:25

## 2017-07-05 RX ADMIN — HYDROXYZINE HYDROCHLORIDE 25 MG: 25 TABLET, FILM COATED ORAL at 18:18

## 2017-07-05 RX ADMIN — HYDROXYZINE HYDROCHLORIDE 25 MG: 25 TABLET, FILM COATED ORAL at 09:23

## 2017-07-05 RX ADMIN — DIPHENHYDRAMINE HYDROCHLORIDE 25 MG: 50 INJECTION, SOLUTION INTRAMUSCULAR; INTRAVENOUS at 13:47

## 2017-07-05 RX ADMIN — Medication 10 ML: at 21:43

## 2017-07-05 RX ADMIN — OXYCODONE HYDROCHLORIDE AND ACETAMINOPHEN 2 TABLET: 7.5; 325 TABLET ORAL at 13:47

## 2017-07-05 RX ADMIN — HYDROXYZINE HYDROCHLORIDE 25 MG: 25 TABLET, FILM COATED ORAL at 00:36

## 2017-07-05 RX ADMIN — HYDROMORPHONE HYDROCHLORIDE 2 MG: 1 INJECTION, SOLUTION INTRAMUSCULAR; INTRAVENOUS; SUBCUTANEOUS at 18:24

## 2017-07-05 RX ADMIN — SEVELAMER CARBONATE 1600 MG: 800 TABLET, FILM COATED ORAL at 18:31

## 2017-07-05 RX ADMIN — NORTRIPTYLINE HYDROCHLORIDE 25 MG: 25 CAPSULE ORAL at 21:43

## 2017-07-05 RX ADMIN — OXYCODONE HYDROCHLORIDE AND ACETAMINOPHEN 2 TABLET: 7.5; 325 TABLET ORAL at 21:51

## 2017-07-05 RX ADMIN — FLUOXETINE 20 MG: 10 CAPSULE ORAL at 18:21

## 2017-07-05 RX ADMIN — SEVELAMER CARBONATE 1600 MG: 800 TABLET, FILM COATED ORAL at 10:28

## 2017-07-05 RX ADMIN — HYDROMORPHONE HYDROCHLORIDE 2 MG: 1 INJECTION, SOLUTION INTRAMUSCULAR; INTRAVENOUS; SUBCUTANEOUS at 12:45

## 2017-07-05 RX ADMIN — CASTOR OIL AND BALSAM, PERU: 788; 87 OINTMENT TOPICAL at 09:35

## 2017-07-05 RX ADMIN — AZTREONAM 500 MG: 1 INJECTION, POWDER, LYOPHILIZED, FOR SOLUTION INTRAMUSCULAR; INTRAVENOUS at 07:11

## 2017-07-05 RX ADMIN — CARVEDILOL 12.5 MG: 12.5 TABLET, FILM COATED ORAL at 18:19

## 2017-07-05 RX ADMIN — CASTOR OIL AND BALSAM, PERU: 788; 87 OINTMENT TOPICAL at 18:36

## 2017-07-05 RX ADMIN — METRONIDAZOLE 500 MG: 250 TABLET ORAL at 16:00

## 2017-07-05 RX ADMIN — AMLODIPINE BESYLATE 2.5 MG: 5 TABLET ORAL at 18:19

## 2017-07-05 RX ADMIN — HYDROMORPHONE HYDROCHLORIDE 2 MG: 1 INJECTION, SOLUTION INTRAMUSCULAR; INTRAVENOUS; SUBCUTANEOUS at 23:07

## 2017-07-05 RX ADMIN — Medication 10 ML: at 06:10

## 2017-07-05 NOTE — PROGRESS NOTES
Bedside and Verbal shift change report given to Mckinley Zazueta RN (oncoming nurse) by Juan Ramon Ruiz RN (offgoing nurse). Report included the following information SBAR, Kardex, ED Summary, Procedure Summary, Intake/Output, MAR, Accordion and Recent Results.

## 2017-07-05 NOTE — PROGRESS NOTES
ID Progress Note  2017    Subjective:     No new complaints, pain about the same    Objective:     Antibiotics:  1. Azactam  2. Flagyl      Vitals:   Visit Vitals    /70    Pulse 63  Comment: RESTING, NO C/O VOICED, PRBCS 1 UNIT INFUSING    Temp 97.2 °F (36.2 °C) (Oral)    Resp 16    Ht 6' 1.75\" (1.873 m)    Wt 135.9 kg (299 lb 8 oz)    SpO2 98%    Breastfeeding No    BMI 38.71 kg/m2        Tmax:  Temp (24hrs), Av.9 °F (36.6 °C), Min:97.2 °F (36.2 °C), Max:98.4 °F (36.9 °C)      Exam:  Lungs:  clear to auscultation bilaterally  Heart:  regular rate and rhythm  Abdomen:  soft, non-tender. Bowel sounds normal. No masses,  no organomegaly  [place]-wound area is softer    Labs:      Recent Labs      17   0946   WBC  5.1   HGB  7.8*   PLT  164   BUN  30*   CREA  5.17*       Cultures:     Lab Results   Component Value Date/Time    Specimen Description: URINE 10/27/2012 08:15 PM    Specimen Description: BLOOD 2011 07:40 AM    Specimen Description: URINE 2010 07:10 AM     Lab Results   Component Value Date/Time    Culture result: LIGHT  MIXED COMMENSAL PARIS   2017 12:40 AM    Culture result:  2017 12:40 AM     LIGHT  ANAEROBIC GRAM NEGATIVE RODS  BETA LACTAMASE POSITIVE      Culture result: NO GROWTH 6 DAYS 02/10/2017 04:43 PM       Radiology:     Line/Insert Date:           Assessment:     1. Perianal abscess  2. ESRD  3. Debility     Objective:     1. Monitor off antibiotics  2.  OK for discharge from my standpoint    Tracey Baez MD

## 2017-07-05 NOTE — PROGRESS NOTES
General Surgery Daily Progress Note    Admit Date: 6/15/2017  Post-Operative Day: 20 Days Post-Op from Procedure(s):  PERINEAL ABSCESS INCISION AND DRAINAGE     Subjective:     Last 24 hrs: Pt is sleepy - has been getting UF w/ HD this week; Less SOB but tired. Cont to have perianal pain    Objective:     Blood pressure 139/67, pulse 73, temperature 98 °F (36.7 °C), resp. rate 17, height 6' 1.75\" (1.873 m), weight 299 lb 8 oz (135.9 kg), SpO2 92 %, not currently breastfeeding. Temp (24hrs), Av °F (36.7 °C), Min:97.9 °F (36.6 °C), Max:98 °F (36.7 °C)      _____________________  Physical Exam:     Alert and Oriented, x3, in no acute distress. Cardiovascular: RRR, 2+ LE peripheral edema; LUE AV fistula clotted  Garth cath in L chest  Lungs:CTAB   Abdomen: soft, nl BS  Perianal wound w/ pink tissue; some induration surrounding and tender      Assessment:   Principal Problem:    Perineal abscess (2017)    Active Problems:    Diabetes mellitus type 2, insulin dependent ([facility] (10/14/2010)      HTN (hypertension) (10/14/2010)      Abscess of deep perineal space (2017)      ESRD (end stage renal disease) on dialysis ([facility] (2017)            Plan:     HD per nephrology  Cont abx per ID  Cont [address]  OOB     Data Review:    No results for input(s): WBC, HGB, HCT, PLT, HGBEXT, HCTEXT, PLTEXT in the last 72 hours. No results for input(s): NA, K, CL, CO2, GLU, BUN, CREA, CA, MG, PHOS, ALB, TBIL, SGOT, ALT, INR in the last 72 hours. No lab exists for component: INREXT  No results for input(s): AML, LPSE in the last 72 hours.         ______________________  Medications:    Current Facility-Administered Medications   Medication Dose Route Frequency    heparin (porcine) pf 300 Units  300 Units InterCATHeter PRN    balsam peru-castor oil (VENELEX)  mg/gram ointment   Topical BID    amLODIPine (NORVASC) tablet 2.5 mg  2.5 mg Oral DAILY    sevelamer carbonate (RENVELA) tab 1,600 mg  1,600 mg Oral TID WITH MEALS    epoetin joi (EPOGEN;PROCRIT) injection 20,000 Units  20,000 Units SubCUTAneous DIALYSIS MON, WED & FRI    hydrOXYzine HCl (ATARAX) tablet 25 mg  25 mg Oral Q4H PRN    albumin human 25% (BUMINATE) solution 12.5 g  12.5 g IntraVENous DIALYSIS PRN    carvedilol (COREG) tablet 25 mg  25 mg Oral DAILY    carvedilol (COREG) tablet 12.5 mg  12.5 mg Oral QPM    polyethylene glycol (MIRALAX) packet 17 g  17 g Oral DAILY    metroNIDAZOLE (FLAGYL) tablet 500 mg  500 mg Oral TID    aztreonam (AZACTAM) 500 mg in 0.9% sodium chloride 100 mL IVPB  500 mg IntraVENous Q12H    diphenhydrAMINE (BENADRYL) injection 25 mg  25 mg IntraVENous Q4H PRN    HYDROmorphone (PF) (DILAUDID) injection 2 mg  2 mg IntraVENous Q4H PRN    insulin regular (NOVOLIN R, HUMULIN R) injection   SubCUTAneous Q6H    aspirin chewable tablet 81 mg  81 mg Oral DAILY    FLUoxetine (PROzac) capsule 20 mg  20 mg Oral DAILY    nortriptyline (PAMELOR) capsule 25 mg  25 mg Oral QHS    oxyCODONE-acetaminophen (PERCOCET 7.5) 7.5-325 mg per tablet 1-2 Tab  1-2 Tab Oral Q4H PRN    sodium chloride (NS) flush 5-10 mL  5-10 mL IntraVENous Q8H    sodium chloride (NS) flush 5-10 mL  5-10 mL IntraVENous PRN    ondansetron (ZOFRAN) injection 4 mg  4 mg IntraVENous Q4H PRN    glucose chewable tablet 16 g  4 Tab Oral PRN    glucagon (GLUCAGEN) injection 1 mg  1 mg IntraMUSCular PRN       Hemant Li NP  7/5/2017    ATTENDING ADDENDUM  I supervised the APC and reviewed the note. We discussed the plan of care  Wound care is not the major problem now. Access and fluid overload are being addressed.

## 2017-07-05 NOTE — DIALYSIS
[facility] Acutes                         [phone]  Vitals Pre Post Assessment Pre Post   /79 150/68 LOC A+OX3 A+OX3   HR 65 72 Lungs DIMINISHED TO BILATERAL LOWER LUNG BASES UNCHANGED  REMAINS ON ROOM AIR   Temp 98.4  ORAL 98.0 Cardiac REGULAR   REGULAR   Resp 20 18 Skin WARM AND DRY  ITCHING WARM AND DRY     Weight 135. 9KG 131.9KG Edema GENERALIZED GENERALIZED      Pain C/O BOTTOM HURTING  RECEIVED PAIN MEDICATION C/O BOTTOM PAIN PRIMARY NURSE AWARE     Orders   Duration: Start: 4772 End: 1745 Total: 3.5 HRS   Dialyzer: REVACLEAR   K Bath: 2   Ca Bath: 2.5   Na / Bicarb: 144-138 LINEAR/35   Target Fluid Removal: 4000     Access   Type & Location: LEFT IJ DARIO CATHETER   Comments:                            DRESSING CLEAN DRY AND INTACT BIOPATCH INTACT NO DATED  DRESSING CHANGED WITH STERILE TECHNIQUE BIOPATCH APPLIED, DATED 7/5/17     Labs   Hep B status / date: Neg 6/14/17   Obtained/Reviewed  Critical Results Called REVIEWED K 4.4 DR. HASTINGS NOTIFIED RECEIVED TELEPHONE ORDER READ BACK AND CONFIRMED BATH 2 K HGB 7.8    1 UNIT OF PRBCS DURING HD     Meds Given   Name Dose Route   NONE                 Total Liters Process: 61 L   Net Fluid Removed: 4000 ML      Comments   Time Out Done: 1400   Primary Nurse Rpt Pre: Tory De La Rosa   Primary Nurse Rpt Post: Tory De La Rosa   Pt Education: [address], [employer]   Care Plan: CONTINUE HEMODIALYSIS AS ORDERED BY NEPHROLOGIST   Tx Summary: TOLERATED FULL 3.5 HRS TX  RECEIVED 1 UNIT OF PRBCS DURING HD    HEMODIALYSIS ENDED 1745  ALL POSSIBLE BLOOD RETURNED IN CIRCUIT WITH 300 ML NS, ART/VENOUS PORTS FLUSHED WITH 10ML NS, STERILE CAPS APPLIED, DRESSING CLEAN DRY AND INTACT BIOPATCH INTACT DATED 7/5/17, REMAINS IN ROOM 355, CALL BELL WITHIN REACH, BED IN LOWEST POSITION

## 2017-07-05 NOTE — WOUND CARE
Seen previously for right buttock I&D site and right heel. Seen today for heel only. Right heel with pliable non-blanching purple field, no exudate or induration. Venelex in use along with Prevalon boots. Will continue to follow.   STEFANI Bettencourt

## 2017-07-05 NOTE — PROGRESS NOTES
Continue to follow hospital course and assist with any identified discharge need when appropriate.   Cristina Tolentino, LCSW

## 2017-07-05 NOTE — PROGRESS NOTES
[facility]   [address] [facility], [facility] [address], [facility]  Phone: [phone]   [phone]       Nephrology Progress Note  Kristy Burrell     1950     [id number]  Date of Admission : 6/15/2017  07/05/17    CC: Follow up for ESRD       Assessment and Plan   ESRD- HD :  - HD MWF at [facility]   - HD today then MWF  - for fistulogram Thursday - should be ok to go home afterwards    Hypervolemia / Hyponatremia :  - UF 5kg on HD yesterday    HyperPhosphatemia :  - cont renvela with meals    Anemia of CKD:  - cont ELTON with dialysis    Recurrent Perianal abscess:   - per Dr Robina Norman and ID  - on aztreonam     Anemia:  - hgb 7.8  - transfuse on dialysis today  - continue epogen    HTN :  - BP stable    Type II DM     Sec HPTH     Interval History:  Seen and examined. No new complaints. For fistulogram tomorrow. HD later today. No cp, sob, n/v/d. Review of Systems: A comprehensive review of systems was negative.     Current Medications:   Current Facility-Administered Medications   Medication Dose Route Frequency    heparin (porcine) pf 300 Units  300 Units InterCATHeter PRN    balsam peru-castor oil (VENELEX)  mg/gram ointment   Topical BID    amLODIPine (NORVASC) tablet 2.5 mg  2.5 mg Oral DAILY    sevelamer carbonate (RENVELA) tab 1,600 mg  1,600 mg Oral TID WITH MEALS    epoetin joi (EPOGEN;PROCRIT) injection 20,000 Units  20,000 Units SubCUTAneous DIALYSIS MON, WED & FRI    hydrOXYzine HCl (ATARAX) tablet 25 mg  25 mg Oral Q4H PRN    albumin human 25% (BUMINATE) solution 12.5 g  12.5 g IntraVENous DIALYSIS PRN    carvedilol (COREG) tablet 25 mg  25 mg Oral DAILY    carvedilol (COREG) tablet 12.5 mg  12.5 mg Oral QPM    polyethylene glycol (MIRALAX) packet 17 g  17 g Oral DAILY    metroNIDAZOLE (FLAGYL) tablet 500 mg  500 mg Oral TID    aztreonam (AZACTAM) 500 mg in 0.9% sodium chloride 100 mL IVPB  500 mg IntraVENous Q12H    diphenhydrAMINE (BENADRYL) injection 25 mg  25 mg IntraVENous Q4H PRN    HYDROmorphone (PF) (DILAUDID) injection 2 mg  2 mg IntraVENous Q4H PRN    insulin regular (NOVOLIN R, HUMULIN R) injection   SubCUTAneous Q6H    aspirin chewable tablet 81 mg  81 mg Oral DAILY    FLUoxetine (PROzac) capsule 20 mg  20 mg Oral DAILY    nortriptyline (PAMELOR) capsule 25 mg  25 mg Oral QHS    oxyCODONE-acetaminophen (PERCOCET 7.5) 7.5-325 mg per tablet 1-2 Tab  1-2 Tab Oral Q4H PRN    sodium chloride (NS) flush 5-10 mL  5-10 mL IntraVENous Q8H    sodium chloride (NS) flush 5-10 mL  5-10 mL IntraVENous PRN    ondansetron (ZOFRAN) injection 4 mg  4 mg IntraVENous Q4H PRN    glucose chewable tablet 16 g  4 Tab Oral PRN    glucagon (GLUCAGEN) injection 1 mg  1 mg IntraMUSCular PRN      Allergies   Allergen Reactions    Latex Itching     Rash, sometimes difficult to breathe    Celebrex [Celecoxib] Anaphylaxis and Swelling     TONGUE. LIPS AND EYES    Iodine Other (comments)     IV CONTRAST-LESIONS, AND SKIN SLUFFED OFF    Keflex [Cephalexin] Anaphylaxis and Swelling     Tongue, lips, and eyes    Levaquin [Levofloxacin] Anaphylaxis and Swelling     Tongue, lips, and eyes    Pcn [Penicillins] Anaphylaxis and Swelling     Tongue, lips and eyes    Morphine Other (comments)     PT STATES IS JUST SENSITIVITY, GOT TOO MUCH ONE TIME. Objective:  Vitals:    Vitals:    07/04/17 2058 07/05/17 0432 07/05/17 0717 07/05/17 0918   BP: 162/71 139/67  127/57   Pulse: 80 73  65   Resp: 18 17 17   Temp: 98 °F (36.7 °C) 98 °F (36.7 °C)  97.8 °F (36.6 °C)   TempSrc:       SpO2: 96% 92%  100%   Weight:   135.9 kg (299 lb 8 oz)    Height:         Intake and Output:  07/05 0701 - 07/05 1900  In: 325 [P.O.:200;  I.V.:125]  Out: -   07/03 1901 - 07/05 0700  In: 200 [P.O.:200]  Out: -     Physical Examination:    General: In mild distress  Neck:  Supple, no mass  Resp:  Lungs CTA   CV:  RRR,  no murmur or rub, 4+ LE edema  GI:  Soft, NT, + Bowel sounds, no hepatosplenomegaly  Neurologic:  Non focal  Ext                   LUE AVF w/ good thrill and bruit    []    High complexity decision making was performed  []    Patient is at high-risk of decompensation with multiple organ involvement    Lab Data Personally Reviewed: I have reviewed all the pertinent labs, microbiology data and radiology studies during assessment. No results for input(s): NA, K, CL, CO2, GLU, BUN, CREA, CA, MG, PHOS, ALB, TBIL, SGOT, ALT, INR in the last 72 hours.     No lab exists for component: Alba Edgar  Recent Labs      07/05/17   0946   WBC  5.1   HGB  7.8*   HCT  24.6*   PLT  164     Lab Results   Component Value Date/Time    Specimen Description: URINE 10/27/2012 08:15 PM    Specimen Description: BLOOD 06/07/2011 07:40 AM    Specimen Description: URINE 08/30/2010 07:10 AM    Specimen Description: URINE 08/09/2010 11:30 AM    Specimen Description: URINE 06/22/2010 11:50 PM     Lab Results   Component Value Date/Time    Culture result: LIGHT  MIXED COMMENSAL PARIS   02/11/2017 12:40 AM    Culture result:  02/11/2017 12:40 AM     LIGHT  ANAEROBIC GRAM NEGATIVE RODS  BETA LACTAMASE POSITIVE      Culture result: NO GROWTH 6 DAYS 02/10/2017 04:43 PM    Culture result: MIXED SKIN PARIS ISOLATED 10/27/2012 08:15 PM    Culture result: NO GROWTH 5 DAYS 06/07/2011 07:40 AM     Recent Results (from the past 24 hour(s))   GLUCOSE, POC    Collection Time: 07/04/17 12:22 PM   Result Value Ref Range    Glucose (POC) 112 (H) 65 - 100 mg/dL    Performed by MACKENZIE [address], POC    Collection Time: 07/04/17  6:56 PM   Result Value Ref Range    Glucose (POC) 109 (H) 65 - 100 mg/dL    Performed by MACKENZIE [place] [address], POC    Collection Time: 07/04/17 11:42 PM   Result Value Ref Range    Glucose (POC) 120 (H) 65 - 100 mg/dL    Performed by Stephanie Nicholson (PCT)    GLUCOSE, POC    Collection Time: 07/05/17  6:08 AM   Result Value Ref Range    Glucose (POC) 129 (H) 65 - 100 mg/dL Performed by Laney Calvin    CBC W/O DIFF    Collection Time: 07/05/17  9:46 AM   Result Value Ref Range    WBC 5.1 3.6 - 11.0 K/uL    RBC 2.71 (L) 3.80 - 5.20 M/uL    HGB 7.8 (L) 11.5 - 16.0 g/dL    HCT 24.6 (L) 35.0 - 47.0 %    MCV 90.8 80.0 - 99.0 FL    MCH 28.8 26.0 - 34.0 PG    MCHC 31.7 30.0 - 36.5 g/dL    RDW 16.2 (H) 11.5 - 14.5 %    PLATELET 043 168 - 673 K/uL               Anthony Alfredo MD

## 2017-07-05 NOTE — PROGRESS NOTES
Bedside shift change report [facility] (oncoming nurse) by Blanca Araujo (offgoing nurse). Report included the following information SBAR, Kardex, Procedure Summary, Intake/Output, MAR and Accordion.

## 2017-07-06 ENCOUNTER — APPOINTMENT (OUTPATIENT)
Dept: GENERAL RADIOLOGY | Age: 67
DRG: 579 | End: 2017-07-06
Payer: MEDICARE

## 2017-07-06 ENCOUNTER — ANESTHESIA (OUTPATIENT)
Dept: SURGERY | Age: 67
DRG: 579 | End: 2017-07-06
Payer: MEDICARE

## 2017-07-06 ENCOUNTER — HOME CARE VISIT (OUTPATIENT)
Dept: HOME HEALTH SERVICES | Facility: HOME HEALTH | Age: 67
End: 2017-07-06

## 2017-07-06 VITALS
HEART RATE: 81 BPM | HEIGHT: 72 IN | TEMPERATURE: 97.5 F | DIASTOLIC BLOOD PRESSURE: 74 MMHG | BODY MASS INDEX: 38.19 KG/M2 | RESPIRATION RATE: 16 BRPM | OXYGEN SATURATION: 97 % | SYSTOLIC BLOOD PRESSURE: 159 MMHG | WEIGHT: 281.97 LBS

## 2017-07-06 LAB
ABO + RH BLD: NORMAL
BLD PROD TYP BPU: NORMAL
BLOOD GROUP ANTIBODIES SERPL: NORMAL
BPU ID: NORMAL
CROSSMATCH RESULT,%XM: NORMAL
GLUCOSE BLD STRIP.AUTO-MCNC: 213 MG/DL (ref 65–100)
GLUCOSE BLD STRIP.AUTO-MCNC: 93 MG/DL (ref 65–100)
GLUCOSE BLD STRIP.AUTO-MCNC: 95 MG/DL (ref 65–100)
SERVICE CMNT-IMP: ABNORMAL
SERVICE CMNT-IMP: NORMAL
SERVICE CMNT-IMP: NORMAL
SPECIMEN EXP DATE BLD: NORMAL
STATUS OF UNIT,%ST: NORMAL
UNIT DIVISION, %UDIV: 0

## 2017-07-06 PROCEDURE — 82962 GLUCOSE BLOOD TEST: CPT

## 2017-07-06 PROCEDURE — 74011250636 HC RX REV CODE- 250/636: Performed by: PHYSICIAN ASSISTANT

## 2017-07-06 PROCEDURE — C1894 INTRO/SHEATH, NON-LASER: HCPCS

## 2017-07-06 PROCEDURE — 74011250636 HC RX REV CODE- 250/636: Performed by: ANESTHESIOLOGY

## 2017-07-06 PROCEDURE — C1769 GUIDE WIRE: HCPCS

## 2017-07-06 PROCEDURE — 76210000006 HC OR PH I REC 0.5 TO 1 HR

## 2017-07-06 PROCEDURE — 74011636637 HC RX REV CODE- 636/637: Performed by: PHYSICIAN ASSISTANT

## 2017-07-06 PROCEDURE — 74011250636 HC RX REV CODE- 250/636: Performed by: SURGERY

## 2017-07-06 PROCEDURE — 74011250636 HC RX REV CODE- 250/636

## 2017-07-06 PROCEDURE — 74011250637 HC RX REV CODE- 250/637: Performed by: INTERNAL MEDICINE

## 2017-07-06 PROCEDURE — 77030020256 HC SOL INJ NACL 0.9%  500ML

## 2017-07-06 PROCEDURE — C1725 CATH, TRANSLUMIN NON-LASER: HCPCS

## 2017-07-06 PROCEDURE — 05793ZZ DILATION OF RIGHT BRACHIAL VEIN, PERCUTANEOUS APPROACH: ICD-10-PCS

## 2017-07-06 PROCEDURE — 76000 FLUOROSCOPY <1 HR PHYS/QHP: CPT

## 2017-07-06 PROCEDURE — 76010000138 HC OR TIME 0.5 TO 1 HR

## 2017-07-06 PROCEDURE — 77030003704 HC NDL VASC ACC ARMD -A

## 2017-07-06 PROCEDURE — 36589 REMOVAL TUNNELED CV CATH: CPT

## 2017-07-06 PROCEDURE — B51W1ZZ FLUOROSCOPY OF DIALYSIS SHUNT/FISTULA USING LOW OSMOLAR CONTRAST: ICD-10-PCS

## 2017-07-06 PROCEDURE — 74011636320 HC RX REV CODE- 636/320

## 2017-07-06 PROCEDURE — 77030011640 HC PAD GRND REM COVD -A

## 2017-07-06 PROCEDURE — 74011000250 HC RX REV CODE- 250

## 2017-07-06 PROCEDURE — 74011250637 HC RX REV CODE- 250/637: Performed by: SURGERY

## 2017-07-06 PROCEDURE — 76060000032 HC ANESTHESIA 0.5 TO 1 HR

## 2017-07-06 PROCEDURE — 77030013060 HC DEV INFL PRSS MRTM -B

## 2017-07-06 PROCEDURE — 74011250637 HC RX REV CODE- 250/637: Performed by: NURSE PRACTITIONER

## 2017-07-06 PROCEDURE — 77030018836 HC SOL IRR NACL ICUM -A

## 2017-07-06 RX ORDER — PROPOFOL 10 MG/ML
INJECTION, EMULSION INTRAVENOUS
Status: DISCONTINUED | OUTPATIENT
Start: 2017-07-06 | End: 2017-07-06 | Stop reason: HOSPADM

## 2017-07-06 RX ORDER — LIDOCAINE HYDROCHLORIDE 10 MG/ML
0.1 INJECTION, SOLUTION EPIDURAL; INFILTRATION; INTRACAUDAL; PERINEURAL AS NEEDED
Status: DISCONTINUED | OUTPATIENT
Start: 2017-07-06 | End: 2017-07-06 | Stop reason: HOSPADM

## 2017-07-06 RX ORDER — SODIUM CHLORIDE 9 MG/ML
25 INJECTION, SOLUTION INTRAVENOUS CONTINUOUS
Status: DISCONTINUED | OUTPATIENT
Start: 2017-07-06 | End: 2017-07-06 | Stop reason: HOSPADM

## 2017-07-06 RX ORDER — MIDAZOLAM HYDROCHLORIDE 1 MG/ML
1 INJECTION, SOLUTION INTRAMUSCULAR; INTRAVENOUS AS NEEDED
Status: DISCONTINUED | OUTPATIENT
Start: 2017-07-06 | End: 2017-07-06 | Stop reason: HOSPADM

## 2017-07-06 RX ORDER — LIDOCAINE HYDROCHLORIDE 20 MG/ML
INJECTION, SOLUTION EPIDURAL; INFILTRATION; INTRACAUDAL; PERINEURAL AS NEEDED
Status: DISCONTINUED | OUTPATIENT
Start: 2017-07-06 | End: 2017-07-06 | Stop reason: HOSPADM

## 2017-07-06 RX ORDER — FENTANYL CITRATE 50 UG/ML
50 INJECTION, SOLUTION INTRAMUSCULAR; INTRAVENOUS AS NEEDED
Status: DISCONTINUED | OUTPATIENT
Start: 2017-07-06 | End: 2017-07-06 | Stop reason: HOSPADM

## 2017-07-06 RX ORDER — HYDROMORPHONE HYDROCHLORIDE 1 MG/ML
0.2 INJECTION, SOLUTION INTRAMUSCULAR; INTRAVENOUS; SUBCUTANEOUS
Status: DISCONTINUED | OUTPATIENT
Start: 2017-07-06 | End: 2017-07-06 | Stop reason: HOSPADM

## 2017-07-06 RX ORDER — LIDOCAINE HYDROCHLORIDE 10 MG/ML
INJECTION INFILTRATION; PERINEURAL AS NEEDED
Status: DISCONTINUED | OUTPATIENT
Start: 2017-07-06 | End: 2017-07-06 | Stop reason: HOSPADM

## 2017-07-06 RX ORDER — SODIUM CHLORIDE, SODIUM LACTATE, POTASSIUM CHLORIDE, CALCIUM CHLORIDE 600; 310; 30; 20 MG/100ML; MG/100ML; MG/100ML; MG/100ML
1000 INJECTION, SOLUTION INTRAVENOUS CONTINUOUS
Status: DISCONTINUED | OUTPATIENT
Start: 2017-07-06 | End: 2017-07-06 | Stop reason: HOSPADM

## 2017-07-06 RX ORDER — HYDROCORTISONE SODIUM SUCCINATE 100 MG/2ML
INJECTION, POWDER, FOR SOLUTION INTRAMUSCULAR; INTRAVENOUS AS NEEDED
Status: DISCONTINUED | OUTPATIENT
Start: 2017-07-06 | End: 2017-07-06 | Stop reason: HOSPADM

## 2017-07-06 RX ORDER — FENTANYL CITRATE 50 UG/ML
25 INJECTION, SOLUTION INTRAMUSCULAR; INTRAVENOUS
Status: DISCONTINUED | OUTPATIENT
Start: 2017-07-06 | End: 2017-07-06 | Stop reason: HOSPADM

## 2017-07-06 RX ORDER — MORPHINE SULFATE 10 MG/ML
2 INJECTION, SOLUTION INTRAMUSCULAR; INTRAVENOUS
Status: DISCONTINUED | OUTPATIENT
Start: 2017-07-06 | End: 2017-07-06 | Stop reason: HOSPADM

## 2017-07-06 RX ORDER — GLYCOPYRROLATE 0.2 MG/ML
0.2 INJECTION INTRAMUSCULAR; INTRAVENOUS
Status: DISCONTINUED | OUTPATIENT
Start: 2017-07-06 | End: 2017-07-06 | Stop reason: HOSPADM

## 2017-07-06 RX ORDER — HYDROCODONE BITARTRATE AND ACETAMINOPHEN 5; 325 MG/1; MG/1
1 TABLET ORAL AS NEEDED
Status: DISCONTINUED | OUTPATIENT
Start: 2017-07-06 | End: 2017-07-06 | Stop reason: HOSPADM

## 2017-07-06 RX ORDER — ALBUTEROL SULFATE 0.83 MG/ML
2.5 SOLUTION RESPIRATORY (INHALATION) AS NEEDED
Status: DISCONTINUED | OUTPATIENT
Start: 2017-07-06 | End: 2017-07-06 | Stop reason: HOSPADM

## 2017-07-06 RX ORDER — DIPHENHYDRAMINE HYDROCHLORIDE 50 MG/ML
12.5 INJECTION, SOLUTION INTRAMUSCULAR; INTRAVENOUS AS NEEDED
Status: DISCONTINUED | OUTPATIENT
Start: 2017-07-06 | End: 2017-07-06 | Stop reason: HOSPADM

## 2017-07-06 RX ORDER — PROPOFOL 10 MG/ML
INJECTION, EMULSION INTRAVENOUS AS NEEDED
Status: DISCONTINUED | OUTPATIENT
Start: 2017-07-06 | End: 2017-07-06 | Stop reason: HOSPADM

## 2017-07-06 RX ORDER — ONDANSETRON 2 MG/ML
4 INJECTION INTRAMUSCULAR; INTRAVENOUS AS NEEDED
Status: DISCONTINUED | OUTPATIENT
Start: 2017-07-06 | End: 2017-07-06 | Stop reason: HOSPADM

## 2017-07-06 RX ORDER — ROPIVACAINE HYDROCHLORIDE 5 MG/ML
30 INJECTION, SOLUTION EPIDURAL; INFILTRATION; PERINEURAL AS NEEDED
Status: DISCONTINUED | OUTPATIENT
Start: 2017-07-06 | End: 2017-07-06 | Stop reason: HOSPADM

## 2017-07-06 RX ORDER — MIDAZOLAM HYDROCHLORIDE 1 MG/ML
0.5 INJECTION, SOLUTION INTRAMUSCULAR; INTRAVENOUS
Status: DISCONTINUED | OUTPATIENT
Start: 2017-07-06 | End: 2017-07-06 | Stop reason: HOSPADM

## 2017-07-06 RX ADMIN — PROPOFOL 20 MG: 10 INJECTION, EMULSION INTRAVENOUS at 11:03

## 2017-07-06 RX ADMIN — SEVELAMER CARBONATE 1600 MG: 800 TABLET, FILM COATED ORAL at 17:18

## 2017-07-06 RX ADMIN — DIPHENHYDRAMINE HYDROCHLORIDE 25 MG: 50 INJECTION, SOLUTION INTRAMUSCULAR; INTRAVENOUS at 02:53

## 2017-07-06 RX ADMIN — DIPHENHYDRAMINE HYDROCHLORIDE 50 MG: 50 INJECTION, SOLUTION INTRAMUSCULAR; INTRAVENOUS at 11:03

## 2017-07-06 RX ADMIN — PROPOFOL 50 MCG/KG/MIN: 10 INJECTION, EMULSION INTRAVENOUS at 11:04

## 2017-07-06 RX ADMIN — HYDROMORPHONE HYDROCHLORIDE 2 MG: 1 INJECTION, SOLUTION INTRAMUSCULAR; INTRAVENOUS; SUBCUTANEOUS at 14:12

## 2017-07-06 RX ADMIN — SODIUM CHLORIDE 25 ML/HR: 900 INJECTION, SOLUTION INTRAVENOUS at 09:34

## 2017-07-06 RX ADMIN — PROPOFOL 20 MG: 10 INJECTION, EMULSION INTRAVENOUS at 11:06

## 2017-07-06 RX ADMIN — HYDROCORTISONE SODIUM SUCCINATE 100 MG: 100 INJECTION, POWDER, FOR SOLUTION INTRAMUSCULAR; INTRAVENOUS at 11:02

## 2017-07-06 RX ADMIN — HYDROMORPHONE HYDROCHLORIDE 2 MG: 1 INJECTION, SOLUTION INTRAMUSCULAR; INTRAVENOUS; SUBCUTANEOUS at 17:22

## 2017-07-06 RX ADMIN — FENTANYL CITRATE 50 MCG: 50 INJECTION, SOLUTION INTRAMUSCULAR; INTRAVENOUS at 09:34

## 2017-07-06 RX ADMIN — CARVEDILOL 12.5 MG: 12.5 TABLET, FILM COATED ORAL at 17:18

## 2017-07-06 RX ADMIN — LIDOCAINE HYDROCHLORIDE 40 MG: 20 INJECTION, SOLUTION EPIDURAL; INFILTRATION; INTRACAUDAL; PERINEURAL at 11:03

## 2017-07-06 RX ADMIN — HUMAN INSULIN 3 UNITS: 100 INJECTION, SOLUTION SUBCUTANEOUS at 18:57

## 2017-07-06 RX ADMIN — HYDROXYZINE HYDROCHLORIDE 25 MG: 25 TABLET, FILM COATED ORAL at 15:45

## 2017-07-06 NOTE — PROGRESS NOTES
Spiritual Care Assessment/Progress Notes    Rosie Spears [id number]  [id number]    1950  75 y.o.  female    Patient Telephone Number: [phone] (home)   Mandaen Affiliation: Evangelical   Language: English   Extended Emergency Contact Information  Primary Emergency Contact: Via Hari Romero Phone: [phone]  Mobile Phone: [phone]  Relation: Daughter   Patient Active Problem List    Diagnosis Date Noted    [facility] 06/20/2017    Abscess of deep perineal space 06/17/2017    Perineal abscess 01/25/2017    Anal cryptitis 06/04/2012    Obstructive sleep apnea (adult) (pediatric) 12/13/2011    s/p debridement of midline abd wound 06/24/2011    Serratia wound infection, 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 06/14/2011    Abdominal pain, chronic, epigastric 01/12/2011    Morbid obesity ([facility] 10/14/2010    Diabetes mellitus type 2, insulin dependent ([facility] 10/14/2010    HTN (hypertension) 10/14/2010    Neuropathy 10/14/2010    Arthritis 10/14/2010        Date: 7/6/2017       Level of Mandaen/Spiritual Activity:  [x]         Involved in kwadwo tradition/spiritual practice    []         Not involved in kwadwo tradition/spiritual practice  []         Spiritually oriented    []         Claims no spiritual orientation    []         seeking spiritual identity  []         Feels alienated from Pentecostal practice/tradition  []         Feels angry about Pentecostal practice/tradition  [x]         Spirituality/Pentecostal tradition is a resource for coping at this time.   []         Not able to assess due to medical condition    Services Provided Today:  []         crisis intervention    []         reading Scriptures  [x]         spiritual assessment    []         prayer  [x]         empathic listening/emotional support  []         rites and rituals (cite in comments)  []         life review     []         Pentecostal support  []         theological development   [] advocacy  []         ethical dialog     []         blessing  []         bereavement support    [x]         support to family  []         anticipatory grief support   []         help with AMD  []         spiritual guidance    []         meditation      Spiritual Care Needs  []         Emotional Support  []         Spiritual/Episcopalian Care  []         Loss/Adjustment  []         Advocacy/Referral                /Ethics  [x]         No needs expressed at               this time  []         Other: (note in               comments)  [address]  []         Follow up visits with               pt/family  []         Provide materials  []         Schedule sacraments  []         Contact Community               Clergy  [x]         Follow up as needed  []         Other: (note in               comments)     Comments: Initial visit with patient and daughter in 36. Provided supportive presence as patient and family shared their experience of lengthy hospitalization and hopes for discharge later today. Helped patient to process emotions surrounding the requirement of home health care as she expressed her strong value for her independence. Patient and daughter understand that this will require a time of adjustment and recognize how much of an adjustment it will be for the patient. Family's kwadwo in God is a strong source of coping and their belief is that blessings can be found in each obstacle. Explored these blessings with patient and family and assured patient and family of ongoing prayers. Chaplain Damon Michael M.Div.    Paging Service [phone])

## 2017-07-06 NOTE — DISCHARGE INSTRUCTIONS
[address] [facility]  Discharge Instructions    Patient ID:  Jose Weldon  [id number]  73 y.o.  1950    Admit Date: 6/15/2017    Discharge Date: 7/6/2017    Last Procedure:   7/6/17: LEFT ARM DIALYSIS FISTULA ANGIOGRAM WITH BALLOON ANGIOPLASTY    6/15/17: Incision and drainage of recurring perineal abscess. Patient Instructions:     FOLLOW-UP:  Follow-up with Dr. Fartun Hung on July 26, 2017 at 3:40pm.  Please call office if you need to make changes to this appointment at [phone]. Please arrive by 20 minutes before scheduled appointment time to complete paperwork. Make sure to bring your ID card and insurance information. We are located at [facility] in the [facility], [address] [facility]. Resume Dialysis MW. Call your physician immediately if you have any fevers greater than 101, drainage from your wound that is not clear or looks infected, persistent bleeding, or increasing pain that does not respond to medication. ACTIVITY:  As tolerated. DIET:  Renal diet. You may eat any foods that you can tolerate. You may find it helpful to eat smaller sized, light meals more frequently for the first few days following surgery. It is a good idea to eat a high fiber diet and take in plenty of fluids to prevent constipation. If you do become constipated you may want to take a mild laxative or take ducolax tablets on a daily basis until your bowel habits are regular. WOUND CARE INSTRUCTIONS:   As directed by Home Health. QUESTIONS:  Please feel free to call the office ([phone]) if you have any questions, and they will be glad to assist you. Kidney Dialysis: Care Instructions  Your Care Instructions  Dialysis is a process that filters wastes from the blood when your kidneys can no longer do the job. It is not a cure, but it can help you live longer and feel better. It is a lifesaving treatment when you have kidney failure.   Normal kidneys work 24 hours a day to clean wastes from your blood. Your kidneys are not able to do this job, so a process called dialysis will do some of the work for your kidneys. You and your doctor will decide which type of dialysis you should have. Peritoneal dialysis uses the lining of your belly (peritoneum) to filter your blood. You can do it at home, on a daily basis. Hemodialysis uses a man-made filter called a dialyzer to clean your blood. Most people need to go to a hospital or clinic 3 days a week for several hours each time. Sometimes hemodialysis can be done at home. It is normal to have questions about your treatment, and you have a right to know what is happening to you. Learning about dialysis can help you take an active role in your treatment. Dialysis does not cure kidney disease, but it can help you live longer and feel better. You will need to follow your diet and treatment schedule carefully. Follow-up care is a key part of your treatment and safety. Be sure to make and go to all appointments, and call your doctor if you are having problems. It's also a good idea to know your test results and keep a list of the medicines you take. What do you need to know about peritoneal dialysis? Peritoneal dialysis uses the lining of your belly (or peritoneal membrane) to filter your blood. Before you can begin peritoneal dialysis, your doctor will need to place a thin tube called a catheter in your belly. This is the dialysis access. · Peritoneal dialysis can be done at home or in any clean place. You may be able to do it while you sleep. · You can do it by yourself. You do not have to rely on help from others. · You can do it at the times you choose as long as you do the right number of treatments. · It has to be done every day of the week. · Some people find it hard to do all the required steps. · It increases your chance for a serious infection of the lining of the belly (peritoneum).   What do you need to know about hemodialysis? Hemodialysis uses a man-made membrane called a dialyzer to clean your blood. You are connected to the dialyzer by tubes attached to your blood vessels. Before you start hemodialysis, your doctor will create a site where the blood can flow in and out of your body during your dialysis sessions. This site is called the vascular access. It may be a fistula, made by connecting an artery and a vein. Or it may be a graft, which is a tube implanted under your skin. · Hemodialysis is done mainly by trained health workers who can watch for any problems. · It allows you to be in contact with other people having dialysis. This can help provide emotional support. · You can schedule your treatments in the evenings so you can keep working. · You may be able to do home hemodialysis, which gives you more control over your schedule. · It usually needs to be done on a set schedule 3 times a week. · It can cause side effects. The most common side effects are low blood pressure and muscle cramps. These can often be treated easily. · It requires needle sticks during every treatment, which bothers some people. Others get used to it and even do the needle sticks themselves. How can you care for yourself at home? · Be sure to have all of your dialysis sessions. Do not try to shorten or skip your sessions. You have a better chance of a longer and healthier life by getting your full treatment. · Your doctor or health care team will show you the steps you need to go through each day before, during, and after dialysis. Be sure to follow these steps. If you do not understand a step, talk to your team.  · Your doctor and dietitian will help you design menus that follow your diet. Be sure to follow your diet guidelines. ¨ You will need to limit fluids and certain foods that contain salt (sodium), potassium, and phosphorus.   ¨ You may need to follow a heart-healthy diet to keep the fat (cholesterol) in your blood under control. ¨ You may need higher levels of protein in your diet. · Your doctor may recommend certain vitamins. But do not take any other medicine, including over-the-counter medicines, vitamins, and herbal products, without talking to your doctor first.  · Do not smoke. Smoking raises your risk of many health problems, including more kidney damage. If you need help quitting, talk to your doctor about stop-smoking programs and medicines. These can increase your chances of quitting for good. · Do not take ibuprofen (Advil, Motrin), naproxen (Aleve), or similar medicines, unless your doctor tells you to. These medicines may make kidney problems worse. When should you call for help? Call 911 anytime you think you may need emergency care. For example, call if:  · You passed out (lost consciousness). · You have severe shortness of breath. Call your doctor now or seek immediate medical care if:  · You have swelling in your hands or feet. · You are dizzy or lightheaded, or you feel like you may faint. · You have an unusual weight gain. · You have trembling or trouble thinking clearly. · You have a fever. Watch closely for changes in your health, and be sure to contact your doctor if:  · You have any problems with dialysis or your diet. Where can you learn more? Go to http://brandan-liang.info/. Enter A638 in the search box to learn more about \"Kidney Dialysis: Care Instructions. \"  Current as of: April 3, 2017  Content Version: 11.3  © 4679-4648 [employer]. Care instructions adapted under license by [employer] (which disclaims liability or warranty for this information). If you have questions about a medical condition or this instruction, always ask your healthcare professional. [place] [address] any warranty or liability for your use of this information.

## 2017-07-06 NOTE — PERIOP NOTES
Patient: Dayan Ratliff MRN: [id number]  SSN: [id number]   YOB: 1950  Age: 75 y.o. Sex: female     Patient is status post Procedure(s):  LEFT ARM DIALYSIS FISTULA ANGIOGRAM WITH BALLOON ANGIOPLASTY.     Surgeon(s) and Role:     * Valery Boggs MD - Primary    Local/Dose/Irrigation:  1ml Lidocaine 1% plain                  Peripheral IV 07/02/17 Right (Active)   Site Assessment Clean, dry, & intact 7/5/2017  9:49 PM   Phlebitis Assessment 0 7/5/2017  9:49 PM   Infiltration Assessment 0 7/5/2017  9:49 PM   Dressing Status Clean, dry, & intact 7/5/2017  9:49 PM   Dressing Type Transparent 7/5/2017  9:49 PM   Hub Color/Line Status Blue;Capped 7/5/2017  9:49 PM   Action Taken Open ports on tubing capped 7/5/2017  9:49 PM   Alcohol Cap Used Yes 7/5/2017  9:49 PM        Hemodialysis Catheter 07/03/17 (Active)   Central Line Being Utilized Yes 7/5/2017  4:35 PM   Criteria for Appropriate Use Dialysis/apheresis 7/5/2017  9:49 PM   Site Assessment Clean, dry, & intact 7/5/2017  9:49 PM   Date of Last Dressing Change 07/03/17 7/5/2017  4:35 PM   Dressing Status Old drainage 7/5/2017  9:49 PM   Dressing Type Gauze;Transparent 7/5/2017  9:49 PM   Proximal Hub Color/Line Status Red 7/5/2017  9:49 PM   Distal Hub Color/Line Status Blue 7/5/2017  9:49 PM                       Dressing/Packing:  Wound Buttocks Right-DRESSING TYPE: 4 x 4;Gauze (07/05/17 2100)  Wound Abdomen Anterior;Right-DRESSING TYPE: Open to air (07/05/17 2100)  Wound Heel Right-DRESSING TYPE: Protective barrier (bunny boot) (07/05/17 2100)  Wound Arm Left-DRESSING TYPE: 4 x 4;Special tape (comment) (07/06/17 1127)  Splint/Cast:  ]    Other:

## 2017-07-06 NOTE — PROGRESS NOTES
Bedside and Verbal shift change report given to Geena Ellington (oncoming nurse) by Lesia Smart (offgoing nurse). Report included the following information SBAR, Kardex, Procedure Summary, Intake/Output and MAR.

## 2017-07-06 NOTE — OP NOTES
[address] [place] [facility] [place] [address]   OP NOTE       Name:  Davide Alvarado   MR#:  [id number]   :  1950   Account #:  [de-identified]    Surgery Date:  2017   Date of Adm:  06/15/2017       PREOPERATIVE DIAGNOSIS: Stenotic left upper arm dialysis fistula. POSTOPERATIVE DIAGNOSIS: Stenotic left upper arm dialysis   fistula. PROCEDURES PERFORMED:  Left upper arm dialysis fistulogram   with balloon angioplasty of fistula. SURGEON: Fermin Thomas MD    ESTIMATED BLOOD LOSS: Minimal.     SPECIMENS REMOVED: None. ANESTHESIA:  Local with sedation. COMPLICATIONS: None. INDICATIONS: The patient is a 77-year-old female with end-stage   renal disease on hemodialysis. She has a functioning left upper arm   cephalic vein fistula that is quite pulsatile. She has been having some   difficulties on dialysis and will undergo angiographic evaluation. DESCRIPTION OF PROCEDURE: The patient's left arm was prepped   and draped. A 6-Cameroonian sheath was percutaneously placed into the   fistula in the distal upper arm. Contrast was injected for angiogram   study. This showed a patent fistula with an 80% stenosis in the fistula   in the proximal upper arm just beyond the head humerus. There is a   stent in the fistula in its proximal portion before it empties into the   axillary vein. A guidewire was passed through the area of stenosis and   it was dilated with an 8 mm angioplasty balloon. Following this, there   was a good angiographic result. The sheath was removed and the puncture site closed with a nylon   suture. Dressings were applied and the patient was returned to the   recovery room in stable condition. FINDINGS   1. Left upper arm dialysis fistula angiogram with balloon angioplasty:   The patient has a patent left brachiocephalic fistula. There is no   evidence of narrowing at the anastomosis.  The body of the fistula is   well-developed without stenoses. There is a stent in the portion of the   fistula just proximal emptying into the axillary vein. There is an 80%   stenosis in the fistula just proximal to the stent. Following balloon   angioplasty, there was no significant residual stenosis. SUMMARY: Successful angioplasty of left upper arm dialysis fistula   stenosis.          MD Farzana Fountain / Yazmin.Leticia   D:  07/06/2017   11:38   T:  07/06/2017   14:47   Job #:  [id number]

## 2017-07-06 NOTE — ANESTHESIA POSTPROCEDURE EVALUATION
Post-Anesthesia Evaluation and Assessment    Patient: Inés Juarez MRN: [id number]  SSN: [id number]    YOB: 1950  Age: 75 y.o. Sex: female       Cardiovascular Function/Vital Signs  Visit Vitals    /74 (BP 1 Location: Right arm, BP Patient Position: At rest)    Pulse 71    Temp 36.7 °C (98 °F)    Resp 12    Ht 6' 1.75\" (1.873 m)    Wt 135.9 kg (299 lb 8 oz)    SpO2 100%    Breastfeeding No    BMI 38.71 kg/m2       Patient is status post MAC anesthesia for Procedure(s):  LEFT ARM DIALYSIS FISTULA ANGIOGRAM WITH BALLOON ANGIOPLASTY. Nausea/Vomiting: None    Postoperative hydration reviewed and adequate. Pain:  Pain Scale 1: Numeric (0 - 10) (07/06/17 1236)  Pain Intensity 1: 0 (07/06/17 1236)   Managed    Neurological Status:   Neuro (WDL): Within Defined Limits (07/06/17 1210)  Neuro  LUE Motor Response: Tingling;Numbness (07/05/17 1958)  LLE Motor Response: Tingling;Numbness (07/05/17 1958)  RUE Motor Response: Tingling;Numbness (07/05/17 1958)  RLE Motor Response: Tingling;Numbness (07/05/17 1958)   At baseline    Mental Status and Level of Consciousness: Arousable    Pulmonary Status:   O2 Device: Nasal cannula (07/06/17 1236)   Adequate oxygenation and airway patent    Complications related to anesthesia: None    Post-anesthesia assessment completed.  No concerns    Signed By: Herve Sweet MD     July 6, 2017

## 2017-07-06 NOTE — PROGRESS NOTES
Note discharge for later today. Met with pt and daughter this pm. Son will transport home with portable O2 tank. [facility] will resume wound care as indicated by order. Daughter will alert dialysis center of planned return as of July 7th. Pt has dialysis Monday/Wednesday/Fridays. Family transport pt to and from dialysis. No other disposition needs identified at this time.   (home health Magdalena Estrella)  Christine Hernandez [place]

## 2017-07-06 NOTE — PROGRESS NOTES
[facility]   [address] [address], [facility] [place]  Phone: [phone]   [phone]       Nephrology Progress Note  Yasmeen Matthew     1950     [id number]  Date of Admission : 6/15/2017  07/06/17    CC: Follow up for ESRD       Assessment and Plan   ESRD- HD :  - [facility]   - s/p fistulogram with balloon angioplasty  - ok to d/c jojo  - ok for d/c from renal standpoint    Hypervolemia / Hyponatremia :  - UF 5kg on HD 7/4 and 4kg on 7/5    HyperPhosphatemia :  - cont renvela with meals    Anemia of CKD:  - cont ELTON with dialysis  - transfused 1 unit on 7/5 with dialysis    Recurrent Perianal abscess:   - per Dr Raciel Alvarez and ID    HTN :  - BP stable    Type II DM     Sec HPTH     Interval History:  Seen and examined. S/p fistulogram this AM.  No cp, sob, n/v/d reported. Review of Systems: A comprehensive review of systems was negative.     Current Medications:   Current Facility-Administered Medications   Medication Dose Route Frequency    balsam peru-castor oil (VENELEX)  mg/gram ointment   Topical BID    amLODIPine (NORVASC) tablet 2.5 mg  2.5 mg Oral DAILY    sevelamer carbonate (RENVELA) tab 1,600 mg  1,600 mg Oral TID WITH MEALS    epoetin joi (EPOGEN;PROCRIT) injection 20,000 Units  20,000 Units SubCUTAneous DIALYSIS MON, WED & FRI    hydrOXYzine HCl (ATARAX) tablet 25 mg  25 mg Oral Q4H PRN    albumin human 25% (BUMINATE) solution 12.5 g  12.5 g IntraVENous DIALYSIS PRN    carvedilol (COREG) tablet 25 mg  25 mg Oral DAILY    carvedilol (COREG) tablet 12.5 mg  12.5 mg Oral QPM    polyethylene glycol (MIRALAX) packet 17 g  17 g Oral DAILY    diphenhydrAMINE (BENADRYL) injection 25 mg  25 mg IntraVENous Q4H PRN    HYDROmorphone (PF) (DILAUDID) injection 2 mg  2 mg IntraVENous Q4H PRN    insulin regular (NOVOLIN R, HUMULIN R) injection   SubCUTAneous Q6H    aspirin chewable tablet 81 mg  81 mg Oral DAILY    FLUoxetine (PROzac) capsule 20 mg  20 mg Oral DAILY    nortriptyline (PAMELOR) capsule 25 mg  25 mg Oral QHS    oxyCODONE-acetaminophen (PERCOCET 7.5) 7.5-325 mg per tablet 1-2 Tab  1-2 Tab Oral Q4H PRN    ondansetron (ZOFRAN) injection 4 mg  4 mg IntraVENous Q4H PRN    glucose chewable tablet 16 g  4 Tab Oral PRN    glucagon (GLUCAGEN) injection 1 mg  1 mg IntraMUSCular PRN      Allergies   Allergen Reactions    Latex Itching     Rash, sometimes difficult to breathe    Celebrex [Celecoxib] Anaphylaxis and Swelling     TONGUE. LIPS AND EYES    Iodine Other (comments)     IV CONTRAST-LESIONS, AND SKIN SLUFFED OFF    Keflex [Cephalexin] Anaphylaxis and Swelling     Tongue, lips, and eyes    Levaquin [Levofloxacin] Anaphylaxis and Swelling     Tongue, lips, and eyes    Pcn [Penicillins] Anaphylaxis and Swelling     Tongue, lips and eyes    Morphine Other (comments)     PT STATES IS JUST SENSITIVITY, GOT TOO MUCH ONE TIME. Objective:  Vitals:    Vitals:    07/06/17 1200 07/06/17 1215 07/06/17 1236 07/06/17 1332   BP: 139/53 142/54 155/74 157/79   Pulse: 69 69 71 76   Resp: 10 11 12 14   Temp:   98 °F (36.7 °C) 98.3 °F (36.8 °C)   TempSrc:       SpO2: 100% 100% 100% 100%   Weight:       Height:         Intake and Output:  07/06 0701 - 07/06 1900  In: 125 [I.V.:125]  Out: -   07/04 1901 - 07/06 0700  In: 1045 [P.O.:920; I.V.:125]  Out: 4150 [Urine:150]    Physical Examination:    General: In mild distress  Neck:  Supple, no mass  Resp:  Lungs CTA   CV:  RRR,  no murmur or rub, 4+ LE edema  GI:  Soft, NT, + Bowel sounds, no hepatosplenomegaly  Neurologic:  Non focal  Ext                   LUE AVF w/ good thrill and bruit    []    High complexity decision making was performed  []    Patient is at high-risk of decompensation with multiple organ involvement    Lab Data Personally Reviewed: I have reviewed all the pertinent labs, microbiology data and radiology studies during assessment.     Recent Labs      07/05/17 0946   NA  133*   K  4.4   CL  98   CO2  28   GLU  126*   BUN  30*   CREA  5.17*   CA  8.6   PHOS  4.2   ALB  2.6*     Recent Labs      07/05/17   0946   WBC  5.1   HGB  7.8*   HCT  24.6*   PLT  164     Lab Results   Component Value Date/Time    Specimen Description: URINE 10/27/2012 08:15 PM    Specimen Description: BLOOD 06/07/2011 07:40 AM    Specimen Description: URINE 08/30/2010 07:10 AM    Specimen Description: URINE 08/09/2010 11:30 AM    Specimen Description: URINE 06/22/2010 11:50 PM     Lab Results   Component Value Date/Time    Culture result: LIGHT  MIXED COMMENSAL PARIS   02/11/2017 12:40 AM    Culture result:  02/11/2017 12:40 AM     LIGHT  ANAEROBIC GRAM NEGATIVE RODS  BETA LACTAMASE POSITIVE      Culture result: NO GROWTH 6 DAYS 02/10/2017 04:43 PM    Culture result: MIXED SKIN PARIS ISOLATED 10/27/2012 08:15 PM    Culture result: NO GROWTH 5 DAYS 06/07/2011 07:40 AM     Recent Results (from the past 24 hour(s))   GLUCOSE, POC    Collection Time: 07/05/17  4:27 PM   Result Value Ref Range    Glucose (POC) 131 (H) 65 - 100 mg/dL    Performed by [address] [place] [address], POC    Collection Time: 07/05/17  6:11 PM   Result Value Ref Range    Glucose (POC) 109 (H) 65 - 100 mg/dL    Performed by [address] [place] [address], POC    Collection Time: 07/05/17 11:07 PM   Result Value Ref Range    Glucose (POC) 123 (H) 65 - 100 mg/dL    Performed by [facility] (TRAVELER)    GLUCOSE, POC    Collection Time: 07/06/17  6:52 AM   Result Value Ref Range    Glucose (POC) 95 65 - 100 mg/dL    Performed by [facility] (TRAVELER)    GLUCOSE, POC    Collection Time: 07/06/17 12:16 PM   Result Value Ref Range    Glucose (POC) 93 65 - 100 mg/dL    Performed by Ash Nunez MD

## 2017-07-06 NOTE — ANESTHESIA PREPROCEDURE EVALUATION
Anesthetic History     PONV          Review of Systems / Medical History  Patient summary reviewed, nursing notes reviewed and pertinent labs reviewed    Pulmonary            Asthma        Neuro/Psych              Cardiovascular    Hypertension              Exercise tolerance: <4 METS  Comments: Wheelchair bound   GI/Hepatic/Renal     GERD: well controlled    Renal disease: ESRD and dialysis      Comments: HD M,W,F,  Endo/Other    Diabetes: well controlled, type 2, using insulin  Hypothyroidism  Morbid obesity and arthritis     Other Findings            Physical Exam    Airway  Mallampati: II  TM Distance: > 6 cm  Neck ROM: normal range of motion   Mouth opening: Normal     Cardiovascular  Regular rate and rhythm,  S1 and S2 normal,  no murmur, click, rub, or gallop             Dental    Dentition: Lower partial plate and Upper partial plate     Pulmonary  Breath sounds clear to auscultation               Abdominal  GI exam deferred       Other Findings            Anesthetic Plan    ASA: 3  Anesthesia type: MAC          Induction: Intravenous  Anesthetic plan and risks discussed with: Patient

## 2017-07-06 NOTE — PROGRESS NOTES
ID Progress Note  2017    Subjective:     No new complaints, pain about the same    Objective:     Antibiotics:  1. Azactam  2. Flagyl      Vitals:   Visit Vitals    /74 (BP 1 Location: Right arm, BP Patient Position: At rest)    Pulse 81    Temp 97.5 °F (36.4 °C)    Resp 16    Ht 6' 1.75\" (1.873 m)    Wt 135.9 kg (299 lb 8 oz)    SpO2 97%    Breastfeeding No    BMI 38.71 kg/m2        Tmax:  Temp (24hrs), Av.2 °F (36.8 °C), Min:97.5 °F (36.4 °C), Max:99.2 °F (37.3 °C)      Exam:  Lungs:  clear to auscultation bilaterally  Heart:  regular rate and rhythm  Abdomen:  soft, non-tender. Bowel sounds normal. No masses,  no organomegaly  [place]-wound area is softer, wound is clean    Labs:      Recent Labs      17   0946   WBC  5.1   HGB  7.8*   PLT  164   BUN  30*   CREA  5.17*       Cultures:     Lab Results   Component Value Date/Time    Specimen Description: URINE 10/27/2012 08:15 PM    Specimen Description: BLOOD 2011 07:40 AM    Specimen Description: URINE 2010 07:10 AM     Lab Results   Component Value Date/Time    Culture result: LIGHT  MIXED COMMENSAL PARIS   2017 12:40 AM    Culture result:  2017 12:40 AM     LIGHT  ANAEROBIC GRAM NEGATIVE RODS  BETA LACTAMASE POSITIVE      Culture result: NO GROWTH 6 DAYS 02/10/2017 04:43 PM       Radiology:     Line/Insert Date:           Assessment:     1. Perianal abscess  2. ESRD  3. Debility     Objective:     1. Monitor off antibiotics  2.  OK for discharge from my standpoint    Lakesha Lynn MD

## 2017-07-06 NOTE — PROGRESS NOTES
Daily Progress Note  Vick Correa General Surgery at [address] Date: 6/15/2017  Post-Operative Day: 0 from Procedure(s):  LEFT ARM DIALYSIS FISTULA ANGIOGRAM WITH BALLOON ANGIOPLASTY     Subjective:     Last 24 hrs: States she is still waking up from fistula procedure. Eating well, c/o itching at her buttock wound. Eager to have dialysis catheter removed. Agreeable to discharge today, plans to go to her regular dialysis center in the morning. Objective:     Blood pressure 155/74, pulse 71, temperature 98 °F (36.7 °C), resp. rate 12, height 6' 1.75\" (1.873 m), weight 135.9 kg (299 lb 8 oz), SpO2 100 %, not currently breastfeeding. Temp (24hrs), Av.1 °F (36.7 °C), Min:97.2 °F (36.2 °C), Max:99.2 °F (37.3 °C)      _____________________  Physical Exam:     Alert and Oriented, lying in bed, in no acute distress. Cardiovascular: RRR, +4 bilat LE peripheral edema  Lungs:CTAB   Abdomen: soft, NT  Perineum:  Left perineal wound with pink base, scant clear drainage.        Assessment:   Principal Problem:    Perineal abscess (2017)    Active Problems:    Diabetes mellitus type 2, insulin dependent ([facility] [address] (10/14/2010)      HTN (hypertension) (10/14/2010)      Abscess of deep perineal space (2017)      ESRD (end stage renal disease) on dialysis [facility]) (2017)            Plan:     Plan for DC later today  Home health set up for wound care  [address]  Will see Dr. Genet Morales on  at 3:40pm for follow-up        Racquel Jones, [address] [facility] at [place] [address], [address] [place] [address]  [phone]    Data Review:    Recent Labs      17   0946   WBC  5.1   HGB  7.8*   HCT  24.6*   PLT  164     Recent Labs      17   0946   NA  133*   K  4.4   CL  98   CO2  28   GLU  126*   BUN  30*   CREA  5.17*   CA  8.6   PHOS  4.2   ALB  2.6*     No results for input(s): AML, LPSE in the last 72 hours.        ______________________  Medications:    Current Facility-Administered Medications   Medication Dose Route Frequency    balsam peru-castor oil (VENELEX)  mg/gram ointment   Topical BID    amLODIPine (NORVASC) tablet 2.5 mg  2.5 mg Oral DAILY    sevelamer carbonate (RENVELA) tab 1,600 mg  1,600 mg Oral TID WITH MEALS    epoetin joi (EPOGEN;PROCRIT) injection 20,000 Units  20,000 Units SubCUTAneous DIALYSIS MON, WED & FRI    hydrOXYzine HCl (ATARAX) tablet 25 mg  25 mg Oral Q4H PRN    albumin human 25% (BUMINATE) solution 12.5 g  12.5 g IntraVENous DIALYSIS PRN    carvedilol (COREG) tablet 25 mg  25 mg Oral DAILY    carvedilol (COREG) tablet 12.5 mg  12.5 mg Oral QPM    polyethylene glycol (MIRALAX) packet 17 g  17 g Oral DAILY    diphenhydrAMINE (BENADRYL) injection 25 mg  25 mg IntraVENous Q4H PRN    HYDROmorphone (PF) (DILAUDID) injection 2 mg  2 mg IntraVENous Q4H PRN    insulin regular (NOVOLIN R, HUMULIN R) injection   SubCUTAneous Q6H    aspirin chewable tablet 81 mg  81 mg Oral DAILY    FLUoxetine (PROzac) capsule 20 mg  20 mg Oral DAILY    nortriptyline (PAMELOR) capsule 25 mg  25 mg Oral QHS    oxyCODONE-acetaminophen (PERCOCET 7.5) 7.5-325 mg per tablet 1-2 Tab  1-2 Tab Oral Q4H PRN    ondansetron (ZOFRAN) injection 4 mg  4 mg IntraVENous Q4H PRN    glucose chewable tablet 16 g  4 Tab Oral PRN    glucagon (GLUCAGEN) injection 1 mg  1 mg IntraMUSCular PRN       I have independently examined the patient and have reviewed the chart. I agree with the above plan. Patient reports doing much better. AV fistula revised with angioplasty earlier today. Plan for d/c home today with HD follow up tomorrow. Home health for wound care and follow up with Dr. Josef Vogel in a couple of weeks.       Yolande Lucia MD  7/6/2017  3:46 PM

## 2017-07-06 NOTE — PERIOP NOTES
TRANSFER - OUT REPORT:    Verbal report given to Suzanna(name) on Karie Mendoza  being transferred to 355(unit) for routine post - op       Report consisted of patients Situation, Background, Assessment and   Recommendations(SBAR). Time Pre op antibiotic given:none  Anesthesia Stop time: none  De Leon Present on Transfer to floor:no  Order for De Leon on Chart:no    Information from the following report(s) SBAR, Kardex, OR Summary, Procedure Summary, Intake/Output, MAR, Recent Results and Med Rec Status was reviewed with the receiving nurse. Opportunity for questions and clarification was provided. Is the patient on 02? YES       L/Min 2       Other     Is the patient on a monitor? NO    Is the nurse transporting with the patient? NO    Surgical Waiting Area notified of patient's transfer from PACU? YES      The following personal items collected during your admission accompanied patient upon transfer:   Dental Appliance: Dental Appliances: Uppers, Lowers (left in hospital room)  Vision: Visual Aid: Glasses, With patient  Hearing Aid:    Jewelry: Jewelry: None  Clothing: Clothing: Footwear, Pants, Undergarments, Socks, Shirt (sent to [employer])  Other Valuables: Other Valuables: Wheelchair (sent to [employer])  Valuables sent to safe:        No belongings in PACU.

## 2017-07-06 NOTE — ROUTINE PROCESS
TRANSFER - IN REPORT:    Verbal report received from [facility] RN on Luz Maria Scherer  being received from [phone]) for ordered procedure      Report consisted of patients Situation, Background, Assessment and   Recommendations(SBAR). Information from the following report(s) SBAR was reviewed with the receiving nurse. Opportunity for questions and clarification was provided. Assessment completed upon patients arrival to unit and care assumed.

## 2017-07-06 NOTE — BRIEF OP NOTE
BRIEF OPERATIVE NOTE    Date of Procedure: 7/6/2017   Preoperative Diagnosis: END STAGE RENAL DISEASE  Postoperative Diagnosis: END STAGE RENAL DISEASE    Procedure(s):  LEFT ARM DIALYSIS FISTULA ANGIOGRAM WITH BALLOON ANGIOPLASTY  Surgeon(s) and Role:     * Valery Boggs MD - Primary         Assistant Staff:       Surgical Staff:  Circ-1: David Ruff RN  Circ-Relief: Janee Hauser RN  Scrub Tech-1: [facility] Staff: Carroll Diaz RN  Event Time In   Incision Start 1110   Incision Close 1127     Anesthesia: MAC   Estimated Blood Loss: minimal  Specimens: * No specimens in log *   Findings: stenosis at level of proximal upper arm - dilated with good result   Complications: none  Implants: * No implants in log *

## 2017-07-07 ENCOUNTER — TELEPHONE (OUTPATIENT)
Dept: SURGERY | Age: 67
End: 2017-07-07

## 2017-07-07 NOTE — TELEPHONE ENCOUNTER
Daughter called to say that patient was having trouble moving around since she was discharged yesterday from the hospital. She wanted to know if a script can be written for a hospital bed and therapy.

## 2017-07-07 NOTE — TELEPHONE ENCOUNTER
I returned daughters call and she states her mother is having fluid build up like she was having in the hospital. She said patient went to dialysis today. I asked her what she meant by fluid  build up and she said the left side of her face swells, like it did in the hospital. She thinks this is from her kidney issues vs surgery. She said her mother has gone back to using oxygen and has an appointment with pulmonary next week. She is requesting PT and either hospital bed or bedside toilet. She said home health is coming out tomorrow and I told her to have home health evaluate patient and make reccommendations based on their findings and send or call  the appropriate physician, either us- surgery, or nephrologist for verbal or signed order for request. Daughter is inn agreement with this plan of care.

## 2017-07-08 ENCOUNTER — HOME CARE VISIT (OUTPATIENT)
Dept: SCHEDULING | Facility: HOME HEALTH | Age: 67
End: 2017-07-08
Payer: MEDICARE

## 2017-07-08 PROCEDURE — 3331090002 HH PPS REVENUE DEBIT

## 2017-07-08 PROCEDURE — 3331090001 HH PPS REVENUE CREDIT

## 2017-07-08 PROCEDURE — G0299 HHS/HOSPICE OF RN EA 15 MIN: HCPCS

## 2017-07-08 PROCEDURE — 400013 HH SOC

## 2017-07-09 VITALS
RESPIRATION RATE: 18 BRPM | WEIGHT: 282 LBS | OXYGEN SATURATION: 99 % | SYSTOLIC BLOOD PRESSURE: 176 MMHG | BODY MASS INDEX: 36.45 KG/M2 | DIASTOLIC BLOOD PRESSURE: 66 MMHG | HEART RATE: 74 BPM | TEMPERATURE: 98.8 F

## 2017-07-09 PROCEDURE — 3331090001 HH PPS REVENUE CREDIT

## 2017-07-09 PROCEDURE — 3331090002 HH PPS REVENUE DEBIT

## 2017-07-10 ENCOUNTER — HOME CARE VISIT (OUTPATIENT)
Dept: SCHEDULING | Facility: HOME HEALTH | Age: 67
End: 2017-07-10
Payer: MEDICARE

## 2017-07-10 ENCOUNTER — TELEPHONE (OUTPATIENT)
Dept: SURGERY | Age: 67
End: 2017-07-10

## 2017-07-10 PROCEDURE — G0300 HHS/HOSPICE OF LPN EA 15 MIN: HCPCS

## 2017-07-10 PROCEDURE — 3331090002 HH PPS REVENUE DEBIT

## 2017-07-10 PROCEDURE — 3331090001 HH PPS REVENUE CREDIT

## 2017-07-10 NOTE — TELEPHONE ENCOUNTER
Received a call from [address] [place] [address] nurse requesting a PT eval for patient as well as going out 3 time a week first week and then 2 times a week there after for wound care. She states she will teach and observe patients daughter in the wound care. I gave her the verbal order and she will send written order for Dr. Kirstie Mcclain.

## 2017-07-11 VITALS
BODY MASS INDEX: 35.62 KG/M2 | WEIGHT: 275.57 LBS | DIASTOLIC BLOOD PRESSURE: 76 MMHG | HEART RATE: 87 BPM | TEMPERATURE: 97.5 F | RESPIRATION RATE: 18 BRPM | OXYGEN SATURATION: 96 % | SYSTOLIC BLOOD PRESSURE: 166 MMHG

## 2017-07-11 PROCEDURE — 3331090002 HH PPS REVENUE DEBIT

## 2017-07-11 PROCEDURE — 3331090001 HH PPS REVENUE CREDIT

## 2017-07-12 ENCOUNTER — HOME CARE VISIT (OUTPATIENT)
Dept: SCHEDULING | Facility: HOME HEALTH | Age: 67
End: 2017-07-12
Payer: MEDICARE

## 2017-07-12 VITALS
DIASTOLIC BLOOD PRESSURE: 72 MMHG | OXYGEN SATURATION: 96 % | TEMPERATURE: 97.6 F | WEIGHT: 271.17 LBS | RESPIRATION RATE: 18 BRPM | BODY MASS INDEX: 35.05 KG/M2 | SYSTOLIC BLOOD PRESSURE: 158 MMHG | HEART RATE: 76 BPM

## 2017-07-12 PROCEDURE — 3331090002 HH PPS REVENUE DEBIT

## 2017-07-12 PROCEDURE — 3331090001 HH PPS REVENUE CREDIT

## 2017-07-12 PROCEDURE — G0300 HHS/HOSPICE OF LPN EA 15 MIN: HCPCS

## 2017-07-13 ENCOUNTER — HOME CARE VISIT (OUTPATIENT)
Dept: HOME HEALTH SERVICES | Facility: HOME HEALTH | Age: 67
End: 2017-07-13
Payer: MEDICARE

## 2017-07-13 PROCEDURE — 3331090001 HH PPS REVENUE CREDIT

## 2017-07-13 PROCEDURE — 3331090002 HH PPS REVENUE DEBIT

## 2017-07-14 ENCOUNTER — HOME CARE VISIT (OUTPATIENT)
Dept: SCHEDULING | Facility: HOME HEALTH | Age: 67
End: 2017-07-14
Payer: MEDICARE

## 2017-07-14 VITALS
HEART RATE: 84 BPM | OXYGEN SATURATION: 96 % | RESPIRATION RATE: 20 BRPM | WEIGHT: 263.67 LBS | DIASTOLIC BLOOD PRESSURE: 70 MMHG | BODY MASS INDEX: 34.08 KG/M2 | TEMPERATURE: 97.8 F | SYSTOLIC BLOOD PRESSURE: 170 MMHG

## 2017-07-14 PROCEDURE — 3331090002 HH PPS REVENUE DEBIT

## 2017-07-14 PROCEDURE — G0300 HHS/HOSPICE OF LPN EA 15 MIN: HCPCS

## 2017-07-14 PROCEDURE — 3331090001 HH PPS REVENUE CREDIT

## 2017-07-15 ENCOUNTER — HOME CARE VISIT (OUTPATIENT)
Dept: SCHEDULING | Facility: HOME HEALTH | Age: 67
End: 2017-07-15
Payer: MEDICARE

## 2017-07-15 VITALS
OXYGEN SATURATION: 98 % | DIASTOLIC BLOOD PRESSURE: 88 MMHG | TEMPERATURE: 98 F | SYSTOLIC BLOOD PRESSURE: 160 MMHG | HEART RATE: 78 BPM | RESPIRATION RATE: 17 BRPM

## 2017-07-15 PROCEDURE — 3331090002 HH PPS REVENUE DEBIT

## 2017-07-15 PROCEDURE — 3331090001 HH PPS REVENUE CREDIT

## 2017-07-15 PROCEDURE — G0151 HHCP-SERV OF PT,EA 15 MIN: HCPCS

## 2017-07-16 PROCEDURE — 3331090002 HH PPS REVENUE DEBIT

## 2017-07-16 PROCEDURE — 3331090001 HH PPS REVENUE CREDIT

## 2017-07-17 PROCEDURE — 3331090001 HH PPS REVENUE CREDIT

## 2017-07-17 PROCEDURE — 3331090002 HH PPS REVENUE DEBIT

## 2017-07-18 ENCOUNTER — HOME CARE VISIT (OUTPATIENT)
Dept: SCHEDULING | Facility: HOME HEALTH | Age: 67
End: 2017-07-18
Payer: MEDICARE

## 2017-07-18 VITALS
TEMPERATURE: 98.8 F | DIASTOLIC BLOOD PRESSURE: 68 MMHG | WEIGHT: 262.13 LBS | BODY MASS INDEX: 33.88 KG/M2 | OXYGEN SATURATION: 94 % | RESPIRATION RATE: 18 BRPM | HEART RATE: 78 BPM | SYSTOLIC BLOOD PRESSURE: 172 MMHG

## 2017-07-18 PROCEDURE — 3331090002 HH PPS REVENUE DEBIT

## 2017-07-18 PROCEDURE — G0299 HHS/HOSPICE OF RN EA 15 MIN: HCPCS

## 2017-07-18 PROCEDURE — G0157 HHC PT ASSISTANT EA 15: HCPCS

## 2017-07-18 PROCEDURE — 3331090001 HH PPS REVENUE CREDIT

## 2017-07-19 VITALS
HEART RATE: 69 BPM | TEMPERATURE: 97.6 F | DIASTOLIC BLOOD PRESSURE: 76 MMHG | RESPIRATION RATE: 17 BRPM | OXYGEN SATURATION: 99 % | SYSTOLIC BLOOD PRESSURE: 159 MMHG

## 2017-07-19 PROCEDURE — 3331090001 HH PPS REVENUE CREDIT

## 2017-07-19 PROCEDURE — 3331090002 HH PPS REVENUE DEBIT

## 2017-07-20 ENCOUNTER — HOME CARE VISIT (OUTPATIENT)
Dept: SCHEDULING | Facility: HOME HEALTH | Age: 67
End: 2017-07-20
Payer: MEDICARE

## 2017-07-20 VITALS
SYSTOLIC BLOOD PRESSURE: 160 MMHG | OXYGEN SATURATION: 98 % | BODY MASS INDEX: 32.32 KG/M2 | DIASTOLIC BLOOD PRESSURE: 82 MMHG | RESPIRATION RATE: 20 BRPM | HEART RATE: 76 BPM | TEMPERATURE: 97.2 F | WEIGHT: 250 LBS

## 2017-07-20 VITALS
SYSTOLIC BLOOD PRESSURE: 150 MMHG | HEART RATE: 74 BPM | DIASTOLIC BLOOD PRESSURE: 78 MMHG | OXYGEN SATURATION: 99 % | TEMPERATURE: 97.3 F | RESPIRATION RATE: 18 BRPM

## 2017-07-20 PROCEDURE — 3331090002 HH PPS REVENUE DEBIT

## 2017-07-20 PROCEDURE — G0157 HHC PT ASSISTANT EA 15: HCPCS

## 2017-07-20 PROCEDURE — G0300 HHS/HOSPICE OF LPN EA 15 MIN: HCPCS

## 2017-07-20 PROCEDURE — 3331090001 HH PPS REVENUE CREDIT

## 2017-07-21 ENCOUNTER — OFFICE VISIT (OUTPATIENT)
Dept: SURGERY | Age: 67
End: 2017-07-21

## 2017-07-21 ENCOUNTER — DOCUMENTATION ONLY (OUTPATIENT)
Dept: SURGERY | Age: 67
End: 2017-07-21

## 2017-07-21 VITALS
WEIGHT: 247.94 LBS | BODY MASS INDEX: 33.58 KG/M2 | HEIGHT: 72 IN | RESPIRATION RATE: 20 BRPM | OXYGEN SATURATION: 98 % | SYSTOLIC BLOOD PRESSURE: 160 MMHG | HEART RATE: 78 BPM | TEMPERATURE: 98.6 F | DIASTOLIC BLOOD PRESSURE: 70 MMHG

## 2017-07-21 DIAGNOSIS — K61.0 PERIANAL ABSCESS: ICD-10-CM

## 2017-07-21 DIAGNOSIS — Z09 FOLLOW-UP EXAMINATION FOLLOWING SURGERY: Primary | ICD-10-CM

## 2017-07-21 DIAGNOSIS — Z51.89 WOUND CHECK, ABSCESS: ICD-10-CM

## 2017-07-21 PROCEDURE — 3331090001 HH PPS REVENUE CREDIT

## 2017-07-21 PROCEDURE — 3331090002 HH PPS REVENUE DEBIT

## 2017-07-21 NOTE — PROGRESS NOTES
HISTORY OF PRESENT ILLNESS  Antwan Santo is a 75 y.o. female. HPI   Chief Complaint   Patient presents with    Surgical Follow-up     5 weeks s/p perineal abscess I&D by Dr Chaya Barrios. Presents today after dialysis with concerns about area of tunneling in wound. Review of Systems   Constitutional: Positive for malaise/fatigue (just finished dialysis ). Negative for chills and fever. Respiratory: Negative for cough. Cardiovascular: Negative for chest pain. Musculoskeletal: Positive for back pain and joint pain. Negative for falls. Skin:        Daughter and home nurse \"worried\" about area of tunneling at \"top of wound\"  Area is more tender   No increased drainage and no odor         Physical Exam   Constitutional: She is oriented to person, place, and time. /70  Pulse 78  Temp 98.6 °F (37 °C)  Resp 20  Ht 6' 1.75\" (1.873 m)  Wt 247 lb 15 oz (112.5 kg)  SpO2 98%  BMI 32.05 kg/m2  AA female accompanied by daughter    Neurological: She is alert and oriented to person, place, and time. Skin:        Right buttock wound granulating 100%, shallow. Area of undermining medial aspect and tunneling at 1 o'clock approx. 1cm; small amount serosanguinous drainage when probed, no odor; tender along distal portion of wound with some firmness, no erythema and no induration     Psychiatric: She has a normal mood and affect. ASSESSMENT and PLAN    ICD-10-CM ICD-9-CM    1. Follow-up examination following surgery Z09 V67.00    2. Wound check, abscess Z51.89 V58.89    3.  Perianal abscess K61.0 566      Wound examined and gently probed   There is definitely undermining and a small area of tunneling that drains small amount of serosanguinous when probed   1/4\" wick packed in tunnel and undermined area with wound gel; covered with 4x4 topper   Continue wound care at home as above  She is seeing Dr. Chaya Barrios next week and will see if Anum Yadav RN from wound care can come over and see the wound to assess  Dereck Anderson verbalized understanding and questions were answered to the best of my knowledge and ability. Skin care  educational materials were provided.

## 2017-07-21 NOTE — MR AVS SNAPSHOT
Visit Information Date & Time Provider Department Dept. Phone Encounter #  
 7/21/2017 11:40 AM Ayala Clark NP [facility] GENERAL SURGERY [address] [phone] [id number] Your Appointments 7/26/2017  3:40 PM  
POST OP 10 MIN with MD Celso Parnell [phone] ([facility]) Appt Note: PO        Follow-up   Appt. made by Jennifer Montes De Oca. vca  
 [address] [facility] [address]  
[address] [facility] Upcoming Health Maintenance Date Due  
 FOOT EXAM Q1 5/8/1959 MICROALBUMIN Q1 5/8/1959 EYE EXAM RETINAL OR DILATED Q1 5/8/1959 DTaP/Tdap/Td series (1 - Tdap) 5/8/1970 BREAST CANCER SCRN MAMMOGRAM 5/8/1999 FOBT Q 1 YEAR AGE 50-75 5/8/1999 ZOSTER VACCINE AGE 60> 3/8/2009 HEMOGLOBIN A1C Q6M 4/6/2010 LIPID PANEL Q1 10/6/2010 GLAUCOMA SCREENING Q2Y 5/8/2014 OSTEOPOROSIS SCREENING (DEXA) 5/8/2014 Pneumococcal 65+ High/Highest Risk (1 of 2 - PCV13) 5/8/2014 MEDICARE YEARLY EXAM 5/8/2014 INFLUENZA AGE 9 TO ADULT 8/1/2017 Allergies as of 7/21/2017  Review Complete On: 7/21/2017 By: Ayala Clark NP Severity Noted Reaction Type Reactions Latex  01/03/2011    Itching Rash, sometimes difficult to breathe Celebrex [Celecoxib] High 01/06/2011    Anaphylaxis, Swelling TONGUE. LIPS AND EYES Iodine High 07/17/2013    Other (comments) IV CONTRAST-LESIONS, AND SKIN SLUFFED OFF Keflex [Cephalexin] High 10/14/2010    Anaphylaxis, Swelling Tongue, lips, and eyes Levaquin [Levofloxacin] High 10/14/2010    Anaphylaxis, Swelling Tongue, lips, and eyes Pcn [Penicillins] High 10/14/2010    Anaphylaxis, Swelling Tongue, lips and eyes Morphine  10/14/2010    Other (comments) PT STATES IS JUST SENSITIVITY, GOT TOO MUCH ONE TIME. Current Immunizations  Never Reviewed No immunizations on file. Not reviewed this visit You Were Diagnosed With   
  
 Codes Comments Follow-up examination following surgery    -  Primary ICD-10-CM: X29 ICD-9-CM: V67.00 Wound check, abscess     ICD-10-CM: Z51.89 ICD-9-CM: V58.89 Perianal abscess     ICD-10-CM: K61.0 ICD-9-CM: 461 Vitals BP Pulse Temp Resp Height(growth percentile) Weight(growth percentile) 160/70 78 98.6 °F (37 °C) 20 6' 1.75\" (1.873 m) 247 lb 15 oz (112.5 kg) SpO2 BMI OB Status Smoking Status 98% 32.05 kg/m2 Hysterectomy Never Smoker Vitals History BMI and BSA Data Body Mass Index Body Surface Area 32.05 kg/m 2 2.42 m 2 Preferred Pharmacy Pharmacy Name Phone [address] AT [facility] OF  [facility] [phone] Your Updated Medication List  
  
   
This list is accurate as of: 7/21/17  1:36 PM.  Always use your most recent med list. ALLEGRA 180 mg tablet Generic drug:  fexofenadine Take 180 mg by mouth nightly. ASPIRIN PO Take 81 mg by mouth daily. BENADRYL 25 mg capsule Generic drug:  diphenhydrAMINE Take 50 mg by mouth every six (6) hours as needed for Itching. * carvedilol 25 mg tablet Commonly known as:  Jerl Specter Take 12.5 mg by mouth nightly. * carvedilol 12.5 mg tablet Commonly known as:  Jerl Specter Take 25 mg by mouth daily. Indications: takes 25 mg in AM and 12.5 mg in pm  
  
 diazePAM 5 mg tablet Commonly known as:  VALIUM Take 1 Tab by mouth every eight (8) hours as needed for Anxiety. Max Daily Amount: 15 mg.  
  
 DULCOLAX STOOL SOFTENER (DSS) 100 mg capsule Generic drug:  docusate sodium Take 100 mg by mouth two (2) times a day. HUMALOG SC  
by SubCUTAneous route Before breakfast, lunch, and dinner. SLIDING SCALE 100-150-4u 151-200-5u ect (1 UNIT INCREASE FOR EVERY 50 POINTS) latanoprost 0.005 % ophthalmic solution Commonly known as:  Stephanie Melvin Administer 1 Drop to both eyes nightly. LEVEMIR FLEXPEN 100 unit/mL (3 mL) Inpn Generic drug:  insulin detemir 8 Units by SubCUTAneous route daily. NOON DAILY  
  
 mineral oil liquid Take 30 mL by mouth daily. MIRALAX 17 gram packet Generic drug:  polyethylene glycol Take 17 g by mouth two (2) times a day.  
  
 naloxegol 25 mg Tab tablet Commonly known as:  Sigmund Meo Take 25 mg by mouth daily. Take 1 tablet every day on empty stomach 1 hr before or 2 hrs after first meal.  
  
 nortriptyline 25 mg capsule Commonly known as:  PAMELOR Take 25 mg by mouth nightly. NORVASC PO Take 10 mg by mouth daily. OTHER  
0.9% Normal saline  Use as needed to affected area  Medical code: L02.215 OTHER(NON-FORMULARY) 1 Tab three (3) times daily (with meals). [address] [place] [address]  
  
 oxyCODONE-acetaminophen 7.5-325 mg per tablet Commonly known as:  PERCOCET 7.5 Take 1-2 Tabs by mouth every four (4) hours as needed. Max Daily Amount: 12 Tabs. PREVACID 15 mg capsule Generic drug:  lansoprazole Take  by mouth two (2) times a day. PROZAC PO Take 20 mg by mouth daily. RENAL SOFTGELS 1 mg capsule Generic drug:  b complex-vitamin c-folic acid Take 1 Cap by mouth daily. RENVELA PO Take 800 mg by mouth three (3) times daily (with meals). SENSIPAR 30 mg tablet Generic drug:  cinacalcet Take 30 mg by mouth daily. simvastatin 40 mg tablet Commonly known as:  ZOCOR Take 20 mg by mouth nightly. VOLTAREN 1 % Gel Generic drug:  diclofenac Apply 4 g to affected area four (4) times daily as needed. * Notice: This list has 2 medication(s) that are the same as other medications prescribed for you. Read the directions carefully, and ask your doctor or other care provider to review them with you. To-Do List   
 07/24/2017 To Be Determined Appointment with Jasmyn London LPN at [place] [address]  
  
 07/25/2017 9:00 AM  
  Appointment with Milly Nair PTA at [place] [address]  
  
 07/27/2017 To Be Determined Appointment with Jasmyn London LPN at [place] [address]  
  
 07/27/2017 8:30 AM  
  Appointment with Milly Nair PTA at [place] [address]  
  
 07/31/2017 To Be Determined Appointment with Sasha Tan RN at [place] [address]  
  
 07/31/2017 To Be Determined Appointment with Sasha Tan RN at [place] [address]  
  
 08/01/2017 To Be Determined Appointment with Milly Nair PTA at [place] [address]  
  
 08/03/2017 To Be Determined Appointment with Milly Nair PTA at [place] [address]  
  
 08/03/2017 To Be Determined Appointment with Jasmyn London LPN at [place] [address]  
  
 08/07/2017 To Be Determined Appointment with Jasmyn London LPN at [place] [address]  
  
 08/08/2017 To Be Determined Appointment with Milly Nair PTA at [place] [address]  
  
 08/10/2017 To Be Determined Appointment with Juliana Mendez PT at [place] [address]  
  
 08/10/2017 To Be Determined Appointment with Jasmyn London LPN at [place] [address]  
  
 08/14/2017 To Be Determined Appointment with Jasmyn London LPN at [place] [address]  
  
 08/17/2017 To Be Determined Appointment with Jasmyn London LPN at [place] [address]  
  
 08/21/2017 To Be Determined Appointment with Jasmyn London LPN at [place] [address]  
  
 08/24/2017 To Be Determined Appointment with Jasmyn London LPN at [place] [address]  
  
 08/28/2017 To Be Determined Appointment with Jasmyn London LPN at [place] [address]  
  
 08/31/2017 To Be Determined Appointment with Sasha Tan RN at [place] [address]  
  
 08/31/2017 To Be Determined Appointment with Sasha Tan RN at [place] [address] Introducing [facility] SERVICES! Romel Sandoval introduces [employer] patient portal. Now you can access parts of your medical record, email your doctor's office, and request medication refills online. 1. In your internet browser, go to [email or web address] 2. Click on the First Time User? Click Here link in the Sign In box. You will see the New Member Sign Up page. 3. Enter your [employer] Access Code exactly as it appears below. You will not need to use this code after youve completed the sign-up process. If you do not sign up before the expiration date, you must request a new code. · [employer] Access Code: [email or web address] Expires: 7/29/2017  5:44 PM 
 
4. Enter the last four digits of your Social Security Number (xxxx) and Date of Birth (mm/dd/yyyy) as indicated and click Submit. You will be taken to the next sign-up page. 5. Create a [employer] ID. This will be your [employer] login ID and cannot be changed, so think of one that is secure and easy to remember. 6. Create a [employer] password. You can change your password at any time. 7. Enter your Password Reset Question and Answer. This can be used at a later time if you forget your password. 8. Enter your e-mail address. You will receive e-mail notification when new information is available in [address] 9. Click Sign Up. You can now view and download portions of your medical record. 10. Click the Download Summary menu link to download a portable copy of your medical information. If you have questions, please visit the Frequently Asked Questions section of the [employer] website. Remember, [employer] is NOT to be used for urgent needs. For medical emergencies, dial 911. Now available from your iPhone and Android! Please provide this summary of care documentation to your next provider. Your primary care clinician is listed as [facility]. If you have any questions after today's visit, please call [phone].

## 2017-07-21 NOTE — PROGRESS NOTES
1. Have you been to the ER, urgent care clinic since your last visit? Hospitalized since your last visit? Surgery [facility] 7/6/17    2. Have you seen or consulted any other health care providers outside of the [address] [place] [address] since your last visit? Include any pap smears or colon screening.   PCP

## 2017-07-21 NOTE — Clinical Note
Will you please see if Zaire Eaton from wound care can come over and see pt on Wednesday when she is seeing Dr. Genet Morales?   She some undermining of the wound and I know they have a bag of tricks they can use to help this along - thanks

## 2017-07-22 PROCEDURE — 3331090001 HH PPS REVENUE CREDIT

## 2017-07-22 PROCEDURE — 3331090002 HH PPS REVENUE DEBIT

## 2017-07-23 PROCEDURE — 3331090002 HH PPS REVENUE DEBIT

## 2017-07-23 PROCEDURE — 3331090001 HH PPS REVENUE CREDIT

## 2017-07-24 PROCEDURE — 3331090001 HH PPS REVENUE CREDIT

## 2017-07-24 PROCEDURE — 3331090002 HH PPS REVENUE DEBIT

## 2017-07-25 ENCOUNTER — HOME CARE VISIT (OUTPATIENT)
Dept: SCHEDULING | Facility: HOME HEALTH | Age: 67
End: 2017-07-25
Payer: MEDICARE

## 2017-07-25 ENCOUNTER — TELEPHONE (OUTPATIENT)
Dept: SURGERY | Age: 67
End: 2017-07-25

## 2017-07-25 VITALS
BODY MASS INDEX: 32.09 KG/M2 | SYSTOLIC BLOOD PRESSURE: 164 MMHG | DIASTOLIC BLOOD PRESSURE: 80 MMHG | RESPIRATION RATE: 20 BRPM | TEMPERATURE: 97.4 F | WEIGHT: 248.24 LBS | HEART RATE: 76 BPM | OXYGEN SATURATION: 98 %

## 2017-07-25 PROCEDURE — 3331090001 HH PPS REVENUE CREDIT

## 2017-07-25 PROCEDURE — G0300 HHS/HOSPICE OF LPN EA 15 MIN: HCPCS

## 2017-07-25 PROCEDURE — 3331090002 HH PPS REVENUE DEBIT

## 2017-07-26 ENCOUNTER — OFFICE VISIT (OUTPATIENT)
Dept: SURGERY | Age: 67
End: 2017-07-26

## 2017-07-26 VITALS
BODY MASS INDEX: 33.86 KG/M2 | WEIGHT: 250 LBS | DIASTOLIC BLOOD PRESSURE: 70 MMHG | RESPIRATION RATE: 18 BRPM | OXYGEN SATURATION: 97 % | SYSTOLIC BLOOD PRESSURE: 140 MMHG | TEMPERATURE: 98.3 F | HEIGHT: 72 IN | HEART RATE: 75 BPM

## 2017-07-26 DIAGNOSIS — K61.0 PERIANAL ABSCESS: Primary | ICD-10-CM

## 2017-07-26 PROCEDURE — 3331090001 HH PPS REVENUE CREDIT

## 2017-07-26 PROCEDURE — 3331090002 HH PPS REVENUE DEBIT

## 2017-07-26 NOTE — MR AVS SNAPSHOT
Visit Information Date & Time Provider Department Dept. Phone Encounter #  
 7/26/2017  3:40 PM Azeemyuridia TateCelso [address] 812 [phone] [id number] Upcoming Health Maintenance Date Due  
 FOOT EXAM Q1 5[birth year]8/1959 MICROALBUMIN Q1 5/8/1959 EYE EXAM RETINAL OR DILATED Q1 5/8/1959 DTaP/Tdap/Td series (1 - Tdap) 5/8/1970 BREAST CANCER SCRN MAMMOGRAM 5/8/1999 FOBT Q 1 YEAR AGE 50-75 5/8/1999 ZOSTER VACCINE AGE 60> 3/8/2009 HEMOGLOBIN A1C Q6M 4/6/2010 LIPID PANEL Q1 10/6/2010 GLAUCOMA SCREENING Q2Y 5/8/2014 OSTEOPOROSIS SCREENING (DEXA) 5/8/2014 Pneumococcal 65+ High/Highest Risk (1 of 2 - PCV13) 5/8/2014 MEDICARE YEARLY EXAM 5/8/2014 INFLUENZA AGE 9 TO ADULT 8/1/2017 Allergies as of 7/26/2017  Review Complete On: 7/26/2017 By: Barry Tate MD  
  
 Severity Noted Reaction Type Reactions Latex  01/03/2011    Itching Rash, sometimes difficult to breathe Celebrex [Celecoxib] High 01/06/2011    Anaphylaxis, Swelling TONGUE. LIPS AND EYES Iodine High 07/17/2013    Other (comments) IV CONTRAST-LESIONS, AND SKIN SLUFFED OFF Keflex [Cephalexin] High 10/14/2010    Anaphylaxis, Swelling Tongue, lips, and eyes Levaquin [Levofloxacin] High 10/14/2010    Anaphylaxis, Swelling Tongue, lips, and eyes Pcn [Penicillins] High 10/14/2010    Anaphylaxis, Swelling Tongue, lips and eyes Morphine  10/14/2010    Other (comments) PT STATES IS JUST SENSITIVITY, GOT TOO MUCH ONE TIME. Current Immunizations  Never Reviewed No immunizations on file. Not reviewed this visit You Were Diagnosed With   
  
 Codes Comments Perianal abscess    -  Primary ICD-10-CM: K61.0 ICD-9-CM: 653 Vitals OB Status Smoking Status Hysterectomy Never Smoker Preferred Pharmacy Pharmacy Name Phone  [address] [place] [address] [facility] OF  [facility] [phone] Your Updated Medication List  
  
   
This list is accurate as of: 7/26/17  4:18 PM.  Always use your most recent med list. ALLEGRA 180 mg tablet Generic drug:  fexofenadine Take 180 mg by mouth nightly. ASPIRIN PO Take 81 mg by mouth daily. BENADRYL 25 mg capsule Generic drug:  diphenhydrAMINE Take 50 mg by mouth every six (6) hours as needed for Itching. * carvedilol 25 mg tablet Commonly known as:  Shelley Ni Take 12.5 mg by mouth nightly. * carvedilol 12.5 mg tablet Commonly known as:  Shelley Ni Take 25 mg by mouth daily. Indications: takes 25 mg in AM and 12.5 mg in pm  
  
 diazePAM 5 mg tablet Commonly known as:  VALIUM Take 1 Tab by mouth every eight (8) hours as needed for Anxiety. Max Daily Amount: 15 mg.  
  
 DULCOLAX STOOL SOFTENER (DSS) 100 mg capsule Generic drug:  docusate sodium Take 100 mg by mouth two (2) times a day. HUMALOG SC  
by SubCUTAneous route Before breakfast, lunch, and dinner. SLIDING SCALE 100-150-4u 151-200-5u ect (1 UNIT INCREASE FOR EVERY 50 POINTS) latanoprost 0.005 % ophthalmic solution Commonly known as:  Robbert Goodpasture Administer 1 Drop to both eyes nightly. LEVEMIR FLEXPEN 100 unit/mL (3 mL) Inpn Generic drug:  insulin detemir 8 Units by SubCUTAneous route daily. NOON DAILY  
  
 mineral oil liquid Take 30 mL by mouth daily. MIRALAX 17 gram packet Generic drug:  polyethylene glycol Take 17 g by mouth two (2) times a day.  
  
 naloxegol 25 mg Tab tablet Commonly known as:  Elizabeth Edward Take 25 mg by mouth daily. Take 1 tablet every day on empty stomach 1 hr before or 2 hrs after first meal.  
  
 nortriptyline 25 mg capsule Commonly known as:  PAMELOR Take 25 mg by mouth nightly. NORVASC PO Take 10 mg by mouth daily.   
  
 OTHER  
0.9% Normal saline  Use as needed to affected area  Medical code: L02.215  
  
 OTHER(NON-FORMULARY) 1 Tab three (3) times daily (with meals). [facility]  
  
 oxyCODONE-acetaminophen 7.5-325 mg per tablet Commonly known as:  PERCOCET 7.5 Take 1-2 Tabs by mouth every four (4) hours as needed. Max Daily Amount: 12 Tabs. PREVACID 15 mg capsule Generic drug:  lansoprazole Take  by mouth two (2) times a day. PROZAC PO Take 20 mg by mouth daily. RENAL SOFTGELS 1 mg capsule Generic drug:  b complex-vitamin c-folic acid Take 1 Cap by mouth daily. RENVELA PO Take 800 mg by mouth three (3) times daily (with meals). SENSIPAR 30 mg tablet Generic drug:  cinacalcet Take 30 mg by mouth daily. simvastatin 40 mg tablet Commonly known as:  ZOCOR Take 20 mg by mouth nightly. VOLTAREN 1 % Gel Generic drug:  diclofenac Apply 4 g to affected area four (4) times daily as needed. * Notice: This list has 2 medication(s) that are the same as other medications prescribed for you. Read the directions carefully, and ask your doctor or other care provider to review them with you. To-Do List   
 07/27/2017 To Be Determined Appointment with Rebecca Cornelius LPN at [place] [address]  
  
 07/27/2017 8:30 AM  
  Appointment with Ted Soriano PTA at [place] [address]  
  
 07/31/2017 To Be Determined Appointment with Kaleb Marie RN at [place] [address]  
  
 07/31/2017 To Be Determined Appointment with Kaleb Marie RN at [place] [address]  
  
 08/01/2017 To Be Determined Appointment with Ted Soriano PTA at [place] [address]  
  
 08/03/2017 To Be Determined Appointment with Ted Soriano PTA at [place] [address]  
  
 08/03/2017 To Be Determined   Appointment with Rebecca Cornelius LPN at [place] [address]  
 08/07/2017 To Be Determined Appointment with Cassie France LPN at [place] [address]  
  
 08/08/2017 To Be Determined Appointment with Nina Dunn PTA at [place] [address]  
  
 08/10/2017 To Be Determined Appointment with Devonte Dorman PT at [place] [address]  
  
 08/10/2017 To Be Determined Appointment with Cassie Walshe, LPN at [place] [address]  
  
 08/14/2017 To Be Determined Appointment with Cassie Walshe, LPN at [place] [address]  
  
 08/17/2017 To Be Determined Appointment with Cassie Walshe LPN at [place] [address]  
  
 08/21/2017 To Be Determined Appointment with Cassie France LPN at [place] [address]  
  
 08/24/2017 To Be Determined Appointment with Cassie Walshe, LPN at [place] [address]  
  
 08/28/2017 To Be Determined Appointment with Cassie France LPN at [place] [address]  
  
 08/31/2017 To Be Determined Appointment with Francesco Robles, RN at [place] [address]  
  
 08/31/2017 To Be Determined Appointment with Francesco Robles, RN at [place] [address] Introducing [facility] & HEALTH SERVICES! [facility] introduces [employer] patient portal. Now you can access parts of your medical record, email your doctor's office, and request medication refills online. 1. In your internet browser, go to [email or web address] 2. Click on the First Time User? Click Here link in the Sign In box. You will see the New Member Sign Up page. 3. Enter your [employer] Access Code exactly as it appears below. You will not need to use this code after youve completed the sign-up process. If you do not sign up before the expiration date, you must request a new code. · [employer] Access Code: [email or web address] Expires: 7/29/2017  5:44 PM 
 
4. Enter the last four digits of your Social Security Number (xxxx) and Date of Birth (mm/dd/yyyy) as indicated and click Submit. You will be taken to the next sign-up page. 5. Create a [employer] ID. This will be your [employer] login ID and cannot be changed, so think of one that is secure and easy to remember. 6. Create a [employer] password. You can change your password at any time. 7. Enter your Password Reset Question and Answer. This can be used at a later time if you forget your password. 8. Enter your e-mail address. You will receive e-mail notification when new information is available in [address] 9. Click Sign Up. You can now view and download portions of your medical record. 10. Click the Download Summary menu link to download a portable copy of your medical information. If you have questions, please visit the Frequently Asked Questions section of the [employer] website. Remember, [employer] is NOT to be used for urgent needs. For medical emergencies, dial 911. Now available from your iPhone and Android! Please provide this summary of care documentation to your next provider. Your primary care clinician is listed as Damian Harmon. If you have any questions after today's visit, please call [phone].

## 2017-07-26 NOTE — TELEPHONE ENCOUNTER
I called Lynn -home health nurse- back today after patients visit. Both Tony Harmon, wound care nurse who came to patients appointment today, and Dr. Whitney Rutherford are in agreement ok to go to [facility] as long as it is moistened 3 times per week.

## 2017-07-26 NOTE — PROGRESS NOTES
1. Have you been to the ER, urgent care clinic since your last visit? Hospitalized since your last visit? No    2. Have you seen or consulted any other health care providers outside of the [address] [place] [address] since your last visit? Include any pap smears or colon screening. PCP F/U hospital stay    Pedro Osborn from wound care dept. came to office visit today. Home health nurse is requesting Silver Alegenate three times a week. Dr. Lyla Oppenheim and Pedro Osborn are in agreement with this, Charito's request being sure it is pre moistened. Daughter was made aware as well.
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
## 2017-07-27 ENCOUNTER — HOME CARE VISIT (OUTPATIENT)
Dept: SCHEDULING | Facility: HOME HEALTH | Age: 67
End: 2017-07-27
Payer: MEDICARE

## 2017-07-27 VITALS
OXYGEN SATURATION: 96 % | TEMPERATURE: 97.1 F | RESPIRATION RATE: 20 BRPM | HEART RATE: 78 BPM | SYSTOLIC BLOOD PRESSURE: 144 MMHG | DIASTOLIC BLOOD PRESSURE: 76 MMHG

## 2017-07-27 PROCEDURE — 3331090001 HH PPS REVENUE CREDIT

## 2017-07-27 PROCEDURE — G0300 HHS/HOSPICE OF LPN EA 15 MIN: HCPCS

## 2017-07-27 PROCEDURE — G0157 HHC PT ASSISTANT EA 15: HCPCS

## 2017-07-27 PROCEDURE — 3331090002 HH PPS REVENUE DEBIT

## 2017-07-28 VITALS
OXYGEN SATURATION: 98 % | SYSTOLIC BLOOD PRESSURE: 140 MMHG | DIASTOLIC BLOOD PRESSURE: 75 MMHG | TEMPERATURE: 98 F | RESPIRATION RATE: 11 BRPM | HEART RATE: 71 BPM

## 2017-07-28 PROCEDURE — 3331090001 HH PPS REVENUE CREDIT

## 2017-07-28 PROCEDURE — 3331090002 HH PPS REVENUE DEBIT

## 2017-07-29 PROCEDURE — 3331090001 HH PPS REVENUE CREDIT

## 2017-07-29 PROCEDURE — 3331090002 HH PPS REVENUE DEBIT

## 2017-07-30 PROCEDURE — 3331090001 HH PPS REVENUE CREDIT

## 2017-07-30 PROCEDURE — 3331090002 HH PPS REVENUE DEBIT

## 2017-07-31 PROCEDURE — 3331090002 HH PPS REVENUE DEBIT

## 2017-07-31 PROCEDURE — 3331090001 HH PPS REVENUE CREDIT

## 2017-08-01 ENCOUNTER — HOME CARE VISIT (OUTPATIENT)
Dept: SCHEDULING | Facility: HOME HEALTH | Age: 67
End: 2017-08-01
Payer: MEDICARE

## 2017-08-01 VITALS
SYSTOLIC BLOOD PRESSURE: 136 MMHG | DIASTOLIC BLOOD PRESSURE: 80 MMHG | RESPIRATION RATE: 18 BRPM | TEMPERATURE: 97.7 F | HEART RATE: 87 BPM | OXYGEN SATURATION: 99 %

## 2017-08-01 PROCEDURE — G0300 HHS/HOSPICE OF LPN EA 15 MIN: HCPCS

## 2017-08-01 PROCEDURE — 3331090001 HH PPS REVENUE CREDIT

## 2017-08-01 PROCEDURE — 3331090002 HH PPS REVENUE DEBIT

## 2017-08-02 PROCEDURE — 3331090002 HH PPS REVENUE DEBIT

## 2017-08-02 PROCEDURE — 3331090001 HH PPS REVENUE CREDIT

## 2017-08-03 ENCOUNTER — HOME CARE VISIT (OUTPATIENT)
Dept: SCHEDULING | Facility: HOME HEALTH | Age: 67
End: 2017-08-03
Payer: MEDICARE

## 2017-08-03 VITALS
DIASTOLIC BLOOD PRESSURE: 88 MMHG | OXYGEN SATURATION: 99 % | SYSTOLIC BLOOD PRESSURE: 150 MMHG | HEART RATE: 75 BPM | TEMPERATURE: 99.1 F | RESPIRATION RATE: 18 BRPM

## 2017-08-03 PROCEDURE — 3331090002 HH PPS REVENUE DEBIT

## 2017-08-03 PROCEDURE — 3331090001 HH PPS REVENUE CREDIT

## 2017-08-03 PROCEDURE — G0300 HHS/HOSPICE OF LPN EA 15 MIN: HCPCS

## 2017-08-03 PROCEDURE — G0157 HHC PT ASSISTANT EA 15: HCPCS

## 2017-08-04 VITALS
RESPIRATION RATE: 20 BRPM | TEMPERATURE: 97.1 F | DIASTOLIC BLOOD PRESSURE: 80 MMHG | SYSTOLIC BLOOD PRESSURE: 142 MMHG | OXYGEN SATURATION: 98 % | HEART RATE: 81 BPM

## 2017-08-04 PROCEDURE — 3331090002 HH PPS REVENUE DEBIT

## 2017-08-04 PROCEDURE — 3331090001 HH PPS REVENUE CREDIT

## 2017-08-05 PROCEDURE — 3331090001 HH PPS REVENUE CREDIT

## 2017-08-05 PROCEDURE — 3331090002 HH PPS REVENUE DEBIT

## 2017-08-06 PROCEDURE — 3331090002 HH PPS REVENUE DEBIT

## 2017-08-06 PROCEDURE — 3331090001 HH PPS REVENUE CREDIT

## 2017-08-07 PROCEDURE — 3331090001 HH PPS REVENUE CREDIT

## 2017-08-07 PROCEDURE — 3331090002 HH PPS REVENUE DEBIT

## 2017-08-08 ENCOUNTER — HOME CARE VISIT (OUTPATIENT)
Dept: HOME HEALTH SERVICES | Facility: HOME HEALTH | Age: 67
End: 2017-08-08
Payer: MEDICARE

## 2017-08-08 ENCOUNTER — HOME CARE VISIT (OUTPATIENT)
Dept: SCHEDULING | Facility: HOME HEALTH | Age: 67
End: 2017-08-08
Payer: MEDICARE

## 2017-08-08 VITALS
DIASTOLIC BLOOD PRESSURE: 63 MMHG | OXYGEN SATURATION: 98 % | HEART RATE: 74 BPM | SYSTOLIC BLOOD PRESSURE: 150 MMHG | TEMPERATURE: 97.9 F | RESPIRATION RATE: 17 BRPM

## 2017-08-08 PROCEDURE — G0157 HHC PT ASSISTANT EA 15: HCPCS

## 2017-08-08 PROCEDURE — 3331090002 HH PPS REVENUE DEBIT

## 2017-08-08 PROCEDURE — 3331090001 HH PPS REVENUE CREDIT

## 2017-08-09 PROCEDURE — 3331090001 HH PPS REVENUE CREDIT

## 2017-08-09 PROCEDURE — 3331090002 HH PPS REVENUE DEBIT

## 2017-08-10 ENCOUNTER — HOME CARE VISIT (OUTPATIENT)
Dept: SCHEDULING | Facility: HOME HEALTH | Age: 67
End: 2017-08-10
Payer: MEDICARE

## 2017-08-10 ENCOUNTER — HOME CARE VISIT (OUTPATIENT)
Dept: HOME HEALTH SERVICES | Facility: HOME HEALTH | Age: 67
End: 2017-08-10
Payer: MEDICARE

## 2017-08-10 PROCEDURE — 3331090001 HH PPS REVENUE CREDIT

## 2017-08-10 PROCEDURE — 3331090002 HH PPS REVENUE DEBIT

## 2017-08-11 PROCEDURE — 3331090002 HH PPS REVENUE DEBIT

## 2017-08-11 PROCEDURE — 3331090001 HH PPS REVENUE CREDIT

## 2017-08-12 PROCEDURE — 3331090002 HH PPS REVENUE DEBIT

## 2017-08-12 PROCEDURE — 3331090001 HH PPS REVENUE CREDIT

## 2017-08-13 PROCEDURE — 3331090001 HH PPS REVENUE CREDIT

## 2017-08-13 PROCEDURE — 3331090002 HH PPS REVENUE DEBIT

## 2017-08-14 PROCEDURE — 3331090001 HH PPS REVENUE CREDIT

## 2017-08-14 PROCEDURE — 3331090002 HH PPS REVENUE DEBIT

## 2017-08-15 ENCOUNTER — HOME CARE VISIT (OUTPATIENT)
Dept: SCHEDULING | Facility: HOME HEALTH | Age: 67
End: 2017-08-15
Payer: MEDICARE

## 2017-08-15 VITALS
DIASTOLIC BLOOD PRESSURE: 80 MMHG | RESPIRATION RATE: 18 BRPM | SYSTOLIC BLOOD PRESSURE: 150 MMHG | HEART RATE: 68 BPM | OXYGEN SATURATION: 95 % | TEMPERATURE: 96.8 F

## 2017-08-15 PROCEDURE — G0300 HHS/HOSPICE OF LPN EA 15 MIN: HCPCS

## 2017-08-15 PROCEDURE — 3331090002 HH PPS REVENUE DEBIT

## 2017-08-15 PROCEDURE — 3331090001 HH PPS REVENUE CREDIT

## 2017-08-16 PROCEDURE — 3331090002 HH PPS REVENUE DEBIT

## 2017-08-16 PROCEDURE — 3331090001 HH PPS REVENUE CREDIT

## 2017-08-17 ENCOUNTER — HOME CARE VISIT (OUTPATIENT)
Dept: SCHEDULING | Facility: HOME HEALTH | Age: 67
End: 2017-08-17
Payer: MEDICARE

## 2017-08-17 VITALS
SYSTOLIC BLOOD PRESSURE: 160 MMHG | HEART RATE: 76 BPM | OXYGEN SATURATION: 96 % | RESPIRATION RATE: 20 BRPM | TEMPERATURE: 97.4 F | DIASTOLIC BLOOD PRESSURE: 80 MMHG

## 2017-08-17 PROCEDURE — 3331090001 HH PPS REVENUE CREDIT

## 2017-08-17 PROCEDURE — G0300 HHS/HOSPICE OF LPN EA 15 MIN: HCPCS

## 2017-08-17 PROCEDURE — 3331090002 HH PPS REVENUE DEBIT

## 2017-08-18 PROCEDURE — 3331090001 HH PPS REVENUE CREDIT

## 2017-08-18 PROCEDURE — 3331090002 HH PPS REVENUE DEBIT

## 2017-08-19 PROCEDURE — 3331090001 HH PPS REVENUE CREDIT

## 2017-08-19 PROCEDURE — 3331090002 HH PPS REVENUE DEBIT

## 2017-08-20 PROCEDURE — 3331090001 HH PPS REVENUE CREDIT

## 2017-08-20 PROCEDURE — 3331090002 HH PPS REVENUE DEBIT

## 2017-08-21 PROCEDURE — 3331090002 HH PPS REVENUE DEBIT

## 2017-08-21 PROCEDURE — 3331090001 HH PPS REVENUE CREDIT

## 2017-08-22 ENCOUNTER — HOME CARE VISIT (OUTPATIENT)
Dept: SCHEDULING | Facility: HOME HEALTH | Age: 67
End: 2017-08-22
Payer: MEDICARE

## 2017-08-22 VITALS
RESPIRATION RATE: 18 BRPM | TEMPERATURE: 97.7 F | OXYGEN SATURATION: 98 % | SYSTOLIC BLOOD PRESSURE: 162 MMHG | DIASTOLIC BLOOD PRESSURE: 90 MMHG | HEART RATE: 85 BPM

## 2017-08-22 PROCEDURE — 3331090001 HH PPS REVENUE CREDIT

## 2017-08-22 PROCEDURE — G0300 HHS/HOSPICE OF LPN EA 15 MIN: HCPCS

## 2017-08-22 PROCEDURE — 3331090002 HH PPS REVENUE DEBIT

## 2017-08-23 ENCOUNTER — OFFICE VISIT (OUTPATIENT)
Dept: SURGERY | Age: 67
End: 2017-08-23

## 2017-08-23 VITALS
SYSTOLIC BLOOD PRESSURE: 140 MMHG | TEMPERATURE: 98.5 F | DIASTOLIC BLOOD PRESSURE: 78 MMHG | HEART RATE: 77 BPM | WEIGHT: 247 LBS | OXYGEN SATURATION: 95 % | RESPIRATION RATE: 18 BRPM | HEIGHT: 72 IN | BODY MASS INDEX: 33.46 KG/M2

## 2017-08-23 DIAGNOSIS — K61.0 PERIANAL ABSCESS: Primary | ICD-10-CM

## 2017-08-23 PROCEDURE — 3331090001 HH PPS REVENUE CREDIT

## 2017-08-23 PROCEDURE — 3331090002 HH PPS REVENUE DEBIT

## 2017-08-23 NOTE — PROGRESS NOTES
1. Have you been to the ER, urgent care clinic since your last visit? Hospitalized since your last visit? No    2. Have you seen or consulted any other health care providers outside of the [address] [place] [address] since your last visit? Include any pap smears or colon screening.  No
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
## 2017-08-23 NOTE — MR AVS SNAPSHOT
Visit Information Date & Time Provider Department Dept. Phone Encounter #  
 8/23/2017  2:40 PM Leah Cleaning, [address] [phone] [id number] Your Appointments 9/6/2017  1:40 PM  
POST OP 10 MIN with Leah Cleaning MD  
[address] ([address]) Appt Note: PO; F/U w/Robel [facility] CG 8/23/17  
 [address] [facility] [address]  
[address] [place] [address] Upcoming Health Maintenance Date Due  
 FOOT EXAM Q1 5/8/1959 MICROALBUMIN Q1 5/8/1959 EYE EXAM RETINAL OR DILATED Q1 5/8/1959 DTaP/Tdap/Td series (1 - Tdap) 5/8/1970 BREAST CANCER SCRN MAMMOGRAM 5/8/1999 FOBT Q 1 YEAR AGE 50-75 5/8/1999 ZOSTER VACCINE AGE 60> 3/8/2009 HEMOGLOBIN A1C Q6M 4/6/2010 LIPID PANEL Q1 10/6/2010 GLAUCOMA SCREENING Q2Y 5/8/2014 OSTEOPOROSIS SCREENING (DEXA) 5/8/2014 Pneumococcal 65+ High/Highest Risk (1 of 2 - PCV13) 5/8/2014 MEDICARE YEARLY EXAM 5/8/2014 INFLUENZA AGE 9 TO ADULT 8/1/2017 Allergies as of 8/23/2017  Review Complete On: 8/23/2017 By: Leah Cleaning MD  
  
 Severity Noted Reaction Type Reactions Latex  01/03/2011    Itching Rash, sometimes difficult to breathe Celebrex [Celecoxib] High 01/06/2011    Anaphylaxis, Swelling TONGUE. LIPS AND EYES Iodine High 07/17/2013    Other (comments) IV CONTRAST-LESIONS, AND SKIN SLUFFED OFF Keflex [Cephalexin] High 10/14/2010    Anaphylaxis, Swelling Tongue, lips, and eyes Levaquin [Levofloxacin] High 10/14/2010    Anaphylaxis, Swelling Tongue, lips, and eyes Pcn [Penicillins] High 10/14/2010    Anaphylaxis, Swelling Tongue, lips and eyes Morphine  10/14/2010    Other (comments) PT STATES IS JUST SENSITIVITY, GOT TOO MUCH ONE TIME. Current Immunizations  Never Reviewed No immunizations on file. Not reviewed this visit You Were Diagnosed With   
  
 Codes Comments Perianal abscess    -  Primary ICD-10-CM: K61.0 ICD-9-CM: 809 Vitals BP Pulse Temp Resp Height(growth percentile) Weight(growth percentile) 140/78 (BP 1 Location: Left arm, BP Patient Position: Sitting) 77 98.5 °F (36.9 °C) (Oral) 18 5' 11.5\" (1.816 m) 247 lb (112 kg) SpO2 BMI OB Status Smoking Status 95% 33.97 kg/m2 Hysterectomy Never Smoker BMI and BSA Data Body Mass Index Body Surface Area  
 33.97 kg/m 2 2.38 m 2 Preferred Pharmacy Pharmacy Name Phone [address] AT [facility] OF  [facility] [phone] Your Updated Medication List  
  
   
This list is accurate as of: 8/23/17  3:46 PM.  Always use your most recent med list. ALLEGRA 180 mg tablet Generic drug:  fexofenadine Take 180 mg by mouth nightly. ASPIRIN PO Take 81 mg by mouth daily. BENADRYL 25 mg capsule Generic drug:  diphenhydrAMINE Take 50 mg by mouth every six (6) hours as needed for Itching. * carvedilol 25 mg tablet Commonly known as:  Joseph Chatman Take 12.5 mg by mouth nightly. * carvedilol 12.5 mg tablet Commonly known as:  Joseph Chatman Take 25 mg by mouth daily. Indications: takes 25 mg in AM and 12.5 mg in pm  
  
 diazePAM 5 mg tablet Commonly known as:  VALIUM Take 1 Tab by mouth every eight (8) hours as needed for Anxiety. Max Daily Amount: 15 mg.  
  
 DULCOLAX STOOL SOFTENER (DSS) 100 mg capsule Generic drug:  docusate sodium Take 100 mg by mouth two (2) times a day. HUMALOG SC  
by SubCUTAneous route Before breakfast, lunch, and dinner. SLIDING SCALE 100-150-4u 151-200-5u ect (1 UNIT INCREASE FOR EVERY 50 POINTS) KERAFOAM 42 % Foam  
Generic drug:  Urea Apply 1 Each to affected area two (2) times a day. latanoprost 0.005 % ophthalmic solution Commonly known as:  Libra Steele Administer 1 Drop to both eyes nightly. LEVEMIR FLEXPEN 100 unit/mL (3 mL) Inpn Generic drug:  insulin detemir 8 Units by SubCUTAneous route daily. NOON DAILY  
  
 mineral oil liquid Take 30 mL by mouth daily. MIRALAX 17 gram packet Generic drug:  polyethylene glycol Take 17 g by mouth two (2) times a day.  
  
 naloxegol 25 mg Tab tablet Commonly known as:  [place] Take 25 mg by mouth daily. Take 1 tablet every day on empty stomach 1 hr before or 2 hrs after first meal.  
  
 nortriptyline 25 mg capsule Commonly known as:  PAMELOR Take 25 mg by mouth nightly. NORVASC PO Take 10 mg by mouth daily. OTHER  
0.9% Normal saline  Use as needed to affected area  Medical code: L02.215 OTHER(NON-FORMULARY) 1 Tab three (3) times daily (with meals). [address] [place] [address]  
  
 oxyCODONE-acetaminophen 7.5-325 mg per tablet Commonly known as:  PERCOCET 7.5 Take 1-2 Tabs by mouth every four (4) hours as needed. Max Daily Amount: 12 Tabs. PREVACID 15 mg capsule Generic drug:  lansoprazole Take  by mouth two (2) times a day. PROZAC PO Take 20 mg by mouth daily. RENAL SOFTGELS 1 mg capsule Generic drug:  b complex-vitamin c-folic acid Take 1 Cap by mouth daily. RENVELA PO Take 800 mg by mouth three (3) times daily (with meals). SENSIPAR 30 mg tablet Generic drug:  cinacalcet Take 30 mg by mouth daily. simvastatin 40 mg tablet Commonly known as:  ZOCOR Take 20 mg by mouth nightly. terbinafine HCl 1 % topical cream  
Commonly known as:  LAMISIL Apply 1 Each to affected area daily. Apply to heel at bedtime, wrap heel with saran wrap. VOLTAREN 1 % Gel Generic drug:  diclofenac Apply 4 g to affected area four (4) times daily as needed. * Notice:   This list has 2 medication(s) that are the same as other medications prescribed for you. Read the directions carefully, and ask your doctor or other care provider to review them with you. To-Do List   
 08/24/2017 To Be Determined Appointment with Georgene Scheuermann, LPN at [place] [address]  
  
 08/28/2017 To Be Determined Appointment with Georgene Scheuermann, LPN at [place] [address]  
  
 08/31/2017 To Be Determined Appointment with Gill Epley, RN at [place] [address]  
  
 08/31/2017 To Be Determined Appointment with Gill Epley, RN at [place] [address] Introducing [facility]! Alem Olea introduces [employer] patient portal. Now you can access parts of your medical record, email your doctor's office, and request medication refills online. 1. In your internet browser, go to [email or web address] 2. Click on the First Time User? Click Here link in the Sign In box. You will see the New Member Sign Up page. 3. Enter your [employer] Access Code exactly as it appears below. You will not need to use this code after youve completed the sign-up process. If you do not sign up before the expiration date, you must request a new code. · [employer] Access Code: [email or web address] Expires: 10/27/2017  7:07 PM 
 
4. Enter the last four digits of your Social Security Number (xxxx) and Date of Birth (mm/dd/yyyy) as indicated and click Submit. You will be taken to the next sign-up page. 5. Create a [employer] ID. This will be your [employer] login ID and cannot be changed, so think of one that is secure and easy to remember. 6. Create a [employer] password. You can change your password at any time. 7. Enter your Password Reset Question and Answer. This can be used at a later time if you forget your password. 8. Enter your e-mail address. You will receive e-mail notification when new information is available in [address] 9. Click Sign Up. You can now view and download portions of your medical record. 10. Click the Download Summary menu link to download a portable copy of your medical information. If you have questions, please visit the Frequently Asked Questions section of the "[employer]" website. Remember, "[employer]" is NOT to be used for urgent needs. For medical emergencies, dial 911. Now available from your iPhone and Android! Please provide this summary of care documentation to your next provider. Your primary care clinician is listed as Saroj Valadez. If you have any questions after today's visit, please call [phone].

## 2017-08-24 PROCEDURE — 3331090001 HH PPS REVENUE CREDIT

## 2017-08-24 PROCEDURE — 3331090002 HH PPS REVENUE DEBIT

## 2017-08-25 ENCOUNTER — TELEPHONE (OUTPATIENT)
Dept: SURGERY | Age: 67
End: 2017-08-25

## 2017-08-25 PROCEDURE — 3331090001 HH PPS REVENUE CREDIT

## 2017-08-25 PROCEDURE — 3331090002 HH PPS REVENUE DEBIT

## 2017-08-25 NOTE — TELEPHONE ENCOUNTER
Please call patients daughter back Truong Tilley. Truong Tilley wants to know how much vitamins does patient need to take since she is starting today. Wants to know if 500 milligrams a day is ok or more.

## 2017-08-26 PROCEDURE — 3331090002 HH PPS REVENUE DEBIT

## 2017-08-26 PROCEDURE — 3331090001 HH PPS REVENUE CREDIT

## 2017-08-27 PROCEDURE — 3331090001 HH PPS REVENUE CREDIT

## 2017-08-27 PROCEDURE — 3331090002 HH PPS REVENUE DEBIT

## 2017-08-28 PROCEDURE — 3331090002 HH PPS REVENUE DEBIT

## 2017-08-28 PROCEDURE — 3331090001 HH PPS REVENUE CREDIT

## 2017-08-29 ENCOUNTER — HOME CARE VISIT (OUTPATIENT)
Dept: SCHEDULING | Facility: HOME HEALTH | Age: 67
End: 2017-08-29
Payer: MEDICARE

## 2017-08-29 VITALS
SYSTOLIC BLOOD PRESSURE: 182 MMHG | RESPIRATION RATE: 16 BRPM | DIASTOLIC BLOOD PRESSURE: 88 MMHG | WEIGHT: 247.36 LBS | HEART RATE: 66 BPM | TEMPERATURE: 97.7 F | BODY MASS INDEX: 34.02 KG/M2

## 2017-08-29 PROCEDURE — 3331090001 HH PPS REVENUE CREDIT

## 2017-08-29 PROCEDURE — G0299 HHS/HOSPICE OF RN EA 15 MIN: HCPCS

## 2017-08-29 PROCEDURE — 3331090002 HH PPS REVENUE DEBIT

## 2017-08-30 PROCEDURE — 3331090001 HH PPS REVENUE CREDIT

## 2017-08-30 PROCEDURE — 3331090002 HH PPS REVENUE DEBIT

## 2017-08-31 ENCOUNTER — HOME CARE VISIT (OUTPATIENT)
Dept: SCHEDULING | Facility: HOME HEALTH | Age: 67
End: 2017-08-31
Payer: MEDICARE

## 2017-08-31 PROCEDURE — 3331090001 HH PPS REVENUE CREDIT

## 2017-08-31 PROCEDURE — 3331090002 HH PPS REVENUE DEBIT

## 2017-08-31 PROCEDURE — G0299 HHS/HOSPICE OF RN EA 15 MIN: HCPCS

## 2017-09-01 PROCEDURE — 3331090002 HH PPS REVENUE DEBIT

## 2017-09-01 PROCEDURE — 3331090001 HH PPS REVENUE CREDIT

## 2017-09-02 PROCEDURE — 3331090001 HH PPS REVENUE CREDIT

## 2017-09-02 PROCEDURE — 3331090002 HH PPS REVENUE DEBIT

## 2017-09-03 VITALS
DIASTOLIC BLOOD PRESSURE: 80 MMHG | TEMPERATURE: 98.6 F | RESPIRATION RATE: 16 BRPM | HEART RATE: 66 BPM | SYSTOLIC BLOOD PRESSURE: 152 MMHG

## 2017-09-03 PROCEDURE — 3331090001 HH PPS REVENUE CREDIT

## 2017-09-03 PROCEDURE — 3331090002 HH PPS REVENUE DEBIT

## 2017-09-04 PROCEDURE — 3331090001 HH PPS REVENUE CREDIT

## 2017-09-04 PROCEDURE — 3331090002 HH PPS REVENUE DEBIT

## 2017-09-05 ENCOUNTER — HOME CARE VISIT (OUTPATIENT)
Dept: SCHEDULING | Facility: HOME HEALTH | Age: 67
End: 2017-09-05
Payer: MEDICARE

## 2017-09-05 PROCEDURE — 3331090002 HH PPS REVENUE DEBIT

## 2017-09-05 PROCEDURE — 3331090001 HH PPS REVENUE CREDIT

## 2017-09-05 PROCEDURE — G0299 HHS/HOSPICE OF RN EA 15 MIN: HCPCS

## 2017-09-06 ENCOUNTER — OFFICE VISIT (OUTPATIENT)
Dept: SURGERY | Age: 67
End: 2017-09-06

## 2017-09-06 VITALS
TEMPERATURE: 98.1 F | DIASTOLIC BLOOD PRESSURE: 70 MMHG | SYSTOLIC BLOOD PRESSURE: 120 MMHG | HEART RATE: 76 BPM | OXYGEN SATURATION: 98 % | WEIGHT: 247 LBS | RESPIRATION RATE: 16 BRPM | BODY MASS INDEX: 33.46 KG/M2 | HEIGHT: 72 IN

## 2017-09-06 DIAGNOSIS — K61.0 PERIANAL ABSCESS: Primary | ICD-10-CM

## 2017-09-06 PROCEDURE — 3331090002 HH PPS REVENUE DEBIT

## 2017-09-06 PROCEDURE — 3331090001 HH PPS REVENUE CREDIT

## 2017-09-06 RX ORDER — ASCORBIC ACID 500 MG
1000 TABLET ORAL DAILY
COMMUNITY

## 2017-09-06 NOTE — MR AVS SNAPSHOT
Visit Information Date & Time Provider Department Dept. Phone Encounter #  
 9/6/2017  1:40 PM Alexandria VernonCelso [phone] [id number] Follow-up Instructions Routing History Upcoming Health Maintenance Date Due  
 FOOT EXAM Q1 5/8/1959 MICROALBUMIN Q1 5/8/1959 EYE EXAM RETINAL OR DILATED Q1 5/8/1959 DTaP/Tdap/Td series (1 - Tdap) 5/8/1970 BREAST CANCER SCRN MAMMOGRAM 5/8/1999 FOBT Q 1 YEAR AGE 50-75 5/8/1999 ZOSTER VACCINE AGE 60> 3/8/2009 HEMOGLOBIN A1C Q6M 4/6/2010 LIPID PANEL Q1 10/6/2010 GLAUCOMA SCREENING Q2Y 5/8/2014 OSTEOPOROSIS SCREENING (DEXA) 5/8/2014 Pneumococcal 65+ High/Highest Risk (1 of 2 - PCV13) 5/8/2014 MEDICARE YEARLY EXAM 5/8/2014 INFLUENZA AGE 9 TO ADULT 8/1/2017 Allergies as of 9/6/2017  Review Complete On: 9/6/2017 By: Alexandria Vernon MD  
  
 Severity Noted Reaction Type Reactions Latex  01/03/2011    Itching Rash, sometimes difficult to breathe Celebrex [Celecoxib] High 01/06/2011    Anaphylaxis, Swelling TONGUE. LIPS AND EYES Iodine High 07/17/2013    Other (comments) IV CONTRAST-LESIONS, AND SKIN SLUFFED OFF Keflex [Cephalexin] High 10/14/2010    Anaphylaxis, Swelling Tongue, lips, and eyes Levaquin [Levofloxacin] High 10/14/2010    Anaphylaxis, Swelling Tongue, lips, and eyes Pcn [Penicillins] High 10/14/2010    Anaphylaxis, Swelling Tongue, lips and eyes Morphine  10/14/2010    Other (comments) PT STATES IS JUST SENSITIVITY, GOT TOO MUCH ONE TIME. Current Immunizations  Never Reviewed No immunizations on file. Not reviewed this visit You Were Diagnosed With   
  
 Codes Comments Perianal abscess    -  Primary ICD-10-CM: K61.0 ICD-9-CM: 690 Vitals BP Pulse Temp Resp Height(growth percentile) Weight(growth percentile)  120/70 (BP 1 Location: Right arm, BP Patient Position: Sitting) 76 98.1 °F (36.7 °C) (Oral) 16 5' 11.5\" (1.816 m) 247 lb (112 kg) SpO2 BMI OB Status Smoking Status 98% 33.97 kg/m2 Hysterectomy Never Smoker BMI and BSA Data Body Mass Index Body Surface Area  
 33.97 kg/m 2 2.38 m 2 Preferred Pharmacy Pharmacy Name Phone [facility] [address], [address] AT [facility] OF  [place] [phone] Your Updated Medication List  
  
   
This list is accurate as of: 9/6/17  2:23 PM.  Always use your most recent med list. ALLEGRA 180 mg tablet Generic drug:  fexofenadine Take 180 mg by mouth nightly. ASPIRIN PO Take 81 mg by mouth daily. BENADRYL 25 mg capsule Generic drug:  diphenhydrAMINE Take 50 mg by mouth every six (6) hours as needed for Itching. * carvedilol 25 mg tablet Commonly known as:  Tu Mood Take 12.5 mg by mouth nightly. * carvedilol 12.5 mg tablet Commonly known as:  Tu Mood Take 25 mg by mouth daily. Indications: takes 25 mg in AM and 12.5 mg in pm  
  
 diazePAM 5 mg tablet Commonly known as:  VALIUM Take 1 Tab by mouth every eight (8) hours as needed for Anxiety. Max Daily Amount: 15 mg.  
  
 DULCOLAX STOOL SOFTENER (DSS) 100 mg capsule Generic drug:  docusate sodium Take 100 mg by mouth two (2) times a day. HUMALOG SC  
by SubCUTAneous route Before breakfast, lunch, and dinner. SLIDING SCALE 100-150-4u 151-200-5u ect (1 UNIT INCREASE FOR EVERY 50 POINTS) KERAFOAM 42 % Foam  
Generic drug:  Urea Apply 1 Each to affected area two (2) times a day. latanoprost 0.005 % ophthalmic solution Commonly known as:  Marlyse Torrey Administer 1 Drop to both eyes nightly. LEVEMIR FLEXPEN 100 unit/mL (3 mL) Inpn Generic drug:  insulin detemir 8 Units by SubCUTAneous route daily. NOON DAILY  
  
 mineral oil liquid Take 30 mL by mouth daily. MIRALAX 17 gram packet Generic drug:  polyethylene glycol Take 17 g by mouth two (2) times a day.  
  
 naloxegol 25 mg Tab tablet Commonly known as:  Arash Hammed Take 25 mg by mouth daily. Take 1 tablet every day on empty stomach 1 hr before or 2 hrs after first meal.  
  
 nortriptyline 25 mg capsule Commonly known as:  PAMELOR Take 25 mg by mouth nightly. NORVASC PO Take 10 mg by mouth daily. OTHER  
0.9% Normal saline  Use as needed to affected area  Medical code: L02.215 OTHER(NON-FORMULARY) 1 Tab three (3) times daily (with meals). [facility]  
  
 oxyCODONE-acetaminophen 7.5-325 mg per tablet Commonly known as:  PERCOCET 7.5 Take 1-2 Tabs by mouth every four (4) hours as needed. Max Daily Amount: 12 Tabs. PREVACID 15 mg capsule Generic drug:  lansoprazole Take  by mouth two (2) times a day. PROZAC PO Take 20 mg by mouth daily. RENAL SOFTGELS 1 mg capsule Generic drug:  b complex-vitamin c-folic acid Take 1 Cap by mouth daily. RENVELA PO Take 800 mg by mouth three (3) times daily (with meals). SENSIPAR 30 mg tablet Generic drug:  cinacalcet Take 30 mg by mouth daily. simvastatin 40 mg tablet Commonly known as:  ZOCOR Take 20 mg by mouth nightly. terbinafine HCl 1 % topical cream  
Commonly known as:  LAMISIL Apply 1 Each to affected area daily. Apply to heel at bedtime, wrap heel with saran wrap. VITAMIN C 500 mg tablet Generic drug:  ascorbic acid (vitamin C) Take 1,000 mg by mouth daily. VOLTAREN 1 % Gel Generic drug:  diclofenac Apply 4 g to affected area four (4) times daily as needed. * Notice: This list has 2 medication(s) that are the same as other medications prescribed for you. Read the directions carefully, and ask your doctor or other care provider to review them with you. We Performed the Following [address] [facility] [address] Comments: No change in current home health orders. Kary Tolentinorob Referral Information Referral ID Referred By Referred To  
  
 [id number] Peewee BRENNAN Not Available Visits Status Start Date End Date 1 New Request 9/6/17 9/6/18 If your referral has a status of pending review or denied, additional information will be sent to support the outcome of this decision. Introducing [facility]! Maile Mckenna introduces [employer] patient portal. Now you can access parts of your medical record, email your doctor's office, and request medication refills online. 1. In your internet browser, go to [email or web address] 2. Click on the First Time User? Click Here link in the Sign In box. You will see the New Member Sign Up page. 3. Enter your [employer] Access Code exactly as it appears below. You will not need to use this code after youve completed the sign-up process. If you do not sign up before the expiration date, you must request a new code. · [employer] Access Code: [email or web address] Expires: 10/27/2017  7:07 PM 
 
4. Enter the last four digits of your Social Security Number (xxxx) and Date of Birth (mm/dd/yyyy) as indicated and click Submit. You will be taken to the next sign-up page. 5. Create a [employer] ID. This will be your [employer] login ID and cannot be changed, so think of one that is secure and easy to remember. 6. Create a [employer] password. You can change your password at any time. 7. Enter your Password Reset Question and Answer. This can be used at a later time if you forget your password. 8. Enter your e-mail address. You will receive e-mail notification when new information is available in [address] 9. Click Sign Up. You can now view and download portions of your medical record. 10. Click the Download Summary menu link to download a portable copy of your medical information.  
 
If you have questions, please visit the Frequently Asked Questions section of the [employer]. Remember, [employer] is NOT to be used for urgent needs. For medical emergencies, dial 911. Now available from your iPhone and Android! Please provide this summary of care documentation to your next provider. Your primary care clinician is listed as Christiano Aragon. If you have any questions after today's visit, please call [phone].
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
## 2017-09-06 NOTE — Clinical Note
She will call to schedule; Debridement of perianal abscess and anoscopy; general anesthesia; 1/2 hour; outpatient.   Needs to be arranged around her dialysis

## 2017-09-06 NOTE — PROGRESS NOTES
1. Have you been to the ER, urgent care clinic since your last visit? Hospitalized since your last visit? No    2. Have you seen or consulted any other health care providers outside of the [facility] since your last visit? Include any pap smears or colon screening. No      Patient states she has nit had a bowel movement in 14 days.

## 2017-09-07 ENCOUNTER — APPOINTMENT (OUTPATIENT)
Dept: CT IMAGING | Age: 67
End: 2017-09-07
Attending: PHYSICIAN ASSISTANT
Payer: MEDICARE

## 2017-09-07 ENCOUNTER — HOSPITAL ENCOUNTER (EMERGENCY)
Age: 67
Discharge: HOME | End: 2017-09-07
Attending: EMERGENCY MEDICINE | Admitting: EMERGENCY MEDICINE
Payer: MEDICARE

## 2017-09-07 ENCOUNTER — APPOINTMENT (OUTPATIENT)
Dept: GENERAL RADIOLOGY | Age: 67
End: 2017-09-07
Attending: EMERGENCY MEDICINE
Payer: MEDICARE

## 2017-09-07 VITALS
HEIGHT: 72 IN | WEIGHT: 249.12 LBS | HEART RATE: 81 BPM | TEMPERATURE: 97.8 F | RESPIRATION RATE: 18 BRPM | SYSTOLIC BLOOD PRESSURE: 145 MMHG | OXYGEN SATURATION: 95 % | BODY MASS INDEX: 33.74 KG/M2 | DIASTOLIC BLOOD PRESSURE: 55 MMHG

## 2017-09-07 DIAGNOSIS — R10.84 ABDOMINAL PAIN, GENERALIZED: ICD-10-CM

## 2017-09-07 DIAGNOSIS — K59.00 CONSTIPATION, UNSPECIFIED CONSTIPATION TYPE: Primary | ICD-10-CM

## 2017-09-07 LAB
ALBUMIN SERPL-MCNC: 3.4 G/DL (ref 3.5–5)
ALBUMIN/GLOB SERPL: 0.5 {RATIO} (ref 1.1–2.2)
ALP SERPL-CCNC: 232 U/L (ref 45–117)
ALT SERPL-CCNC: 48 U/L (ref 12–78)
ANION GAP SERPL CALC-SCNC: 10 MMOL/L (ref 5–15)
AST SERPL-CCNC: 32 U/L (ref 15–37)
ATRIAL RATE: 80 BPM
BASOPHILS # BLD: 0 K/UL (ref 0–0.1)
BASOPHILS NFR BLD: 0 % (ref 0–1)
BILIRUB SERPL-MCNC: 0.4 MG/DL (ref 0.2–1)
BUN SERPL-MCNC: 44 MG/DL (ref 6–20)
BUN/CREAT SERPL: 7 (ref 12–20)
CALCIUM SERPL-MCNC: 9.1 MG/DL (ref 8.5–10.1)
CALCULATED P AXIS, ECG09: 42 DEGREES
CALCULATED R AXIS, ECG10: -35 DEGREES
CALCULATED T AXIS, ECG11: 29 DEGREES
CHLORIDE SERPL-SCNC: 89 MMOL/L (ref 97–108)
CK SERPL-CCNC: 59 U/L (ref 26–192)
CO2 SERPL-SCNC: 32 MMOL/L (ref 21–32)
CREAT SERPL-MCNC: 6.51 MG/DL (ref 0.55–1.02)
DIAGNOSIS, 93000: NORMAL
EOSINOPHIL # BLD: 0.1 K/UL (ref 0–0.4)
EOSINOPHIL NFR BLD: 2 % (ref 0–7)
ERYTHROCYTE [DISTWIDTH] IN BLOOD BY AUTOMATED COUNT: 14 % (ref 11.5–14.5)
GLOBULIN SER CALC-MCNC: 6.3 G/DL (ref 2–4)
GLUCOSE SERPL-MCNC: 208 MG/DL (ref 65–100)
HCT VFR BLD AUTO: 38.8 % (ref 35–47)
HGB BLD-MCNC: 12.4 G/DL (ref 11.5–16)
LIPASE SERPL-CCNC: 151 U/L (ref 73–393)
LYMPHOCYTES # BLD: 2.6 K/UL (ref 0.8–3.5)
LYMPHOCYTES NFR BLD: 36 % (ref 12–49)
MCH RBC QN AUTO: 27.6 PG (ref 26–34)
MCHC RBC AUTO-ENTMCNC: 32 G/DL (ref 30–36.5)
MCV RBC AUTO: 86.4 FL (ref 80–99)
MONOCYTES # BLD: 0.6 K/UL (ref 0–1)
MONOCYTES NFR BLD: 8 % (ref 5–13)
NEUTS SEG # BLD: 4 K/UL (ref 1.8–8)
NEUTS SEG NFR BLD: 54 % (ref 32–75)
P-R INTERVAL, ECG05: 178 MS
PLATELET # BLD AUTO: 186 K/UL (ref 150–400)
POTASSIUM SERPL-SCNC: 4.5 MMOL/L (ref 3.5–5.1)
PROT SERPL-MCNC: 9.7 G/DL (ref 6.4–8.2)
Q-T INTERVAL, ECG07: 422 MS
QRS DURATION, ECG06: 92 MS
QTC CALCULATION (BEZET), ECG08: 486 MS
RBC # BLD AUTO: 4.49 M/UL (ref 3.8–5.2)
SODIUM SERPL-SCNC: 131 MMOL/L (ref 136–145)
TROPONIN I SERPL-MCNC: <0.04 NG/ML
VENTRICULAR RATE, ECG03: 80 BPM
WBC # BLD AUTO: 7.4 K/UL (ref 3.6–11)

## 2017-09-07 PROCEDURE — 3331090002 HH PPS REVENUE DEBIT

## 2017-09-07 PROCEDURE — 84484 ASSAY OF TROPONIN QUANT: CPT | Performed by: EMERGENCY MEDICINE

## 2017-09-07 PROCEDURE — 82550 ASSAY OF CK (CPK): CPT | Performed by: EMERGENCY MEDICINE

## 2017-09-07 PROCEDURE — 80053 COMPREHEN METABOLIC PANEL: CPT | Performed by: EMERGENCY MEDICINE

## 2017-09-07 PROCEDURE — 74011000250 HC RX REV CODE- 250: Performed by: PHYSICIAN ASSISTANT

## 2017-09-07 PROCEDURE — 74020 XR ABD FLAT/ ERECT: CPT

## 2017-09-07 PROCEDURE — 74176 CT ABD & PELVIS W/O CONTRAST: CPT

## 2017-09-07 PROCEDURE — 99285 EMERGENCY DEPT VISIT HI MDM: CPT

## 2017-09-07 PROCEDURE — 85025 COMPLETE CBC W/AUTO DIFF WBC: CPT | Performed by: EMERGENCY MEDICINE

## 2017-09-07 PROCEDURE — 83690 ASSAY OF LIPASE: CPT | Performed by: PHYSICIAN ASSISTANT

## 2017-09-07 PROCEDURE — 3331090001 HH PPS REVENUE CREDIT

## 2017-09-07 PROCEDURE — 71020 XR CHEST PA LAT: CPT

## 2017-09-07 PROCEDURE — 96365 THER/PROPH/DIAG IV INF INIT: CPT

## 2017-09-07 PROCEDURE — 93005 ELECTROCARDIOGRAM TRACING: CPT

## 2017-09-07 PROCEDURE — 36415 COLL VENOUS BLD VENIPUNCTURE: CPT | Performed by: EMERGENCY MEDICINE

## 2017-09-07 PROCEDURE — 96375 TX/PRO/DX INJ NEW DRUG ADDON: CPT

## 2017-09-07 PROCEDURE — 74011250636 HC RX REV CODE- 250/636: Performed by: PHYSICIAN ASSISTANT

## 2017-09-07 RX ORDER — ONDANSETRON 2 MG/ML
4 INJECTION INTRAMUSCULAR; INTRAVENOUS
Status: COMPLETED | OUTPATIENT
Start: 2017-09-07 | End: 2017-09-07

## 2017-09-07 RX ORDER — POLYETHYLENE GLYCOL 3350 17 G/17G
17 POWDER, FOR SOLUTION ORAL DAILY
Qty: 119 G | Refills: 0 | Status: SHIPPED | OUTPATIENT
Start: 2017-09-07 | End: 2018-02-13 | Stop reason: DRUGHIGH

## 2017-09-07 RX ORDER — FAMOTIDINE 10 MG/ML
20 INJECTION INTRAVENOUS
Status: COMPLETED | OUTPATIENT
Start: 2017-09-07 | End: 2017-09-07

## 2017-09-07 RX ADMIN — FAMOTIDINE 20 MG: 10 INJECTION, SOLUTION INTRAVENOUS at 16:10

## 2017-09-07 RX ADMIN — ONDANSETRON 4 MG: 2 INJECTION INTRAMUSCULAR; INTRAVENOUS at 16:10

## 2017-09-07 RX ADMIN — PROMETHAZINE HYDROCHLORIDE 25 MG: 25 INJECTION INTRAMUSCULAR; INTRAVENOUS at 19:59

## 2017-09-07 RX ADMIN — SODIUM CHLORIDE 1000 ML: 900 INJECTION, SOLUTION INTRAVENOUS at 16:09

## 2017-09-07 NOTE — ED NOTES
Bedside and Verbal shift change report given to [address] [place] [address] (oncoming nurse) by Matthew Guzman RN (offgoing nurse). Report included the following information ED Summary.

## 2017-09-07 NOTE — ED NOTES
Bedside report received from Bri Tapia RN. Pt remains on monitor. VSS. Call bell within reach. Will continue to monitor.

## 2017-09-07 NOTE — ED TRIAGE NOTES
Patient comes to the ER c/o constipation x3 weeks, generalized pains and burning from chest to vagina and SOB x1 week

## 2017-09-07 NOTE — DISCHARGE INSTRUCTIONS
Constipation: Care Instructions  Your Care Instructions  Constipation means that you have a hard time passing stools (bowel movements). People pass stools from 3 times a day to once every 3 days. What is normal for you may be different. Constipation may occur with pain in the rectum and cramping. The pain may get worse when you try to pass stools. Sometimes there are small amounts of bright red blood on toilet paper or the surface of stools. This is because of enlarged veins near the rectum (hemorrhoids). A few changes in your diet and lifestyle may help you avoid ongoing constipation. Your doctor may also prescribe medicine to help loosen your stool. Some medicines can cause constipation. These include pain medicines and antidepressants. Tell your doctor about all the medicines you take. Your doctor may want to make a medicine change to ease your symptoms. Follow-up care is a key part of your treatment and safety. Be sure to make and go to all appointments, and call your doctor if you are having problems. It's also a good idea to know your test results and keep a list of the medicines you take. How can you care for yourself at home? · Drink plenty of fluids, enough so that your urine is light yellow or clear like water. If you have kidney, heart, or liver disease and have to limit fluids, talk with your doctor before you increase the amount of fluids you drink. · Include high-fiber foods in your diet each day. These include fruits, vegetables, beans, and whole grains. · Get at least 30 minutes of exercise on most days of the week. Walking is a good choice. You also may want to do other activities, such as running, swimming, cycling, or playing tennis or team sports. · Take a fiber supplement, such as Citrucel or Metamucil, every day. Read and follow all instructions on the label. · Schedule time each day for a bowel movement. A daily routine may help.  Take your time having your bowel movement. · Support your feet with a small step stool when you sit on the toilet. This helps flex your hips and places your pelvis in a squatting position. · Your doctor may recommend an over-the-counter laxative to relieve your constipation. Examples are Milk of Magnesia and MiraLax. Read and follow all instructions on the label. Do not use laxatives on a long-term basis. When should you call for help? Call your doctor now or seek immediate medical care if:  · You have new or worse belly pain. · You have new or worse nausea or vomiting. · You have blood in your stools. Watch closely for changes in your health, and be sure to contact your doctor if:  · Your constipation is getting worse. · You do not get better as expected. Where can you learn more? Go to http://brandan-liang.info/. Enter [phone] in the search box to learn more about \"Constipation: Care Instructions. \"  Current as of: March 20, 2017  Content Version: 11.3  © 3149-7906 [employer]. Care instructions adapted under license by [employer] (which disclaims liability or warranty for this information). If you have questions about a medical condition or this instruction, always ask your healthcare professional. [address] any warranty or liability for your use of this information. We hope that we have addressed all of your medical concerns. The examination and treatment you received in the Emergency Department were for an emergent problem and were not intended as complete care. It is important that you follow up with your healthcare provider(s) for ongoing care. If your symptoms worsen or do not improve as expected, and you are unable to reach your usual health care provider(s), you should return to the Emergency Department. Today's healthcare is undergoing tremendous change, and patient satisfaction surveys are one of the many tools to assess the quality of medical care. You may receive a survey from the [employer] organization regarding your experience in the Emergency Department. I hope that your experience has been completely positive, particularly the medical care that I provided. As such, please participate in the survey; anything less than excellent does not meet my expectations or intentions. [facility] and [address] participate in nationally recognized quality of care measures. If your blood pressure is greater than 120/80, as reported below, we urge that you seek medical care to address the potential of high blood pressure, commonly known as hypertension. Hypertension can be hereditary or can be caused by certain medical conditions, pain, stress, or \"white coat syndrome. \"       Please make an appointment with your health care provider(s) for follow up of your Emergency Department visit. VITALS:   Patient Vitals for the past 8 hrs:   Temp Pulse Resp BP SpO2   09/07/17 1700 - - - 120/47 94 %   09/07/17 1604 97.8 °F (36.6 °C) 81 18 132/52 98 %   09/07/17 1500 - - - 136/60 98 %   09/07/17 1400 - - - 149/85 -   09/07/17 1141 97.9 °F (36.6 °C) 83 18 149/79 100 %          Thank you for allowing us to provide you with medical care today. We realize that you have many choices for your emergency care needs. Please choose us in the future for any continued health care needs. Ishaan Baugh [facility], [address] [place], [place] [address]y 20.   Office: [phone]            Recent Results (from the past 24 hour(s))   CBC WITH AUTOMATED DIFF    Collection Time: 09/07/17 12:00 PM   Result Value Ref Range    WBC 7.4 3.6 - 11.0 K/uL    RBC 4.49 3.80 - 5.20 M/uL    HGB 12.4 11.5 - 16.0 g/dL    HCT 38.8 35.0 - 47.0 %    MCV 86.4 80.0 - 99.0 FL    MCH 27.6 26.0 - 34.0 PG    MCHC 32.0 30.0 - 36.5 g/dL    RDW 14.0 11.5 - 14.5 %    PLATELET 254 657 - 120 K/uL    NEUTROPHILS 54 32 - 75 %    LYMPHOCYTES 36 12 - 49 % MONOCYTES 8 5 - 13 %    EOSINOPHILS 2 0 - 7 %    BASOPHILS 0 0 - 1 %    ABS. NEUTROPHILS 4.0 1.8 - 8.0 K/UL    ABS. LYMPHOCYTES 2.6 0.8 - 3.5 K/UL    ABS. MONOCYTES 0.6 0.0 - 1.0 K/UL    ABS. EOSINOPHILS 0.1 0.0 - 0.4 K/UL    ABS. BASOPHILS 0.0 0.0 - 0.1 K/UL   METABOLIC PANEL, COMPREHENSIVE    Collection Time: 09/07/17 12:00 PM   Result Value Ref Range    Sodium 131 (L) 136 - 145 mmol/L    Potassium 4.5 3.5 - 5.1 mmol/L    Chloride 89 (L) 97 - 108 mmol/L    CO2 32 21 - 32 mmol/L    Anion gap 10 5 - 15 mmol/L    Glucose 208 (H) 65 - 100 mg/dL    BUN 44 (H) 6 - 20 MG/DL    Creatinine 6.51 (H) 0.55 - 1.02 MG/DL    BUN/Creatinine ratio 7 (L) 12 - 20      GFR est AA 8 (L) >60 ml/min/1.73m2    GFR est non-AA 6 (L) >60 ml/min/1.73m2    Calcium 9.1 8.5 - 10.1 MG/DL    Bilirubin, total 0.4 0.2 - 1.0 MG/DL    ALT (SGPT) 48 12 - 78 U/L    AST (SGOT) 32 15 - 37 U/L    Alk.  phosphatase 232 (H) 45 - 117 U/L    Protein, total 9.7 (H) 6.4 - 8.2 g/dL    Albumin 3.4 (L) 3.5 - 5.0 g/dL    Globulin 6.3 (H) 2.0 - 4.0 g/dL    A-G Ratio 0.5 (L) 1.1 - 2.2     TROPONIN I    Collection Time: 09/07/17 12:00 PM   Result Value Ref Range    Troponin-I, Qt. <0.04 <0.05 ng/mL   CK W/ REFLX CKMB    Collection Time: 09/07/17 12:00 PM   Result Value Ref Range    CK 59 26 - 192 U/L   LIPASE    Collection Time: 09/07/17 12:00 PM   Result Value Ref Range    Lipase 151 73 - 393 U/L   EKG, 12 LEAD, INITIAL    Collection Time: 09/07/17 12:04 PM   Result Value Ref Range    Ventricular Rate 80 BPM    Atrial Rate 80 BPM    P-R Interval 178 ms    QRS Duration 92 ms    Q-T Interval 422 ms    QTC Calculation (Bezet) 486 ms    Calculated P Axis 42 degrees    Calculated R Axis -35 degrees    Calculated T Axis 29 degrees    Diagnosis       Normal sinus rhythm  Left axis deviation  When compared with ECG of 29-JUN-2017 18:48,  No significant change was found  Confirmed by Sulema Loyd MD, Amanda Graves ([id number]) on 9/7/2017 4:08:32 PM         Xr Chest Pa Lat    Result Date: 9/7/2017  EXAM:  XR CHEST PA LAT INDICATION:   chest burning, sob COMPARISON: 2017. FINDINGS: PA and lateral radiographs of the chest demonstrate lungs clear of an acute process. There is minor basilar atelectasis/scarring. Musa Bharathi Heart size is upper limits of normal. Depth of inspiration is shallow. There is a vascular stent overlying left axilla. Bony structures appear unchanged. Musa Garvin IMPRESSION: No acute abnormality is identified. Xr Abd Flat/ Erect    Result Date: 9/7/2017  EXAM:  XR ABD FLAT/ ERECT INDICATION:   constipation COMPARISON: None. FINDINGS: Supine and decubitus views of the abdomen demonstrate no definite evidence of obstruction. . There is no free intraperitoneal air. There is moderate fecal stasis throughout the colon. . . IMPRESSION: Gas pattern is nonobstructed. There is no free air. There is moderate fecal stasis throughout the colon. Ct Abd Pelv Wo Cont    Result Date: 9/7/2017  EXAM:  CT ABD PELV WO CONT INDICATION: abdominal/pelvic pain COMPARISON: CONTRAST:  None. TECHNIQUE: Thin axial images were obtained through the abdomen and pelvis. Coronal and sagittal reconstructions were generated. Oral contrast was not administered. CT dose reduction was achieved through use of a standardized protocol tailored for this examination and automatic exposure control for dose modulation. The absence of intravenous contrast material reduces the sensitivity for evaluation of the solid parenchymal organs of the abdomen. FINDINGS: LUNG BASES: There is mild atelectasis right base. INCIDENTALLY IMAGED HEART AND MEDIASTINUM: Unremarkable. LIVER: No mass or biliary dilatation. GALLBLADDER: Surgically absent SPLEEN: No mass. PANCREAS: No mass or ductal dilatation. ADRENALS: Unremarkable. KIDNEYS/URETERS: There is thinning of the renal parenchyma. There are nonobstructing calculi bilaterally. . This calculation the medullary pyramids.  There is a 6 mm calcification in the right intrarenal pelvis may represent a calcified middle lobe which has sloughed. There is a 12 mm low-density upper pole right kidney may represent a cyst but is nonspecific. STOMACH: Unremarkable. SMALL BOWEL: No dilatation or wall thickening. COLON: There is extensive fecal stasis throughout the colon. There is fecal impaction rectosigmoid colon. APPENDIX: Not identified PERITONEUM: No ascites or pneumoperitoneum. RETROPERITONEUM: No lymphadenopathy or aortic aneurysm. REPRODUCTIVE ORGANS: URINARY BLADDER: No mass or calculus. BONES: There are changes metabolic bone disease. There is irregularity of the superior endplate of L4 and inferior endplate of L3 could be degenerative disc changes but discitis is not excluded ADDITIONAL COMMENTS: Small amount of high density in the thecal sac at S1 is unchanged. IMPRESSION: 1. There is extensive fecal stasis throughout the colon with fecal impaction rectosigmoid colon. 2. There is thinning of the renal parenchyma. There is nonobstructing bilateral calculi and there are calcified medullary pyramids. there is a 6 mm calculus in the right intrarenal pelvis without evidence of hydronephrosis. 3. There is increased density in the visualized bony structures most likely metabolic bone disease. There is irregularity of the L3-L4 endplates could be degenerative disc changes but discitis is not excluded.

## 2017-09-08 PROCEDURE — 3331090002 HH PPS REVENUE DEBIT

## 2017-09-08 PROCEDURE — 3331090001 HH PPS REVENUE CREDIT
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
## 2017-09-09 PROCEDURE — 3331090002 HH PPS REVENUE DEBIT

## 2017-09-09 PROCEDURE — 3331090001 HH PPS REVENUE CREDIT

## 2017-09-10 VITALS
SYSTOLIC BLOOD PRESSURE: 130 MMHG | DIASTOLIC BLOOD PRESSURE: 82 MMHG | RESPIRATION RATE: 18 BRPM | HEART RATE: 78 BPM | TEMPERATURE: 97.6 F

## 2017-09-10 PROCEDURE — 3331090002 HH PPS REVENUE DEBIT

## 2017-09-10 PROCEDURE — 3331090001 HH PPS REVENUE CREDIT

## 2017-09-11 PROCEDURE — 3331090002 HH PPS REVENUE DEBIT

## 2017-09-11 PROCEDURE — 3331090001 HH PPS REVENUE CREDIT

## 2017-09-12 ENCOUNTER — TELEPHONE (OUTPATIENT)
Dept: SURGERY | Age: 67
End: 2017-09-12

## 2017-09-12 ENCOUNTER — HOME CARE VISIT (OUTPATIENT)
Dept: SCHEDULING | Facility: HOME HEALTH | Age: 67
End: 2017-09-12
Payer: MEDICARE

## 2017-09-12 PROCEDURE — 400014 HH F/U

## 2017-09-12 PROCEDURE — G0300 HHS/HOSPICE OF LPN EA 15 MIN: HCPCS

## 2017-09-12 PROCEDURE — 3331090001 HH PPS REVENUE CREDIT

## 2017-09-12 PROCEDURE — 3331090002 HH PPS REVENUE DEBIT

## 2017-09-12 NOTE — TELEPHONE ENCOUNTER
I returned phone call from home health nurse gerda [phone]. She states she was out to patients house today and patient has another boil/abscess on her labia. She is asking if this is something Dr. Raciel Alvarez would take care of or did she need to call GYN. She states daughter Jany December said patient is supposed to have eye surgery done tomorrow and then daughter was going to call to set up  Dr. Ari Contreras (debriedment of perianal abscess). Gerda said the daughter had said she wanted to wait until after the eye surgery was done before she dealt with this. I spoke with Dr. Raciel Alvarez and he said he thought the two areas were related. He said since he is out of town the rest of this week, if need be one of the other physicians could see patient or we could also call in an antibiotic to see if that would help until she has the surgery and Dr. Raciel Alvarez could take care of both areas at the time of surgery. Gerda said she would contact the daughter and see what she wanted to do.

## 2017-09-13 PROCEDURE — 3331090002 HH PPS REVENUE DEBIT

## 2017-09-13 PROCEDURE — 3331090001 HH PPS REVENUE CREDIT

## 2017-09-14 VITALS
TEMPERATURE: 98 F | OXYGEN SATURATION: 96 % | SYSTOLIC BLOOD PRESSURE: 144 MMHG | DIASTOLIC BLOOD PRESSURE: 68 MMHG | HEART RATE: 80 BPM | RESPIRATION RATE: 22 BRPM

## 2017-09-14 PROCEDURE — 3331090002 HH PPS REVENUE DEBIT

## 2017-09-14 PROCEDURE — 3331090001 HH PPS REVENUE CREDIT

## 2017-09-15 ENCOUNTER — HOME CARE VISIT (OUTPATIENT)
Dept: HOME HEALTH SERVICES | Facility: HOME HEALTH | Age: 67
End: 2017-09-15
Payer: MEDICARE

## 2017-09-15 PROCEDURE — 3331090002 HH PPS REVENUE DEBIT

## 2017-09-15 PROCEDURE — 3331090001 HH PPS REVENUE CREDIT

## 2017-09-16 PROCEDURE — 3331090002 HH PPS REVENUE DEBIT

## 2017-09-16 PROCEDURE — 3331090001 HH PPS REVENUE CREDIT

## 2017-09-17 PROCEDURE — 3331090002 HH PPS REVENUE DEBIT

## 2017-09-17 PROCEDURE — 3331090001 HH PPS REVENUE CREDIT

## 2017-09-18 PROCEDURE — 3331090001 HH PPS REVENUE CREDIT

## 2017-09-18 PROCEDURE — 3331090002 HH PPS REVENUE DEBIT

## 2017-09-19 ENCOUNTER — HOME CARE VISIT (OUTPATIENT)
Dept: SCHEDULING | Facility: HOME HEALTH | Age: 67
End: 2017-09-19
Payer: MEDICARE

## 2017-09-19 VITALS
RESPIRATION RATE: 16 BRPM | DIASTOLIC BLOOD PRESSURE: 80 MMHG | OXYGEN SATURATION: 96 % | HEART RATE: 85 BPM | WEIGHT: 248.24 LBS | SYSTOLIC BLOOD PRESSURE: 148 MMHG | TEMPERATURE: 97.6 F | BODY MASS INDEX: 32.75 KG/M2

## 2017-09-19 PROCEDURE — 3331090002 HH PPS REVENUE DEBIT

## 2017-09-19 PROCEDURE — G0299 HHS/HOSPICE OF RN EA 15 MIN: HCPCS

## 2017-09-19 PROCEDURE — 3331090001 HH PPS REVENUE CREDIT

## 2017-09-20 PROCEDURE — 3331090001 HH PPS REVENUE CREDIT

## 2017-09-20 PROCEDURE — 3331090002 HH PPS REVENUE DEBIT

## 2017-09-21 ENCOUNTER — HOME CARE VISIT (OUTPATIENT)
Dept: SCHEDULING | Facility: HOME HEALTH | Age: 67
End: 2017-09-21
Payer: MEDICARE

## 2017-09-21 VITALS
RESPIRATION RATE: 16 BRPM | SYSTOLIC BLOOD PRESSURE: 146 MMHG | HEART RATE: 71 BPM | OXYGEN SATURATION: 97 % | DIASTOLIC BLOOD PRESSURE: 76 MMHG | TEMPERATURE: 98.1 F

## 2017-09-21 PROCEDURE — 3331090002 HH PPS REVENUE DEBIT

## 2017-09-21 PROCEDURE — 3331090001 HH PPS REVENUE CREDIT

## 2017-09-21 PROCEDURE — G0299 HHS/HOSPICE OF RN EA 15 MIN: HCPCS

## 2017-09-22 PROCEDURE — 3331090001 HH PPS REVENUE CREDIT

## 2017-09-22 PROCEDURE — 3331090002 HH PPS REVENUE DEBIT

## 2017-09-23 PROCEDURE — 3331090001 HH PPS REVENUE CREDIT

## 2017-09-23 PROCEDURE — 3331090002 HH PPS REVENUE DEBIT

## 2017-09-24 PROCEDURE — 3331090002 HH PPS REVENUE DEBIT

## 2017-09-24 PROCEDURE — 3331090001 HH PPS REVENUE CREDIT

## 2017-09-25 PROCEDURE — 3331090001 HH PPS REVENUE CREDIT

## 2017-09-25 PROCEDURE — 3331090002 HH PPS REVENUE DEBIT

## 2017-09-26 ENCOUNTER — HOME CARE VISIT (OUTPATIENT)
Dept: SCHEDULING | Facility: HOME HEALTH | Age: 67
End: 2017-09-26
Payer: MEDICARE

## 2017-09-26 VITALS
SYSTOLIC BLOOD PRESSURE: 138 MMHG | RESPIRATION RATE: 18 BRPM | TEMPERATURE: 98 F | HEART RATE: 68 BPM | DIASTOLIC BLOOD PRESSURE: 70 MMHG | OXYGEN SATURATION: 96 %

## 2017-09-26 PROCEDURE — 3331090001 HH PPS REVENUE CREDIT

## 2017-09-26 PROCEDURE — G0299 HHS/HOSPICE OF RN EA 15 MIN: HCPCS

## 2017-09-26 PROCEDURE — 3331090002 HH PPS REVENUE DEBIT

## 2017-09-27 PROCEDURE — 3331090002 HH PPS REVENUE DEBIT

## 2017-09-27 PROCEDURE — 3331090001 HH PPS REVENUE CREDIT

## 2017-09-28 ENCOUNTER — HOME CARE VISIT (OUTPATIENT)
Dept: SCHEDULING | Facility: HOME HEALTH | Age: 67
End: 2017-09-28
Payer: MEDICARE

## 2017-09-28 PROCEDURE — G0300 HHS/HOSPICE OF LPN EA 15 MIN: HCPCS

## 2017-09-28 PROCEDURE — 3331090002 HH PPS REVENUE DEBIT

## 2017-09-28 PROCEDURE — 3331090001 HH PPS REVENUE CREDIT

## 2017-09-29 PROCEDURE — 3331090002 HH PPS REVENUE DEBIT

## 2017-09-29 PROCEDURE — 3331090001 HH PPS REVENUE CREDIT

## 2017-09-30 VITALS
RESPIRATION RATE: 20 BRPM | OXYGEN SATURATION: 97 % | SYSTOLIC BLOOD PRESSURE: 152 MMHG | HEART RATE: 70 BPM | DIASTOLIC BLOOD PRESSURE: 80 MMHG | TEMPERATURE: 97.8 F

## 2017-09-30 PROCEDURE — 3331090002 HH PPS REVENUE DEBIT

## 2017-09-30 PROCEDURE — 3331090001 HH PPS REVENUE CREDIT

## 2017-10-01 PROCEDURE — 3331090001 HH PPS REVENUE CREDIT

## 2017-10-01 PROCEDURE — 3331090002 HH PPS REVENUE DEBIT

## 2017-10-02 PROCEDURE — 3331090001 HH PPS REVENUE CREDIT

## 2017-10-02 PROCEDURE — 3331090002 HH PPS REVENUE DEBIT

## 2017-10-03 ENCOUNTER — HOME CARE VISIT (OUTPATIENT)
Dept: SCHEDULING | Facility: HOME HEALTH | Age: 67
End: 2017-10-03
Payer: MEDICARE

## 2017-10-03 PROCEDURE — 3331090002 HH PPS REVENUE DEBIT

## 2017-10-03 PROCEDURE — G0300 HHS/HOSPICE OF LPN EA 15 MIN: HCPCS

## 2017-10-03 PROCEDURE — 3331090001 HH PPS REVENUE CREDIT

## 2017-10-04 PROCEDURE — 3331090002 HH PPS REVENUE DEBIT

## 2017-10-04 PROCEDURE — 3331090001 HH PPS REVENUE CREDIT

## 2017-10-05 ENCOUNTER — HOME CARE VISIT (OUTPATIENT)
Dept: HOME HEALTH SERVICES | Facility: HOME HEALTH | Age: 67
End: 2017-10-05
Payer: MEDICARE

## 2017-10-05 VITALS
OXYGEN SATURATION: 97 % | HEART RATE: 81 BPM | TEMPERATURE: 97.8 F | DIASTOLIC BLOOD PRESSURE: 72 MMHG | RESPIRATION RATE: 20 BRPM | SYSTOLIC BLOOD PRESSURE: 132 MMHG

## 2017-10-05 PROCEDURE — 3331090001 HH PPS REVENUE CREDIT

## 2017-10-05 PROCEDURE — 3331090002 HH PPS REVENUE DEBIT

## 2017-10-06 PROCEDURE — 3331090002 HH PPS REVENUE DEBIT

## 2017-10-06 PROCEDURE — 3331090001 HH PPS REVENUE CREDIT

## 2017-10-07 PROCEDURE — 3331090002 HH PPS REVENUE DEBIT

## 2017-10-07 PROCEDURE — 3331090001 HH PPS REVENUE CREDIT

## 2017-10-08 PROCEDURE — 3331090001 HH PPS REVENUE CREDIT

## 2017-10-08 PROCEDURE — 3331090002 HH PPS REVENUE DEBIT

## 2017-10-09 PROCEDURE — 3331090001 HH PPS REVENUE CREDIT

## 2017-10-09 PROCEDURE — 3331090002 HH PPS REVENUE DEBIT

## 2017-10-10 ENCOUNTER — HOME CARE VISIT (OUTPATIENT)
Dept: SCHEDULING | Facility: HOME HEALTH | Age: 67
End: 2017-10-10
Payer: MEDICARE

## 2017-10-10 VITALS
OXYGEN SATURATION: 96 % | DIASTOLIC BLOOD PRESSURE: 72 MMHG | RESPIRATION RATE: 20 BRPM | SYSTOLIC BLOOD PRESSURE: 138 MMHG | HEART RATE: 85 BPM | TEMPERATURE: 97.6 F

## 2017-10-10 PROCEDURE — 3331090001 HH PPS REVENUE CREDIT

## 2017-10-10 PROCEDURE — G0300 HHS/HOSPICE OF LPN EA 15 MIN: HCPCS

## 2017-10-10 PROCEDURE — 3331090002 HH PPS REVENUE DEBIT

## 2017-10-11 ENCOUNTER — TELEPHONE (OUTPATIENT)
Dept: SURGERY | Age: 67
End: 2017-10-11

## 2017-10-11 PROCEDURE — 3331090001 HH PPS REVENUE CREDIT

## 2017-10-11 PROCEDURE — 3331090002 HH PPS REVENUE DEBIT

## 2017-10-12 ENCOUNTER — HOME CARE VISIT (OUTPATIENT)
Dept: HOME HEALTH SERVICES | Facility: HOME HEALTH | Age: 67
End: 2017-10-12
Payer: MEDICARE

## 2017-10-12 ENCOUNTER — TELEPHONE (OUTPATIENT)
Dept: SURGERY | Age: 67
End: 2017-10-12

## 2017-10-12 PROCEDURE — 3331090001 HH PPS REVENUE CREDIT

## 2017-10-12 PROCEDURE — 3331090002 HH PPS REVENUE DEBIT

## 2017-10-12 NOTE — TELEPHONE ENCOUNTER
Marivel Black 2 hours ago (1:33 PM)                 Barak Bowen is calling on behalf of her mother, Jacquelyn Major, who is a patient of Dr. Delfin Dela Cruz was scheduled to have surgery in September but had other issues and surgery was cancelled. Mackenzie Izquierdo would like to know if Ms. Hawa Mcguire should be seen in the office or if the surgery could be scheduled ASAP.  The abscess on her buttock is worse and she has two abscesses in her vaginal area. Her Home Care Nurse was supposed to have called Dr. Vincenza Litten.   Could you check on this and call them [phone]. (Routing comment)                        Supriya Durán [phone]  Marivel Solis 2 hours ago (1:28 PM)                              I spoke with DR. Vincenza Litten and let him know about patients vaginal abscess as well as the buttocks abscess getting worse and he said he did not need to see patient back in the office before the surgery. I called the daughter back and she her mothers eye surgery had some swelling afterwards and had to use antibiotic eye drops, but she felt she was better from that now and the abscess were getting worse so she was ready to get on the schedule. I told her Dr. Vincenza Litten had said he did not need to see her in the office before the surgery and I would route this to [facility] in the scheduling dept and daughter would be getting a call from [facility] to schedule the surgery. Daughter was in agreement with this plan of care.

## 2017-10-13 ENCOUNTER — TELEPHONE (OUTPATIENT)
Dept: SURGERY | Age: 67
End: 2017-10-13

## 2017-10-13 PROCEDURE — 3331090002 HH PPS REVENUE DEBIT

## 2017-10-13 PROCEDURE — 3331090001 HH PPS REVENUE CREDIT

## 2017-10-14 PROCEDURE — 3331090002 HH PPS REVENUE DEBIT

## 2017-10-14 PROCEDURE — 3331090001 HH PPS REVENUE CREDIT

## 2017-10-15 PROCEDURE — 3331090001 HH PPS REVENUE CREDIT

## 2017-10-15 PROCEDURE — 3331090002 HH PPS REVENUE DEBIT

## 2017-10-16 PROCEDURE — 3331090002 HH PPS REVENUE DEBIT

## 2017-10-16 PROCEDURE — 3331090001 HH PPS REVENUE CREDIT

## 2017-10-17 ENCOUNTER — HOME CARE VISIT (OUTPATIENT)
Dept: SCHEDULING | Facility: HOME HEALTH | Age: 67
End: 2017-10-17
Payer: MEDICARE

## 2017-10-17 PROCEDURE — 3331090001 HH PPS REVENUE CREDIT

## 2017-10-17 PROCEDURE — 3331090002 HH PPS REVENUE DEBIT

## 2017-10-17 PROCEDURE — G0300 HHS/HOSPICE OF LPN EA 15 MIN: HCPCS

## 2017-10-18 PROCEDURE — 3331090001 HH PPS REVENUE CREDIT

## 2017-10-18 PROCEDURE — 3331090002 HH PPS REVENUE DEBIT

## 2017-10-19 ENCOUNTER — HOME CARE VISIT (OUTPATIENT)
Dept: SCHEDULING | Facility: HOME HEALTH | Age: 67
End: 2017-10-19
Payer: MEDICARE

## 2017-10-19 PROCEDURE — 3331090001 HH PPS REVENUE CREDIT

## 2017-10-19 PROCEDURE — G0300 HHS/HOSPICE OF LPN EA 15 MIN: HCPCS

## 2017-10-19 PROCEDURE — 3331090002 HH PPS REVENUE DEBIT

## 2017-10-20 PROCEDURE — 3331090001 HH PPS REVENUE CREDIT

## 2017-10-20 PROCEDURE — 3331090002 HH PPS REVENUE DEBIT

## 2017-10-21 VITALS
HEART RATE: 87 BPM | DIASTOLIC BLOOD PRESSURE: 78 MMHG | RESPIRATION RATE: 20 BRPM | TEMPERATURE: 97.9 F | TEMPERATURE: 97.4 F | SYSTOLIC BLOOD PRESSURE: 148 MMHG | DIASTOLIC BLOOD PRESSURE: 86 MMHG | OXYGEN SATURATION: 97 % | SYSTOLIC BLOOD PRESSURE: 170 MMHG | RESPIRATION RATE: 20 BRPM | HEART RATE: 85 BPM | OXYGEN SATURATION: 97 %

## 2017-10-21 PROCEDURE — 3331090001 HH PPS REVENUE CREDIT

## 2017-10-21 PROCEDURE — 3331090002 HH PPS REVENUE DEBIT

## 2017-10-22 PROCEDURE — 3331090002 HH PPS REVENUE DEBIT

## 2017-10-22 PROCEDURE — 3331090001 HH PPS REVENUE CREDIT

## 2017-10-23 PROCEDURE — 3331090001 HH PPS REVENUE CREDIT

## 2017-10-23 PROCEDURE — 3331090002 HH PPS REVENUE DEBIT

## 2017-10-24 ENCOUNTER — HOME CARE VISIT (OUTPATIENT)
Dept: SCHEDULING | Facility: HOME HEALTH | Age: 67
End: 2017-10-24
Payer: MEDICARE

## 2017-10-24 VITALS
SYSTOLIC BLOOD PRESSURE: 138 MMHG | OXYGEN SATURATION: 98 % | HEART RATE: 76 BPM | RESPIRATION RATE: 20 BRPM | DIASTOLIC BLOOD PRESSURE: 72 MMHG | TEMPERATURE: 97.6 F

## 2017-10-24 PROCEDURE — 3331090002 HH PPS REVENUE DEBIT

## 2017-10-24 PROCEDURE — G0300 HHS/HOSPICE OF LPN EA 15 MIN: HCPCS

## 2017-10-24 PROCEDURE — 3331090001 HH PPS REVENUE CREDIT

## 2017-10-25 PROCEDURE — 3331090001 HH PPS REVENUE CREDIT

## 2017-10-25 PROCEDURE — 3331090002 HH PPS REVENUE DEBIT

## 2017-10-25 RX ORDER — CARVEDILOL 25 MG/1
25 TABLET ORAL
COMMUNITY
End: 2018-04-09 | Stop reason: DRUGHIGH

## 2017-10-25 RX ORDER — CARVEDILOL 12.5 MG/1
25 TABLET ORAL 2 TIMES DAILY WITH MEALS
COMMUNITY
End: 2018-04-24 | Stop reason: DRUGHIGH

## 2017-10-25 RX ORDER — SIMVASTATIN 20 MG/1
20 TABLET, FILM COATED ORAL
COMMUNITY

## 2017-10-25 NOTE — PERIOP NOTES
Patient history was reviewed with Dr Goins Or, anesthesiologist, tests requested as ordered by Dr Goins Or.

## 2017-10-26 ENCOUNTER — ANESTHESIA (OUTPATIENT)
Dept: SURGERY | Age: 67
End: 2017-10-26
Payer: MEDICARE

## 2017-10-26 ENCOUNTER — HOME CARE VISIT (OUTPATIENT)
Dept: HOME HEALTH SERVICES | Facility: HOME HEALTH | Age: 67
End: 2017-10-26
Payer: MEDICARE

## 2017-10-26 ENCOUNTER — ANESTHESIA EVENT (OUTPATIENT)
Dept: SURGERY | Age: 67
End: 2017-10-26
Payer: MEDICARE

## 2017-10-26 ENCOUNTER — HOSPITAL ENCOUNTER (OUTPATIENT)
Age: 67
Setting detail: OBSERVATION
Discharge: HOME HEALTH | End: 2017-10-28
Attending: SURGERY | Admitting: SURGERY
Payer: MEDICARE

## 2017-10-26 PROBLEM — K61.0 PERIANAL ABSCESS: Status: ACTIVE | Noted: 2017-10-26

## 2017-10-26 LAB
ANION GAP BLD CALC-SCNC: 17 MMOL/L (ref 5–15)
BUN BLD-MCNC: 39 MG/DL (ref 9–20)
CA-I BLD-MCNC: 0.96 MMOL/L (ref 1.12–1.32)
CHLORIDE BLD-SCNC: 92 MMOL/L (ref 98–107)
CO2 BLD-SCNC: 31 MMOL/L (ref 21–32)
CREAT BLD-MCNC: 6.4 MG/DL (ref 0.6–1.3)
GLUCOSE BLD STRIP.AUTO-MCNC: 170 MG/DL (ref 65–100)
GLUCOSE BLD STRIP.AUTO-MCNC: 175 MG/DL (ref 65–100)
GLUCOSE BLD STRIP.AUTO-MCNC: 275 MG/DL (ref 65–100)
GLUCOSE BLD-MCNC: 184 MG/DL (ref 65–100)
HCT VFR BLD CALC: 43 % (ref 35–47)
HGB BLD-MCNC: 14.6 GM/DL (ref 11.5–16)
POTASSIUM BLD-SCNC: 4.3 MMOL/L (ref 3.5–5.1)
SERVICE CMNT-IMP: ABNORMAL
SODIUM BLD-SCNC: 135 MMOL/L (ref 136–145)

## 2017-10-26 PROCEDURE — 76060000033 HC ANESTHESIA 1 TO 1.5 HR: Performed by: SURGERY

## 2017-10-26 PROCEDURE — 77030011640 HC PAD GRND REM COVD -A: Performed by: SURGERY

## 2017-10-26 PROCEDURE — 77030026438 HC STYL ET INTUB CARD -A: Performed by: ANESTHESIOLOGY

## 2017-10-26 PROCEDURE — 77030008684 HC TU ET CUF COVD -B: Performed by: ANESTHESIOLOGY

## 2017-10-26 PROCEDURE — 77030020782 HC GWN BAIR PAWS FLX 3M -B

## 2017-10-26 PROCEDURE — 88305 TISSUE EXAM BY PATHOLOGIST: CPT | Performed by: SURGERY

## 2017-10-26 PROCEDURE — 87116 MYCOBACTERIA CULTURE: CPT | Performed by: SURGERY

## 2017-10-26 PROCEDURE — 99218 HC RM OBSERVATION: CPT

## 2017-10-26 PROCEDURE — 3331090002 HH PPS REVENUE DEBIT

## 2017-10-26 PROCEDURE — 74011000250 HC RX REV CODE- 250

## 2017-10-26 PROCEDURE — 74011250636 HC RX REV CODE- 250/636: Performed by: SURGERY

## 2017-10-26 PROCEDURE — 87205 SMEAR GRAM STAIN: CPT | Performed by: SURGERY

## 2017-10-26 PROCEDURE — 87102 FUNGUS ISOLATION CULTURE: CPT | Performed by: SURGERY

## 2017-10-26 PROCEDURE — 74011000250 HC RX REV CODE- 250: Performed by: SURGERY

## 2017-10-26 PROCEDURE — 3331090001 HH PPS REVENUE CREDIT

## 2017-10-26 PROCEDURE — 76210000017 HC OR PH I REC 1.5 TO 2 HR: Performed by: SURGERY

## 2017-10-26 PROCEDURE — 74011250636 HC RX REV CODE- 250/636

## 2017-10-26 PROCEDURE — 74011250636 HC RX REV CODE- 250/636: Performed by: ANESTHESIOLOGY

## 2017-10-26 PROCEDURE — 82962 GLUCOSE BLOOD TEST: CPT

## 2017-10-26 PROCEDURE — 74011250637 HC RX REV CODE- 250/637: Performed by: SURGERY

## 2017-10-26 PROCEDURE — 77030032490 HC SLV COMPR SCD KNE COVD -B: Performed by: SURGERY

## 2017-10-26 PROCEDURE — 74011636637 HC RX REV CODE- 636/637: Performed by: SURGERY

## 2017-10-26 PROCEDURE — 77030018836 HC SOL IRR NACL ICUM -A: Performed by: SURGERY

## 2017-10-26 PROCEDURE — 80047 BASIC METABLC PNL IONIZED CA: CPT

## 2017-10-26 PROCEDURE — 76010000149 HC OR TIME 1 TO 1.5 HR: Performed by: SURGERY

## 2017-10-26 RX ORDER — OXYCODONE AND ACETAMINOPHEN 7.5; 325 MG/1; MG/1
1-2 TABLET ORAL
Status: DISCONTINUED | OUTPATIENT
Start: 2017-10-26 | End: 2017-10-28 | Stop reason: HOSPADM

## 2017-10-26 RX ORDER — HYDROMORPHONE HYDROCHLORIDE 2 MG/1
2 TABLET ORAL
Status: DISCONTINUED | OUTPATIENT
Start: 2017-10-26 | End: 2017-10-28 | Stop reason: HOSPADM

## 2017-10-26 RX ORDER — ASCORBIC ACID 500 MG
1000 TABLET ORAL
Status: DISCONTINUED | OUTPATIENT
Start: 2017-10-26 | End: 2017-10-28 | Stop reason: HOSPADM

## 2017-10-26 RX ORDER — SODIUM CHLORIDE 9 MG/ML
50 INJECTION, SOLUTION INTRAVENOUS CONTINUOUS
Status: DISCONTINUED | OUTPATIENT
Start: 2017-10-26 | End: 2017-10-26

## 2017-10-26 RX ORDER — CALC/MAG/B COMPLEX/D3/HERB 61
15 TABLET ORAL 2 TIMES DAILY
Status: DISCONTINUED | OUTPATIENT
Start: 2017-10-26 | End: 2017-10-26 | Stop reason: CLARIF

## 2017-10-26 RX ORDER — SODIUM CHLORIDE, SODIUM LACTATE, POTASSIUM CHLORIDE, CALCIUM CHLORIDE 600; 310; 30; 20 MG/100ML; MG/100ML; MG/100ML; MG/100ML
125 INJECTION, SOLUTION INTRAVENOUS CONTINUOUS
Status: DISCONTINUED | OUTPATIENT
Start: 2017-10-26 | End: 2017-10-26 | Stop reason: HOSPADM

## 2017-10-26 RX ORDER — ROCURONIUM BROMIDE 10 MG/ML
INJECTION, SOLUTION INTRAVENOUS AS NEEDED
Status: DISCONTINUED | OUTPATIENT
Start: 2017-10-26 | End: 2017-10-26 | Stop reason: HOSPADM

## 2017-10-26 RX ORDER — SEVELAMER CARBONATE 800 MG/1
800 TABLET, FILM COATED ORAL
Status: DISCONTINUED | OUTPATIENT
Start: 2017-10-26 | End: 2017-10-28 | Stop reason: HOSPADM

## 2017-10-26 RX ORDER — POLYETHYLENE GLYCOL 3350 17 G/17G
17 POWDER, FOR SOLUTION ORAL DAILY
Status: DISCONTINUED | OUTPATIENT
Start: 2017-10-27 | End: 2017-10-28 | Stop reason: HOSPADM

## 2017-10-26 RX ORDER — METRONIDAZOLE 500 MG/100ML
500 INJECTION, SOLUTION INTRAVENOUS ONCE
Status: COMPLETED | OUTPATIENT
Start: 2017-10-26 | End: 2017-10-26

## 2017-10-26 RX ORDER — SODIUM CHLORIDE 9 MG/ML
1000 INJECTION, SOLUTION INTRAVENOUS CONTINUOUS
Status: DISCONTINUED | OUTPATIENT
Start: 2017-10-26 | End: 2017-10-26 | Stop reason: HOSPADM

## 2017-10-26 RX ORDER — DOCUSATE SODIUM 100 MG/1
100 CAPSULE, LIQUID FILLED ORAL 2 TIMES DAILY
Status: DISCONTINUED | OUTPATIENT
Start: 2017-10-26 | End: 2017-10-28 | Stop reason: HOSPADM

## 2017-10-26 RX ORDER — NEOSTIGMINE METHYLSULFATE 1 MG/ML
INJECTION INTRAVENOUS AS NEEDED
Status: DISCONTINUED | OUTPATIENT
Start: 2017-10-26 | End: 2017-10-26 | Stop reason: HOSPADM

## 2017-10-26 RX ORDER — SODIUM CHLORIDE 0.9 % (FLUSH) 0.9 %
5-10 SYRINGE (ML) INJECTION EVERY 8 HOURS
Status: DISCONTINUED | OUTPATIENT
Start: 2017-10-26 | End: 2017-10-26 | Stop reason: HOSPADM

## 2017-10-26 RX ORDER — ONDANSETRON 2 MG/ML
4 INJECTION INTRAMUSCULAR; INTRAVENOUS AS NEEDED
Status: DISCONTINUED | OUTPATIENT
Start: 2017-10-26 | End: 2017-10-26 | Stop reason: HOSPADM

## 2017-10-26 RX ORDER — PROPOFOL 10 MG/ML
INJECTION, EMULSION INTRAVENOUS AS NEEDED
Status: DISCONTINUED | OUTPATIENT
Start: 2017-10-26 | End: 2017-10-26 | Stop reason: HOSPADM

## 2017-10-26 RX ORDER — HYDROMORPHONE HYDROCHLORIDE 1 MG/ML
INJECTION, SOLUTION INTRAMUSCULAR; INTRAVENOUS; SUBCUTANEOUS AS NEEDED
Status: DISCONTINUED | OUTPATIENT
Start: 2017-10-26 | End: 2017-10-26 | Stop reason: HOSPADM

## 2017-10-26 RX ORDER — MAGNESIUM SULFATE 100 %
4 CRYSTALS MISCELLANEOUS AS NEEDED
Status: DISCONTINUED | OUTPATIENT
Start: 2017-10-26 | End: 2017-10-28 | Stop reason: HOSPADM

## 2017-10-26 RX ORDER — SODIUM CHLORIDE 0.9 % (FLUSH) 0.9 %
5-10 SYRINGE (ML) INJECTION AS NEEDED
Status: DISCONTINUED | OUTPATIENT
Start: 2017-10-26 | End: 2017-10-26 | Stop reason: HOSPADM

## 2017-10-26 RX ORDER — MIDAZOLAM HYDROCHLORIDE 1 MG/ML
0.5 INJECTION, SOLUTION INTRAMUSCULAR; INTRAVENOUS
Status: DISCONTINUED | OUTPATIENT
Start: 2017-10-26 | End: 2017-10-26 | Stop reason: HOSPADM

## 2017-10-26 RX ORDER — AMLODIPINE BESYLATE 5 MG/1
10 TABLET ORAL DAILY
Status: DISCONTINUED | OUTPATIENT
Start: 2017-10-27 | End: 2017-10-28 | Stop reason: HOSPADM

## 2017-10-26 RX ORDER — CETIRIZINE HCL 10 MG
10 TABLET ORAL
Status: DISCONTINUED | OUTPATIENT
Start: 2017-10-26 | End: 2017-10-28 | Stop reason: HOSPADM

## 2017-10-26 RX ORDER — SODIUM CHLORIDE 9 MG/ML
50 INJECTION, SOLUTION INTRAVENOUS CONTINUOUS
Status: DISCONTINUED | OUTPATIENT
Start: 2017-10-26 | End: 2017-10-26 | Stop reason: HOSPADM

## 2017-10-26 RX ORDER — FLUOXETINE HYDROCHLORIDE 20 MG/1
20 CAPSULE ORAL DAILY
Status: DISCONTINUED | OUTPATIENT
Start: 2017-10-27 | End: 2017-10-28 | Stop reason: HOSPADM

## 2017-10-26 RX ORDER — PRENATAL VIT 91/IRON/FOLIC/DHA 28-975-200
1 COMBINATION PACKAGE (EA) ORAL DAILY
Status: DISCONTINUED | OUTPATIENT
Start: 2017-10-26 | End: 2017-10-28 | Stop reason: HOSPADM

## 2017-10-26 RX ORDER — CARVEDILOL 12.5 MG/1
25 TABLET ORAL
Status: DISCONTINUED | OUTPATIENT
Start: 2017-10-27 | End: 2017-10-28 | Stop reason: HOSPADM

## 2017-10-26 RX ORDER — DIPHENHYDRAMINE HYDROCHLORIDE 50 MG/ML
12.5 INJECTION, SOLUTION INTRAMUSCULAR; INTRAVENOUS AS NEEDED
Status: DISCONTINUED | OUTPATIENT
Start: 2017-10-26 | End: 2017-10-26 | Stop reason: HOSPADM

## 2017-10-26 RX ORDER — MIDAZOLAM HYDROCHLORIDE 1 MG/ML
INJECTION, SOLUTION INTRAMUSCULAR; INTRAVENOUS AS NEEDED
Status: DISCONTINUED | OUTPATIENT
Start: 2017-10-26 | End: 2017-10-26 | Stop reason: HOSPADM

## 2017-10-26 RX ORDER — ACETAMINOPHEN 10 MG/ML
INJECTION, SOLUTION INTRAVENOUS AS NEEDED
Status: DISCONTINUED | OUTPATIENT
Start: 2017-10-26 | End: 2017-10-26 | Stop reason: HOSPADM

## 2017-10-26 RX ORDER — LIDOCAINE HYDROCHLORIDE 20 MG/ML
INJECTION, SOLUTION EPIDURAL; INFILTRATION; INTRACAUDAL; PERINEURAL AS NEEDED
Status: DISCONTINUED | OUTPATIENT
Start: 2017-10-26 | End: 2017-10-26 | Stop reason: HOSPADM

## 2017-10-26 RX ORDER — ASPIRIN 81 MG/1
81 TABLET ORAL DAILY
Status: DISCONTINUED | OUTPATIENT
Start: 2017-10-26 | End: 2017-10-28 | Stop reason: HOSPADM

## 2017-10-26 RX ORDER — DIAZEPAM 5 MG/1
5 TABLET ORAL
Status: DISCONTINUED | OUTPATIENT
Start: 2017-10-26 | End: 2017-10-28 | Stop reason: HOSPADM

## 2017-10-26 RX ORDER — LIDOCAINE HYDROCHLORIDE 10 MG/ML
0.1 INJECTION, SOLUTION EPIDURAL; INFILTRATION; INTRACAUDAL; PERINEURAL AS NEEDED
Status: DISCONTINUED | OUTPATIENT
Start: 2017-10-26 | End: 2017-10-26 | Stop reason: HOSPADM

## 2017-10-26 RX ORDER — SIMVASTATIN 20 MG/1
20 TABLET, FILM COATED ORAL
Status: DISCONTINUED | OUTPATIENT
Start: 2017-10-26 | End: 2017-10-28 | Stop reason: HOSPADM

## 2017-10-26 RX ORDER — MIDAZOLAM HYDROCHLORIDE 1 MG/ML
1 INJECTION, SOLUTION INTRAMUSCULAR; INTRAVENOUS AS NEEDED
Status: DISCONTINUED | OUTPATIENT
Start: 2017-10-26 | End: 2017-10-26 | Stop reason: HOSPADM

## 2017-10-26 RX ORDER — INSULIN LISPRO 100 [IU]/ML
INJECTION, SOLUTION INTRAVENOUS; SUBCUTANEOUS
Status: DISCONTINUED | OUTPATIENT
Start: 2017-10-26 | End: 2017-10-28 | Stop reason: HOSPADM

## 2017-10-26 RX ORDER — LATANOPROST 50 UG/ML
1 SOLUTION/ DROPS OPHTHALMIC
Status: DISCONTINUED | OUTPATIENT
Start: 2017-10-26 | End: 2017-10-28 | Stop reason: HOSPADM

## 2017-10-26 RX ORDER — HYDROMORPHONE HYDROCHLORIDE 1 MG/ML
0.2 INJECTION, SOLUTION INTRAMUSCULAR; INTRAVENOUS; SUBCUTANEOUS
Status: DISCONTINUED | OUTPATIENT
Start: 2017-10-26 | End: 2017-10-26 | Stop reason: HOSPADM

## 2017-10-26 RX ORDER — PANTOPRAZOLE SODIUM 40 MG/1
40 TABLET, DELAYED RELEASE ORAL
Status: DISCONTINUED | OUTPATIENT
Start: 2017-10-26 | End: 2017-10-28 | Stop reason: HOSPADM

## 2017-10-26 RX ORDER — FENTANYL CITRATE 50 UG/ML
INJECTION, SOLUTION INTRAMUSCULAR; INTRAVENOUS AS NEEDED
Status: DISCONTINUED | OUTPATIENT
Start: 2017-10-26 | End: 2017-10-26 | Stop reason: HOSPADM

## 2017-10-26 RX ORDER — SUCCINYLCHOLINE CHLORIDE 20 MG/ML
INJECTION INTRAMUSCULAR; INTRAVENOUS AS NEEDED
Status: DISCONTINUED | OUTPATIENT
Start: 2017-10-26 | End: 2017-10-26 | Stop reason: HOSPADM

## 2017-10-26 RX ORDER — FENTANYL CITRATE 50 UG/ML
25 INJECTION, SOLUTION INTRAMUSCULAR; INTRAVENOUS
Status: COMPLETED | OUTPATIENT
Start: 2017-10-26 | End: 2017-10-26

## 2017-10-26 RX ORDER — OXYCODONE AND ACETAMINOPHEN 5; 325 MG/1; MG/1
1 TABLET ORAL AS NEEDED
Status: DISCONTINUED | OUTPATIENT
Start: 2017-10-26 | End: 2017-10-26 | Stop reason: HOSPADM

## 2017-10-26 RX ORDER — NORTRIPTYLINE HYDROCHLORIDE 25 MG/1
25 CAPSULE ORAL
Status: DISCONTINUED | OUTPATIENT
Start: 2017-10-26 | End: 2017-10-28 | Stop reason: HOSPADM

## 2017-10-26 RX ORDER — ONDANSETRON 2 MG/ML
INJECTION INTRAMUSCULAR; INTRAVENOUS AS NEEDED
Status: DISCONTINUED | OUTPATIENT
Start: 2017-10-26 | End: 2017-10-26 | Stop reason: HOSPADM

## 2017-10-26 RX ORDER — DEXTROSE 50 % IN WATER (D50W) INTRAVENOUS SYRINGE
12.5-25 AS NEEDED
Status: DISCONTINUED | OUTPATIENT
Start: 2017-10-26 | End: 2017-10-28 | Stop reason: HOSPADM

## 2017-10-26 RX ORDER — CARVEDILOL 12.5 MG/1
12.5 TABLET ORAL
Status: DISCONTINUED | OUTPATIENT
Start: 2017-10-26 | End: 2017-10-28 | Stop reason: HOSPADM

## 2017-10-26 RX ORDER — FENTANYL CITRATE 50 UG/ML
50 INJECTION, SOLUTION INTRAMUSCULAR; INTRAVENOUS AS NEEDED
Status: DISCONTINUED | OUTPATIENT
Start: 2017-10-26 | End: 2017-10-26 | Stop reason: HOSPADM

## 2017-10-26 RX ORDER — ONDANSETRON 4 MG/1
4 TABLET, ORALLY DISINTEGRATING ORAL
Status: DISCONTINUED | OUTPATIENT
Start: 2017-10-26 | End: 2017-10-28 | Stop reason: HOSPADM

## 2017-10-26 RX ORDER — CINACALCET 30 MG/1
30 TABLET, FILM COATED ORAL DAILY
Status: DISCONTINUED | OUTPATIENT
Start: 2017-10-26 | End: 2017-10-28 | Stop reason: HOSPADM

## 2017-10-26 RX ORDER — GLYCOPYRROLATE 0.2 MG/ML
INJECTION INTRAMUSCULAR; INTRAVENOUS AS NEEDED
Status: DISCONTINUED | OUTPATIENT
Start: 2017-10-26 | End: 2017-10-26 | Stop reason: HOSPADM

## 2017-10-26 RX ADMIN — FENTANYL CITRATE 25 MCG: 50 INJECTION, SOLUTION INTRAMUSCULAR; INTRAVENOUS at 09:55

## 2017-10-26 RX ADMIN — HYDROMORPHONE HYDROCHLORIDE 2 MG: 2 TABLET ORAL at 16:31

## 2017-10-26 RX ADMIN — OXYCODONE HYDROCHLORIDE AND ACETAMINOPHEN 1000 MG: 500 TABLET ORAL at 13:19

## 2017-10-26 RX ADMIN — SODIUM CHLORIDE, POTASSIUM CHLORIDE, SODIUM LACTATE AND CALCIUM CHLORIDE: 600; 310; 30; 20 INJECTION, SOLUTION INTRAVENOUS at 07:45

## 2017-10-26 RX ADMIN — FENTANYL CITRATE 25 MCG: 50 INJECTION, SOLUTION INTRAMUSCULAR; INTRAVENOUS at 09:10

## 2017-10-26 RX ADMIN — PROPOFOL 50 MG: 10 INJECTION, EMULSION INTRAVENOUS at 08:18

## 2017-10-26 RX ADMIN — FENTANYL CITRATE 25 MCG: 50 INJECTION, SOLUTION INTRAMUSCULAR; INTRAVENOUS at 07:58

## 2017-10-26 RX ADMIN — LIDOCAINE HYDROCHLORIDE 100 MG: 20 INJECTION, SOLUTION EPIDURAL; INFILTRATION; INTRACAUDAL; PERINEURAL at 08:03

## 2017-10-26 RX ADMIN — INSULIN LISPRO 3 UNITS: 100 INJECTION, SOLUTION INTRAVENOUS; SUBCUTANEOUS at 13:17

## 2017-10-26 RX ADMIN — NORTRIPTYLINE HYDROCHLORIDE 25 MG: 25 CAPSULE ORAL at 22:01

## 2017-10-26 RX ADMIN — ONDANSETRON 4 MG: 2 INJECTION INTRAMUSCULAR; INTRAVENOUS at 08:50

## 2017-10-26 RX ADMIN — FENTANYL CITRATE 50 MCG: 50 INJECTION, SOLUTION INTRAMUSCULAR; INTRAVENOUS at 08:03

## 2017-10-26 RX ADMIN — CETIRIZINE HYDROCHLORIDE 10 MG: 10 TABLET, FILM COATED ORAL at 22:01

## 2017-10-26 RX ADMIN — FENTANYL CITRATE 25 MCG: 50 INJECTION, SOLUTION INTRAMUSCULAR; INTRAVENOUS at 09:42

## 2017-10-26 RX ADMIN — CHLORASEPTIC 1 SPRAY: 1.5 LIQUID ORAL at 17:46

## 2017-10-26 RX ADMIN — SODIUM CHLORIDE 50 ML/HR: 900 INJECTION, SOLUTION INTRAVENOUS at 07:32

## 2017-10-26 RX ADMIN — OXYCODONE HYDROCHLORIDE AND ACETAMINOPHEN 2 TABLET: 7.5; 325 TABLET ORAL at 13:19

## 2017-10-26 RX ADMIN — SUCCINYLCHOLINE CHLORIDE 110 MG: 20 INJECTION INTRAMUSCULAR; INTRAVENOUS at 08:02

## 2017-10-26 RX ADMIN — NEOSTIGMINE METHYLSULFATE 3 MG: 1 INJECTION INTRAVENOUS at 08:50

## 2017-10-26 RX ADMIN — LATANOPROST 1 DROP: 50 SOLUTION OPHTHALMIC at 22:01

## 2017-10-26 RX ADMIN — ASPIRIN 81 MG: 81 TABLET, COATED ORAL at 13:19

## 2017-10-26 RX ADMIN — HYDROMORPHONE HYDROCHLORIDE 0.2 MG: 1 INJECTION, SOLUTION INTRAMUSCULAR; INTRAVENOUS; SUBCUTANEOUS at 10:17

## 2017-10-26 RX ADMIN — HYDROMORPHONE HYDROCHLORIDE 0.2 MG: 1 INJECTION, SOLUTION INTRAMUSCULAR; INTRAVENOUS; SUBCUTANEOUS at 10:03

## 2017-10-26 RX ADMIN — ASCORBIC ACID, THIAMINE MONONITRATE,RIBOFLAVIN, NIACINAMIDE, PYRIDOXINE HYDROCHLORIDE, FOLIC ACID, CYANOCOBALAMIN, BIOTIN, CALCIUM PANTOTHENATE, 1 CAPSULE: 100; 1.5; 1.7; 20; 10; 1; 6000; 150000; 5 CAPSULE, LIQUID FILLED ORAL at 13:19

## 2017-10-26 RX ADMIN — ACETAMINOPHEN 1000 MG: 10 INJECTION, SOLUTION INTRAVENOUS at 08:25

## 2017-10-26 RX ADMIN — HYDROMORPHONE HYDROCHLORIDE 0.2 MG: 1 INJECTION, SOLUTION INTRAMUSCULAR; INTRAVENOUS; SUBCUTANEOUS at 10:33

## 2017-10-26 RX ADMIN — DOCUSATE SODIUM 100 MG: 100 CAPSULE, LIQUID FILLED ORAL at 13:19

## 2017-10-26 RX ADMIN — CINACALCET HYDROCHLORIDE 30 MG: 30 TABLET, COATED ORAL at 14:24

## 2017-10-26 RX ADMIN — DIPHENHYDRAMINE HYDROCHLORIDE 75 MG: 25 CAPSULE ORAL at 17:56

## 2017-10-26 RX ADMIN — ONDANSETRON 4 MG: 4 TABLET, ORALLY DISINTEGRATING ORAL at 20:09

## 2017-10-26 RX ADMIN — SODIUM CHLORIDE 50 ML/HR: 900 INJECTION, SOLUTION INTRAVENOUS at 09:56

## 2017-10-26 RX ADMIN — MIDAZOLAM HYDROCHLORIDE 2 MG: 1 INJECTION, SOLUTION INTRAMUSCULAR; INTRAVENOUS at 07:59

## 2017-10-26 RX ADMIN — INSULIN LISPRO 7 UNITS: 100 INJECTION, SOLUTION INTRAVENOUS; SUBCUTANEOUS at 16:41

## 2017-10-26 RX ADMIN — FENTANYL CITRATE 25 MCG: 50 INJECTION, SOLUTION INTRAMUSCULAR; INTRAVENOUS at 09:34

## 2017-10-26 RX ADMIN — PANTOPRAZOLE SODIUM 40 MG: 40 TABLET, DELAYED RELEASE ORAL at 16:31

## 2017-10-26 RX ADMIN — SIMVASTATIN 20 MG: 20 TABLET, FILM COATED ORAL at 22:01

## 2017-10-26 RX ADMIN — PROPOFOL 150 MG: 10 INJECTION, EMULSION INTRAVENOUS at 08:03

## 2017-10-26 RX ADMIN — METRONIDAZOLE 500 MG: 500 INJECTION, SOLUTION INTRAVENOUS at 08:10

## 2017-10-26 RX ADMIN — SEVELAMER CARBONATE 800 MG: 800 TABLET, FILM COATED ORAL at 17:56

## 2017-10-26 RX ADMIN — ROCURONIUM BROMIDE 10 MG: 10 INJECTION, SOLUTION INTRAVENOUS at 08:03

## 2017-10-26 RX ADMIN — OXYCODONE HYDROCHLORIDE AND ACETAMINOPHEN 2 TABLET: 7.5; 325 TABLET ORAL at 20:09

## 2017-10-26 RX ADMIN — ONDANSETRON 4 MG: 2 INJECTION INTRAMUSCULAR; INTRAVENOUS at 09:04

## 2017-10-26 RX ADMIN — CARVEDILOL 12.5 MG: 12.5 TABLET, FILM COATED ORAL at 16:31

## 2017-10-26 RX ADMIN — DOCUSATE SODIUM 100 MG: 100 CAPSULE, LIQUID FILLED ORAL at 16:31

## 2017-10-26 RX ADMIN — FENTANYL CITRATE 25 MCG: 50 INJECTION, SOLUTION INTRAMUSCULAR; INTRAVENOUS at 09:23

## 2017-10-26 RX ADMIN — HYDROMORPHONE HYDROCHLORIDE 0.25 MG: 1 INJECTION, SOLUTION INTRAMUSCULAR; INTRAVENOUS; SUBCUTANEOUS at 08:55

## 2017-10-26 RX ADMIN — GLYCOPYRROLATE 0.6 MG: 0.2 INJECTION INTRAMUSCULAR; INTRAVENOUS at 08:50

## 2017-10-26 NOTE — ANESTHESIA PREPROCEDURE EVALUATION
Anesthetic History   No history of anesthetic complications  PONV          Review of Systems / Medical History  Patient summary reviewed, nursing notes reviewed and pertinent labs reviewed    Pulmonary  Within defined limits      Sleep apnea    Asthma        Neuro/Psych   Within defined limits    CVA       Cardiovascular  Within defined limits  Hypertension                   GI/Hepatic/Renal  Within defined limits   GERD           Endo/Other  Within defined limits  Diabetes: poorly controlled  Hypothyroidism  Obesity and arthritis     Other Findings              Physical Exam    Airway  Mallampati: II  TM Distance: > 6 cm  Neck ROM: normal range of motion   Mouth opening: Normal     Cardiovascular  Regular rate and rhythm,  S1 and S2 normal,  no murmur, click, rub, or gallop             Dental  No notable dental hx       Pulmonary  Breath sounds clear to auscultation               Abdominal  GI exam deferred       Other Findings            Anesthetic Plan    ASA: 3  Anesthesia type: general          Induction: Intravenous  Anesthetic plan and risks discussed with: Patient
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
## 2017-10-26 NOTE — IP AVS SNAPSHOT
[address] 
[phone] Patient: Leola Spurling MRN: [id number] LGK:6/0/3851 About your hospitalization You were admitted on:  October 26, 2017 You last received care in the:  [facility] [address] You were discharged on:  October 28, 2017 Why you were hospitalized Your primary diagnosis was:  Perineal Abscess Your diagnoses also included:  Perianal Abscess Things You Need To Do (next 8 weeks) Follow up with Frank Cowart MD  
  
Phone:  [phone] Where:  [address], , [facility] [place] [address] Saturday Oct 28, 2017 Follow up with [facility]  
rolling walker supplied on day of discharge, to be billed to insurance Phone:  [phone] Where:  [address] [facility] [address], [address] Javed Oct 29, 2017 Follow up with [address] [place] [address]  
resumption of skilled nursing care, please call if you don't hear from agency by noon on day after discharge to confirm start date of care Phone:  [phone] Where:  [address] 532 [facility], [address] [facility] [address] Monday Oct 30, 2017 ROUTINE with Andre Rosas RN at  9:00 AM  
Where:  [address] ([address]) Thursday Nov 02, 2017 ROUTINE with Andre Rosas RN at  9:00 AM  
Where:  [address] ([address] [place]) Wednesday Nov 08, 2017 POST OP with Giancarlo Morataya NP at  1:00 PM  
Where:  [address] [address] ([facility]) Discharge Orders None A check betty indicates which time of day the medication should be taken. My Medications TAKE these medications as instructed Instructions Each Dose to Equal  
 Morning Noon Evening Bedtime ALLEGRA 180 mg tablet Generic drug:  fexofenadine Your last dose was: Your next dose is: Take 180 mg by mouth nightly. 180 mg  
    
   
   
   
  
 ASPIRIN PO Your last dose was: Your next dose is: Take 81 mg by mouth daily. 81 mg  
    
   
   
   
  
 BENADRYL 25 mg capsule Generic drug:  diphenhydrAMINE Your last dose was: Your next dose is: Take 75 mg by mouth every six (6) hours as needed for Itching. 75 mg  
    
   
   
   
  
 * COREG 25 mg tablet Generic drug:  carvedilol Your last dose was: Your next dose is: Take 25 mg by mouth every morning. 25 mg  
    
   
   
   
  
 * COREG 12.5 mg tablet Generic drug:  carvedilol Your last dose was: Your next dose is: Take 12.5 mg by mouth nightly. 12.5 mg  
    
   
   
   
  
 diazePAM 5 mg tablet Commonly known as:  VALIUM Your last dose was: Your next dose is: Take 1 Tab by mouth every eight (8) hours as needed for Anxiety. Max Daily Amount: 15 mg.  
 5 mg  
    
   
   
   
  
 diclofenac 1 % Gel Commonly known as:  VOLTAREN Your last dose was: Your next dose is:    
   
   
 2 g by TransDERmal route every four (4) hours as needed (pain). 2 g DULCOLAX STOOL SOFTENER (DSS) 100 mg capsule Generic drug:  docusate sodium Your last dose was: Your next dose is: Take 100 mg by mouth two (2) times a day. 100 mg HUMALOG SC Your last dose was: Your next dose is:    
   
   
 by SubCUTAneous route Before breakfast, lunch, and dinner. SLIDING SCALE 100-150-4u 151-200-5u ect (1 UNIT INCREASE FOR EVERY 50 POINTS) latanoprost 0.005 % ophthalmic solution Commonly known as:  Joyceann Shock Your last dose was: Your next dose is: Administer 1 Drop to both eyes nightly. 1 Drop LEVEMIR FLEXPEN 100 unit/mL (3 mL) Inpn Generic drug:  insulin detemir Your last dose was: Your next dose is:    
   
   
 8 Units by SubCUTAneous route daily. NOON DAILY  
 8 Units  
    
   
   
   
  
 mineral oil liquid Your last dose was: Your next dose is: Take 30 mL by mouth daily. 30 mL  
    
   
   
   
  
 naloxegol 25 mg Tab tablet Commonly known as:  Gulshan Pedroza Your last dose was: Your next dose is: Take 25 mg by mouth as needed. Take 1 tablet every day on empty stomach 1 hr before or 2 hrs after first meal.  
 25 mg  
    
   
   
   
  
 nortriptyline 25 mg capsule Commonly known as:  PAMELOR Your last dose was: Your next dose is: Take 25 mg by mouth nightly. 25 mg  
    
   
   
   
  
 NORVASC PO Your last dose was: Your next dose is: Take 10 mg by mouth daily. 10 mg  
    
   
   
   
  
 * OTHER Your last dose was: Your next dose is:    
   
   
 0.9% Normal saline  Use as needed to affected area  Medical code: L02.215  
     
   
   
   
  
 * OTHER Your last dose was: Your next dose is:    
   
   
 Rotator for ambulation * OTHER Your last dose was: Your next dose is:    
   
   
 Adult diapers size XL  
     
   
   
   
  
 OTHER(NON-FORMULARY) Your last dose was: Your next dose is:    
   
   
 210 mg three (3) times daily (with meals). KRISTINAIXIA  DAUGHTER WILL BRING TO HOS[facility]  
 210 mg  
    
   
   
   
  
 * oxyCODONE-acetaminophen 7.5-325 mg per tablet Commonly known as:  PERCOCET 7.5 Your last dose was: Your next dose is: Take 1-2 Tabs by mouth every four (4) hours as needed. Max Daily Amount: 12 Tabs. 1-2 Tab * oxyCODONE-acetaminophen 7.5-325 mg per tablet Commonly known as:  PERCOCET 7.5 Your last dose was: Your next dose is: Take 1-2 Tabs by mouth every eight (8) hours as needed. Max Daily Amount: 6 Tabs. 1-2 Tab  
    
   
   
   
  
 polyethylene glycol 17 gram/dose powder Commonly known as:  Nish Abel Your last dose was: Your next dose is: Take 17 g by mouth daily. 1 tablespoon with 8 oz of water daily 17 g PREVACID 15 mg capsule Generic drug:  lansoprazole Your last dose was: Your next dose is: Take  by mouth two (2) times a day. PROZAC PO Your last dose was: Your next dose is: Take 20 mg by mouth daily. 20 mg RENAL SOFTGELS 1 mg capsule Generic drug:  b complex-vitamin c-folic acid Your last dose was: Your next dose is: Take 1 Cap by mouth daily. 1 Cap RENVELA PO Your last dose was: Your next dose is: Take 800 mg by mouth three (3) times daily (with meals). 800 mg SENSIPAR 30 mg tablet Generic drug:  cinacalcet Your last dose was: Your next dose is: Take 30 mg by mouth daily. 30 mg  
    
   
   
   
  
 simvastatin 20 mg tablet Commonly known as:  ZOCOR Your last dose was: Your next dose is: Take 20 mg by mouth nightly. 20 mg  
    
   
   
   
  
 terbinafine HCl 1 % topical cream  
Commonly known as:  LAMISIL Your last dose was: Your next dose is:    
   
   
 Apply 1 Each to affected area daily. Apply to heel at bedtime, wrap heel with saran wrap.  
 1 Each VITAMIN C 500 mg tablet Generic drug:  ascorbic acid (vitamin C) Your last dose was: Your next dose is: Take 1,000 mg by mouth daily. 1000 mg * Notice: This list has 7 medication(s) that are the same as other medications prescribed for you. Read the directions carefully, and ask your doctor or other care provider to review them with you. Where to Get Your Medications Information on where to get these meds will be given to you by the nurse or doctor. ! Ask your nurse or doctor about these medications OTHER  
 OTHER  
 oxyCODONE-acetaminophen 7.5-325 mg per tablet Discharge Instructions Perineal Abscess: Care Instructions Your Care Instructions A perineal abscess is an infection that causes a painful lump in the perineum. The perineum is the area between the scrotum and the anus in a man. In a woman, it's the area between the vulva and the anus. The area may look red and feel painful and be swollen. The abscess may form after surgery or after delivery of a baby. It can also be caused by an infection of the prostate gland. The prostate gland is an organ found inside a man's body, just below the bladder. Your doctor may have done minor surgery to open and drain the abscess. You may have had a sedative to help you relax. You may be unsteady after having sedation. It can take a few hours for the medicine's effects to wear off. Common side effects include nausea, vomiting, and feeling sleepy or tired. The doctor has checked you carefully, but problems can develop later. If you notice any problems or new symptoms, get medical treatment right away. Follow-up care is a key part of your treatment and safety. Be sure to make and go to all appointments, and call your doctor if you are having problems. It's also a good idea to know your test results and keep a list of the medicines you take. How can you care for yourself at home? · If your doctor gave you a sedative: ¨ For 24 hours, don't do anything that requires attention to detail.  It takes time for the medicine's effects to completely wear off. ¨ For your safety, do not drive or operate any machinery that could be dangerous. Wait until the medicine wears off. You need to be able to think clearly and react easily. · Be safe with medicines. Read and follow all instructions on the label. ¨ If the doctor gave you a prescription medicine for pain, take it as prescribed. ¨ If you are not taking a prescription pain medicine, ask your doctor if you can take an over-the-counter medicine. · If your doctor prescribed antibiotics, take them as directed. Do not stop taking them just because you feel better. You need to take the full course of antibiotics. · Sit in a few inches of warm water (sitz bath) 3 times a day and after bowel movements. The warm water helps the area heal and eases discomfort. When should you call for help? Call 911 anytime you think you may need emergency care. For example, call if: 
? · You have trouble breathing. ? · You passed out (lost consciousness). ?Call your doctor now or seek immediate medical care if: 
? · You have symptoms of infection, such as: 
¨ Increased pain, swelling, warmth, or redness. ¨ Red streaks leading from the area. ¨ Pus draining from the area. ¨ A fever. ? Watch closely for changes in your health, and be sure to contact your doctor if: 
? · You do not get better as expected. Where can you learn more? Go to http://brandan-liang.info/. Enter [phone] [id number] in the search box to learn more about \"Perineal Abscess: Care Instructions. \" Current as of: May 12, 2017 Content Version: 11.4 © 9363-6050 [employer] Care instructions adapted under license by [employer] (which disclaims liability or warranty for this information).  If you have questions about a medical condition or this instruction, always ask your healthcare professional. [employer] disclaims any warranty or liability for your use of this information. Introducing [facility]! Mary Gupta introduces AskNshare patient portal. Now you can access parts of your medical record, email your doctor's office, and request medication refills online. 1. In your internet browser, go to [email or web address] 2. Click on the First Time User? Click Here link in the Sign In box. You will see the New Member Sign Up page. 3. Enter your AskNshare Access Code exactly as it appears below. You will not need to use this code after youve completed the sign-up process. If you do not sign up before the expiration date, you must request a new code. · AskNshare Access Code: [email or web address] Expires: 1/26/2018 12:44 PM 
 
4. Enter the last four digits of your Social Security Number (xxxx) and Date of Birth (mm/dd/yyyy) as indicated and click Submit. You will be taken to the next sign-up page. 5. Create a AskNshare ID. This will be your AskNshare login ID and cannot be changed, so think of one that is secure and easy to remember. 6. Create a AskNshare password. You can change your password at any time. 7. Enter your Password Reset Question and Answer. This can be used at a later time if you forget your password. 8. Enter your e-mail address. You will receive e-mail notification when new information is available in [address] 9. Click Sign Up. You can now view and download portions of your medical record. 10. Click the Download Summary menu link to download a portable copy of your medical information. If you have questions, please visit the Frequently Asked Questions section of the AskNshare website. Remember, AskNshare is NOT to be used for urgent needs. For medical emergencies, dial 911. Now available from your iPhone and Android! Unresulted Labs-Please follow up with your PCP about these lab tests Order Current Status CULTURE, FUNGUS In process AFB CULTURE + SMEAR W/RFLX ID FROM CULTURE Preliminary result Providers Seen During Your Hospitalization Provider Specialty Primary office phone Nora Gray MD General Surgery [phone] Your Primary Care Physician (PCP) Primary Care Physician Office Phone Office Fax Derril Leader [phone] You are allergic to the following Allergen Reactions Latex Itching Rash, sometimes difficult to breathe Celebrex (Celecoxib) Anaphylaxis Swelling TONGUE. LIPS AND EYES Iodine Other (comments) IV CONTRAST-LESIONS, AND SKIN SLUFFED OFF Keflex (Cephalexin) Anaphylaxis Swelling Tongue, lips, and eyes Levaquin (Levofloxacin) Anaphylaxis Swelling Tongue, lips, and eyes Pcn (Penicillins) Anaphylaxis Swelling Tongue, lips and eyes Morphine Other (comments) PT STATES IS JUST SENSITIVITY, GOT TOO MUCH ONE TIME. Recent Documentation Height Weight Breastfeeding? BMI OB Status Smoking Status 1.873 m 111.1 kg No 31.67 kg/m2 Hysterectomy Never Smoker Emergency Contacts Name Discharge Info Relation Home Work Mobile [address] CAREGIVER [3] Daughter [21] [phone] Monse Butler  Child [2] [phone] Patient Belongings The following personal items are in your possession at time of discharge: 
  Dental Appliances: Lowers, Partials, Uppers (PARTIALS UPPER & LOWER TO PACU)  Visual Aid: Glasses, With patient, At bedside   Hearing Aids/Status:  (NONE)  Home Medications: None   Jewelry: None  Clothing: Other (comment) (CLOTHING & SHOES TO PACU)    Other Valuables: Eyeglasses, Wheelchair ([address] [place] [address] PACU) Please provide this summary of care documentation to your next provider. Signatures-by signing, you are acknowledging that this After Visit Summary has been reviewed with you and you have received a copy. Patient Signature:  ____________________________________________________________ Date:  ____________________________________________________________  
  
Carmen Moulding Provider Signature:  ____________________________________________________________ Date:  ____________________________________________________________

## 2017-10-26 NOTE — BRIEF OP NOTE
BRIEF OPERATIVE NOTE    Date of Procedure: 10/26/2017   Preoperative Diagnosis: PERIANAL ABSCESSES  Postoperative Diagnosis: PERIANAL ABSCESSES    Procedure(s):  RECTAL EUA, DEBRIDEMENT OF PERIANAL ABSCESS   Surgeon(s) and Role:     * Malcolm Virk MD - Primary         Assistant Staff:       Surgical Staff:  Circ-1: Tahira Ontiveros RN  Circ-Intern: Floyd Watson RN  Scrub Tech-1: Toan Vu  Event Time In   Incision Start 0831   Incision Close 0414     Anesthesia: General   Estimated Blood Loss: minimal  Specimens:   ID Type Source Tests Collected by Time Destination   1 : Perianal abscess Fresh Anal  Malcolm Virk MD 10/26/2017 4941 Pathology   1 : Perianal abscess Tissue Abscess CULTURE, FUNGUS, AEROBIC/ANAEROBIC CULTURE, AFB CULTURE Malcolm Virk MD 10/26/2017 6094 Microbiology      Findings: new abscess on the left and overlying skin debrided on the right   Complications: none  Implants: * No implants in log *    [id number]

## 2017-10-26 NOTE — ADDENDUM NOTE
Addendum  created 10/26/17 1336 by Jelani Chao CRNA    Anesthesia Intra Flowsheets edited, Anesthesia Intra LDAs edited, LDA properties accepted

## 2017-10-26 NOTE — IP AVS SNAPSHOT
[address] 
[phone] Patient: Dharmesh Laura MRN: [id number] RXB:1/1/5494 My Medications TAKE these medications as instructed Instructions Each Dose to Equal  
 Morning Noon Evening Bedtime ALLEGRA 180 mg tablet Generic drug:  fexofenadine Your last dose was: Your next dose is: Take 180 mg by mouth nightly. 180 mg  
    
   
   
   
  
 ASPIRIN PO Your last dose was: Your next dose is: Take 81 mg by mouth daily. 81 mg  
    
   
   
   
  
 BENADRYL 25 mg capsule Generic drug:  diphenhydrAMINE Your last dose was: Your next dose is: Take 75 mg by mouth every six (6) hours as needed for Itching. 75 mg  
    
   
   
   
  
 * COREG 25 mg tablet Generic drug:  carvedilol Your last dose was: Your next dose is: Take 25 mg by mouth every morning. 25 mg  
    
   
   
   
  
 * COREG 12.5 mg tablet Generic drug:  carvedilol Your last dose was: Your next dose is: Take 12.5 mg by mouth nightly. 12.5 mg  
    
   
   
   
  
 diazePAM 5 mg tablet Commonly known as:  VALIUM Your last dose was: Your next dose is: Take 1 Tab by mouth every eight (8) hours as needed for Anxiety. Max Daily Amount: 15 mg.  
 5 mg  
    
   
   
   
  
 diclofenac 1 % Gel Commonly known as:  VOLTAREN Your last dose was: Your next dose is:    
   
   
 2 g by TransDERmal route every four (4) hours as needed (pain). 2 g DULCOLAX STOOL SOFTENER (DSS) 100 mg capsule Generic drug:  docusate sodium Your last dose was: Your next dose is: Take 100 mg by mouth two (2) times a day. 100 mg HUMALOG SC Your last dose was: Your next dose is: by SubCUTAneous route Before breakfast, lunch, and dinner. SLIDING SCALE 100-150-4u 151-200-5u ect (1 UNIT INCREASE FOR EVERY 50 POINTS) latanoprost 0.005 % ophthalmic solution Commonly known as:  Joyceann Shock Your last dose was: Your next dose is:    
   
   
 Administer 1 Drop to both eyes nightly. 1 Drop LEVEMIR FLEXPEN 100 unit/mL (3 mL) Inpn Generic drug:  insulin detemir Your last dose was: Your next dose is:    
   
   
 8 Units by SubCUTAneous route daily. NOON DAILY  
 8 Units  
    
   
   
   
  
 mineral oil liquid Your last dose was: Your next dose is: Take 30 mL by mouth daily. 30 mL  
    
   
   
   
  
 naloxegol 25 mg Tab tablet Commonly known as:  Layman Jamar Your last dose was: Your next dose is: Take 25 mg by mouth as needed. Take 1 tablet every day on empty stomach 1 hr before or 2 hrs after first meal.  
 25 mg  
    
   
   
   
  
 nortriptyline 25 mg capsule Commonly known as:  PAMELOR Your last dose was: Your next dose is: Take 25 mg by mouth nightly. 25 mg  
    
   
   
   
  
 NORVASC PO Your last dose was: Your next dose is: Take 10 mg by mouth daily. 10 mg  
    
   
   
   
  
 * OTHER Your last dose was: Your next dose is:    
   
   
 0.9% Normal saline  Use as needed to affected area  Medical code: L02.215  
     
   
   
   
  
 * OTHER Your last dose was: Your next dose is:    
   
   
 Rotator for ambulation * OTHER Your last dose was: Your next dose is:    
   
   
 Adult diapers size XL  
     
   
   
   
  
 OTHER(NON-FORMULARY) Your last dose was: Your next dose is:    
   
   
 210 mg three (3) times daily (with meals).  [facility]  
 210 mg  
    
   
   
   
  
 * oxyCODONE-acetaminophen 7.5-325 mg per tablet Commonly known as:  PERCOCET 7.5 Your last dose was: Your next dose is: Take 1-2 Tabs by mouth every four (4) hours as needed. Max Daily Amount: 12 Tabs. 1-2 Tab * oxyCODONE-acetaminophen 7.5-325 mg per tablet Commonly known as:  PERCOCET 7.5 Your last dose was: Your next dose is: Take 1-2 Tabs by mouth every eight (8) hours as needed. Max Daily Amount: 6 Tabs. 1-2 Tab  
    
   
   
   
  
 polyethylene glycol 17 gram/dose powder Commonly known as:  Diana Boer Your last dose was: Your next dose is: Take 17 g by mouth daily. 1 tablespoon with 8 oz of water daily 17 g PREVACID 15 mg capsule Generic drug:  lansoprazole Your last dose was: Your next dose is: Take  by mouth two (2) times a day. PROZAC PO Your last dose was: Your next dose is: Take 20 mg by mouth daily. 20 mg RENAL SOFTGELS 1 mg capsule Generic drug:  b complex-vitamin c-folic acid Your last dose was: Your next dose is: Take 1 Cap by mouth daily. 1 Cap RENVELA PO Your last dose was: Your next dose is: Take 800 mg by mouth three (3) times daily (with meals). 800 mg SENSIPAR 30 mg tablet Generic drug:  cinacalcet Your last dose was: Your next dose is: Take 30 mg by mouth daily. 30 mg  
    
   
   
   
  
 simvastatin 20 mg tablet Commonly known as:  ZOCOR Your last dose was: Your next dose is: Take 20 mg by mouth nightly. 20 mg  
    
   
   
   
  
 terbinafine HCl 1 % topical cream  
Commonly known as:  LAMISIL Your last dose was: Your next dose is: Apply 1 Each to affected area daily. Apply to heel at bedtime, wrap heel with saran wrap.  
 1 Each VITAMIN C 500 mg tablet Generic drug:  ascorbic acid (vitamin C) Your last dose was: Your next dose is: Take 1,000 mg by mouth daily. 1000 mg * Notice: This list has 7 medication(s) that are the same as other medications prescribed for you. Read the directions carefully, and ask your doctor or other care provider to review them with you. Where to Get Your Medications Information on where to get these meds will be given to you by the nurse or doctor. ! Ask your nurse or doctor about these medications OTHER  
 OTHER  
 oxyCODONE-acetaminophen 7.5-325 mg per tablet

## 2017-10-26 NOTE — PROGRESS NOTES
Bedside and Verbal shift change report given to [address] (oncoming nurse) by En Metcalf (offgoing nurse). Report included the following information SBAR, Kardex, Intake/Output, MAR and Recent Results.

## 2017-10-26 NOTE — OP NOTES
[address]   [address] [facility], [address] [place] [address] Ave   OP NOTE       Name:  Elvi Harmon   MR#:  [id number]   :  1950   Account #:  [de-identified]    Surgery Date:  10/26/2017   Date of Adm:  10/26/2017       PREOPERATIVE DIAGNOSIS: Multiple perianal abscess. POSTOPERATIVE DIAGNOSIS: Multiple perianal abscess. PROCEDURES PERFORMED: Evaluation under anesthesia and   debridement of bilateral perianal abscesses. SURGEON: Holly Camacho MD.    ANESTHESIA: General.    FINDINGS: Absolutely normal area of the anal crypts and actually   some nice healing on the right side with a small area of skin overlying   basically a cavern of somewhat really healthy-looking granulation   tissue. On the left side, there was a new abscess which was only about   2 cm in size with surrounding erythema, which contained some fat that was really quite   hardened, so I excised all of that as well. SPECIMENS REMOVED: The cultures for routine bacterial cultures as   well as fungus and TB. Also sent a specimen of the wall of the abscess   on the left side for permanent sections. ESTIMATED BLOOD LOSS: Minimal.    COMPLICATIONS: None. DRAINS: None. CONDITION: Good to the PACU. DESCRIPTION OF PROCEDURE: With the patient under suitable   general anesthesia, she was placed prone on the operating table   and padded appropriately and secured well to the table. The patient   was placed in a sort of modified jackknife position. The perianal region   was prepared with chlorhexidine. The area was draped as a field. A   digital rectal exam revealed no evidence of any firmness anywhere   along the dentate line and anal retractors were placed and the dentate   line was inspected carefully circumferentially and there was no   evidence of any cryptitis.  The right side where she has had multiple   debridements and incision and drainage is feeling pretty well except for   one area skin forming a cavern, if you would, so I debrided   the overlying skin just to allow this to really heal in nicely. I debrided   the base as well. On the left side there was a 2 cm abscess which I   opened up and sent wall for pathology and debrided some of   the mid portion of it and sent that for cultures. Also, each of those 2   sites was packed with saline-soaked 2-inch Nu Gauze and covered   with 4 x 4s. At the termination of the procedure, all counts were   correct. The patient tolerated this well and was brought to the PACU in   satisfactory condition. MD Angela Key / Tai   D:  10/26/2017   09:13   T:  10/26/2017   11:46   Job #:  [id number]

## 2017-10-26 NOTE — PROGRESS NOTES
Bedside shift change report given to Neli (oncoming nurse) by Dale Ramirez (offgoing nurse). Report included the following information SBAR, OR Summary, Procedure Summary, Intake/Output, Accordion and Recent Results.

## 2017-10-26 NOTE — CONSULTS
Nephrology Progress Note  David Gallagehr  Date of Admission : 10/26/2017    CC: Follow up for ESRD       Assessment and Plan     ESRD- HD :  - HD MWF at [facility], [facility]   - HD for tomorrow AM - [facility] Notified      Hypervolemia:  - appears stable  - d/c IVF      Secondary HPT:  - cont renvela and sensipar     Anemia of CKD:  - check CBC on HD tomorrow     Recurrent Perianal abscess:   - s/p b/l debridement on 10/26    HTN :  - BP stable  - cont current meds     Type II DM   - per primary service       Interval History: This is a 76 yo AAF with a hx of ESRD on HD MWF who was admitted for b/l perianal abscess debridement. She was recently here in June for the same. She underwent her surgery this AM w/o complications. Her last HD was on Wednesday w/o issues. She currently feels ok and denies any cp, sob, n/v/d. C/o mild pain at her surgical site. Current Medications: all current  Medications have been eviewed in EPIC  Review of Systems: Pertinent items are noted in HPI.     Objective:  Vitals:    Vitals:    10/26/17 1055 10/26/17 1120 10/26/17 1223 10/26/17 1324   BP:  96/59 133/76 143/62   Pulse: (!) 59 60 73 63   Resp: 10 16 16 18   Temp:  97.5 °F (36.4 °C) 97.6 °F (36.4 °C) 98 °F (36.7 °C)   SpO2: 100% 99% 99% 98%   Weight:       Height:         Intake and Output:  10/26 0701 - 10/26 1900  In: 100 [I.V.:100]  Out: -        Physical Examination:  General: NAD,Conversant   Neck:  Supple, no mass  Resp:  Lungs CTA B/L, no wheezing , normal respiratory effort  CV:  RRR,  no murmur or rub, 1+ b/l LE edema  GI:  Soft, NT, + Bowel sounds, no hepatosplenomegaly  Neurologic:  Non focal  Psych:             AAO x 3 appropriate affect   Skin:  No Rash  :  No fontenot  Access:           LUE AVF +thrill/bruit    []    High complexity decision making was performed  []    Patient is at high-risk of decompensation with multiple organ involvement    Lab Data Personally Reviewed: I have reviewed all the pertinent labs, microbiology data and radiology studies during assessment. No results for input(s): NA, K, CL, CO2, GLU, BUN, CREA, CA, MG, PHOS, ALB, TBIL, SGOT, ALT, INR in the last 72 hours. No lab exists for component: INREXT  No results for input(s): WBC, HGB, HCT, PLT, HGBEXT, HCTEXT, PLTEXT in the last 72 hours. Lab Results   Component Value Date/Time    Specimen Description: URINE 10/27/2012 08:15 PM    Specimen Description: BLOOD 06/07/2011 07:40 AM    Specimen Description: URINE 08/30/2010 07:10 AM     Lab Results   Component Value Date/Time    Culture result: PENDING 10/26/2017 08:36 AM    Culture result: LIGHT  MIXED COMMENSAL PARIS   02/11/2017 12:40 AM    Culture result:  02/11/2017 12:40 AM     LIGHT  ANAEROBIC GRAM NEGATIVE RODS  BETA LACTAMASE POSITIVE       Recent Results (from the past 24 hour(s))   POC CHEM8    Collection Time: 10/26/17  7:29 AM   Result Value Ref Range    Calcium, ionized (POC) 0.96 (L) 1.12 - 1.32 MMOL/L    Sodium (POC) 135 (L) 136 - 145 MMOL/L    Potassium (POC) 4.3 3.5 - 5.1 MMOL/L    Chloride (POC) 92 (L) 98 - 107 MMOL/L    CO2 (POC) 31 21 - 32 MMOL/L    Anion gap (POC) 17 (H) 5 - 15 mmol/L    Glucose (POC) 184 (H) 65 - 100 MG/DL    BUN (POC) 39 (H) 9 - 20 MG/DL    Creatinine (POC) 6.4 (H) 0.6 - 1.3 MG/DL    GFRAA, POC 8 (L) >60 ml/min/1.73m2    GFRNA, POC 6 (L) >60 ml/min/1.73m2    Hemoglobin (POC) 14.6 11.5 - 16.0 GM/DL    Hematocrit (POC) 43 35.0 - 47.0 %    Comment Comment Not Indicated.      CULTURE, TISSUE Patrica Al STAIN    Collection Time: 10/26/17  8:36 AM   Result Value Ref Range    Special Requests: PERIANAL ABSCESS     GRAM STAIN NO WBC'S SEEN      GRAM STAIN NO ORGANISMS SEEN      Culture result: PENDING    GLUCOSE, POC    Collection Time: 10/26/17  9:20 AM   Result Value Ref Range    Glucose (POC) 170 (H) 65 - 100 mg/dL    Performed by JOHNATHON Altamirano    Collection Time: 10/26/17 12:37 PM   Result Value Ref Range    Glucose (POC) 175 (H) 65 - 100 mg/dL    Performed by Amparo Wheeler MD  [facility]   [id number] [facility] [place] [address] [place] [address]  Phone - [phone]   Fax - [phone]  [email or web address]

## 2017-10-26 NOTE — PROGRESS NOTES
Primary Nurse Marylene Inch, RN and Corrie Michelle RN, RN performed a dual skin assessment on this patient Impairment noted- see wound doc flow sheet  Rob score is 17.

## 2017-10-26 NOTE — PROGRESS NOTES
TRANSFER - IN REPORT:    Verbal report received from [facility] RN(name) on Ferny Block  being received from [facility]) for routine post - op      Report consisted of patients Situation, Background, Assessment and   Recommendations(SBAR). Information from the following report(s) SBAR, Intake/Output, MAR and Recent Results was reviewed with the receiving nurse. Opportunity for questions and clarification was provided. Assessment completed upon patients arrival to unit and care assumed.

## 2017-10-26 NOTE — PERIOP NOTES
TRANSFER - OUT REPORT:    Verbal report given to Jordyn(name) on David Gallagher  being transferred to [facility](unit) for routine post - op       Report consisted of patients Situation, Background, Assessment and   Recommendations(SBAR). Time Pre op antibiotic given:0810  Anesthesia Stop time: 3591  De Leon Present on Transfer to floor:no  Order for De Leon on Chart:no    Information from the following report(s) SBAR, Kardex, OR Summary, Procedure Summary, Intake/Output, MAR, Recent Results and Med Rec Status was reviewed with the receiving nurse. Opportunity for questions and clarification was provided. Is the patient on 02? YES       L/Min 2       Other     Is the patient on a monitor? NO    Is the nurse transporting with the patient? YES    Surgical Waiting Area notified of patient's transfer from PACU? YES      The following personal items collected during your admission accompanied patient upon transfer:   Dental Appliance: Dental Appliances: Lowers, Partials, Uppers (PARTIALS UPPER & LOWER TO PACU)  Vision: Visual Aid: Glasses  Hearing Aid:    Jewelry: Jewelry: None  Clothing: Clothing: Other (comment) (CLOTHING & SHOES TO PACU)  Other Valuables: Other Valuables: Eyeglasses, Wheelchair ([id number] TO PACU)  Valuables sent to safe:        Glasses, dentures, clothes, wheelchair sent to floor.

## 2017-10-27 LAB
ALBUMIN SERPL-MCNC: 3 G/DL (ref 3.5–5)
ANION GAP SERPL CALC-SCNC: 5 MMOL/L (ref 5–15)
BUN SERPL-MCNC: 23 MG/DL (ref 6–20)
BUN/CREAT SERPL: 6 (ref 12–20)
CALCIUM SERPL-MCNC: 8.2 MG/DL (ref 8.5–10.1)
CHLORIDE SERPL-SCNC: 97 MMOL/L (ref 97–108)
CO2 SERPL-SCNC: 31 MMOL/L (ref 21–32)
CREAT SERPL-MCNC: 4.11 MG/DL (ref 0.55–1.02)
ERYTHROCYTE [DISTWIDTH] IN BLOOD BY AUTOMATED COUNT: 15.3 % (ref 11.5–14.5)
GLUCOSE BLD STRIP.AUTO-MCNC: 166 MG/DL (ref 65–100)
GLUCOSE BLD STRIP.AUTO-MCNC: 167 MG/DL (ref 65–100)
GLUCOSE BLD STRIP.AUTO-MCNC: 227 MG/DL (ref 65–100)
GLUCOSE SERPL-MCNC: 120 MG/DL (ref 65–100)
HCT VFR BLD AUTO: 34.6 % (ref 35–47)
HGB BLD-MCNC: 11.1 G/DL (ref 11.5–16)
MCH RBC QN AUTO: 27.6 PG (ref 26–34)
MCHC RBC AUTO-ENTMCNC: 32.1 G/DL (ref 30–36.5)
MCV RBC AUTO: 86.1 FL (ref 80–99)
PHOSPHATE SERPL-MCNC: 2.6 MG/DL (ref 2.6–4.7)
PLATELET # BLD AUTO: 155 K/UL (ref 150–400)
POTASSIUM SERPL-SCNC: 3.5 MMOL/L (ref 3.5–5.1)
RBC # BLD AUTO: 4.02 M/UL (ref 3.8–5.2)
SERVICE CMNT-IMP: ABNORMAL
SODIUM SERPL-SCNC: 133 MMOL/L (ref 136–145)
WBC # BLD AUTO: 5.8 K/UL (ref 3.6–11)

## 2017-10-27 PROCEDURE — 90935 HEMODIALYSIS ONE EVALUATION: CPT

## 2017-10-27 PROCEDURE — 74011636637 HC RX REV CODE- 636/637: Performed by: SURGERY

## 2017-10-27 PROCEDURE — 80069 RENAL FUNCTION PANEL: CPT | Performed by: INTERNAL MEDICINE

## 2017-10-27 PROCEDURE — 82962 GLUCOSE BLOOD TEST: CPT

## 2017-10-27 PROCEDURE — 36415 COLL VENOUS BLD VENIPUNCTURE: CPT | Performed by: INTERNAL MEDICINE

## 2017-10-27 PROCEDURE — 3331090001 HH PPS REVENUE CREDIT

## 2017-10-27 PROCEDURE — 99218 HC RM OBSERVATION: CPT

## 2017-10-27 PROCEDURE — 74011250637 HC RX REV CODE- 250/637: Performed by: SURGERY

## 2017-10-27 PROCEDURE — 3331090002 HH PPS REVENUE DEBIT

## 2017-10-27 PROCEDURE — 85027 COMPLETE CBC AUTOMATED: CPT | Performed by: INTERNAL MEDICINE

## 2017-10-27 RX ADMIN — HYDROMORPHONE HYDROCHLORIDE 2 MG: 2 TABLET ORAL at 03:40

## 2017-10-27 RX ADMIN — ASCORBIC ACID, THIAMINE MONONITRATE,RIBOFLAVIN, NIACINAMIDE, PYRIDOXINE HYDROCHLORIDE, FOLIC ACID, CYANOCOBALAMIN, BIOTIN, CALCIUM PANTOTHENATE, 1 CAPSULE: 100; 1.5; 1.7; 20; 10; 1; 6000; 150000; 5 CAPSULE, LIQUID FILLED ORAL at 18:00

## 2017-10-27 RX ADMIN — SIMVASTATIN 20 MG: 20 TABLET, FILM COATED ORAL at 21:40

## 2017-10-27 RX ADMIN — DIPHENHYDRAMINE HYDROCHLORIDE 50 MG: 25 CAPSULE ORAL at 12:45

## 2017-10-27 RX ADMIN — OXYCODONE HYDROCHLORIDE AND ACETAMINOPHEN 1000 MG: 500 TABLET ORAL at 18:00

## 2017-10-27 RX ADMIN — CARVEDILOL 12.5 MG: 12.5 TABLET, FILM COATED ORAL at 16:36

## 2017-10-27 RX ADMIN — DIPHENHYDRAMINE HYDROCHLORIDE 75 MG: 25 CAPSULE ORAL at 03:41

## 2017-10-27 RX ADMIN — HYDROMORPHONE HYDROCHLORIDE 2 MG: 2 TABLET ORAL at 21:46

## 2017-10-27 RX ADMIN — NORTRIPTYLINE HYDROCHLORIDE 25 MG: 25 CAPSULE ORAL at 21:41

## 2017-10-27 RX ADMIN — OXYCODONE HYDROCHLORIDE AND ACETAMINOPHEN 2 TABLET: 7.5; 325 TABLET ORAL at 07:06

## 2017-10-27 RX ADMIN — SEVELAMER CARBONATE 800 MG: 800 TABLET, FILM COATED ORAL at 07:10

## 2017-10-27 RX ADMIN — PANTOPRAZOLE SODIUM 40 MG: 40 TABLET, DELAYED RELEASE ORAL at 07:10

## 2017-10-27 RX ADMIN — LATANOPROST 1 DROP: 50 SOLUTION OPHTHALMIC at 21:42

## 2017-10-27 RX ADMIN — OXYCODONE HYDROCHLORIDE AND ACETAMINOPHEN 2 TABLET: 7.5; 325 TABLET ORAL at 18:00

## 2017-10-27 RX ADMIN — INSULIN LISPRO 3 UNITS: 100 INJECTION, SOLUTION INTRAVENOUS; SUBCUTANEOUS at 07:30

## 2017-10-27 RX ADMIN — AMLODIPINE BESYLATE 10 MG: 5 TABLET ORAL at 17:59

## 2017-10-27 RX ADMIN — INSULIN LISPRO 3 UNITS: 100 INJECTION, SOLUTION INTRAVENOUS; SUBCUTANEOUS at 18:10

## 2017-10-27 RX ADMIN — FLUOXETINE 20 MG: 20 CAPSULE ORAL at 18:00

## 2017-10-27 RX ADMIN — DOCUSATE SODIUM 100 MG: 100 CAPSULE, LIQUID FILLED ORAL at 18:00

## 2017-10-27 RX ADMIN — SEVELAMER CARBONATE 800 MG: 800 TABLET, FILM COATED ORAL at 16:36

## 2017-10-27 RX ADMIN — POLYETHYLENE GLYCOL 3350 17 G: 17 POWDER, FOR SOLUTION ORAL at 16:36

## 2017-10-27 RX ADMIN — CARVEDILOL 25 MG: 12.5 TABLET, FILM COATED ORAL at 07:13

## 2017-10-27 RX ADMIN — HYDROMORPHONE HYDROCHLORIDE 2 MG: 2 TABLET ORAL at 12:45

## 2017-10-27 RX ADMIN — CETIRIZINE HYDROCHLORIDE 10 MG: 10 TABLET, FILM COATED ORAL at 21:40

## 2017-10-27 RX ADMIN — CINACALCET HYDROCHLORIDE 30 MG: 30 TABLET, COATED ORAL at 18:00

## 2017-10-27 RX ADMIN — PANTOPRAZOLE SODIUM 40 MG: 40 TABLET, DELAYED RELEASE ORAL at 16:36

## 2017-10-27 NOTE — CONSULTS
Nephrology Progress Note  Latanya Jones  Date of Admission : 10/26/2017    CC: Follow up for ESRD       Assessment and Plan     ESRD- HD :  - HD MWF at [facility]   - HD now  - plan to continue MWF while here     Hypervolemia:  - appears stable  - UF 3kg as tolerated today     Secondary HPT:  - cont renvela and sensipar     Anemia of CKD:  - CBC pending from today     Recurrent Perianal abscess:   - s/p b/l debridement on 10/26    HTN :  - BP stable     Type II DM   - per primary service       Interval History:  Seen and examined on dialysis. Labs pending. C/o pain at surgical site. No cp or sob. BP stable on HD. Goal UF 3kg today. Current Medications: all current  Medications have been eviewed in EPIC  Review of Systems: Pertinent items are noted in HPI. Objective:  Vitals:    Vitals:    10/27/17 0017 10/27/17 0333 10/27/17 0713 10/27/17 1013   BP: 118/67 131/73 142/66 124/66   Pulse: 61 69 77 (!) 58   Resp: 14 14  18   Temp: 97.7 °F (36.5 °C) 97.6 °F (36.4 °C)  98.1 °F (36.7 °C)   SpO2: 94% 99%     Weight:       Height:         Intake and Output:     10/25 1901 - 10/27 0700  In: 175 [P.O.:75; I.V.:100]  Out: -     Physical Examination:  General: NAD,Conversant   Neck:  Supple, no mass  Resp:  Lungs CTA B/L, no wheezing , normal respiratory effort  CV:  RRR,  no murmur or rub, 1+ b/l LE edema  GI:  Soft, NT, + Bowel sounds, no hepatosplenomegaly  Neurologic:  Non focal  Psych:             AAO x 3 appropriate affect   Skin:  No Rash  :  No fontenot  Access:           LUE AVF +thrill/bruit    []    High complexity decision making was performed  []    Patient is at high-risk of decompensation with multiple organ involvement    Lab Data Personally Reviewed: I have reviewed all the pertinent labs, microbiology data and radiology studies during assessment. No results for input(s): NA, K, CL, CO2, GLU, BUN, CREA, CA, MG, PHOS, ALB, TBIL, SGOT, ALT, INR in the last 72 hours.     No lab exists for component: INREXT, INREXT  No results for input(s): WBC, HGB, HCT, PLT, HGBEXT, HCTEXT, PLTEXT, HGBEXT, HCTEXT, PLTEXT in the last 72 hours. Lab Results   Component Value Date/Time    Specimen Description: URINE 10/27/2012 08:15 PM    Specimen Description: BLOOD 06/07/2011 07:40 AM    Specimen Description: URINE 08/30/2010 07:10 AM     Lab Results   Component Value Date/Time    Culture result: NO GROWTH ON AEROBIC MEDIA IN 1 DAY 10/26/2017 08:36 AM    Culture result:  10/26/2017 08:36 AM     GRAM POSITIVE RODS  SEEN ON GRAM STAIN OF THE THIO BROTH      Culture result: LIGHT  MIXED COMMENSAL PARIS   02/11/2017 12:40 AM    Culture result:  02/11/2017 12:40 AM     LIGHT  ANAEROBIC GRAM NEGATIVE RODS  BETA LACTAMASE POSITIVE       Recent Results (from the past 24 hour(s))   GLUCOSE, POC    Collection Time: 10/26/17 12:37 PM   Result Value Ref Range    Glucose (POC) 175 (H) 65 - 100 mg/dL    Performed by Julien Gutierrez    GLUCOSE, POC    Collection Time: 10/26/17  4:33 PM   Result Value Ref Range    Glucose (POC) 275 (H) 65 - 100 mg/dL    Performed by [address], POC    Collection Time: 10/27/17  6:59 AM   Result Value Ref Range    Glucose (POC) 167 (H) 65 - 100 mg/dL    Performed by [address] Arnulfo Bullard MD  [address]  Phone - [phone]   Fax - [phone]  [email or web address]

## 2017-10-27 NOTE — DIALYSIS
[facility]  [phone]    Vitals   Pre   Post   Assessment   Pre   Post     Temp  Temp: 98.1 °F (36.7 °C) (10/27/17 1013)  97.5 @1345 LOC  A/O x3 A/Ox3   HR   Pulse (Heart Rate): (!) 58 (10/27/17 1013) 62 Lungs   Clear  Clear   B/P   BP: 124/66 (10/27/17 1013) 115/59 Cardiac   Normal  Normal   Resp   Resp Rate: 18 (10/27/17 1013) 18 Skin   Dry  Dry   Pain level  Pain Intensity 1: 10 (10/27/17 0707) pt pre medicated before treatment 7 out of 10 medicated by RN Edema    Generalized     Generalized   Orders:    Duration:   Start:    2478 End:    2900 Total:   3.5hrs   Dialyzer:   Dialyzer/Set Up Inspection: Revaclear (10/27/17 1013)   K Bath:   Dialysate K (mEq/L): 2 (10/27/17 1013)   Ca Bath:   Dialysate CA (mEq/L): 2.5 (10/27/17 1013)   Na/Bicarb:   Dialysate NA (mEq/L): 140 (10/27/17 1013)   Target Fluid Removal:   Goal/Amount of Fluid to Remove (mL): 3000 mL (10/27/17 1013)   Access  BETH AVF positive bruit/thrill   Type & Location:    BETH AVF flushed before and after treatment. Good flow, high Venous Pressure starting of treatment. Labs     Obtained/Reviewed   Critical Results Called   Date when labs were drawn-  Hgb-    HGB   Date Value Ref Range Status   09/07/2017 12.4 11.5 - 16.0 g/dL Final     K-    Potassium   Date Value Ref Range Status   09/07/2017 4.5 3.5 - 5.1 mmol/L Final     Ca-   Calcium   Date Value Ref Range Status   09/07/2017 9.1 8.5 - 10.1 MG/DL Final     Bun-   BUN   Date Value Ref Range Status   09/07/2017 44 (H) 6 - 20 MG/DL Final     Creat-   Creatinine   Date Value Ref Range Status   09/07/2017 6.51 (H) 0.55 - 1.02 MG/DL Final        Medications/ Blood Products Given     Name   Dose   Route and Time     n/a                Blood Volume Processed (BVP):    72 liters Net Fluid   Removed:  3000 mls   Comments   Time Out Done: 9455  Primary Nurse Rpt Pre: ROWDY Alamo  Primary Nurse Rpt Post: ROWDY Griffith  Pt Education: procedure  Care Plan: continue to follow orders of nephrologit    Tx Summary:Flushed back all possible blood back to patient. Removed needles achieved homeostasis. Applied clean dressing x2 sites. Pt tolerated treatment well. Admiting Diagnosis: Surgery    Pt's previous clinic-[facility]  Consent signed -  Yes  [facility] Consent - yes  Hepatitis Status- neg 10/23/17  Machine #- Machine Number: [id number] (10/27/17 1013)  Telemetry status-  Pre-dialysis wt. - Pre-Dialysis Weight: 111.1 kg (244 lb 14.9 oz) (10/27/17 1013)

## 2017-10-27 NOTE — PROGRESS NOTES
Bedside and Verbal shift change report given to Rodri moreland RN (oncoming nurse) by Eugenio France RN (offgoing nurse). Report included the following information SBAR, Kardex, OR Summary, Procedure Summary, Intake/Output, MAR, Accordion and Recent Results.

## 2017-10-27 NOTE — PROGRESS NOTES
Physical Therapy Screening:    An [facility] screening referral was triggered for physical therapy based on results obtained during the nursing admission assessment. The patients chart was reviewed and the patient is appropriate for a skilled therapy evaluation if there is a decline in functional mobility from baseline. Please order a consult for physical therapy if you are in agreement and would like an evaluation to be completed. Thank you.     Nolan Osler, PT

## 2017-10-28 VITALS
HEIGHT: 72 IN | TEMPERATURE: 97.9 F | HEART RATE: 63 BPM | BODY MASS INDEX: 33.18 KG/M2 | DIASTOLIC BLOOD PRESSURE: 64 MMHG | OXYGEN SATURATION: 94 % | SYSTOLIC BLOOD PRESSURE: 111 MMHG | RESPIRATION RATE: 15 BRPM | WEIGHT: 245 LBS

## 2017-10-28 LAB
BACTERIA SPEC CULT: ABNORMAL
BACTERIA SPEC CULT: ABNORMAL
GLUCOSE BLD STRIP.AUTO-MCNC: 178 MG/DL (ref 65–100)
GLUCOSE BLD STRIP.AUTO-MCNC: 179 MG/DL (ref 65–100)
GRAM STN SPEC: ABNORMAL
GRAM STN SPEC: ABNORMAL
SERVICE CMNT-IMP: ABNORMAL

## 2017-10-28 PROCEDURE — 3331090002 HH PPS REVENUE DEBIT

## 2017-10-28 PROCEDURE — 99218 HC RM OBSERVATION: CPT

## 2017-10-28 PROCEDURE — 77030027138 HC INCENT SPIROMETER -A

## 2017-10-28 PROCEDURE — 3331090001 HH PPS REVENUE CREDIT

## 2017-10-28 PROCEDURE — 74011250637 HC RX REV CODE- 250/637: Performed by: SURGERY

## 2017-10-28 PROCEDURE — 82962 GLUCOSE BLOOD TEST: CPT

## 2017-10-28 PROCEDURE — 74011636637 HC RX REV CODE- 636/637: Performed by: SURGERY

## 2017-10-28 RX ORDER — OXYCODONE AND ACETAMINOPHEN 7.5; 325 MG/1; MG/1
1-2 TABLET ORAL
Qty: 20 TAB | Refills: 0 | Status: SHIPPED | OUTPATIENT
Start: 2017-10-28 | End: 2018-02-13 | Stop reason: DRUGHIGH

## 2017-10-28 RX ADMIN — OXYCODONE HYDROCHLORIDE AND ACETAMINOPHEN 2 TABLET: 7.5; 325 TABLET ORAL at 01:00

## 2017-10-28 RX ADMIN — FLUOXETINE 20 MG: 20 CAPSULE ORAL at 09:52

## 2017-10-28 RX ADMIN — DOCUSATE SODIUM 100 MG: 100 CAPSULE, LIQUID FILLED ORAL at 09:52

## 2017-10-28 RX ADMIN — ASCORBIC ACID, THIAMINE MONONITRATE,RIBOFLAVIN, NIACINAMIDE, PYRIDOXINE HYDROCHLORIDE, FOLIC ACID, CYANOCOBALAMIN, BIOTIN, CALCIUM PANTOTHENATE, 1 CAPSULE: 100; 1.5; 1.7; 20; 10; 1; 6000; 150000; 5 CAPSULE, LIQUID FILLED ORAL at 12:59

## 2017-10-28 RX ADMIN — SEVELAMER CARBONATE 800 MG: 800 TABLET, FILM COATED ORAL at 07:27

## 2017-10-28 RX ADMIN — DIPHENHYDRAMINE HYDROCHLORIDE 75 MG: 25 CAPSULE ORAL at 10:02

## 2017-10-28 RX ADMIN — CINACALCET HYDROCHLORIDE 30 MG: 30 TABLET, COATED ORAL at 10:02

## 2017-10-28 RX ADMIN — ASPIRIN 81 MG: 81 TABLET, COATED ORAL at 09:52

## 2017-10-28 RX ADMIN — POLYETHYLENE GLYCOL 3350 17 G: 17 POWDER, FOR SOLUTION ORAL at 09:52

## 2017-10-28 RX ADMIN — OXYCODONE HYDROCHLORIDE AND ACETAMINOPHEN 2 TABLET: 7.5; 325 TABLET ORAL at 09:52

## 2017-10-28 RX ADMIN — SEVELAMER CARBONATE 800 MG: 800 TABLET, FILM COATED ORAL at 12:59

## 2017-10-28 RX ADMIN — AMLODIPINE BESYLATE 10 MG: 5 TABLET ORAL at 09:52

## 2017-10-28 RX ADMIN — INSULIN LISPRO 3 UNITS: 100 INJECTION, SOLUTION INTRAVENOUS; SUBCUTANEOUS at 07:39

## 2017-10-28 RX ADMIN — INSULIN LISPRO 3 UNITS: 100 INJECTION, SOLUTION INTRAVENOUS; SUBCUTANEOUS at 12:58

## 2017-10-28 RX ADMIN — OXYCODONE HYDROCHLORIDE AND ACETAMINOPHEN 1000 MG: 500 TABLET ORAL at 12:58

## 2017-10-28 RX ADMIN — PANTOPRAZOLE SODIUM 40 MG: 40 TABLET, DELAYED RELEASE ORAL at 07:27

## 2017-10-28 RX ADMIN — CARVEDILOL 25 MG: 12.5 TABLET, FILM COATED ORAL at 07:30

## 2017-10-28 NOTE — PROGRESS NOTES
Progress Note    Patient: Ronn La MRN: [id number]  SSN: [id number]    YOB: 1950  Age: 75 y.o. Sex: female      Admit Date: 10/26/2017    2 Days Post-Op    Procedure:  Procedure(s):  RECTAL EUA, DEBRIDEMENT OF PERIANAL ABSCESS     Subjective:     Patient has complaints of sore throat . Tolerating liquids and soft foods; little cough with clear phlegm. Wants to go home if [facility] nurse, PT can be resumed for tomorrow. Dialysis yesterday and will resume usual regimen on Monday once home. No fevers, no chest pain and no SOB. Son is in town and he will take her home. Objective:     Visit Vitals    /64 (BP 1 Location: Right arm, BP Patient Position: At rest)    Pulse 63    Temp 97.9 °F (36.6 °C)    Resp 15    Ht 6' 1.75\" (1.873 m)    Wt 245 lb (111.1 kg)    SpO2 94%    Breastfeeding No    BMI 31.67 kg/m2       Temp (24hrs), Av.2 °F (36.8 °C), Min:97.9 °F (36.6 °C), Max:98.8 °F (37.1 °C)      Physical Exam:    A + O x 3, reading Bible   Chest  CTA  COR  Regular   ABD Soft, NT/ND, +BS  Wound buttock will come back to see when nurse ready for wound care   EXT Moving all extremities   Data Review: reviewed  Consultants documentation, Nursing documentation and I & O    Lab Review: All lab results for the last 24 hours reviewed.     Assessment:     Hospital Problems  Date Reviewed: 10/26/2017          Codes Class Noted POA    Perianal abscess ICD-10-CM: K61.0  ICD-9-CM: 521  10/26/2017 Unknown        * (Principal)Perineal abscess ICD-10-CM: L02.215  ICD-9-CM: 682.2  2017 Yes              Plan/Recommendations/Medical Decision Making:     Case Management for DC planning    Hopefully can go this afternoon   Nurse to call when ready for wound care   Home health nursing, PT and Rolator     Signed By: Fracisco Ford NP     2017

## 2017-10-28 NOTE — PROGRESS NOTES
CM received call from bedside nurse that pt has resumption of homecare orders with [facility] for wound care. Pt was already open to their service. There is also an order for walker but pt prefers rollator. Per pt, she had this current walker for over 15 years and it has now been broken for a month. CM will provide walker from DME closet and send referral to [facility] with order as attachment and completed paperwork in envelope for [employer]. Updated AVS with bon zhen  information as well as [facility] for walker dispensed at bedside.     JERRY Burns    Care Management Interventions  Transition of Care Consult (CM Consult): [address]: Yes  [facility] of Choice Offered: Yes  Discharge Location  Discharge Placement: Home with home health

## 2017-10-28 NOTE — PROGRESS NOTES
Bedside and Verbal shift change report given to April, MARVA (oncoming nurse) by Trace Osborne RN (offgoing nurse). Report included the following information SBAR, Kardex, Procedure Summary, Intake/Output, MAR and Accordion.

## 2017-10-28 NOTE — PROGRESS NOTES
Wound care: area right and left buttock tender to touch. Area right buttock shallow and approximately 1 cm circumference; small opening distal to primary wound along ridge; scant bloody non odorous drainage.   Left buttock wound closer to anus; pink and granulating, moist no odor, tender as expected; shallow and about 1 cm   Wound area gently cleaned and both areas gently packed with loose saline moist 1\"gauze and covered with 4x4 toppers   Continue home wound care and ok for discharge

## 2017-10-28 NOTE — DISCHARGE INSTRUCTIONS
Perineal Abscess: Care Instructions  Your Care Instructions  A perineal abscess is an infection that causes a painful lump in the perineum. The perineum is the area between the scrotum and the anus in a man. In a woman, it's the area between the vulva and the anus. The area may look red and feel painful and be swollen. The abscess may form after surgery or after delivery of a baby. It can also be caused by an infection of the prostate gland. The prostate gland is an organ found inside a man's body, just below the bladder. Your doctor may have done minor surgery to open and drain the abscess. You may have had a sedative to help you relax. You may be unsteady after having sedation. It can take a few hours for the medicine's effects to wear off. Common side effects include nausea, vomiting, and feeling sleepy or tired. The doctor has checked you carefully, but problems can develop later. If you notice any problems or new symptoms, get medical treatment right away. Follow-up care is a key part of your treatment and safety. Be sure to make and go to all appointments, and call your doctor if you are having problems. It's also a good idea to know your test results and keep a list of the medicines you take. How can you care for yourself at home? · If your doctor gave you a sedative:  ¨ For 24 hours, don't do anything that requires attention to detail. It takes time for the medicine's effects to completely wear off. ¨ For your safety, do not drive or operate any machinery that could be dangerous. Wait until the medicine wears off. You need to be able to think clearly and react easily. · Be safe with medicines. Read and follow all instructions on the label. ¨ If the doctor gave you a prescription medicine for pain, take it as prescribed. ¨ If you are not taking a prescription pain medicine, ask your doctor if you can take an over-the-counter medicine.   · If your doctor prescribed antibiotics, take them as directed. Do not stop taking them just because you feel better. You need to take the full course of antibiotics. · Sit in a few inches of warm water (sitz bath) 3 times a day and after bowel movements. The warm water helps the area heal and eases discomfort. When should you call for help? Call 911 anytime you think you may need emergency care. For example, call if:  ? · You have trouble breathing. ? · You passed out (lost consciousness). ?Call your doctor now or seek immediate medical care if:  ? · You have symptoms of infection, such as:  ¨ Increased pain, swelling, warmth, or redness. ¨ Red streaks leading from the area. ¨ Pus draining from the area. ¨ A fever. ? Watch closely for changes in your health, and be sure to contact your doctor if:  ? · You do not get better as expected. Where can you learn more? Go to http://brandan-liang.info/. Enter [phone] [id number] in the search box to learn more about \"Perineal Abscess: Care Instructions. \"  Current as of: May 12, 2017  Content Version: 11.4  © 0528-9416 [employer]. Care instructions adapted under license by [employer] (which disclaims liability or warranty for this information). If you have questions about a medical condition or this instruction, always ask your healthcare professional. [address] any warranty or liability for your use of this information.

## 2017-10-29 ENCOUNTER — HOME CARE VISIT (OUTPATIENT)
Dept: HOME HEALTH SERVICES | Facility: HOME HEALTH | Age: 67
End: 2017-10-29
Payer: MEDICARE

## 2017-10-29 PROCEDURE — 3331090002 HH PPS REVENUE DEBIT

## 2017-10-29 PROCEDURE — 3331090001 HH PPS REVENUE CREDIT

## 2017-10-30 ENCOUNTER — HOME CARE VISIT (OUTPATIENT)
Dept: SCHEDULING | Facility: HOME HEALTH | Age: 67
End: 2017-10-30
Payer: MEDICARE

## 2017-10-30 PROCEDURE — G0162 HHC RN E&M PLAN SVS, 15 MIN: HCPCS

## 2017-10-30 PROCEDURE — 3331090001 HH PPS REVENUE CREDIT

## 2017-10-30 PROCEDURE — 3331090002 HH PPS REVENUE DEBIT

## 2017-10-30 PROCEDURE — G0299 HHS/HOSPICE OF RN EA 15 MIN: HCPCS

## 2017-10-31 PROCEDURE — 3331090001 HH PPS REVENUE CREDIT

## 2017-10-31 PROCEDURE — 3331090002 HH PPS REVENUE DEBIT

## 2017-11-01 PROCEDURE — 3331090001 HH PPS REVENUE CREDIT

## 2017-11-01 PROCEDURE — 3331090002 HH PPS REVENUE DEBIT

## 2017-11-02 ENCOUNTER — HOME CARE VISIT (OUTPATIENT)
Dept: SCHEDULING | Facility: HOME HEALTH | Age: 67
End: 2017-11-02
Payer: MEDICARE

## 2017-11-02 PROCEDURE — 3331090002 HH PPS REVENUE DEBIT

## 2017-11-02 PROCEDURE — G0299 HHS/HOSPICE OF RN EA 15 MIN: HCPCS

## 2017-11-02 PROCEDURE — 3331090001 HH PPS REVENUE CREDIT

## 2017-11-03 PROCEDURE — 3331090001 HH PPS REVENUE CREDIT

## 2017-11-03 PROCEDURE — 3331090002 HH PPS REVENUE DEBIT

## 2017-11-04 PROCEDURE — 3331090002 HH PPS REVENUE DEBIT

## 2017-11-04 PROCEDURE — 3331090001 HH PPS REVENUE CREDIT

## 2017-11-05 VITALS
DIASTOLIC BLOOD PRESSURE: 68 MMHG | TEMPERATURE: 97.7 F | HEART RATE: 88 BPM | RESPIRATION RATE: 18 BRPM | SYSTOLIC BLOOD PRESSURE: 122 MMHG | OXYGEN SATURATION: 92 %

## 2017-11-05 PROCEDURE — 3331090002 HH PPS REVENUE DEBIT

## 2017-11-05 PROCEDURE — 3331090001 HH PPS REVENUE CREDIT

## 2017-11-06 VITALS
TEMPERATURE: 98.1 F | HEART RATE: 78 BPM | RESPIRATION RATE: 15 BRPM | DIASTOLIC BLOOD PRESSURE: 78 MMHG | SYSTOLIC BLOOD PRESSURE: 138 MMHG

## 2017-11-06 PROCEDURE — 3331090001 HH PPS REVENUE CREDIT

## 2017-11-06 PROCEDURE — 3331090002 HH PPS REVENUE DEBIT

## 2017-11-07 ENCOUNTER — HOME CARE VISIT (OUTPATIENT)
Dept: SCHEDULING | Facility: HOME HEALTH | Age: 67
End: 2017-11-07
Payer: MEDICARE

## 2017-11-07 VITALS
RESPIRATION RATE: 18 BRPM | OXYGEN SATURATION: 98 % | TEMPERATURE: 98.1 F | DIASTOLIC BLOOD PRESSURE: 68 MMHG | HEART RATE: 71 BPM | SYSTOLIC BLOOD PRESSURE: 158 MMHG

## 2017-11-07 PROCEDURE — G0300 HHS/HOSPICE OF LPN EA 15 MIN: HCPCS

## 2017-11-07 PROCEDURE — 3331090001 HH PPS REVENUE CREDIT

## 2017-11-07 PROCEDURE — 400014 HH F/U

## 2017-11-07 PROCEDURE — 3331090002 HH PPS REVENUE DEBIT

## 2017-11-08 ENCOUNTER — OFFICE VISIT (OUTPATIENT)
Dept: SURGERY | Age: 67
End: 2017-11-08

## 2017-11-08 VITALS
HEIGHT: 72 IN | SYSTOLIC BLOOD PRESSURE: 138 MMHG | BODY MASS INDEX: 33.74 KG/M2 | DIASTOLIC BLOOD PRESSURE: 96 MMHG | WEIGHT: 249.12 LBS | HEART RATE: 72 BPM | OXYGEN SATURATION: 96 % | RESPIRATION RATE: 18 BRPM | TEMPERATURE: 98 F

## 2017-11-08 DIAGNOSIS — Z09 POSTOPERATIVE EXAMINATION: Primary | ICD-10-CM

## 2017-11-08 PROCEDURE — 3331090001 HH PPS REVENUE CREDIT

## 2017-11-08 PROCEDURE — 3331090002 HH PPS REVENUE DEBIT

## 2017-11-08 NOTE — PROGRESS NOTES
Subjective:    Desirae Regalado is a 75 y.o. female presents for postop care 13 days following rectal EUA and debridement of multiple perianal abscess by Dr. Hipolito Flowers. No fistulas noted in the EUA. She is receiving wound care by home health RN and her daughter who reports no problems with wound care. No fevers. Minimal serous drainage unless wound is probed. Per Dr. Hipolito Flowers, wound is not being packed. The patient is not having any pain unless wound is probed. Blood sugars have been in 200's as difficulty managing them with dialysis. PCP is tweaking DM therapy. .    Advance directive not on file      Objective:     Visit Vitals    BP (!) 138/96 (BP 1 Location: Right arm, BP Patient Position: Sitting)    Pulse 72    Temp 98 °F (36.7 °C) (Oral)    Resp 18    Ht 6' 1.75\" (1.873 m)    Wt 249 lb 1.9 oz (113 kg)    SpO2 96%    BMI 32.2 kg/m2     A&O x 3  Wheelchair bound  Accompanied by daughter    Wound:  Location: buttock, left  clean, dry, no drainage, healing, pink granulation tissue, open at end of incision with approx 2.5 cm track. Middle portion of incision open, shallow wound bed. periwound skin intact, no erythema or induration        Wound:  Location: right perineum  clean, dry, no drainage, open, healing, pink tissue, shallow and approx size of quarter  periwound skin intact, no erythema or induration            Assessment:     Wound care. Doing well postoperatively. Plan:     1. Wound care discussed. Rinsed area with saline, probed with q-tip. Pt tolerated well. Applied 4x4 and Opsite to secure dressing. Continue with daily wound care. 2. Pt is to increase activities as tolerated. .  3. Follow-up in 2 weeks    Ms. Cornel Lau has a reminder for a \"due or due soon\" health maintenance. I have asked that she contact her primary care provider for follow-up on this health maintenance. Patient verbalized understanding and agreement.

## 2017-11-08 NOTE — PATIENT INSTRUCTIONS
Wound Check: Care Instructions  Your Care Instructions  People have wounds that need care for many reasons. You may have a cut that needs care after surgery. You may have a cut or puncture wound from an accident. Or you may have a wound because of a condition like diabetes. Whatever the cause of your wound, there are things you can do to care for it at home. Your doctor may also want you to come back for a wound check. The wound check lets the doctor know how your wound is healing and if you need more treatment. Follow-up care is a key part of your treatment and safety. Be sure to make and go to all appointments, and call your doctor if you are having problems. It's also a good idea to know your test results and keep a list of the medicines you take. How can you care for yourself at home? · If your doctor told you how to care for your wound, follow your doctor's instructions. If you did not get instructions, follow this general advice:  ¨ You may cover the wound with a thin layer of petroleum jelly, such as Vaseline, and a nonstick bandage. ¨ Apply more petroleum jelly and replace the bandage as needed. · Keep the wound dry for the first 24 to 48 hours. After this, you can shower if your doctor okays it. Pat the wound dry. · Be safe with medicines. Read and follow all instructions on the label. ¨ If the doctor gave you a prescription medicine for pain, take it as prescribed. ¨ If you are not taking a prescription pain medicine, ask your doctor if you can take an over-the-counter medicine. · If your doctor prescribed antibiotics, take them as directed. Do not stop taking them just because you feel better. You need to take the full course of antibiotics. · If you have stitches, do not remove them on your own. Your doctor will tell you when to come back to have them removed. · If you have Steri-Strips, leave them on until they fall off.   · If possible, prop up the injured area on a pillow anytime you sit or lie down during the next 3 days. Try to keep it above the level of your heart. This will help reduce swelling. When should you call for help? Call your doctor now or seek immediate medical care if:  ? · You have new pain, or the pain gets worse. ? · The skin near the wound is cold or pale or changes color. ? · You have tingling, weakness, or numbness near the wound. ? · The wound starts to bleed, and blood soaks through the bandage. Oozing small amounts of blood is normal.   ? · You have symptoms of infection, such as:  ¨ Increased pain, swelling, warmth, or redness. ¨ Red streaks leading from the wound. ¨ Pus draining from the wound. ¨ A fever. ? Watch closely for changes in your health, and be sure to contact your doctor if:  ? · You do not get better as expected. Where can you learn more? Go to http://brandan-liang.info/. Enter P342 in the search box to learn more about \"Wound Check: Care Instructions. \"  Current as of: March 20, 2017  Content Version: 11.4  © 5695-8589 [employer]. Care instructions adapted under license by [employer] (which disclaims liability or warranty for this information). If you have questions about a medical condition or this instruction, always ask your healthcare professional. [place] [address] any warranty or liability for your use of this information.

## 2017-11-08 NOTE — PROGRESS NOTES
1. Have you been to the ER, urgent care clinic since your last visit? Hospitalized since your last visit? No    2. Have you seen or consulted any other health care providers outside of the [address] [place] [address] since your last visit? Include any pap smears or colon screening.  No

## 2017-11-09 ENCOUNTER — HOME CARE VISIT (OUTPATIENT)
Dept: SCHEDULING | Facility: HOME HEALTH | Age: 67
End: 2017-11-09
Payer: MEDICARE

## 2017-11-09 VITALS
TEMPERATURE: 96.9 F | DIASTOLIC BLOOD PRESSURE: 90 MMHG | HEART RATE: 75 BPM | SYSTOLIC BLOOD PRESSURE: 140 MMHG | OXYGEN SATURATION: 99 %

## 2017-11-09 PROCEDURE — 3331090001 HH PPS REVENUE CREDIT

## 2017-11-09 PROCEDURE — G0151 HHCP-SERV OF PT,EA 15 MIN: HCPCS

## 2017-11-09 PROCEDURE — 3331090002 HH PPS REVENUE DEBIT

## 2017-11-10 ENCOUNTER — TELEPHONE (OUTPATIENT)
Dept: SURGERY | Age: 67
End: 2017-11-10

## 2017-11-10 ENCOUNTER — HOME CARE VISIT (OUTPATIENT)
Dept: SCHEDULING | Facility: HOME HEALTH | Age: 67
End: 2017-11-10
Payer: MEDICARE

## 2017-11-10 PROCEDURE — 3331090002 HH PPS REVENUE DEBIT

## 2017-11-10 PROCEDURE — 3331090001 HH PPS REVENUE CREDIT

## 2017-11-10 PROCEDURE — G0300 HHS/HOSPICE OF LPN EA 15 MIN: HCPCS

## 2017-11-10 NOTE — TELEPHONE ENCOUNTER
I returned Santosh phone call from [facility]. She states patients daughter told her that Eugenia Villalobos NP had said Dr. Estefany Trujillo said would did not need to be packed anymore. Sheela states she can not coem out to place dry dressing over site which means they will have to close case. Daughter does not want this. She would like for home health to stay involved until completely healed. Adria note says to continue with daily would care. The nurse said they can still get some packing into the wound. I told her to continue daily would care as is states in Adria note until Eugenia Villalobos is ion on Monday and can address stopping wound care and home health visits or continuing wound care until healed.

## 2017-11-11 PROCEDURE — 3331090001 HH PPS REVENUE CREDIT

## 2017-11-11 PROCEDURE — 3331090002 HH PPS REVENUE DEBIT

## 2017-11-12 VITALS
TEMPERATURE: 97.5 F | RESPIRATION RATE: 18 BRPM | HEART RATE: 88 BPM | SYSTOLIC BLOOD PRESSURE: 142 MMHG | DIASTOLIC BLOOD PRESSURE: 82 MMHG | OXYGEN SATURATION: 97 %

## 2017-11-12 PROCEDURE — 3331090001 HH PPS REVENUE CREDIT

## 2017-11-12 PROCEDURE — 3331090002 HH PPS REVENUE DEBIT

## 2017-11-13 ENCOUNTER — HOME CARE VISIT (OUTPATIENT)
Dept: HOME HEALTH SERVICES | Facility: HOME HEALTH | Age: 67
End: 2017-11-13
Payer: MEDICARE

## 2017-11-13 PROCEDURE — 3331090002 HH PPS REVENUE DEBIT

## 2017-11-13 PROCEDURE — 3331090001 HH PPS REVENUE CREDIT

## 2017-11-13 NOTE — TELEPHONE ENCOUNTER
Per Ann Holland called Sheela, home health nurse and let her know that it was fine to continue to pack and keep home health visits.

## 2017-11-14 ENCOUNTER — HOME CARE VISIT (OUTPATIENT)
Dept: SCHEDULING | Facility: HOME HEALTH | Age: 67
End: 2017-11-14
Payer: MEDICARE

## 2017-11-14 ENCOUNTER — HOME CARE VISIT (OUTPATIENT)
Dept: HOME HEALTH SERVICES | Facility: HOME HEALTH | Age: 67
End: 2017-11-14
Payer: MEDICARE

## 2017-11-14 PROCEDURE — 3331090002 HH PPS REVENUE DEBIT

## 2017-11-14 PROCEDURE — 3331090001 HH PPS REVENUE CREDIT

## 2017-11-15 ENCOUNTER — HOME CARE VISIT (OUTPATIENT)
Dept: SCHEDULING | Facility: HOME HEALTH | Age: 67
End: 2017-11-15
Payer: MEDICARE

## 2017-11-15 VITALS
SYSTOLIC BLOOD PRESSURE: 134 MMHG | RESPIRATION RATE: 20 BRPM | OXYGEN SATURATION: 97 % | DIASTOLIC BLOOD PRESSURE: 76 MMHG | HEART RATE: 75 BPM | TEMPERATURE: 97 F

## 2017-11-15 PROCEDURE — 3331090001 HH PPS REVENUE CREDIT

## 2017-11-15 PROCEDURE — 3331090002 HH PPS REVENUE DEBIT

## 2017-11-15 PROCEDURE — G0300 HHS/HOSPICE OF LPN EA 15 MIN: HCPCS

## 2017-11-15 PROCEDURE — G0152 HHCP-SERV OF OT,EA 15 MIN: HCPCS

## 2017-11-16 ENCOUNTER — HOME CARE VISIT (OUTPATIENT)
Dept: SCHEDULING | Facility: HOME HEALTH | Age: 67
End: 2017-11-16
Payer: MEDICARE

## 2017-11-16 VITALS
DIASTOLIC BLOOD PRESSURE: 70 MMHG | TEMPERATURE: 97.8 F | HEART RATE: 66 BPM | SYSTOLIC BLOOD PRESSURE: 138 MMHG | OXYGEN SATURATION: 99 %

## 2017-11-16 VITALS
SYSTOLIC BLOOD PRESSURE: 131 MMHG | HEART RATE: 76 BPM | TEMPERATURE: 97.5 F | OXYGEN SATURATION: 99 % | DIASTOLIC BLOOD PRESSURE: 68 MMHG

## 2017-11-16 PROCEDURE — G0151 HHCP-SERV OF PT,EA 15 MIN: HCPCS

## 2017-11-16 PROCEDURE — 3331090002 HH PPS REVENUE DEBIT

## 2017-11-16 PROCEDURE — 3331090001 HH PPS REVENUE CREDIT

## 2017-11-17 ENCOUNTER — HOME CARE VISIT (OUTPATIENT)
Dept: SCHEDULING | Facility: HOME HEALTH | Age: 67
End: 2017-11-17
Payer: MEDICARE

## 2017-11-17 VITALS
DIASTOLIC BLOOD PRESSURE: 70 MMHG | RESPIRATION RATE: 18 BRPM | SYSTOLIC BLOOD PRESSURE: 108 MMHG | TEMPERATURE: 97.2 F | HEART RATE: 75 BPM | OXYGEN SATURATION: 98 %

## 2017-11-17 PROCEDURE — 3331090001 HH PPS REVENUE CREDIT

## 2017-11-17 PROCEDURE — 3331090002 HH PPS REVENUE DEBIT

## 2017-11-17 PROCEDURE — G0300 HHS/HOSPICE OF LPN EA 15 MIN: HCPCS

## 2017-11-18 PROCEDURE — 3331090001 HH PPS REVENUE CREDIT

## 2017-11-18 PROCEDURE — 3331090002 HH PPS REVENUE DEBIT

## 2017-11-19 PROCEDURE — 3331090002 HH PPS REVENUE DEBIT

## 2017-11-19 PROCEDURE — 3331090001 HH PPS REVENUE CREDIT

## 2017-11-20 ENCOUNTER — HOME CARE VISIT (OUTPATIENT)
Dept: SCHEDULING | Facility: HOME HEALTH | Age: 67
End: 2017-11-20
Payer: MEDICARE

## 2017-11-20 ENCOUNTER — HOME CARE VISIT (OUTPATIENT)
Dept: HOME HEALTH SERVICES | Facility: HOME HEALTH | Age: 67
End: 2017-11-20
Payer: MEDICARE

## 2017-11-20 VITALS
TEMPERATURE: 97.8 F | DIASTOLIC BLOOD PRESSURE: 68 MMHG | HEART RATE: 73 BPM | OXYGEN SATURATION: 98 % | SYSTOLIC BLOOD PRESSURE: 128 MMHG

## 2017-11-20 VITALS
HEART RATE: 73 BPM | SYSTOLIC BLOOD PRESSURE: 128 MMHG | DIASTOLIC BLOOD PRESSURE: 68 MMHG | TEMPERATURE: 97.8 F | OXYGEN SATURATION: 98 %

## 2017-11-20 PROCEDURE — G0152 HHCP-SERV OF OT,EA 15 MIN: HCPCS

## 2017-11-20 PROCEDURE — 3331090002 HH PPS REVENUE DEBIT

## 2017-11-20 PROCEDURE — G0151 HHCP-SERV OF PT,EA 15 MIN: HCPCS

## 2017-11-20 PROCEDURE — 3331090001 HH PPS REVENUE CREDIT

## 2017-11-21 PROCEDURE — 3331090002 HH PPS REVENUE DEBIT

## 2017-11-21 PROCEDURE — 3331090001 HH PPS REVENUE CREDIT

## 2017-11-22 ENCOUNTER — HOME CARE VISIT (OUTPATIENT)
Dept: SCHEDULING | Facility: HOME HEALTH | Age: 67
End: 2017-11-22
Payer: MEDICARE

## 2017-11-22 PROCEDURE — 3331090002 HH PPS REVENUE DEBIT

## 2017-11-22 PROCEDURE — 3331090001 HH PPS REVENUE CREDIT

## 2017-11-22 PROCEDURE — G0300 HHS/HOSPICE OF LPN EA 15 MIN: HCPCS

## 2017-11-23 VITALS
DIASTOLIC BLOOD PRESSURE: 68 MMHG | HEART RATE: 89 BPM | OXYGEN SATURATION: 98 % | TEMPERATURE: 97.6 F | SYSTOLIC BLOOD PRESSURE: 122 MMHG | RESPIRATION RATE: 20 BRPM

## 2017-11-23 PROCEDURE — 3331090002 HH PPS REVENUE DEBIT

## 2017-11-23 PROCEDURE — 3331090001 HH PPS REVENUE CREDIT

## 2017-11-24 ENCOUNTER — HOME CARE VISIT (OUTPATIENT)
Dept: SCHEDULING | Facility: HOME HEALTH | Age: 67
End: 2017-11-24
Payer: MEDICARE

## 2017-11-24 VITALS
HEART RATE: 70 BPM | TEMPERATURE: 97.7 F | OXYGEN SATURATION: 99 % | RESPIRATION RATE: 18 BRPM | DIASTOLIC BLOOD PRESSURE: 70 MMHG | SYSTOLIC BLOOD PRESSURE: 130 MMHG

## 2017-11-24 PROCEDURE — 3331090001 HH PPS REVENUE CREDIT

## 2017-11-24 PROCEDURE — G0151 HHCP-SERV OF PT,EA 15 MIN: HCPCS

## 2017-11-24 PROCEDURE — 3331090002 HH PPS REVENUE DEBIT

## 2017-11-25 PROCEDURE — 3331090001 HH PPS REVENUE CREDIT

## 2017-11-25 PROCEDURE — 3331090002 HH PPS REVENUE DEBIT

## 2017-11-26 PROCEDURE — 3331090001 HH PPS REVENUE CREDIT

## 2017-11-26 PROCEDURE — 3331090002 HH PPS REVENUE DEBIT

## 2017-11-27 LAB
BACTERIA SPEC CULT: NORMAL
SERVICE CMNT-IMP: NORMAL

## 2017-11-27 PROCEDURE — 3331090001 HH PPS REVENUE CREDIT

## 2017-11-27 PROCEDURE — 3331090002 HH PPS REVENUE DEBIT

## 2017-11-28 ENCOUNTER — HOME CARE VISIT (OUTPATIENT)
Dept: SCHEDULING | Facility: HOME HEALTH | Age: 67
End: 2017-11-28
Payer: MEDICARE

## 2017-11-28 VITALS
TEMPERATURE: 97.7 F | OXYGEN SATURATION: 97 % | DIASTOLIC BLOOD PRESSURE: 75 MMHG | RESPIRATION RATE: 18 BRPM | HEART RATE: 66 BPM | SYSTOLIC BLOOD PRESSURE: 130 MMHG

## 2017-11-28 VITALS
DIASTOLIC BLOOD PRESSURE: 76 MMHG | HEART RATE: 73 BPM | SYSTOLIC BLOOD PRESSURE: 140 MMHG | RESPIRATION RATE: 20 BRPM | TEMPERATURE: 97.9 F | OXYGEN SATURATION: 97 %

## 2017-11-28 PROCEDURE — 3331090001 HH PPS REVENUE CREDIT

## 2017-11-28 PROCEDURE — 3331090002 HH PPS REVENUE DEBIT

## 2017-11-28 PROCEDURE — G0152 HHCP-SERV OF OT,EA 15 MIN: HCPCS

## 2017-11-28 PROCEDURE — G0300 HHS/HOSPICE OF LPN EA 15 MIN: HCPCS

## 2017-11-29 PROCEDURE — 3331090001 HH PPS REVENUE CREDIT

## 2017-11-29 PROCEDURE — 3331090002 HH PPS REVENUE DEBIT

## 2017-11-30 ENCOUNTER — HOME CARE VISIT (OUTPATIENT)
Dept: HOME HEALTH SERVICES | Facility: HOME HEALTH | Age: 67
End: 2017-11-30
Payer: MEDICARE

## 2017-11-30 PROCEDURE — 3331090001 HH PPS REVENUE CREDIT

## 2017-11-30 PROCEDURE — 3331090002 HH PPS REVENUE DEBIT

## 2017-12-01 ENCOUNTER — HOME CARE VISIT (OUTPATIENT)
Dept: SCHEDULING | Facility: HOME HEALTH | Age: 67
End: 2017-12-01
Payer: MEDICARE

## 2017-12-01 PROCEDURE — 3331090002 HH PPS REVENUE DEBIT

## 2017-12-01 PROCEDURE — 3331090001 HH PPS REVENUE CREDIT

## 2017-12-02 PROCEDURE — 3331090002 HH PPS REVENUE DEBIT

## 2017-12-02 PROCEDURE — 3331090001 HH PPS REVENUE CREDIT

## 2017-12-03 PROCEDURE — 3331090002 HH PPS REVENUE DEBIT

## 2017-12-03 PROCEDURE — 3331090001 HH PPS REVENUE CREDIT

## 2017-12-04 PROCEDURE — 3331090001 HH PPS REVENUE CREDIT

## 2017-12-04 PROCEDURE — 3331090002 HH PPS REVENUE DEBIT

## 2017-12-05 ENCOUNTER — HOME CARE VISIT (OUTPATIENT)
Dept: SCHEDULING | Facility: HOME HEALTH | Age: 67
End: 2017-12-05
Payer: MEDICARE

## 2017-12-05 VITALS
HEART RATE: 78 BPM | TEMPERATURE: 97.3 F | DIASTOLIC BLOOD PRESSURE: 64 MMHG | RESPIRATION RATE: 20 BRPM | SYSTOLIC BLOOD PRESSURE: 112 MMHG | OXYGEN SATURATION: 99 %

## 2017-12-05 VITALS
HEART RATE: 63 BPM | SYSTOLIC BLOOD PRESSURE: 120 MMHG | DIASTOLIC BLOOD PRESSURE: 70 MMHG | OXYGEN SATURATION: 99 % | RESPIRATION RATE: 18 BRPM | TEMPERATURE: 97.9 F

## 2017-12-05 PROCEDURE — 3331090002 HH PPS REVENUE DEBIT

## 2017-12-05 PROCEDURE — G0300 HHS/HOSPICE OF LPN EA 15 MIN: HCPCS

## 2017-12-05 PROCEDURE — 3331090001 HH PPS REVENUE CREDIT

## 2017-12-05 PROCEDURE — G0151 HHCP-SERV OF PT,EA 15 MIN: HCPCS

## 2017-12-06 PROCEDURE — 3331090002 HH PPS REVENUE DEBIT

## 2017-12-06 PROCEDURE — 3331090001 HH PPS REVENUE CREDIT

## 2017-12-07 ENCOUNTER — HOME CARE VISIT (OUTPATIENT)
Dept: HOME HEALTH SERVICES | Facility: HOME HEALTH | Age: 67
End: 2017-12-07
Payer: MEDICARE

## 2017-12-07 ENCOUNTER — HOME CARE VISIT (OUTPATIENT)
Dept: SCHEDULING | Facility: HOME HEALTH | Age: 67
End: 2017-12-07
Payer: MEDICARE

## 2017-12-07 VITALS
DIASTOLIC BLOOD PRESSURE: 70 MMHG | OXYGEN SATURATION: 97 % | SYSTOLIC BLOOD PRESSURE: 120 MMHG | TEMPERATURE: 97.3 F | RESPIRATION RATE: 18 BRPM | HEART RATE: 49 BPM

## 2017-12-07 PROCEDURE — 3331090002 HH PPS REVENUE DEBIT

## 2017-12-07 PROCEDURE — 3331090001 HH PPS REVENUE CREDIT

## 2017-12-07 PROCEDURE — G0151 HHCP-SERV OF PT,EA 15 MIN: HCPCS

## 2017-12-08 PROCEDURE — 3331090002 HH PPS REVENUE DEBIT

## 2017-12-08 PROCEDURE — 3331090001 HH PPS REVENUE CREDIT

## 2017-12-09 LAB
ACID FAST STN SPEC: NEGATIVE
MYCOBACTERIUM SPEC QL CULT: NEGATIVE
SPECIMEN PREPARATION: NORMAL
SPECIMEN SOURCE: NORMAL

## 2017-12-09 PROCEDURE — 3331090001 HH PPS REVENUE CREDIT

## 2017-12-09 PROCEDURE — 3331090002 HH PPS REVENUE DEBIT

## 2017-12-10 PROCEDURE — 3331090001 HH PPS REVENUE CREDIT

## 2017-12-10 PROCEDURE — 3331090002 HH PPS REVENUE DEBIT

## 2017-12-11 ENCOUNTER — HOME CARE VISIT (OUTPATIENT)
Dept: SCHEDULING | Facility: HOME HEALTH | Age: 67
End: 2017-12-11
Payer: MEDICARE

## 2017-12-11 VITALS
SYSTOLIC BLOOD PRESSURE: 148 MMHG | DIASTOLIC BLOOD PRESSURE: 78 MMHG | OXYGEN SATURATION: 98 % | HEART RATE: 78 BPM | TEMPERATURE: 99 F | RESPIRATION RATE: 18 BRPM

## 2017-12-11 PROCEDURE — 3331090002 HH PPS REVENUE DEBIT

## 2017-12-11 PROCEDURE — G0299 HHS/HOSPICE OF RN EA 15 MIN: HCPCS

## 2017-12-11 PROCEDURE — 3331090001 HH PPS REVENUE CREDIT

## 2017-12-12 ENCOUNTER — HOME CARE VISIT (OUTPATIENT)
Dept: SCHEDULING | Facility: HOME HEALTH | Age: 67
End: 2017-12-12
Payer: MEDICARE

## 2017-12-12 VITALS
HEART RATE: 70 BPM | SYSTOLIC BLOOD PRESSURE: 128 MMHG | OXYGEN SATURATION: 97 % | TEMPERATURE: 97 F | DIASTOLIC BLOOD PRESSURE: 70 MMHG | RESPIRATION RATE: 18 BRPM

## 2017-12-12 PROCEDURE — G0151 HHCP-SERV OF PT,EA 15 MIN: HCPCS

## 2017-12-12 PROCEDURE — 3331090002 HH PPS REVENUE DEBIT

## 2017-12-12 PROCEDURE — 3331090001 HH PPS REVENUE CREDIT

## 2017-12-13 PROCEDURE — 3331090002 HH PPS REVENUE DEBIT

## 2017-12-13 PROCEDURE — 3331090001 HH PPS REVENUE CREDIT

## 2017-12-14 PROCEDURE — 3331090001 HH PPS REVENUE CREDIT

## 2017-12-14 PROCEDURE — 3331090002 HH PPS REVENUE DEBIT

## 2017-12-15 ENCOUNTER — HOME CARE VISIT (OUTPATIENT)
Dept: SCHEDULING | Facility: HOME HEALTH | Age: 67
End: 2017-12-15
Payer: MEDICARE

## 2017-12-15 VITALS
HEART RATE: 81 BPM | RESPIRATION RATE: 20 BRPM | DIASTOLIC BLOOD PRESSURE: 60 MMHG | OXYGEN SATURATION: 97 % | SYSTOLIC BLOOD PRESSURE: 104 MMHG | TEMPERATURE: 97.4 F

## 2017-12-15 PROCEDURE — 3331090001 HH PPS REVENUE CREDIT

## 2017-12-15 PROCEDURE — 3331090002 HH PPS REVENUE DEBIT

## 2017-12-15 PROCEDURE — G0151 HHCP-SERV OF PT,EA 15 MIN: HCPCS

## 2017-12-15 PROCEDURE — G0300 HHS/HOSPICE OF LPN EA 15 MIN: HCPCS

## 2017-12-16 VITALS
SYSTOLIC BLOOD PRESSURE: 130 MMHG | HEART RATE: 81 BPM | OXYGEN SATURATION: 96 % | RESPIRATION RATE: 18 BRPM | DIASTOLIC BLOOD PRESSURE: 80 MMHG | TEMPERATURE: 97.9 F

## 2017-12-16 PROCEDURE — 3331090002 HH PPS REVENUE DEBIT

## 2017-12-16 PROCEDURE — 3331090001 HH PPS REVENUE CREDIT

## 2017-12-17 PROCEDURE — 3331090001 HH PPS REVENUE CREDIT

## 2017-12-17 PROCEDURE — 3331090002 HH PPS REVENUE DEBIT

## 2017-12-18 PROCEDURE — 3331090001 HH PPS REVENUE CREDIT

## 2017-12-18 PROCEDURE — 3331090002 HH PPS REVENUE DEBIT

## 2017-12-19 ENCOUNTER — HOME CARE VISIT (OUTPATIENT)
Dept: SCHEDULING | Facility: HOME HEALTH | Age: 67
End: 2017-12-19
Payer: MEDICARE

## 2017-12-19 VITALS
OXYGEN SATURATION: 99 % | HEART RATE: 77 BPM | TEMPERATURE: 97.3 F | RESPIRATION RATE: 18 BRPM | DIASTOLIC BLOOD PRESSURE: 80 MMHG | SYSTOLIC BLOOD PRESSURE: 130 MMHG

## 2017-12-19 PROCEDURE — 3331090001 HH PPS REVENUE CREDIT

## 2017-12-19 PROCEDURE — G0151 HHCP-SERV OF PT,EA 15 MIN: HCPCS

## 2017-12-19 PROCEDURE — 3331090002 HH PPS REVENUE DEBIT

## 2017-12-20 ENCOUNTER — HOME CARE VISIT (OUTPATIENT)
Dept: SCHEDULING | Facility: HOME HEALTH | Age: 67
End: 2017-12-20
Payer: MEDICARE

## 2017-12-20 PROCEDURE — 3331090002 HH PPS REVENUE DEBIT

## 2017-12-20 PROCEDURE — 3331090001 HH PPS REVENUE CREDIT

## 2017-12-20 PROCEDURE — G0300 HHS/HOSPICE OF LPN EA 15 MIN: HCPCS

## 2017-12-21 ENCOUNTER — HOME CARE VISIT (OUTPATIENT)
Dept: SCHEDULING | Facility: HOME HEALTH | Age: 67
End: 2017-12-21
Payer: MEDICARE

## 2017-12-21 VITALS
DIASTOLIC BLOOD PRESSURE: 74 MMHG | OXYGEN SATURATION: 97 % | TEMPERATURE: 97.9 F | SYSTOLIC BLOOD PRESSURE: 124 MMHG | RESPIRATION RATE: 20 BRPM | HEART RATE: 74 BPM

## 2017-12-21 VITALS
OXYGEN SATURATION: 97 % | TEMPERATURE: 97 F | RESPIRATION RATE: 18 BRPM | SYSTOLIC BLOOD PRESSURE: 118 MMHG | HEART RATE: 64 BPM | DIASTOLIC BLOOD PRESSURE: 62 MMHG

## 2017-12-21 PROCEDURE — 3331090001 HH PPS REVENUE CREDIT

## 2017-12-21 PROCEDURE — G0151 HHCP-SERV OF PT,EA 15 MIN: HCPCS

## 2017-12-21 PROCEDURE — 3331090002 HH PPS REVENUE DEBIT

## 2017-12-22 ENCOUNTER — HOME CARE VISIT (OUTPATIENT)
Dept: SCHEDULING | Facility: HOME HEALTH | Age: 67
End: 2017-12-22
Payer: MEDICARE

## 2017-12-22 VITALS
DIASTOLIC BLOOD PRESSURE: 74 MMHG | TEMPERATURE: 97.7 F | RESPIRATION RATE: 20 BRPM | HEART RATE: 87 BPM | OXYGEN SATURATION: 97 % | SYSTOLIC BLOOD PRESSURE: 122 MMHG

## 2017-12-22 PROCEDURE — G0300 HHS/HOSPICE OF LPN EA 15 MIN: HCPCS

## 2017-12-22 PROCEDURE — 3331090002 HH PPS REVENUE DEBIT

## 2017-12-22 PROCEDURE — 3331090001 HH PPS REVENUE CREDIT

## 2017-12-23 PROCEDURE — 3331090001 HH PPS REVENUE CREDIT

## 2017-12-23 PROCEDURE — 3331090002 HH PPS REVENUE DEBIT

## 2017-12-24 PROCEDURE — 3331090001 HH PPS REVENUE CREDIT

## 2017-12-24 PROCEDURE — 3331090002 HH PPS REVENUE DEBIT

## 2017-12-25 PROCEDURE — 3331090001 HH PPS REVENUE CREDIT

## 2017-12-25 PROCEDURE — 3331090002 HH PPS REVENUE DEBIT

## 2017-12-26 ENCOUNTER — HOME CARE VISIT (OUTPATIENT)
Dept: SCHEDULING | Facility: HOME HEALTH | Age: 67
End: 2017-12-26
Payer: MEDICARE

## 2017-12-26 VITALS
RESPIRATION RATE: 18 BRPM | OXYGEN SATURATION: 97 % | DIASTOLIC BLOOD PRESSURE: 60 MMHG | SYSTOLIC BLOOD PRESSURE: 108 MMHG | HEART RATE: 68 BPM | TEMPERATURE: 97.4 F

## 2017-12-26 VITALS
DIASTOLIC BLOOD PRESSURE: 80 MMHG | SYSTOLIC BLOOD PRESSURE: 132 MMHG | OXYGEN SATURATION: 97 % | RESPIRATION RATE: 20 BRPM | HEART RATE: 84 BPM | TEMPERATURE: 97.8 F

## 2017-12-26 PROCEDURE — G0151 HHCP-SERV OF PT,EA 15 MIN: HCPCS

## 2017-12-26 PROCEDURE — G0300 HHS/HOSPICE OF LPN EA 15 MIN: HCPCS

## 2017-12-26 PROCEDURE — 3331090002 HH PPS REVENUE DEBIT

## 2017-12-26 PROCEDURE — 3331090001 HH PPS REVENUE CREDIT

## 2017-12-27 PROCEDURE — 3331090001 HH PPS REVENUE CREDIT

## 2017-12-27 PROCEDURE — 3331090002 HH PPS REVENUE DEBIT

## 2017-12-28 ENCOUNTER — HOME CARE VISIT (OUTPATIENT)
Dept: SCHEDULING | Facility: HOME HEALTH | Age: 67
End: 2017-12-28
Payer: MEDICARE

## 2017-12-28 VITALS
OXYGEN SATURATION: 98 % | DIASTOLIC BLOOD PRESSURE: 60 MMHG | SYSTOLIC BLOOD PRESSURE: 100 MMHG | RESPIRATION RATE: 20 BRPM | HEART RATE: 89 BPM | TEMPERATURE: 97.9 F

## 2017-12-28 PROCEDURE — 3331090001 HH PPS REVENUE CREDIT

## 2017-12-28 PROCEDURE — G0300 HHS/HOSPICE OF LPN EA 15 MIN: HCPCS

## 2017-12-28 PROCEDURE — 3331090002 HH PPS REVENUE DEBIT

## 2017-12-29 PROCEDURE — 3331090002 HH PPS REVENUE DEBIT

## 2017-12-29 PROCEDURE — 3331090001 HH PPS REVENUE CREDIT

## 2017-12-30 PROCEDURE — 3331090001 HH PPS REVENUE CREDIT

## 2017-12-30 PROCEDURE — 3331090002 HH PPS REVENUE DEBIT

## 2017-12-31 PROCEDURE — 3331090002 HH PPS REVENUE DEBIT

## 2017-12-31 PROCEDURE — 3331090001 HH PPS REVENUE CREDIT

## 2018-01-01 PROCEDURE — 3331090001 HH PPS REVENUE CREDIT

## 2018-01-01 PROCEDURE — 3331090002 HH PPS REVENUE DEBIT

## 2018-01-02 ENCOUNTER — HOME CARE VISIT (OUTPATIENT)
Dept: SCHEDULING | Facility: HOME HEALTH | Age: 68
End: 2018-01-02
Payer: MEDICARE

## 2018-01-02 VITALS
HEART RATE: 69 BPM | SYSTOLIC BLOOD PRESSURE: 136 MMHG | RESPIRATION RATE: 20 BRPM | OXYGEN SATURATION: 98 % | TEMPERATURE: 97.2 F | DIASTOLIC BLOOD PRESSURE: 70 MMHG

## 2018-01-02 VITALS
RESPIRATION RATE: 18 BRPM | SYSTOLIC BLOOD PRESSURE: 140 MMHG | DIASTOLIC BLOOD PRESSURE: 80 MMHG | TEMPERATURE: 97.2 F | HEART RATE: 72 BPM | OXYGEN SATURATION: 99 %

## 2018-01-02 PROCEDURE — G0299 HHS/HOSPICE OF RN EA 15 MIN: HCPCS

## 2018-01-02 PROCEDURE — G0151 HHCP-SERV OF PT,EA 15 MIN: HCPCS

## 2018-01-02 PROCEDURE — 3331090001 HH PPS REVENUE CREDIT

## 2018-01-02 PROCEDURE — 3331090002 HH PPS REVENUE DEBIT

## 2018-01-03 PROCEDURE — 3331090001 HH PPS REVENUE CREDIT

## 2018-01-03 PROCEDURE — 3331090003 HH PPS REVENUE ADJ

## 2018-01-03 PROCEDURE — 3331090002 HH PPS REVENUE DEBIT

## 2018-01-04 ENCOUNTER — HOME CARE VISIT (OUTPATIENT)
Dept: SCHEDULING | Facility: HOME HEALTH | Age: 68
End: 2018-01-04
Payer: MEDICARE

## 2018-01-04 PROCEDURE — 3331090001 HH PPS REVENUE CREDIT

## 2018-01-04 PROCEDURE — 3331090002 HH PPS REVENUE DEBIT

## 2018-01-05 PROCEDURE — 3331090002 HH PPS REVENUE DEBIT

## 2018-01-05 PROCEDURE — 3331090001 HH PPS REVENUE CREDIT

## 2018-01-06 PROCEDURE — 3331090001 HH PPS REVENUE CREDIT

## 2018-01-06 PROCEDURE — 3331090002 HH PPS REVENUE DEBIT

## 2018-01-07 PROCEDURE — 3331090002 HH PPS REVENUE DEBIT

## 2018-01-07 PROCEDURE — 3331090001 HH PPS REVENUE CREDIT

## 2018-01-08 PROCEDURE — 3331090001 HH PPS REVENUE CREDIT

## 2018-01-08 PROCEDURE — 3331090002 HH PPS REVENUE DEBIT

## 2018-01-09 ENCOUNTER — TELEPHONE (OUTPATIENT)
Dept: SURGERY | Age: 68
End: 2018-01-09

## 2018-01-09 ENCOUNTER — HOME CARE VISIT (OUTPATIENT)
Dept: SCHEDULING | Facility: HOME HEALTH | Age: 68
End: 2018-01-09
Payer: MEDICARE

## 2018-01-09 PROCEDURE — 3331090001 HH PPS REVENUE CREDIT

## 2018-01-09 PROCEDURE — 3331090002 HH PPS REVENUE DEBIT

## 2018-01-10 PROCEDURE — 3331090001 HH PPS REVENUE CREDIT

## 2018-01-10 PROCEDURE — 3331090002 HH PPS REVENUE DEBIT

## 2018-01-10 NOTE — TELEPHONE ENCOUNTER
I returned call from nurse  Ruby [phone]. She states daughter had called and ask her where they were, why wasn't home health coming out to house to dress her mothers wound. She said home health can not come out to only put a dry dressing on. I told her we have not seen the patient in 2 months and if she could have the patient make an appointment for wound evaluation or give us some measurements with their recommendations we could determine medical necessity for continuation or D/Cing wound care.

## 2018-01-11 ENCOUNTER — HOSPITAL ENCOUNTER (OUTPATIENT)
Age: 68
Setting detail: OUTPATIENT SURGERY
Discharge: HOME | End: 2018-01-11
Attending: INTERNAL MEDICINE | Admitting: INTERNAL MEDICINE
Payer: MEDICARE

## 2018-01-11 ENCOUNTER — ANESTHESIA (OUTPATIENT)
Dept: ENDOSCOPY | Age: 68
End: 2018-01-11
Payer: MEDICARE

## 2018-01-11 ENCOUNTER — ANESTHESIA EVENT (OUTPATIENT)
Dept: ENDOSCOPY | Age: 68
End: 2018-01-11
Payer: MEDICARE

## 2018-01-11 VITALS
HEART RATE: 99 BPM | SYSTOLIC BLOOD PRESSURE: 145 MMHG | OXYGEN SATURATION: 100 % | BODY MASS INDEX: 33.62 KG/M2 | WEIGHT: 248.24 LBS | RESPIRATION RATE: 14 BRPM | DIASTOLIC BLOOD PRESSURE: 67 MMHG | HEIGHT: 72 IN | TEMPERATURE: 98 F

## 2018-01-11 LAB
ANION GAP BLD CALC-SCNC: 8 MMOL/L (ref 5–15)
BUN BLD-MCNC: 38 MG/DL (ref 9–20)
CA-I BLD-MCNC: 1.04 MMOL/L (ref 1.12–1.32)
CHLORIDE BLD-SCNC: 93 MMOL/L (ref 98–107)
CO2 BLD-SCNC: 38 MMOL/L (ref 21–32)
CREAT BLD-MCNC: 6 MG/DL (ref 0.6–1.3)
GLUCOSE BLD-MCNC: 182 MG/DL (ref 65–100)
HCT VFR BLD CALC: 37 % (ref 35–47)
HGB BLD-MCNC: 12.6 GM/DL (ref 11.5–16)
POTASSIUM BLD-SCNC: 3.9 MMOL/L (ref 3.5–5.1)
SERVICE CMNT-IMP: ABNORMAL
SODIUM BLD-SCNC: 135 MMOL/L (ref 136–145)

## 2018-01-11 PROCEDURE — 80047 BASIC METABLC PNL IONIZED CA: CPT

## 2018-01-11 PROCEDURE — 76040000019: Performed by: INTERNAL MEDICINE

## 2018-01-11 PROCEDURE — 76060000031 HC ANESTHESIA FIRST 0.5 HR: Performed by: INTERNAL MEDICINE

## 2018-01-11 PROCEDURE — 74011000250 HC RX REV CODE- 250

## 2018-01-11 PROCEDURE — 3331090002 HH PPS REVENUE DEBIT

## 2018-01-11 PROCEDURE — 74011250636 HC RX REV CODE- 250/636

## 2018-01-11 PROCEDURE — 77030027957 HC TBNG IRR ENDOGTR BUSS -B: Performed by: INTERNAL MEDICINE

## 2018-01-11 PROCEDURE — 3331090001 HH PPS REVENUE CREDIT

## 2018-01-11 RX ORDER — SODIUM CHLORIDE 0.9 % (FLUSH) 0.9 %
5-10 SYRINGE (ML) INJECTION AS NEEDED
Status: DISCONTINUED | OUTPATIENT
Start: 2018-01-11 | End: 2018-01-11 | Stop reason: HOSPADM

## 2018-01-11 RX ORDER — NALOXONE HYDROCHLORIDE 0.4 MG/ML
0.4 INJECTION, SOLUTION INTRAMUSCULAR; INTRAVENOUS; SUBCUTANEOUS
Status: DISCONTINUED | OUTPATIENT
Start: 2018-01-11 | End: 2018-01-11 | Stop reason: HOSPADM

## 2018-01-11 RX ORDER — EPINEPHRINE 0.1 MG/ML
1 INJECTION INTRACARDIAC; INTRAVENOUS
Status: DISCONTINUED | OUTPATIENT
Start: 2018-01-11 | End: 2018-01-11 | Stop reason: HOSPADM

## 2018-01-11 RX ORDER — SODIUM CHLORIDE 9 MG/ML
INJECTION, SOLUTION INTRAVENOUS
Status: DISCONTINUED | OUTPATIENT
Start: 2018-01-11 | End: 2018-01-11 | Stop reason: HOSPADM

## 2018-01-11 RX ORDER — FLUMAZENIL 0.1 MG/ML
0.2 INJECTION INTRAVENOUS
Status: DISCONTINUED | OUTPATIENT
Start: 2018-01-11 | End: 2018-01-11 | Stop reason: HOSPADM

## 2018-01-11 RX ORDER — FENTANYL CITRATE 50 UG/ML
200 INJECTION, SOLUTION INTRAMUSCULAR; INTRAVENOUS
Status: DISCONTINUED | OUTPATIENT
Start: 2018-01-11 | End: 2018-01-11 | Stop reason: HOSPADM

## 2018-01-11 RX ORDER — MIDAZOLAM HYDROCHLORIDE 1 MG/ML
.25-1 INJECTION, SOLUTION INTRAMUSCULAR; INTRAVENOUS
Status: DISCONTINUED | OUTPATIENT
Start: 2018-01-11 | End: 2018-01-11 | Stop reason: HOSPADM

## 2018-01-11 RX ORDER — DEXTROMETHORPHAN/PSEUDOEPHED 2.5-7.5/.8
1.2 DROPS ORAL
Status: DISCONTINUED | OUTPATIENT
Start: 2018-01-11 | End: 2018-01-11 | Stop reason: HOSPADM

## 2018-01-11 RX ORDER — SODIUM CHLORIDE 0.9 % (FLUSH) 0.9 %
5-10 SYRINGE (ML) INJECTION EVERY 8 HOURS
Status: DISCONTINUED | OUTPATIENT
Start: 2018-01-11 | End: 2018-01-11 | Stop reason: HOSPADM

## 2018-01-11 RX ORDER — LIDOCAINE HYDROCHLORIDE 20 MG/ML
INJECTION, SOLUTION EPIDURAL; INFILTRATION; INTRACAUDAL; PERINEURAL AS NEEDED
Status: DISCONTINUED | OUTPATIENT
Start: 2018-01-11 | End: 2018-01-11 | Stop reason: HOSPADM

## 2018-01-11 RX ORDER — PROPOFOL 10 MG/ML
INJECTION, EMULSION INTRAVENOUS AS NEEDED
Status: DISCONTINUED | OUTPATIENT
Start: 2018-01-11 | End: 2018-01-11 | Stop reason: HOSPADM

## 2018-01-11 RX ORDER — SODIUM CHLORIDE 9 MG/ML
100 INJECTION, SOLUTION INTRAVENOUS CONTINUOUS
Status: DISCONTINUED | OUTPATIENT
Start: 2018-01-11 | End: 2018-01-11 | Stop reason: HOSPADM

## 2018-01-11 RX ORDER — ATROPINE SULFATE 0.1 MG/ML
0.5 INJECTION INTRAVENOUS
Status: DISCONTINUED | OUTPATIENT
Start: 2018-01-11 | End: 2018-01-11 | Stop reason: HOSPADM

## 2018-01-11 RX ADMIN — PROPOFOL 50 MG: 10 INJECTION, EMULSION INTRAVENOUS at 08:27

## 2018-01-11 RX ADMIN — LIDOCAINE HYDROCHLORIDE 100 MG: 20 INJECTION, SOLUTION EPIDURAL; INFILTRATION; INTRACAUDAL; PERINEURAL at 08:23

## 2018-01-11 RX ADMIN — PROPOFOL 50 MG: 10 INJECTION, EMULSION INTRAVENOUS at 08:31

## 2018-01-11 RX ADMIN — PROPOFOL 50 MG: 10 INJECTION, EMULSION INTRAVENOUS at 08:23

## 2018-01-11 RX ADMIN — PROPOFOL 50 MG: 10 INJECTION, EMULSION INTRAVENOUS at 08:25

## 2018-01-11 RX ADMIN — PROPOFOL 50 MG: 10 INJECTION, EMULSION INTRAVENOUS at 08:34

## 2018-01-11 RX ADMIN — SODIUM CHLORIDE: 9 INJECTION, SOLUTION INTRAVENOUS at 08:18

## 2018-01-11 RX ADMIN — PROPOFOL 50 MG: 10 INJECTION, EMULSION INTRAVENOUS at 08:39

## 2018-01-11 RX ADMIN — PROPOFOL 50 MG: 10 INJECTION, EMULSION INTRAVENOUS at 08:28

## 2018-01-11 NOTE — ANESTHESIA PREPROCEDURE EVALUATION
Anesthetic History   No history of anesthetic complications  PONV          Review of Systems / Medical History  Patient summary reviewed, nursing notes reviewed and pertinent labs reviewed    Pulmonary  Within defined limits      Sleep apnea    Asthma        Neuro/Psych   Within defined limits    CVA  Psychiatric history     Cardiovascular  Within defined limits  Hypertension              Exercise tolerance: <4 METS     GI/Hepatic/Renal  Within defined limits   GERD    Renal disease: dialysis       Endo/Other  Within defined limits  Diabetes  Hypothyroidism  Morbid obesity and arthritis     Other Findings              Physical Exam    Airway  Mallampati: III  TM Distance: 4 - 6 cm  Neck ROM: short neck   Mouth opening: Normal     Cardiovascular  Regular rate and rhythm,  S1 and S2 normal,  no murmur, click, rub, or gallop             Dental  No notable dental hx       Pulmonary  Breath sounds clear to auscultation               Abdominal  GI exam deferred       Other Findings            Anesthetic Plan    ASA: 3  Anesthesia type: MAC          Induction: Intravenous  Anesthetic plan and risks discussed with: Patient

## 2018-01-11 NOTE — H&P
The patient is a 75year old female who presents with a complaint of Constipation. Reason for encounter: for routine follow-up. There has been no associated abdominal distention, abdominal pain, anorexia, bladder disturbances, bleeding per rectum, depression, failure to pass flatus, family history of colon cancer, hemorrhoids, history of abdominal surgery, insufficient roughage in diet, intake of anticholinergics, intermittent diarrhea, nausea, neurological disease, painful bowel movements, trauma to spine, vomiting, weight loss or family history of colon polyps.  Note for \"Constipation\": stopped movantic because it caused cramping, due for colonoscopy, her sister also diagnosed with colon cancer, s/p multiple perineal abscess debridement by Dr. Placido Tran, on HD, linzess worked the best for constipation but not covered by her insurance      Problem List/Past Medical Precilla Cellar; 12/4/2017 1:19 PM)  Urinary Tract Infection    Hypertension    Depression    Asthma    Dialysis    Arthritis    Dysphagia    Colonic Polyps    Diabetes Mellitus, Type II    Epigastric abdominal pain (789.06  R10.13)   she had it for years, getting worse, negative labs and CT scan she is on hemodilaysis needs to r/o ulcers, gastritis, IBS, or abdominal adhesions will request all her records from Dr. Gaetano Constantino  Weight loss, abnormal (783.21  R63.4)    Abdominal pain, RLQ (789.03  R10.31)   i suspect from adhesions, also possible IBS, needs also to r/o any colonic neoplasm will schedule her endoscopic w/u under MAC  Diarrhea (787.91) (787.91  R19.7)    Generalized abdominal pain (789.07  R10.84)   she c/o severe constipation for weeks after given more pain medication then went to ER, given golytely, since then c/o of diarrhea and more abdominal pain to take bentyl tid for next few days then as needed  Nausea alone (787.02) (787.02  R11.0)   Suspect from gastroparesis i gave her dietary instructions about it  Abdominal pain, LLQ (789.04  R10.32)    Dysphagia (787.20  R13.10)    Constipation (564.00  K59.00)    Heartburn (787.1) (787.1  R12)    Abdominal swelling, generalized (789.37  R19.07)    Dyspepsia (536.8  K30)    Abdominal pain, lower (789.09  R10.30)      Past Surgical History Rich Piper; 12/4/2017 1:19 PM)  cyst removed from spine    5th vertebrae fused    Stomach Surgery    Cataract surgery    femur Surgery   Right. Polypectomy    Cholecystectomy    Hysterectomy; Abdominal    achilles   Left. Appendectomy    fistula placed in right arm      Allergies Rich Piper; 12/4/2017 1:19 PM)  opium sensitive    Latex    Penicillins    levaquin    IODINE    Iodinated Contrast    keflex    celebrex    tylox      Medication History Rich Piper; 12/4/2017 1:25 PM)  Carvedilol  (12.5MG Tablet, Oral 25 am 12.5 pm) Active. HumaLOG Mix 50/50 Pen  (sliding scale Subcutaneous 12 units as needed) Specific strength unknown - Active. Prevacid  (30MG Capsule DR, 1 Capsule DR Oral BID, Taken starting 12/09/2013) Active. MiraLax  (Oral) Specific strength unknown - Active. Voltaren  (1% Gel, Transdermal) Active. Aspirin Low Dose  (81MG Tablet, Oral) Active. Percocet  (7.5-325MG Tablet, Oral as needed) Active. Simvastatin  (20MG Tablet, Oral) Active. Sensipar  (1 Oral daily) Specific strength unknown - Active. Colace  (100MG Capsule, 1 Oral BID) Active. PROzac  (20MG Capsule, 1 Oral daily) Active. Levemir  (100UNIT/ML Solution, 8 units Subcutaneous daily) Active. Nortriptyline HCl  (25MG Capsule, 1 Oral at bedtime) Active. Renvela  (800MG Tablet, 2 Oral before meals and at bedtime) Active. Auryxia  (1 [id number] MG(Fe) Tablet, Oral three times daily with meals) Active. Medications Reconciled     Family History Toño Piper; 12/4/2017 1:19 PM)  Diabetes Mellitus   Father, Mother. Stroke   Family Members In General.  Hypertension   Father. Cancer   Father. Prostate Cancer   Father.     Social History Toño Piper; 12/4/2017 1:19 PM)  Blood Transfusion   Yes. 2003  Employment status   Disabled. Marital status   . Tobacco Use   Never smoker. Alcohol Use   Has never drank. Non smoker / no tobacco use    No alcohol use      Diagnostic Studies History Tray Bello; 12/4/2017 1:19 PM)  Colonoscopy  [2012]:  Endoscopy  [2012]: Health Maintenance History Tray Bello; 12/4/2017 1:19 PM)  Flu Vaccine  [10/2015]:  Pneumovax   Date: 8/2016. Review of Systems Tray Bello; 12/4/2017 1:19 PM)  General Present- Chronic Fatigue and Poor Appetite. Skin Not Present- Itching, Rash and Skin Color Changes. HEENT Not Present- Hearing Loss and Vertigo. Respiratory Present- Difficulty Breathing. Not Present- TB exposure. Cardiovascular Present- Chest Pain. Not Present- Use of Antibiotics before Dental Procedures and Use of Blood Thinners. Gastrointestinal Present- Abdominal Pain, Black, Tarry Stool, Constipation, Difficulty Swallowing, History of Previous Colonoscopy, History of Previous Endoscopy, Nausea and Polyps. Not Present- Bloody Stool, Cirrhosis, Colon Cancer, Crohns disease, Dysphagia, Esophageal Cancer, Gallbladder Disease, Heartburn, Hepatitis, Hiatal Hernia, History of Previous GI X-rays, Indigestion, Jaundice, Rectal Bleeding, Stomach Cancer, Ulcer, Ulcerative Colitis and Vomiting. Musculoskeletal Present- Arthritis. Not Present- Hip Replacement Surgery and Knee Replacement Surgery. Neurological Not Present- Weakness. Psychiatric Present- Depression. Endocrine Present- Diabetes and Thyroid Problems. Hematology Present- Anemia. Vitals Tray Bello; 12/4/2017 1:28 PM)  12/4/2017 1:19 PM  Weight: 250 lb   Height: 73 in   Body Surface Area: 2.37 m²   Body Mass Index: 32.98 kg/m²    Pulse: 64 (Regular)    Resp. : 16 (Unlabored)     BP: 130/78 (Sitting, Left Arm, Standard)              Physical Exam (Elena Conn MD; 12/4/2017 1:55 PM)  General  Mental Status - Alert.   General Appearance - Cooperative, Pleasant, Not in acute distress. Orientation - Oriented X3. Build & Nutrition - Well nourished and Well developed. Note:  in wheelchair      Integumentary  General Characteristics  Overall examination of the patient's skin reveals - no rashes, no bruises and no spider angiomas. Color - normal coloration of skin. Head and Neck  Neck  Global Assessment - full range of motion and supple, no bruit auscultated on the right, no bruit auscultated on the left, non-tender, no lymphadenopathy. Thyroid  Gland Characteristics - normal size and consistency. Eye  Eyeball - Left - No Exophthalmos. Eyeball - Right - No Exophthalmos. Sclera/Conjunctiva - Left - No Jaundice. Sclera/Conjunctiva - Right - No Jaundice. Chest and Lung Exam  Chest and lung exam reveals  - quiet, even and easy respiratory effort with no use of accessory muscles. Auscultation  Breath sounds - Normal. Adventitious sounds - No Adventitious sounds. Cardiovascular  Auscultation  Rhythm - Regular, No Tachycardia, No Bradycardia . Heart Sounds - Normal heart sounds , S1 WNL and S2 WNL, No S3, No Summation Gallop. Murmurs & Other Heart Sounds - Auscultation of the heart reveals - No Murmurs. Abdomen  Palpation/Percussion  Tenderness - Non-Tender. Rebound tenderness - No rebound. Rigidity (guarding) - No Rigidity. Dullness to percussion - No abnormal dullness to percussion. Liver - No hepatosplenomegaly. Abdominal Mass Palpable - No masses. Other Characteristics - No Ascites. Auscultation  Auscultation of the abdomen reveals - Bowel sounds normal, No Abdominal bruits and No Succussion splash. Note:  obese      Rectal - Did not examine. Peripheral Vascular  Upper Extremity  Inspection - Left - Normal - No Clubbing, No Cyanosis, No Edema, Pulses Intact. Right - Normal - No Clubbing, No Cyanosis, No Edema, Pulses Intact. Palpation - Edema - Left - No edema. Right - No edema.   Lower Extremity  Inspection - Left - Inspection Normal. Right - Inspection Normal. Palpation - Edema - Left - No edema. Right - No edema. Note:  rt forearm fistula      Neurologic  Neurologic evaluation reveals  - Cranial nerves grossly intact, no focal neurologic deficits. Motor  Involuntary Movements - Asterixis - not present. Musculoskeletal  Global Assessment  Gait and Station - normal gait and station. Assessment & Plan Elia Haynes MD; 12/4/2017 1:56 PM)  Constipation (564.00  K59.00)  Impression: stopped movantic because it caused cramping, due for colonoscopy, her sister also diagnosed with colon cancer, s/p multiple perineal abscess debridement by Dr. Neel Valerio, on HD, linzess worked the best for constipation but not covered by her insurance  cut down on percocet ot 3 pills/d  gave her samples of linzess 290/d  on HD  Current Plans  Pt Education - How to access health information online: discussed with patient and provided information. Family history of cancer of GI tract (V16.0  Z80.0)  Current Plans  Discussed the risks and benefits of colonoscopy with the patient. Colonoscopy (19952) (Discussed risks and benefits with the patient to include:; perforation, post polypectomy, or post biopsy bleeding, missed lesions, and sedation reactions.)  Started PEG 3350/Electrolytes 240GM, 1 (one) KIt container Milliliter as directed by physician, 4000 Milliliter, 1 day starting 12/04/2017, No Refill. Date of Surgery Update:  Angela Jean-Baptiste was seen and examined. History and physical has been reviewed. The patient has been examined.  There have been no significant clinical changes since the completion of the originally dated History and Physical.    Signed By: Abhi Rueda MD     January 11, 2018 8:08 AM

## 2018-01-11 NOTE — IP AVS SNAPSHOT
[address] [place] [address] 
[phone] Patient: Ty Andres MRN: [id number] About your hospitalization You were admitted on:  January 11, 2018 [facility] You were discharged on:  January 11, 2018 Why you were hospitalized Your primary diagnosis was:  Not on File Follow-up Information None Your Scheduled Appointments Friday January 12, 2018 To Be Determined PT ROUTINE with Garth Hinders [address] ([address]) [address] ([address]) Tuesday January 16, 2018 To Be Determined PT ROUTINE with Garth Hinders [address] ([address]) [address] ([address]) Thursday January 18, 2018 To Be Determined PT ROUTINE with Garth Hinders [address] ([address]) [address] ([address]) Tuesday January 23, 2018 To Be Determined PT ROUTINE with Garth Hinders [address] ([address]) [address] ([address]) Thursday January 25, 2018 To Be Determined PT DISCIPLINE DISCHARGE with Garth Hinders [address] ([address]) [address] ([address]) Discharge Orders None A check betty indicates which time of day the medication should be taken. My Medications CHANGE how you take these medications Instructions Each Dose to Equal  
 Morning Noon Evening Bedtime * LINZESS 290 mcg Cap capsule Generic drug:  linaclotide What changed:  Another medication with the same name was added. Make sure you understand how and when to take each. Your last dose was: Your next dose is: Take 290 mcg by mouth daily. 290 mcg * linaclotide 290 mcg Cap capsule Commonly known as:  Alta  What changed: You were already taking a medication with the same name, and this prescription was added. Make sure you understand how and when to take each. Your last dose was: Your next dose is: Take 1 Cap by mouth Daily (before breakfast) for 30 days. 290 mcg  
    
   
   
   
  
 mineral oil liquid What changed:   
- when to take this 
- reasons to take this Your last dose was: Your next dose is: Take 30 mL by mouth daily. 30 mL * oxyCODONE-acetaminophen 7.5-325 mg per tablet Commonly known as:  PERCOCET 7.5 What changed:  reasons to take this Your last dose was: Your next dose is: Take 1-2 Tabs by mouth every four (4) hours as needed. Max Daily Amount: 12 Tabs. 1-2 Tab * oxyCODONE-acetaminophen 7.5-325 mg per tablet Commonly known as:  PERCOCET 7.5 What changed:  reasons to take this Your last dose was: Your next dose is: Take 1-2 Tabs by mouth every eight (8) hours as needed. Max Daily Amount: 6 Tabs. 1-2 Tab  
    
   
   
   
  
 polyethylene glycol 17 gram/dose powder Commonly known as:  [place] What changed:   
- when to take this 
- reasons to take this 
- additional instructions Your last dose was: Your next dose is: Take 17 g by mouth daily. 1 tablespoon with 8 oz of water daily 17 g * Notice: This list has 4 medication(s) that are the same as other medications prescribed for you.  Read the directions carefully, and ask your doctor or other care provider to review them with you. CONTINUE taking these medications Instructions Each Dose to Equal  
 Morning Noon Evening Bedtime ALLEGRA 180 mg tablet Generic drug:  fexofenadine Your last dose was: Your next dose is: Take 180 mg by mouth nightly. 180 mg  
    
   
   
   
  
 ASPIRIN PO Your last dose was: Your next dose is: Take 81 mg by mouth daily. 81 mg  
    
   
   
   
  
 BENADRYL 25 mg capsule Generic drug:  diphenhydrAMINE Your last dose was: Your next dose is: Take 75 mg by mouth every six (6) hours as needed for Itching. 75 mg CALMOSEPTINE 0.44-20.6 % Oint Generic drug:  Menthol-Zinc Oxide Your last dose was: Your next dose is:    
   
   
 Apply 1 Each to affected area daily. to left groin wound 1 Each * COREG 25 mg tablet Generic drug:  carvedilol Your last dose was: Your next dose is: Take 25 mg by mouth every morning. 25 mg  
    
   
   
   
  
 * COREG 12.5 mg tablet Generic drug:  carvedilol Your last dose was: Your next dose is: Take 12.5 mg by mouth nightly. 12.5 mg  
    
   
   
   
  
 diazePAM 5 mg tablet Commonly known as:  VALIUM Your last dose was: Your next dose is: Take 1 Tab by mouth every eight (8) hours as needed for Anxiety. Max Daily Amount: 15 mg.  
 5 mg  
    
   
   
   
  
 diclofenac 1 % Gel Commonly known as:  VOLTAREN Your last dose was: Your next dose is:    
   
   
 2 g by TransDERmal route every four (4) hours as needed (pain). 2 g DULCOLAX STOOL SOFTENER (DSS) 100 mg capsule Generic drug:  docusate sodium Your last dose was: Your next dose is: Take 100 mg by mouth two (2) times a day.   
 100 mg  
    
   
 HUMALOG SC Your last dose was: Your next dose is:    
   
   
 by SubCUTAneous route Before breakfast, lunch, and dinner. SLIDING SCALE 100-150-4u 151-200-5u ect (1 UNIT INCREASE FOR EVERY 50 POINTS)  
     
   
   
   
  
 insulin detemir 100 unit/mL (3 mL) Inpn Commonly known as:  Deangelo Che Your last dose was: Your next dose is:    
   
   
 9 Units by SubCUTAneous route daily. NOON DAILY  
 9 Units  
    
   
   
   
  
 latanoprost 0.005 % ophthalmic solution Commonly known as:  Becca Moreira Your last dose was: Your next dose is:    
   
   
 Administer 1 Drop to both eyes nightly. 1 Drop  
    
   
   
   
  
 nortriptyline 25 mg capsule Commonly known as:  PAMELOR Your last dose was: Your next dose is: Take 25 mg by mouth nightly. 25 mg  
    
   
   
   
  
 NORVASC PO Your last dose was: Your next dose is: Take 10 mg by mouth daily. 10 mg  
    
   
   
   
  
 * OTHER Your last dose was: Your next dose is:    
   
   
 0.9% Normal saline  Use as needed to affected area  Medical code: L02.215  
     
   
   
   
  
 * OTHER Your last dose was: Your next dose is:    
   
   
 Rotator for ambulation * OTHER Your last dose was: Your next dose is:    
   
   
 Adult diapers size XL  
     
   
   
   
  
 OTHER(NON-FORMULARY) Your last dose was: Your next dose is:    
   
   
 210 mg three (3) times daily (with meals). KYLE  DAUGHTER WILL BRING TO HOSPITAL  
 210 mg  
    
   
   
   
  
 PREVACID 15 mg capsule Generic drug:  lansoprazole Your last dose was: Your next dose is: Take  by mouth two (2) times a day. PROZAC PO Your last dose was: Your next dose is: Take 20 mg by mouth daily.   
 20 mg  
    
   
   
   
  
 RENAL SOFTGELS 1 mg capsule Generic drug:  b complex-vitamin c-folic acid Your last dose was: Your next dose is: Take 1 Cap by mouth daily. 1 Cap RENVELA PO Your last dose was: Your next dose is: Take 800 mg by mouth three (3) times daily (with meals). 800 mg SENSIPAR 30 mg tablet Generic drug:  cinacalcet Your last dose was: Your next dose is: Take 30 mg by mouth daily. 30 mg  
    
   
   
   
  
 simvastatin 20 mg tablet Commonly known as:  ZOCOR Your last dose was: Your next dose is: Take 20 mg by mouth nightly. 20 mg  
    
   
   
   
  
 * terbinafine HCl 1 % topical cream  
Commonly known as:  LAMISIL Your last dose was: Your next dose is:    
   
   
 Apply 1 Each to affected area daily. Apply to heel at bedtime, wrap heel with saran wrap.  
 1 Each * terbinafine HCl 1 % topical cream  
Commonly known as:  LAMISIL Your last dose was: Your next dose is:    
   
   
 Apply 1 Each to affected area daily. 1 Each VITAMIN C 500 mg tablet Generic drug:  ascorbic acid (vitamin C) Your last dose was: Your next dose is: Take 1,000 mg by mouth daily. 1000 mg * Notice: This list has 7 medication(s) that are the same as other medications prescribed for you. Read the directions carefully, and ask your doctor or other care provider to review them with you. STOP taking these medications   
 naloxegol 25 mg Tab tablet Commonly known as:  Barbara Boxer Where to Get Your Medications Information on where to get these meds will be given to you by the nurse or doctor. ! Ask your nurse or doctor about these medications  
  linaclotide 290 mcg Cap capsule Discharge Instructions [address] 
[address] [facility], [address] COLON DISCHARGE INSTRUCTIONS Kelvin Donahue [id number] 1950 Discomfort: 
Redness at IV site- apply warm compress to area; if redness or soreness persist- contact your physician There may be a slight amount of blood passed from the rectum Gaseous discomfort- walking, belching will help relieve any discomfort You may not operate a vehicle for 12 hours You may not engage in an occupation involving machinery or appliances for rest of today You may not drink alcoholic beverages for at least 12 hours Avoid making any critical decisions for at least 24 hour DIET: 
You may resume your regular diet  however -  remember your colon is empty and a heavy meal will produce gas. Avoid these foods:  vegetables, fried / greasy foods, carbonated drinks ACTIVITY: 
You may  resume your normal daily activities it is recommended that you spend the remainder of the day resting -  avoid any strenuous activity. CALL M.D. ANY SIGN OF: Increasing pain, nausea, vomiting Abdominal distension (swelling) New increased bleeding (oral or rectal) Fever (chills) Pain in chest area Bloody discharge from nose or mouth Shortness of breath Follow-up Instructions: 
 Call Dr. Xin Greenfield for any questions or problems at [phone] and follow up with him in 2 months ENDOSCOPY FINDINGS: 
 Your colonoscopy was normal. 
Telephone # [phone] Signed By: Xin Greenfield MD   
 1/11/2018  8:51 AM 
  
 
DISCHARGE SUMMARY from Nurse The following personal items collected during your admission are returned to you:  
Dental Appliance:   
Vision:   
Hearing Aid:   
Jewelry:   
Clothing:   
Other Valuables:   
Valuables sent to safe: MyChart Activation Thank you for requesting access to [employer]. Please follow the instructions below to securely access and download your online medical record.  [employer] allows you to send messages to your doctor, view your test results, renew your prescriptions, schedule appointments, and more. How Do I Sign Up? 1. In your internet browser, go to [email or web address] 
2. Click on the First Time User? Click Here link in the Sign In box. You will be redirect to the New Member Sign Up page. 3. Enter your GeoPay Access Code exactly as it appears below. You will not need to use this code after youve completed the sign-up process. If you do not sign up before the expiration date, you must request a new code. GeoPay Access Code: [email or web address] Expires: 2018 11:44 AM (This is the date your GeoPay access code will ) 4. Enter the last four digits of your Social Security Number (xxxx) and Date of Birth (mm/dd/yyyy) as indicated and click Submit. You will be taken to the next sign-up page. 5. Create a GeoPay ID. This will be your GeoPay login ID and cannot be changed, so think of one that is secure and easy to remember. 6. Create a GeoPay password. You can change your password at any time. 7. Enter your Password Reset Question and Answer. This can be used at a later time if you forget your password. 8. Enter your e-mail address. You will receive e-mail notification when new information is available in [address] 9. Click Sign Up. You can now view and download portions of your medical record. 10. Click the Download Summary menu link to download a portable copy of your medical information. Additional Information If you have questions, please visit the Frequently Asked Questions section of the GeoPay website at [email or web address] Remember, GeoPay is NOT to be used for urgent needs. For medical emergencies, dial 911. Introducing [facility]! Stanley Sprague introduces GeoPay patient portal. Now you can access parts of your medical record, email your doctor's office, and request medication refills online. 1. In your internet browser, go to [email or web address] 2. Click on the First Time User? Click Here link in the Sign In box. You will see the New Member Sign Up page. 3. Enter your [employer] Access Code exactly as it appears below. You will not need to use this code after youve completed the sign-up process. If you do not sign up before the expiration date, you must request a new code. · [employer] Access Code: [email or web address] Expires: 1/26/2018 11:44 AM 
 
4. Enter the last four digits of your Social Security Number (xxxx) and Date of Birth (mm/dd/yyyy) as indicated and click Submit. You will be taken to the next sign-up page. 5. Create a Pockets Unitedt ID. This will be your [employer] login ID and cannot be changed, so think of one that is secure and easy to remember. 6. Create a [employer] password. You can change your password at any time. 7. Enter your Password Reset Question and Answer. This can be used at a later time if you forget your password. 8. Enter your e-mail address. You will receive e-mail notification when new information is available in [address] 9. Click Sign Up. You can now view and download portions of your medical record. 10. Click the Download Summary menu link to download a portable copy of your medical information. If you have questions, please visit the Frequently Asked Questions section of the [employer] website. Remember, [employer] is NOT to be used for urgent needs. For medical emergencies, dial 911. Now available from your iPhone and Android! Providers Seen During Your Hospitalization Provider Specialty Primary office phone Abhi Rueda MD Gastroenterology [phone] Your Primary Care Physician (PCP) Primary Care Physician Office Phone Office Fax Jermain Bishop [phone] You are allergic to the following Allergen Reactions Latex Itching Rash, sometimes difficult to breathe Celebrex (Celecoxib) Anaphylaxis Swelling TONGUE. LIPS AND EYES Iodine Other (comments) IV CONTRAST-LESIONS, AND SKIN SLUFFED OFF Keflex (Cephalexin) Anaphylaxis Swelling Tongue, lips, and eyes Levaquin (Levofloxacin) Anaphylaxis Swelling Tongue, lips, and eyes Pcn (Penicillins) Anaphylaxis Swelling Tongue, lips and eyes Morphine Other (comments) PT STATES IS JUST SENSITIVITY, GOT TOO MUCH ONE TIME. Recent Documentation Height Weight BMI OB Status Smoking Status 1.873 m 112.6 kg 32.09 kg/m2 Hysterectomy Never Smoker Emergency Contacts Name Discharge Info Relation Home Work Mobile [address] CAREGIVER [3] Daughter [21] [phone] Patient Belongings The following personal items are in your possession at time of discharge: 
                             
 
  
  
 Please provide this summary of care documentation to your next provider. Signatures-by signing, you are acknowledging that this After Visit Summary has been reviewed with you and you have received a copy. Patient Signature:  ____________________________________________________________ Date:  ____________________________________________________________  
  
João Boyd Provider Signature:  ____________________________________________________________ Date:  ____________________________________________________________

## 2018-01-11 NOTE — PROCEDURES
[address]  [address] [facility], [address] [place] [address]      Colonoscopy Operative Report    Patsy Sahu  [id number]  1950      Procedure Type:   Colonoscopy --diagnostic     Indications:    Family history of coloretal cancer (screening only)   Date of last colonoscopy: 5 years ago, Polyps  No    Pre-operative Diagnosis: see indication above    Post-operative Diagnosis:  See findings below    :  Howard Barrios MD      Referring Provider: Siena Torres MD      Sedation:  MAC anesthesia Propofol      Procedure Details:  After informed consent was obtained with all risks and benefits of procedure explained and preoperative exam completed, the patient was taken to the endoscopy suite and placed in the left lateral decubitus position. Upon sequential sedation as per above, a digital rectal exam was performed demonstrating internal hemorrhoids. The Olympus videocolonoscope  was inserted in the rectum and carefully advanced to the cecum, which was identified by the ileocecal valve and appendiceal orifice. The cecum was identified by the ileocecal valve and appendiceal orifice. The quality of preparation was good. The colonoscope was slowly withdrawn with careful evaluation between folds. Retroflexion in the rectum was completed . Findings:   Rectum: normal  Sigmoid: normal  Descending Colon: normal  Transverse Colon: normal  Ascending Colon: normal  Cecum: normal        Specimen Removed:  none    Complications: None. EBL:  None. Impression:    see findings    Recommendations: --Repeat colonoscopy in 5 years.       Doing much better on linzess 290/d, to continue on  F/u in 2 months    Signed By: Howard Barrios MD     1/11/2018  8:48 AM

## 2018-01-11 NOTE — DISCHARGE INSTRUCTIONS
[address] [facility]  [id number]  1950    Discomfort:  Redness at IV site- apply warm compress to area; if redness or soreness persist- contact your physician  There may be a slight amount of blood passed from the rectum  Gaseous discomfort- walking, belching will help relieve any discomfort  You may not operate a vehicle for 12 hours  You may not engage in an occupation involving machinery or appliances for rest of today  You may not drink alcoholic beverages for at least 12 hours  Avoid making any critical decisions for at least 24 hour  DIET:  You may resume your regular diet - however -  remember your colon is empty and a heavy meal will produce gas. Avoid these foods:  vegetables, fried / greasy foods, carbonated drinks     ACTIVITY:  You may  resume your normal daily activities it is recommended that you spend the remainder of the day resting -  avoid any strenuous activity. CALL M.D. ANY SIGN OF:   Increasing pain, nausea, vomiting  Abdominal distension (swelling)  New increased bleeding (oral or rectal)  Fever (chills)  Pain in chest area  Bloody discharge from nose or mouth  Shortness of breath      Follow-up Instructions:   Call Dr. Hyacinth Major for any questions or problems at [phone] and follow up with him in 2 months          ENDOSCOPY FINDINGS:   Your colonoscopy was normal.  Telephone # [phone]      Signed By: Hyacinth Major MD     1/11/2018  8:51 AM       DISCHARGE SUMMARY from Nurse    The following personal items collected during your admission are returned to you:   Dental Appliance:    Vision:    Hearing Aid:    Jewelry:    Clothing:    Other Valuables:    Valuables sent to safe: MyChart Activation    Thank you for requesting access to [employer]. Please follow the instructions below to securely access and download your online medical record.  [employer] allows you to send messages to your doctor, view your test results, renew your prescriptions, schedule appointments, and more. How Do I Sign Up? 1. In your internet browser, go to [email or web address]  2. Click on the First Time User? Click Here link in the Sign In box. You will be redirect to the New Member Sign Up page. 3. Enter your Medallia Access Code exactly as it appears below. You will not need to use this code after youve completed the sign-up process. If you do not sign up before the expiration date, you must request a new code. Medallia Access Code: [email or web address]  Expires: 2018 11:44 AM (This is the date your Medallia access code will )    4. Enter the last four digits of your Social Security Number (xxxx) and Date of Birth (mm/dd/yyyy) as indicated and click Submit. You will be taken to the next sign-up page. 5. Create a Medallia ID. This will be your Medallia login ID and cannot be changed, so think of one that is secure and easy to remember. 6. Create a Medallia password. You can change your password at any time. 7. Enter your Password Reset Question and Answer. This can be used at a later time if you forget your password. 8. Enter your e-mail address. You will receive e-mail notification when new information is available in [address] 9. Click Sign Up. You can now view and download portions of your medical record. 10. Click the Download Summary menu link to download a portable copy of your medical information. Additional Information    If you have questions, please visit the Frequently Asked Questions section of the Medallia website at [email or web address] Remember, Medallia is NOT to be used for urgent needs. For medical emergencies, dial 911.

## 2018-01-11 NOTE — ANESTHESIA POSTPROCEDURE EVALUATION
Post-Anesthesia Evaluation and Assessment    Patient: Henry Palacio MRN: [id number]  SSN: [id number]    YOB: 1950  Age: 75 y.o. Sex: female       Cardiovascular Function/Vital Signs  Visit Vitals    /67    Pulse 99    Temp 36.7 °C (98 °F)    Resp 14    Ht 6' 1.75\" (1.873 m)    Wt 112.6 kg (248 lb 3.8 oz)    SpO2 100%    BMI 32.09 kg/m2       Patient is status post MAC anesthesia for Procedure(s):  COLONOSCOPY. Nausea/Vomiting: None    Postoperative hydration reviewed and adequate. Pain:  Pain Scale 1: Numeric (0 - 10) (01/11/18 0915)  Pain Intensity 1: 0 (01/11/18 0915)   Managed    Neurological Status: At baseline    Mental Status and Level of Consciousness: Arousable    Pulmonary Status:   O2 Device: Room air (01/11/18 0915)   Adequate oxygenation and airway patent    Complications related to anesthesia: None    Post-anesthesia assessment completed.  No concerns    Signed By: Nu Tilley MD     January 11, 2018

## 2018-01-11 NOTE — PERIOP NOTES

## 2018-01-12 ENCOUNTER — HOME CARE VISIT (OUTPATIENT)
Dept: SCHEDULING | Facility: HOME HEALTH | Age: 68
End: 2018-01-12
Payer: MEDICARE

## 2018-01-12 VITALS
SYSTOLIC BLOOD PRESSURE: 128 MMHG | TEMPERATURE: 97.8 F | OXYGEN SATURATION: 99 % | DIASTOLIC BLOOD PRESSURE: 60 MMHG | HEART RATE: 78 BPM | RESPIRATION RATE: 18 BRPM

## 2018-01-12 PROCEDURE — 3331090001 HH PPS REVENUE CREDIT

## 2018-01-12 PROCEDURE — 400014 HH F/U

## 2018-01-12 PROCEDURE — G0151 HHCP-SERV OF PT,EA 15 MIN: HCPCS

## 2018-01-12 PROCEDURE — 3331090002 HH PPS REVENUE DEBIT

## 2018-01-13 PROCEDURE — 3331090001 HH PPS REVENUE CREDIT

## 2018-01-13 PROCEDURE — 3331090002 HH PPS REVENUE DEBIT

## 2018-01-14 PROCEDURE — 3331090001 HH PPS REVENUE CREDIT

## 2018-01-14 PROCEDURE — 3331090002 HH PPS REVENUE DEBIT

## 2018-01-15 PROCEDURE — 3331090002 HH PPS REVENUE DEBIT

## 2018-01-15 PROCEDURE — 3331090001 HH PPS REVENUE CREDIT

## 2018-01-16 ENCOUNTER — HOME CARE VISIT (OUTPATIENT)
Dept: SCHEDULING | Facility: HOME HEALTH | Age: 68
End: 2018-01-16
Payer: MEDICARE

## 2018-01-16 VITALS
DIASTOLIC BLOOD PRESSURE: 60 MMHG | TEMPERATURE: 97.4 F | SYSTOLIC BLOOD PRESSURE: 110 MMHG | OXYGEN SATURATION: 96 % | HEART RATE: 70 BPM | RESPIRATION RATE: 18 BRPM

## 2018-01-16 PROCEDURE — 3331090002 HH PPS REVENUE DEBIT

## 2018-01-16 PROCEDURE — 3331090001 HH PPS REVENUE CREDIT

## 2018-01-16 PROCEDURE — G0151 HHCP-SERV OF PT,EA 15 MIN: HCPCS

## 2018-01-17 PROCEDURE — 3331090002 HH PPS REVENUE DEBIT

## 2018-01-17 PROCEDURE — 3331090001 HH PPS REVENUE CREDIT

## 2018-01-18 ENCOUNTER — HOME CARE VISIT (OUTPATIENT)
Dept: SCHEDULING | Facility: HOME HEALTH | Age: 68
End: 2018-01-18
Payer: MEDICARE

## 2018-01-18 PROCEDURE — 3331090002 HH PPS REVENUE DEBIT

## 2018-01-18 PROCEDURE — 3331090001 HH PPS REVENUE CREDIT

## 2018-01-19 PROCEDURE — 3331090001 HH PPS REVENUE CREDIT

## 2018-01-19 PROCEDURE — 3331090002 HH PPS REVENUE DEBIT

## 2018-01-20 PROCEDURE — 3331090002 HH PPS REVENUE DEBIT

## 2018-01-20 PROCEDURE — 3331090001 HH PPS REVENUE CREDIT

## 2018-01-21 PROCEDURE — 3331090001 HH PPS REVENUE CREDIT

## 2018-01-21 PROCEDURE — 3331090002 HH PPS REVENUE DEBIT

## 2018-01-22 PROCEDURE — 3331090001 HH PPS REVENUE CREDIT

## 2018-01-22 PROCEDURE — 3331090002 HH PPS REVENUE DEBIT

## 2018-01-23 ENCOUNTER — TELEPHONE (OUTPATIENT)
Dept: SURGERY | Age: 68
End: 2018-01-23

## 2018-01-23 ENCOUNTER — HOME CARE VISIT (OUTPATIENT)
Dept: SCHEDULING | Facility: HOME HEALTH | Age: 68
End: 2018-01-23
Payer: MEDICARE

## 2018-01-23 VITALS
OXYGEN SATURATION: 99 % | DIASTOLIC BLOOD PRESSURE: 82 MMHG | TEMPERATURE: 98.3 F | SYSTOLIC BLOOD PRESSURE: 110 MMHG | HEART RATE: 70 BPM | RESPIRATION RATE: 18 BRPM

## 2018-01-23 PROCEDURE — 3331090002 HH PPS REVENUE DEBIT

## 2018-01-23 PROCEDURE — G0151 HHCP-SERV OF PT,EA 15 MIN: HCPCS

## 2018-01-23 PROCEDURE — 3331090001 HH PPS REVENUE CREDIT

## 2018-01-24 PROCEDURE — 3331090001 HH PPS REVENUE CREDIT

## 2018-01-24 PROCEDURE — 3331090002 HH PPS REVENUE DEBIT

## 2018-01-24 NOTE — TELEPHONE ENCOUNTER
Home health order faxed back and confirmation received. Of note, this order was not signed by Dr. Sunni Albright, it was returned with a note for patient to make an appointment for wound status since we have not seen the patient in over two months.

## 2018-01-24 NOTE — TELEPHONE ENCOUNTER
I returned daughter, Margarito Garrett, phone call and she states the wound is still open 'about the size of a quarter, but home health has not been out in about two weeks so she does not know the measurements'. She states she had wanted the home health nurses to continue to come out 'to make sure everything was ok until it closed' and home health is saying they can't come out to put a dry dressing over it and patient needs to see a wound care specialist if its not healing. She was unsure if that was what she needed to do. I told her we hadnt seen patient in 2 months and Dr. Anjel Lopez should see and evaluate the wound and the need for continued home health or wound care specialist referral. Daughter expressed understanding and was in agreement with this plan of care and will make appointment for mother with Dr. Anjel Lopez.

## 2018-01-25 ENCOUNTER — HOME CARE VISIT (OUTPATIENT)
Dept: SCHEDULING | Facility: HOME HEALTH | Age: 68
End: 2018-01-25
Payer: MEDICARE

## 2018-01-25 VITALS
OXYGEN SATURATION: 92 % | TEMPERATURE: 96.8 F | SYSTOLIC BLOOD PRESSURE: 120 MMHG | HEART RATE: 78 BPM | DIASTOLIC BLOOD PRESSURE: 80 MMHG | RESPIRATION RATE: 18 BRPM

## 2018-01-25 PROCEDURE — 3331090001 HH PPS REVENUE CREDIT

## 2018-01-25 PROCEDURE — 3331090003 HH PPS REVENUE ADJ

## 2018-01-25 PROCEDURE — 3331090002 HH PPS REVENUE DEBIT

## 2018-01-25 PROCEDURE — G0151 HHCP-SERV OF PT,EA 15 MIN: HCPCS

## 2018-01-31 ENCOUNTER — OFFICE VISIT (OUTPATIENT)
Dept: SURGERY | Age: 68
End: 2018-01-31

## 2018-01-31 VITALS
HEIGHT: 72 IN | OXYGEN SATURATION: 96 % | RESPIRATION RATE: 16 BRPM | DIASTOLIC BLOOD PRESSURE: 70 MMHG | WEIGHT: 248 LBS | SYSTOLIC BLOOD PRESSURE: 120 MMHG | TEMPERATURE: 98.5 F | BODY MASS INDEX: 33.59 KG/M2 | HEART RATE: 80 BPM

## 2018-01-31 DIAGNOSIS — Z51.89 WOUND CHECK, ABSCESS: Primary | ICD-10-CM

## 2018-01-31 PROBLEM — E11.21 TYPE 2 DIABETES MELLITUS WITH NEPHROPATHY (HCC): Status: ACTIVE | Noted: 2018-01-31
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
## 2018-01-31 NOTE — PROGRESS NOTES
1. Have you been to the ER, urgent care clinic since your last visit? Hospitalized since your last visit? No    2. Have you seen or consulted any other health care providers outside of the [address] [place] [address] since your last visit? Include any pap smears or colon screening.  No

## 2018-01-31 NOTE — MR AVS SNAPSHOT
[address] [facility] [address] [address] 
[phone] Patient: Mao Hilliard MRN: [id number] NJE:[id number] Visit Information Date & Time Provider Department Dept. Phone Encounter #  
 1/31/2018  1:40 PM Arron Walters, [address] [facility] [address] [phone] [id number] Upcoming Health Maintenance Date Due  
 FOOT EXAM Q1 5/8/1960 MICROALBUMIN Q1 5/8/1959 EYE EXAM RETINAL OR DILATED Q1 5/8/1959 DTaP/Tdap/Td series (1 - Tdap) 5/8/1970 BREAST CANCER SCRN MAMMOGRAM 5/8/1999 FOBT Q 1 YEAR AGE 50-75 5/8/1999 ZOSTER VACCINE AGE 60> 3/8/2009 HEMOGLOBIN A1C Q6M 4/6/2010 LIPID PANEL Q1 10/6/2010 GLAUCOMA SCREENING Q2Y 5/8/2014 OSTEOPOROSIS SCREENING (DEXA) 5/8/2014 Pneumococcal 65+ High/Highest Risk (1 of 2 - PCV13) 5/8/2014 MEDICARE YEARLY EXAM 5/8/2014 Influenza Age 5 to Adult 8/1/2017 Allergies as of 1/31/2018  Review Complete On: 1/31/2018 By: Arron Walters MD  
  
 Severity Noted Reaction Type Reactions Latex  01/03/2011    Itching Rash, sometimes difficult to breathe Celebrex [Celecoxib] High 01/06/2011    Anaphylaxis, Swelling TONGUE. LIPS AND EYES Iodine High 07/17/2013    Other (comments) IV CONTRAST-LESIONS, AND SKIN SLUFFED OFF Keflex [Cephalexin] High 10/14/2010    Anaphylaxis, Swelling Tongue, lips, and eyes Levaquin [Levofloxacin] High 10/14/2010    Anaphylaxis, Swelling Tongue, lips, and eyes Pcn [Penicillins] High 10/14/2010    Anaphylaxis, Swelling Tongue, lips and eyes Morphine  10/14/2010    Other (comments) PT STATES IS JUST SENSITIVITY, GOT TOO MUCH ONE TIME. Current Immunizations  Never Reviewed No immunizations on file. Not reviewed this visit You Were Diagnosed With   
  
 Codes Comments Wound check, abscess    -  Primary ICD-10-CM: Z51.89 ICD-9-CM: V58.89 Vitals BP Pulse Temp Resp Height(growth percentile) 120/70 (BP 1 Location: Left arm, BP Patient Position: Lying right side) 80 98.5 °F (36.9 °C) (Oral) 16 6' 1.5\" (1.867 m) Weight(growth percentile) SpO2 BMI OB Status Smoking Status 248 lb (112.5 kg) 96% 32.28 kg/m2 Hysterectomy Never Smoker BMI and BSA Data Body Mass Index Body Surface Area  
 32.28 kg/m 2 2.42 m 2 Preferred Pharmacy Pharmacy Name Phone [facility] [address] [facility] [phone] Your Updated Medication List  
  
   
This list is accurate as of: 1/31/18  2:31 PM.  Always use your most recent med list. ALLEGRA 180 mg tablet Generic drug:  fexofenadine Take 180 mg by mouth nightly. ASPIRIN PO Take 81 mg by mouth daily. BENADRYL 25 mg capsule Generic drug:  diphenhydrAMINE Take 75 mg by mouth every six (6) hours as needed for Itching. CALMOSEPTINE 0.44-20.6 % Oint Generic drug:  Menthol-Zinc Oxide Apply 1 Each to affected area daily. to left groin wound * COREG 25 mg tablet Generic drug:  carvedilol Take 25 mg by mouth every morning. * COREG 12.5 mg tablet Generic drug:  carvedilol Take 12.5 mg by mouth nightly. diazePAM 5 mg tablet Commonly known as:  VALIUM Take 1 Tab by mouth every eight (8) hours as needed for Anxiety. Max Daily Amount: 15 mg.  
  
 diclofenac 1 % Gel Commonly known as:  VOLTAREN  
2 g by TransDERmal route every four (4) hours as needed (pain). DULCOLAX STOOL SOFTENER (DSS) 100 mg capsule Generic drug:  docusate sodium Take 100 mg by mouth two (2) times a day. HUMALOG SC  
by SubCUTAneous route Before breakfast, lunch, and dinner. SLIDING SCALE 100-150-4u 151-200-5u ect (1 UNIT INCREASE FOR EVERY 50 POINTS)  
  
 insulin detemir 100 unit/mL (3 mL) Inpn Commonly known as:  Isacc Nuñez  
 9 Units by SubCUTAneous route daily. NOON DAILY  
  
 latanoprost 0.005 % ophthalmic solution Commonly known as:  Kuldeep Julio Administer 1 Drop to both eyes nightly. * LINZESS 290 mcg Cap capsule Generic drug:  linaclotide Take 290 mcg by mouth daily. * linaclotide 290 mcg Cap capsule Commonly known as:  Betsy Old Take 1 Cap by mouth Daily (before breakfast) for 30 days. mineral oil liquid Take 30 mL by mouth daily. nortriptyline 25 mg capsule Commonly known as:  PAMELOR Take 25 mg by mouth nightly. NORVASC PO Take 10 mg by mouth daily. * OTHER  
0.9% Normal saline  Use as needed to affected area  Medical code: L02.215  
  
 * OTHER Rotator for ambulation * OTHER Adult diapers size XL  
  
 OTHER(NON-FORMULARY) 210 mg three (3) times daily (with meals). [facility]  
  
 * oxyCODONE-acetaminophen 7.5-325 mg per tablet Commonly known as:  PERCOCET 7.5 Take 1-2 Tabs by mouth every four (4) hours as needed. Max Daily Amount: 12 Tabs. * oxyCODONE-acetaminophen 7.5-325 mg per tablet Commonly known as:  PERCOCET 7.5 Take 1-2 Tabs by mouth every eight (8) hours as needed. Max Daily Amount: 6 Tabs. polyethylene glycol 17 gram/dose powder Commonly known as:  [place] Take 17 g by mouth daily. 1 tablespoon with 8 oz of water daily PREVACID 15 mg capsule Generic drug:  lansoprazole Take  by mouth two (2) times a day. PROZAC PO Take 20 mg by mouth daily. RENAL SOFTGELS 1 mg capsule Generic drug:  b complex-vitamin c-folic acid Take 1 Cap by mouth daily. RENVELA PO Take 800 mg by mouth three (3) times daily (with meals). SENSIPAR 30 mg tablet Generic drug:  cinacalcet Take 30 mg by mouth daily. simvastatin 20 mg tablet Commonly known as:  ZOCOR Take 20 mg by mouth nightly. * terbinafine HCl 1 % topical cream  
Commonly known as:  LAMISIL Apply 1 Each to affected area daily. Apply to heel at bedtime, wrap heel with saran wrap. * terbinafine HCl 1 % topical cream  
Commonly known as:  LAMISIL Apply 1 Each to affected area daily. VITAMIN C 500 mg tablet Generic drug:  ascorbic acid (vitamin C) Take 1,000 mg by mouth daily. * Notice: This list has 11 medication(s) that are the same as other medications prescribed for you. Read the directions carefully, and ask your doctor or other care provider to review them with you. Introducing [facility]! Kaye Turner introduces [employer] patient portal. Now you can access parts of your medical record, email your doctor's office, and request medication refills online. 1. In your internet browser, go to [email or web address] 2. Click on the First Time User? Click Here link in the Sign In box. You will see the New Member Sign Up page. 3. Enter your [employer] Access Code exactly as it appears below. You will not need to use this code after youve completed the sign-up process. If you do not sign up before the expiration date, you must request a new code. · [employer] Access Code: [email or web address] Expires: 4/27/2018  1:26 AM 
 
4. Enter the last four digits of your Social Security Number (xxxx) and Date of Birth (mm/dd/yyyy) as indicated and click Submit. You will be taken to the next sign-up page. 5. Create a [employer] ID. This will be your [employer] login ID and cannot be changed, so think of one that is secure and easy to remember. 6. Create a [employer] password. You can change your password at any time. 7. Enter your Password Reset Question and Answer. This can be used at a later time if you forget your password. 8. Enter your e-mail address. You will receive e-mail notification when new information is available in [address] 9. Click Sign Up. You can now view and download portions of your medical record. 10. Click the Download Summary menu link to download a portable copy of your medical information. If you have questions, please visit the Frequently Asked Questions section of the [employer] website. Remember, [employer] is NOT to be used for urgent needs. For medical emergencies, dial 911. Now available from your iPhone and Android! Please provide this summary of care documentation to your next provider. Your primary care clinician is listed as Nidia Nielson. If you have any questions after today's visit, please call [phone].

## 2018-02-13 ENCOUNTER — HOSPITAL ENCOUNTER (OUTPATIENT)
Age: 68
Setting detail: OBSERVATION
Discharge: HOME | End: 2018-02-14
Attending: EMERGENCY MEDICINE | Admitting: INTERNAL MEDICINE
Payer: MEDICARE

## 2018-02-13 ENCOUNTER — APPOINTMENT (OUTPATIENT)
Dept: GENERAL RADIOLOGY | Age: 68
End: 2018-02-13
Attending: EMERGENCY MEDICINE
Payer: MEDICARE

## 2018-02-13 DIAGNOSIS — K59.00 CONSTIPATION, UNSPECIFIED CONSTIPATION TYPE: Primary | ICD-10-CM

## 2018-02-13 DIAGNOSIS — K56.41 FECAL IMPACTION (HCC): ICD-10-CM

## 2018-02-13 PROBLEM — N18.6 ESRD NEEDING DIALYSIS (HCC): Status: ACTIVE | Noted: 2018-02-13

## 2018-02-13 PROBLEM — Z99.2 ESRD NEEDING DIALYSIS (HCC): Status: ACTIVE | Noted: 2018-02-13

## 2018-02-13 PROBLEM — E87.5 HYPERKALEMIA: Status: ACTIVE | Noted: 2018-02-13

## 2018-02-13 LAB
ALBUMIN SERPL-MCNC: 3.3 G/DL (ref 3.5–5)
ALBUMIN/GLOB SERPL: 0.6 {RATIO} (ref 1.1–2.2)
ALP SERPL-CCNC: 258 U/L (ref 45–117)
ALT SERPL-CCNC: 65 U/L (ref 12–78)
ANION GAP SERPL CALC-SCNC: 11 MMOL/L (ref 5–15)
AST SERPL-CCNC: 56 U/L (ref 15–37)
ATRIAL RATE: 63 BPM
BASOPHILS # BLD: 0.1 K/UL (ref 0–0.1)
BASOPHILS NFR BLD: 1 % (ref 0–1)
BILIRUB SERPL-MCNC: 0.5 MG/DL (ref 0.2–1)
BUN SERPL-MCNC: 80 MG/DL (ref 6–20)
BUN/CREAT SERPL: 8 (ref 12–20)
CALCIUM SERPL-MCNC: 8 MG/DL (ref 8.5–10.1)
CALCULATED P AXIS, ECG09: 63 DEGREES
CALCULATED R AXIS, ECG10: -31 DEGREES
CALCULATED T AXIS, ECG11: 38 DEGREES
CHLORIDE SERPL-SCNC: 90 MMOL/L (ref 97–108)
CO2 SERPL-SCNC: 29 MMOL/L (ref 21–32)
CREAT SERPL-MCNC: 10.5 MG/DL (ref 0.55–1.02)
DIAGNOSIS, 93000: NORMAL
DIFFERENTIAL METHOD BLD: NORMAL
EOSINOPHIL # BLD: 0.2 K/UL (ref 0–0.4)
EOSINOPHIL NFR BLD: 3 % (ref 0–7)
ERYTHROCYTE [DISTWIDTH] IN BLOOD BY AUTOMATED COUNT: 14.2 % (ref 11.5–14.5)
GLOBULIN SER CALC-MCNC: 5.5 G/DL (ref 2–4)
GLUCOSE BLD STRIP.AUTO-MCNC: 263 MG/DL (ref 65–100)
GLUCOSE SERPL-MCNC: 121 MG/DL (ref 65–100)
HCT VFR BLD AUTO: 37.1 % (ref 35–47)
HGB BLD-MCNC: 12 G/DL (ref 11.5–16)
IMM GRANULOCYTES # BLD: 0 K/UL (ref 0–0.04)
IMM GRANULOCYTES NFR BLD AUTO: 0 % (ref 0–0.5)
LYMPHOCYTES # BLD: 2.2 K/UL (ref 0.8–3.5)
LYMPHOCYTES NFR BLD: 30 % (ref 12–49)
MCH RBC QN AUTO: 29.6 PG (ref 26–34)
MCHC RBC AUTO-ENTMCNC: 32.3 G/DL (ref 30–36.5)
MCV RBC AUTO: 91.4 FL (ref 80–99)
MONOCYTES # BLD: 0.5 K/UL (ref 0–1)
MONOCYTES NFR BLD: 7 % (ref 5–13)
NEUTS SEG # BLD: 4.5 K/UL (ref 1.8–8)
NEUTS SEG NFR BLD: 60 % (ref 32–75)
NRBC # BLD: 0 K/UL (ref 0–0.01)
NRBC BLD-RTO: 0 PER 100 WBC
P-R INTERVAL, ECG05: 200 MS
PLATELET # BLD AUTO: 177 K/UL (ref 150–400)
PMV BLD AUTO: 9.9 FL (ref 8.9–12.9)
POTASSIUM SERPL-SCNC: 5.7 MMOL/L (ref 3.5–5.1)
PROT SERPL-MCNC: 8.8 G/DL (ref 6.4–8.2)
Q-T INTERVAL, ECG07: 486 MS
QRS DURATION, ECG06: 98 MS
QTC CALCULATION (BEZET), ECG08: 497 MS
RBC # BLD AUTO: 4.06 M/UL (ref 3.8–5.2)
SERVICE CMNT-IMP: ABNORMAL
SODIUM SERPL-SCNC: 130 MMOL/L (ref 136–145)
VENTRICULAR RATE, ECG03: 63 BPM
WBC # BLD AUTO: 7.5 K/UL (ref 3.6–11)

## 2018-02-13 PROCEDURE — 74011636637 HC RX REV CODE- 636/637: Performed by: INTERNAL MEDICINE

## 2018-02-13 PROCEDURE — 93005 ELECTROCARDIOGRAM TRACING: CPT

## 2018-02-13 PROCEDURE — 96372 THER/PROPH/DIAG INJ SC/IM: CPT

## 2018-02-13 PROCEDURE — 99218 HC RM OBSERVATION: CPT

## 2018-02-13 PROCEDURE — 80053 COMPREHEN METABOLIC PANEL: CPT | Performed by: EMERGENCY MEDICINE

## 2018-02-13 PROCEDURE — 36415 COLL VENOUS BLD VENIPUNCTURE: CPT | Performed by: EMERGENCY MEDICINE

## 2018-02-13 PROCEDURE — 99284 EMERGENCY DEPT VISIT MOD MDM: CPT

## 2018-02-13 PROCEDURE — 74011000250 HC RX REV CODE- 250: Performed by: INTERNAL MEDICINE

## 2018-02-13 PROCEDURE — 74011250637 HC RX REV CODE- 250/637: Performed by: INTERNAL MEDICINE

## 2018-02-13 PROCEDURE — 85025 COMPLETE CBC W/AUTO DIFF WBC: CPT | Performed by: EMERGENCY MEDICINE

## 2018-02-13 PROCEDURE — 74018 RADEX ABDOMEN 1 VIEW: CPT

## 2018-02-13 PROCEDURE — 74011250636 HC RX REV CODE- 250/636: Performed by: INTERNAL MEDICINE

## 2018-02-13 PROCEDURE — 82962 GLUCOSE BLOOD TEST: CPT

## 2018-02-13 RX ORDER — PANTOPRAZOLE SODIUM 40 MG/1
40 TABLET, DELAYED RELEASE ORAL
Status: DISCONTINUED | OUTPATIENT
Start: 2018-02-14 | End: 2018-02-14 | Stop reason: HOSPADM

## 2018-02-13 RX ORDER — CINACALCET 30 MG/1
30 TABLET, FILM COATED ORAL DAILY
Status: DISCONTINUED | OUTPATIENT
Start: 2018-02-14 | End: 2018-02-14 | Stop reason: HOSPADM

## 2018-02-13 RX ORDER — DIAZEPAM 5 MG/1
5 TABLET ORAL
Status: DISCONTINUED | OUTPATIENT
Start: 2018-02-13 | End: 2018-02-14 | Stop reason: HOSPADM

## 2018-02-13 RX ORDER — FLUOXETINE HYDROCHLORIDE 20 MG/1
20 CAPSULE ORAL DAILY
Status: DISCONTINUED | OUTPATIENT
Start: 2018-02-14 | End: 2018-02-14 | Stop reason: HOSPADM

## 2018-02-13 RX ORDER — GUAIFENESIN 100 MG/5ML
81 LIQUID (ML) ORAL DAILY
Status: DISCONTINUED | OUTPATIENT
Start: 2018-02-14 | End: 2018-02-14 | Stop reason: HOSPADM

## 2018-02-13 RX ORDER — SIMVASTATIN 20 MG/1
20 TABLET, FILM COATED ORAL
Status: DISCONTINUED | OUTPATIENT
Start: 2018-02-13 | End: 2018-02-14 | Stop reason: HOSPADM

## 2018-02-13 RX ORDER — PANTOPRAZOLE SODIUM 40 MG/1
40 TABLET, DELAYED RELEASE ORAL 2 TIMES DAILY
Status: DISCONTINUED | OUTPATIENT
Start: 2018-02-13 | End: 2018-02-13

## 2018-02-13 RX ORDER — CARVEDILOL 12.5 MG/1
12.5 TABLET ORAL
Status: DISCONTINUED | OUTPATIENT
Start: 2018-02-13 | End: 2018-02-14 | Stop reason: HOSPADM

## 2018-02-13 RX ORDER — AMLODIPINE BESYLATE 5 MG/1
10 TABLET ORAL DAILY
Status: DISCONTINUED | OUTPATIENT
Start: 2018-02-14 | End: 2018-02-14 | Stop reason: HOSPADM

## 2018-02-13 RX ORDER — MAGNESIUM SULFATE 100 %
4 CRYSTALS MISCELLANEOUS AS NEEDED
Status: DISCONTINUED | OUTPATIENT
Start: 2018-02-13 | End: 2018-02-14 | Stop reason: HOSPADM

## 2018-02-13 RX ORDER — ONDANSETRON 2 MG/ML
4 INJECTION INTRAMUSCULAR; INTRAVENOUS
Status: DISCONTINUED | OUTPATIENT
Start: 2018-02-13 | End: 2018-02-14 | Stop reason: HOSPADM

## 2018-02-13 RX ORDER — CARVEDILOL 12.5 MG/1
25 TABLET ORAL
Status: DISCONTINUED | OUTPATIENT
Start: 2018-02-14 | End: 2018-02-14 | Stop reason: HOSPADM

## 2018-02-13 RX ORDER — INSULIN LISPRO 100 [IU]/ML
INJECTION, SOLUTION INTRAVENOUS; SUBCUTANEOUS
Status: DISCONTINUED | OUTPATIENT
Start: 2018-02-13 | End: 2018-02-14 | Stop reason: HOSPADM

## 2018-02-13 RX ORDER — LANOLIN ALCOHOL/MO/W.PET/CERES
325 CREAM (GRAM) TOPICAL
Status: DISCONTINUED | OUTPATIENT
Start: 2018-02-14 | End: 2018-02-14 | Stop reason: HOSPADM

## 2018-02-13 RX ORDER — PRENATAL VIT 91/IRON/FOLIC/DHA 28-975-200
1 COMBINATION PACKAGE (EA) ORAL DAILY
Status: DISCONTINUED | OUTPATIENT
Start: 2018-02-14 | End: 2018-02-14 | Stop reason: HOSPADM

## 2018-02-13 RX ORDER — DIPHENHYDRAMINE HCL 25 MG
25 CAPSULE ORAL
Status: DISCONTINUED | OUTPATIENT
Start: 2018-02-13 | End: 2018-02-14 | Stop reason: HOSPADM

## 2018-02-13 RX ORDER — LORATADINE 10 MG/1
10 TABLET ORAL DAILY
Status: DISCONTINUED | OUTPATIENT
Start: 2018-02-14 | End: 2018-02-14 | Stop reason: HOSPADM

## 2018-02-13 RX ORDER — NORTRIPTYLINE HYDROCHLORIDE 25 MG/1
25 CAPSULE ORAL
Status: DISCONTINUED | OUTPATIENT
Start: 2018-02-13 | End: 2018-02-14 | Stop reason: HOSPADM

## 2018-02-13 RX ORDER — MINERAL OIL
30 OIL (ML) ORAL DAILY PRN
Status: DISCONTINUED | OUTPATIENT
Start: 2018-02-13 | End: 2018-02-14 | Stop reason: HOSPADM

## 2018-02-13 RX ORDER — HEPARIN SODIUM 5000 [USP'U]/ML
5000 INJECTION, SOLUTION INTRAVENOUS; SUBCUTANEOUS EVERY 8 HOURS
Status: DISCONTINUED | OUTPATIENT
Start: 2018-02-13 | End: 2018-02-14 | Stop reason: HOSPADM

## 2018-02-13 RX ORDER — POLYETHYLENE GLYCOL 3350 17 G/17G
17 POWDER, FOR SOLUTION ORAL
COMMUNITY
End: 2018-04-23

## 2018-02-13 RX ORDER — SEVELAMER CARBONATE 800 MG/1
800 TABLET, FILM COATED ORAL
Status: DISCONTINUED | OUTPATIENT
Start: 2018-02-14 | End: 2018-02-14 | Stop reason: HOSPADM

## 2018-02-13 RX ORDER — AMLODIPINE BESYLATE 10 MG/1
5 TABLET ORAL DAILY
COMMUNITY
End: 2018-04-09 | Stop reason: DRUGHIGH

## 2018-02-13 RX ORDER — OXYCODONE AND ACETAMINOPHEN 7.5; 325 MG/1; MG/1
1 TABLET ORAL
Status: DISCONTINUED | OUTPATIENT
Start: 2018-02-13 | End: 2018-02-14 | Stop reason: HOSPADM

## 2018-02-13 RX ORDER — SODIUM CHLORIDE 0.9 % (FLUSH) 0.9 %
5-10 SYRINGE (ML) INJECTION AS NEEDED
Status: DISCONTINUED | OUTPATIENT
Start: 2018-02-13 | End: 2018-02-14 | Stop reason: HOSPADM

## 2018-02-13 RX ORDER — MINERAL OIL
30 OIL (ML) ORAL DAILY PRN
COMMUNITY
End: 2018-04-14 | Stop reason: ALTCHOICE

## 2018-02-13 RX ORDER — SODIUM CHLORIDE 0.9 % (FLUSH) 0.9 %
5-10 SYRINGE (ML) INJECTION EVERY 8 HOURS
Status: DISCONTINUED | OUTPATIENT
Start: 2018-02-13 | End: 2018-02-14 | Stop reason: HOSPADM

## 2018-02-13 RX ORDER — LATANOPROST 50 UG/ML
1 SOLUTION/ DROPS OPHTHALMIC
Status: DISCONTINUED | OUTPATIENT
Start: 2018-02-13 | End: 2018-02-14 | Stop reason: HOSPADM

## 2018-02-13 RX ORDER — INSULIN GLARGINE 100 [IU]/ML
5 INJECTION, SOLUTION SUBCUTANEOUS DAILY
Status: DISCONTINUED | OUTPATIENT
Start: 2018-02-14 | End: 2018-02-14 | Stop reason: HOSPADM

## 2018-02-13 RX ORDER — OXYCODONE AND ACETAMINOPHEN 7.5; 325 MG/1; MG/1
1 TABLET ORAL
COMMUNITY
End: 2018-07-19

## 2018-02-13 RX ORDER — DEXTROSE 50 % IN WATER (D50W) INTRAVENOUS SYRINGE
12.5-25 AS NEEDED
Status: DISCONTINUED | OUTPATIENT
Start: 2018-02-13 | End: 2018-02-14 | Stop reason: HOSPADM

## 2018-02-13 RX ORDER — ASCORBIC ACID 500 MG
1000 TABLET ORAL DAILY
Status: DISCONTINUED | OUTPATIENT
Start: 2018-02-14 | End: 2018-02-14 | Stop reason: HOSPADM

## 2018-02-13 RX ORDER — DOCUSATE SODIUM 100 MG/1
100 CAPSULE, LIQUID FILLED ORAL 2 TIMES DAILY
Status: DISCONTINUED | OUTPATIENT
Start: 2018-02-13 | End: 2018-02-14 | Stop reason: HOSPADM

## 2018-02-13 RX ADMIN — SIMVASTATIN 20 MG: 20 TABLET, FILM COATED ORAL at 21:28

## 2018-02-13 RX ADMIN — NORTRIPTYLINE HYDROCHLORIDE 25 MG: 25 CAPSULE ORAL at 21:28

## 2018-02-13 RX ADMIN — LATANOPROST 1 DROP: 50 SOLUTION OPHTHALMIC at 21:28

## 2018-02-13 RX ADMIN — Medication 10 ML: at 21:28

## 2018-02-13 RX ADMIN — DOCUSATE SODIUM 100 MG: 100 CAPSULE, LIQUID FILLED ORAL at 21:28

## 2018-02-13 RX ADMIN — INSULIN LISPRO 3 UNITS: 100 INJECTION, SOLUTION INTRAVENOUS; SUBCUTANEOUS at 23:07

## 2018-02-13 RX ADMIN — DIPHENHYDRAMINE HYDROCHLORIDE 25 MG: 25 CAPSULE ORAL at 21:28

## 2018-02-13 RX ADMIN — HEPARIN SODIUM 5000 UNITS: 5000 INJECTION, SOLUTION INTRAVENOUS; SUBCUTANEOUS at 21:29

## 2018-02-13 RX ADMIN — OXYCODONE HYDROCHLORIDE AND ACETAMINOPHEN 1 TABLET: 7.5; 325 TABLET ORAL at 21:28

## 2018-02-13 RX ADMIN — CARVEDILOL 12.5 MG: 12.5 TABLET, FILM COATED ORAL at 21:28

## 2018-02-13 NOTE — PROGRESS NOTES
Admission Medication Reconciliation:    Information obtained from: Patient, patient's visitor, and Rx Query    Significant PMH/Disease States:   Past Medical History:   Diagnosis Date    Abscess of abdominal wall 2006?  Adverse effect of anesthesia     DIFFICULTY WAKING 20 YEARS AGO    Anal cryptitis 06/04/2012    Anemia     Arthritis 10/14/2010    back, neck, knees, hands    Asthma     \"TOUCH OF\"    Axillary abscess     right axillary    Blood transfusion 1999    [facility], NO REACTION    Blood transfusion 1980'S    [place] NO REACTION    Chronic kidney disease     [id number]    Chronic pain     BACK, NECK, HANDS, KNEES    Depression     Diabetes mellitus type 2, insulin dependent ([address] 10/14/2010    Dialysis patient ([address] Since 3/3/2010    M, W, F    GERD (gastroesophageal reflux disease)     Glaucoma     Heart failure ([address] 2004    IN PAST-CHF; PT WAS 412lb AT THE TIME.     High cholesterol     HTN (hypertension) 10/14/2010    IBS (irritable bowel syndrome)     Migraine     Morbid obesity ([address] 10/14/2010    HAS LOST 150+ POUNDS SINCE 2010    Nausea 04/14/2017    PERSISTENT    Nausea & vomiting     Neuropathy 10/14/2010    FEET, LEGS & FACE    Other ill-defined conditions(799.89)     facial neuropathy STATES PN 1/17/11 HAS NEUROPATHY OF FEET/ LEGS     Other ill-defined conditions(799.89)     glaucoma and cateracts    Other ill-defined conditions(799.89) 04/14/2017    ANEMIA    Perineal abscess 1/25/2017    Psychiatric disorder     ANXIETY AND DEPRESSION    s/p debridement of midline abd wound 6/24/2011    Serratia wound infection, 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 06/14/2011    Stroke ([address]     TIA, NO RESIDUAL    Thromboembolus ([address] 2007    LEFT LEG    Thyroid disease     LOW THYROID    Unspecified adverse effect of anesthesia 1999    DIFFICULTY WAKING AFTER 2ND SURGERY SHORTLY AFTER OTHER SURGERY; WEIGHT 400+ POUNDS    Unspecified sleep apnea     HAS NOT USED CPAP SINCE LOSING WEIGHT, PT STATES ON 17       Chief Complaint for this Admission:    Chief Complaint   Patient presents with    Constipation       Allergies:  Latex; Celebrex [celecoxib]; Iodine; Keflex [cephalexin]; Levaquin [levofloxacin]; Pcn [penicillins]; and Morphine    Prior to Admission Medications:   Prior to Admission Medications   Prescriptions Last Dose Informant Patient Reported? Taking? FLUoxetine (PROZAC) 20 mg capsule   Yes Yes   Sig: Take 20 mg by mouth daily. INSULIN LISPRO (HUMALOG SC)  Self Yes Yes   Sig: by SubCUTAneous route Before breakfast, lunch, and dinner. SLIDING SCALE  100-150-4u  151-200-5u  ect (1 UNIT INCREASE FOR EVERY 50 POINTS)   Menthol-Zinc Oxide (CALMOSEPTINE) 0.44-20.6 % oint   Yes Yes   Sig: Apply 1 Each to affected area daily. to left groin wound   OTHER   No Yes   Si.9% Normal saline    Use as needed to affected area    Medical code: L02.215   amLODIPine (NORVASC) 10 mg tablet   Yes Yes   Sig: Take 10 mg by mouth daily. ascorbic acid, vitamin C, (VITAMIN C) 500 mg tablet   Yes Yes   Sig: Take 1,000 mg by mouth daily. aspirin 81 mg chewable tablet   Yes Yes   Sig: Take 81 mg by mouth daily. b complex-vitamin c-folic acid (RENAL SOFTGELS) 1 mg capsule  Self Yes Yes   Sig: Take 1 Cap by mouth daily. carvedilol (COREG) 12.5 mg tablet   Yes Yes   Sig: Take 12.5 mg by mouth nightly. 25 mg every morning and 12.5 mg every evening. carvedilol (COREG) 25 mg tablet   Yes Yes   Sig: Take 25 mg by mouth every morning. 25 mg every morning and 12.5 mg every evening. cinacalcet (SENSIPAR) 30 mg tablet   Yes Yes   Sig: Take 30 mg by mouth daily. diazepam (VALIUM) 5 mg tablet   No Yes   Sig: Take 1 Tab by mouth every eight (8) hours as needed for Anxiety. Max Daily Amount: 15 mg.   diclofenac (VOLTAREN) 1 % gel   Yes Yes   Si g by TransDERmal route every four (4) hours as needed (pain).    diphenhydrAMINE (BENADRYL) 25 mg capsule  Self Yes Yes   Sig: Take 75 mg by mouth every six (6) hours as needed for Itching. docusate sodium (DULCOLAX STOOL SOFTENER) 100 mg capsule   Yes Yes   Sig: Take 100 mg by mouth two (2) times a day. ferric citrate (AURYXIA) 210 mg iron tablet   Yes Yes   Sig: Take 210 mg by mouth three (3) times daily (with meals). fexofenadine (ALLEGRA) 180 mg tablet   Yes Yes   Sig: Take 180 mg by mouth nightly. insulin detemir (LEVEMIR FLEXTOUCH) 100 unit/mL (3 mL) inpn   Yes Yes   Si Units by SubCUTAneous route Daily (before lunch). NOON DAILY   lansoprazole (PREVACID) 15 mg capsule   Yes Yes   Sig: Take 15 mg by mouth two (2) times a day. latanoprost (XALATAN) 0.005 % ophthalmic solution   Yes Yes   Sig: Administer 1 Drop to both eyes nightly. linaclotide (LINZESS) 290 mcg cap capsule   Yes Yes   Sig: Take 290 mcg by mouth daily. mineral oil liquid   Yes Yes   Sig: Take 30 mL by mouth daily as needed for Constipation. nortriptyline (PAMELOR) 25 mg capsule   Yes Yes   Sig: Take 25 mg by mouth nightly. oxyCODONE-acetaminophen (PERCOCET) 7.5-325 mg per tablet   Yes Yes   Sig: Take 1-2 Tabs by mouth every eight (8) hours as needed for Pain.   polyethylene glycol (MIRALAX) 17 gram packet   Yes Yes   Sig: Take 17 g by mouth two (2) times daily as needed. sevelamer carbonate (RENVELA) 800 mg tab tab   Yes Yes   Sig: Take 800 mg by mouth three (3) times daily (with meals). simvastatin (ZOCOR) 20 mg tablet   Yes Yes   Sig: Take 20 mg by mouth nightly. terbinafine HCl (LAMISIL) 1 % topical cream   Yes Yes   Sig: Apply 1 Each to affected area daily. Apply to heel at bedtime, wrap heel with saran wrap. Facility-Administered Medications: None         Comments/Recommendations:     Spoke with patient and patient's visitor regarding patient's allergies and PTA medications. Patient's visitor was very familiar with patient's medication regimen. Per previous notes, patient receives dialysis MWF.     1) Reviewed and confirmed allergies. 2) Updated PTA medication list. Modified other \"non formulary\" (changed to [facility] listing), mineral oil (changed from daily to daily prn), and Miralax (changed from daily to BID prn). Removed rotator and adult diapers, as well as duplicate Percocet and terbinafine. Patient reports she has not had any medications today.       Elias Cantrell, HoneyD

## 2018-02-13 NOTE — ED TRIAGE NOTES
Triage: \" I have not had a BM in 9 days and I'm impacted\". Has been taking OTC medications with no relief. Also having lower back pain. ESRD- M,W,Fri did not go to dialysis yesterday scheduled for dialysis today at 11:00.

## 2018-02-13 NOTE — ED NOTES
CONSULT NOTE:  4:20 PM  spoke with Dr. Eric Linton, Consult for Hospitalist.  Discussed available diagnostic tests and clinical findings. He is in agreement with care plans as outlined. He will see and admit the pt.

## 2018-02-13 NOTE — Clinical Note
Home to continue your stool softeners and follow up with nephrology for dialysis. Call your MD sooner if any worsening of symptoms.
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
## 2018-02-13 NOTE — CONSULTS
Nephrology Progress Note  Juvencio Pinto  Date of Admission : 2/13/2018    CC: Follow up for ESRD       Assessment and Plan     [facility] :  [facility]   - last HD was Friday last week  - HD tonight  - ok for d/c after her HD    Hyperkalemia:  - will correct with HD     Anemia of CKD:  - hgb stable  - no ELTON needed     HTN :  - BP stable     Constipation:  - resolved  - neg ABD X-ray    Type II DM      Sec HPTH       Interval History: This is a 74 yo AAF w/ a hx of ESRD on HD MWF at [facility] who presented to the ED w/o constipation. She missed her HD Monday and was supposed to run today, but missed because she came into the ED with constipation. Her ABD X-ray was neg and she did have a BM in the ER. She is still c/o abd discomfort, poor appetite. No nausea or vomiting, cp or sob reported. Current Medications: all current  Medications have been eviewed in EPIC  Review of Systems: Pertinent items are noted in HPI. Objective:  Vitals:    Vitals:    02/13/18 1101 02/13/18 1537   BP: 125/66 117/59   Pulse: 80 67   Resp: 20 18   Temp: 98.5 °F (36.9 °C) 98.3 °F (36.8 °C)   SpO2: 98% 99%   Weight: 113 kg (249 lb 1.9 oz)    Height: 6' 1\" (1.854 m)      Intake and Output:          Physical Examination:  General: NAD,Conversant   Neck:  Supple, no mass  Resp:  Lungs CTA B/L, no wheezing , normal respiratory effort  CV:  RRR,  no murmur or rub, trace LE edema  GI:  Soft, NT, + Bowel sounds, no hepatosplenomegaly  Neurologic:  Non focal  Psych:             AAO x 3 appropriate affect  Skin:  No Rash  Access:           LUE AVF +thrill/bruit    []    High complexity decision making was performed  []    Patient is at high-risk of decompensation with multiple organ involvement    Lab Data Personally Reviewed: I have reviewed all the pertinent labs, microbiology data and radiology studies during assessment.     Recent Labs      02/13/18   1150   NA  130*   K  5.7*   CL  90*   CO2  29 GLU  121*   BUN  80*   CREA  10.50*   CA  8.0*   ALB  3.3*   SGOT  56*   ALT  65     Recent Labs      02/13/18   1150   WBC  7.5   HGB  12.0   HCT  37.1   PLT  177     Lab Results   Component Value Date/Time    Specimen Description: URINE 10/27/2012 08:15 PM    Specimen Description: BLOOD 06/07/2011 07:40 AM    Specimen Description: URINE 08/30/2010 07:10 AM     Lab Results   Component Value Date/Time    Culture result: SCANT  MIXED [facility] [place]   10/26/2017 08:36 AM    Culture result: (A) 10/26/2017 08:36 AM     PROBABLE CLOSTRIDIUM SPECIES ISOLATED FROM THIO BROTH ONLY    Culture result: NO GROWTH 32 DAYS 10/26/2017 07:36 AM     Recent Results (from the past 24 hour(s))   CBC WITH AUTOMATED DIFF    Collection Time: 02/13/18 11:50 AM   Result Value Ref Range    WBC 7.5 3.6 - 11.0 K/uL    RBC 4.06 3.80 - 5.20 M/uL    HGB 12.0 11.5 - 16.0 g/dL    HCT 37.1 35.0 - 47.0 %    MCV 91.4 80.0 - 99.0 FL    MCH 29.6 26.0 - 34.0 PG    MCHC 32.3 30.0 - 36.5 g/dL    RDW 14.2 11.5 - 14.5 %    PLATELET 332 161 - 454 K/uL    MPV 9.9 8.9 - 12.9 FL    NRBC 0.0 0  WBC    ABSOLUTE NRBC 0.00 0.00 - 0.01 K/uL    NEUTROPHILS 60 32 - 75 %    LYMPHOCYTES 30 12 - 49 %    MONOCYTES 7 5 - 13 %    EOSINOPHILS 3 0 - 7 %    BASOPHILS 1 0 - 1 %    IMMATURE GRANULOCYTES 0 0.0 - 0.5 %    ABS. NEUTROPHILS 4.5 1.8 - 8.0 K/UL    ABS. LYMPHOCYTES 2.2 0.8 - 3.5 K/UL    ABS. MONOCYTES 0.5 0.0 - 1.0 K/UL    ABS. EOSINOPHILS 0.2 0.0 - 0.4 K/UL    ABS. BASOPHILS 0.1 0.0 - 0.1 K/UL    ABS. IMM.  GRANS. 0.0 0.00 - 0.04 K/UL    DF AUTOMATED     METABOLIC PANEL, COMPREHENSIVE    Collection Time: 02/13/18 11:50 AM   Result Value Ref Range    Sodium 130 (L) 136 - 145 mmol/L    Potassium 5.7 (H) 3.5 - 5.1 mmol/L    Chloride 90 (L) 97 - 108 mmol/L    CO2 29 21 - 32 mmol/L    Anion gap 11 5 - 15 mmol/L    Glucose 121 (H) 65 - 100 mg/dL    BUN 80 (H) 6 - 20 MG/DL    Creatinine 10.50 (H) 0.55 - 1.02 MG/DL    BUN/Creatinine ratio 8 (L) 12 - 20      GFR est AA 4 (L) >60 ml/min/1.73m2    GFR est non-AA 4 (L) >60 ml/min/1.73m2    Calcium 8.0 (L) 8.5 - 10.1 MG/DL    Bilirubin, total 0.5 0.2 - 1.0 MG/DL    ALT (SGPT) 65 12 - 78 U/L    AST (SGOT) 56 (H) 15 - 37 U/L    Alk. phosphatase 258 (H) 45 - 117 U/L    Protein, total 8.8 (H) 6.4 - 8.2 g/dL    Albumin 3.3 (L) 3.5 - 5.0 g/dL    Globulin 5.5 (H) 2.0 - 4.0 g/dL    A-G Ratio 0.6 (L) 1.1 - 2.2     EKG, 12 LEAD, INITIAL    Collection Time: 02/13/18  3:39 PM   Result Value Ref Range    Ventricular Rate 63 BPM    Atrial Rate 63 BPM    P-R Interval 200 ms    QRS Duration 98 ms    Q-T Interval 486 ms    QTC Calculation (Bezet) 497 ms    Calculated P Axis 63 degrees    Calculated R Axis -31 degrees    Calculated T Axis 38 degrees    Diagnosis       Normal sinus rhythm  Left axis deviation  Prolonged QT  When compared with ECG of 07-SEP-2017 12:04,  T wave amplitude has increased in Anterior leads                 MACKENZIE [address]   [address] [facility] [address]  Phone - [phone]   Fax - [phone]  www. [facility] FacilityCellPly

## 2018-02-13 NOTE — IP AVS SNAPSHOT
[address] 
[phone] Patient: Deangelo Scott MRN: [id number] [facility]:[birth year] You are allergic to the following Allergen Reactions Latex Itching Rash, sometimes difficult to breathe Celebrex (Celecoxib) Anaphylaxis Swelling TONGUE. LIPS AND EYES Iodine Other (comments) IV CONTRAST-LESIONS, AND SKIN SLUFFED OFF Keflex (Cephalexin) Anaphylaxis Swelling Tongue, lips, and eyes Levaquin (Levofloxacin) Anaphylaxis Swelling Tongue, lips, and eyes Pcn (Penicillins) Anaphylaxis Swelling Tongue, lips and eyes Morphine Other (comments) PT STATES IS JUST SENSITIVITY, GOT TOO MUCH ONE TIME. Recent Documentation Height Weight BMI OB Status Smoking Status 1.854 m 112.6 kg 32.75 kg/m2 Hysterectomy Never Smoker Unresulted Labs-Please follow up with your PCP about these lab tests Order Current Status HEP B SURFACE AG In process CULTURE, MRSA Preliminary result Emergency Contacts  (Rel.) Home Phone Work Phone Mobile Phone Khari Magallon (Daughter) [phone] -- [phone] About your hospitalization You were admitted on:  February 13, 2018 You last received care in the:  [facility] You were discharged on:  February 14, 2018 Why you were hospitalized Your primary diagnosis was:  Fecal Impaction (Hcc) Your diagnoses also included:  Hyperkalemia, Esrd Needing Dialysis (Hcc), Diabetes Mellitus Type 2, Insulin Dependent (Hcc) Providers Seen During Your Hospitalization Provider Specialty Primary office phone Sabrina Catalan MD Emergency Medicine [phone] Rayray Buck MD Internal Medicine [phone] Your Primary Care Physician (PCP) Primary Care Physician Office Phone Office Fax Kingsley Kincaid [phone] Follow-up Information Follow up With Details Comments Contact Info Lori Gaines MD On 2/20/2018 Hospital follow up PCP appointment on Tuesday, 2/20/18 @ 2:15 p.m.  [address] 
[phone] My Medications TAKE these medications as instructed Instructions Each Dose to Equal  
 Morning Noon Evening Bedtime ALLEGRA 180 mg tablet Generic drug:  fexofenadine Your last dose was: Your next dose is: Take 180 mg by mouth nightly. 180 mg  
    
   
   
   
  
 amLODIPine 10 mg tablet Commonly known as:  Ray Sotomayor Your last dose was: Your next dose is: Take 10 mg by mouth daily. 10 mg  
    
   
   
   
  
 aspirin 81 mg chewable tablet Your last dose was: Your next dose is: Take 81 mg by mouth daily. 81 mg  
    
   
   
   
  
 BENADRYL 25 mg capsule Generic drug:  diphenhydrAMINE Your last dose was: Your next dose is: Take 75 mg by mouth every six (6) hours as needed for Itching. 75 mg CALMOSEPTINE 0.44-20.6 % Oint Generic drug:  Menthol-Zinc Oxide Your last dose was: Your next dose is:    
   
   
 Apply 1 Each to affected area daily. to left groin wound 1 Each * COREG 25 mg tablet Generic drug:  carvedilol Your last dose was: Your next dose is: Take 25 mg by mouth every morning. 25 mg every morning and 12.5 mg every evening. 25 mg  
    
   
   
   
  
 * COREG 12.5 mg tablet Generic drug:  carvedilol Your last dose was: Your next dose is: Take 12.5 mg by mouth nightly. 25 mg every morning and 12.5 mg every evening. 12.5 mg  
    
   
   
   
  
 diazePAM 5 mg tablet Commonly known as:  VALIUM Your last dose was: Your next dose is: Take 1 Tab by mouth every eight (8) hours as needed for Anxiety. Max Daily Amount: 15 mg.  
 5 mg  
    
   
   
   
  
 diclofenac 1 % Gel Commonly known as:  VOLTAREN Your last dose was: Your next dose is:    
   
   
 2 g by TransDERmal route every four (4) hours as needed (pain). 2 g DULCOLAX STOOL SOFTENER (DSS) 100 mg capsule Generic drug:  docusate sodium Your last dose was: Your next dose is: Take 100 mg by mouth two (2) times a day. 100 mg  
    
   
   
   
  
 ferric citrate 210 mg iron tablet Commonly known as:  Jessica Wilfrid Your last dose was: Your next dose is: Take 210 mg by mouth three (3) times daily (with meals). 210 mg FLUoxetine 20 mg capsule Commonly known as:  PROzac Your last dose was: Your next dose is: Take 20 mg by mouth daily. 20 mg  
    
   
   
   
  
 HUMALOG SC Your last dose was: Your next dose is:    
   
   
 by SubCUTAneous route Before breakfast, lunch, and dinner. SLIDING SCALE 100-150-4u 151-200-5u ect (1 UNIT INCREASE FOR EVERY 50 POINTS)  
     
   
   
   
  
 insulin detemir 100 unit/mL (3 mL) Inpn Commonly known as:  Shea Brothers Your last dose was: Your next dose is:    
   
   
 9 Units by SubCUTAneous route Daily (before lunch). NOON DAILY  
 9 Units  
    
   
   
   
  
 latanoprost 0.005 % ophthalmic solution Commonly known as:  Hawkins Shave Your last dose was: Your next dose is:    
   
   
 Administer 1 Drop to both eyes nightly. 1 Drop LINZESS 290 mcg Cap capsule Generic drug:  linaclotide Your last dose was: Your next dose is: Take 290 mcg by mouth daily. 290 mcg  
    
   
   
   
  
 mineral oil liquid Your last dose was: Your next dose is: Take 30 mL by mouth daily as needed for Constipation. 30 mL MIRALAX 17 gram packet Generic drug:  polyethylene glycol Your last dose was: Your next dose is: Take 17 g by mouth two (2) times daily as needed. 17 g  
    
   
   
   
  
 nortriptyline 25 mg capsule Commonly known as:  PAMELOR Your last dose was: Your next dose is: Take 25 mg by mouth nightly. 25 mg  
    
   
   
   
  
 OTHER Your last dose was: Your next dose is:    
   
   
 0.9% Normal saline  Use as needed to affected area  Medical code: L02.215 PERCOCET 7.5-325 mg per tablet Generic drug:  oxyCODONE-acetaminophen Your last dose was: Your next dose is: Take 1-2 Tabs by mouth every eight (8) hours as needed for Pain. 1-2 Tab PREVACID 15 mg capsule Generic drug:  lansoprazole Your last dose was: Your next dose is: Take 15 mg by mouth two (2) times a day. 15 mg RENAL SOFTGELS 1 mg capsule Generic drug:  b complex-vitamin c-folic acid Your last dose was: Your next dose is: Take 1 Cap by mouth daily. 1 Cap SENSIPAR 30 mg tablet Generic drug:  cinacalcet Your last dose was: Your next dose is: Take 30 mg by mouth daily. 30 mg  
    
   
   
   
  
 sevelamer carbonate 800 mg Tab tab Commonly known as:  Jimmy Sos Your last dose was: Your next dose is: Take 800 mg by mouth three (3) times daily (with meals). 800 mg  
    
   
   
   
  
 simvastatin 20 mg tablet Commonly known as:  ZOCOR Your last dose was: Your next dose is: Take 20 mg by mouth nightly. 20 mg  
    
   
   
   
  
 terbinafine HCl 1 % topical cream  
Commonly known as:  LAMISIL Your last dose was: Your next dose is:    
   
   
 Apply 1 Each to affected area daily. Apply to heel at bedtime, wrap heel with saran wrap.  
 1 Each VITAMIN C 500 mg tablet Generic drug:  ascorbic acid (vitamin C) Your last dose was: Your next dose is: Take 1,000 mg by mouth daily. 1000 mg * Notice: This list has 2 medication(s) that are the same as other medications prescribed for you. Read the directions carefully, and ask your doctor or other care provider to review them with you. ASK your physician about these medications Instructions Each Dose to Equal  
 Morning Noon Evening Bedtime OTHER(NON-FORMULARY) Your last dose was: Your next dose is:    
   
   
 210 mg three (3) times daily (with meals). [facility]  
 210 mg Discharge Instructions Constipation: Care Instructions Your Care Instructions Constipation means that you have a hard time passing stools (bowel movements). People pass stools from 3 times a day to once every 3 days. What is normal for you may be different. Constipation may occur with pain in the rectum and cramping. The pain may get worse when you try to pass stools. Sometimes there are small amounts of bright red blood on toilet paper or the surface of stools. This is because of enlarged veins near the rectum (hemorrhoids). A few changes in your diet and lifestyle may help you avoid ongoing constipation. Your doctor may also prescribe medicine to help loosen your stool. Some medicines can cause constipation. These include pain medicines and antidepressants. Tell your doctor about all the medicines you take. Your doctor may want to make a medicine change to ease your symptoms. Follow-up care is a key part of your treatment and safety.  Be sure to make and go to all appointments, and call your doctor if you are having problems. It's also a good idea to know your test results and keep a list of the medicines you take. How can you care for yourself at home? · Drink plenty of fluids, enough so that your urine is light yellow or clear like water. If you have kidney, heart, or liver disease and have to limit fluids, talk with your doctor before you increase the amount of fluids you drink. · Include high-fiber foods in your diet each day. These include fruits, vegetables, beans, and whole grains. · Get at least 30 minutes of exercise on most days of the week. Walking is a good choice. You also may want to do other activities, such as running, swimming, cycling, or playing tennis or team sports. · Take a fiber supplement, such as Citrucel or Metamucil, every day. Read and follow all instructions on the label. · Schedule time each day for a bowel movement. A daily routine may help. Take your time having your bowel movement. · Support your feet with a small step stool when you sit on the toilet. This helps flex your hips and places your pelvis in a squatting position. · Your doctor may recommend an over-the-counter laxative to relieve your constipation. Examples are Milk of Magnesia and MiraLax. Read and follow all instructions on the label. Do not use laxatives on a long-term basis. When should you call for help? Call your doctor now or seek immediate medical care if: 
? · You have new or worse belly pain. ? · You have new or worse nausea or vomiting. ? · You have blood in your stools. ? Watch closely for changes in your health, and be sure to contact your doctor if: 
? · Your constipation is getting worse. ? · You do not get better as expected. Where can you learn more? Go to http://brandan-liang.info/. Enter 21  in the search box to learn more about \"Constipation: Care Instructions. \" Current as of: March 20, 2017 Content Version: 11.4 © 2808-5406 [employer]. Care instructions adapted under license by [employer] (which disclaims liability or warranty for this information). If you have questions about a medical condition or this instruction, always ask your healthcare professional. [address] any warranty or liability for your use of this information. Other instructions: Follow-up with your pcp,nephrologist 
Seek medical attention if recurrence of symptoms. Discharge Orders None [facility] Announce[facility] We are excited to announce that we are making your provider's discharge notes available to you in [employer]. You will see these notes when they are completed and signed by the physician that discharged you from your recent hospital stay. If you have any questions or concerns about any information you see in [employer], please call the Health Information Department where you were seen or reach out to your Primary Care Provider for more information about your plan of care. Introducing [facility]! [facility] introduces [employer] patient portal. Now you can access parts of your medical record, email your doctor's office, and request medication refills online. 1. In your internet browser, go to [email or web address] 2. Click on the First Time User? Click Here link in the Sign In box. You will see the New Member Sign Up page. 3. Enter your [employer] Access Code exactly as it appears below. You will not need to use this code after youve completed the sign-up process. If you do not sign up before the expiration date, you must request a new code. · [employer] Access Code: [email or web address] Expires: 4/27/2018  1:26 AM 
 
4. Enter the last four digits of your Social Security Number (xxxx) and Date of Birth (mm/dd/yyyy) as indicated and click Submit. You will be taken to the next sign-up page. 5. Create a [employer] ID.  This will be your [employer] login ID and cannot be changed, so think of one that is secure and easy to remember. 6. Create a [employer] password. You can change your password at any time. 7. Enter your Password Reset Question and Answer. This can be used at a later time if you forget your password. 8. Enter your e-mail address. You will receive e-mail notification when new information is available in [address] 9. Click Sign Up. You can now view and download portions of your medical record. 10. Click the Download Summary menu link to download a portable copy of your medical information. If you have questions, please visit the Frequently Asked Questions section of the [employer] website. Remember, [employer] is NOT to be used for urgent needs. For medical emergencies, dial 911. Now available from your iPhone and Android! General Information Please provide this summary of care documentation to your next provider. Patient Signature:  ____________________________________________________________ Date:  ____________________________________________________________  
  
Torri Keto Provider Signature:  ____________________________________________________________ Date:  ____________________________________________________________

## 2018-02-13 NOTE — H&P
Hospitalist Admission Note    NAME:  Pieter Harper   :   1950   MRN:   [id number]     Date/Time:  2018 6:35 PM    Subjective:     CHIEF COMPLAINT:    Chief Complaint   Patient presents with    Constipation       HISTORY OF PRESENT ILLNESS:     Marion Bauer is a 75 y.o.  female with h/o esrd on dialysis mwf,hypertension,diabetes,came to er because of not having a bowel movement for almost 10 days. She says that this has started causing discomfort since yesterday,generalized malaise,nausea,causing her to miss her dialysis. She called the dialysis center today and was told that she could have her dialysis today but prefer to come to the emergency room as the malaise persisted. CT of abdm/pelvis showed fecal stasis throughout the colon with fecal impaction rectosigmoid colon. Lab work showed elevated potassium 5. 7. She received enema in the emergency room and responded well. Also dialysis was scheduled for tonight,so patient has been admitted for observation. Past Medical History:   Diagnosis Date    Abscess of abdominal wall ?  Adverse effect of anesthesia     DIFFICULTY WAKING 20 YEARS AGO    Anal cryptitis 2012    Anemia     Arthritis 10/14/2010    back, neck, knees, hands    Asthma     \"TOUCH OF\"    Axillary abscess     right axillary    Blood transfusion     MCV, NO REACTION    Blood transfusion     [place] NO REACTION    Chronic kidney disease     [id number]    Chronic pain     BACK, NECK, HANDS, KNEES    Depression     Diabetes mellitus type 2, insulin dependent ([address] 10/14/2010    Dialysis patient ([facility] Since 3/3/2010    M, W, F    GERD (gastroesophageal reflux disease)     Glaucoma     Heart failure ([address] 2004    IN PAST-CHF; PT WAS 412lb AT THE TIME.     High cholesterol     HTN (hypertension) 10/14/2010    IBS (irritable bowel syndrome)     Migraine     Morbid obesity (HCC) 10/14/2010    HAS LOST 150+ POUNDS SINCE 2010    Nausea 04/14/2017    PERSISTENT    Nausea & vomiting     Neuropathy 10/14/2010    FEET, LEGS & FACE    Other ill-defined conditions(799.89)     facial neuropathy STATES PN 1/17/11 HAS NEUROPATHY OF FEET/ LEGS     Other ill-defined conditions(799.89)     glaucoma and cateracts    Other ill-defined conditions(799.89) 04/14/2017    ANEMIA    Perineal abscess 1/25/2017    Psychiatric disorder     ANXIETY AND DEPRESSION    s/p debridement of midline abd wound 6/24/2011    Serratia wound infection, 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 06/14/2011    Stroke ([address]     TIA, NO RESIDUAL    Thromboembolus ([address] 2007    LEFT LEG    Thyroid disease     LOW THYROID    Unspecified adverse effect of anesthesia 1999    DIFFICULTY WAKING AFTER 2ND SURGERY SHORTLY AFTER OTHER SURGERY; WEIGHT 400+ POUNDS    Unspecified sleep apnea     HAS NOT USED CPAP SINCE LOSING WEIGHT, PT STATES ON 4/14/17        Social History   Substance Use Topics    Smoking status: Never Smoker    Smokeless tobacco: Never Used    Alcohol use No        Family History   Problem Relation Age of Onset    Diabetes Mother     Hypertension Mother     Dementia Mother     Psychiatric Disorder Mother      DEMENTIA    Cancer Father      colon STATED ON 1/17/11-PROSTATE CANCER NOT COLON    Hypertension Father     Diabetes Father     Cancer Brother      colon    Cancer Sister      BREAST    Other Sister      FIBROMYALGIA AND RA    Hypertension Sister     Thyroid Disease Sister     Hypertension Sister     Cancer Sister      COLON    Thyroid Disease Sister     Hypertension Sister     Diabetes Sister     Hypertension Sister     Diabetes Sister     Hypertension Sister     Diabetes Sister     Hypertension Daughter     Hypertension Son     Hypertension Son     Anesth Problems Neg Hx         Allergies   Allergen Reactions    Latex Itching     Rash, sometimes difficult to breathe    Celebrex [Celecoxib] Anaphylaxis and Swelling     TONGUE.  LIPS AND EYES  Iodine Other (comments)     IV CONTRAST-LESIONS, AND SKIN SLUFFED OFF    Keflex [Cephalexin] Anaphylaxis and Swelling     Tongue, lips, and eyes    Levaquin [Levofloxacin] Anaphylaxis and Swelling     Tongue, lips, and eyes    Pcn [Penicillins] Anaphylaxis and Swelling     Tongue, lips and eyes    Morphine Other (comments)     PT STATES IS JUST SENSITIVITY, GOT TOO MUCH ONE TIME. Prior to Admission medications    Medication Sig Start Date End Date Taking? Authorizing Provider   amLODIPine (NORVASC) 10 mg tablet Take 10 mg by mouth daily. Yes Historical Provider   mineral oil liquid Take 30 mL by mouth daily as needed for Constipation. Yes Historical Provider   ferric citrate (AURYXIA) 210 mg iron tablet Take 210 mg by mouth three (3) times daily (with meals). Yes Historical Provider   oxyCODONE-acetaminophen (PERCOCET) 7.5-325 mg per tablet Take 1-2 Tabs by mouth every eight (8) hours as needed for Pain. Yes Historical Provider   polyethylene glycol (MIRALAX) 17 gram packet Take 17 g by mouth two (2) times daily as needed. Yes Historical Provider   aspirin 81 mg chewable tablet Take 81 mg by mouth daily. 1/2/18  Yes Historical Provider   FLUoxetine (PROZAC) 20 mg capsule Take 20 mg by mouth daily. 1/2/18  Yes Historical Provider   sevelamer carbonate (RENVELA) 800 mg tab tab Take 800 mg by mouth three (3) times daily (with meals). 1/2/18  Yes Historical Provider   linaclotide (LINZESS) 290 mcg cap capsule Take 290 mcg by mouth daily. 12/4/17  Yes Elena Leyva MD   insulin detemir (LEVEMIR FLEXTOUCH) 100 unit/mL (3 mL) inpn 9 Units by SubCUTAneous route Daily (before lunch). NOON DAILY   Yes Historical Provider   Menthol-Zinc Oxide (CALMOSEPTINE) 0.44-20.6 % oint Apply 1 Each to affected area daily. to left groin wound   Yes Historical Provider   carvedilol (COREG) 25 mg tablet Take 25 mg by mouth every morning. 25 mg every morning and 12.5 mg every evening.    Yes Historical Provider carvedilol (COREG) 12.5 mg tablet Take 12.5 mg by mouth nightly. 25 mg every morning and 12.5 mg every evening. Yes Historical Provider   simvastatin (ZOCOR) 20 mg tablet Take 20 mg by mouth nightly. Yes Historical Provider   diclofenac (VOLTAREN) 1 % gel 2 g by TransDERmal route every four (4) hours as needed (pain). Yes Historical Provider   ascorbic acid, vitamin C, (VITAMIN C) 500 mg tablet Take 1,000 mg by mouth daily. Yes Historical Provider   terbinafine HCl (LAMISIL) 1 % topical cream Apply 1 Each to affected area daily. Apply to heel at bedtime, wrap heel with saran wrap. 8/17/17  Yes Munir Lao MD   latanoprost (XALATAN) 0.005 % ophthalmic solution Administer 1 Drop to both eyes nightly. 4/20/17  Yes Historical Provider   lansoprazole (PREVACID) 15 mg capsule Take 15 mg by mouth two (2) times a day. Yes Historical Provider   OTHER 0.9% Normal saline    Use as needed to affected area    Medical code: L02.215 4/7/17  Yes Celio Ha NP   nortriptyline (PAMELOR) 25 mg capsule Take 25 mg by mouth nightly. Yes Historical Provider   cinacalcet (SENSIPAR) 30 mg tablet Take 30 mg by mouth daily. Yes Historical Provider   fexofenadine (ALLEGRA) 180 mg tablet Take 180 mg by mouth nightly. Yes Art Thomas MD   diazepam (VALIUM) 5 mg tablet Take 1 Tab by mouth every eight (8) hours as needed for Anxiety. Max Daily Amount: 15 mg. 7/13/15  Yes Shweta Schmidt DO   b complex-vitamin c-folic acid (RENAL SOFTGELS) 1 mg capsule Take 1 Cap by mouth daily. Yes Historical Provider   diphenhydrAMINE (BENADRYL) 25 mg capsule Take 75 mg by mouth every six (6) hours as needed for Itching. Yes Historical Provider   docusate sodium (DULCOLAX STOOL SOFTENER) 100 mg capsule Take 100 mg by mouth two (2) times a day. Yes Historical Provider   INSULIN LISPRO (HUMALOG SC) by SubCUTAneous route Before breakfast, lunch, and dinner.  SLIDING SCALE  100-150-4u  151-200-5u  ect (1 UNIT INCREASE FOR EVERY 50 POINTS)   Yes Historical Provider       REVIEW OF SYSTEMS:    Constitutional:  No fever or weight loss  HEENT:  No headache or visual changes  Cardiovascular:  No chest pain, no palpitations. Respiratory:  No coughing, wheezing, or shortness of breath. GI:  Generalized discomfort. Nausea. Constipation. No vomiting. No diarrhea  :  No hematuria or dysuria. No frequency, retention, urinary incontinence. Skin:  No rashes or moles  Neuro:  No seizures or syncope  Hematological:  No bruising or bleeding  Endocrine:  No diabetes or thyroid disease  Objective:   VITALS:       Visit Vitals    /51    Pulse 67    Temp 98.3 °F (36.8 °C)    Resp 18    Ht 6' 1\" (1.854 m)    Wt 113 kg (249 lb 1.9 oz)    SpO2 100%    BMI 32.87 kg/m2       O2 Device: Room air    PHYSICAL EXAM:   General:    Lying in bed in no acute distress. HEENT:  Pupils equal.  Sclera anicteric. Conjunctiva pink. Mucous membranes                           moist  Neck:  Supple. Trachea midline. No accessory muscle use. No thyromegaly. No jugular venous distention  CV:                  Regular rate and rhythm. Lungs:   Clear to auscultation bilaterally. No Wheezing or Rhonchi. No rales. Normal to percussion  Abdomen:   Soft(after receiving enema and having bm), non-tender. Not distended. Bowel sounds normal. No organomegaly  Extremities: No cyanosis. No edema. No clubbing  Neurologic: Alert and oriented X 3. Skin:                Warm and dry. No rashes.        LAB DATA REVIEWED:    Recent Results (from the past 24 hour(s))   CBC WITH AUTOMATED DIFF    Collection Time: 02/13/18 11:50 AM   Result Value Ref Range    WBC 7.5 3.6 - 11.0 K/uL    RBC 4.06 3.80 - 5.20 M/uL    HGB 12.0 11.5 - 16.0 g/dL    HCT 37.1 35.0 - 47.0 %    MCV 91.4 80.0 - 99.0 FL    MCH 29.6 26.0 - 34.0 PG    MCHC 32.3 30.0 - 36.5 g/dL    RDW 14.2 11.5 - 14.5 %    PLATELET 040 039 - 920 K/uL    MPV 9.9 8.9 - 12.9 FL NRBC 0.0 0  WBC    ABSOLUTE NRBC 0.00 0.00 - 0.01 K/uL    NEUTROPHILS 60 32 - 75 %    LYMPHOCYTES 30 12 - 49 %    MONOCYTES 7 5 - 13 %    EOSINOPHILS 3 0 - 7 %    BASOPHILS 1 0 - 1 %    IMMATURE GRANULOCYTES 0 0.0 - 0.5 %    ABS. NEUTROPHILS 4.5 1.8 - 8.0 K/UL    ABS. LYMPHOCYTES 2.2 0.8 - 3.5 K/UL    ABS. MONOCYTES 0.5 0.0 - 1.0 K/UL    ABS. EOSINOPHILS 0.2 0.0 - 0.4 K/UL    ABS. BASOPHILS 0.1 0.0 - 0.1 K/UL    ABS. IMM. GRANS. 0.0 0.00 - 0.04 K/UL    DF AUTOMATED     METABOLIC PANEL, COMPREHENSIVE    Collection Time: 02/13/18 11:50 AM   Result Value Ref Range    Sodium 130 (L) 136 - 145 mmol/L    Potassium 5.7 (H) 3.5 - 5.1 mmol/L    Chloride 90 (L) 97 - 108 mmol/L    CO2 29 21 - 32 mmol/L    Anion gap 11 5 - 15 mmol/L    Glucose 121 (H) 65 - 100 mg/dL    BUN 80 (H) 6 - 20 MG/DL    Creatinine 10.50 (H) 0.55 - 1.02 MG/DL    BUN/Creatinine ratio 8 (L) 12 - 20      GFR est AA 4 (L) >60 ml/min/1.73m2    GFR est non-AA 4 (L) >60 ml/min/1.73m2    Calcium 8.0 (L) 8.5 - 10.1 MG/DL    Bilirubin, total 0.5 0.2 - 1.0 MG/DL    ALT (SGPT) 65 12 - 78 U/L    AST (SGOT) 56 (H) 15 - 37 U/L    Alk.  phosphatase 258 (H) 45 - 117 U/L    Protein, total 8.8 (H) 6.4 - 8.2 g/dL    Albumin 3.3 (L) 3.5 - 5.0 g/dL    Globulin 5.5 (H) 2.0 - 4.0 g/dL    A-G Ratio 0.6 (L) 1.1 - 2.2     EKG, 12 LEAD, INITIAL    Collection Time: 02/13/18  3:39 PM   Result Value Ref Range    Ventricular Rate 63 BPM    Atrial Rate 63 BPM    P-R Interval 200 ms    QRS Duration 98 ms    Q-T Interval 486 ms    QTC Calculation (Bezet) 497 ms    Calculated P Axis 63 degrees    Calculated R Axis -31 degrees    Calculated T Axis 38 degrees    Diagnosis       Normal sinus rhythm  Left axis deviation  Prolonged QT  When compared with ECG of 07-SEP-2017 12:04,  T wave amplitude has increased in Anterior leads  Possible Inferior infarct  Confirmed by Melissa Valdes MD, Gia Leavitt ([id number]) on 2/13/2018 4:38:30 PM         Assessment/Plan:      Principal Problem:    Fecal impaction ([facility] (2/13/2018)    Active Problems:    Diabetes mellitus type 2, insulin dependent ([facility] (10/14/2010)      Hyperkalemia (2/13/2018)      ESRD needing dialysis ([facility] (2/13/2018)       ___________________________________________________  PLAN:    1. Fecal impaction:    -CT abdm/pelvis c/w fecal stasis throughout the colon with fecal impaction  rectosigmoid colon. -Good bowel movement after enema in ER.    -Will continue with laxative. High fiber diet. Hold narcotics. 2.Hyperkalemia/ESRD needing dialysis:    -Missed her dialysis yesterday    -Nephrology consulted and patient due for dialysis tonight   3. Diabetes    -Lantus 5 units in am    -SSI  DVT prophylaxis:sc heparin  Full code  Surrogate:  Disposition:2/14/2018    __________________________________________________  Admitting Physician: Alex Boucher MD

## 2018-02-14 VITALS
DIASTOLIC BLOOD PRESSURE: 56 MMHG | WEIGHT: 248.24 LBS | TEMPERATURE: 97.6 F | OXYGEN SATURATION: 96 % | BODY MASS INDEX: 33.62 KG/M2 | RESPIRATION RATE: 18 BRPM | SYSTOLIC BLOOD PRESSURE: 94 MMHG | HEART RATE: 60 BPM | HEIGHT: 72 IN

## 2018-02-14 LAB
ANION GAP SERPL CALC-SCNC: 13 MMOL/L (ref 5–15)
BACTERIA SPEC CULT: NORMAL
BACTERIA SPEC CULT: NORMAL
BUN SERPL-MCNC: 87 MG/DL (ref 6–20)
BUN/CREAT SERPL: 8 (ref 12–20)
CALCIUM SERPL-MCNC: 7.6 MG/DL (ref 8.5–10.1)
CHLORIDE SERPL-SCNC: 88 MMOL/L (ref 97–108)
CO2 SERPL-SCNC: 27 MMOL/L (ref 21–32)
CREAT SERPL-MCNC: 11.5 MG/DL (ref 0.55–1.02)
GLUCOSE BLD STRIP.AUTO-MCNC: 169 MG/DL (ref 65–100)
GLUCOSE SERPL-MCNC: 140 MG/DL (ref 65–100)
HBV SURFACE AG SER QL: <0.1 INDEX
HBV SURFACE AG SER QL: NEGATIVE
POTASSIUM SERPL-SCNC: 5 MMOL/L (ref 3.5–5.1)
SERVICE CMNT-IMP: ABNORMAL
SERVICE CMNT-IMP: NORMAL
SODIUM SERPL-SCNC: 128 MMOL/L (ref 136–145)

## 2018-02-14 PROCEDURE — 90935 HEMODIALYSIS ONE EVALUATION: CPT

## 2018-02-14 PROCEDURE — 36415 COLL VENOUS BLD VENIPUNCTURE: CPT | Performed by: INTERNAL MEDICINE

## 2018-02-14 PROCEDURE — 74011250637 HC RX REV CODE- 250/637: Performed by: INTERNAL MEDICINE

## 2018-02-14 PROCEDURE — 80048 BASIC METABOLIC PNL TOTAL CA: CPT | Performed by: INTERNAL MEDICINE

## 2018-02-14 PROCEDURE — 74011250636 HC RX REV CODE- 250/636: Performed by: INTERNAL MEDICINE

## 2018-02-14 PROCEDURE — 96372 THER/PROPH/DIAG INJ SC/IM: CPT

## 2018-02-14 PROCEDURE — 87340 HEPATITIS B SURFACE AG IA: CPT | Performed by: INTERNAL MEDICINE

## 2018-02-14 PROCEDURE — 82962 GLUCOSE BLOOD TEST: CPT

## 2018-02-14 PROCEDURE — 74011636637 HC RX REV CODE- 636/637: Performed by: INTERNAL MEDICINE

## 2018-02-14 PROCEDURE — 99218 HC RM OBSERVATION: CPT

## 2018-02-14 RX ADMIN — HEPARIN SODIUM 5000 UNITS: 5000 INJECTION, SOLUTION INTRAVENOUS; SUBCUTANEOUS at 04:46

## 2018-02-14 RX ADMIN — DIPHENHYDRAMINE HYDROCHLORIDE 25 MG: 25 CAPSULE ORAL at 06:48

## 2018-02-14 RX ADMIN — OXYCODONE HYDROCHLORIDE AND ACETAMINOPHEN 1 TABLET: 7.5; 325 TABLET ORAL at 05:38

## 2018-02-14 RX ADMIN — INSULIN LISPRO 2 UNITS: 100 INJECTION, SOLUTION INTRAVENOUS; SUBCUTANEOUS at 11:42

## 2018-02-14 RX ADMIN — INSULIN GLARGINE 5 UNITS: 100 INJECTION, SOLUTION SUBCUTANEOUS at 11:43

## 2018-02-14 RX ADMIN — SEVELAMER CARBONATE 800 MG: 800 TABLET, FILM COATED ORAL at 11:27

## 2018-02-14 RX ADMIN — FERROUS SULFATE TAB 325 MG (65 MG ELEMENTAL FE) 325 MG: 325 (65 FE) TAB at 11:27

## 2018-02-14 RX ADMIN — HEPARIN SODIUM 5000 UNITS: 5000 INJECTION, SOLUTION INTRAVENOUS; SUBCUTANEOUS at 11:28

## 2018-02-14 NOTE — PROGRESS NOTES
Spoke with [facility] . Informed nurse will arrive around 1130pm to dialyze patient. Informed [facility] primary nurse.

## 2018-02-14 NOTE — PROGRESS NOTES
Nephrology Progress Note  Deangelo Scott  Date of Admission : 2/13/2018    CC: Follow up for ESRD       Assessment and Plan     ESRD- HD :  - HD MWF at [facility] [place]   - HD this AM  - ok for d/c and to resume outpt HD on friday    Hyperkalemia:  - resolved with HD     Anemia of CKD:  - hgb stable  - no ELTON needed     HTN :  - BP stable     Constipation:  - resolved  - neg ABD X-ray    Type II DM      Sec HPTH       Interval History:  Seen and examined. Dialysis early this AM.  No cp, sob, n/v/d. Pre HD K 5 this AM.  For d/c home today. Current Medications: all current  Medications have been eviewed in EPIC  Review of Systems: Pertinent items are noted in HPI. Objective:  Vitals:    Vitals:    02/14/18 0745 02/14/18 0815 02/14/18 0845 02/14/18 0915   BP: 115/54 114/63 124/60 94/56   Pulse: (!) 52 (!) 57 (!) 59 60   Resp:    18   Temp:    97.6 °F (36.4 °C)   TempSrc:    Oral   SpO2:    96%   Weight:       Height:         Intake and Output:  02/14 0701 - 02/14 1900  In: -   Out: 2000        Physical Examination:  General: NAD,Conversant   Neck:  Supple, no mass  Resp:  Lungs CTA B/L, no wheezing , normal respiratory effort  CV:  RRR,  no murmur or rub, trace LE edema  GI:  Soft, NT, + Bowel sounds, no hepatosplenomegaly  Neurologic:  Non focal  Psych:             AAO x 3 appropriate affect  Skin:  No Rash  Access:           LUE AVF +thrill/bruit    []    High complexity decision making was performed  []    Patient is at high-risk of decompensation with multiple organ involvement    Lab Data Personally Reviewed: I have reviewed all the pertinent labs, microbiology data and radiology studies during assessment.     Recent Labs      02/14/18   0527  02/13/18   1150   NA  128*  130*   K  5.0  5.7*   CL  88*  90*   CO2  27  29   GLU  140*  121*   BUN  87*  80*   CREA  11.50*  10.50*   CA  7.6*  8.0*   ALB   --   3.3*   SGOT   --   56*   ALT   --   65     Recent Labs      02/13/18   1150   WBC  7.5   HGB 12.0   HCT  37.1   PLT  177     Lab Results   Component Value Date/Time    Specimen Description: URINE 10/27/2012 08:15 PM    Specimen Description: BLOOD 06/07/2011 07:40 AM    Specimen Description: URINE 08/30/2010 07:10 AM     Lab Results   Component Value Date/Time    Culture result: MRSA NOT PRESENT AT 13 HOURS 02/13/2018 09:48 PM    Culture result: SCANT  MIXED [facility] [place]   10/26/2017 08:36 AM    Culture result: (A) 10/26/2017 08:36 AM     PROBABLE CLOSTRIDIUM SPECIES ISOLATED FROM [facility]IO [facility]OTH ONLY     Recent Results (from the past 24 hour(s))   EKG, 12 LEAD, INITIAL    Collection Time: 02/13/18  3:39 PM   Result Value Ref Range    Ventricular Rate 63 BPM    Atrial Rate 63 BPM    P-R Interval 200 ms    QRS Duration 98 ms    Q-T Interval 486 ms    QTC Calculation (Bezet) 497 ms    Calculated P Axis 63 degrees    Calculated R Axis -31 degrees    Calculated T Axis 38 degrees    Diagnosis       Normal sinus rhythm  Left axis deviation  Prolonged QT  When compared with ECG of 07-SEP-2017 12:04,  T wave amplitude has increased in Anterior leads  Possible Inferior infarct  Confirmed by Jsaon Ontiveros MD, Patrice Saldana ([id number]) on 2/13/2018 4:38:30 PM     CULTURE, MRSA    Collection Time: 02/13/18  9:48 PM   Result Value Ref Range    Special Requests: NO SPECIAL REQUESTS      Culture result: MRSA NOT PRESENT AT 13 HOURS     GLUCOSE, POC    Collection Time: 02/13/18  9:50 PM   Result Value Ref Range    Glucose (POC) 263 (H) 65 - 100 mg/dL    Performed by [facility] MONTY MONET    METABOLIC PANEL, BASIC    Collection Time: 02/14/18  5:27 AM   Result Value Ref Range    Sodium 128 (L) 136 - 145 mmol/L    Potassium 5.0 3.5 - 5.1 mmol/L    Chloride 88 (L) 97 - 108 mmol/L    CO2 27 21 - 32 mmol/L    Anion gap 13 5 - 15 mmol/L    Glucose 140 (H) 65 - 100 mg/dL    BUN 87 (H) 6 - 20 MG/DL    Creatinine 11.50 (H) 0.55 - 1.02 MG/DL    BUN/Creatinine ratio 8 (L) 12 - 20      GFR est AA 4 (L) >60 ml/min/1.73m2    GFR est non-AA 3 (L) >60 ml/min/1.73m2    Calcium 7.6 (L) 8.5 - 10.1 MG/DL   GLUCOSE, POC    Collection Time: 02/14/18 11:29 AM   Result Value Ref Range    Glucose (POC) 169 (H) 65 - 100 mg/dL    Performed by Kacey Thomas MD  [facility]   [address] [address]  Phone - [phone]   Fax - [phone]  [email or web address]

## 2018-02-14 NOTE — DISCHARGE INSTRUCTIONS
Constipation: Care Instructions  Your Care Instructions    Constipation means that you have a hard time passing stools (bowel movements). People pass stools from 3 times a day to once every 3 days. What is normal for you may be different. Constipation may occur with pain in the rectum and cramping. The pain may get worse when you try to pass stools. Sometimes there are small amounts of bright red blood on toilet paper or the surface of stools. This is because of enlarged veins near the rectum (hemorrhoids). A few changes in your diet and lifestyle may help you avoid ongoing constipation. Your doctor may also prescribe medicine to help loosen your stool. Some medicines can cause constipation. These include pain medicines and antidepressants. Tell your doctor about all the medicines you take. Your doctor may want to make a medicine change to ease your symptoms. Follow-up care is a key part of your treatment and safety. Be sure to make and go to all appointments, and call your doctor if you are having problems. It's also a good idea to know your test results and keep a list of the medicines you take. How can you care for yourself at home? · Drink plenty of fluids, enough so that your urine is light yellow or clear like water. If you have kidney, heart, or liver disease and have to limit fluids, talk with your doctor before you increase the amount of fluids you drink. · Include high-fiber foods in your diet each day. These include fruits, vegetables, beans, and whole grains. · Get at least 30 minutes of exercise on most days of the week. Walking is a good choice. You also may want to do other activities, such as running, swimming, cycling, or playing tennis or team sports. · Take a fiber supplement, such as Citrucel or Metamucil, every day. Read and follow all instructions on the label. · Schedule time each day for a bowel movement. A daily routine may help.  Take your time having your bowel movement. · Support your feet with a small step stool when you sit on the toilet. This helps flex your hips and places your pelvis in a squatting position. · Your doctor may recommend an over-the-counter laxative to relieve your constipation. Examples are Milk of Magnesia and MiraLax. Read and follow all instructions on the label. Do not use laxatives on a long-term basis. When should you call for help? Call your doctor now or seek immediate medical care if:  ? · You have new or worse belly pain. ? · You have new or worse nausea or vomiting. ? · You have blood in your stools. ? Watch closely for changes in your health, and be sure to contact your doctor if:  ? · Your constipation is getting worse. ? · You do not get better as expected. Where can you learn more? Go to http://brandan-liang.info/. Enter 21 [id number] in the search box to learn more about \"Constipation: Care Instructions. \"  Current as of: March 20, 2017  Content Version: 11.4  © 3712-3158 [employer]. Care instructions adapted under license by [employer] (which disclaims liability or warranty for this information). If you have questions about a medical condition or this instruction, always ask your healthcare professional. [address] any warranty or liability for your use of this information. Other instructions: Follow-up with your pcp,nephrologist  Seek medical attention if recurrence of symptoms.

## 2018-02-14 NOTE — PROGRESS NOTES
...TRANSFER - IN REPORT:    Verbal report received from BarbaraRn(name) on [place]  being received from ED(unit) for routine progression of care      Report consisted of patients Situation, Background, Assessment and   Recommendations(SBAR). Information from the following report(s) Kardex and ED Summary was reviewed with the receiving nurse. Opportunity for questions and clarification was provided. Assessment completed upon patients arrival to unit and care assumed. Received report. Nurse unsure if pt has any skin issues at this time and last BS done at 1150 today of 121. She states, pt has had a BM today and will need dialysis as ordered.        1925- Pt arrived to unit

## 2018-02-14 NOTE — PROGRESS NOTES
Problem: Falls - Risk of  Goal: *Absence of Falls  Document Rodrick Fall Risk and appropriate interventions in the flowsheet.   Outcome: Progressing Towards Goal  Fall Risk Interventions:  Mobility Interventions: Patient to call before getting OOB, Utilize walker, cane, or other assitive device         Medication Interventions: Patient to call before getting OOB, Teach patient to arise slowly    Elimination Interventions: Call light in reach, Patient to call for help with toileting needs

## 2018-02-14 NOTE — PROGRESS NOTES
Bedside and Verbal shift change report given to Oh Lainez RN (oncoming nurse) by Evelia Devi RN (offgoing nurse). Report included the following information SBAR, Kardex, MAR, Accordion and Recent Results. Primary Nurse Oh Lainez and Evelia Devi RN performed a dual skin assessment on this patient Impairment noted- see wound doc flow sheet  Rob score is 19    Breakdown on rt heel and 2nd rt toe, scar to rt thigh, scattered scarring, scars to lt arm, AV fistula present, wound to lt buttock, and excoriation to groin    2215: Noted pt has accuchecks ordered ACHS, and is on a sliding scale at home. No orders for insulin sliding scale. Paged MD for orders.

## 2018-02-14 NOTE — ED NOTES
TRANSFER - OUT REPORT:    Verbal report given to Lindsey La RN (name) on Tom Walsh  being transferred to [facility] (unit) for routine progression of care       Report consisted of patients Situation, Background, Assessment and   Recommendations(SBAR). Information from the following report(s) SBAR, Kardex, ED Summary, Intake/Output, MAR and Recent Results was reviewed with the receiving nurse. Lines:       Opportunity for questions and clarification was provided.

## 2018-02-14 NOTE — PROGRESS NOTES
Hospital follow-up PCP transitional care appointment has been scheduled with Dr. Guillermo Chowdary for Tuesday, 2/20/18 at 2:15 p.m. Pending patient discharge.   Keren Taylor, Care Management Specialist.

## 2018-02-14 NOTE — WOUND CARE
WOCN Note:   New consult placed by RN for skin assessment; Chart review revealed:   Admitted for fecal impaction on observation for discharge today per Dr. Zach Mooney; history of morbid obesity, HTN, neuropathy, perianal abscess, buttock wounds, HD, ESRD; Admitted from HOME    Assessment:   Patient is alert, verbal denied pain, daughter at bedside who normally performs wound care to right buttock; Bed: [facility]  Diet: diabetic    1. POA  Right buttock healing abscess that has had serial debridements per patient measured 1cm x 0.5cm x 0.1cm with tunneling 12-3 O'clock = 0.8cm; cleansed with normal saline, loosely filled with 1/4 \" plain packing gauze, topped with dry gauze, secured with medipore tape; Patient repositioned without assist from supine to left on [facility]acare bed with pillows to offload bony prominences;     Skin/wound treatment Recommendations:    Daily and as needed cleanse right buttock wound with normal saline, loosely fill with 1/4\" plain nugauze moist with normal saline, cover with dry gauze, secure with medipore tape;     Skin Care & Pressure Injury Prevention Recommendations:  1. Minimize layers of linen/pads under patient to optimize support surface. 2.  Reposition patient approximately every 2 hours;  3. Float heels with pillow under calves. 4.  promote continence;  5. Continue on [facility] bed for pressure redistribution. 6.  Assess/protect skin in contact with medical devices. Keep skin moisturized;   7. Ensure that patient is repositioning in chair shifting weight approximately every 15 minutes and standing up every hour.      Discussed above assessment and recommendations with Antonia TREVIZO) and Dr. Zach Mooney;    Transition of Care: Plan to follow weekly and as needed while admitted to hospital.    Certified Wound, Ostomy, Continence Nurse  office [phone]  pager [phone]   Daniel Buckley, [facility]

## 2018-02-14 NOTE — DIALYSIS
2345 Arrived at [facility] [place], had a call from Dr. Mahajan Apt re stat patient from [facility] ED, called primary nurse Graciela that I need to go back to [facility] for an urgent patient. Nurse Gracilea, verbalized that patient is stable and doing ok.

## 2018-02-14 NOTE — DIALYSIS
[facility]  [phone]    Vitals   Pre   Post   Assessment   Pre   Post     Temp  Temp: 97.6 °F (36.4 °C) (02/14/18 0150)  97.6 LOC  AOx3 AOx3   HR   Pulse (Heart Rate): (!) 54 (02/14/18 0615) 60 Lungs   CTA CTA    B/P   BP: 99/50 (HD initiated) (02/14/18 0615) 109/52 Cardiac   Sinus Ceasar  Sinus ceasar   Resp   Resp Rate: 16 (02/14/18 0615) 16 Skin   Warm, dry  Warm, dry   Pain level  Pain Intensity 1: 9 (02/14/18 0536) 0/10 Edema  None   None   Orders:    Duration:   Start:   7888 End:   3297 Total:   3 hours   Dialyzer:   Dialyzer/Set Up Inspection: Renae Palmer (02/14/18 0615)   K Bath:   Dialysate K (mEq/L): 2 (02/14/18 0615)   Ca Bath:   Dialysate CA (mEq/L): 2.5 (02/14/18 0615)   Na/Bicarb:   Dialysate NA (mEq/L): 140/35 (02/14/18 0615)   Target Fluid Removal:   Goal/Amount of Fluid to Remove (mL): 2000 mL (02/14/18 0615)   Access     Type & Location:   LUE AVF +T/+B, prepped and cannulated per protocol with 15G x2 needles. No s/s of infection noted to site. Post HD, both needles removed, hemostasis achieved through hand held pressure with dressing applied. T/B intact.    Labs     Obtained/Reviewed   Critical Results Called   Date when labs were drawn-  Hgb-    HGB   Date Value Ref Range Status   02/13/2018 12.0 11.5 - 16.0 g/dL Final     K-    Potassium   Date Value Ref Range Status   02/13/2018 5.7 (H) 3.5 - 5.1 mmol/L Final     Ca-   Calcium   Date Value Ref Range Status   02/13/2018 8.0 (L) 8.5 - 10.1 MG/DL Final     Bun-   BUN   Date Value Ref Range Status   02/13/2018 80 (H) 6 - 20 MG/DL Final     Creat-   Creatinine   Date Value Ref Range Status   02/13/2018 10.50 (H) 0.55 - 1.02 MG/DL Final        Medications/ Blood Products Given     Name   Dose   Route and Time     None                Blood Volume Processed (BVP):    65.2 Net Fluid   Removed:  2000 ml   Comments     Time Out Done:  610    Primary Nurse Rpt Pre: Tiffanie Shah RN  Primary Nurse Rpt Post: Chauncey Garcia Shazia Yang, RN    Pt Education: Procedural  Care Plan: Hemodynamically stable, HD as prescribed, comfort    Tx Summary: Patient tolerated treatment well. BP started low, lowered HOB then placed patient on trendelenburg position. All possible blood returned during rinse back without any complications. Report given to primary nurse for continued care. Left patient in bed stable. Admiting Diagnosis: Constipation    Pt's previous clinic- [facility]  Consent signed - Informed Consent Verified: Yes (02/14/18 0615)  [facility] Consent - Obtained    Hepatitis Status- Negative 1/28/18  Machine #- Machine Number: [id number] [id number] (02/14/18 6544)     Telemetry status- None  Pre-dialysis wt. - Pre-Dialysis Weight: 112.6 kg (248 lb 3.8 oz) (02/14/18 0615)   Post wt - 110.6 kg

## 2018-02-14 NOTE — DISCHARGE SUMMARY
Discharge Summary    Patient: Ty Andres               Sex: female          DOA: 2/13/2018         YOB: 1950      Age:  75 y.o.        LOS:  LOS: 0 days                Admit Date: 2/13/2018    Discharge Date: 2/14/2018    Admission Diagnoses: Fecal impaction [facility])    Discharge Diagnoses:    Problem List as of 2/14/2018  Date Reviewed: 2/13/2018          Codes Class Noted - Resolved    * (Principal)Fecal impaction ([place] [address].) ICD-10-CM: K56.41  ICD-9-CM: 560.32  2/13/2018 - Present        Hyperkalemia ICD-10-CM: E87.5  ICD-9-CM: 276.7  2/13/2018 - Present        ESRD needing dialysis ([place] [address].) ICD-10-CM: N18.6, Z99.2  ICD-9-CM: 585.6  2/13/2018 - Present        Type 2 diabetes mellitus with nephropathy ([place] [address].) ICD-10-CM: E11.21  ICD-9-CM: 250.40, 583.81  1/31/2018 - Present        Perianal abscess ICD-10-CM: K61.0  ICD-9-CM: 548  10/26/2017 - Present        ESRD (end stage renal disease) on dialysis [facility]) ICD-10-CM: N18.6, Z99.2  ICD-9-CM: 585.6, V45.11  6/20/2017 - Present        Abscess of deep perineal space ICD-10-CM: N34.0  ICD-9-CM: 597.0  6/17/2017 - Present        Perineal abscess ICD-10-CM: L02.215  ICD-9-CM: 682.2  1/25/2017 - Present        Obstructive sleep apnea (adult) (pediatric) ICD-10-CM: G47.33  ICD-9-CM: 327.23  12/13/2011 - Present        Serratia wound infection, 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 ICD-10-CM: A49.8  ICD-9-CM: 041.85  6/14/2011 - Present        Abdominal pain, chronic, epigastric ICD-10-CM: R10.13, G89.29  ICD-9-CM: 789.06, 338.29  1/12/2011 - Present        Morbid obesity ([facility] [address] ICD-10-CM: E66.01  ICD-9-CM: 278.01  10/14/2010 - Present    Overview Signed 6/20/2017 11:05 AM by Dustin Macias NP     Body mass index is 36.4 kg/(m^2).                Diabetes mellitus type 2, insulin dependent (HCC) ICD-10-CM: E11.9, Z79.4  ICD-9-CM: 250.00, V58.67  10/14/2010 - Present        HTN (hypertension) ICD-10-CM: I10  ICD-9-CM: 401.9  10/14/2010 - Present        Neuropathy ICD-10-CM: G62.9  ICD-9-CM: 355.9  10/14/2010 - Present        Arthritis ICD-10-CM: M19.90  ICD-9-CM: 716.90  10/14/2010 - Present        RESOLVED: Anal cryptitis ICD-10-CM: K62.89  ICD-9-CM: 569.49  6/4/2012 - 7/21/2017        RESOLVED: s/p debridement of midline abd wound ICD-9-CM: Humera Mo  6/24/2011 - 7/21/2017              Discharge Medications:     Current Discharge Medication List      CONTINUE these medications which have NOT CHANGED    Details   amLODIPine (NORVASC) 10 mg tablet Take 10 mg by mouth daily. mineral oil liquid Take 30 mL by mouth daily as needed for Constipation. ferric citrate (AURYXIA) 210 mg iron tablet Take 210 mg by mouth three (3) times daily (with meals). oxyCODONE-acetaminophen (PERCOCET) 7.5-325 mg per tablet Take 1-2 Tabs by mouth every eight (8) hours as needed for Pain.      polyethylene glycol (MIRALAX) 17 gram packet Take 17 g by mouth two (2) times daily as needed. aspirin 81 mg chewable tablet Take 81 mg by mouth daily. FLUoxetine (PROZAC) 20 mg capsule Take 20 mg by mouth daily. sevelamer carbonate (RENVELA) 800 mg tab tab Take 800 mg by mouth three (3) times daily (with meals). linaclotide (LINZESS) 290 mcg cap capsule Take 290 mcg by mouth daily. insulin detemir (LEVEMIR FLEXTOUCH) 100 unit/mL (3 mL) inpn 9 Units by SubCUTAneous route Daily (before lunch). NOON DAILY      Menthol-Zinc Oxide (CALMOSEPTINE) 0.44-20.6 % oint Apply 1 Each to affected area daily. to left groin wound      !! carvedilol (COREG) 25 mg tablet Take 25 mg by mouth every morning. 25 mg every morning and 12.5 mg every evening. !! carvedilol (COREG) 12.5 mg tablet Take 12.5 mg by mouth nightly. 25 mg every morning and 12.5 mg every evening. simvastatin (ZOCOR) 20 mg tablet Take 20 mg by mouth nightly. diclofenac (VOLTAREN) 1 % gel 2 g by TransDERmal route every four (4) hours as needed (pain). ascorbic acid, vitamin C, (VITAMIN C) 500 mg tablet Take 1,000 mg by mouth daily. terbinafine HCl (LAMISIL) 1 % topical cream Apply 1 Each to affected area daily. Apply to heel at bedtime, wrap heel with saran wrap.      latanoprost (XALATAN) 0.005 % ophthalmic solution Administer 1 Drop to both eyes nightly. lansoprazole (PREVACID) 15 mg capsule Take 15 mg by mouth two (2) times a day. OTHER 0.9% Normal saline    Use as needed to affected area    Medical code: L02.215  Qty: 1 Bottle, Refills: 3      nortriptyline (PAMELOR) 25 mg capsule Take 25 mg by mouth nightly. cinacalcet (SENSIPAR) 30 mg tablet Take 30 mg by mouth daily. fexofenadine (ALLEGRA) 180 mg tablet Take 180 mg by mouth nightly. diazepam (VALIUM) 5 mg tablet Take 1 Tab by mouth every eight (8) hours as needed for Anxiety. Max Daily Amount: 15 mg.  Qty: 12 Tab, Refills: 0      b complex-vitamin c-folic acid (RENAL SOFTGELS) 1 mg capsule Take 1 Cap by mouth daily. diphenhydrAMINE (BENADRYL) 25 mg capsule Take 75 mg by mouth every six (6) hours as needed for Itching. docusate sodium (DULCOLAX STOOL SOFTENER) 100 mg capsule Take 100 mg by mouth two (2) times a day. INSULIN LISPRO (HUMALOG SC) by SubCUTAneous route Before breakfast, lunch, and dinner. SLIDING SCALE  100-150-4u  151-200-5u  ect (1 UNIT INCREASE FOR EVERY 50 POINTS)       ! ! - Potential duplicate medications found. Please discuss with provider. STOP taking these medications       polyethylene glycol (MIRALAX) 17 gram/dose powder Comments:   Reason for Stopping:         OTHER,NON-FORMULARY, Comments:   Reason for Stopping:                Follow-up:pcp,nephrology    Discharge Condition:good    Activity:as tolerated    Diet:renal,diabetic,high fiber diet      Labs:  Labs: Results:       Chemistry Recent Labs      02/14/18   0527  02/13/18   1150   GLU  140*  121*   NA  128*  130*   K  5.0  5.7*   CL  88*  90*   CO2  27  29   BUN  87*  80*   CREA  11.50*  10.50*   CA  7.6*  8.0*   AGAP  13  11   BUCR  8*  8*   AP   --   258* TP   --   8.8*   ALB   --   3.3*   GLOB   --   5.5*   AGRAT   --   0.6*      CBC w/Diff Recent Labs      02/13/18   1150   WBC  7.5   RBC  4.06   HGB  12.0   HCT  37.1   PLT  177   GRANS  60   LYMPH  30   EOS  3      Cardiac Enzymes No results for input(s): CPK, CKND1, VIVEK in the last 72 hours. No lab exists for component: CKRMB, TROIP   Coagulation No results for input(s): PTP, INR, APTT in the last 72 hours. No lab exists for component: INREXT    Lipid Panel Lab Results   Component Value Date/Time    Cholesterol, total 156 10/06/2009 04:00 AM    HDL Cholesterol 57 10/06/2009 04:00 AM    LDL, calculated 83.8 10/06/2009 04:00 AM    VLDL, calculated 15.2 10/06/2009 04:00 AM    Triglyceride 76 10/06/2009 04:00 AM    CHOL/HDL Ratio 2.7 10/06/2009 04:00 AM      BNP No results for input(s): BNPP in the last 72 hours. Liver Enzymes Recent Labs      02/13/18   1150   TP  8.8*   ALB  3.3*   AP  258*   SGOT  56*      Thyroid Studies No results found for: T4, T3U, TSH, TSHEXT       Imaging:  KUB  Single KUB demonstrates a normal bowel gas pattern. Surgical clips project over  the right upper quadrant. Degenerative changes are seen in the hip joints  bilaterally and lumbar spine. No significant pattern of fecal stasis is noted. Consults:   nephrology    Treatment Team: Treatment Team: Attending Provider: Bettina Helm MD; Primary Nurse: Gordy Vargas RN; Consulting Provider: Niki Hall MD    Significant Diagnostic Studies:see recent lab results. Hospital Course:  Gissell Pollard is a 75 y.o.  female with h/o esrd on dialysis mwf,hypertension,diabetes,came to er because of not having a bowel movement for almost 10 days. She says that this has started causing discomfort since yesterday,generalized malaise,nausea,causing her to miss her dialysis. She called the dialysis center today and was told that she could have her dialysis today but prefer to come to the emergency room as the malaise persisted. Pt has had problem with fecal impaction before. Lab work showed elevated potassium 5. 7. She received enema in the emergency room and responded well. Also dialysis was scheduled for 2/23 night and 2/14 morning,so patient has been admitted for observation. She had her dialysis today and she is clinically stable to be discharged. P/E  Lungs ctab  Heart s1s2+  Abdm:soft,bs present  Neuro:aaox3    Time for discharge:30 minutes.     John Merida MD  February 14, 2018

## 2018-02-26 ENCOUNTER — HOSPITAL ENCOUNTER (EMERGENCY)
Age: 68
Discharge: HOME | End: 2018-02-26
Attending: EMERGENCY MEDICINE
Payer: MEDICARE

## 2018-02-26 VITALS
RESPIRATION RATE: 16 BRPM | OXYGEN SATURATION: 98 % | WEIGHT: 248.24 LBS | HEART RATE: 57 BPM | TEMPERATURE: 98.6 F | DIASTOLIC BLOOD PRESSURE: 56 MMHG | SYSTOLIC BLOOD PRESSURE: 98 MMHG | HEIGHT: 72 IN | BODY MASS INDEX: 33.62 KG/M2

## 2018-02-26 DIAGNOSIS — M79.604 PAIN OF RIGHT LOWER EXTREMITY: Primary | ICD-10-CM

## 2018-02-26 DIAGNOSIS — E83.51 HYPOCALCEMIA: ICD-10-CM

## 2018-02-26 LAB
ALBUMIN SERPL-MCNC: 3.3 G/DL (ref 3.5–5)
ALBUMIN/GLOB SERPL: 0.6 {RATIO} (ref 1.1–2.2)
ALP SERPL-CCNC: 297 U/L (ref 45–117)
ALT SERPL-CCNC: 116 U/L (ref 12–78)
ANION GAP BLD CALC-SCNC: 18 MMOL/L (ref 5–15)
ANION GAP SERPL CALC-SCNC: 7 MMOL/L (ref 5–15)
AST SERPL-CCNC: 110 U/L (ref 15–37)
BILIRUB SERPL-MCNC: 0.5 MG/DL (ref 0.2–1)
BUN BLD-MCNC: 52 MG/DL (ref 9–20)
BUN SERPL-MCNC: 59 MG/DL (ref 6–20)
BUN/CREAT SERPL: 6 (ref 12–20)
CA-I BLD-MCNC: 0.86 MMOL/L (ref 1.12–1.32)
CALCIUM SERPL-MCNC: 7.8 MG/DL (ref 8.5–10.1)
CHLORIDE BLD-SCNC: 92 MMOL/L (ref 98–107)
CHLORIDE SERPL-SCNC: 90 MMOL/L (ref 97–108)
CO2 BLD-SCNC: 28 MMOL/L (ref 21–32)
CO2 SERPL-SCNC: 31 MMOL/L (ref 21–32)
CREAT BLD-MCNC: 9.3 MG/DL (ref 0.6–1.3)
CREAT SERPL-MCNC: 9.4 MG/DL (ref 0.55–1.02)
GLOBULIN SER CALC-MCNC: 5.4 G/DL (ref 2–4)
GLUCOSE BLD-MCNC: 147 MG/DL (ref 65–100)
GLUCOSE SERPL-MCNC: 143 MG/DL (ref 65–100)
HCT VFR BLD CALC: 40 % (ref 35–47)
HGB BLD-MCNC: 13.6 GM/DL (ref 11.5–16)
MAGNESIUM SERPL-MCNC: 2.5 MG/DL (ref 1.6–2.4)
POTASSIUM BLD-SCNC: 5.3 MMOL/L (ref 3.5–5.1)
POTASSIUM SERPL-SCNC: 5.2 MMOL/L (ref 3.5–5.1)
PROT SERPL-MCNC: 8.7 G/DL (ref 6.4–8.2)
SERVICE CMNT-IMP: ABNORMAL
SODIUM BLD-SCNC: 132 MMOL/L (ref 136–145)
SODIUM SERPL-SCNC: 128 MMOL/L (ref 136–145)

## 2018-02-26 PROCEDURE — 99282 EMERGENCY DEPT VISIT SF MDM: CPT

## 2018-02-26 PROCEDURE — 96372 THER/PROPH/DIAG INJ SC/IM: CPT

## 2018-02-26 PROCEDURE — 83735 ASSAY OF MAGNESIUM: CPT | Performed by: PHYSICIAN ASSISTANT

## 2018-02-26 PROCEDURE — 93971 EXTREMITY STUDY: CPT

## 2018-02-26 PROCEDURE — 74011250636 HC RX REV CODE- 250/636: Performed by: PHYSICIAN ASSISTANT

## 2018-02-26 PROCEDURE — 74011000258 HC RX REV CODE- 258: Performed by: PHYSICIAN ASSISTANT

## 2018-02-26 PROCEDURE — 36415 COLL VENOUS BLD VENIPUNCTURE: CPT | Performed by: PHYSICIAN ASSISTANT

## 2018-02-26 PROCEDURE — 80047 BASIC METABLC PNL IONIZED CA: CPT

## 2018-02-26 PROCEDURE — 96365 THER/PROPH/DIAG IV INF INIT: CPT

## 2018-02-26 PROCEDURE — 80053 COMPREHEN METABOLIC PANEL: CPT | Performed by: PHYSICIAN ASSISTANT

## 2018-02-26 RX ORDER — PROMETHAZINE HYDROCHLORIDE 25 MG/ML
25 INJECTION, SOLUTION INTRAMUSCULAR; INTRAVENOUS
Status: COMPLETED | OUTPATIENT
Start: 2018-02-26 | End: 2018-02-26

## 2018-02-26 RX ORDER — CALCIUM GLUCONATE 94 MG/ML
1 INJECTION, SOLUTION INTRAVENOUS
Status: DISCONTINUED | OUTPATIENT
Start: 2018-02-26 | End: 2018-02-26 | Stop reason: CLARIF

## 2018-02-26 RX ORDER — OXYCODONE HYDROCHLORIDE 5 MG/1
5 TABLET ORAL
Qty: 12 TAB | Refills: 0 | Status: SHIPPED | OUTPATIENT
Start: 2018-02-26 | End: 2018-04-09

## 2018-02-26 RX ORDER — HYDROMORPHONE HYDROCHLORIDE 1 MG/ML
1 INJECTION, SOLUTION INTRAMUSCULAR; INTRAVENOUS; SUBCUTANEOUS
Status: COMPLETED | OUTPATIENT
Start: 2018-02-26 | End: 2018-02-26

## 2018-02-26 RX ADMIN — HYDROMORPHONE HYDROCHLORIDE 1 MG: 1 INJECTION, SOLUTION INTRAMUSCULAR; INTRAVENOUS; SUBCUTANEOUS at 02:41

## 2018-02-26 RX ADMIN — CALCIUM GLUCONATE 1 G: 94 INJECTION, SOLUTION INTRAVENOUS at 04:15

## 2018-02-26 RX ADMIN — PROMETHAZINE HYDROCHLORIDE 25 MG: 25 INJECTION INTRAMUSCULAR; INTRAVENOUS at 02:41

## 2018-02-26 NOTE — ED NOTES
Patient will be ready for discharge once her calcium is completed. All discharge papers and instructions have been given to patient and her daughter at this time. All questions answered. Once Calcium is complete patient can have IV removed and discharge.

## 2018-02-26 NOTE — ED TRIAGE NOTES
Triage: Patient arrives from home with c/o right calf pain since Friday, worsening over the weekend. Reports hx DVT, concerned for same.

## 2018-02-26 NOTE — ED NOTES
Assumed care of patient at this time. Patient states that she has some RIGHT side calf pain. Per her report she has been having this type of pain over the past few weeks but it is significantly worse this morning. Patient is a hemodialysis patient. VSS. Doppler ordered.

## 2018-02-26 NOTE — PROCEDURES
[facility]  *** FINAL REPORT ***    Name: Ish Ochoa  MRN: [id number]  : 08 May 1950  HIS Order #: [id number]  [address] [facility] Visit #: [id number]  Date: 2018    TYPE OF TEST: Peripheral Venous Testing    REASON FOR TEST  Pain in limb, Lower leg, calf/foot pain    Right Leg:-  Deep venous thrombosis:           No  Superficial venous thrombosis:    No  Deep venous insufficiency:        Not examined  Superficial venous insufficiency: Not examined      INTERPRETATION/FINDINGS  PROCEDURE:  Color duplex ultrasound imaging of lower extremity veins. FINDINGS:       Right: The common femoral, deep femoral, femoral, popliteal,  posterior tibial, peroneal, and great saphenous are patent and without   evidence of thrombus; each is is fully compressible and there is no  narrowing of the flow channel on color Doppler imaging. Phasic flow  is observed in the common femoral vein. Left:   The common femoral vein is patent and without evidence of   thrombus. Phasic flow is observed. This extremity was not otherwise   evaluated. IMPRESSION:  No evidence of right lower extremity vein thrombosis. ADDITIONAL COMMENTS    I have personally reviewed the data relevant to the interpretation of  this  study.     TECHNOLOGIST: Mariam Thomas RDMS  Signed: 2018 03:11 AM    PHYSICIAN: Katalina Rodriguez MD  Signed: 2018 06:38 AM

## 2018-02-26 NOTE — DISCHARGE INSTRUCTIONS
Hypocalcemia: Care Instructions  Your Care Instructions    Hypocalcemia means that the level of calcium in your blood is lower than it should be. Your doctor may have done tests to check your calcium levels because you had certain symptoms. These include tingling or twitching of your muscles. Your doctor may do more tests to find out why your calcium is low and to see how well your kidneys and other organs are working. Your doctor will also want to see how well your parathyroid gland is working. This gland controls calcium levels in your blood. You may have this problem because you are not getting enough calcium in your diet. Or your body may not be absorbing the calcium as it should. You may be able to get your calcium up to a safe level by taking supplements. If your levels are very low, your doctor may give you a calcium shot, possibly along with magnesium. You will probably also be given vitamin D, because you need it to absorb calcium. After your doctor has your calcium levels up, be sure to get plenty of calcium in your diet. If you have a kidney or parathyroid problem, you may need to keep taking extra calcium. Follow-up care is a key part of your treatment and safety. Be sure to make and go to all appointments, and call your doctor if you are having problems. It's also a good idea to know your test results and keep a list of the medicines you take. How can you care for yourself at home? · Take your medicines exactly as prescribed. Call your doctor if you think you are having a problem with your medicine. · Eat foods rich in calcium. These include yogurt, cheese, milk, and dark green vegetables. This is the best way to get the calcium you need. You can get vitamin D from eggs, fatty fish, cereal, and milk. · Talk to your doctor about taking a calcium plus vitamin D supplement. · Stay active. Regular weight-bearing exercise, such as walking, can help keep your bones strong.  It can also improve your overall health. · Spend a small amount of time outside in the sun without sunscreen. The sun helps your body make vitamin D. Talk to your doctor first if you have had skin cancer or you are at high risk for skin cancer. When should you call for help? Call your doctor now or seek immediate medical care if:  ? · You feel numb or have tingling in your fingers and hands or toes and feet. ? · You are confused or are having trouble remembering things. ? · You have muscle spasms or cramps. ? · Your heart seems to be speeding up and then slowing down or skipping beats. ? · You are feeling down or blue, or you are not enjoying things like you once did. You may be depressed, which is common in people with hypocalcemia. Depression can be treated. ? Watch closely for changes in your health, and be sure to contact your doctor if:  ? · You do not get better as expected. Where can you learn more? Go to http://brandan-liang.info/. Enter P520 in the search box to learn more about \"Hypocalcemia: Care Instructions. \"  Current as of: May 12, 2017  Content Version: 11.4  © 5727-7474 [employer]. Care instructions adapted under license by [employer] (which disclaims liability or warranty for this information). If you have questions about a medical condition or this instruction, always ask your healthcare professional. [place] [address] any warranty or liability for your use of this information. Leg Pain: Care Instructions  Your Care Instructions  Many things can cause leg pain. Too much exercise or overuse can cause a muscle cramp (or charley horse). You can get leg cramps from not eating a balanced diet that has enough potassium, calcium, and other minerals. If you do not drink enough fluids or are taking certain medicines, you may develop leg cramps.  Other causes of leg pain include injuries, blood flow problems, nerve damage, and twisted and enlarged veins (varicose veins). You can usually ease pain with self-care. Your doctor may recommend that you rest your leg and keep it elevated. Follow-up care is a key part of your treatment and safety. Be sure to make and go to all appointments, and call your doctor if you are having problems. It's also a good idea to know your test results and keep a list of the medicines you take. How can you care for yourself at home? · Take pain medicines exactly as directed. ¨ If the doctor gave you a prescription medicine for pain, take it as prescribed. ¨ If you are not taking a prescription pain medicine, ask your doctor if you can take an over-the-counter medicine. · Take any other medicines exactly as prescribed. Call your doctor if you think you are having a problem with your medicine. · Rest your leg while you have pain, and avoid standing for long periods of time. · Prop up your leg at or above the level of your heart when possible. · Make sure you are eating a balanced diet that is rich in calcium, potassium, and magnesium, especially if you are pregnant. · If directed by your doctor, put ice or a cold pack on the area for 10 to 20 minutes at a time. Put a thin cloth between the ice and your skin. · Your leg may be in a splint, a brace, or an elastic bandage, and you may have crutches to help you walk. Follow your doctor's directions about how long to wear supports and how to use the crutches. When should you call for help? Call 911 anytime you think you may need emergency care. For example, call if:  ? · You have sudden chest pain and shortness of breath, or you cough up blood. ? · Your leg is cool or pale or changes color. ?Call your doctor now or seek immediate medical care if:  ? · You have increasing or severe pain. ? · Your leg suddenly feels weak and you cannot move it. ? · You have signs of a blood clot, such as:  ¨ Pain in your calf, back of the knee, thigh, or groin.   ¨ Redness and swelling in your leg or groin. ? · You have signs of infection, such as:  ¨ Increased pain, swelling, warmth, or redness. ¨ Red streaks leading from the sore area. ¨ Pus draining from a place on your leg. ¨ A fever. ? · You cannot bear weight on your leg. ? Watch closely for changes in your health, and be sure to contact your doctor if:  ? · You do not get better as expected. Where can you learn more? Go to http://brandan-liang.info/. Enter G513 in the search box to learn more about \"Leg Pain: Care Instructions. \"  Current as of: March 20, 2017  Content Version: 11.4  © 0048-5006 [employer]. Care instructions adapted under license by [employer] (which disclaims liability or warranty for this information). If you have questions about a medical condition or this instruction, always ask your healthcare professional. [address] any warranty or liability for your use of this information.
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
## 2018-04-09 ENCOUNTER — APPOINTMENT (OUTPATIENT)
Dept: CT IMAGING | Age: 68
DRG: 640 | End: 2018-04-09
Attending: EMERGENCY MEDICINE
Payer: MEDICARE

## 2018-04-09 ENCOUNTER — HOSPITAL ENCOUNTER (INPATIENT)
Age: 68
LOS: 3 days | Discharge: HOME HEALTH | DRG: 640 | End: 2018-04-12
Attending: EMERGENCY MEDICINE | Admitting: HOSPITALIST
Payer: MEDICARE

## 2018-04-09 DIAGNOSIS — N18.6 ESRD ON HEMODIALYSIS (HCC): ICD-10-CM

## 2018-04-09 DIAGNOSIS — Z99.2 ESRD ON HEMODIALYSIS (HCC): ICD-10-CM

## 2018-04-09 DIAGNOSIS — I63.9 CEREBROVASCULAR ACCIDENT (CVA), UNSPECIFIED MECHANISM (HCC): Primary | ICD-10-CM

## 2018-04-09 PROBLEM — R53.1 WEAKNESS: Status: ACTIVE | Noted: 2018-04-09

## 2018-04-09 LAB
ALBUMIN SERPL-MCNC: 3.2 G/DL (ref 3.5–5)
ALBUMIN/GLOB SERPL: 0.6 {RATIO} (ref 1.1–2.2)
ALP SERPL-CCNC: 239 U/L (ref 45–117)
ALT SERPL-CCNC: 28 U/L (ref 12–78)
ANION GAP BLD CALC-SCNC: 16 MMOL/L (ref 10–20)
ANION GAP SERPL CALC-SCNC: 11 MMOL/L (ref 5–15)
AST SERPL-CCNC: 34 U/L (ref 15–37)
ATRIAL RATE: 79 BPM
BASOPHILS # BLD: 0 K/UL (ref 0–0.1)
BASOPHILS NFR BLD: 1 % (ref 0–1)
BILIRUB SERPL-MCNC: 0.4 MG/DL (ref 0.2–1)
BUN BLD-MCNC: 50 MG/DL (ref 9–20)
BUN SERPL-MCNC: 56 MG/DL (ref 6–20)
BUN/CREAT SERPL: 6 (ref 12–20)
CA-I BLD-MCNC: 0.77 MMOL/L (ref 1.12–1.32)
CALCIUM SERPL-MCNC: 8.2 MG/DL (ref 8.5–10.1)
CALCULATED P AXIS, ECG09: 60 DEGREES
CALCULATED R AXIS, ECG10: -94 DEGREES
CALCULATED T AXIS, ECG11: 14 DEGREES
CHLORIDE BLD-SCNC: 96 MMOL/L (ref 98–107)
CHLORIDE SERPL-SCNC: 92 MMOL/L (ref 97–108)
CO2 BLD-SCNC: 27 MMOL/L (ref 21–32)
CO2 SERPL-SCNC: 25 MMOL/L (ref 21–32)
CREAT BLD-MCNC: 8.4 MG/DL (ref 0.6–1.3)
CREAT SERPL-MCNC: 8.93 MG/DL (ref 0.55–1.02)
DIAGNOSIS, 93000: NORMAL
DIFFERENTIAL METHOD BLD: NORMAL
EOSINOPHIL # BLD: 0.2 K/UL (ref 0–0.4)
EOSINOPHIL NFR BLD: 2 % (ref 0–7)
ERYTHROCYTE [DISTWIDTH] IN BLOOD BY AUTOMATED COUNT: 14.3 % (ref 11.5–14.5)
GLOBULIN SER CALC-MCNC: 5.8 G/DL (ref 2–4)
GLUCOSE BLD STRIP.AUTO-MCNC: 100 MG/DL (ref 65–100)
GLUCOSE BLD STRIP.AUTO-MCNC: 128 MG/DL (ref 65–100)
GLUCOSE BLD-MCNC: 124 MG/DL (ref 65–100)
GLUCOSE SERPL-MCNC: 134 MG/DL (ref 65–100)
HCT VFR BLD AUTO: 39.7 % (ref 35–47)
HCT VFR BLD CALC: 33 % (ref 35–47)
HGB BLD-MCNC: 12.3 G/DL (ref 11.5–16)
IMM GRANULOCYTES # BLD: 0 K/UL (ref 0–0.04)
IMM GRANULOCYTES NFR BLD AUTO: 0 % (ref 0–0.5)
INR BLD: 1.5 (ref 0.9–1.2)
LYMPHOCYTES # BLD: 2.7 K/UL (ref 0.8–3.5)
LYMPHOCYTES NFR BLD: 34 % (ref 12–49)
MCH RBC QN AUTO: 29.3 PG (ref 26–34)
MCHC RBC AUTO-ENTMCNC: 31 G/DL (ref 30–36.5)
MCV RBC AUTO: 94.5 FL (ref 80–99)
MONOCYTES # BLD: 0.9 K/UL (ref 0–1)
MONOCYTES NFR BLD: 11 % (ref 5–13)
NEUTS SEG # BLD: 4.1 K/UL (ref 1.8–8)
NEUTS SEG NFR BLD: 52 % (ref 32–75)
NRBC # BLD: 0 K/UL (ref 0–0.01)
NRBC BLD-RTO: 0 PER 100 WBC
P-R INTERVAL, ECG05: 280 MS
PLATELET # BLD AUTO: 170 K/UL (ref 150–400)
PMV BLD AUTO: 9.5 FL (ref 8.9–12.9)
POTASSIUM BLD-SCNC: 7.4 MMOL/L (ref 3.5–5.1)
POTASSIUM SERPL-SCNC: 5 MMOL/L (ref 3.5–5.1)
POTASSIUM SERPL-SCNC: 7.1 MMOL/L (ref 3.5–5.1)
POTASSIUM SERPL-SCNC: 7.9 MMOL/L (ref 3.5–5.1)
POTASSIUM SERPL-SCNC: 8.2 MMOL/L (ref 3.5–5.1)
PROT SERPL-MCNC: 9 G/DL (ref 6.4–8.2)
Q-T INTERVAL, ECG07: 484 MS
QRS DURATION, ECG06: 170 MS
QTC CALCULATION (BEZET), ECG08: 554 MS
RBC # BLD AUTO: 4.2 M/UL (ref 3.8–5.2)
SERVICE CMNT-IMP: ABNORMAL
SERVICE CMNT-IMP: ABNORMAL
SERVICE CMNT-IMP: NORMAL
SODIUM BLD-SCNC: 131 MMOL/L (ref 136–145)
SODIUM SERPL-SCNC: 128 MMOL/L (ref 136–145)
TROPONIN I SERPL-MCNC: <0.04 NG/ML
VENTRICULAR RATE, ECG03: 79 BPM
WBC # BLD AUTO: 7.9 K/UL (ref 3.6–11)

## 2018-04-09 PROCEDURE — 85610 PROTHROMBIN TIME: CPT

## 2018-04-09 PROCEDURE — 74011250637 HC RX REV CODE- 250/637: Performed by: EMERGENCY MEDICINE

## 2018-04-09 PROCEDURE — 96375 TX/PRO/DX INJ NEW DRUG ADDON: CPT

## 2018-04-09 PROCEDURE — 74011250636 HC RX REV CODE- 250/636: Performed by: EMERGENCY MEDICINE

## 2018-04-09 PROCEDURE — 80053 COMPREHEN METABOLIC PANEL: CPT | Performed by: EMERGENCY MEDICINE

## 2018-04-09 PROCEDURE — 99285 EMERGENCY DEPT VISIT HI MDM: CPT

## 2018-04-09 PROCEDURE — 84132 ASSAY OF SERUM POTASSIUM: CPT | Performed by: EMERGENCY MEDICINE

## 2018-04-09 PROCEDURE — 74011000250 HC RX REV CODE- 250: Performed by: EMERGENCY MEDICINE

## 2018-04-09 PROCEDURE — 96365 THER/PROPH/DIAG IV INF INIT: CPT

## 2018-04-09 PROCEDURE — 90935 HEMODIALYSIS ONE EVALUATION: CPT

## 2018-04-09 PROCEDURE — 85025 COMPLETE CBC W/AUTO DIFF WBC: CPT | Performed by: EMERGENCY MEDICINE

## 2018-04-09 PROCEDURE — 74011250636 HC RX REV CODE- 250/636: Performed by: HOSPITALIST

## 2018-04-09 PROCEDURE — 65660000000 HC RM CCU STEPDOWN

## 2018-04-09 PROCEDURE — 84484 ASSAY OF TROPONIN QUANT: CPT | Performed by: EMERGENCY MEDICINE

## 2018-04-09 PROCEDURE — 74011000258 HC RX REV CODE- 258: Performed by: EMERGENCY MEDICINE

## 2018-04-09 PROCEDURE — 96372 THER/PROPH/DIAG INJ SC/IM: CPT

## 2018-04-09 PROCEDURE — 82962 GLUCOSE BLOOD TEST: CPT

## 2018-04-09 PROCEDURE — 74011250637 HC RX REV CODE- 250/637: Performed by: HOSPITALIST

## 2018-04-09 PROCEDURE — 80047 BASIC METABLC PNL IONIZED CA: CPT

## 2018-04-09 PROCEDURE — 36415 COLL VENOUS BLD VENIPUNCTURE: CPT | Performed by: EMERGENCY MEDICINE

## 2018-04-09 PROCEDURE — 93005 ELECTROCARDIOGRAM TRACING: CPT

## 2018-04-09 PROCEDURE — 70450 CT HEAD/BRAIN W/O DYE: CPT

## 2018-04-09 PROCEDURE — 84132 ASSAY OF SERUM POTASSIUM: CPT | Performed by: HOSPITALIST

## 2018-04-09 PROCEDURE — 93306 TTE W/DOPPLER COMPLETE: CPT

## 2018-04-09 PROCEDURE — 74011636637 HC RX REV CODE- 636/637: Performed by: EMERGENCY MEDICINE

## 2018-04-09 PROCEDURE — 5A1D70Z PERFORMANCE OF URINARY FILTRATION, INTERMITTENT, LESS THAN 6 HOURS PER DAY: ICD-10-PCS | Performed by: INTERNAL MEDICINE

## 2018-04-09 RX ORDER — INSULIN LISPRO 100 [IU]/ML
INJECTION, SOLUTION INTRAVENOUS; SUBCUTANEOUS
Status: DISCONTINUED | OUTPATIENT
Start: 2018-04-09 | End: 2018-04-12 | Stop reason: HOSPADM

## 2018-04-09 RX ORDER — SEVELAMER CARBONATE 800 MG/1
800 TABLET, FILM COATED ORAL
Status: DISCONTINUED | OUTPATIENT
Start: 2018-04-09 | End: 2018-04-12 | Stop reason: HOSPADM

## 2018-04-09 RX ORDER — CALCIUM GLUCONATE 94 MG/ML
1 INJECTION, SOLUTION INTRAVENOUS
Status: DISCONTINUED | OUTPATIENT
Start: 2018-04-09 | End: 2018-04-09 | Stop reason: SDUPTHER

## 2018-04-09 RX ORDER — CALCIUM GLUCONATE 94 MG/ML
1 INJECTION, SOLUTION INTRAVENOUS
Status: DISCONTINUED | OUTPATIENT
Start: 2018-04-09 | End: 2018-04-09

## 2018-04-09 RX ORDER — SODIUM BICARBONATE 1 MEQ/ML
50 SYRINGE (ML) INTRAVENOUS
Status: DISCONTINUED | OUTPATIENT
Start: 2018-04-09 | End: 2018-04-09

## 2018-04-09 RX ORDER — AMLODIPINE BESYLATE 5 MG/1
5 TABLET ORAL DAILY
COMMUNITY
End: 2018-04-12

## 2018-04-09 RX ORDER — SIMVASTATIN 20 MG/1
20 TABLET, FILM COATED ORAL
Status: DISCONTINUED | OUTPATIENT
Start: 2018-04-09 | End: 2018-04-12 | Stop reason: HOSPADM

## 2018-04-09 RX ORDER — CINACALCET 30 MG/1
30 TABLET, FILM COATED ORAL DAILY
Status: DISCONTINUED | OUTPATIENT
Start: 2018-04-09 | End: 2018-04-12 | Stop reason: HOSPADM

## 2018-04-09 RX ORDER — GUAIFENESIN 100 MG/5ML
81 LIQUID (ML) ORAL DAILY
Status: DISCONTINUED | OUTPATIENT
Start: 2018-04-09 | End: 2018-04-12 | Stop reason: HOSPADM

## 2018-04-09 RX ORDER — SODIUM CHLORIDE 0.9 % (FLUSH) 0.9 %
5-10 SYRINGE (ML) INJECTION EVERY 8 HOURS
Status: DISCONTINUED | OUTPATIENT
Start: 2018-04-09 | End: 2018-04-12 | Stop reason: HOSPADM

## 2018-04-09 RX ORDER — DEXTROSE 50 % IN WATER (D50W) INTRAVENOUS SYRINGE
50
Status: COMPLETED | OUTPATIENT
Start: 2018-04-09 | End: 2018-04-09

## 2018-04-09 RX ORDER — SODIUM BICARBONATE 1 MEQ/ML
50 SYRINGE (ML) INTRAVENOUS
Status: COMPLETED | OUTPATIENT
Start: 2018-04-09 | End: 2018-04-09

## 2018-04-09 RX ORDER — MAGNESIUM SULFATE 100 %
4 CRYSTALS MISCELLANEOUS AS NEEDED
Status: DISCONTINUED | OUTPATIENT
Start: 2018-04-09 | End: 2018-04-12 | Stop reason: HOSPADM

## 2018-04-09 RX ORDER — DEXTROSE 50 % IN WATER (D50W) INTRAVENOUS SYRINGE
25
Status: DISCONTINUED | OUTPATIENT
Start: 2018-04-09 | End: 2018-04-09

## 2018-04-09 RX ORDER — GABAPENTIN 100 MG/1
100 CAPSULE ORAL
COMMUNITY
End: 2018-07-06 | Stop reason: DRUGHIGH

## 2018-04-09 RX ORDER — GABAPENTIN 100 MG/1
100 CAPSULE ORAL
Status: DISCONTINUED | OUTPATIENT
Start: 2018-04-09 | End: 2018-04-12 | Stop reason: HOSPADM

## 2018-04-09 RX ORDER — NORTRIPTYLINE HYDROCHLORIDE 25 MG/1
25 CAPSULE ORAL
Status: DISCONTINUED | OUTPATIENT
Start: 2018-04-09 | End: 2018-04-12 | Stop reason: HOSPADM

## 2018-04-09 RX ORDER — DEXTROSE 50 % IN WATER (D50W) INTRAVENOUS SYRINGE
12.5-25 AS NEEDED
Status: DISCONTINUED | OUTPATIENT
Start: 2018-04-09 | End: 2018-04-12 | Stop reason: HOSPADM

## 2018-04-09 RX ORDER — FLUOXETINE HYDROCHLORIDE 20 MG/1
20 CAPSULE ORAL DAILY
Status: DISCONTINUED | OUTPATIENT
Start: 2018-04-09 | End: 2018-04-12 | Stop reason: HOSPADM

## 2018-04-09 RX ORDER — DIAZEPAM 5 MG/1
5 TABLET ORAL
Status: DISCONTINUED | OUTPATIENT
Start: 2018-04-09 | End: 2018-04-12 | Stop reason: HOSPADM

## 2018-04-09 RX ORDER — SODIUM CHLORIDE 0.9 % (FLUSH) 0.9 %
5-10 SYRINGE (ML) INJECTION AS NEEDED
Status: DISCONTINUED | OUTPATIENT
Start: 2018-04-09 | End: 2018-04-12 | Stop reason: HOSPADM

## 2018-04-09 RX ORDER — HEPARIN SODIUM 5000 [USP'U]/ML
5000 INJECTION, SOLUTION INTRAVENOUS; SUBCUTANEOUS EVERY 8 HOURS
Status: DISCONTINUED | OUTPATIENT
Start: 2018-04-09 | End: 2018-04-12 | Stop reason: HOSPADM

## 2018-04-09 RX ORDER — DOCUSATE SODIUM 100 MG/1
100 CAPSULE, LIQUID FILLED ORAL 2 TIMES DAILY
Status: DISCONTINUED | OUTPATIENT
Start: 2018-04-09 | End: 2018-04-12 | Stop reason: HOSPADM

## 2018-04-09 RX ORDER — SODIUM POLYSTYRENE SULFONATE 15 G/60ML
15 SUSPENSION ORAL; RECTAL
Status: COMPLETED | OUTPATIENT
Start: 2018-04-09 | End: 2018-04-09

## 2018-04-09 RX ORDER — ACETAMINOPHEN 325 MG/1
650 TABLET ORAL
Status: DISCONTINUED | OUTPATIENT
Start: 2018-04-09 | End: 2018-04-12 | Stop reason: HOSPADM

## 2018-04-09 RX ORDER — SULFAMETHOXAZOLE AND TRIMETHOPRIM 800; 160 MG/1; MG/1
1 TABLET ORAL 2 TIMES DAILY
COMMUNITY
Start: 2018-04-03 | End: 2018-04-12

## 2018-04-09 RX ADMIN — CINACALCET HYDROCHLORIDE 30 MG: 30 TABLET, COATED ORAL at 19:26

## 2018-04-09 RX ADMIN — HEPARIN SODIUM 5000 UNITS: 5000 INJECTION, SOLUTION INTRAVENOUS; SUBCUTANEOUS at 21:49

## 2018-04-09 RX ADMIN — DIAZEPAM 5 MG: 5 TABLET ORAL at 16:07

## 2018-04-09 RX ADMIN — CALCIUM GLUCONATE 1 G: 98 INJECTION, SOLUTION INTRAVENOUS at 09:37

## 2018-04-09 RX ADMIN — NORTRIPTYLINE HYDROCHLORIDE 25 MG: 25 CAPSULE ORAL at 21:50

## 2018-04-09 RX ADMIN — SIMVASTATIN 20 MG: 20 TABLET, FILM COATED ORAL at 21:50

## 2018-04-09 RX ADMIN — Medication 10 ML: at 21:50

## 2018-04-09 RX ADMIN — HUMAN INSULIN 10 UNITS: 100 INJECTION, SOLUTION SUBCUTANEOUS at 08:55

## 2018-04-09 RX ADMIN — GABAPENTIN 100 MG: 100 CAPSULE ORAL at 21:49

## 2018-04-09 RX ADMIN — DEXTROSE MONOHYDRATE 25 G: 25 INJECTION, SOLUTION INTRAVENOUS at 08:55

## 2018-04-09 RX ADMIN — FLUOXETINE 20 MG: 20 CAPSULE ORAL at 19:26

## 2018-04-09 RX ADMIN — SEVELAMER CARBONATE 800 MG: 800 TABLET, FILM COATED ORAL at 19:26

## 2018-04-09 RX ADMIN — SODIUM POLYSTYRENE SULFONATE 15 G: 15 SUSPENSION ORAL; RECTAL at 08:56

## 2018-04-09 RX ADMIN — SODIUM BICARBONATE 50 MEQ: 84 INJECTION, SOLUTION INTRAVENOUS at 08:55

## 2018-04-09 RX ADMIN — CALCIUM GLUCONATE 1 G: 98 INJECTION, SOLUTION INTRAVENOUS at 11:59

## 2018-04-09 NOTE — DIALYSIS
[facility]  [phone]    Vitals   Pre   Post   Assessment   Pre   Post     Temp  Temp: 98 °F (36.7 °C) (04/09/18 1345)  97.4   LOC  AOx3 AOx3   HR   Pulse (Heart Rate): 67 (04/09/18 1345) 75 Lungs   CTA, Room air CTA, Room air    B/P   BP: 119/48 (HD initiated) (04/09/18 1345) 126/59 Cardiac   NSR NSR    Resp   Resp Rate: 20 (04/09/18 1345) 15 Skin   Warm, dry  Warm, dry   Pain level  Pain Intensity 1: 9 (04/09/18 1212) 7/10- medicated Edema  None   None   Orders:    Duration:   Start:   1345 End:   1715 Total:   3.5 hours   Dialyzer:   Dialyzer/Set Up Inspection: [place] (04/09/18 1345)   K Bath:   Dialysate K (mEq/L): 2 (04/09/18 1345)   Ca Bath:   Dialysate CA (mEq/L): 2.5 (04/09/18 1345)   Na/Bicarb:   Dialysate NA (mEq/L): 140/35 (04/09/18 1345)   Target Fluid Removal:   Goal/Amount of Fluid to Remove (mL): 3000 mL (04/09/18 1345)   Access     Type & Location:   LUE AVF +T/+B, prepped and cannulated per protocol with 15G x2 needles. No s/s of infection noted to site. Lines visible and secured during treatment. Post HD, both needles pulled hemostasis achieved with pressure dressing applied. T/B intact.    Labs     Obtained/Reviewed   Critical Results Called   Date when labs were drawn-  Hgb-    HGB   Date Value Ref Range Status   04/09/2018 12.3 11.5 - 16.0 g/dL Final     K-    Potassium   Date Value Ref Range Status   04/09/2018 7.1 (HH) 3.5 - 5.1 mmol/L Final     Comment:     RESULTS VERIFIED, PHONED TO AND READ BACK BY  MARVA GONZALEZ @[address]       Ca-   Calcium   Date Value Ref Range Status   04/09/2018 8.2 (L) 8.5 - 10.1 MG/DL Final     Bun-   BUN   Date Value Ref Range Status   04/09/2018 56 (H) 6 - 20 MG/DL Final     Creat-   Creatinine   Date Value Ref Range Status   04/09/2018 8.93 (H) 0.55 - 1.02 MG/DL Final        Medications/ Blood Products Given     Name   Dose   Route and Time     None                Blood Volume Processed (BVP):    81.8 Net Fluid   Removed:  3000 ml   Comments   Time Out Done:  1340    Primary Nurse Rpt Pre: Juan Atkinson RN  Primary Nurse Rpt Post:  Juan Atkinson RN    Pt Education: Procedural, diet high in K  Care Plan: HD as ordered    Tx Summary:  Patient tolerated treatment well. All possible blood returned without any issues. Report given to primary nurse. Patient remain in ER bed 9 stretcher in stable condition. Admiting Diagnosis:  HyperKalemia  Pt's previous clinic- [facility]    Consent signed - Informed Consent Verified: Yes (04/09/18 7507)  [facility] Consent - Obtained    Hepatitis Status- Negative 3/26/18  Machine #- Machine Number: [id number] / [place] (04/09/18 1707)    Telemetry status- Bedside  Pre-dialysis wt. - LOIS

## 2018-04-09 NOTE — ED NOTES
TRANSFER - OUT REPORT:    Verbal report given to MARVA Rodriguez(name) on [facility]  being transferred to [facility](unit) for routine progression of care       Report consisted of patients Situation, Background, Assessment and   Recommendations(SBAR). Information from the following report(s) SBAR, ED Summary, Procedure Summary, MAR, Recent Results and Cardiac Rhythm nsr was reviewed with the receiving nurse. Lines:   Peripheral IV 04/09/18 Right Forearm (Active)   Site Assessment Clean, dry, & intact 4/9/2018  6:50 AM   Phlebitis Assessment 0 4/9/2018  6:50 AM   Infiltration Assessment 0 4/9/2018  6:50 AM   Dressing Status Clean, dry, & intact 4/9/2018  6:50 AM       Peripheral IV 04/09/18 Right;Posterior Forearm (Active)   Site Assessment Clean, dry, & intact 4/9/2018  9:47 AM   Phlebitis Assessment 0 4/9/2018  9:47 AM   Infiltration Assessment 0 4/9/2018  9:47 AM   Dressing Status Clean, dry, & intact 4/9/2018  9:47 AM   Dressing Type Transparent 4/9/2018  9:47 AM   Hub Color/Line Status Pink;Flushed;Patent 4/9/2018  9:47 AM   Action Taken Blood drawn 4/9/2018  9:47 AM        Opportunity for questions and clarification was provided.       Patient transported with:   Monitor  Registered Nurse or paramedic

## 2018-04-09 NOTE — ED TRIAGE NOTES
Triage: Patient arrives via private vehicle for c/o left arm and left leg weakness beginning at 2100 yesterday. Reports hx TIA without deficits. Was on the way to dialysis this morning and felt sx were getting worse, unable to get out of the car on her own. Escorted by daughter. Code S initiated.

## 2018-04-09 NOTE — PROGRESS NOTES
Admission Medication Reconciliation:    Information obtained from:  Patient/Daughter    Comments/Recommendations:     1)  Amlodipine and Carvedilol recently reduced (~2 wks) 2/2 hypotension. Was on amlodipine 10mg and carvedilol 25 mg AM and 12.5 mg PM    2)  Confirmed insulin regimen as 12 units of Levemir daily before Lunch and sliding scale lispro AC meals. Last dose yesterday 12 units and 6 units, respectively. 3)  Added gabapentin and Bactrim    4)  Removed Prevacid    5)  Of note aspirin 81 mg on hold until 4/19       Significant PMH/Disease States:   Past Medical History:   Diagnosis Date    Abscess of abdominal wall 2006?  Adverse effect of anesthesia     DIFFICULTY WAKING 20 YEARS AGO    Anal cryptitis 06/04/2012    Anemia     Arthritis 10/14/2010    back, neck, knees, hands    Asthma     \"TOUCH OF\"    Axillary abscess     right axillary    Blood transfusion 1999    [facility]V, NO REACTION    Blood transfusion 1980'[place]    [place] NO REACTION    Chronic kidney disease     [facility]    Chronic pain     BACK, NECK, HANDS, KNEES    Depression     Diabetes mellitus type 2, insulin dependent ([address] 10/14/2010    Dialysis patient ([address] Since 3/3/2010    M, W, F    GERD (gastroesophageal reflux disease)     Glaucoma     Heart failure ([address] 2004    IN PAST-CHF; PT WAS 412lb AT THE TIME.     High cholesterol     HTN (hypertension) 10/14/2010    IBS (irritable bowel syndrome)     Migraine     Morbid obesity ([address] 10/14/2010    HAS LOST 150+ POUNDS SINCE 2010    Nausea 04/14/2017    PERSISTENT    Nausea & vomiting     Neuropathy 10/14/2010    FEET, LEGS & FACE    Other ill-defined conditions(799.89)     facial neuropathy STATES PN 1/17/11 HAS NEUROPATHY OF FEET/ LEGS     Other ill-defined conditions(799.89)     glaucoma and cateracts    Other ill-defined conditions(799.89) 04/14/2017    ANEMIA    Perineal abscess 1/25/2017    Psychiatric disorder ANXIETY AND DEPRESSION    s/p debridement of midline abd wound 2011    Serratia wound infection, 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 2011    Stroke (HCC)     TIA, NO RESIDUAL    Thromboembolus ([facility]     LEFT LEG    Thyroid disease     LOW THYROID    Unspecified adverse effect of anesthesia     DIFFICULTY WAKING AFTER 2ND SURGERY SHORTLY AFTER OTHER SURGERY; WEIGHT 400+ POUNDS    Unspecified sleep apnea     HAS NOT USED CPAP SINCE LOSING WEIGHT, PT STATES ON 17       Chief Complaint for this Admission:    Chief Complaint   Patient presents with    Extremity Weakness       Allergies:  Latex; Celebrex [celecoxib]; Iodine; Keflex [cephalexin]; Levaquin [levofloxacin]; Pcn [penicillins]; and Morphine    Prior to Admission Medications:   Prior to Admission Medications   Prescriptions Last Dose Informant Patient Reported? Taking? FLUoxetine (PROZAC) 20 mg capsule 2018 at Unknown time  Yes Yes   Sig: Take 20 mg by mouth daily. INSULIN LISPRO (HUMALOG SC) 2018 at 6 units @ 1300 Self Yes Yes   Sig: by SubCUTAneous route Before breakfast, lunch, and dinner. SLIDING SCALE  100-150 = 4 units   151-200 = 5 units  Then 1 UNIT INCREASE FOR EVERY 50 POINTS   Menthol-Zinc Oxide (CALMOSEPTINE) 0.44-20.6 % oint 2018 at Unknown time  Yes Yes   Sig: Apply 1 Each to affected area daily. to left groin wound   OTHER 2018 at Unknown time  No Yes   Si.9% Normal saline    Use as needed to affected area    Medical code: L02.215   Patient taking differently: 0.9% Normal saline    Use as needed to affected area (wound care)    Medical code: L02.215   amLODIPine (NORVASC) 5 mg tablet 2018 at Unknown time  Yes Yes   Sig: Take 5 mg by mouth daily. ascorbic acid, vitamin C, (VITAMIN C) 500 mg tablet 2018 at Unknown time  Yes Yes   Sig: Take 1,000 mg by mouth daily. aspirin 81 mg chewable tablet 2018 at On hold until  perioperatively  Yes No   Sig: Take 81 mg by mouth daily.    b complex-vitamin c-folic acid (RENAL SOFTGELS) 1 mg capsule 2018 at Unknown time Self Yes Yes   Sig: Take 1 Cap by mouth daily. carvedilol (COREG) 12.5 mg tablet 2018 at Unknown time  Yes Yes   Sig: Take 12.5 mg by mouth two (2) times daily (with meals). 25 mg every morning and 12.5 mg every evening. cinacalcet (SENSIPAR) 30 mg tablet 2018 at Unknown time  Yes Yes   Sig: Take 30 mg by mouth daily. diazepam (VALIUM) 5 mg tablet   No Yes   Sig: Take 1 Tab by mouth every eight (8) hours as needed for Anxiety. Max Daily Amount: 15 mg.   diclofenac (VOLTAREN) 1 % gel 2018 at Unknown time  Yes Yes   Si g by TransDERmal route every four (4) hours as needed (diabetic neuropathy). diphenhydrAMINE (BENADRYL) 25 mg capsule 2018 at Unknown time Self Yes Yes   Sig: Take 75 mg by mouth every six (6) hours as needed for Itching. docusate sodium (DULCOLAX STOOL SOFTENER) 100 mg capsule 2018 at Unknown time  Yes Yes   Sig: Take 100 mg by mouth two (2) times a day. ferric citrate (AURYXIA) 210 mg iron tablet 2018 at Unknown time  Yes Yes   Sig: Take 210 mg by mouth three (3) times daily (with meals). fexofenadine (ALLEGRA) 180 mg tablet 2018 at Unknown time  Yes Yes   Sig: Take 180 mg by mouth nightly.   gabapentin (NEURONTIN) 100 mg capsule 2018 at Unknown time  Yes Yes   Sig: Take 100 mg by mouth nightly. Indications: NEUROPATHIC PAIN   insulin detemir (LEVEMIR FLEXTOUCH) 100 unit/mL (3 mL) inpn 2018 at 1300  Yes Yes   Si Units by SubCUTAneous route Daily (before lunch). NOON DAILY   latanoprost (XALATAN) 0.005 % ophthalmic solution 2018 at Unknown time  Yes Yes   Sig: Administer 1 Drop to both eyes nightly. linaclotide (LINZESS) 290 mcg cap capsule 2018 at Unknown time  Yes Yes   Sig: Take 290 mcg by mouth daily. mineral oil liquid   Yes Yes   Sig: Take 30 mL by mouth daily as needed for Constipation.    nortriptyline (PAMELOR) 25 mg capsule 2018 at Unknown time  Yes Yes Sig: Take 25 mg by mouth nightly. oxyCODONE-acetaminophen (PERCOCET) 7.5-325 mg per tablet 4/8/2018 at AM  Yes Yes   Sig: Take 1-2 Tabs by mouth every eight (8) hours as needed for Pain.   polyethylene glycol (MIRALAX) 17 gram packet   Yes Yes   Sig: Take 17 g by mouth two (2) times daily as needed. sevelamer carbonate (RENVELA) 800 mg tab tab 4/8/2018 at Unknown time  Yes Yes   Sig: Take 800 mg by mouth three (3) times daily (with meals). simvastatin (ZOCOR) 20 mg tablet 4/8/2018 at Unknown time  Yes Yes   Sig: Take 20 mg by mouth nightly. terbinafine HCl (LAMISIL) 1 % topical cream 4/8/2018 at Unknown time  Yes Yes   Sig: Apply 1 Each to affected area two (2) times a day. Apply to heel at bedtime, wrap heel with saran wrap.   trimethoprim-sulfamethoxazole (BACTRIM DS) 160-800 mg per tablet   Yes Yes   Sig: Take 1 Tab by mouth two (2) times a day.  Bactrim started 4/03 for 10 days      Facility-Administered Medications: None
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
## 2018-04-09 NOTE — ED NOTES
Pt resting in bed in no apparent distress. Offered ice chips per her request. Waiting on dialysis to get here.

## 2018-04-09 NOTE — IP AVS SNAPSHOT
[address] 
[phone] Patient: Valeria Yuan MRN: [id number] PCW:[id number] About your hospitalization You were admitted on:  April 9, 2018 You last received care in the:  [facility] 6S NEURO-SCI TELE You were discharged on:  April 11, 2018 Why you were hospitalized Your primary diagnosis was:  Weakness Your diagnoses also included:  Hyperkalemia Follow-up Information Follow up With Details Comments Contact Info Noa Romero MD In 1 week Bp meds reduced pl follow up aslo stopped bactrim for perinial abscess. Hospital PCP f/u appointment on Thursday April 12, 2018 @ 1:00 p.m. If patient is unable to attend, please call the office. [address] [place] [phone] Jeana [address]  Referred for home health services. Services to begin the day after discharge. Please call the agency if no contact by 12 noon the day after discharge. [address] 
[address] [place] [address] 
[phone] Discharge Orders None A check betty indicates which time of day the medication should be taken. My Medications CHANGE how you take these medications Instructions Each Dose to Equal  
 Morning Noon Evening Bedtime COREG 12.5 mg tablet Generic drug:  carvedilol What changed:  Another medication with the same name was removed. Continue taking this medication, and follow the directions you see here. Your last dose was: Your next dose is: Take 12.5 mg by mouth two (2) times daily (with meals). 25 mg every morning and 12.5 mg every evening. 12.5 mg  
    
   
   
   
  
 OTHER What changed:  additional instructions Your last dose was:     
   
Your next dose is:    
   
   
 0.9% Normal saline  Use as needed to affected area  Medical code: L02.215  
     
   
   
   
  
  
 CONTINUE taking these medications Instructions Each Dose to Equal  
 Morning Noon Evening Bedtime ALLEGRA 180 mg tablet Generic drug:  fexofenadine Your last dose was: Your next dose is: Take 180 mg by mouth nightly. 180 mg  
    
   
   
   
  
 aspirin 81 mg chewable tablet Your last dose was: Your next dose is: Take 81 mg by mouth daily. 81 mg  
    
   
   
   
  
 DULCOLAX STOOL SOFTENER (DSS) 100 mg capsule Generic drug:  docusate sodium Your last dose was: Your next dose is: Take 100 mg by mouth two (2) times a day. 100 mg  
    
   
   
   
  
 ferric citrate 210 mg iron tablet Commonly known as:  Pepper Courtney Your last dose was: Your next dose is: Take 210 mg by mouth three (3) times daily (with meals). 210 mg FLUoxetine 20 mg capsule Commonly known as:  PROzac Your last dose was: Your next dose is: Take 20 mg by mouth daily. 20 mg  
    
   
   
   
  
 gabapentin 100 mg capsule Commonly known as:  NEURONTIN Your last dose was: Your next dose is: Take 100 mg by mouth nightly. Indications: NEUROPATHIC PAIN  
 100 mg HUMALOG SC Your last dose was: Your next dose is:    
   
   
 by SubCUTAneous route Before breakfast, lunch, and dinner. SLIDING SCALE 100-150 = 4 units  151-200 = 5 units Then 1 UNIT INCREASE FOR EVERY 50 POINTS  
     
   
   
   
  
 insulin detemir U-100 100 unit/mL (3 mL) Inpn Commonly known as:  Geovanna Green Your last dose was: Your next dose is:    
   
   
 12 Units by SubCUTAneous route Daily (before lunch). NOON DAILY  
 12 Units  
    
   
   
   
  
 latanoprost 0.005 % ophthalmic solution Commonly known as:  Tammy Mathur Your last dose was: Your next dose is: Administer 1 Drop to both eyes nightly. 1 Drop LINZESS 290 mcg Cap capsule Generic drug:  linaclotide Your last dose was: Your next dose is: Take 290 mcg by mouth daily. 290 mcg  
    
   
   
   
  
 mineral oil liquid Your last dose was: Your next dose is: Take 30 mL by mouth daily as needed for Constipation. 30 mL MIRALAX 17 gram packet Generic drug:  polyethylene glycol Your last dose was: Your next dose is: Take 17 g by mouth two (2) times daily as needed. 17 g  
    
   
   
   
  
 nortriptyline 25 mg capsule Commonly known as:  PAMELOR Your last dose was: Your next dose is: Take 25 mg by mouth nightly. 25 mg PERCOCET 7.5-325 mg per tablet Generic drug:  oxyCODONE-acetaminophen Your last dose was: Your next dose is: Take 1-2 Tabs by mouth every eight (8) hours as needed for Pain. 1-2 Tab RENAL SOFTGELS 1 mg capsule Generic drug:  b complex-vitamin c-folic acid Your last dose was: Your next dose is: Take 1 Cap by mouth daily. 1 Cap SENSIPAR 30 mg tablet Generic drug:  cinacalcet Your last dose was: Your next dose is: Take 30 mg by mouth daily. 30 mg  
    
   
   
   
  
 sevelamer carbonate 800 mg Tab tab Commonly known as:  Bina Mccleary Your last dose was: Your next dose is: Take 800 mg by mouth three (3) times daily (with meals). 800 mg  
    
   
   
   
  
 simvastatin 20 mg tablet Commonly known as:  ZOCOR Your last dose was: Your next dose is: Take 20 mg by mouth nightly. 20 mg  
    
   
   
   
  
 terbinafine HCl 1 % topical cream  
Commonly known as:  LAMISIL Your last dose was: Your next dose is:    
   
   
 Apply 1 Each to affected area two (2) times a day. Apply to heel at bedtime, wrap heel with saran wrap.  
 1 Each VITAMIN C 500 mg tablet Generic drug:  ascorbic acid (vitamin C) Your last dose was: Your next dose is: Take 1,000 mg by mouth daily. 1000 mg  
    
   
   
   
  
  
STOP taking these medications   
 amLODIPine 10 mg tablet Commonly known as:  NORVASC  
   
  
 amLODIPine 5 mg tablet Commonly known as:  Unknown [place] BACTRIM -800 mg per tablet Generic drug:  trimethoprim-sulfamethoxazole BENADRYL 25 mg capsule Generic drug:  diphenhydrAMINE  
   
  
 CALMOSEPTINE 0.44-20.6 % Oint Generic drug:  Menthol-Zinc Oxide  
   
  
 diazePAM 5 mg tablet Commonly known as:  VALIUM  
   
  
 diclofenac 1 % Gel Commonly known as:  VOLTAREN Opioid Education Prescription Opioids: What You Need to Know: 
 
 
ATTENDING PHYSICIAN: Evelin King MD 
DISCHARGING PROVIDER: Evelin King MD   
To contact this individual call [phone] and ask the  to page. If unavailable ask to be transferred the Adult Hospitalist Department. DISCHARGE DIAGNOSES Hyperkalemia due to bactrim CONSULTATIONS: IP CONSULT TO NEPHROLOGY 
IP CONSULT TO HOSPITALIST 
 
PROCEDURES/SURGERIES: * No surgery found * PENDING TEST RESULTS:  
At the time of discharge the following test results are still pending: FOLLOW UP APPOINTMENTS:  
Follow-up Information Follow up With Details Comments Contact Info Jessica Neff MD In 1 week Bp meds reduced pl follow up aslo stopped bactrim for perinial abscess [address] 
[phone] ADDITIONAL CARE RECOMMENDATIONS:  
 
DIET: Regular Diet, Cardiac Diet and Diabetic Diet ACTIVITY: Activity as tolerated WOUND CARE:  
 
EQUIPMENT needed:  
 
 
  
 SNF/Inpatient Rehab/LTAC Independent/assisted living Hospice Other: CDMP Checked:  
Yes x PROBLEM LIST Updated: 
Yes x Signed:  
Nina Puente MD 
4/11/2018 11:36 AM 
 
  
  
  
iGuiders Announcement We are excited to announce that we are making your provider's discharge notes available to you in iGuiders. You will see these notes when they are completed and signed by the physician that discharged you from your recent hospital stay. If you have any questions or concerns about any information you see in iGuiders, please call the Health Information Department where you were seen or reach out to your Primary Care Provider for more information about your plan of care. Introducing [facility]! [address] [place] [address] introduces [employer]Vis[employer] patient portal. Now you can access parts of your medical record, email your doctor's office, and request medication refills online. 1. In your internet browser, go to [email or web address] 2. Click on the First Time User? Click Here link in the Sign In box. You will see the New Member Sign Up page. 3. Enter your Blueknow Access Code exactly as it appears below. You will not need to use this code after youve completed the sign-up process. If you do not sign up before the expiration date, you must request a new code. · Blueknow Access Code: [email or web address] Expires: 4/27/2018  2:26 AM 
 
4. Enter the last four digits of your Social Security Number (xxxx) and Date of Birth (mm/dd/yyyy) as indicated and click Submit. You will be taken to the next sign-up page. 5. Create a Blueknow ID. This will be your Blueknow login ID and cannot be changed, so think of one that is secure and easy to remember. 6. Create a Blueknow password. You can change your password at any time. 7. Enter your Password Reset Question and Answer. This can be used at a later time if you forget your password. 8. Enter your e-mail address. You will receive e-mail notification when new information is available in [address] 9. Click Sign Up. You can now view and download portions of your medical record. 10. Click the Download Summary menu link to download a portable copy of your medical information. If you have questions, please visit the Frequently Asked Questions section of the Blueknow website. Remember, Blueknow is NOT to be used for urgent needs. For medical emergencies, dial 911. Now available from your iPhone and Android! Introducing Robel Alvarez As a [facility] patient, I wanted to make you aware of our electronic visit tool called Robel Alvarez. [address] [place] [address] 24/7 allows you to connect within minutes with a medical provider 24 hours a day, seven days a week via a mobile device or tablet or logging into a secure website from your computer. You can access PhotoSolardakotafin from anywhere in the [place]. A virtual visit might be right for you when you have a simple condition and feel like you just dont want to get out of bed, or cant get away from work for an appointment, when your regular [facility] provider is not available (evenings, weekends or holidays), or when youre out of town and need minor care. Electronic visits cost only $49 and if the [employer] provider determines a prescription is needed to treat your condition, one can be electronically transmitted to a nearby pharmacy*. Please take a moment to enroll today if you have not already done so. The enrollment process is free and takes just a few minutes. To enroll, please download the [employer] juaquin to your tablet or phone, or visit [email or web address] to enroll on your computer. And, as an [address] [place] [address] patient with a Gen[employer] account, the results of your visits will be scanned into your electronic medical record and your primary care provider will be able to view the scanned results. We urge you to continue to see your regular [facility] provider for your ongoing medical care. And while your primary care provider may not be the one available when you seek a Robel Alvarez virtual visit, the peace of mind you get from getting a real diagnosis real time can be priceless. For more information on Robel Maximdakotafin, view our Frequently Asked Questions (FAQs) at [email or web address]. Sincerely, 
 
Socorro Gamboa MD 
Chief Medical Officer [employer] *:  certain medications cannot be prescribed via Robel Maximjan Providers Seen During Your Hospitalization Provider Specialty Primary office phone Indigo Caicedo MD Emergency Medicine [phone] Arlene Pryor MD Hospitalist [phone] Delia Wick MD Internal Medicine [phone] Your Primary Care Physician (PCP) Primary Care Physician Office Phone Office Fax Christofer Pryor [phone] You are allergic to the following Allergen Reactions Latex Itching Rash, sometimes difficult to breathe Celebrex (Celecoxib) Anaphylaxis Swelling TONGUE. LIPS AND EYES Iodine Other (comments) IV CONTRAST-LESIONS, AND SKIN SLUFFED OFF Keflex (Cephalexin) Anaphylaxis Swelling Tongue, lips, and eyes Levaquin (Levofloxacin) Anaphylaxis Swelling Tongue, lips, and eyes Pcn (Penicillins) Anaphylaxis Swelling Tongue, lips and eyes Morphine Other (comments) PT STATES IS JUST SENSITIVITY, GOT TOO MUCH ONE TIME. Recent Documentation Height Weight BMI OB Status Smoking Status 1.854 m 112.5 kg 32.72 kg/m2 Hysterectomy Never Smoker Emergency Contacts Name Discharge Info Relation Home Work Mobile [address]  CAREGIVER [3] Daughter [21] [phone] Patient Belongings The following personal items are in your possession at time of discharge: 
  Dental Appliances: None  Visual Aid: None      Home Medications: None   Jewelry: Sent home  Clothing: At bedside, Footwear, Pants, Shirt, Socks, Undergarments, With patient    Other Valuables: Eyeglasses, Cell Phone Please provide this summary of care documentation to your next provider. Signatures-by signing, you are acknowledging that this After Visit Summary has been reviewed with you and you have received a copy. Patient Signature:  ____________________________________________________________ Date:  ____________________________________________________________  
  
Marley Kenny Provider Signature:  ____________________________________________________________ Date:  ____________________________________________________________

## 2018-04-09 NOTE — PROGRESS NOTES
Speech Pathology    Consult received and appreciated. Chart reviewed. Patient noted to be starting dialysis now. Will follow up later this afternoon/tomorrow as available. Thanks,  Suzanne Stanford MS, CCC-SLP, BCS-S

## 2018-04-09 NOTE — ED NOTES
Called [facility] for emergent HD. Explained critical potassium levels, office explained that the earliest a nurse could come to do HD is at noon. Notified Dr. Fab Urbina. Will repeat K level after Calcium gluconate infusion completed.

## 2018-04-09 NOTE — CONSULTS
Nephrology Progress Note  Lupe Almanzar  Date of Admission : 4/9/2018    CC: Follow up for ESRD       Assessment and Plan     ESRD- HD :  - HD MWF at [facility]   - HD emergently   - resume MWF schedule while here     Hyperkalemia:  - agree with medical treatment STAT  - [id number] called for emergent dialysis       Anemia of CKD:  - hgb stable  - hold ELTON today      HTN :  - BP stable      L-sided weakness:  - concern for CVA   - CT head neg  - will need MRI once stabilized      Type II DM       Sec HPTH       Interval History: This is a 74 yo AAF with a hx of ESRD on HD MWF at [facility], hx of DM2, HTN, multiple recurrent perineal abscesses, recently had I&D about a week ago and was placed on bactrim for this. She presented to her HD unit today then developed L-sided weakness. She presented to the ED for stroke w/u. Her labs revealed a K of 7.9 with widened QRS on her EKG. She was given calcium, insulin, and SPS. Her EKG appears ok now. No cp or sob. No movement in her L arm or leg. Current Medications: all current  Medications have been eviewed in EPIC  Review of Systems: Pertinent items are noted in HPI.     Objective:  Vitals:    Vitals:    04/09/18 0710 04/09/18 0730 04/09/18 0800   BP: (!) 83/67 120/53 115/53   Pulse: 82 72 71   Resp: 16 16 14   Temp: 98.5 °F (36.9 °C)     SpO2: 97% 99% 96%     Intake and Output:          Physical Examination:  General: NAD,Conversant   Neck:  Supple, no mass  Resp:  Lungs CTA B/L, no wheezing , normal respiratory effort  CV:  RRR,  no murmur or rub, 2+ LE edema  GI:  Soft, NT, + Bowel sounds, no hepatosplenomegaly  Neurologic:  L arm and leg weakness, normal speech  Psych:             AAO X3  Skin:  No Rash  :  No fontenot  Access:           AVF +thrill/bruit    []    High complexity decision making was performed  []    Patient is at high-risk of decompensation with multiple organ involvement    Lab Data Personally Reviewed: I have reviewed all the pertinent labs, microbiology data and radiology studies during assessment. Recent Labs      04/09/18 0940  04/09/18 0840  04/09/18 0644   NA   --    --   128*   K   --   7.9*  8.2*   CL   --    --   92*   CO2   --    --   25   GLU   --    --   134*   BUN   --    --   56*   CREA   --    --   8.93*   CA   --    --   8.2*   ALB   --    --   3.2*   SGOT   --    --   34   ALT   --    --   28   INR  1.5*   --    --      Recent Labs      04/09/18 0644   WBC  7.9   HGB  12.3   HCT  39.7   PLT  170     Lab Results   Component Value Date/Time    Specimen Description: URINE 10/27/2012 08:15 PM    Specimen Description: BLOOD 06/07/2011 07:40 AM    Specimen Description: URINE 08/30/2010 07:10 AM     Lab Results   Component Value Date/Time    Culture result: MRSA NOT PRESENT 02/13/2018 09:48 PM    Culture result:  02/13/2018 09:48 PM         Screening of patient nares for MRSA is for surveillance purposes and, if positive, to facilitate isolation considerations in high risk settings. It is not intended for automatic decolonization interventions per se as regimens are not sufficiently effective to warrant routine use.     Culture result: SCANT  [facility]ED [facility]   10/26/2017 08:36 AM    Culture result: (A) 10/26/2017 08:36 AM     PROBABLE CLOSTRIDIUM SPECIES ISOLATED FROM [facility] ONLY     Recent Results (from the past 24 hour(s))   GLUCOSE, POC    Collection Time: 04/09/18  6:38 AM   Result Value Ref Range    Glucose (POC) 128 (H) 65 - 100 mg/dL    Performed by Carrie Thompson (PCT)    CBC WITH AUTOMATED DIFF    Collection Time: 04/09/18  6:44 AM   Result Value Ref Range    WBC 7.9 3.6 - 11.0 K/uL    RBC 4.20 3.80 - 5.20 M/uL    HGB 12.3 11.5 - 16.0 g/dL    HCT 39.7 35.0 - 47.0 %    MCV 94.5 80.0 - 99.0 FL    MCH 29.3 26.0 - 34.0 PG    MCHC 31.0 30.0 - 36.5 g/dL    RDW 14.3 11.5 - 14.5 %    PLATELET 798 676 - 241 K/uL    MPV 9.5 8.9 - 12.9 FL    NRBC 0.0 0  WBC    ABSOLUTE NRBC 0.00 0.00 - 0.01 K/uL    NEUTROPHILS 52 32 - 75 %    LYMPHOCYTES 34 12 - 49 %    MONOCYTES 11 5 - 13 %    EOSINOPHILS 2 0 - 7 %    BASOPHILS 1 0 - 1 %    IMMATURE GRANULOCYTES 0 0.0 - 0.5 %    ABS. NEUTROPHILS 4.1 1.8 - 8.0 K/UL    ABS. LYMPHOCYTES 2.7 0.8 - 3.5 K/UL    ABS. MONOCYTES 0.9 0.0 - 1.0 K/UL    ABS. EOSINOPHILS 0.2 0.0 - 0.4 K/UL    ABS. BASOPHILS 0.0 0.0 - 0.1 K/UL    ABS. IMM. GRANS. 0.0 0.00 - 0.04 K/UL    DF AUTOMATED     METABOLIC PANEL, COMPREHENSIVE    Collection Time: 04/09/18  6:44 AM   Result Value Ref Range    Sodium 128 (L) 136 - 145 mmol/L    Potassium 8.2 (HH) 3.5 - 5.1 mmol/L    Chloride 92 (L) 97 - 108 mmol/L    CO2 25 21 - 32 mmol/L    Anion gap 11 5 - 15 mmol/L    Glucose 134 (H) 65 - 100 mg/dL    BUN 56 (H) 6 - 20 MG/DL    Creatinine 8.93 (H) 0.55 - 1.02 MG/DL    BUN/Creatinine ratio 6 (L) 12 - 20      GFR est AA 5 (L) >60 ml/min/1.73m2    GFR est non-AA 4 (L) >60 ml/min/1.73m2    Calcium 8.2 (L) 8.5 - 10.1 MG/DL    Bilirubin, total 0.4 0.2 - 1.0 MG/DL    ALT (SGPT) 28 12 - 78 U/L    AST (SGOT) 34 15 - 37 U/L    Alk.  phosphatase 239 (H) 45 - 117 U/L    Protein, total 9.0 (H) 6.4 - 8.2 g/dL    Albumin 3.2 (L) 3.5 - 5.0 g/dL    Globulin 5.8 (H) 2.0 - 4.0 g/dL    A-G Ratio 0.6 (L) 1.1 - 2.2     TROPONIN I    Collection Time: 04/09/18  6:44 AM   Result Value Ref Range    Troponin-I, Qt. <0.04 <0.05 ng/mL   EKG, 12 LEAD, INITIAL    Collection Time: 04/09/18  7:06 AM   Result Value Ref Range    Ventricular Rate 79 BPM    Atrial Rate 79 BPM    P-R Interval 280 ms    QRS Duration 170 ms    Q-T Interval 484 ms    QTC Calculation (Bezet) 554 ms    Calculated P Axis 60 degrees    Calculated R Axis -94 degrees    Calculated T Axis 14 degrees    Diagnosis       ** Poor data quality, interpretation may be adversely affected  Sinus rhythm with 1st degree AV block  Nonspecific intraventricular block  Inferior infarct , age undetermined  When compared with ECG of 13-FEB-2018 15:39,  CA interval has increased  QRS duration has increased  Confirmed by Parish Eagle M.D., Jeanne Crain ([id number]) on 4/9/2018 8:25:30 AM     POTASSIUM    Collection Time: 04/09/18  8:40 AM   Result Value Ref Range    Potassium 7.9 (HH) 3.5 - 5.1 mmol/L   POC CHEM8    Collection Time: 04/09/18  8:46 AM   Result Value Ref Range    Calcium, ionized (POC) 0.77 (LL) 1.12 - 1.32 mmol/L    Sodium (POC) 131 (L) 136 - 145 mmol/L    Potassium (POC) 7.4 (HH) 3.5 - 5.1 mmol/L    Chloride (POC) 96 (L) 98 - 107 mmol/L    CO2 (POC) 27 21 - 32 mmol/L    Anion gap (POC) 16 10 - 20 mmol/L    Glucose (POC) 124 (H) 65 - 100 mg/dL    BUN (POC) 50 (H) 9 - 20 mg/dL    Creatinine (POC) 8.4 (H) 0.6 - 1.3 mg/dL    GFRAA, POC 6 (L) >60 ml/min/1.73m2    GFRNA, POC 5 (L) >60 ml/min/1.73m2    Hematocrit (POC) 33 (L) 35.0 - 47.0 %    Comment Comment Not Indicated. POC INR    Collection Time: 04/09/18  9:40 AM   Result Value Ref Range    INR (POC) 1.5 (H) <1.2               Ti Pisano MD  [address]  Phone - [phone]   Fax - [phone]  [email or web address]
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
## 2018-04-10 ENCOUNTER — APPOINTMENT (OUTPATIENT)
Dept: MRI IMAGING | Age: 68
DRG: 640 | End: 2018-04-10
Attending: HOSPITALIST
Payer: MEDICARE

## 2018-04-10 LAB
ANION GAP SERPL CALC-SCNC: 7 MMOL/L (ref 5–15)
BUN SERPL-MCNC: 28 MG/DL (ref 6–20)
BUN/CREAT SERPL: 5 (ref 12–20)
CALCIUM SERPL-MCNC: 8.1 MG/DL (ref 8.5–10.1)
CHLORIDE SERPL-SCNC: 97 MMOL/L (ref 97–108)
CHOLEST SERPL-MCNC: 144 MG/DL
CO2 SERPL-SCNC: 31 MMOL/L (ref 21–32)
CREAT SERPL-MCNC: 5.71 MG/DL (ref 0.55–1.02)
ERYTHROCYTE [DISTWIDTH] IN BLOOD BY AUTOMATED COUNT: 14.4 % (ref 11.5–14.5)
EST. AVERAGE GLUCOSE BLD GHB EST-MCNC: 214 MG/DL
GLUCOSE BLD STRIP.AUTO-MCNC: 120 MG/DL (ref 65–100)
GLUCOSE BLD STRIP.AUTO-MCNC: 120 MG/DL (ref 65–100)
GLUCOSE BLD STRIP.AUTO-MCNC: 158 MG/DL (ref 65–100)
GLUCOSE BLD STRIP.AUTO-MCNC: 79 MG/DL (ref 65–100)
GLUCOSE BLD STRIP.AUTO-MCNC: 91 MG/DL (ref 65–100)
GLUCOSE SERPL-MCNC: 138 MG/DL (ref 65–100)
HBA1C MFR BLD: 9.1 % (ref 4.2–6.3)
HCT VFR BLD AUTO: 37 % (ref 35–47)
HDLC SERPL-MCNC: 66 MG/DL
HDLC SERPL: 2.2 {RATIO} (ref 0–5)
HGB BLD-MCNC: 11.4 G/DL (ref 11.5–16)
LDLC SERPL CALC-MCNC: 67.6 MG/DL (ref 0–100)
LIPID PROFILE,FLP: NORMAL
MAGNESIUM SERPL-MCNC: 2.3 MG/DL (ref 1.6–2.4)
MCH RBC QN AUTO: 29.3 PG (ref 26–34)
MCHC RBC AUTO-ENTMCNC: 30.8 G/DL (ref 30–36.5)
MCV RBC AUTO: 95.1 FL (ref 80–99)
NRBC # BLD: 0 K/UL (ref 0–0.01)
NRBC BLD-RTO: 0 PER 100 WBC
PHOSPHATE SERPL-MCNC: 4 MG/DL (ref 2.6–4.7)
PLATELET # BLD AUTO: 144 K/UL (ref 150–400)
PMV BLD AUTO: 9.8 FL (ref 8.9–12.9)
POTASSIUM SERPL-SCNC: 5.4 MMOL/L (ref 3.5–5.1)
RBC # BLD AUTO: 3.89 M/UL (ref 3.8–5.2)
SERVICE CMNT-IMP: ABNORMAL
SERVICE CMNT-IMP: NORMAL
SERVICE CMNT-IMP: NORMAL
SODIUM SERPL-SCNC: 135 MMOL/L (ref 136–145)
TRIGL SERPL-MCNC: 52 MG/DL (ref ?–150)
TROPONIN I SERPL-MCNC: <0.04 NG/ML
VLDLC SERPL CALC-MCNC: 10.4 MG/DL
WBC # BLD AUTO: 5.3 K/UL (ref 3.6–11)

## 2018-04-10 PROCEDURE — 36415 COLL VENOUS BLD VENIPUNCTURE: CPT | Performed by: HOSPITALIST

## 2018-04-10 PROCEDURE — G8998 SWALLOW D/C STATUS: HCPCS

## 2018-04-10 PROCEDURE — 82962 GLUCOSE BLOOD TEST: CPT

## 2018-04-10 PROCEDURE — 77030010520

## 2018-04-10 PROCEDURE — 84100 ASSAY OF PHOSPHORUS: CPT | Performed by: HOSPITALIST

## 2018-04-10 PROCEDURE — 80048 BASIC METABOLIC PNL TOTAL CA: CPT | Performed by: HOSPITALIST

## 2018-04-10 PROCEDURE — 74011250637 HC RX REV CODE- 250/637: Performed by: HOSPITALIST

## 2018-04-10 PROCEDURE — 97165 OT EVAL LOW COMPLEX 30 MIN: CPT

## 2018-04-10 PROCEDURE — G8996 SWALLOW CURRENT STATUS: HCPCS

## 2018-04-10 PROCEDURE — G8988 SELF CARE GOAL STATUS: HCPCS

## 2018-04-10 PROCEDURE — 97161 PT EVAL LOW COMPLEX 20 MIN: CPT

## 2018-04-10 PROCEDURE — 97530 THERAPEUTIC ACTIVITIES: CPT

## 2018-04-10 PROCEDURE — 92610 EVALUATE SWALLOWING FUNCTION: CPT

## 2018-04-10 PROCEDURE — 74011250636 HC RX REV CODE- 250/636: Performed by: HOSPITALIST

## 2018-04-10 PROCEDURE — G8987 SELF CARE CURRENT STATUS: HCPCS

## 2018-04-10 PROCEDURE — 65660000000 HC RM CCU STEPDOWN

## 2018-04-10 PROCEDURE — 93880 EXTRACRANIAL BILAT STUDY: CPT

## 2018-04-10 PROCEDURE — 70551 MRI BRAIN STEM W/O DYE: CPT

## 2018-04-10 PROCEDURE — G8997 SWALLOW GOAL STATUS: HCPCS

## 2018-04-10 PROCEDURE — 84484 ASSAY OF TROPONIN QUANT: CPT | Performed by: INTERNAL MEDICINE

## 2018-04-10 PROCEDURE — 80061 LIPID PANEL: CPT | Performed by: HOSPITALIST

## 2018-04-10 PROCEDURE — 85027 COMPLETE CBC AUTOMATED: CPT | Performed by: HOSPITALIST

## 2018-04-10 PROCEDURE — 74011636637 HC RX REV CODE- 636/637: Performed by: HOSPITALIST

## 2018-04-10 PROCEDURE — 83036 HEMOGLOBIN GLYCOSYLATED A1C: CPT | Performed by: HOSPITALIST

## 2018-04-10 PROCEDURE — 83735 ASSAY OF MAGNESIUM: CPT | Performed by: HOSPITALIST

## 2018-04-10 RX ORDER — DIPHENHYDRAMINE HCL 25 MG
25 CAPSULE ORAL
Status: DISCONTINUED | OUTPATIENT
Start: 2018-04-10 | End: 2018-04-12 | Stop reason: HOSPADM

## 2018-04-10 RX ORDER — DIBUCAINE 1 %
OINTMENT (GRAM) TOPICAL
Status: DISCONTINUED | OUTPATIENT
Start: 2018-04-10 | End: 2018-04-12 | Stop reason: HOSPADM

## 2018-04-10 RX ORDER — KETOCONAZOLE 20 MG/G
CREAM TOPICAL 2 TIMES DAILY
Status: DISCONTINUED | OUTPATIENT
Start: 2018-04-10 | End: 2018-04-12 | Stop reason: HOSPADM

## 2018-04-10 RX ORDER — OXYCODONE AND ACETAMINOPHEN 7.5; 325 MG/1; MG/1
1 TABLET ORAL ONCE
Status: COMPLETED | OUTPATIENT
Start: 2018-04-10 | End: 2018-04-10

## 2018-04-10 RX ADMIN — ACETAMINOPHEN 650 MG: 325 TABLET, FILM COATED ORAL at 18:05

## 2018-04-10 RX ADMIN — CINACALCET HYDROCHLORIDE 30 MG: 30 TABLET, COATED ORAL at 09:23

## 2018-04-10 RX ADMIN — ACETAMINOPHEN 650 MG: 325 TABLET, FILM COATED ORAL at 04:48

## 2018-04-10 RX ADMIN — DIPHENHYDRAMINE HYDROCHLORIDE 25 MG: 25 CAPSULE ORAL at 18:05

## 2018-04-10 RX ADMIN — HEPARIN SODIUM 5000 UNITS: 5000 INJECTION, SOLUTION INTRAVENOUS; SUBCUTANEOUS at 15:25

## 2018-04-10 RX ADMIN — DIAZEPAM 5 MG: 5 TABLET ORAL at 18:05

## 2018-04-10 RX ADMIN — NORTRIPTYLINE HYDROCHLORIDE 25 MG: 25 CAPSULE ORAL at 22:46

## 2018-04-10 RX ADMIN — Medication 10 ML: at 22:47

## 2018-04-10 RX ADMIN — SIMVASTATIN 20 MG: 20 TABLET, FILM COATED ORAL at 22:46

## 2018-04-10 RX ADMIN — SEVELAMER CARBONATE 800 MG: 800 TABLET, FILM COATED ORAL at 09:22

## 2018-04-10 RX ADMIN — DIAZEPAM 5 MG: 5 TABLET ORAL at 09:37

## 2018-04-10 RX ADMIN — HEPARIN SODIUM 5000 UNITS: 5000 INJECTION, SOLUTION INTRAVENOUS; SUBCUTANEOUS at 20:24

## 2018-04-10 RX ADMIN — INSULIN LISPRO 2 UNITS: 100 INJECTION, SOLUTION INTRAVENOUS; SUBCUTANEOUS at 14:46

## 2018-04-10 RX ADMIN — Medication 10 ML: at 14:45

## 2018-04-10 RX ADMIN — DOCUSATE SODIUM 100 MG: 100 CAPSULE, LIQUID FILLED ORAL at 09:22

## 2018-04-10 RX ADMIN — SEVELAMER CARBONATE 800 MG: 800 TABLET, FILM COATED ORAL at 14:45

## 2018-04-10 RX ADMIN — OXYCODONE HYDROCHLORIDE AND ACETAMINOPHEN 1 TABLET: 7.5; 325 TABLET ORAL at 23:28

## 2018-04-10 RX ADMIN — SEVELAMER CARBONATE 800 MG: 800 TABLET, FILM COATED ORAL at 18:13

## 2018-04-10 RX ADMIN — GABAPENTIN 100 MG: 100 CAPSULE ORAL at 22:46

## 2018-04-10 RX ADMIN — DIAZEPAM 5 MG: 5 TABLET ORAL at 01:27

## 2018-04-10 RX ADMIN — ASPIRIN 81 MG 81 MG: 81 TABLET ORAL at 09:23

## 2018-04-10 RX ADMIN — ACETAMINOPHEN 650 MG: 325 TABLET, FILM COATED ORAL at 09:37

## 2018-04-10 RX ADMIN — FLUOXETINE 20 MG: 20 CAPSULE ORAL at 09:22

## 2018-04-10 RX ADMIN — DOCUSATE SODIUM 100 MG: 100 CAPSULE, LIQUID FILLED ORAL at 18:09

## 2018-04-10 RX ADMIN — KETOCONAZOLE: 20 CREAM TOPICAL at 13:00

## 2018-04-10 RX ADMIN — HEPARIN SODIUM 5000 UNITS: 5000 INJECTION, SOLUTION INTRAVENOUS; SUBCUTANEOUS at 04:48

## 2018-04-10 RX ADMIN — Medication 10 ML: at 06:25

## 2018-04-10 NOTE — PROGRESS NOTES
CM met with pt and her daughter, Leigh Ann Wheeler ([phone]) to introduce them to the role of CM and transition of care. Both verbalized understanding. This pt lives at home with her  and daughter, Truong Tilley. Per daughter, she lives with her parents and does not work outside of the home. She stated that she assists them as needed. Pt has a motorized wheelchair that she uses, as well as a walker. She also has bathroom DME (BSC and shower bench). She has used [facility] in the past and would choose them again if home health is needed. In case pt will need rehab at d/c, CM gave the daughter a list of SNF facilities. [address] Management Interventions  PCP Verified by CM:  Yes  Palliative Care Criteria Met (RRAT>21 & CHF Dx)?: No  Transition of Care Consult (CM Consult): Discharge Planning  MyChart Signup: No  Discharge Durable Medical Equipment: No  Physical Therapy Consult: Yes  Occupational Therapy Consult: Yes  Speech Therapy Consult: No  Current Support Network: Lives with Spouse  Confirm Follow Up Transport: Family  Plan discussed with Pt/Family/Caregiver: Yes  Freedom of Choice Offered: Yes  [facility] Provided?: No
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
## 2018-04-10 NOTE — PROGRESS NOTES
Nephrology Progress Note  Dereck Justin  Date of Admission : 4/9/2018    CC: Follow up for ESRD       Assessment and Plan     ESRD- HD :  - HD MWF at [facility]   - HD tomorrow      Hyperkalemia:  - discussed avoiding bactrim, cranberry juice and constipation   - further HD planned for tomorrow       Non Healing perineal abscess :  - per Dr Katie Santana     Anemia of CKD:  - hgb stable  - hold ELTON today      HTN :  - BP stable      L-sided weakness:  - concern for CVA   - CT head neg  - will need MRI once stabilized      Type II DM       Sec HPTH       Interval History:  Seen and examined   HD last night for 3 hrs and K down , but still slightly elevated     Current Medications: all current  Medications have been eviewed in EPIC  Review of Systems: Pertinent items are noted in HPI. Objective:  Vitals:    Vitals:    04/09/18 2058 04/09/18 2300 04/10/18 0300 04/10/18 0700   BP:  109/46 112/48 121/53   Pulse:  71 71 69   Resp:  19 18 19   Temp:  98.4 °F (36.9 °C) 98.8 °F (37.1 °C) 98.1 °F (36.7 °C)   TempSrc:       SpO2:  98% 98% 98%   Weight: 112.9 kg (249 lb)        Intake and Output:          Physical Examination:  General: NAD,Conversant   Neck:  Supple, no mass  Resp:  Lungs CTA B/L, no wheezing , normal respiratory effort  CV:  RRR,  no murmur or rub, 2+ LE edema  GI:  Soft, NT, + Bowel sounds, no hepatosplenomegaly  Neurologic:  L arm and leg weakness, normal speech  Psych:             AAO X3  Skin:  No Rash  :  No fontenot  Access:           AVF +thrill/bruit    []    High complexity decision making was performed  []    Patient is at high-risk of decompensation with multiple organ involvement    Lab Data Personally Reviewed: I have reviewed all the pertinent labs, microbiology data and radiology studies during assessment.     Recent Labs      04/10/18   0253  04/09/18   1852  04/09/18   1123  04/09/18   0940   04/09/18   0644   NA  135*   --    --    --    --   128*   K  5.4*  5.0  7.1*   --    < >  8.2*   CL  97   --    --    --    --   92*   CO2  31   --    --    --    --   25   GLU  138*   --    --    --    --   134*   BUN  28*   --    --    --    --   56*   CREA  5.71*   --    --    --    --   8.93*   CA  8.1*   --    --    --    --   8.2*   MG  2.3   --    --    --    --    --    PHOS  4.0   --    --    --    --    --    ALB   --    --    --    --    --   3.2*   SGOT   --    --    --    --    --   34   ALT   --    --    --    --    --   28   INR   --    --    --   1.5*   --    --     < > = values in this interval not displayed. Recent Labs      04/10/18   0253  04/09/18   0644   WBC  5.3  7.9   HGB  11.4*  12.3   HCT  37.0  39.7   PLT  144*  170     Lab Results   Component Value Date/Time    Specimen Description: URINE 10/27/2012 08:15 PM    Specimen Description: BLOOD 06/07/2011 07:40 AM    Specimen Description: URINE 08/30/2010 07:10 AM     Lab Results   Component Value Date/Time    Culture result: MRSA NOT PRESENT 02/13/2018 09:48 PM    Culture result:  02/13/2018 09:48 PM         Screening of patient nares for MRSA is for surveillance purposes and, if positive, to facilitate isolation considerations in high risk settings. It is not intended for automatic decolonization interventions per se as regimens are not sufficiently effective to warrant routine use.     Culture result: SCANT  [facility]ED [facility]   10/26/2017 08:36 AM    Culture result: (A) 10/26/2017 08:36 AM     PROBABLE CLOSTRIDIUM SPECIES ISOLATED FROM [facility] ONLY     Recent Results (from the past 24 hour(s))   POTASSIUM    Collection Time: 04/09/18 11:23 AM   Result Value Ref Range    Potassium 7.1 (HH) 3.5 - 5.1 mmol/L   POTASSIUM    Collection Time: 04/09/18  6:52 PM   Result Value Ref Range    Potassium 5.0 3.5 - 5.1 mmol/L   GLUCOSE, POC    Collection Time: 04/09/18  9:18 PM   Result Value Ref Range    Glucose (POC) 100 65 - 100 mg/dL    Performed by Annabella CINTRON W/O DIFF    Collection Time: 04/10/18  2:53 AM   Result Value Ref Range    WBC 5.3 3.6 - 11.0 K/uL    RBC 3.89 3.80 - 5.20 M/uL    HGB 11.4 (L) 11.5 - 16.0 g/dL    HCT 37.0 35.0 - 47.0 %    MCV 95.1 80.0 - 99.0 FL    MCH 29.3 26.0 - 34.0 PG    MCHC 30.8 30.0 - 36.5 g/dL    RDW 14.4 11.5 - 14.5 %    PLATELET 892 (L) 678 - 400 K/uL    MPV 9.8 8.9 - 12.9 FL    NRBC 0.0 0  WBC    ABSOLUTE NRBC 0.00 0.00 - 2.97 K/uL   METABOLIC PANEL, BASIC    Collection Time: 04/10/18  2:53 AM   Result Value Ref Range    Sodium 135 (L) 136 - 145 mmol/L    Potassium 5.4 (H) 3.5 - 5.1 mmol/L    Chloride 97 97 - 108 mmol/L    CO2 31 21 - 32 mmol/L    Anion gap 7 5 - 15 mmol/L    Glucose 138 (H) 65 - 100 mg/dL    BUN 28 (H) 6 - 20 MG/DL    Creatinine 5.71 (H) 0.55 - 1.02 MG/DL    BUN/Creatinine ratio 5 (L) 12 - 20      GFR est AA 9 (L) >60 ml/min/1.73m2    GFR est non-AA 7 (L) >60 ml/min/1.73m2    Calcium 8.1 (L) 8.5 - 10.1 MG/DL   MAGNESIUM    Collection Time: 04/10/18  2:53 AM   Result Value Ref Range    Magnesium 2.3 1.6 - 2.4 mg/dL   PHOSPHORUS    Collection Time: 04/10/18  2:53 AM   Result Value Ref Range    Phosphorus 4.0 2.6 - 4.7 MG/DL   LIPID PANEL    Collection Time: 04/10/18  2:53 AM   Result Value Ref Range    LIPID PROFILE          Cholesterol, total 144 <200 MG/DL    Triglyceride 52 <150 MG/DL    HDL Cholesterol 66 MG/DL    LDL, calculated 67.6 0 - 100 MG/DL    VLDL, calculated 10.4 MG/DL    CHOL/HDL Ratio 2.2 0 - 5.0     HEMOGLOBIN A1C WITH EAG    Collection Time: 04/10/18  2:53 AM   Result Value Ref Range    Hemoglobin A1c 9.1 (H) 4.2 - 6.3 %    Est. average glucose 214 mg/dL   TROPONIN I    Collection Time: 04/10/18  2:53 AM   Result Value Ref Range    Troponin-I, Qt. <0.04 <0.05 ng/mL   GLUCOSE, POC    Collection Time: 04/10/18  7:49 AM   Result Value Ref Range    Glucose (POC) 120 (H) 65 - 100 mg/dL    Performed by Sachin Castro MD  [address]  Phone - [phone]   Fax - [phone]  [email or web address]

## 2018-04-10 NOTE — PROGRESS NOTES
Primary Nurse Ines Aleman and Matthew Carvalho RN performed a dual skin assessment on this patient Impairment noted- see wound doc flow sheet  Rob score is 18

## 2018-04-10 NOTE — PROGRESS NOTES
Hospitalist Progress Note  Myla Jarrell MD  Answering service: [phone] OR [phone] from in house phone        Date of Service:  4/10/2018  NAME:  Dotty Stephens  :  1950  MRN:  [id number]      Admission Summary:   75 y.o lady w/ DM, HTN, hyperlipidemia, ESRD on HD, who presents with left-sided weakness. Symptoms began this morning upon awakening when she noted left arm and leg weakness. Her speech was also slurred. She felt well going to sleep last night around 9 PM. No exacerbating or alleviating factors. She denies new pain, dyspnea, n/v/d. She was supposed to go to dialysis today but couldn't make it due to the weakness. She was found to be severely hyperkalemic in the ER. Interval history / Subjective:   Doing well no new complains, K --> 5.4   Pt awaiting MRI , still has weakness RLE      Assessment & Plan:     Hyperkalemia: (POA) K 8.2   - ?  Bactrim induced which she was taking for perineal abscess, stopped  -  ECG changes with widened QRS repeat EKG K is 5.4  - had emergent hemodialysis; on MWF schedule per nephrology  -s/p IV calcium gluconate, insulin, dextrose, and kayexalate  - cardiac monitoring     Left-sided weakness: (POA)  -MRI brain to r/o CVA  -echocardiogram EF 55% NWMA,  carotid dopplers, check lipid panel and hgb a1c- 9.1  -continue ASA, statin  -consult neurology  - pt/ot     ESRD on HD- MWF     Type 2 DM:  -hold basal insulin for now  -SSI and POC checks     Chronic pain:  -BP is borderline low before HD so will hold pain meds until HD     HTN:  -hold home meds as BP is borderline     Hyperlipidemia:  -continue statin     Recent perineal abscess:  -continue home management w/ sitz baths  -hold Bactrim due to hyperkalemia  -wound care    Code status: Full  DVT prophylaxis: Krystle Díaz discussed with: Patient/Family and Nurse  Disposition: TBD     Hospital Problems  Date Reviewed: 2018          Codes Class Noted POA    * (Principal)Weakness ICD-10-CM: R53.1  ICD-9-CM: 780.79  4/9/2018 Unknown        Hyperkalemia ICD-10-CM: E87.5  ICD-9-CM: 276.7  2/13/2018 Yes                Review of Systems:   A comprehensive review of systems was negative. Vital Signs:    Last 24hrs VS reviewed since prior progress note. Most recent are:  Visit Vitals    /53 (BP 1 Location: Right arm, BP Patient Position: At rest)    Pulse 69    Temp 98.1 °F (36.7 °C)    Resp 19    Wt 112.9 kg (249 lb)    SpO2 98%    BMI 32.85 kg/m2       No intake or output data in the 24 hours ending 04/10/18 0837     Physical Examination:             Constitutional:  No acute distress, cooperative, pleasant    ENT:  Oral mucous moist, oropharynx benign. Neck supple,    Resp:  CTA bilaterally. No wheezing/rhonchi/rales. No accessory muscle use   CV:  Regular rhythm, normal rate, no murmurs, gallops, rubs    GI:  Soft, non distended, non tender. normoactive bowel sounds, no hepatosplenomegaly     Musculoskeletal:  No edema, warm, 2+ pulses throughout LUE graft thrill +    Neurologic:  Moves all extremities. AAOx3, some numbness sensation in RLE otherwise non focal     Psych:  Good insight, Not anxious nor agitated. Data Review:    I personally reviewed  Image and labs      Labs:     Recent Labs      04/10/18   0253  04/09/18   0644   WBC  5.3  7.9   HGB  11.4*  12.3   HCT  37.0  39.7   PLT  144*  170     Recent Labs      04/10/18   0253  04/09/18   1852  04/09/18   1123   04/09/18   0644   NA  135*   --    --    --   128*   K  5.4*  5.0  7.1*   < >  8.2*   CL  97   --    --    --   92*   CO2  31   --    --    --   25   BUN  28*   --    --    --   56*   CREA  5.71*   --    --    --   8.93*   GLU  138*   --    --    --   134*   CA  8.1*   --    --    --   8.2*   MG  2.3   --    --    --    --    PHOS  4.0   --    --    --    --     < > = values in this interval not displayed.      Recent Labs      04/09/18   0644   SGOT  34   ALT  28   AP 239*   TBILI  0.4   TP  9.0*   ALB  3.2*   GLOB  5.8*     Recent Labs      04/09/18   0940   INR  1.5*      No results for input(s): FE, TIBC, PSAT, FERR in the last 72 hours. No results found for: FOL, RBCF   No results for input(s): PH, PCO2, PO2 in the last 72 hours.   Recent Labs      04/10/18   0253  04/09/18   0644   TROIQ  <0.04  <0.04     Lab Results   Component Value Date/Time    Cholesterol, total 144 04/10/2018 02:53 AM    HDL Cholesterol 66 04/10/2018 02:53 AM    LDL, calculated 67.6 04/10/2018 02:53 AM    Triglyceride 52 04/10/2018 02:53 AM    CHOL/HDL Ratio 2.2 04/10/2018 02:53 AM     Lab Results   Component Value Date/Time    Glucose (POC) 120 (H) 04/10/2018 07:49 AM    Glucose (POC) 100 04/09/2018 09:18 PM    Glucose (POC) 124 (H) 04/09/2018 08:46 AM    Glucose (POC) 128 (H) 04/09/2018 06:38 AM    Glucose (POC) 147 (H) 02/26/2018 03:34 AM    Glucose (POC) 169 (H) 02/14/2018 11:29 AM    Glucose (POC) 263 (H) 02/13/2018 09:50 PM     Lab Results   Component Value Date/Time    Color YELLOW 10/27/2012 10:25 PM    Appearance CLEAR 10/27/2012 10:25 PM    Specific gravity 1.012 10/27/2012 10:25 PM    Specific gravity 1.015 08/30/2010 07:10 AM    pH (UA) 8.0 10/27/2012 10:25 PM    Protein 100 (A) 10/27/2012 10:25 PM    Glucose NEGATIVE  10/27/2012 10:25 PM    Ketone NEGATIVE  10/27/2012 10:25 PM    Bilirubin NEGATIVE  10/27/2012 10:25 PM    Urobilinogen 1.0 10/27/2012 10:25 PM    Nitrites NEGATIVE  10/27/2012 10:25 PM    Leukocyte Esterase TRACE (A) 10/27/2012 10:25 PM    Epithelial cells 0-5 10/27/2012 10:25 PM    Bacteria NEGATIVE  10/27/2012 10:25 PM    WBC 10-20 10/27/2012 10:25 PM    RBC 5-10 10/27/2012 10:25 PM         Medications Reviewed:     Current Facility-Administered Medications   Medication Dose Route Frequency    aspirin chewable tablet 81 mg  81 mg Oral DAILY    diazePAM (VALIUM) tablet 5 mg  5 mg Oral Q8H PRN    cinacalcet (SENSIPAR) tablet 30 mg  30 mg Oral DAILY    docusate sodium (COLACE) capsule 100 mg  100 mg Oral BID    FLUoxetine (PROzac) capsule 20 mg  20 mg Oral DAILY    gabapentin (NEURONTIN) capsule 100 mg  100 mg Oral QHS    nortriptyline (PAMELOR) capsule 25 mg  25 mg Oral QHS    sevelamer carbonate (RENVELA) tab 800 mg  800 mg Oral TID WITH MEALS    simvastatin (ZOCOR) tablet 20 mg  20 mg Oral QHS    sodium chloride (NS) flush 5-10 mL  5-10 mL IntraVENous Q8H    sodium chloride (NS) flush 5-10 mL  5-10 mL IntraVENous PRN    heparin (porcine) injection 5,000 Units  5,000 Units SubCUTAneous Q8H    acetaminophen (TYLENOL) tablet 650 mg  650 mg Oral Q4H PRN    insulin lispro (HUMALOG) injection   SubCUTAneous AC&HS    glucose chewable tablet 16 g  4 Tab Oral PRN    dextrose (D50W) injection syrg 12.5-25 g  12.5-25 g IntraVENous PRN    glucagon (GLUCAGEN) injection 1 mg  1 mg IntraMUSCular PRN     ______________________________________________________________________  EXPECTED LENGTH OF STAY: - - -  ACTUAL LENGTH OF STAY:          1                 Nina Puente MD

## 2018-04-10 NOTE — PROGRESS NOTES
Bedside shift change report given to Sharon Yepez (oncoming nurse) by Yumiko Foster (offgoing nurse). Report included the following information SBAR, Kardex, Procedure Summary, Intake/Output, MAR, Accordion, Recent Results, Med Rec Status and Alarm Parameters .

## 2018-04-10 NOTE — PROGRESS NOTES
Reason for Admission:    Weakness, R/o TIA/CVA      RRAT Score:      34     Resources/supports as identified by patient/family:       Daughter, Benji Gimenez ([phone])  Top Challenges facing patient (as identified by patient/family and CM):  Pt's health. Finances/Medication cost?         Transportation? Support system or lack thereof? Living arrangements? Self-care/ADLs/Cognition? Current Advanced Care Plan:    Not on file      Plan for utilizing home health:    Pt was just discharged from [facility]). She would use them again.        Likelihood of readmission:     medium  Transition of Care Plan:  [address] with home health   Plan B- possible SNF-pending PT and OT recs

## 2018-04-10 NOTE — WOUND CARE
Wound Care Note:     New consult placed by physician request for abscess on sacrum    Chart shows:  Admitted for weakness and hyperkalemia with a history of abscess of abdominal wall, adverse effect of anesthesia, anal cryptitis, anemia, arthritis, asthma, axillary abscess, blood transfusion, chronic kidney disease, chronic pain, depression, diabetes, dialysis patient, GERD, glaucoma, heart failure, high cholesterol, HTN, IBS, migraine, morbid obesity, nausea, neuropathy, facial neuropathy, glaucoma/cateracts, perineal abscess, stroke, thromboembolus, thyroid disease and sleep apnea. WBC = 5.3 on 4/10/18  Admitted from home    Assessment:   Patient is A&O x 4, communicative, continent with moderate assistance needed in repositioning. Bed: Total Care Sport  Diet: Renal with options 70-70-70  Patient pre-medicated for pain by RN. Left heels and sacral skin intact and without erythema. 1. POA left buttock has an abscess that has been there for a long time that has not closed, wound is hard to locate, thin fissure measuring 0.1 cm x 0.5 cm with the wound bed being undermining from 10 o'clock to 2 o'clock with the deepest at 2 o'clock being 2 cm, wound bed not able to be visualized, moderate amount of sero/sang drainage, wound edges are open. Dressing applied per recommendation. 2.  POA perineum abscess is in the same location as a previous abscess only this one extends much closer to the anus, wound measures 5 cm x 2 cm x approximately 1.3 cm, wound bed is 90% pink and 10% slough, small sero/sang drainage, new wound edges are open and previous wound edges are rolled. Dressing applied per recommendation. 3.  Right heel has POA unstageable pressure injuries. There are two areas of eschar and one area with pink epitheliazed tissue. Left open to air; no wound care needed.   There is dry, flaky skin; podiatrist ordered Ketoconazole which will be ordered. The patient has been using Nupercainal three times a day as needed for left buttock and perineum wounds; no packing. Advised daughter and patient the importance of packing wounds so they will heal from the inside out. Demonstrated wound care with daughter observing; she acknowledged understanding. Advised them of the services offered at the [facility]; they are closer to the [address] [place] [address] location. Spoke with Dr. Bettie Phillips, wound care orders obtained. Patient sitting on bedside commode. Recommendations:    Left buttock abscess- Daily (and PRN with stools) cleanse with normal saline, apply Nupercainal and wait 10 minutes for dressing, cut a thin strip of Mesalt (located on 5E) and loosely fill wound, cover with a couple 4 x 4's and secure with Tegaderm. Perineum abscess- Daily (and PRN with stools) cleanse with normal saline,, apply Nupercainal and wait 10 minutes for dressing, loosely fill with Mesalt (located on 5E), apply skin prep (All Care) to noemi-wound,  place a piece of ostomy ring at end of wound closest to anus, cover wound with 4 x 4 and secure with Tegaderm. Skin Care & Pressure Prevention:  Minimize layers of linen/pads under patient to optimize support surface. Turn/reposition approximately every 2 hours and offload heels.   Promote continence     Discussed above plan with patient & Amelia Hawley RN    Transition of Care: Plan to follow as needed while admitted to hospital.    Sudie Hodgkins" Rozetta Fate, BSN, RN, [facility], [facility].  office [phone]  pager [phone] or call  to page
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
## 2018-04-10 NOTE — PROCEDURES
[facility]  *** FINAL REPORT ***    Name: Alexy Mccarthy  MRN: [id number]  : 08 May 1950  HIS Order #: [id number]  [address] Visit #: [id number]  Date: 10 Apr 2018    TYPE OF TEST: Cerebrovascular Duplex    REASON FOR TEST  Cerebrovascular accident    Right Carotid:-             Proximal               Mid                 Distal  cm/s  Systolic  Diastolic  Systolic  Diastolic  Systolic  Diastolic  CCA:     90.1      10.0                            44.0      11.0  Bulb:  ECA:     46.0  ICA:     48.0      15.0                            35.0      12.0  ICA/CCA:  1.1       1.4    ICA Stenosis:    Right Vertebral:-  Finding: Antegrade  Sys:       56.0  Ev:    Right Subclavian:    Left Carotid:-            Proximal                Mid                 Distal  cm/s  Systolic  Diastolic  Systolic  Diastolic  Systolic  Diastolic  CCA:     53.2      15.0                            46.0      11.0  Bulb:  ECA:     57.0  ICA:     50.0      14.0                            32.0      11.0  ICA/CCA:  1.1       1.3    ICA Stenosis:    Left Vertebral:-  Finding: Antegrade  Sys:       24.0  Ev:    Left Subclavian:    INTERPRETATION/FINDINGS  PROCEDURE:  Color duplex ultrasound imaging of extracranial  cerebrovascular arteries. FINDINGS:       Right:  Internal carotid velocity is within normal limits. There   is narrowing of the internal carotid flow channel on color Doppler  imaging and mixed density plaque on B-mode imaging, consistent with  less than 50 percent stenosis. The common and external carotid  arteries are patent and without evidence of hemodynamically  significant stenosis. Mild common carotid wall thickening seen. Left:   Internal carotid velocity is within normal limits. There   is narrowing of the internal carotid flow channel on color Doppler  imaging and calcific plaque on B-mode imaging, consistent with less  than 50 percent stenosis.   The common and external carotid arteries  are patent and without evidence of hemodynamically significant  stenosis. Mild common carotid wall thickening seen. IMPRESSION:  Consistent with less than 50% stenosis of the internal  carotid. Vertebrals are patent with antegrade flow. Asymmetric  vertebrals with right peak systolic velocity greater than left. ADDITIONAL COMMENTS    I have personally reviewed the data relevant to the interpretation of  this  study.     TECHNOLOGIST: Hawa Contreras RVT  Signed: 04/10/2018 02:02 PM    PHYSICIAN: Britt Tellez MD  Signed: 04/11/2018 06:29 AM

## 2018-04-10 NOTE — INTERDISCIPLINARY ROUNDS
IDR/SLIDR Summary          Patient: Gee Handy MRN: [id number]    Age: 75 y.o. YOB: 1950 Room/Bed: [facility]   Admit Diagnosis: Weakness  Principal Diagnosis: Weakness   Goals: improved strength, manage potassium  Readmission: NO  Quality Measure:   VTE Prophylaxis: Chemical  Influenza Vaccine screening completed? YES  Pneumococcal Vaccine screening completed? NO  Mobility needs: Yes   Nutrition plan:Yes  Consults:Case Management    Financial concerns:No  Escalated to CM? RRAT Score: 34   Interventions:  Testing due for pt today?    LOS: 1 days Expected length of stay  days  Discharge plan:    PCP: Chucho Kathleen MD  Transportation needs:     Days before discharge:  Discharge disposition:     Signed:     Rashid Cantu RN  [phone]  4/10/18

## 2018-04-10 NOTE — PROGRESS NOTES
Bedside shift change report given to [address] [place] [address] Road (oncoming nurse) by Bj Lal RN (offgoing nurse). Report included the following information SBAR, Kardex, Intake/Output, MAR and Cardiac Rhythm NSR.
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
## 2018-04-11 ENCOUNTER — HOME HEALTH ADMISSION (OUTPATIENT)
Dept: HOME HEALTH SERVICES | Facility: HOME HEALTH | Age: 68
End: 2018-04-11
Payer: MEDICARE

## 2018-04-11 PROBLEM — E87.5 HYPERKALEMIA: Status: RESOLVED | Noted: 2018-02-13 | Resolved: 2018-04-11

## 2018-04-11 PROBLEM — R53.1 WEAKNESS: Status: RESOLVED | Noted: 2018-04-09 | Resolved: 2018-04-11

## 2018-04-11 LAB
GLUCOSE BLD STRIP.AUTO-MCNC: 125 MG/DL (ref 65–100)
GLUCOSE BLD STRIP.AUTO-MCNC: 134 MG/DL (ref 65–100)
GLUCOSE BLD STRIP.AUTO-MCNC: 185 MG/DL (ref 65–100)
SERVICE CMNT-IMP: ABNORMAL

## 2018-04-11 PROCEDURE — 74011250637 HC RX REV CODE- 250/637: Performed by: HOSPITALIST

## 2018-04-11 PROCEDURE — 77030010520

## 2018-04-11 PROCEDURE — 90935 HEMODIALYSIS ONE EVALUATION: CPT

## 2018-04-11 PROCEDURE — 82962 GLUCOSE BLOOD TEST: CPT

## 2018-04-11 PROCEDURE — 74011250636 HC RX REV CODE- 250/636: Performed by: HOSPITALIST

## 2018-04-11 PROCEDURE — 74011636637 HC RX REV CODE- 636/637: Performed by: HOSPITALIST

## 2018-04-11 PROCEDURE — 65660000000 HC RM CCU STEPDOWN

## 2018-04-11 RX ORDER — POLYETHYLENE GLYCOL 3350 17 G/17G
17 POWDER, FOR SOLUTION ORAL DAILY
Status: DISCONTINUED | OUTPATIENT
Start: 2018-04-11 | End: 2018-04-12 | Stop reason: HOSPADM

## 2018-04-11 RX ORDER — OXYCODONE AND ACETAMINOPHEN 5; 325 MG/1; MG/1
1 TABLET ORAL
Status: COMPLETED | OUTPATIENT
Start: 2018-04-11 | End: 2018-04-11

## 2018-04-11 RX ADMIN — SEVELAMER CARBONATE 800 MG: 800 TABLET, FILM COATED ORAL at 17:16

## 2018-04-11 RX ADMIN — OXYCODONE HYDROCHLORIDE AND ACETAMINOPHEN 1 TABLET: 5; 325 TABLET ORAL at 17:13

## 2018-04-11 RX ADMIN — DOCUSATE SODIUM 100 MG: 100 CAPSULE, LIQUID FILLED ORAL at 08:12

## 2018-04-11 RX ADMIN — INSULIN LISPRO 2 UNITS: 100 INJECTION, SOLUTION INTRAVENOUS; SUBCUTANEOUS at 08:12

## 2018-04-11 RX ADMIN — Medication 10 ML: at 22:38

## 2018-04-11 RX ADMIN — CINACALCET HYDROCHLORIDE 30 MG: 30 TABLET, COATED ORAL at 08:32

## 2018-04-11 RX ADMIN — NORTRIPTYLINE HYDROCHLORIDE 25 MG: 25 CAPSULE ORAL at 22:36

## 2018-04-11 RX ADMIN — ASPIRIN 81 MG 81 MG: 81 TABLET ORAL at 08:12

## 2018-04-11 RX ADMIN — POLYETHYLENE GLYCOL 3350 17 G: 17 POWDER, FOR SOLUTION ORAL at 12:14

## 2018-04-11 RX ADMIN — SEVELAMER CARBONATE 800 MG: 800 TABLET, FILM COATED ORAL at 08:11

## 2018-04-11 RX ADMIN — HEPARIN SODIUM 5000 UNITS: 5000 INJECTION, SOLUTION INTRAVENOUS; SUBCUTANEOUS at 03:54

## 2018-04-11 RX ADMIN — HEPARIN SODIUM 5000 UNITS: 5000 INJECTION, SOLUTION INTRAVENOUS; SUBCUTANEOUS at 12:14

## 2018-04-11 RX ADMIN — GABAPENTIN 100 MG: 100 CAPSULE ORAL at 22:36

## 2018-04-11 RX ADMIN — DOCUSATE SODIUM 100 MG: 100 CAPSULE, LIQUID FILLED ORAL at 17:16

## 2018-04-11 RX ADMIN — SIMVASTATIN 20 MG: 20 TABLET, FILM COATED ORAL at 22:36

## 2018-04-11 RX ADMIN — Medication 10 ML: at 06:52

## 2018-04-11 RX ADMIN — DIBUCAINE: 0.28 OINTMENT TOPICAL at 12:14

## 2018-04-11 RX ADMIN — KETOCONAZOLE: 20 CREAM TOPICAL at 17:16

## 2018-04-11 RX ADMIN — SEVELAMER CARBONATE 800 MG: 800 TABLET, FILM COATED ORAL at 12:13

## 2018-04-11 RX ADMIN — KETOCONAZOLE: 20 CREAM TOPICAL at 12:14

## 2018-04-11 RX ADMIN — FLUOXETINE 20 MG: 20 CAPSULE ORAL at 08:12

## 2018-04-11 NOTE — DISCHARGE INSTRUCTIONS
Discharge Instructions       PATIENT ID: Oly Aaron  MRN: [id number]   YOB: 1950    DATE OF ADMISSION: 4/9/2018  6:33 AM    DATE OF DISCHARGE: 4/11/2018    PRIMARY CARE PROVIDER: Nedra Michael MD     ATTENDING PHYSICIAN: Henry Tyson MD  DISCHARGING PROVIDER: Henry Tyson MD    To contact this individual call [phone] and ask the  to page. If unavailable ask to be transferred the Adult Hospitalist Department. DISCHARGE DIAGNOSES Hyperkalemia due to bactrim    CONSULTATIONS: IP CONSULT TO NEPHROLOGY  IP CONSULT TO HOSPITALIST    PROCEDURES/SURGERIES: * No surgery found *    PENDING TEST RESULTS:   At the time of discharge the following test results are still pending:     FOLLOW UP APPOINTMENTS:   Follow-up Information     Follow up With Details Comments Contact Info    Nedra Michael MD In 1 week Bp meds reduced pl follow up aslo stopped bactrim for perinial abscess [address]             ADDITIONAL CARE RECOMMENDATIONS:     DIET: Regular Diet, Cardiac Diet and Diabetic Diet    ACTIVITY: Activity as tolerated    WOUND CARE:   Left buttock abscess- Daily (and PRN with stools) cleanse with normal saline, apply Nupercainal and wait 10 minutes for dressing, cut a thin strip of Mesalt (located on 5E) and loosely fill wound, cover with a couple 4 x 4's and secure with Tegaderm.       Perineum abscess- Daily (and PRN with stools) cleanse with normal saline,, apply Nupercainal and wait 10 minutes for dressing, loosely fill with Mesalt (located on 5E), apply skin prep (All Care) to noemi-wound,  place a piece of ostomy ring at end of wound closest to anus, cover wound with 4 x 4 and secure with Tegaderm. EQUIPMENT needed:       DISCHARGE MEDICATIONS:   See Medication Reconciliation Form    · It is important that you take the medication exactly as they are prescribed.    · Keep your medication in the bottles provided by the pharmacist and keep a list of the medication names, dosages, and times to be taken in your wallet. · Do not take other medications without consulting your doctor. NOTIFY YOUR PHYSICIAN FOR ANY OF THE FOLLOWING:   Fever over 101 degrees for 24 hours. Chest pain, shortness of breath, fever, chills, nausea, vomiting, diarrhea, change in mentation, falling, weakness, bleeding. Severe pain or pain not relieved by medications. Or, any other signs or symptoms that you may have questions about.       DISPOSITION:  x Home With:   OT  PT  HH  RN       SNF/Inpatient Rehab/LTAC    Independent/assisted living    Hospice    Other:     CDMP Checked:   Yes x     PROBLEM LIST Updated:  Yes x       Signed:   Henry Tyson MD  4/11/2018  11:36 AM

## 2018-04-11 NOTE — PROGRESS NOTES
NUTRITION COMPLETE ASSESSMENT    RECOMMENDATIONS:   1. Encourage PO intake with meals  2. Resume PTA medications per MD:   - on bowel regimen for constipation - linzess, miralax, mineral oral   - on levemir 12 units daily   3. Daily weights (standing scale if able)     Interventions/Plan:   Food/Nutrient Delivery:   (snacks, dental soft, consistent CHO diet) Commercial supplement          Assessment:   Reason for Assessment: [x] Provider Consult    Diet: Renal  (70-70-70)  Supplements: none  Nutritionally Significant Medications: [x] Reviewed & Includes: sensipar, colace, SSI, renvela, zocor    Pre-Hospitalization:  Usual Appetite: Fair  Diet at Home: renal  Vitamins/Supplements: Yes (Glucerna occassionally )    Current Hospitalization:   Appetite: Good  PO Ability: Independent Average po intake:%  Average supplements intake:        Subjective:  \"I forced myself to eat breakfast because I want to go home. \"    Objective:  Pt admitted for left-sided weakness. PMHx: ESRD on HD, chronic pain, DM, depression, IBS, persistent nausea, stroke/TIA, perineal abscess. Hyperkalemia on admit with pt missing HD that morning - emergent HD completed. Plans for additional HD today per renal noted. K+ remains elevated. Recent I&D for perineal abscess noted. WOCN following with right unstageable pressure injury also present    Wt stable 244-248# prior to admit recently but pt reports wt of 412# when starting HD 8 years ago. Progressive wt loss with poor appetite since. Issues with constipation at home with bowel regimen - no reordered but colace rx. Pt visited today. Ate 100% breakfast. Occasionally drinks Glucerna at home (since she does not like Nepro) when she skips a meal. Pt knowledgeable about renal diet but reinforce appropriate foods and high protein foods. Follows with RD at dialysis where she gets a \"cocktail\" of protein and vitamins/minerals.  Does better with softer foods since does not eat with dentures in - diet order adjusted. Pt agreeable to trial of Ensure Clear (renal appropriate) instead of apple juice at breakfast and trial of Prosource Liquid + crystal Lite. High protein snacks added between meals. Pt reports BG usually in the 100's at home but A1c 9.1%. Takes levimir (12 units) at noon daily - per PTA med list. May want to resume with diet reordered and pt eating well at breakfast. Consistent CHO added to diet order. Will continue to follow for PO intake, supplement acceptance/consumption, BG, BM regularity, and wt trends. Estimated Nutrition Needs:   Kcals/day: 2250 Kcals/day (1875-2250kcal)  Protein: 90 g (90-105g (1.2-1.4g/kg))  Fluid: 1875 ml (25ml/kg or per renal)  Based On: Kcal/kg - specify (Comment) (25-30kcal/kg)  Weight Used: IBW (75kg)    Pt expected to meet estimated nutrient needs:  [x]   Yes     []  No [] Unable to predict at this time  Nutrition Diagnosis:   1.  (Increased protein/energy needs) related to ESRD, wound healing  as evidenced by on HD, perineal abscesses/right heel injury    2.  Altered nutrition-related lab values related to ESRD  as evidenced by severe hyperkalemia with missed HD    Goals:     Consumption of  at least 90% of protein foods     Monitoring & Evaluation:    - Total energy intake, Liquid meal replacement, Protein intake   - Weight/weight change, Electrolyte and renal profile    Previous Nutrition Goals Met:   N/A  Previous Recommendations:    N/A    Education & Discharge Needs:   [x] None Identified   [] Identified and addressed    [] Participated in care plan, discharge planning, and/or interdisciplinary rounds        Cultural, Mormon and ethnic food preferences identified: None    Skin Integrity: [x]Intact  []Other  Edema: []None [x]Other: 2+ BLLE  Last BM: 4/10  Food Allergies: [x]None []Other  Diet Restrictions: Food Dislikes:  (nepro)  Cultural/Worship Preference(s): None     Anthropometrics:    Weight Loss Metrics 4/11/2018 2/26/2018 2/14/2018 1/31/2018 1/11/2018 11/8/2017 10/26/2017   Today's Wt 248 lb 248 lb 3.8 oz 248 lb 3.8 oz 248 lb 248 lb 3.8 oz 249 lb 1.9 oz 245 lb   BMI 32.72 kg/m2 32.75 kg/m2 32.75 kg/m2 32.28 kg/m2 32.09 kg/m2 32.2 kg/m2 31.67 kg/m2      Weight Source: Bed  Height: 6' 1\" (185.4 cm),    Body mass index is 32.72 kg/(m^2).   IBW : 74.8 kg (165 lb), % IBW (Calculated): 150.3 %  Usual Body Weight: 110.7 kg (244 lb),      Labs:    Lab Results   Component Value Date/Time    Sodium 135 (L) 04/10/2018 02:53 AM    Potassium 5.4 (H) 04/10/2018 02:53 AM    Chloride 97 04/10/2018 02:53 AM    CO2 31 04/10/2018 02:53 AM    Glucose 138 (H) 04/10/2018 02:53 AM    BUN 28 (H) 04/10/2018 02:53 AM    Creatinine 5.71 (H) 04/10/2018 02:53 AM    Calcium 8.1 (L) 04/10/2018 02:53 AM    Magnesium 2.3 04/10/2018 02:53 AM    Phosphorus 4.0 04/10/2018 02:53 AM    Albumin 3.2 (L) 04/09/2018 06:44 AM     Lab Results   Component Value Date/Time    Hemoglobin A1c 9.1 (H) 04/10/2018 02:53 AM     Kim Sheriff RD

## 2018-04-11 NOTE — WOUND CARE
WOUND CARE FOLLOW UP:  Discussed wound care with daughter, she feel comfortable with how to change the dressing. Discussed working in sitz baths around dressing changes. Additional wound care supplies obtained for patient. Reminded daughter of [address]. Staff nurse feels comfortable changing dressing; patient will be discharged today. Wound care will sign off.     MARK Nava, RN, [facility]  Office x [phone]  Pager x [employer]

## 2018-04-11 NOTE — PROGRESS NOTES
Problem: Falls - Risk of  Goal: *Absence of Falls  Document Rodrick Fall Risk and appropriate interventions in the flowsheet.    Outcome: Progressing Towards Goal  Fall Risk Interventions:  Mobility Interventions: Communicate number of staff needed for ambulation/transfer         Medication Interventions: Assess postural VS orthostatic hypotension    Elimination Interventions: Call light in reach

## 2018-04-11 NOTE — DIALYSIS
[facility]  [phone]    Vitals   Pre   Post   Assessment   Pre   Post     Temp  Temp: 98 °F (36.7 °C) (04/11/18 1500)  Temp:98.1F LOC  A&Ox 3 and following commands A&Ox 3 and following commands   HR   66 65 Lungs   Diminished  Diminished   B/P  120/55 98/54 Cardiac   Heart sounds auscultated, pulses papable Heart sounds auscultated, pulses palpable   Resp   Resp Rate: 14 (04/11/18 1500) 15 Skin   Warm, dry and intact Warm, dry and intact   Pain level  Pain Intensity 1: 0 (04/11/18 1500) 0 Edema  None noted None noted   Orders:    Duration:   Start:    1840 End:   2155 Total:   3.5 hours   Dialyzer:   Dialyzer/Set Up Inspection: Revaclear (04/09/18 1345)   K Bath:   Dialysate K (mEq/L): 2 (04/09/18 1345)   Ca Bath:   Dialysate CA (mEq/L): 2.5 (04/09/18 1345)   Na/Bicarb:   Dialysate NA (mEq/L): 140/35 (04/09/18 1345)   Target Fluid Removal:   Goal/Amount of Fluid to Remove (mL): 3000 mL (04/09/18 1345)   Access     Type & Location:   Left AV fistula, cannulated with 15 gauge needles x 2 and flushed with no problem.    Labs     Obtained/Reviewed   Critical Results Called   Date when labs were drawn-  Hgb-    HGB   Date Value Ref Range Status   04/10/2018 11.4 (L) 11.5 - 16.0 g/dL Final     K-    Potassium   Date Value Ref Range Status   04/10/2018 5.4 (H) 3.5 - 5.1 mmol/L Final     Ca-   Calcium   Date Value Ref Range Status   04/10/2018 8.1 (L) 8.5 - 10.1 MG/DL Final     Bun-   BUN   Date Value Ref Range Status   04/10/2018 28 (H) 6 - 20 MG/DL Final     Comment:     INVESTIGATED PER DELTA CHECK PROTOCOL     Creat-   Creatinine   Date Value Ref Range Status   04/10/2018 5.71 (H) 0.55 - 1.02 MG/DL Final     Comment:     INVESTIGATED PER DELTA CHECK PROTOCOL        Medications/ Blood Products Given     Name   Dose   Route and Time     none                Blood Volume Processed (BVP):    74.6 Net Fluid   Removed:  3000 ml   Comments   Time Out [id number]  Primary Nurse Rpt Pre: Saray Sr, RN  Primary Nurse Rpt Post: Tova Arguello RN  Pt Education: access care  Care Plan: Continue tx as ordered    Tx Summary:  Pt tolerated tx well. At the end blood in circuit returned with 300 ml of normal saline. Needles removed x 2. Pressure held until no bleeding noted. Bed in lowest position. Call bell within reach. Pt's previous clinic- [id number]  Consent signed - Informed Consent Verified: Yes (04/09/18 1345)  [facility] Consent - yes  Hepatitis Status- neg   Machine #- Machine Number: [id number] / Heriberto Hernandez (04/09/18 1345)  Telemetry status- monitored bedside   Pre-dialysis wt. -  274.6 pounds

## 2018-04-11 NOTE — PROGRESS NOTES
Nephrology Progress Note  Hakeem Lomax  Date of Admission : 4/9/2018    CC: Follow up for ESRD       Assessment and Plan     ESRD- HD :  - HD MWF at [facility]   - HD today and 3 kg UF goal      Hyperkalemia:  - discussed avoiding bactrim, cranberry juice and constipation   - further HD planned for tomorrow       Non Healing perineal abscess :  - per Dr Larry Batista     Anemia of CKD:  - hgb stable  - hold ELTON today      HTN :  - BP stable      L-sided weakness:  - concern for CVA   - CT head neg  - will need MRI once stabilized      Type II DM       Sec HPTH       Interval History:  Seen and examined   No CP/N/V/SOB    Current Medications: all current  Medications have been eviewed in EPIC  Review of Systems: Pertinent items are noted in HPI. Objective:  Vitals:    Vitals:    04/10/18 2300 04/11/18 0300 04/11/18 0700 04/11/18 1100   BP: 120/53 143/60 128/56 110/47   Pulse: 62 68 60 68   Resp: 18 19 17 15   Temp: 97.5 °F (36.4 °C) 97.5 °F (36.4 °C) 98.6 °F (37 °C) 98.2 °F (36.8 °C)   TempSrc:       SpO2: 99% 98% 97% 98%   Weight:  112.5 kg (248 lb)     Height:  6' 1\" (1.854 m)       Intake and Output:          Physical Examination:  General: NAD,Conversant   Neck:  Supple, no mass  Resp:  Lungs CTA B/L, no wheezing , normal respiratory effort  CV:  RRR,  no murmur or rub, 2+ LE edema  GI:  Soft, NT, + Bowel sounds, no hepatosplenomegaly  Neurologic:  L arm and leg weakness, normal speech  Psych:             AAO X3  Skin:  No Rash  :  No fontenot  Access:           AVF +thrill/bruit    []    High complexity decision making was performed  []    Patient is at high-risk of decompensation with multiple organ involvement    Lab Data Personally Reviewed: I have reviewed all the pertinent labs, microbiology data and radiology studies during assessment.     Recent Labs      04/10/18   0253  04/09/18   1852  04/09/18   1123  04/09/18   0940   04/09/18   0644   NA  135*   --    --    --    --   128*   K  5.4* 5.0  7.1*   --    < >  8.2*   CL  97   --    --    --    --   92*   CO2  31   --    --    --    --   25   GLU  138*   --    --    --    --   134*   BUN  28*   --    --    --    --   56*   CREA  5.71*   --    --    --    --   8.93*   CA  8.1*   --    --    --    --   8.2*   MG  2.3   --    --    --    --    --    PHOS  4.0   --    --    --    --    --    ALB   --    --    --    --    --   3.2*   SGOT   --    --    --    --    --   34   ALT   --    --    --    --    --   28   INR   --    --    --   1.5*   --    --     < > = values in this interval not displayed. Recent Labs      04/10/18   0253  04/09/18   0644   WBC  5.3  7.9   HGB  11.4*  12.3   HCT  37.0  39.7   PLT  144*  170     Lab Results   Component Value Date/Time    Specimen Description: URINE 10/27/2012 08:15 PM    Specimen Description: BLOOD 06/07/2011 07:40 AM    Specimen Description: URINE 08/30/2010 07:10 AM     Lab Results   Component Value Date/Time    Culture result: MRSA NOT PRESENT 02/13/2018 09:48 PM    Culture result:  02/13/2018 09:48 PM         Screening of patient nares for MRSA is for surveillance purposes and, if positive, to facilitate isolation considerations in high risk settings. It is not intended for automatic decolonization interventions per se as regimens are not sufficiently effective to warrant routine use.     Culture result: SCANT  MIXED COMMENSAL [place]   10/26/2017 08:36 AM    Culture result: (A) 10/26/2017 08:36 AM     PROBABLE CLOSTRIDIUM SPECIES ISOLATED FROM [facility]IO [facility]OTH ONLY     Recent Results (from the past 24 hour(s))   GLUCOSE, POC    Collection Time: 04/10/18  5:24 PM   Result Value Ref Range    Glucose (POC) 79 65 - 100 mg/dL    Performed by Bonilla Haskins    GLUCOSE, POC    Collection Time: 04/10/18  5:47 PM   Result Value Ref Range    Glucose (POC) 91 65 - 100 mg/dL    Performed by Bonilla ROBERT, POC    Collection Time: 04/10/18  9:24 PM   Result Value Ref Range    Glucose (POC) 120 (H) 65 - 100 mg/dL Performed by Alvina CHACON    GLUCOSE, POC    Collection Time: 04/11/18  7:38 AM   Result Value Ref Range    Glucose (POC) 185 (H) 65 - 100 mg/dL    Performed by Rachana Osuna, POC    Collection Time: 04/11/18 11:41 AM   Result Value Ref Range    Glucose (POC) 134 (H) 65 - 100 mg/dL    Performed by Wilson Willis MD  [address]  Phone - [phone]   Fax - [phone]  [email or web address]

## 2018-04-11 NOTE — PROGRESS NOTES
Hospital PCP [facility] follow-up appointment with Dr. Genet Terrell on Thursday April 12, 2018 @ 1:00 p.m.  Added to AVS.   Norman Lema CM Specialist

## 2018-04-11 NOTE — PROGRESS NOTES
CM received consult for home health services. Patient was asleep. CM met with patient's daughter Timmy Yip and offered choice of providers. Timmy Yip selected [facility] stating that patient has been with the agency before and was very satisfied. CM sent referral via [facility] to [facility], and referral was accepted. CM updated AVS.   No other discharge planning needs presented at this time.

## 2018-04-11 NOTE — INTERDISCIPLINARY ROUNDS
IDR/SLIDR Summary          Patient: Candelaria Sanford MRN: [id number]    Age: 75 y.o. YOB: 1950 Room/Bed: [facility]   Admit Diagnosis: Weakness  Principal Diagnosis: Weakness   Goals: improved strength, manage potassium  Readmission: NO                  Quality Measure:   VTE Prophylaxis: Chemical  Influenza Vaccine screening completed? YES  Pneumococcal Vaccine screening completed? NO  Mobility needs: Yes                                                           Nutrition plan:Yes  Consults:Case Management                                    Financial concerns:No                                            Escalated to CM? RRAT Score: 34                                                                Interventions:  Testing due for pt today?    LOS: 2 days Expected length of stay  days  Discharge plan:    PCP: Johnnie Young MD  Transportation needs:     Days before discharge:  Discharge disposition:     Signed:     Abhinav Mcclain RN  4/11/18  6113

## 2018-04-11 NOTE — DISCHARGE SUMMARY
Discharge Summary       PATIENT ID: Gee Handy  MRN: [id number]   YOB: 1950    DATE OF ADMISSION: 4/9/2018  6:33 AM    DATE OF DISCHARGE: 4/11/18   PRIMARY CARE PROVIDER: Chucho Kathleen MD     ATTENDING PHYSICIAN: Aaron Smart  DISCHARGING PROVIDER: Trudy Posada MD    To contact this individual call [phone] and ask the  to page. If unavailable ask to be transferred the Adult Hospitalist Department. CONSULTATIONS: IP CONSULT TO NEPHROLOGY  IP CONSULT TO HOSPITALIST    PROCEDURES/SURGERIES: * No surgery found *    ADMITTING DIAGNOSES & HOSPITAL COURSE:   75 y.o lady w/ DM, HTN, hyperlipidemia, ESRD on HD, who presents with left-sided weakness. Symptoms began this morning upon awakening when she noted left arm and leg weakness. Her speech was also slurred. She felt well going to sleep last night around 9 PM. No exacerbating or alleviating factors. She denies new pain, dyspnea, n/v/d. She was supposed to go to dialysis today but couldn't make it due to the weakness. She was found to be severely hyperkalemic in the ER.      Assessment & Plan:      Hyperkalemia: (POA) K 8.2   - ?  Bactrim induced which she was taking for perineal abscess, stoppe      Left-sided weakness: (POA)  -MRI brain - no acute event chronic ischemic changes  -echocardiogram EF 55% NWMA,  carotid dopplers, check lipid panel and hgb a1c- 9.1  -continue ASA, statin      ESRD on HD- MWF - HD today      Type 2 DM:  -resume home meds basal and SSI      Chronic pain:  -BP is borderline low before HD so will hold pain meds until HD      HTN:  -resumed BB       Hyperlipidemia:  -continue statin      Recent perineal abscess:  -continue home management w/ sitz baths  -hold Bactrim due to hyperkalemia  -wound care            PENDING TEST RESULTS:   At the time of discharge the following test results are still pending:     FOLLOW UP APPOINTMENTS:    Follow-up Information     Follow up With Details Comments Contact Info Dhruv Briceno MD In 1 week Bp meds reduced pl follow up aslo stopped bactrim for perinial abscess [address]  [phone]             ADDITIONAL CARE RECOMMENDATIONS:     DIET: Cardiac Diet and Diabetic Diet    ACTIVITY: Activity as tolerated    WOUND CARE:     EQUIPMENT needed:       DISCHARGE MEDICATIONS:  Current Discharge Medication List      CONTINUE these medications which have NOT CHANGED    Details   gabapentin (NEURONTIN) 100 mg capsule Take 100 mg by mouth nightly. Indications: NEUROPATHIC PAIN      mineral oil liquid Take 30 mL by mouth daily as needed for Constipation. ferric citrate (AURYXIA) 210 mg iron tablet Take 210 mg by mouth three (3) times daily (with meals). oxyCODONE-acetaminophen (PERCOCET) 7.5-325 mg per tablet Take 1-2 Tabs by mouth every eight (8) hours as needed for Pain.      polyethylene glycol (MIRALAX) 17 gram packet Take 17 g by mouth two (2) times daily as needed. FLUoxetine (PROZAC) 20 mg capsule Take 20 mg by mouth daily. sevelamer carbonate (RENVELA) 800 mg tab tab Take 800 mg by mouth three (3) times daily (with meals). linaclotide (LINZESS) 290 mcg cap capsule Take 290 mcg by mouth daily. insulin detemir (LEVEMIR FLEXTOUCH) 100 unit/mL (3 mL) inpn 12 Units by SubCUTAneous route Daily (before lunch). NOON DAILY      carvedilol (COREG) 12.5 mg tablet Take 12.5 mg by mouth two (2) times daily (with meals). 25 mg every morning and 12.5 mg every evening. simvastatin (ZOCOR) 20 mg tablet Take 20 mg by mouth nightly. ascorbic acid, vitamin C, (VITAMIN C) 500 mg tablet Take 1,000 mg by mouth daily. terbinafine HCl (LAMISIL) 1 % topical cream Apply 1 Each to affected area two (2) times a day. Apply to heel at bedtime, wrap heel with saran wrap.      latanoprost (XALATAN) 0.005 % ophthalmic solution Administer 1 Drop to both eyes nightly.       OTHER 0.9% Normal saline    Use as needed to affected area    Medical code: L02.215  Qty: 1 Bottle, Refills: 3      nortriptyline (PAMELOR) 25 mg capsule Take 25 mg by mouth nightly. cinacalcet (SENSIPAR) 30 mg tablet Take 30 mg by mouth daily. fexofenadine (ALLEGRA) 180 mg tablet Take 180 mg by mouth nightly. b complex-vitamin c-folic acid (RENAL SOFTGELS) 1 mg capsule Take 1 Cap by mouth daily. docusate sodium (DULCOLAX STOOL SOFTENER) 100 mg capsule Take 100 mg by mouth two (2) times a day. INSULIN LISPRO (HUMALOG SC) by SubCUTAneous route Before breakfast, lunch, and dinner. SLIDING SCALE  100-150 = 4 units   151-200 = 5 units  Then 1 UNIT INCREASE FOR EVERY 50 POINTS      aspirin 81 mg chewable tablet Take 81 mg by mouth daily. STOP taking these medications       amLODIPine (NORVASC) 5 mg tablet Comments:   Reason for Stopping:         trimethoprim-sulfamethoxazole (BACTRIM DS) 160-800 mg per tablet Comments:   Reason for Stopping:         Menthol-Zinc Oxide (CALMOSEPTINE) 0.44-20.6 % oint Comments:   Reason for Stopping:         diclofenac (VOLTAREN) 1 % gel Comments:   Reason for Stopping:         diazepam (VALIUM) 5 mg tablet Comments:   Reason for Stopping:         diphenhydrAMINE (BENADRYL) 25 mg capsule Comments:   Reason for Stopping:         amLODIPine (NORVASC) 10 mg tablet Comments:   Reason for Stopping:                 NOTIFY YOUR PHYSICIAN FOR ANY OF THE FOLLOWING:   Fever over 101 degrees for 24 hours. Chest pain, shortness of breath, fever, chills, nausea, vomiting, diarrhea, change in mentation, falling, weakness, bleeding. Severe pain or pain not relieved by medications. Or, any other signs or symptoms that you may have questions about.     DISPOSITION:   x Home With:   OT x PT x HH  RN       Long term SNF/Inpatient Rehab    Independent/assisted living    Hospice    Other:       PATIENT CONDITION AT DISCHARGE:     Functional status    Poor    x Deconditioned     Independent      Cognition   x  Lucid     Forgetful Dementia      Catheters/lines (plus indication)    [place]     PICC     PEG    x None      Code status   x  Full code     DNR      PHYSICAL EXAMINATION AT DISCHARGE:   Refer to Progress Note      CHRONIC MEDICAL DIAGNOSES:  Problem List as of 4/11/2018  Date Reviewed: 2/13/2018          Codes Class Noted - Resolved    Fecal impaction ([facility] ICD-10-CM: K56.41  ICD-9-CM: 560.32  2/13/2018 - Present        ESRD needing dialysis ([facility] ICD-10-CM: N18.6, Z99.2  ICD-9-CM: 585.6  2/13/2018 - Present        Type 2 diabetes mellitus with nephropathy ([facility] ICD-10-CM: E11.21  ICD-9-CM: 250.40, 583.81  1/31/2018 - Present        Perianal abscess ICD-10-CM: K61.0  ICD-9-CM: 417  10/26/2017 - Present        ESRD (end stage renal disease) on dialysis [facility]) ICD-10-CM: N18.6, Z99.2  ICD-9-CM: 585.6, V45.11  6/20/2017 - Present        Abscess of deep perineal space ICD-10-CM: N34.0  ICD-9-CM: 597.0  6/17/2017 - Present        Perineal abscess ICD-10-CM: L02.215  ICD-9-CM: 682.2  1/25/2017 - Present        Obstructive sleep apnea (adult) (pediatric) ICD-10-CM: G47.33  ICD-9-CM: 327.23  12/13/2011 - Present        Serratia wound infection, 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 ICD-10-CM: A49.8  ICD-9-CM: 041.85  6/14/2011 - Present        Abdominal pain, chronic, epigastric ICD-10-CM: R10.13, G89.29  ICD-9-CM: 789.06, 338.29  1/12/2011 - Present        Morbid obesity ([facility] ICD-10-CM: E66.01  ICD-9-CM: 278.01  10/14/2010 - Present    Overview Signed 6/20/2017 11:05 AM by Luis Miguel Veliz NP     Body mass index is 36.4 kg/(m^2).                Diabetes mellitus type 2, insulin dependent ([facility] ICD-10-CM: E11.9, Z79.4  ICD-9-CM: 250.00, V58.67  10/14/2010 - Present        HTN (hypertension) ICD-10-CM: I10  ICD-9-CM: 401.9  10/14/2010 - Present        Neuropathy ICD-10-CM: G62.9  ICD-9-CM: 355.9  10/14/2010 - Present        Arthritis ICD-10-CM: M19.90  ICD-9-CM: 716.90  10/14/2010 - Present        * (Principal)RESOLVED: Weakness ICD-10-CM: R53.1  ICD-9-CM: 780.79 4/9/2018 - 4/11/2018        RESOLVED: Hyperkalemia ICD-10-CM: E87.5  ICD-9-CM: 276.7  2/13/2018 - 4/11/2018        RESOLVED: Anal cryptitis ICD-10-CM: K62.89  ICD-9-CM: 569.49  6/4/2012 - 7/21/2017        RESOLVED: s/p debridement of midline abd wound ICD-9-CM: [id number]  6/24/2011 - 7/21/2017              Greater than 30  minutes were spent with the patient on counseling and coordination of care    Signed:   Anna Alonzo MD  4/11/2018  11:36 AM

## 2018-04-11 NOTE — PROGRESS NOTES
2000 Bedside shift change report given to Tg Sarkar RN (oncoming nurse) by Lizbeth Katz (offgoing nurse). Report included the following information SBAR, Kardex, MAR and Cardiac Rhythm NSR.       0800 Bedside shift change report given to Lizbeth Katz (oncoming nurse) by Tg Sarkar RN (offgoing nurse). Report included the following information SBAR and Kardex NSR.

## 2018-04-11 NOTE — PROGRESS NOTES
Hospitalist Progress Note  Karsten Booker MD  Answering service: [phone] OR [phone] from in house phone        Date of Service:  2018  NAME:  Shweta Eli  :  1950  MRN:  [id number]      Admission Summary:   75 y.o lady w/ DM, HTN, hyperlipidemia, ESRD on HD, who presents with left-sided weakness. Symptoms began this morning upon awakening when she noted left arm and leg weakness. Her speech was also slurred. She felt well going to sleep last night around 9 PM. No exacerbating or alleviating factors. She denies new pain, dyspnea, n/v/d. She was supposed to go to dialysis today but couldn't make it due to the weakness. She was found to be severely hyperkalemic in the ER. Interval history / Subjective:   Doing well no new complains, HD today   MRI - no acute event, no PT recommended    Pt can be d/c'd after HD today     Assessment & Plan:     Hyperkalemia: (POA) K 8.2   - ?  Bactrim induced which she was taking for perineal abscess, stopped  -  ECG changes with widened QRS resolved  - had emergent hemodialysis; on MWF schedule per nephrology  - s/p IV calcium gluconate, insulin, dextrose, and kayexalate  - cardiac monitoring     Left-sided weakness: (POA)  -MRI brain - no acute event chronic ischemic changes  -echocardiogram EF 55% NWMA,  carotid dopplers, check lipid panel and hgb a1c- 9.1  -continue ASA, statin  -consult neurology     ESRD on HD- MWF - HD today     Type 2 DM:  -hold basal insulin for now  -SSI and POC checks     Chronic pain:  -BP is borderline low before HD so will hold pain meds until HD     HTN:  -hold home meds as BP is borderline     Hyperlipidemia:  -continue statin     Recent perineal abscess:  -continue home management w/ sitz baths  -hold Bactrim due to hyperkalemia  -wound care    Code status: Full  DVT prophylaxis: 3 Elvira Díaz discussed with: Patient/Family and Nurse  Disposition: TBD can be d/c'd after HD      Hospital Problems  Date Reviewed: 2/13/2018          Codes Class Noted POA    * (Principal)Weakness ICD-10-CM: R53.1  ICD-9-CM: 780.79  4/9/2018 Unknown        Hyperkalemia ICD-10-CM: E87.5  ICD-9-CM: 276.7  2/13/2018 Yes                Review of Systems:   A comprehensive review of systems was negative. Vital Signs:    Last 24hrs VS reviewed since prior progress note. Most recent are:  Visit Vitals    /56 (BP 1 Location: Right arm, BP Patient Position: At rest)    Pulse 60    Temp 98.6 °F (37 °C)    Resp 17    Ht 6' 1\" (1.854 m)    Wt 112.5 kg (248 lb)    SpO2 97%    BMI 32.72 kg/m2       No intake or output data in the 24 hours ending 04/11/18 0826     Physical Examination:             Constitutional:  No acute distress   ENT:  Oral mucous moist   Resp:  CTA bilaterally. CV:  Regular rhythm, normal rate    GI:  Soft, non distended, non tender. bs+    Musculoskeletal:  No edema, warm, 2+ pulses throughout LUE graft thrill +    Neurologic:  Moves all extremities. AAOx3, some numbness sensation in RLE otherwise non focal     Psych:  Good insight, Not anxious nor agitated. Data Review:    I personally reviewed  Image and labs      Labs:     Recent Labs      04/10/18   0253  04/09/18   0644   WBC  5.3  7.9   HGB  11.4*  12.3   HCT  37.0  39.7   PLT  144*  170     Recent Labs      04/10/18   0253  04/09/18   1852  04/09/18   1123   04/09/18   0644   NA  135*   --    --    --   128*   K  5.4*  5.0  7.1*   < >  8.2*   CL  97   --    --    --   92*   CO2  31   --    --    --   25   BUN  28*   --    --    --   56*   CREA  5.71*   --    --    --   8.93*   GLU  138*   --    --    --   134*   CA  8.1*   --    --    --   8.2*   MG  2.3   --    --    --    --    PHOS  4.0   --    --    --    --     < > = values in this interval not displayed.      Recent Labs      04/09/18   0644   SGOT  34   ALT  28   AP  239*   TBILI  0.4   TP  9.0*   ALB  3.2*   GLOB  5.8*     Recent Labs 04/09/18   0940   INR  1.5*      No results for input(s): FE, TIBC, PSAT, FERR in the last 72 hours. No results found for: FOL, RBCF   No results for input(s): PH, PCO2, PO2 in the last 72 hours.   Recent Labs      04/10/18   0253  04/09/18   0644   TROIQ  <0.04  <0.04     Lab Results   Component Value Date/Time    Cholesterol, total 144 04/10/2018 02:53 AM    HDL Cholesterol 66 04/10/2018 02:53 AM    LDL, calculated 67.6 04/10/2018 02:53 AM    Triglyceride 52 04/10/2018 02:53 AM    CHOL/HDL Ratio 2.2 04/10/2018 02:53 AM     Lab Results   Component Value Date/Time    Glucose (POC) 185 (H) 04/11/2018 07:38 AM    Glucose (POC) 120 (H) 04/10/2018 09:24 PM    Glucose (POC) 91 04/10/2018 05:47 PM    Glucose (POC) 79 04/10/2018 05:24 PM    Glucose (POC) 158 (H) 04/10/2018 12:13 PM     Lab Results   Component Value Date/Time    Color YELLOW 10/27/2012 10:25 PM    Appearance CLEAR 10/27/2012 10:25 PM    Specific gravity 1.012 10/27/2012 10:25 PM    Specific gravity 1.015 08/30/2010 07:10 AM    pH (UA) 8.0 10/27/2012 10:25 PM    Protein 100 (A) 10/27/2012 10:25 PM    Glucose NEGATIVE  10/27/2012 10:25 PM    Ketone NEGATIVE  10/27/2012 10:25 PM    Bilirubin NEGATIVE  10/27/2012 10:25 PM    Urobilinogen 1.0 10/27/2012 10:25 PM    Nitrites NEGATIVE  10/27/2012 10:25 PM    Leukocyte Esterase TRACE (A) 10/27/2012 10:25 PM    Epithelial cells 0-5 10/27/2012 10:25 PM    Bacteria NEGATIVE  10/27/2012 10:25 PM    WBC 10-20 10/27/2012 10:25 PM    RBC 5-10 10/27/2012 10:25 PM         Medications Reviewed:     Current Facility-Administered Medications   Medication Dose Route Frequency    dibucaine (NUPERCAINAL) 1 % ointment   Topical TID PRN    ketoconazole (NIZORAL) 2 % cream   Topical BID    diphenhydrAMINE (BENADRYL) capsule 25 mg  25 mg Oral Q6H PRN    aspirin chewable tablet 81 mg  81 mg Oral DAILY    diazePAM (VALIUM) tablet 5 mg  5 mg Oral Q8H PRN    cinacalcet (SENSIPAR) tablet 30 mg  30 mg Oral DAILY    docusate sodium (COLACE) capsule 100 mg  100 mg Oral BID    FLUoxetine (PROzac) capsule 20 mg  20 mg Oral DAILY    gabapentin (NEURONTIN) capsule 100 mg  100 mg Oral QHS    nortriptyline (PAMELOR) capsule 25 mg  25 mg Oral QHS    sevelamer carbonate (RENVELA) tab 800 mg  800 mg Oral TID WITH MEALS    simvastatin (ZOCOR) tablet 20 mg  20 mg Oral QHS    sodium chloride (NS) flush 5-10 mL  5-10 mL IntraVENous Q8H    sodium chloride (NS) flush 5-10 mL  5-10 mL IntraVENous PRN    heparin (porcine) injection 5,000 Units  5,000 Units SubCUTAneous Q8H    acetaminophen (TYLENOL) tablet 650 mg  650 mg Oral Q4H PRN    insulin lispro (HUMALOG) injection   SubCUTAneous AC&HS    glucose chewable tablet 16 g  4 Tab Oral PRN    dextrose (D50W) injection syrg 12.5-25 g  12.5-25 g IntraVENous PRN    glucagon (GLUCAGEN) injection 1 mg  1 mg IntraMUSCular PRN     ______________________________________________________________________  EXPECTED LENGTH OF STAY: - - -  ACTUAL LENGTH OF STAY:          2                 Jeremiah Rodriguez MD

## 2018-04-12 VITALS
WEIGHT: 262.3 LBS | DIASTOLIC BLOOD PRESSURE: 61 MMHG | OXYGEN SATURATION: 97 % | HEART RATE: 60 BPM | BODY MASS INDEX: 35.53 KG/M2 | HEIGHT: 72 IN | TEMPERATURE: 97.5 F | RESPIRATION RATE: 13 BRPM | SYSTOLIC BLOOD PRESSURE: 121 MMHG

## 2018-04-12 LAB
GLUCOSE BLD STRIP.AUTO-MCNC: 121 MG/DL (ref 65–100)
GLUCOSE BLD STRIP.AUTO-MCNC: 123 MG/DL (ref 65–100)
SERVICE CMNT-IMP: ABNORMAL
SERVICE CMNT-IMP: ABNORMAL

## 2018-04-12 PROCEDURE — 74011250637 HC RX REV CODE- 250/637: Performed by: HOSPITALIST

## 2018-04-12 PROCEDURE — 82962 GLUCOSE BLOOD TEST: CPT

## 2018-04-12 PROCEDURE — 74011250636 HC RX REV CODE- 250/636: Performed by: HOSPITALIST

## 2018-04-12 RX ADMIN — POLYETHYLENE GLYCOL 3350 17 G: 17 POWDER, FOR SOLUTION ORAL at 08:16

## 2018-04-12 RX ADMIN — HEPARIN SODIUM 5000 UNITS: 5000 INJECTION, SOLUTION INTRAVENOUS; SUBCUTANEOUS at 03:17

## 2018-04-12 RX ADMIN — ASPIRIN 81 MG 81 MG: 81 TABLET ORAL at 08:16

## 2018-04-12 RX ADMIN — CINACALCET HYDROCHLORIDE 30 MG: 30 TABLET, COATED ORAL at 08:16

## 2018-04-12 RX ADMIN — KETOCONAZOLE: 20 CREAM TOPICAL at 08:17

## 2018-04-12 RX ADMIN — FLUOXETINE 20 MG: 20 CAPSULE ORAL at 08:16

## 2018-04-12 RX ADMIN — DOCUSATE SODIUM 100 MG: 100 CAPSULE, LIQUID FILLED ORAL at 08:16

## 2018-04-12 RX ADMIN — SEVELAMER CARBONATE 800 MG: 800 TABLET, FILM COATED ORAL at 08:16

## 2018-04-12 NOTE — PROGRESS NOTES
10:37 AM  CM notified that patient has orders for discharge. Patient to discharge home with home health and family assistance. [address] following patient for services in the community. There are no other CM consults or needs. Mode of transport at time of discharge to be provided by patient's family who is present in room.     JERRY Russo/SIERRA

## 2018-04-12 NOTE — PROGRESS NOTES
I have reviewed discharge instructions with the patient. The patient verbalized understanding. Discharge medications reviewed with patient and appropriate educational materials and side effects teaching were provided. PIV and telemetry removed from patient. Additional instructions of wound care provided. Signed copy of discharge instructions placed on chart. Patient wheeled down by volunteer to transportation home via family.

## 2018-04-14 ENCOUNTER — HOME CARE VISIT (OUTPATIENT)
Dept: SCHEDULING | Facility: HOME HEALTH | Age: 68
End: 2018-04-14
Payer: MEDICARE

## 2018-04-14 VITALS — OXYGEN SATURATION: 97 % | RESPIRATION RATE: 16 BRPM | HEART RATE: 78 BPM | TEMPERATURE: 97.2 F

## 2018-04-14 PROCEDURE — 3331090001 HH PPS REVENUE CREDIT

## 2018-04-14 PROCEDURE — 3331090002 HH PPS REVENUE DEBIT

## 2018-04-14 PROCEDURE — G0151 HHCP-SERV OF PT,EA 15 MIN: HCPCS

## 2018-04-14 PROCEDURE — 400013 HH SOC

## 2018-04-15 PROCEDURE — 3331090002 HH PPS REVENUE DEBIT

## 2018-04-15 PROCEDURE — 3331090001 HH PPS REVENUE CREDIT

## 2018-04-16 ENCOUNTER — HOME CARE VISIT (OUTPATIENT)
Dept: HOME HEALTH SERVICES | Facility: HOME HEALTH | Age: 68
End: 2018-04-16
Payer: MEDICARE

## 2018-04-16 PROCEDURE — 3331090002 HH PPS REVENUE DEBIT

## 2018-04-16 PROCEDURE — 3331090001 HH PPS REVENUE CREDIT

## 2018-04-17 PROCEDURE — 3331090001 HH PPS REVENUE CREDIT

## 2018-04-17 PROCEDURE — 3331090002 HH PPS REVENUE DEBIT

## 2018-04-18 ENCOUNTER — HOME CARE VISIT (OUTPATIENT)
Dept: SCHEDULING | Facility: HOME HEALTH | Age: 68
End: 2018-04-18
Payer: MEDICARE

## 2018-04-18 VITALS
TEMPERATURE: 97.6 F | DIASTOLIC BLOOD PRESSURE: 48 MMHG | HEART RATE: 74 BPM | SYSTOLIC BLOOD PRESSURE: 100 MMHG | OXYGEN SATURATION: 98 % | RESPIRATION RATE: 18 BRPM

## 2018-04-18 PROCEDURE — 3331090002 HH PPS REVENUE DEBIT

## 2018-04-18 PROCEDURE — G0151 HHCP-SERV OF PT,EA 15 MIN: HCPCS

## 2018-04-18 PROCEDURE — 3331090001 HH PPS REVENUE CREDIT

## 2018-04-19 ENCOUNTER — HOME CARE VISIT (OUTPATIENT)
Dept: SCHEDULING | Facility: HOME HEALTH | Age: 68
End: 2018-04-19
Payer: MEDICARE

## 2018-04-19 PROCEDURE — 3331090001 HH PPS REVENUE CREDIT

## 2018-04-19 PROCEDURE — G0299 HHS/HOSPICE OF RN EA 15 MIN: HCPCS

## 2018-04-19 PROCEDURE — 3331090002 HH PPS REVENUE DEBIT

## 2018-04-20 ENCOUNTER — HOME CARE VISIT (OUTPATIENT)
Dept: SCHEDULING | Facility: HOME HEALTH | Age: 68
End: 2018-04-20
Payer: MEDICARE

## 2018-04-20 PROCEDURE — 3331090002 HH PPS REVENUE DEBIT

## 2018-04-20 PROCEDURE — 3331090001 HH PPS REVENUE CREDIT

## 2018-04-21 PROCEDURE — 3331090001 HH PPS REVENUE CREDIT

## 2018-04-21 PROCEDURE — 3331090002 HH PPS REVENUE DEBIT

## 2018-04-22 PROCEDURE — 3331090001 HH PPS REVENUE CREDIT

## 2018-04-22 PROCEDURE — 3331090002 HH PPS REVENUE DEBIT

## 2018-04-23 VITALS
DIASTOLIC BLOOD PRESSURE: 76 MMHG | SYSTOLIC BLOOD PRESSURE: 132 MMHG | OXYGEN SATURATION: 97 % | RESPIRATION RATE: 16 BRPM | TEMPERATURE: 97.3 F | HEART RATE: 82 BPM

## 2018-04-23 PROCEDURE — 3331090002 HH PPS REVENUE DEBIT

## 2018-04-23 PROCEDURE — 3331090001 HH PPS REVENUE CREDIT

## 2018-04-24 ENCOUNTER — HOME CARE VISIT (OUTPATIENT)
Dept: SCHEDULING | Facility: HOME HEALTH | Age: 68
End: 2018-04-24
Payer: MEDICARE

## 2018-04-24 VITALS
HEART RATE: 70 BPM | RESPIRATION RATE: 18 BRPM | DIASTOLIC BLOOD PRESSURE: 60 MMHG | TEMPERATURE: 97.5 F | SYSTOLIC BLOOD PRESSURE: 102 MMHG | OXYGEN SATURATION: 99 %

## 2018-04-24 VITALS
HEART RATE: 90 BPM | RESPIRATION RATE: 20 BRPM | DIASTOLIC BLOOD PRESSURE: 82 MMHG | SYSTOLIC BLOOD PRESSURE: 140 MMHG | OXYGEN SATURATION: 95 % | TEMPERATURE: 97.1 F

## 2018-04-24 PROCEDURE — G0300 HHS/HOSPICE OF LPN EA 15 MIN: HCPCS

## 2018-04-24 PROCEDURE — 3331090001 HH PPS REVENUE CREDIT

## 2018-04-24 PROCEDURE — G0151 HHCP-SERV OF PT,EA 15 MIN: HCPCS

## 2018-04-24 PROCEDURE — 3331090002 HH PPS REVENUE DEBIT

## 2018-04-25 ENCOUNTER — HOME CARE VISIT (OUTPATIENT)
Dept: HOME HEALTH SERVICES | Facility: HOME HEALTH | Age: 68
End: 2018-04-25
Payer: MEDICARE

## 2018-04-25 PROCEDURE — 3331090002 HH PPS REVENUE DEBIT

## 2018-04-25 PROCEDURE — 3331090001 HH PPS REVENUE CREDIT

## 2018-04-26 ENCOUNTER — HOME CARE VISIT (OUTPATIENT)
Dept: SCHEDULING | Facility: HOME HEALTH | Age: 68
End: 2018-04-26
Payer: MEDICARE

## 2018-04-26 PROCEDURE — 3331090001 HH PPS REVENUE CREDIT

## 2018-04-26 PROCEDURE — 3331090002 HH PPS REVENUE DEBIT

## 2018-04-26 PROCEDURE — G0300 HHS/HOSPICE OF LPN EA 15 MIN: HCPCS

## 2018-04-27 PROCEDURE — 3331090002 HH PPS REVENUE DEBIT

## 2018-04-27 PROCEDURE — 3331090001 HH PPS REVENUE CREDIT

## 2018-04-28 PROCEDURE — 3331090001 HH PPS REVENUE CREDIT

## 2018-04-28 PROCEDURE — 3331090002 HH PPS REVENUE DEBIT

## 2018-04-29 VITALS
DIASTOLIC BLOOD PRESSURE: 60 MMHG | SYSTOLIC BLOOD PRESSURE: 108 MMHG | RESPIRATION RATE: 20 BRPM | TEMPERATURE: 97.2 F | HEART RATE: 89 BPM | OXYGEN SATURATION: 98 %

## 2018-04-29 PROCEDURE — 3331090002 HH PPS REVENUE DEBIT

## 2018-04-29 PROCEDURE — 3331090001 HH PPS REVENUE CREDIT

## 2018-04-30 PROCEDURE — 3331090002 HH PPS REVENUE DEBIT

## 2018-04-30 PROCEDURE — 3331090001 HH PPS REVENUE CREDIT

## 2018-05-01 ENCOUNTER — HOME CARE VISIT (OUTPATIENT)
Dept: SCHEDULING | Facility: HOME HEALTH | Age: 68
End: 2018-05-01
Payer: MEDICARE

## 2018-05-01 VITALS
SYSTOLIC BLOOD PRESSURE: 122 MMHG | DIASTOLIC BLOOD PRESSURE: 68 MMHG | OXYGEN SATURATION: 98 % | TEMPERATURE: 98.2 F | RESPIRATION RATE: 18 BRPM | HEART RATE: 68 BPM

## 2018-05-01 VITALS
OXYGEN SATURATION: 68 % | SYSTOLIC BLOOD PRESSURE: 128 MMHG | RESPIRATION RATE: 20 BRPM | TEMPERATURE: 97.3 F | DIASTOLIC BLOOD PRESSURE: 68 MMHG | HEART RATE: 68 BPM

## 2018-05-01 PROCEDURE — 3331090002 HH PPS REVENUE DEBIT

## 2018-05-01 PROCEDURE — G0300 HHS/HOSPICE OF LPN EA 15 MIN: HCPCS

## 2018-05-01 PROCEDURE — G0151 HHCP-SERV OF PT,EA 15 MIN: HCPCS

## 2018-05-01 PROCEDURE — G0152 HHCP-SERV OF OT,EA 15 MIN: HCPCS

## 2018-05-01 PROCEDURE — 3331090001 HH PPS REVENUE CREDIT

## 2018-05-02 VITALS
SYSTOLIC BLOOD PRESSURE: 120 MMHG | RESPIRATION RATE: 18 BRPM | TEMPERATURE: 98.1 F | DIASTOLIC BLOOD PRESSURE: 70 MMHG | HEART RATE: 56 BPM | OXYGEN SATURATION: 97 %

## 2018-05-02 PROCEDURE — 3331090002 HH PPS REVENUE DEBIT

## 2018-05-02 PROCEDURE — 3331090001 HH PPS REVENUE CREDIT

## 2018-05-03 ENCOUNTER — HOME CARE VISIT (OUTPATIENT)
Dept: SCHEDULING | Facility: HOME HEALTH | Age: 68
End: 2018-05-03
Payer: MEDICARE

## 2018-05-03 VITALS
RESPIRATION RATE: 18 BRPM | OXYGEN SATURATION: 96 % | SYSTOLIC BLOOD PRESSURE: 126 MMHG | HEART RATE: 67 BPM | DIASTOLIC BLOOD PRESSURE: 64 MMHG | TEMPERATURE: 99.6 F

## 2018-05-03 PROCEDURE — G0152 HHCP-SERV OF OT,EA 15 MIN: HCPCS

## 2018-05-03 PROCEDURE — 3331090001 HH PPS REVENUE CREDIT

## 2018-05-03 PROCEDURE — 3331090002 HH PPS REVENUE DEBIT

## 2018-05-04 ENCOUNTER — HOME CARE VISIT (OUTPATIENT)
Dept: HOME HEALTH SERVICES | Facility: HOME HEALTH | Age: 68
End: 2018-05-04
Payer: MEDICARE

## 2018-05-04 PROCEDURE — 3331090002 HH PPS REVENUE DEBIT

## 2018-05-04 PROCEDURE — 3331090001 HH PPS REVENUE CREDIT

## 2018-05-05 PROCEDURE — 3331090002 HH PPS REVENUE DEBIT

## 2018-05-05 PROCEDURE — 3331090001 HH PPS REVENUE CREDIT

## 2018-05-06 PROCEDURE — 3331090001 HH PPS REVENUE CREDIT

## 2018-05-06 PROCEDURE — 3331090002 HH PPS REVENUE DEBIT

## 2018-05-07 PROCEDURE — 3331090001 HH PPS REVENUE CREDIT

## 2018-05-07 PROCEDURE — 3331090002 HH PPS REVENUE DEBIT

## 2018-05-08 ENCOUNTER — HOME CARE VISIT (OUTPATIENT)
Dept: SCHEDULING | Facility: HOME HEALTH | Age: 68
End: 2018-05-08
Payer: MEDICARE

## 2018-05-08 VITALS
HEART RATE: 72 BPM | RESPIRATION RATE: 18 BRPM | DIASTOLIC BLOOD PRESSURE: 66 MMHG | TEMPERATURE: 99 F | SYSTOLIC BLOOD PRESSURE: 112 MMHG

## 2018-05-08 VITALS
TEMPERATURE: 98.2 F | DIASTOLIC BLOOD PRESSURE: 60 MMHG | OXYGEN SATURATION: 98 % | SYSTOLIC BLOOD PRESSURE: 110 MMHG | RESPIRATION RATE: 18 BRPM | HEART RATE: 72 BPM

## 2018-05-08 VITALS
HEART RATE: 72 BPM | OXYGEN SATURATION: 98 % | DIASTOLIC BLOOD PRESSURE: 60 MMHG | RESPIRATION RATE: 18 BRPM | TEMPERATURE: 98.2 F | SYSTOLIC BLOOD PRESSURE: 110 MMHG

## 2018-05-08 PROCEDURE — G0152 HHCP-SERV OF OT,EA 15 MIN: HCPCS

## 2018-05-08 PROCEDURE — G0300 HHS/HOSPICE OF LPN EA 15 MIN: HCPCS

## 2018-05-08 PROCEDURE — G0151 HHCP-SERV OF PT,EA 15 MIN: HCPCS

## 2018-05-08 PROCEDURE — 3331090001 HH PPS REVENUE CREDIT

## 2018-05-08 PROCEDURE — 3331090002 HH PPS REVENUE DEBIT

## 2018-05-09 PROCEDURE — 3331090001 HH PPS REVENUE CREDIT

## 2018-05-09 PROCEDURE — 3331090002 HH PPS REVENUE DEBIT

## 2018-05-10 ENCOUNTER — HOME CARE VISIT (OUTPATIENT)
Dept: SCHEDULING | Facility: HOME HEALTH | Age: 68
End: 2018-05-10
Payer: MEDICARE

## 2018-05-10 VITALS
SYSTOLIC BLOOD PRESSURE: 120 MMHG | HEART RATE: 69 BPM | DIASTOLIC BLOOD PRESSURE: 70 MMHG | OXYGEN SATURATION: 99 % | RESPIRATION RATE: 18 BRPM | TEMPERATURE: 97.9 F

## 2018-05-10 VITALS
HEART RATE: 70 BPM | SYSTOLIC BLOOD PRESSURE: 110 MMHG | OXYGEN SATURATION: 98 % | TEMPERATURE: 98.2 F | RESPIRATION RATE: 18 BRPM | DIASTOLIC BLOOD PRESSURE: 64 MMHG

## 2018-05-10 VITALS
OXYGEN SATURATION: 97 % | DIASTOLIC BLOOD PRESSURE: 62 MMHG | SYSTOLIC BLOOD PRESSURE: 116 MMHG | RESPIRATION RATE: 20 BRPM | TEMPERATURE: 97.5 F | HEART RATE: 100 BPM

## 2018-05-10 PROCEDURE — G0300 HHS/HOSPICE OF LPN EA 15 MIN: HCPCS

## 2018-05-10 PROCEDURE — G0152 HHCP-SERV OF OT,EA 15 MIN: HCPCS

## 2018-05-10 PROCEDURE — 3331090002 HH PPS REVENUE DEBIT

## 2018-05-10 PROCEDURE — G0151 HHCP-SERV OF PT,EA 15 MIN: HCPCS

## 2018-05-10 PROCEDURE — 3331090001 HH PPS REVENUE CREDIT

## 2018-05-11 PROCEDURE — 3331090002 HH PPS REVENUE DEBIT

## 2018-05-11 PROCEDURE — 3331090001 HH PPS REVENUE CREDIT

## 2018-05-12 PROCEDURE — 3331090001 HH PPS REVENUE CREDIT

## 2018-05-12 PROCEDURE — 3331090002 HH PPS REVENUE DEBIT

## 2018-05-13 PROCEDURE — 3331090001 HH PPS REVENUE CREDIT

## 2018-05-13 PROCEDURE — 3331090002 HH PPS REVENUE DEBIT

## 2018-05-14 PROCEDURE — 3331090001 HH PPS REVENUE CREDIT

## 2018-05-14 PROCEDURE — 3331090002 HH PPS REVENUE DEBIT

## 2018-05-15 ENCOUNTER — HOME CARE VISIT (OUTPATIENT)
Dept: SCHEDULING | Facility: HOME HEALTH | Age: 68
End: 2018-05-15
Payer: MEDICARE

## 2018-05-15 VITALS
TEMPERATURE: 97.9 F | OXYGEN SATURATION: 99 % | HEART RATE: 72 BPM | RESPIRATION RATE: 18 BRPM | SYSTOLIC BLOOD PRESSURE: 112 MMHG | DIASTOLIC BLOOD PRESSURE: 70 MMHG

## 2018-05-15 VITALS
SYSTOLIC BLOOD PRESSURE: 116 MMHG | HEART RATE: 71 BPM | TEMPERATURE: 97.1 F | DIASTOLIC BLOOD PRESSURE: 66 MMHG | RESPIRATION RATE: 20 BRPM | OXYGEN SATURATION: 95 %

## 2018-05-15 PROCEDURE — 3331090001 HH PPS REVENUE CREDIT

## 2018-05-15 PROCEDURE — G0300 HHS/HOSPICE OF LPN EA 15 MIN: HCPCS

## 2018-05-15 PROCEDURE — 3331090002 HH PPS REVENUE DEBIT

## 2018-05-15 PROCEDURE — G0152 HHCP-SERV OF OT,EA 15 MIN: HCPCS

## 2018-05-15 PROCEDURE — G0151 HHCP-SERV OF PT,EA 15 MIN: HCPCS

## 2018-05-16 VITALS
RESPIRATION RATE: 18 BRPM | TEMPERATURE: 98.8 F | DIASTOLIC BLOOD PRESSURE: 55 MMHG | HEART RATE: 76 BPM | SYSTOLIC BLOOD PRESSURE: 104 MMHG

## 2018-05-16 PROCEDURE — 3331090002 HH PPS REVENUE DEBIT

## 2018-05-16 PROCEDURE — 3331090001 HH PPS REVENUE CREDIT

## 2018-05-17 ENCOUNTER — HOME CARE VISIT (OUTPATIENT)
Dept: SCHEDULING | Facility: HOME HEALTH | Age: 68
End: 2018-05-17
Payer: MEDICARE

## 2018-05-17 VITALS
OXYGEN SATURATION: 95 % | DIASTOLIC BLOOD PRESSURE: 60 MMHG | HEART RATE: 70 BPM | RESPIRATION RATE: 18 BRPM | TEMPERATURE: 97.4 F | SYSTOLIC BLOOD PRESSURE: 104 MMHG

## 2018-05-17 VITALS
RESPIRATION RATE: 18 BRPM | HEART RATE: 72 BPM | SYSTOLIC BLOOD PRESSURE: 106 MMHG | DIASTOLIC BLOOD PRESSURE: 64 MMHG | TEMPERATURE: 98.2 F

## 2018-05-17 PROCEDURE — G0151 HHCP-SERV OF PT,EA 15 MIN: HCPCS

## 2018-05-17 PROCEDURE — 3331090001 HH PPS REVENUE CREDIT

## 2018-05-17 PROCEDURE — 3331090002 HH PPS REVENUE DEBIT

## 2018-05-17 PROCEDURE — G0152 HHCP-SERV OF OT,EA 15 MIN: HCPCS

## 2018-05-17 PROCEDURE — G0300 HHS/HOSPICE OF LPN EA 15 MIN: HCPCS

## 2018-05-18 PROCEDURE — 3331090001 HH PPS REVENUE CREDIT

## 2018-05-18 PROCEDURE — 3331090002 HH PPS REVENUE DEBIT

## 2018-05-19 PROCEDURE — 3331090002 HH PPS REVENUE DEBIT

## 2018-05-19 PROCEDURE — 3331090001 HH PPS REVENUE CREDIT

## 2018-05-20 PROCEDURE — 3331090001 HH PPS REVENUE CREDIT

## 2018-05-20 PROCEDURE — 3331090002 HH PPS REVENUE DEBIT

## 2018-05-21 VITALS
TEMPERATURE: 98.2 F | HEART RATE: 72 BPM | SYSTOLIC BLOOD PRESSURE: 106 MMHG | RESPIRATION RATE: 18 BRPM | DIASTOLIC BLOOD PRESSURE: 64 MMHG

## 2018-05-21 PROCEDURE — 3331090001 HH PPS REVENUE CREDIT

## 2018-05-21 PROCEDURE — 3331090002 HH PPS REVENUE DEBIT

## 2018-05-22 ENCOUNTER — HOME CARE VISIT (OUTPATIENT)
Dept: HOME HEALTH SERVICES | Facility: HOME HEALTH | Age: 68
End: 2018-05-22
Payer: MEDICARE

## 2018-05-22 PROCEDURE — A9270 NON-COVERED ITEM OR SERVICE: HCPCS

## 2018-05-22 PROCEDURE — 3331090001 HH PPS REVENUE CREDIT

## 2018-05-22 PROCEDURE — 3331090002 HH PPS REVENUE DEBIT

## 2018-05-23 ENCOUNTER — HOME CARE VISIT (OUTPATIENT)
Dept: SCHEDULING | Facility: HOME HEALTH | Age: 68
End: 2018-05-23
Payer: MEDICARE

## 2018-05-23 PROCEDURE — 3331090002 HH PPS REVENUE DEBIT

## 2018-05-23 PROCEDURE — 3331090001 HH PPS REVENUE CREDIT

## 2018-05-24 ENCOUNTER — HOME CARE VISIT (OUTPATIENT)
Dept: SCHEDULING | Facility: HOME HEALTH | Age: 68
End: 2018-05-24
Payer: MEDICARE

## 2018-05-24 VITALS
HEART RATE: 81 BPM | TEMPERATURE: 97.9 F | SYSTOLIC BLOOD PRESSURE: 150 MMHG | OXYGEN SATURATION: 96 % | RESPIRATION RATE: 20 BRPM | DIASTOLIC BLOOD PRESSURE: 80 MMHG

## 2018-05-24 PROCEDURE — 3331090001 HH PPS REVENUE CREDIT

## 2018-05-24 PROCEDURE — 3331090002 HH PPS REVENUE DEBIT

## 2018-05-24 PROCEDURE — G0300 HHS/HOSPICE OF LPN EA 15 MIN: HCPCS

## 2018-05-25 ENCOUNTER — HOME CARE VISIT (OUTPATIENT)
Dept: SCHEDULING | Facility: HOME HEALTH | Age: 68
End: 2018-05-25
Payer: MEDICARE

## 2018-05-25 PROCEDURE — 3331090002 HH PPS REVENUE DEBIT

## 2018-05-25 PROCEDURE — 3331090001 HH PPS REVENUE CREDIT

## 2018-05-26 PROCEDURE — 3331090002 HH PPS REVENUE DEBIT

## 2018-05-26 PROCEDURE — 3331090001 HH PPS REVENUE CREDIT

## 2018-05-27 PROCEDURE — 3331090001 HH PPS REVENUE CREDIT

## 2018-05-27 PROCEDURE — 3331090002 HH PPS REVENUE DEBIT

## 2018-05-28 PROCEDURE — 3331090002 HH PPS REVENUE DEBIT

## 2018-05-28 PROCEDURE — 3331090001 HH PPS REVENUE CREDIT

## 2018-05-29 ENCOUNTER — HOME CARE VISIT (OUTPATIENT)
Dept: SCHEDULING | Facility: HOME HEALTH | Age: 68
End: 2018-05-29
Payer: MEDICARE

## 2018-05-29 VITALS
DIASTOLIC BLOOD PRESSURE: 80 MMHG | RESPIRATION RATE: 20 BRPM | TEMPERATURE: 97.7 F | SYSTOLIC BLOOD PRESSURE: 130 MMHG | HEART RATE: 76 BPM | OXYGEN SATURATION: 94 %

## 2018-05-29 PROCEDURE — 3331090001 HH PPS REVENUE CREDIT

## 2018-05-29 PROCEDURE — G0300 HHS/HOSPICE OF LPN EA 15 MIN: HCPCS

## 2018-05-29 PROCEDURE — 3331090002 HH PPS REVENUE DEBIT

## 2018-05-30 PROCEDURE — 3331090001 HH PPS REVENUE CREDIT

## 2018-05-30 PROCEDURE — 3331090002 HH PPS REVENUE DEBIT

## 2018-05-31 ENCOUNTER — HOME CARE VISIT (OUTPATIENT)
Dept: SCHEDULING | Facility: HOME HEALTH | Age: 68
End: 2018-05-31
Payer: MEDICARE

## 2018-05-31 VITALS
DIASTOLIC BLOOD PRESSURE: 58 MMHG | HEART RATE: 80 BPM | TEMPERATURE: 97.7 F | OXYGEN SATURATION: 97 % | RESPIRATION RATE: 18 BRPM | SYSTOLIC BLOOD PRESSURE: 108 MMHG

## 2018-05-31 VITALS
DIASTOLIC BLOOD PRESSURE: 58 MMHG | HEART RATE: 80 BPM | SYSTOLIC BLOOD PRESSURE: 108 MMHG | RESPIRATION RATE: 18 BRPM | TEMPERATURE: 97.7 F | OXYGEN SATURATION: 97 %

## 2018-05-31 PROCEDURE — 3331090001 HH PPS REVENUE CREDIT

## 2018-05-31 PROCEDURE — G0300 HHS/HOSPICE OF LPN EA 15 MIN: HCPCS

## 2018-05-31 PROCEDURE — 3331090002 HH PPS REVENUE DEBIT

## 2018-05-31 PROCEDURE — G0151 HHCP-SERV OF PT,EA 15 MIN: HCPCS

## 2018-06-01 PROCEDURE — 3331090002 HH PPS REVENUE DEBIT

## 2018-06-01 PROCEDURE — 3331090001 HH PPS REVENUE CREDIT

## 2018-06-02 PROCEDURE — 3331090001 HH PPS REVENUE CREDIT

## 2018-06-02 PROCEDURE — 3331090002 HH PPS REVENUE DEBIT

## 2018-06-03 PROCEDURE — 3331090002 HH PPS REVENUE DEBIT

## 2018-06-03 PROCEDURE — 3331090001 HH PPS REVENUE CREDIT

## 2018-06-04 PROCEDURE — 3331090002 HH PPS REVENUE DEBIT

## 2018-06-04 PROCEDURE — 3331090001 HH PPS REVENUE CREDIT

## 2018-06-05 ENCOUNTER — HOME CARE VISIT (OUTPATIENT)
Dept: SCHEDULING | Facility: HOME HEALTH | Age: 68
End: 2018-06-05
Payer: MEDICARE

## 2018-06-05 VITALS
TEMPERATURE: 97.5 F | OXYGEN SATURATION: 97 % | RESPIRATION RATE: 18 BRPM | DIASTOLIC BLOOD PRESSURE: 60 MMHG | SYSTOLIC BLOOD PRESSURE: 102 MMHG | HEART RATE: 81 BPM

## 2018-06-05 VITALS
RESPIRATION RATE: 20 BRPM | DIASTOLIC BLOOD PRESSURE: 60 MMHG | HEART RATE: 73 BPM | SYSTOLIC BLOOD PRESSURE: 112 MMHG | TEMPERATURE: 97.4 F | OXYGEN SATURATION: 95 %

## 2018-06-05 PROCEDURE — 3331090002 HH PPS REVENUE DEBIT

## 2018-06-05 PROCEDURE — G0300 HHS/HOSPICE OF LPN EA 15 MIN: HCPCS

## 2018-06-05 PROCEDURE — 3331090001 HH PPS REVENUE CREDIT

## 2018-06-05 PROCEDURE — G0151 HHCP-SERV OF PT,EA 15 MIN: HCPCS

## 2018-06-05 PROCEDURE — E0700 SAFETY EQUIPMENT: HCPCS

## 2018-06-06 ENCOUNTER — HOME CARE VISIT (OUTPATIENT)
Dept: SCHEDULING | Facility: HOME HEALTH | Age: 68
End: 2018-06-06
Payer: MEDICARE

## 2018-06-06 VITALS
DIASTOLIC BLOOD PRESSURE: 80 MMHG | RESPIRATION RATE: 18 BRPM | SYSTOLIC BLOOD PRESSURE: 110 MMHG | TEMPERATURE: 98.6 F | HEART RATE: 90 BPM | OXYGEN SATURATION: 99 %

## 2018-06-06 PROCEDURE — A9270 NON-COVERED ITEM OR SERVICE: HCPCS

## 2018-06-06 PROCEDURE — G0151 HHCP-SERV OF PT,EA 15 MIN: HCPCS

## 2018-06-06 PROCEDURE — 3331090001 HH PPS REVENUE CREDIT

## 2018-06-06 PROCEDURE — 3331090002 HH PPS REVENUE DEBIT

## 2018-06-07 ENCOUNTER — HOME CARE VISIT (OUTPATIENT)
Dept: SCHEDULING | Facility: HOME HEALTH | Age: 68
End: 2018-06-07
Payer: MEDICARE

## 2018-06-07 PROCEDURE — 3331090002 HH PPS REVENUE DEBIT

## 2018-06-07 PROCEDURE — G0300 HHS/HOSPICE OF LPN EA 15 MIN: HCPCS

## 2018-06-07 PROCEDURE — 3331090001 HH PPS REVENUE CREDIT

## 2018-06-08 PROCEDURE — 3331090002 HH PPS REVENUE DEBIT

## 2018-06-08 PROCEDURE — 3331090001 HH PPS REVENUE CREDIT

## 2018-06-09 PROCEDURE — 3331090002 HH PPS REVENUE DEBIT

## 2018-06-09 PROCEDURE — 3331090001 HH PPS REVENUE CREDIT

## 2018-06-10 PROCEDURE — 3331090001 HH PPS REVENUE CREDIT

## 2018-06-10 PROCEDURE — 3331090002 HH PPS REVENUE DEBIT

## 2018-06-11 VITALS
DIASTOLIC BLOOD PRESSURE: 58 MMHG | TEMPERATURE: 97.6 F | OXYGEN SATURATION: 97 % | HEART RATE: 90 BPM | RESPIRATION RATE: 20 BRPM | SYSTOLIC BLOOD PRESSURE: 104 MMHG

## 2018-06-11 PROCEDURE — 3331090002 HH PPS REVENUE DEBIT

## 2018-06-11 PROCEDURE — 3331090001 HH PPS REVENUE CREDIT

## 2018-06-12 ENCOUNTER — HOME CARE VISIT (OUTPATIENT)
Dept: SCHEDULING | Facility: HOME HEALTH | Age: 68
End: 2018-06-12
Payer: MEDICARE

## 2018-06-12 PROCEDURE — 3331090003 HH PPS REVENUE ADJ

## 2018-06-12 PROCEDURE — 3331090002 HH PPS REVENUE DEBIT

## 2018-06-12 PROCEDURE — 3331090001 HH PPS REVENUE CREDIT

## 2018-06-12 PROCEDURE — G0299 HHS/HOSPICE OF RN EA 15 MIN: HCPCS

## 2018-06-13 VITALS
HEART RATE: 78 BPM | TEMPERATURE: 97.8 F | SYSTOLIC BLOOD PRESSURE: 112 MMHG | RESPIRATION RATE: 18 BRPM | DIASTOLIC BLOOD PRESSURE: 62 MMHG

## 2018-06-13 PROCEDURE — 3331090002 HH PPS REVENUE DEBIT

## 2018-06-13 PROCEDURE — 3331090001 HH PPS REVENUE CREDIT

## 2018-06-14 ENCOUNTER — HOME CARE VISIT (OUTPATIENT)
Dept: HOME HEALTH SERVICES | Facility: HOME HEALTH | Age: 68
End: 2018-06-14
Payer: MEDICARE

## 2018-06-14 PROCEDURE — 3331090002 HH PPS REVENUE DEBIT

## 2018-06-14 PROCEDURE — 3331090001 HH PPS REVENUE CREDIT

## 2018-06-14 PROCEDURE — 400014 HH F/U

## 2018-06-14 PROCEDURE — G0300 HHS/HOSPICE OF LPN EA 15 MIN: HCPCS

## 2018-06-15 PROCEDURE — 3331090001 HH PPS REVENUE CREDIT

## 2018-06-15 PROCEDURE — 3331090002 HH PPS REVENUE DEBIT

## 2018-06-16 PROCEDURE — 3331090002 HH PPS REVENUE DEBIT

## 2018-06-16 PROCEDURE — 3331090001 HH PPS REVENUE CREDIT

## 2018-06-17 VITALS
RESPIRATION RATE: 20 BRPM | SYSTOLIC BLOOD PRESSURE: 90 MMHG | HEART RATE: 107 BPM | TEMPERATURE: 97.4 F | OXYGEN SATURATION: 97 % | DIASTOLIC BLOOD PRESSURE: 60 MMHG

## 2018-06-17 PROCEDURE — 3331090002 HH PPS REVENUE DEBIT

## 2018-06-17 PROCEDURE — 3331090001 HH PPS REVENUE CREDIT

## 2018-06-18 PROCEDURE — 3331090002 HH PPS REVENUE DEBIT

## 2018-06-18 PROCEDURE — 3331090001 HH PPS REVENUE CREDIT

## 2018-06-19 ENCOUNTER — HOME CARE VISIT (OUTPATIENT)
Dept: SCHEDULING | Facility: HOME HEALTH | Age: 68
End: 2018-06-19
Payer: MEDICARE

## 2018-06-19 PROCEDURE — G0300 HHS/HOSPICE OF LPN EA 15 MIN: HCPCS

## 2018-06-19 PROCEDURE — 3331090002 HH PPS REVENUE DEBIT

## 2018-06-19 PROCEDURE — 3331090001 HH PPS REVENUE CREDIT

## 2018-06-20 VITALS
OXYGEN SATURATION: 97 % | RESPIRATION RATE: 18 BRPM | TEMPERATURE: 97.1 F | HEART RATE: 70 BPM | DIASTOLIC BLOOD PRESSURE: 68 MMHG | SYSTOLIC BLOOD PRESSURE: 122 MMHG

## 2018-06-20 PROCEDURE — 3331090002 HH PPS REVENUE DEBIT

## 2018-06-20 PROCEDURE — 3331090001 HH PPS REVENUE CREDIT

## 2018-06-21 ENCOUNTER — HOME CARE VISIT (OUTPATIENT)
Dept: SCHEDULING | Facility: HOME HEALTH | Age: 68
End: 2018-06-21
Payer: MEDICARE

## 2018-06-21 PROCEDURE — 3331090001 HH PPS REVENUE CREDIT

## 2018-06-21 PROCEDURE — 3331090002 HH PPS REVENUE DEBIT

## 2018-06-21 PROCEDURE — G0300 HHS/HOSPICE OF LPN EA 15 MIN: HCPCS

## 2018-06-22 PROCEDURE — 3331090002 HH PPS REVENUE DEBIT

## 2018-06-22 PROCEDURE — 3331090001 HH PPS REVENUE CREDIT

## 2018-06-23 PROCEDURE — 3331090001 HH PPS REVENUE CREDIT

## 2018-06-23 PROCEDURE — 3331090002 HH PPS REVENUE DEBIT

## 2018-06-24 PROCEDURE — 3331090002 HH PPS REVENUE DEBIT

## 2018-06-24 PROCEDURE — 3331090001 HH PPS REVENUE CREDIT

## 2018-06-25 PROCEDURE — 3331090002 HH PPS REVENUE DEBIT

## 2018-06-25 PROCEDURE — 3331090001 HH PPS REVENUE CREDIT

## 2018-06-26 ENCOUNTER — HOME CARE VISIT (OUTPATIENT)
Dept: SCHEDULING | Facility: HOME HEALTH | Age: 68
End: 2018-06-26
Payer: MEDICARE

## 2018-06-26 VITALS
HEART RATE: 87 BPM | DIASTOLIC BLOOD PRESSURE: 64 MMHG | RESPIRATION RATE: 18 BRPM | OXYGEN SATURATION: 96 % | SYSTOLIC BLOOD PRESSURE: 110 MMHG | TEMPERATURE: 97.3 F

## 2018-06-26 PROCEDURE — 3331090002 HH PPS REVENUE DEBIT

## 2018-06-26 PROCEDURE — A6240 HYDROCOLLD DRG FILLER PASTE: HCPCS

## 2018-06-26 PROCEDURE — 3331090001 HH PPS REVENUE CREDIT

## 2018-06-26 PROCEDURE — G0300 HHS/HOSPICE OF LPN EA 15 MIN: HCPCS

## 2018-06-27 ENCOUNTER — APPOINTMENT (OUTPATIENT)
Dept: GENERAL RADIOLOGY | Age: 68
End: 2018-06-27
Attending: NURSE PRACTITIONER
Payer: MEDICARE

## 2018-06-27 ENCOUNTER — HOME CARE VISIT (OUTPATIENT)
Dept: HOME HEALTH SERVICES | Facility: HOME HEALTH | Age: 68
End: 2018-06-27
Payer: MEDICARE

## 2018-06-27 ENCOUNTER — HOSPITAL ENCOUNTER (EMERGENCY)
Age: 68
Discharge: HOME | End: 2018-06-27
Attending: STUDENT IN AN ORGANIZED HEALTH CARE EDUCATION/TRAINING PROGRAM
Payer: MEDICARE

## 2018-06-27 VITALS
OXYGEN SATURATION: 98 % | TEMPERATURE: 98.3 F | WEIGHT: 251.32 LBS | SYSTOLIC BLOOD PRESSURE: 115 MMHG | BODY MASS INDEX: 34.04 KG/M2 | HEART RATE: 101 BPM | RESPIRATION RATE: 20 BRPM | HEIGHT: 72 IN | DIASTOLIC BLOOD PRESSURE: 65 MMHG

## 2018-06-27 DIAGNOSIS — E11.42 DIABETIC POLYNEUROPATHY ASSOCIATED WITH TYPE 2 DIABETES MELLITUS (HCC): Primary | ICD-10-CM

## 2018-06-27 LAB
ALBUMIN SERPL-MCNC: 2.9 G/DL (ref 3.5–5)
ALBUMIN/GLOB SERPL: 0.4 {RATIO} (ref 1.1–2.2)
ALP SERPL-CCNC: 268 U/L (ref 45–117)
ALT SERPL-CCNC: 33 U/L (ref 12–78)
ANION GAP SERPL CALC-SCNC: 12 MMOL/L (ref 5–15)
AST SERPL-CCNC: 25 U/L (ref 15–37)
BASOPHILS # BLD: 0.1 K/UL (ref 0–0.1)
BASOPHILS NFR BLD: 1 % (ref 0–1)
BILIRUB SERPL-MCNC: 0.3 MG/DL (ref 0.2–1)
BUN SERPL-MCNC: 31 MG/DL (ref 6–20)
BUN/CREAT SERPL: 6 (ref 12–20)
CALCIUM SERPL-MCNC: 8.3 MG/DL (ref 8.5–10.1)
CHLORIDE SERPL-SCNC: 91 MMOL/L (ref 97–108)
CO2 SERPL-SCNC: 28 MMOL/L (ref 21–32)
CREAT SERPL-MCNC: 5.15 MG/DL (ref 0.55–1.02)
DIFFERENTIAL METHOD BLD: ABNORMAL
EOSINOPHIL # BLD: 0.1 K/UL (ref 0–0.4)
EOSINOPHIL NFR BLD: 2 % (ref 0–7)
ERYTHROCYTE [DISTWIDTH] IN BLOOD BY AUTOMATED COUNT: 15.4 % (ref 11.5–14.5)
GLOBULIN SER CALC-MCNC: 7 G/DL (ref 2–4)
GLUCOSE SERPL-MCNC: 292 MG/DL (ref 65–100)
HCT VFR BLD AUTO: 38.1 % (ref 35–47)
HGB BLD-MCNC: 11.5 G/DL (ref 11.5–16)
IMM GRANULOCYTES # BLD: 0 K/UL (ref 0–0.04)
IMM GRANULOCYTES NFR BLD AUTO: 0 % (ref 0–0.5)
LYMPHOCYTES # BLD: 1.9 K/UL (ref 0.8–3.5)
LYMPHOCYTES NFR BLD: 24 % (ref 12–49)
MAGNESIUM SERPL-MCNC: 2 MG/DL (ref 1.6–2.4)
MCH RBC QN AUTO: 27.8 PG (ref 26–34)
MCHC RBC AUTO-ENTMCNC: 30.2 G/DL (ref 30–36.5)
MCV RBC AUTO: 92.3 FL (ref 80–99)
MONOCYTES # BLD: 0.8 K/UL (ref 0–1)
MONOCYTES NFR BLD: 10 % (ref 5–13)
NEUTS SEG # BLD: 5.1 K/UL (ref 1.8–8)
NEUTS SEG NFR BLD: 64 % (ref 32–75)
NRBC # BLD: 0 K/UL (ref 0–0.01)
NRBC BLD-RTO: 0 PER 100 WBC
PLATELET # BLD AUTO: 238 K/UL (ref 150–400)
PMV BLD AUTO: 9.4 FL (ref 8.9–12.9)
POTASSIUM SERPL-SCNC: 3.5 MMOL/L (ref 3.5–5.1)
PROT SERPL-MCNC: 9.9 G/DL (ref 6.4–8.2)
RBC # BLD AUTO: 4.13 M/UL (ref 3.8–5.2)
SODIUM SERPL-SCNC: 131 MMOL/L (ref 136–145)
WBC # BLD AUTO: 8 K/UL (ref 3.6–11)

## 2018-06-27 PROCEDURE — 83735 ASSAY OF MAGNESIUM: CPT | Performed by: NURSE PRACTITIONER

## 2018-06-27 PROCEDURE — 80053 COMPREHEN METABOLIC PANEL: CPT | Performed by: NURSE PRACTITIONER

## 2018-06-27 PROCEDURE — 36415 COLL VENOUS BLD VENIPUNCTURE: CPT | Performed by: NURSE PRACTITIONER

## 2018-06-27 PROCEDURE — 73610 X-RAY EXAM OF ANKLE: CPT

## 2018-06-27 PROCEDURE — 99282 EMERGENCY DEPT VISIT SF MDM: CPT

## 2018-06-27 PROCEDURE — 85025 COMPLETE CBC W/AUTO DIFF WBC: CPT | Performed by: NURSE PRACTITIONER

## 2018-06-27 PROCEDURE — 73590 X-RAY EXAM OF LOWER LEG: CPT

## 2018-06-27 PROCEDURE — 3331090001 HH PPS REVENUE CREDIT

## 2018-06-27 PROCEDURE — 3331090002 HH PPS REVENUE DEBIT

## 2018-06-27 RX ORDER — CAPSAICIN 0.07 G/100G
CREAM TOPICAL 3 TIMES DAILY
Qty: 60 G | Refills: 0 | Status: SHIPPED | OUTPATIENT
Start: 2018-06-27 | End: 2018-07-02

## 2018-06-27 RX ORDER — CAPSAICIN 0.07 G/100G
CREAM TOPICAL 3 TIMES DAILY
Qty: 60 G | Refills: 0 | Status: SHIPPED | OUTPATIENT
Start: 2018-06-27 | End: 2018-06-27
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
## 2018-06-27 NOTE — ED NOTES
11:17 AM  I have evaluated the patient as the Provider in Triage. I have reviewed Her vital signs and the triage nurse assessment. I have talked with the patient and any available family and advised that I am the provider in triage and have ordered the appropriate study to initiate their work up based on the clinical presentation during my assessment. I have advised that the patient will be accommodated in the Main ED as soon as possible. I have also requested to contact the triage nurse or myself immediately if the patient experiences any changes in their condition during this brief waiting period. Came from dialysis. Swollen left ankle & pain. Stepped off curb and had a sudden onset of pain yesterday. Difficulty with weight bearing. Also feels fatigued. Hand swelling and pain    Films of left ankle & tib/fib. Labs due to multiple areas of pain.      Isabel Wong, NP

## 2018-06-27 NOTE — DISCHARGE INSTRUCTIONS
Diabetic Neuropathy: Care Instructions  Your Care Instructions    When you have diabetes, your blood sugar level may get too high. Over time, high blood sugar levels can damage nerves. This is called diabetic neuropathy. Nerve damage can cause pain, burning, tingling, and numbness and may leave you feeling weak. The feet are often affected. When you have nerve damage in your feet, you cannot feel your feet and toes as well as normal and may not notice cuts or sores. Even a small injury can lead to a serious infection. It is very important that you follow your doctor's advice on foot care. Sometimes diabetes damages nerves that help the body function. If this happens, your blood pressure, sweating, digestion, and urination might be affected. Your doctor may give you a target blood sugar level that is higher or lower than you are used to. Try to keep your blood sugar very close to this target level to prevent more damage. Follow-up care is a key part of your treatment and safety. Be sure to make and go to all appointments, and call your doctor if you are having problems. It's also a good idea to know your test results and keep a list of the medicines you take. How can you care for yourself at home? · Take your medicines exactly as prescribed. Call your doctor if you think you are having a problem with your medicine. It is very important that you take your insulin or diabetes pills as your doctor tells you. · Try to keep blood sugar at your target level. ¨ Eat a variety of healthy foods, with carbohydrate spread out in your meals. A dietitian can help you plan meals. ¨ Try to get at least 30 minutes of exercise on most days. ¨ Check your blood sugar as many times each day as your doctor recommends. · Take and record your blood pressure at home if your doctor tells you to. Learn the importance of the two measures of blood pressure (such as 130 over 80, or 130/80).  To take your blood pressure at home:  ¨ Ask your doctor to check your blood pressure monitor to be sure it is accurate and the cuff fits you. Also ask your doctor to watch you to make sure that you are using it right. ¨ Do not use medicine known to raise blood pressure (such as some nasal decongestant sprays) before taking your blood pressure. ¨ Avoid taking your blood pressure if you have just exercised or are nervous or upset. Rest at least 15 minutes before you take a reading. · Take pain medicines exactly as directed. ¨ If the doctor gave you a prescription medicine for pain, take it as prescribed. ¨ If you are not taking a prescription pain medicine, ask your doctor if you can take an over-the-counter medicine. · Do not smoke. Smoking can increase your chance for a heart attack or stroke. If you need help quitting, talk to your doctor about stop-smoking programs and medicines. These can increase your chances of quitting for good. · Limit alcohol to 2 drinks a day for men and 1 drink a day for women. Too much alcohol can cause health problems. · Eat small meals often, rather than 2 or 3 large meals a day. To care for your feet  · Prevent injury by wearing shoes at all times, even when you are indoors. · Do foot care as part of your daily routine. Wash your feet and then rub lotion on your feet, but not between your toes. Use a handheld mirror or magnifying mirror to inspect your feet for blisters, cuts, cracks, or sores. · Have your toenails trimmed and filed straight across. · Wear shoes and socks that fit well. Soft shoes that have good support and that fit well (such as tennis shoes) are best for your feet. · Check your shoes for any loose objects or rough edges before you put them on. · Ask your doctor to check your feet during each visit. Your doctor may notice a foot problem you have missed. · Get early treatment for any foot problem, even a minor one. When should you call for help?   Call your doctor now or seek immediate medical care if:  ? · You have symptoms of infection, such as:  ¨ Increased pain, swelling, warmth, or redness. ¨ Red streaks leading from the area. ¨ Pus draining from the area. ¨ A fever. ? · You have new or worse numbness, pain, or tingling in any part of your body. ? Watch closely for changes in your health, and be sure to contact your doctor if:  ? · You have a new problem with your feet, such as:  ¨ A new sore or ulcer. ¨ A break in the skin that is not healing after several days. ¨ Bleeding corns or calluses. ¨ An ingrown toenail. ? · You do not get better as expected. Where can you learn more? Go to http://brandan-liang.info/. Enter Q677 in the search box to learn more about \"Diabetic Neuropathy: Care Instructions. \"  Current as of: March 13, 2017  Content Version: 11.4  © 2226-6129 [employer]. Care instructions adapted under license by [employer] (which disclaims liability or warranty for this information). If you have questions about a medical condition or this instruction, always ask your healthcare professional. [address] any warranty or liability for your use of this information.

## 2018-06-27 NOTE — ED TRIAGE NOTES
Triage Note: Patient is coming in with left ankle pain and swelling. Patient stepped off a curb wrong and had had pain since then.

## 2018-06-28 ENCOUNTER — HOME CARE VISIT (OUTPATIENT)
Dept: SCHEDULING | Facility: HOME HEALTH | Age: 68
End: 2018-06-28
Payer: MEDICARE

## 2018-06-28 VITALS
TEMPERATURE: 97.3 F | SYSTOLIC BLOOD PRESSURE: 116 MMHG | HEART RATE: 80 BPM | RESPIRATION RATE: 20 BRPM | OXYGEN SATURATION: 96 % | DIASTOLIC BLOOD PRESSURE: 60 MMHG

## 2018-06-28 PROCEDURE — 3331090002 HH PPS REVENUE DEBIT

## 2018-06-28 PROCEDURE — 3331090001 HH PPS REVENUE CREDIT

## 2018-06-28 PROCEDURE — G0300 HHS/HOSPICE OF LPN EA 15 MIN: HCPCS

## 2018-06-29 PROCEDURE — 3331090001 HH PPS REVENUE CREDIT

## 2018-06-29 PROCEDURE — 3331090002 HH PPS REVENUE DEBIT

## 2018-06-30 ENCOUNTER — HOME CARE VISIT (OUTPATIENT)
Dept: SCHEDULING | Facility: HOME HEALTH | Age: 68
End: 2018-06-30
Payer: MEDICARE

## 2018-06-30 VITALS
RESPIRATION RATE: 20 BRPM | DIASTOLIC BLOOD PRESSURE: 60 MMHG | OXYGEN SATURATION: 97 % | SYSTOLIC BLOOD PRESSURE: 104 MMHG | TEMPERATURE: 97.1 F | HEART RATE: 82 BPM

## 2018-06-30 VITALS
TEMPERATURE: 97.7 F | SYSTOLIC BLOOD PRESSURE: 140 MMHG | HEART RATE: 90 BPM | DIASTOLIC BLOOD PRESSURE: 60 MMHG | RESPIRATION RATE: 18 BRPM

## 2018-06-30 PROCEDURE — G0300 HHS/HOSPICE OF LPN EA 15 MIN: HCPCS

## 2018-06-30 PROCEDURE — 3331090002 HH PPS REVENUE DEBIT

## 2018-06-30 PROCEDURE — 3331090001 HH PPS REVENUE CREDIT

## 2018-07-01 PROCEDURE — 3331090001 HH PPS REVENUE CREDIT

## 2018-07-01 PROCEDURE — 3331090002 HH PPS REVENUE DEBIT

## 2018-07-02 ENCOUNTER — HOME CARE VISIT (OUTPATIENT)
Dept: SCHEDULING | Facility: HOME HEALTH | Age: 68
End: 2018-07-02
Payer: MEDICARE

## 2018-07-02 PROCEDURE — 3331090001 HH PPS REVENUE CREDIT

## 2018-07-02 PROCEDURE — 3331090002 HH PPS REVENUE DEBIT

## 2018-07-02 PROCEDURE — G0300 HHS/HOSPICE OF LPN EA 15 MIN: HCPCS

## 2018-07-03 VITALS
SYSTOLIC BLOOD PRESSURE: 118 MMHG | RESPIRATION RATE: 20 BRPM | TEMPERATURE: 97.8 F | DIASTOLIC BLOOD PRESSURE: 78 MMHG | HEART RATE: 88 BPM | OXYGEN SATURATION: 95 %

## 2018-07-03 PROCEDURE — 3331090001 HH PPS REVENUE CREDIT

## 2018-07-03 PROCEDURE — 3331090002 HH PPS REVENUE DEBIT

## 2018-07-04 ENCOUNTER — HOME CARE VISIT (OUTPATIENT)
Dept: SCHEDULING | Facility: HOME HEALTH | Age: 68
End: 2018-07-04
Payer: MEDICARE

## 2018-07-04 PROCEDURE — G0300 HHS/HOSPICE OF LPN EA 15 MIN: HCPCS

## 2018-07-04 PROCEDURE — 3331090002 HH PPS REVENUE DEBIT

## 2018-07-04 PROCEDURE — 3331090001 HH PPS REVENUE CREDIT

## 2018-07-05 PROCEDURE — 3331090001 HH PPS REVENUE CREDIT

## 2018-07-05 PROCEDURE — 3331090002 HH PPS REVENUE DEBIT

## 2018-07-06 ENCOUNTER — HOME CARE VISIT (OUTPATIENT)
Dept: SCHEDULING | Facility: HOME HEALTH | Age: 68
End: 2018-07-06
Payer: MEDICARE

## 2018-07-06 ENCOUNTER — HOSPITAL ENCOUNTER (INPATIENT)
Age: 68
LOS: 13 days | Discharge: NURSING, REHAB OR LONG-TERM CARE | DRG: 628 | End: 2018-07-19
Attending: EMERGENCY MEDICINE | Admitting: FAMILY MEDICINE
Payer: MEDICARE

## 2018-07-06 ENCOUNTER — HOME CARE VISIT (OUTPATIENT)
Dept: HOME HEALTH SERVICES | Facility: HOME HEALTH | Age: 68
End: 2018-07-06
Payer: MEDICARE

## 2018-07-06 DIAGNOSIS — M86.171 OSTEOMYELITIS OF FOOT, RIGHT, ACUTE (HCC): ICD-10-CM

## 2018-07-06 DIAGNOSIS — L03.119 CELLULITIS OF LOWER EXTREMITY, UNSPECIFIED LATERALITY: Primary | ICD-10-CM

## 2018-07-06 PROBLEM — R79.89 LFT ELEVATION: Status: ACTIVE | Noted: 2018-07-06

## 2018-07-06 PROBLEM — L03.115 BILATERAL LOWER LEG CELLULITIS: Status: ACTIVE | Noted: 2018-07-06

## 2018-07-06 PROBLEM — L03.116 BILATERAL LOWER LEG CELLULITIS: Status: ACTIVE | Noted: 2018-07-06

## 2018-07-06 PROBLEM — N18.6 ESRD (END STAGE RENAL DISEASE) (HCC): Status: ACTIVE | Noted: 2018-07-06

## 2018-07-06 PROBLEM — R60.0 BILATERAL LEG EDEMA: Status: ACTIVE | Noted: 2018-07-06

## 2018-07-06 PROBLEM — D64.9 ANEMIA: Status: ACTIVE | Noted: 2018-07-06

## 2018-07-06 PROBLEM — E11.65 TYPE 2 DIABETES MELLITUS WITH HYPERGLYCEMIA (HCC): Status: ACTIVE | Noted: 2018-07-06

## 2018-07-06 LAB
ALBUMIN SERPL-MCNC: 2.3 G/DL (ref 3.5–5)
ALBUMIN/GLOB SERPL: 0.3 {RATIO} (ref 1.1–2.2)
ALP SERPL-CCNC: 256 U/L (ref 45–117)
ALT SERPL-CCNC: 38 U/L (ref 12–78)
ANION GAP SERPL CALC-SCNC: 13 MMOL/L (ref 5–15)
AST SERPL-CCNC: 52 U/L (ref 15–37)
BASOPHILS # BLD: 0.1 K/UL (ref 0–0.1)
BASOPHILS NFR BLD: 1 % (ref 0–1)
BILIRUB SERPL-MCNC: 0.3 MG/DL (ref 0.2–1)
BUN SERPL-MCNC: 47 MG/DL (ref 6–20)
BUN/CREAT SERPL: 8 (ref 12–20)
CALCIUM SERPL-MCNC: 8.2 MG/DL (ref 8.5–10.1)
CHLORIDE SERPL-SCNC: 89 MMOL/L (ref 97–108)
CO2 SERPL-SCNC: 29 MMOL/L (ref 21–32)
CREAT SERPL-MCNC: 6.25 MG/DL (ref 0.55–1.02)
DIFFERENTIAL METHOD BLD: ABNORMAL
EOSINOPHIL # BLD: 0.1 K/UL (ref 0–0.4)
EOSINOPHIL NFR BLD: 1 % (ref 0–7)
ERYTHROCYTE [DISTWIDTH] IN BLOOD BY AUTOMATED COUNT: 15.9 % (ref 11.5–14.5)
GLOBULIN SER CALC-MCNC: 7.5 G/DL (ref 2–4)
GLUCOSE SERPL-MCNC: 217 MG/DL (ref 65–100)
HCT VFR BLD AUTO: 35.5 % (ref 35–47)
HGB BLD-MCNC: 10.7 G/DL (ref 11.5–16)
IMM GRANULOCYTES # BLD: 0 K/UL (ref 0–0.04)
IMM GRANULOCYTES NFR BLD AUTO: 0 % (ref 0–0.5)
LACTATE SERPL-SCNC: 1.3 MMOL/L (ref 0.4–2)
LYMPHOCYTES # BLD: 1.8 K/UL (ref 0.8–3.5)
LYMPHOCYTES NFR BLD: 19 % (ref 12–49)
MCH RBC QN AUTO: 27.4 PG (ref 26–34)
MCHC RBC AUTO-ENTMCNC: 30.1 G/DL (ref 30–36.5)
MCV RBC AUTO: 90.8 FL (ref 80–99)
MONOCYTES # BLD: 1 K/UL (ref 0–1)
MONOCYTES NFR BLD: 10 % (ref 5–13)
NEUTS SEG # BLD: 6.6 K/UL (ref 1.8–8)
NEUTS SEG NFR BLD: 69 % (ref 32–75)
NRBC # BLD: 0 K/UL (ref 0–0.01)
NRBC BLD-RTO: 0 PER 100 WBC
PLATELET # BLD AUTO: 286 K/UL (ref 150–400)
PMV BLD AUTO: 9.8 FL (ref 8.9–12.9)
POTASSIUM SERPL-SCNC: 4.7 MMOL/L (ref 3.5–5.1)
PROT SERPL-MCNC: 9.8 G/DL (ref 6.4–8.2)
RBC # BLD AUTO: 3.91 M/UL (ref 3.8–5.2)
SODIUM SERPL-SCNC: 131 MMOL/L (ref 136–145)
WBC # BLD AUTO: 9.6 K/UL (ref 3.6–11)

## 2018-07-06 PROCEDURE — 83605 ASSAY OF LACTIC ACID: CPT | Performed by: EMERGENCY MEDICINE

## 2018-07-06 PROCEDURE — 65270000029 HC RM PRIVATE

## 2018-07-06 PROCEDURE — 96375 TX/PRO/DX INJ NEW DRUG ADDON: CPT

## 2018-07-06 PROCEDURE — 74011250636 HC RX REV CODE- 250/636: Performed by: EMERGENCY MEDICINE

## 2018-07-06 PROCEDURE — 3331090002 HH PPS REVENUE DEBIT

## 2018-07-06 PROCEDURE — 96365 THER/PROPH/DIAG IV INF INIT: CPT

## 2018-07-06 PROCEDURE — G0300 HHS/HOSPICE OF LPN EA 15 MIN: HCPCS

## 2018-07-06 PROCEDURE — 80053 COMPREHEN METABOLIC PANEL: CPT | Performed by: PHYSICIAN ASSISTANT

## 2018-07-06 PROCEDURE — 36415 COLL VENOUS BLD VENIPUNCTURE: CPT | Performed by: EMERGENCY MEDICINE

## 2018-07-06 PROCEDURE — 85025 COMPLETE CBC W/AUTO DIFF WBC: CPT | Performed by: PHYSICIAN ASSISTANT

## 2018-07-06 PROCEDURE — 87040 BLOOD CULTURE FOR BACTERIA: CPT | Performed by: EMERGENCY MEDICINE

## 2018-07-06 PROCEDURE — 99283 EMERGENCY DEPT VISIT LOW MDM: CPT

## 2018-07-06 PROCEDURE — 93971 EXTREMITY STUDY: CPT

## 2018-07-06 PROCEDURE — 3331090001 HH PPS REVENUE CREDIT

## 2018-07-06 RX ORDER — FENTANYL CITRATE 50 UG/ML
50 INJECTION, SOLUTION INTRAMUSCULAR; INTRAVENOUS ONCE
Status: COMPLETED | OUTPATIENT
Start: 2018-07-06 | End: 2018-07-06

## 2018-07-06 RX ORDER — VANCOMYCIN 2 GRAM/500 ML IN 0.9 % SODIUM CHLORIDE INTRAVENOUS
2000 ONCE
Status: COMPLETED | OUTPATIENT
Start: 2018-07-06 | End: 2018-07-07

## 2018-07-06 RX ORDER — FENTANYL CITRATE 50 UG/ML
25 INJECTION, SOLUTION INTRAMUSCULAR; INTRAVENOUS
Status: DISCONTINUED | OUTPATIENT
Start: 2018-07-06 | End: 2018-07-09

## 2018-07-06 RX ADMIN — FENTANYL CITRATE 50 MCG: 50 INJECTION, SOLUTION INTRAMUSCULAR; INTRAVENOUS at 21:09

## 2018-07-06 RX ADMIN — VANCOMYCIN HYDROCHLORIDE 2000 MG: 10 INJECTION, POWDER, LYOPHILIZED, FOR SOLUTION INTRAVENOUS at 22:33

## 2018-07-06 NOTE — IP AVS SNAPSHOT
1111 [facility] [address] [place] [address] 
[phone] Patient: Ger Velázquez MRN: [id number] DCN:[id number] You are allergic to the following Allergen Reactions Latex Itching Rash, sometimes difficult to breathe Celebrex (Celecoxib) Anaphylaxis Swelling TONGUE. LIPS AND EYES Iodine Other (comments) IV CONTRAST-LESIONS, AND SKIN SLUFFED OFF Keflex (Cephalexin) Anaphylaxis Swelling Tongue, lips, and eyes Levaquin (Levofloxacin) Anaphylaxis Swelling Tongue, lips, and eyes Pcn (Penicillins) Anaphylaxis Swelling Tongue, lips and eyes Recent Documentation Height Weight Breastfeeding? BMI OB Status Smoking Status 1.873 m 109.7 kg No 31.26 kg/m2 Hysterectomy Never Smoker Unresulted Labs-Please follow up with your PCP about these lab tests Order Current Status RENAL FUNCTION PANEL Collected (07/17/18 0400) Emergency Contacts  (Rel.) Home Phone Work Phone Mobile Phone Zulma Guillen (Daughter) [phone] -- [phone] About your hospitalization You were admitted on:  July 6, 2018 You last received care in the:  [facility] You were discharged on:  July 19, 2018 Why you were hospitalized Your primary diagnosis was:  Bilateral Lower Leg Cellulitis Your diagnoses also included:  Anemia, Bilateral Leg Edema, Lft Elevation, Type 2 Diabetes Mellitus With Hyperglycemia (Hcc), Osteomyelitis Of Foot, Right, Acute (Hcc), Hyponatremia, Esrd (End Stage Renal Disease) On Dialysis (Hcc), Sacral Wound, Ankle Wound, Left, Initial Encounter, Morbid Obesity (Hcc), Juan José On Cpap Providers Seen During Your Hospitalization Provider Specialty Primary office phone Katya Long MD Emergency Medicine [phone] Harry Gaspar MD Family Practice [phone] Oumar Aguirre MD Internal Medicine [phone] Wende Dakin, MD Internal Medicine [phone] Saritha Figueroa MD Internal Medicine [phone] Your Primary Care Physician (PCP) Primary Care Physician Office Phone Office Fax Earline Carr [phone] Follow-up Information Follow up With Details Comments Contact Info Michelle Rodríguez MD In 1 week 1 week following discharge from hospital/SNF 53 [facility] 1007 [facility] 
[phone] MW hemodialysis as scheduled Odell Salas MD  infectious diseases; hospital follow up as needed/instructed [address] 
[phone] My Medications STOP taking these medications   
 ketoconazole 2 % topical cream  
Commonly known as:  NIZORAL  
   
  
 OTHER PERCOCET 7.5-325 mg per tablet Generic drug:  oxyCODONE-acetaminophen Replaced by:  oxyCODONE-acetaminophen 5-325 mg per tablet ROXICODONE 5 mg immediate release tablet Generic drug:  oxyCODONE IR  
   
  
 terbinafine HCl 1 % topical cream  
Commonly known as:  LAMISIL TAKE these medications as instructed Instructions Each Dose to Equal  
 Morning Noon Evening Bedtime  
 albumin human 25% 25 % solution Commonly known as:  Joycelyn Pulse Your last dose was: Your next dose is:    
   
   
 200 mL by IntraVENous route DIALYSIS PRN (Hypotension during dialysis). 50 g ALLEGRA 180 mg tablet Generic drug:  fexofenadine Your last dose was: Your next dose is: Take 180 mg by mouth nightly. 180 mg  
    
   
   
   
  
 ASPERCREME (LIDOCAINE) 4 % patch Generic drug:  lidocaine Your last dose was: Your next dose is:    
   
   
 1 Patch by TransDERmal route every twelve (12) hours. 1 Patch  
    
   
   
   
  
 aspirin 81 mg chewable tablet Your last dose was: Your next dose is: Take 81 mg by mouth daily. 81 mg  
    
   
   
   
  
 aztreonam 1 gram 500 mg ivpb Your last dose was: Your next dose is:    
   
   
 500 mg by IntraVENous route every twelve (12) hours every twelve (12) hours. 500 mg  
    
   
   
   
  
 carvedilol 12.5 mg tablet Commonly known as:  Carin Locket Your last dose was: Your next dose is: Take 12.5 mg by mouth two (2) times daily (with meals). Sun., Tues and Thurs, no evening dose 12.5 mg  
    
   
   
   
  
 cyclobenzaprine 10 mg tablet Commonly known as:  FLEXERIL Your last dose was: Your next dose is: Take 10 mg by mouth every eight (8) hours as needed for Muscle Spasm(s). 10 mg  
    
   
   
   
  
 DULCOLAX STOOL SOFTENER (DSS) 100 mg capsule Generic drug:  docusate sodium Your last dose was: Your next dose is: Take 100 mg by mouth two (2) times a day. 100 mg  
    
   
   
   
  
 epoetin joi 20,000 unit/mL injection Commonly known as:  EPOGEN;PROCRIT Start taking on:  7/20/2018 Your last dose was: Your next dose is:    
   
   
 1 mL by SubCUTAneous route every Monday, Wednesday, Friday. Indications: ANEMIA DUE TO RENAL FAILURE, Renal Dialysis [address]11 Units  
    
   
   
   
  
 ferric citrate 210 mg iron tablet Commonly known as:  Raquel Mussel Your last dose was: Your next dose is: Take 210 mg by mouth three (3) times daily (with meals). 210 mg FLUoxetine 20 mg capsule Commonly known as:  PROzac Your last dose was: Your next dose is: Take 20 mg by mouth daily. 20 mg  
    
   
   
   
  
 gabapentin 100 mg capsule Commonly known as:  NEURONTIN Your last dose was: Your next dose is: Take 100 mg by mouth two (2) times a day.  Indications: NEUROPATHIC PAIN  
 100 mg  
    
   
   
   
  
 HUMALOG SC Your last dose was: Your next dose is:    
   
   
 by SubCUTAneous route Before breakfast, lunch, and dinner. SLIDING SCALE 100-150 = 4 units  151-200 = 5 units Then 1 UNIT INCREASE FOR EVERY 50 POINTS  
     
   
   
   
  
 insulin detemir U-100 100 unit/mL (3 mL) Inpn Commonly known as:  Floyd Farris Your last dose was: Your next dose is:    
   
   
 12 Units by SubCUTAneous route Daily (before lunch). NOON DAILY  
 12 Units  
    
   
   
   
  
 latanoprost 0.005 % ophthalmic solution Commonly known as:  Conley Nageotte Your last dose was: Your next dose is:    
   
   
 Administer 1 Drop to both eyes nightly. 1 Drop LINZESS 290 mcg Cap capsule Generic drug:  linaclotide Your last dose was: Your next dose is: Take 290 mcg by mouth daily. 290 mcg  
    
   
   
   
  
 midodrine 5 mg tablet Commonly known as:  Nikolas Streeter Your last dose was: Your next dose is: Take 5 mg by mouth three (3) days a week. take before dialysis as directed. 5 mg  
    
   
   
   
  
 nortriptyline 25 mg capsule Commonly known as:  PAMELOR Your last dose was: Your next dose is: Take 50 mg by mouth nightly. 50 mg  
    
   
   
   
  
 oxyCODONE-acetaminophen 5-325 mg per tablet Commonly known as:  PERCOCET Your last dose was: Your next dose is: Take 1-2 Tabs by mouth every four (4) hours as needed. Max Daily Amount: 12 Tabs. Indications: Pain 1-2 Tab OXYGEN-AIR DELIVERY SYSTEMS Your last dose was: Your next dose is:    
   
   
 2 L by Nasal route as needed (shortness of breath). 2 L  
    
   
   
   
  
 pantoprazole 40 mg tablet Commonly known as:  PROTONIX Your last dose was: Your next dose is: Take 1 Tab by mouth Before breakfast and dinner.   
 40 mg  
 polyethylene glycol 17 gram packet Commonly known as:  Kenneth Curry Your last dose was: Your next dose is: Take 17 g by mouth two (2) times daily as needed (constipation). 17 g RENAL SOFTGELS 1 mg capsule Generic drug:  b complex-vitamin c-folic acid Your last dose was: Your next dose is: Take 1 Cap by mouth daily. 1 Cap SENSIPAR 30 mg tablet Generic drug:  cinacalcet Your last dose was: Your next dose is: Take 30 mg by mouth daily. 30 mg  
    
   
   
   
  
 sevelamer carbonate 800 mg Tab tab Commonly known as:  Rhae Beat Your last dose was: Your next dose is: Take 800 mg by mouth three (3) times daily (with meals). 800 mg  
    
   
   
   
  
 simvastatin 20 mg tablet Commonly known as:  ZOCOR Your last dose was: Your next dose is: Take 20 mg by mouth nightly. 20 mg  
    
   
   
   
  
 vancomycin 1,000 mg 1,000 mg, ADDaptor 1 Device IVPB Start taking on:  7/20/2018 Your last dose was: Your next dose is:    
   
   
 1,000 mg by IntraVENous route DIALYSIS MON, WED & FRI. 1000 mg  
    
   
   
   
  
 VITAMIN C 500 mg tablet Generic drug:  ascorbic acid (vitamin C) Your last dose was: Your next dose is: Take 1,000 mg by mouth daily. 1000 mg Where to Get Your Medications Information on where to get these meds will be given to you by the nurse or doctor. ! Ask your nurse or doctor about these medications  
  albumin human 25% 25 % solution  
 aztreonam 1 gram 500 mg ivpb  
 epoetin joi 20,000 unit/mL injection  
 oxyCODONE-acetaminophen 5-325 mg per tablet  
 pantoprazole 40 mg tablet  
 vancomycin 1,000 mg 1,000 mg, ADDaptor 1 Device IVPB Discharge Instructions ADDITIONAL CARE RECOMMENDATIONS:  
1. Take medications as prescribed. 2. Keep appointment(s) as recommended/scheduled. 3. Accucheks qACHS with low dose/high sensitivity insulin correction scale 
4 Outpatient IV antibiotic orders as below: 
~~~~~~~~~~~~~~~~~~~~~~~~~~~~~~~~~~~~~~~~~~~~~~~~~~~~~~~~~~~~~~~~~~~~ 
IV Antibiotic Orders 1. Diagnosis:  Osteomyelitis of heel 2. Routine Jacob care 3. Antibiotic:  Azactam 500 mg IV Q 12 hours 4. Lab each Monday: CBC/diff/platelets BMP 
                      ESR 5. Lab each Thursday: CBC/diff/platelets BMP 6. Fax lab to Dr. Joy Chino @ [phone]. 
7.  Call Dr. Joy Chino @ [phone] for WBC under 4. 
8.  Duration of therapy: 6 weeks Please call Dr. Joy Chino @ [phone] before stopping therapy. 9.  Allergies: Allergies Allergen Reactions  Latex Itching  
    Rash, sometimes difficult to breathe  Celebrex [Celecoxib] Anaphylaxis and Swelling  
    TONGUE. LIPS AND EYES  
 Iodine Other (comments)  
    IV CONTRAST-LESIONS, AND SKIN SLUFFED OFF  
 Keflex [Cephalexin] Anaphylaxis and Swelling  
    Tongue, lips, and eyes  Levaquin [Levofloxacin] Anaphylaxis and Swelling  
    Tongue, lips, and eyes  Pcn [Penicillins] Anaphylaxis and Swelling  
    Tongue, lips and eyes  
  
10. Call [phone] with name of [facility] and to set up 2-3 week appointment with tram Espinoza MD 
~~~~~~~~~~~~~~~~~~~~~~~~~~~~~~~~~~~~~~~~~~~~~~~~~~~~~~~~~~~~~~~~~~~~~~~~~~~~~~~~~~~~~~~~ 
5. CPAP qhs 
6. Patient needs Total Care Bariatric Plus bed. 7. Routine Jacob catheter care. 8. Elevate right foot and reinforce dressing prn. 
9. Bariatric commode at bedside. 10. Bilateral Prevalon boots. 11. Vancomycin to be given qMWF in hemodialysis. 12. 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 DIET: diabetic consistent carb 2000kcal/day, fluid restriction 1000ml/day; renal diet 70-70-70 protein/sodium/potassium Oral Nutritional Supplements: Prosource with breakfast and dinner; Glucerna Shake with lunch ACTIVITY: PT/OT Eval and Treat WOUND CARE:  
 
Wound vac: 
VAC (NPWT) Dressing Management Orders: 
Settings at 100 mmHg, continuous negative pressure. Dressing of black foam and use white or contact layer to base as needed. Dressing change Friday and Tuesday and prn as needed by WOCN. Change canisters when full. (located in 5E, PSBU and Main ICU supply rooms). Measure amount of drainage Qshift. For sudden development of blood or an increased amount of tawnya blood: 1.  Immediately turn off VAC system. 2.  Leave dressing in place. 3.  Hold pressure over wound. 4.  Call physician. For graft and flap wounds, if vacuum fails to maintain seal or unable to manage alarm for clogged tubing for >2hrs: Contact Physician For all other wounds, if vacuum fails to maintain seal or unable to manage alarm for clogged tubing for >2hrs: 1.  Remove dressing and all foam from wound bed. 2.  Cleanse wound with normal saline. 3.  Apply saline soaked gauze to wound bed. 4.  Cover with dry dressing. 5.  Secure with tape. 6.  Change daily and as needed. 7.  Notify Physician and WOCN that wound VAC dressing needs to be reapplied. When patient is discharged from our facility: 1.  Same as above for dressing removal and wound care. 2.  Place VAC system and plug in clear bag in utility room. (no red bags) 3. This is a convenience VAC. Zaira Solange will . Do not call [facility]. 4. Wound VAC cannot leave the facility with the patient. For procedures only:  
Patient may be disconnected from [facility] system for less than 2 hours by clamping and disconnecting tubing.  VAC machine cannot go into MRI area.  
Battery backup on our current inpatient systems lasts for 4 hours and may be used to prevent interruption of treatment. Right lower buttock: daily and PRN incontinence: clean with NS, apply Aquacel Ag to tunnel and wound. Cover with mepilex border dressing. Every third day: PRN incontinence: mepilex border dressing to 2 sacral wounds after cleaning with NS. Apply dry gauze dressing to right heel wound daily.  Apply moistened aquacell Ag and gauze dressing to left ankle daily. EQUIPMENT needed: as per PT/OT, wound care Osteomyelitis: Care Instructions Your Care Instructions Osteomyelitis (say \"pc-xlmq-vd-gl-kb-VO-tus\") is a bone infection. It is caused by bacteria. The bacteria can infect the bone where it has been injured, or they can be carried through the blood from another area in the body. Osteomyelitis can be a short- or long-term problem. It is treated with antibiotics. You may get the antibiotics as pills or through a needle in a vein (IV). You will probably get treatment in the hospital at first. The type of treatment depends on the type of bacteria causing the infection, the bones affected, and how bad the infection is. Sometimes people need surgery to drain pus from bone or to fix damaged bone. Short-term osteomyelitis that is treated right away usually can be cured. But the long-term form sometimes comes back after treatment. You can help your chances of stopping the infection by taking your medicines as directed. Follow-up care is a key part of your treatment and safety. Be sure to make and go to all appointments, and call your doctor if you are having problems. It's also a good idea to know your test results and keep a list of the medicines you take. How can you care for yourself at home? · Take your antibiotics as directed. Do not stop taking them just because you feel better. You need to take the full course of antibiotics. · Take pain medicines exactly as directed.  
¨ If the doctor gave you a prescription medicine for pain, take it as prescribed. ¨ If you are not taking a prescription pain medicine, ask your doctor if you can take an over-the-counter medicine. · Do mild exercise and stretching if your doctor says it is okay. This can help keep your bones and muscles healthy. Avoid strenuous work or exercise until your doctor says you can do it. · Consider physical therapy if your doctor suggests it. Physical therapy may help you have a normal range of movement. · Do not smoke. Smoking can slow healing of the infection. If you need help quitting, talk to your doctor about stop-smoking programs and medicines. These can increase your chances of quitting for good. When should you call for help? Call 911 anytime you think you may need emergency care. For example, call if: 
  · You have severe bone pain.  
 Call your doctor now or seek immediate medical care if: 
  · You continue to have bone pain.  
  · You have signs of infection, such as: 
¨ Increased pain, swelling, warmth, or redness. ¨ Red streaks leading from a wound. ¨ Pus draining from a wound. ¨ A fever.  
 Watch closely for changes in your health, and be sure to contact your doctor if: 
  · You do not get better as expected. Where can you learn more? Go to http://brandan-liang.info/. Enter R194 in the search box to learn more about \"Osteomyelitis: Care Instructions. \" Current as of: November 21, 2017 Content Version: 11.7 © 3451-2397 [employer]. Care instructions adapted under license by [employer] (which disclaims liability or warranty for this information). If you have questions about a medical condition or this instruction, always ask your healthcare professional. [place] [address] any warranty or liability for your use of this information. Discharge Instructions Attachments/References AZTREONAM (BY INJECTION) (ENGLISH) VANCOMYCIN (BY INJECTION) (ENGLISH) Discharge Orders None [employer] Announcement We are excited to announce that we are making your provider's discharge notes available to you in [employer]. You will see these notes when they are completed and signed by the physician that discharged you from your recent hospital stay. If you have any questions or concerns about any information you see in [employer], please call the Health Information Department where you were seen or reach out to your Primary Care Provider for more information about your plan of care. Introducing [facility] SERVICES! [employer] introduces [employer] patient portal. Now you can access parts of your medical record, email your doctor's office, and request medication refills online. 1. In your internet browser, go to [email or web address] 2. Click on the First Time User? Click Here link in the Sign In box. You will see the New Member Sign Up page. 3. Enter your [employer] Access Code exactly as it appears below. You will not need to use this code after youve completed the sign-up process. If you do not sign up before the expiration date, you must request a new code. · [employer] Access Code: [email or web address] Expires: 7/26/2018  7:35 AM 
 
4. Enter the last four digits of your Social Security Number (xxxx) and Date of Birth (mm/dd/yyyy) as indicated and click Submit. You will be taken to the next sign-up page. 5. Create a [employer] ID. This will be your [employer] login ID and cannot be changed, so think of one that is secure and easy to remember. 6. Create a [employer] password. You can change your password at any time. 7. Enter your Password Reset Question and Answer. This can be used at a later time if you forget your password. 8. Enter your e-mail address. You will receive e-mail notification when new information is available in [address] 9. Click Sign Up. You can now view and download portions of your medical record. 10. Click the Download Summary menu link to download a portable copy of your medical information. If you have questions, please visit the Frequently Asked Questions section of the [employer] website. Remember, MyChart is NOT to be used for urgent needs. For medical emergencies, dial 911. Now available from your iPhone and Android! General Information Please provide this summary of care documentation to your next provider. Patient Signature:  ____________________________________________________________ Date:  ____________________________________________________________  
  
Caroline  Provider Signature:  ____________________________________________________________ Date:  ____________________________________________________________
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
## 2018-07-06 NOTE — PROCEDURES
[facility]  *** FINAL REPORT ***    Name: Ann Ryan  MRN: [id number]  : 08 May 1950  HIS Order #: [id number]  [id number] [facility] Visit #: [id number]  Date: 2018    TYPE OF TEST: Peripheral Venous Testing    REASON FOR TEST  Limb swelling    Right Leg:-  Deep venous thrombosis:           No  Superficial venous thrombosis:    No  Deep venous insufficiency:        Not examined  Superficial venous insufficiency: Not examined      INTERPRETATION/FINDINGS  PROCEDURE:  Color duplex ultrasound imaging of lower extremity veins. FINDINGS:       Right: The common femoral, deep femoral, femoral, popliteal,  posterior tibial and great saphenous are patent and without evidence  of thrombus; each is is fully compressible and there is no narrowing  of the flow channel on color Doppler imaging. The peroneal vein was  not visualized. Phasic flow is observed in the common femoral vein. Left:   The common femoral vein is patent and without evidence of   thrombus. Phasic flow is observed. This extremity was not otherwise   evaluated. IMPRESSION:  No evidence of right lower extremity vein thrombosis  where visualized. ADDITIONAL COMMENTS    I have personally reviewed the data relevant to the interpretation of  this  study.     TECHNOLOGIST: Mara Aguila RVT  Signed: 2018 06:42 PM    PHYSICIAN: Shasta Sage., MD  Signed: 2018 08:59 PM

## 2018-07-06 NOTE — ED TRIAGE NOTES
Pt reports she was sent here by her home health nurse for fever, bilateral leg wounds and generalized fatigue. Pt states she is also having swelling in right lower leg and home health nurse thinks she might have a blood clot. Temp in triage 98.2 oral.  Pt is currently on dialysis and receives treatment every Monday, Wednesday, Friday. Pt states she did not finish her treatment today due to her neuropathy in her legs, feet and hands.

## 2018-07-06 NOTE — ED NOTES
6:07 PM  I have evaluated the patient as the Provider in Triage. I have reviewed Her vital signs and the triage nurse assessment. I have talked with the patient and any available family and advised that I am the provider in triage and have ordered the appropriate study to initiate their work up based on the clinical presentation during my assessment. I have advised that the patient will be accommodated in the Main ED as soon as possible. I have also requested to contact the triage nurse or myself immediately if the patient experiences any changes in their condition during this brief waiting period. ESRD - followed by home health for foot wounds. Sent in for temp of 99.6 at home, afebrile here, no medications taken PTA.  + fatigue all week. Seen here 6/27. C/f blood clot in the right leg.       MARTHA Blackburn

## 2018-07-07 LAB
ALBUMIN SERPL-MCNC: 1.9 G/DL (ref 3.5–5)
ALBUMIN/GLOB SERPL: 0.3 {RATIO} (ref 1.1–2.2)
ALP SERPL-CCNC: 211 U/L (ref 45–117)
ALT SERPL-CCNC: 28 U/L (ref 12–78)
ANION GAP SERPL CALC-SCNC: 13 MMOL/L (ref 5–15)
AST SERPL-CCNC: 31 U/L (ref 15–37)
BASOPHILS # BLD: 0.1 K/UL (ref 0–0.1)
BASOPHILS NFR BLD: 1 % (ref 0–1)
BILIRUB SERPL-MCNC: 0.4 MG/DL (ref 0.2–1)
BUN SERPL-MCNC: 51 MG/DL (ref 6–20)
BUN/CREAT SERPL: 7 (ref 12–20)
CALCIUM SERPL-MCNC: 8.1 MG/DL (ref 8.5–10.1)
CHLORIDE SERPL-SCNC: 91 MMOL/L (ref 97–108)
CO2 SERPL-SCNC: 27 MMOL/L (ref 21–32)
CREAT SERPL-MCNC: 6.86 MG/DL (ref 0.55–1.02)
DIFFERENTIAL METHOD BLD: ABNORMAL
EOSINOPHIL # BLD: 0.1 K/UL (ref 0–0.4)
EOSINOPHIL NFR BLD: 1 % (ref 0–7)
ERYTHROCYTE [DISTWIDTH] IN BLOOD BY AUTOMATED COUNT: 15.8 % (ref 11.5–14.5)
GLOBULIN SER CALC-MCNC: 6.4 G/DL (ref 2–4)
GLUCOSE BLD STRIP.AUTO-MCNC: 187 MG/DL (ref 65–100)
GLUCOSE BLD STRIP.AUTO-MCNC: 193 MG/DL (ref 65–100)
GLUCOSE BLD STRIP.AUTO-MCNC: 225 MG/DL (ref 65–100)
GLUCOSE SERPL-MCNC: 136 MG/DL (ref 65–100)
HCT VFR BLD AUTO: 30.4 % (ref 35–47)
HGB BLD-MCNC: 9.1 G/DL (ref 11.5–16)
IMM GRANULOCYTES # BLD: 0 K/UL (ref 0–0.04)
IMM GRANULOCYTES NFR BLD AUTO: 0 % (ref 0–0.5)
LYMPHOCYTES # BLD: 2.9 K/UL (ref 0.8–3.5)
LYMPHOCYTES NFR BLD: 27 % (ref 12–49)
MCH RBC QN AUTO: 26.9 PG (ref 26–34)
MCHC RBC AUTO-ENTMCNC: 29.9 G/DL (ref 30–36.5)
MCV RBC AUTO: 89.9 FL (ref 80–99)
MONOCYTES # BLD: 1.6 K/UL (ref 0–1)
MONOCYTES NFR BLD: 15 % (ref 5–13)
NEUTS SEG # BLD: 6.1 K/UL (ref 1.8–8)
NEUTS SEG NFR BLD: 56 % (ref 32–75)
NRBC # BLD: 0 K/UL (ref 0–0.01)
NRBC BLD-RTO: 0 PER 100 WBC
PLATELET # BLD AUTO: 251 K/UL (ref 150–400)
PMV BLD AUTO: 9.2 FL (ref 8.9–12.9)
POTASSIUM SERPL-SCNC: 4 MMOL/L (ref 3.5–5.1)
PROT SERPL-MCNC: 8.3 G/DL (ref 6.4–8.2)
RBC # BLD AUTO: 3.38 M/UL (ref 3.8–5.2)
SERVICE CMNT-IMP: ABNORMAL
SODIUM SERPL-SCNC: 131 MMOL/L (ref 136–145)
WBC # BLD AUTO: 10.8 K/UL (ref 3.6–11)

## 2018-07-07 PROCEDURE — 74011250637 HC RX REV CODE- 250/637: Performed by: FAMILY MEDICINE

## 2018-07-07 PROCEDURE — 85025 COMPLETE CBC W/AUTO DIFF WBC: CPT | Performed by: FAMILY MEDICINE

## 2018-07-07 PROCEDURE — 3331090002 HH PPS REVENUE DEBIT

## 2018-07-07 PROCEDURE — 77030012879 HC MSK CPAP FLL FAC PHIL -B

## 2018-07-07 PROCEDURE — 74011000250 HC RX REV CODE- 250: Performed by: FAMILY MEDICINE

## 2018-07-07 PROCEDURE — 80053 COMPREHEN METABOLIC PANEL: CPT | Performed by: FAMILY MEDICINE

## 2018-07-07 PROCEDURE — 65270000032 HC RM SEMIPRIVATE

## 2018-07-07 PROCEDURE — 74011250636 HC RX REV CODE- 250/636: Performed by: FAMILY MEDICINE

## 2018-07-07 PROCEDURE — 74011250637 HC RX REV CODE- 250/637: Performed by: HOSPITALIST

## 2018-07-07 PROCEDURE — 36415 COLL VENOUS BLD VENIPUNCTURE: CPT | Performed by: FAMILY MEDICINE

## 2018-07-07 PROCEDURE — 74011636637 HC RX REV CODE- 636/637: Performed by: HOSPITALIST

## 2018-07-07 PROCEDURE — 77030020186 HC BOOT HL PROTCT SAGE -B

## 2018-07-07 PROCEDURE — 3331090001 HH PPS REVENUE CREDIT

## 2018-07-07 PROCEDURE — 93923 UPR/LXTR ART STDY 3+ LVLS: CPT

## 2018-07-07 PROCEDURE — 94761 N-INVAS EAR/PLS OXIMETRY MLT: CPT

## 2018-07-07 PROCEDURE — 82962 GLUCOSE BLOOD TEST: CPT

## 2018-07-07 RX ORDER — ASCORBIC ACID 500 MG
1000 TABLET ORAL DAILY
Status: DISCONTINUED | OUTPATIENT
Start: 2018-07-07 | End: 2018-07-19 | Stop reason: HOSPADM

## 2018-07-07 RX ORDER — DEXTROSE 50 % IN WATER (D50W) INTRAVENOUS SYRINGE
12.5-25 AS NEEDED
Status: DISCONTINUED | OUTPATIENT
Start: 2018-07-07 | End: 2018-07-19 | Stop reason: HOSPADM

## 2018-07-07 RX ORDER — GABAPENTIN 100 MG/1
200 CAPSULE ORAL 3 TIMES DAILY
Status: DISCONTINUED | OUTPATIENT
Start: 2018-07-07 | End: 2018-07-07 | Stop reason: SDUPTHER

## 2018-07-07 RX ORDER — POLYETHYLENE GLYCOL 3350 17 G/17G
17 POWDER, FOR SOLUTION ORAL
Status: DISCONTINUED | OUTPATIENT
Start: 2018-07-07 | End: 2018-07-19 | Stop reason: HOSPADM

## 2018-07-07 RX ORDER — SODIUM CHLORIDE 0.9 % (FLUSH) 0.9 %
5-10 SYRINGE (ML) INJECTION EVERY 8 HOURS
Status: DISCONTINUED | OUTPATIENT
Start: 2018-07-07 | End: 2018-07-19 | Stop reason: HOSPADM

## 2018-07-07 RX ORDER — MAGNESIUM SULFATE 100 %
4 CRYSTALS MISCELLANEOUS AS NEEDED
Status: DISCONTINUED | OUTPATIENT
Start: 2018-07-07 | End: 2018-07-19 | Stop reason: HOSPADM

## 2018-07-07 RX ORDER — GABAPENTIN 100 MG/1
100 CAPSULE ORAL 2 TIMES DAILY
Status: DISCONTINUED | OUTPATIENT
Start: 2018-07-07 | End: 2018-07-19 | Stop reason: HOSPADM

## 2018-07-07 RX ORDER — SODIUM CHLORIDE 0.9 % (FLUSH) 0.9 %
5-10 SYRINGE (ML) INJECTION AS NEEDED
Status: DISCONTINUED | OUTPATIENT
Start: 2018-07-07 | End: 2018-07-19 | Stop reason: HOSPADM

## 2018-07-07 RX ORDER — CINACALCET 30 MG/1
30 TABLET, FILM COATED ORAL DAILY
Status: DISCONTINUED | OUTPATIENT
Start: 2018-07-07 | End: 2018-07-19 | Stop reason: HOSPADM

## 2018-07-07 RX ORDER — LATANOPROST 50 UG/ML
1 SOLUTION/ DROPS OPHTHALMIC
Status: DISCONTINUED | OUTPATIENT
Start: 2018-07-07 | End: 2018-07-19 | Stop reason: HOSPADM

## 2018-07-07 RX ORDER — MIDODRINE HYDROCHLORIDE 5 MG/1
5 TABLET ORAL
Status: DISCONTINUED | OUTPATIENT
Start: 2018-07-09 | End: 2018-07-19 | Stop reason: HOSPADM

## 2018-07-07 RX ORDER — FLUOXETINE HYDROCHLORIDE 20 MG/1
20 CAPSULE ORAL DAILY
Status: DISCONTINUED | OUTPATIENT
Start: 2018-07-07 | End: 2018-07-19 | Stop reason: HOSPADM

## 2018-07-07 RX ORDER — SIMVASTATIN 20 MG/1
20 TABLET, FILM COATED ORAL
Status: DISCONTINUED | OUTPATIENT
Start: 2018-07-07 | End: 2018-07-19 | Stop reason: HOSPADM

## 2018-07-07 RX ORDER — NORTRIPTYLINE HYDROCHLORIDE 25 MG/1
50 CAPSULE ORAL
Status: DISCONTINUED | OUTPATIENT
Start: 2018-07-07 | End: 2018-07-19 | Stop reason: HOSPADM

## 2018-07-07 RX ORDER — GUAIFENESIN 100 MG/5ML
81 LIQUID (ML) ORAL DAILY
Status: DISCONTINUED | OUTPATIENT
Start: 2018-07-07 | End: 2018-07-19 | Stop reason: HOSPADM

## 2018-07-07 RX ORDER — CARVEDILOL 12.5 MG/1
12.5 TABLET ORAL 2 TIMES DAILY WITH MEALS
Status: DISCONTINUED | OUTPATIENT
Start: 2018-07-07 | End: 2018-07-19 | Stop reason: HOSPADM

## 2018-07-07 RX ORDER — INSULIN LISPRO 100 [IU]/ML
INJECTION, SOLUTION INTRAVENOUS; SUBCUTANEOUS
Status: DISCONTINUED | OUTPATIENT
Start: 2018-07-07 | End: 2018-07-19

## 2018-07-07 RX ORDER — SEVELAMER CARBONATE 800 MG/1
800 TABLET, FILM COATED ORAL
Status: DISCONTINUED | OUTPATIENT
Start: 2018-07-07 | End: 2018-07-19 | Stop reason: HOSPADM

## 2018-07-07 RX ADMIN — ASPIRIN 81 MG 81 MG: 81 TABLET ORAL at 08:55

## 2018-07-07 RX ADMIN — INSULIN LISPRO 2 UNITS: 100 INJECTION, SOLUTION INTRAVENOUS; SUBCUTANEOUS at 17:00

## 2018-07-07 RX ADMIN — FLUOXETINE 20 MG: 20 CAPSULE ORAL at 08:55

## 2018-07-07 RX ADMIN — INSULIN LISPRO 3 UNITS: 100 INJECTION, SOLUTION INTRAVENOUS; SUBCUTANEOUS at 12:38

## 2018-07-07 RX ADMIN — CARVEDILOL 12.5 MG: 12.5 TABLET, FILM COATED ORAL at 17:01

## 2018-07-07 RX ADMIN — GABAPENTIN 100 MG: 100 CAPSULE ORAL at 21:42

## 2018-07-07 RX ADMIN — CINACALCET HYDROCHLORIDE 30 MG: 30 TABLET, COATED ORAL at 21:42

## 2018-07-07 RX ADMIN — OXYCODONE HYDROCHLORIDE AND ACETAMINOPHEN 1000 MG: 500 TABLET ORAL at 08:55

## 2018-07-07 RX ADMIN — SEVELAMER CARBONATE 800 MG: 800 TABLET, FILM COATED ORAL at 07:35

## 2018-07-07 RX ADMIN — GABAPENTIN 100 MG: 100 CAPSULE ORAL at 07:36

## 2018-07-07 RX ADMIN — Medication 10 ML: at 21:42

## 2018-07-07 RX ADMIN — CARVEDILOL 12.5 MG: 12.5 TABLET, FILM COATED ORAL at 07:35

## 2018-07-07 RX ADMIN — SIMVASTATIN 20 MG: 20 TABLET, FILM COATED ORAL at 21:42

## 2018-07-07 RX ADMIN — FENTANYL CITRATE 25 MCG: 50 INJECTION, SOLUTION INTRAMUSCULAR; INTRAVENOUS at 21:41

## 2018-07-07 RX ADMIN — LATANOPROST 1 DROP: 50 SOLUTION OPHTHALMIC at 21:42

## 2018-07-07 RX ADMIN — SEVELAMER CARBONATE 800 MG: 800 TABLET, FILM COATED ORAL at 17:00

## 2018-07-07 RX ADMIN — SEVELAMER CARBONATE 800 MG: 800 TABLET, FILM COATED ORAL at 12:38

## 2018-07-07 RX ADMIN — NORTRIPTYLINE HYDROCHLORIDE 50 MG: 25 CAPSULE ORAL at 21:42

## 2018-07-07 RX ADMIN — FENTANYL CITRATE 25 MCG: 50 INJECTION, SOLUTION INTRAMUSCULAR; INTRAVENOUS at 10:09

## 2018-07-07 RX ADMIN — ASCORBIC ACID, THIAMINE MONONITRATE,RIBOFLAVIN, NIACINAMIDE, PYRIDOXINE HYDROCHLORIDE, FOLIC ACID, CYANOCOBALAMIN, BIOTIN, CALCIUM PANTOTHENATE, 1 CAPSULE: 100; 1.5; 1.7; 20; 10; 1; 6000; 150000; 5 CAPSULE, LIQUID FILLED ORAL at 08:55

## 2018-07-07 RX ADMIN — FENTANYL CITRATE 25 MCG: 50 INJECTION, SOLUTION INTRAMUSCULAR; INTRAVENOUS at 15:17

## 2018-07-07 NOTE — PROGRESS NOTES
Hospitalist Progress Note Oumar Aguirre MD 
Answering service: [phone] OR [phone] from in house phone Cell: [phone] Date of Service:  2018 NAME:  Lior Ordonez :  1950 MRN:  [id number] Admission Summary:  
Lior Ordonez is a 70 y.o.  female with past medical history of anemia, arthritis, asthma, anxiety, ESRD, chronic pain, depression, type 2 diabetes mellitus (DM), GERD, glaucoma, hypertension, hyperlipidemia, IBS, migraine, morbid obesity, sleep apnea presented to the ED from home with chief complaint of leg swelling. Interval history / Subjective:  
Bilateral feet pain and wound,  Pain 10/10, no chest pain or shortness of breath Assessment & Plan:  
 
Acute bilateral foot cellulitis with chronic bilateral heel wound POA 
-continue on IV vancomycin  
-follow up on blood cx 
-wound care per wound care nurse 
-doppler study negative for right leg DVT 
-seen by podiatrist, Hyponatremia  
-patient on dialysis  
-repeat bmp after HD 
 
ESRD DD 
-on sensipar, renvela,  sodium bicarbonate 
-HD per nephrologist 
 
Anemia of chronic disease  
-H/H trending down, no evidence of active bleeding, monitor H/H 
 
HTN 
-BP normal, continue on coreg, monitor BP 
 
DM-II 
-A1c 9.1 on 4/10/18 
-sliding scale and monitor finger stick glucose Elevated liver enzyme 
-multifactorial,  
-improved 
-repeat liver enzyme in am  
 
Hx of asthma 
-stable, stable, SpO2 94-95% Hx of anxiety, depression 
-stable, continue on prozac, add home nortriptyline Left sacral wound, left open abrasion, left lateral ankle wound POA 
-continue wound care per wound care nurse Morbid obesity 
-diet and weight loss program  
 
Hx of sleep apnea 
-CPAP q hs Code status: Full Code DVT prophylaxis: SCD Care Plan discussed with: Patient/Family and Nurse Disposition: TBD Hospital Problems  Date Reviewed: 7/6/2018 Codes Class Noted POA Anemia ICD-10-CM: D64.9 ICD-9-CM: 285.9  7/6/2018 Unknown ESRD (end stage renal disease) ([facility] ICD-10-CM: N18.6 ICD-9-CM: 585.6  7/6/2018 Unknown Bilateral leg edema ICD-10-CM: R60.0 ICD-9-CM: 782.3  7/6/2018 Unknown LFT elevation ICD-10-CM: R79.89 ICD-9-CM: 790.6  7/6/2018 Unknown Type 2 diabetes mellitus with hyperglycemia (HCC) ICD-10-CM: E11.65 ICD-9-CM: 250.00  7/6/2018 Unknown * (Principal)Bilateral lower leg cellulitis ICD-10-CM: L03.116, O99.058 ICD-9-CM: 682.6  7/6/2018 Unknown Vital Signs:  
 Last 24hrs VS reviewed since prior progress note. Most recent are: 
Visit Vitals  BP 98/56 (BP 1 Location: Left arm, BP Patient Position: Sitting)  Pulse 79  Temp 98.3 °F (36.8 °C)  Resp 18  Ht 6' 1.75\" (1.873 m)  Wt 117.6 kg (259 lb 4.2 oz)  SpO2 94%  Breastfeeding No  
 BMI 33.51 kg/m2 No intake or output data in the 24 hours ending 07/07/18 0856 Physical Examination:  
 
 
     
Constitutional:  No acute distress, cooperative, pleasant   
ENT:  Oral mucous moist, oropharynx benign. Neck supple, Resp:  CTA bilaterally. No wheezing/rhonchi/rales. No accessory muscle use CV:  Regular rhythm, normal rate, no murmurs, gallops, rubs GI:  Soft, non distended, non tender. normoactive bowel sounds, no hepatosplenomegaly Musculoskeletal:  Bilateral foot dressed Neurologic:  Moves all extremities. AAOx3, CN II-XII reviewed Skin:  Left sacral wound, left open abrasion, left lateral ankle wound Data Review:  
 Review and/or order of clinical lab test 
 
 
Labs:  
 
Recent Labs  
   07/07/18 
 0450  07/06/18 
 1822 WBC  10.8  9.6 HGB  9.1*  10.7* HCT  30.4*  35.5 PLT  251  286 Recent Labs  
   07/07/18 
 0450  07/06/18 
 1822 NA  131*  131* K  4.0  4.7 CL  91*  89* CO2  27  29 BUN  51*  47* CREA  6.86*  6.25* GLU  136*  217* CA  8.1* 8.2*  
 
Recent Labs  
   07/07/18 
 0450  07/06/18 
 1822 SGOT  31  52* ALT  28  38 AP  211*  256* TBILI  0.4  0.3 TP  8.3*  9.8* ALB  1.9*  2.3*  
GLOB  6.4*  7.5* No results for input(s): INR, PTP, APTT in the last 72 hours. No lab exists for component: INREXT No results for input(s): FE, TIBC, PSAT, FERR in the last 72 hours. No results found for: FOL, RBCF No results for input(s): PH, PCO2, PO2 in the last 72 hours. No results for input(s): CPK, CKNDX, TROIQ in the last 72 hours. No lab exists for component: CPKMB Lab Results Component Value Date/Time Cholesterol, total 144 04/10/2018 02:53 AM  
 HDL Cholesterol 66 04/10/2018 02:53 AM  
 LDL, calculated 67.6 04/10/2018 02:53 AM  
 Triglyceride 52 04/10/2018 02:53 AM  
 CHOL/HDL Ratio 2.2 04/10/2018 02:53 AM  
 
Lab Results Component Value Date/Time Glucose (POC) 121 (H) 04/12/2018 07:19 AM  
 Glucose (POC) 123 (H) 04/11/2018 10:48 PM  
 Glucose (POC) 125 (H) 04/11/2018 04:27 PM  
 Glucose (POC) 134 (H) 04/11/2018 11:41 AM  
 Glucose (POC) 185 (H) 04/11/2018 07:38 AM  
 
Lab Results Component Value Date/Time Color YELLOW 10/27/2012 10:25 PM  
 Appearance CLEAR 10/27/2012 10:25 PM  
 Specific gravity 1.012 10/27/2012 10:25 PM  
 Specific gravity 1.015 08/30/2010 07:10 AM  
 pH (UA) 8.0 10/27/2012 10:25 PM  
 Protein 100 (A) 10/27/2012 10:25 PM  
 Glucose NEGATIVE  10/27/2012 10:25 PM  
 Ketone NEGATIVE  10/27/2012 10:25 PM  
 Bilirubin NEGATIVE  10/27/2012 10:25 PM  
 Urobilinogen 1.0 10/27/2012 10:25 PM  
 Nitrites NEGATIVE  10/27/2012 10:25 PM  
 Leukocyte Esterase TRACE (A) 10/27/2012 10:25 PM  
 Epithelial cells 0-5 10/27/2012 10:25 PM  
 Bacteria NEGATIVE  10/27/2012 10:25 PM  
 WBC 10-20 10/27/2012 10:25 PM  
 RBC 5-10 10/27/2012 10:25 PM  
 
 
 
Medications Reviewed:  
 
Current Facility-Administered Medications Medication Dose Route Frequency  carvedilol (COREG) tablet 12.5 mg  12.5 mg Oral BID WITH MEALS  cinacalcet (SENSIPAR) tablet 30 mg  30 mg Oral DAILY  ascorbic acid (vitamin C) (VITAMIN C) tablet 1,000 mg  1,000 mg Oral DAILY  aspirin chewable tablet 81 mg  81 mg Oral DAILY  B complex-vitaminC-folic acid (NEPHROCAP) cap  1 Cap Oral DAILY  FLUoxetine (PROzac) capsule 20 mg  20 mg Oral DAILY  gabapentin (NEURONTIN) capsule 100 mg  100 mg Oral BID  latanoprost (XALATAN) 0.005 % ophthalmic solution 1 Drop  1 Drop Both Eyes QHS  [START ON 7/9/2018] midodrine (PROAMITINE) tablet 5 mg  5 mg Oral Once per day on Mon Wed Fri  polyethylene glycol (MIRALAX) packet 17 g  17 g Oral BID PRN  
 sevelamer carbonate (RENVELA) tab 800 mg  800 mg Oral TID WITH MEALS  simvastatin (ZOCOR) tablet 20 mg  20 mg Oral QHS  sodium chloride (NS) flush 5-10 mL  5-10 mL IntraVENous Q8H  
 sodium chloride (NS) flush 5-10 mL  5-10 mL IntraVENous PRN  Vancomycin- pharmacy to dose   Other Rx Dosing/Monitoring  vancomycin (VANCOCIN) 1,000 mg in 0.9% sodium chloride (MBP/ADV) 250 mL  1,000 mg IntraVENous DIALYSIS PRN  
 fentaNYL citrate (PF) injection 25 mcg  25 mcg IntraVENous Q4H PRN  
 
______________________________________________________________________ EXPECTED LENGTH OF STAY: 4d 16h ACTUAL LENGTH OF STAY:          1 Dayana Jones MD

## 2018-07-07 NOTE — PROCEDURES
[facility]  *** FINAL REPORT ***    Name: Phuong Rome  MRN: [id number]    Inpatient  : 08 May 1950  HIS Order #: [id number]  [address] [facility] Visit #: [id number]  Date: 2018    TYPE OF TEST: Peripheral Arterial Testing    REASON FOR TEST  B/L foot ulcers    Right Leg  Segmentals: Noncompressible                     mmHg  Brachial  High thigh  Low thigh        179  Calf             171  Posterior tibial 137  Dorsalis pedis  Peroneal  Metatarsal  Toe pressure      71  Doppler:  PVR:  Ankle/Brachial: 1.17    Left Leg  Segmentals: Normal                     mmHg  Brachial         117  High thigh  Low thigh        176  Calf  Posterior tibial 150  Dorsalis pedis   172  Peroneal  Metatarsal  Toe pressure      81  Doppler:  PVR:  Ankle/Brachial: 1.47    INTERPRETATION/FINDINGS  PROCEDURE:  Evaluation of lower extremity arteries with multilevel  systolic blood pressure measurements and pulse volume recording (PVR)  plethysmography. Includes calculation of the ankle/brachial pressure  indices (ISAC's). FINDINGS:  ISAC is 1.17 on the right and 1.47on the left. ISAC's may be  elevated due to diabetic arterial calcification. PVR waveforms on the   right are normal at the thigh, near normal at the calf, and near  normal at the ankle. PVR waveforms on the left are normal at the  thigh, normal at the calf, and near normal at the ankle. Left  brachial pressure used for ISAC calculation as patient has fistula in  right arm and failed fistula in left upper arm. The right dorsalis  pedis and left calf pressures were not obtained due to  non-compressible vessels. IMPRESSION:  Based on PVR waveforms, there is no evidence of  hemodynamically significant lower extremity arterial obstruction. ADDITIONAL COMMENTS    I have personally reviewed the data relevant to the interpretation of  this  study.     TECHNOLOGIST: Javon Manzanares RVT  Signed: 2018 01:44 PM    PHYSICIAN: Otto Zepeda MD  Signed: 07/08/2018 08:58 PM

## 2018-07-07 NOTE — PROGRESS NOTES
Pharmacist Note - Vancomycin Dosing (Hemodialysis Patient) Consult provided for this 70 y.o. female for indication of  LE cellulitis. Bob Lizarraga Lab Results Component Value Date/Time Creatinine 6.25 (H) 2018 06:22 PM  
 Creatinine (POC) 8.4 (H) 2018 08:46 AM  
 WBC 9.6 2018 06:22 PM  
 BUN 47 (H) 2018 06:22 PM  
 BUN (POC) 50 (H) 2018 08:46 AM  
Temp (24hrs), Av.8 °F (37.1 °C), Min:98.2 °F (36.8 °C), Max:99.4 °F (37.4 °C) ESRD on HD A 2000 mg loading dose was given, to be followed with a dose of 1000 mg after each HD session. Dose will be adjusted to maintain a pre-HD trough of approximately 15 - 20 mcg/mL for therapeutic goal of 10 - 15 mcg/mL as approximately 35% of drug will be removed by HD filtration. Pharmacy to follow patient daily and order levels / make dose adjustments as appropriate.

## 2018-07-07 NOTE — CDMP QUERY
Agustina Mejia, There is documentation by nursing staff of a left sacral wound for this patient, please specify the type of wound in your progress notes. After further study, if the wound is found to be a pressure ulcer please specify the POA status and stage of the pressure ulcer. Pressure ulcer staging: 
 
Stage I: Intact skin with non-blanchable redness of a localized area usually over a bony prominence; darkly pigmented skin may not have visible blanching Stage II: Partial thickness loss of dermis presenting as a shallow open ulcer with a red pink wound bed, without slough - may also present as an intact or open/ruptured serum filled blister Stage III: Full thickness tissue loss; subcutaneous fat may be visible but bone, tendon or muscle are not exposed. Jodene Patricio may be present but does not obscure the depth of tissue loss - may include undermining and tunneling Stage IV: Full thickness tissue loss with exposed bone, tendon or muscle - slough eschar may be present on some parts of the wound bed; often includes undermining and tunneling Unstageable: Full thickness tissue loss in which the base of the ulcer is covered by slough and/or eschar *if the \"unstageable ulcer\" is \"staged\" during the admission/encounter, report the stage Suspected Deep Tissue Injury: Purple or maroon localized area of discolored intact skin or blood filled blister d/t damage of the underlying soft tissue from pressure and/or sheer. The wound may further evolve and become covered by a thin eschar. Evolution may be rapid exposing additional layers of tissue even with optimal treatment. Thank you, Beth Strickland [facility] 
[phone]

## 2018-07-07 NOTE — PROGRESS NOTES
Primary Nurse Patrica Resendez, MARVA and Mitul Marin RN performed a dual skin assessment on this patient Impairment noted- see wound doc flow sheet Rob score is 18. Wounds: Patient is receiving home health care for wound care management. 1. Right heel -  Black and boggy 2. Left lateral ankle open wound. 3. Left knee - open abrasion 4. Left sacral wound. 5. Abdominal folds - intact, moist  
6. Right hip - healed incision (femer fx) 7. Multiple healed dark areas to bilateral lower extremities. 8. Left upper arm non-functioning AVF.
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
## 2018-07-07 NOTE — PROGRESS NOTES
TRANSFER - IN REPORT: 
 
Verbal report received from [address] (name) on Ger Velázquez  being received from ED (unit) for routine progression of care Report consisted of patients Situation, Background, Assessment and  
Recommendations(SBAR). Information from the following report(s) Kardex, ED Summary, Intake/Output, Accordion, Recent Results and Med Rec Status was reviewed with the receiving nurse. Opportunity for questions and clarification was provided. Assessment completed upon patients arrival to unit and care assumed.

## 2018-07-07 NOTE — PROGRESS NOTES
Bedside shift change report given to [facility] Radha Serna (oncoming nurse) by Cathleen Graham (offgoing nurse). Report included the following information SBAR, Kardex and MAR.

## 2018-07-07 NOTE — ROUTINE PROCESS
TRANSFER - OUT REPORT: 
 
Verbal report given to Mai(name) on Jo Lucio  being transferred to 6E(unit) for routine progression of care Report consisted of patients Situation, Background, Assessment and  
Recommendations(SBAR). Information from the following report(s) ED Summary and Recent Results was reviewed with the receiving nurse. Lines:  
Peripheral IV 07/06/18 Left Hand (Active) Site Assessment Clean, dry, & intact 7/6/2018  6:23 PM  
Phlebitis Assessment 0 7/6/2018  6:23 PM  
Infiltration Assessment 0 7/6/2018  6:23 PM  
Dressing Status Clean, dry, & intact 7/6/2018  6:23 PM  
Dressing Type Tape;Transparent 7/6/2018  6:23 PM  
Hub Color/Line Status Pink;Capped;Flushed;Patent 7/6/2018  6:23 PM  
Action Taken Blood drawn 7/6/2018  6:23 PM  
  
 
Opportunity for questions and clarification was provided. Patient transported with: 
 [employer] Diabetes

## 2018-07-07 NOTE — PROGRESS NOTES
Problem: Falls - Risk of 
Goal: *Absence of Falls Document Latanya Nelson Fall Risk and appropriate interventions in the flowsheet. Outcome: Progressing Towards Goal 
Fall Risk Interventions: 
Mobility Interventions: Assess mobility with egress test, Communicate number of staff needed for ambulation/transfer, OT consult for ADLs, PT Consult for mobility concerns Medication Interventions: Evaluate medications/consider consulting pharmacy, Utilize gait belt for transfers/ambulation Elimination Interventions: Call light in reach, Patient to call for help with toileting needs
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
## 2018-07-07 NOTE — PROGRESS NOTES
Bedside shift change report given to Severa Pinks, RN (oncoming nurse) by Lanre Menendez RN (offgoing nurse). Report included the following information SBAR, Procedure Summary, Intake/Output, MAR, Recent Results and Med Rec Status.

## 2018-07-07 NOTE — PROGRESS NOTES
Problem: Pain Goal: *Control of Pain Outcome: Progressing Towards Goal 
Patient will be able to voice better pain control. Pain scale, name of medication, dose and frequency educated on.

## 2018-07-08 ENCOUNTER — APPOINTMENT (OUTPATIENT)
Dept: MRI IMAGING | Age: 68
DRG: 628 | End: 2018-07-08
Attending: PODIATRIST
Payer: MEDICARE

## 2018-07-08 LAB
ALBUMIN SERPL-MCNC: 1.9 G/DL (ref 3.5–5)
ALBUMIN/GLOB SERPL: 0.3 {RATIO} (ref 1.1–2.2)
ALP SERPL-CCNC: 203 U/L (ref 45–117)
ALT SERPL-CCNC: 24 U/L (ref 12–78)
ANION GAP SERPL CALC-SCNC: 13 MMOL/L (ref 5–15)
AST SERPL-CCNC: 16 U/L (ref 15–37)
BACTERIA SPEC CULT: NORMAL
BACTERIA SPEC CULT: NORMAL
BILIRUB SERPL-MCNC: 0.4 MG/DL (ref 0.2–1)
BUN SERPL-MCNC: 62 MG/DL (ref 6–20)
BUN/CREAT SERPL: 7 (ref 12–20)
CALCIUM SERPL-MCNC: 7.7 MG/DL (ref 8.5–10.1)
CHLORIDE SERPL-SCNC: 89 MMOL/L (ref 97–108)
CO2 SERPL-SCNC: 26 MMOL/L (ref 21–32)
CREAT SERPL-MCNC: 8.68 MG/DL (ref 0.55–1.02)
ERYTHROCYTE [DISTWIDTH] IN BLOOD BY AUTOMATED COUNT: 15.8 % (ref 11.5–14.5)
GLOBULIN SER CALC-MCNC: 6.1 G/DL (ref 2–4)
GLUCOSE BLD STRIP.AUTO-MCNC: 189 MG/DL (ref 65–100)
GLUCOSE BLD STRIP.AUTO-MCNC: 209 MG/DL (ref 65–100)
GLUCOSE BLD STRIP.AUTO-MCNC: 219 MG/DL (ref 65–100)
GLUCOSE BLD STRIP.AUTO-MCNC: 229 MG/DL (ref 65–100)
GLUCOSE SERPL-MCNC: 176 MG/DL (ref 65–100)
HCT VFR BLD AUTO: 30.9 % (ref 35–47)
HGB BLD-MCNC: 9.4 G/DL (ref 11.5–16)
MCH RBC QN AUTO: 27.6 PG (ref 26–34)
MCHC RBC AUTO-ENTMCNC: 30.4 G/DL (ref 30–36.5)
MCV RBC AUTO: 90.6 FL (ref 80–99)
NRBC # BLD: 0 K/UL (ref 0–0.01)
NRBC BLD-RTO: 0 PER 100 WBC
PLATELET # BLD AUTO: 259 K/UL (ref 150–400)
PMV BLD AUTO: 8.8 FL (ref 8.9–12.9)
POTASSIUM SERPL-SCNC: 4.3 MMOL/L (ref 3.5–5.1)
PROT SERPL-MCNC: 8 G/DL (ref 6.4–8.2)
RBC # BLD AUTO: 3.41 M/UL (ref 3.8–5.2)
SERVICE CMNT-IMP: ABNORMAL
SERVICE CMNT-IMP: NORMAL
SODIUM SERPL-SCNC: 128 MMOL/L (ref 136–145)
WBC # BLD AUTO: 11.2 K/UL (ref 3.6–11)

## 2018-07-08 PROCEDURE — 73718 MRI LOWER EXTREMITY W/O DYE: CPT

## 2018-07-08 PROCEDURE — 94761 N-INVAS EAR/PLS OXIMETRY MLT: CPT

## 2018-07-08 PROCEDURE — 74011636637 HC RX REV CODE- 636/637: Performed by: HOSPITALIST

## 2018-07-08 PROCEDURE — 82962 GLUCOSE BLOOD TEST: CPT

## 2018-07-08 PROCEDURE — 74011250637 HC RX REV CODE- 250/637: Performed by: FAMILY MEDICINE

## 2018-07-08 PROCEDURE — 36415 COLL VENOUS BLD VENIPUNCTURE: CPT | Performed by: HOSPITALIST

## 2018-07-08 PROCEDURE — 80053 COMPREHEN METABOLIC PANEL: CPT | Performed by: HOSPITALIST

## 2018-07-08 PROCEDURE — 74011250636 HC RX REV CODE- 250/636: Performed by: HOSPITALIST

## 2018-07-08 PROCEDURE — 74011250637 HC RX REV CODE- 250/637: Performed by: HOSPITALIST

## 2018-07-08 PROCEDURE — 3331090001 HH PPS REVENUE CREDIT

## 2018-07-08 PROCEDURE — 3331090002 HH PPS REVENUE DEBIT

## 2018-07-08 PROCEDURE — 74011250636 HC RX REV CODE- 250/636: Performed by: FAMILY MEDICINE

## 2018-07-08 PROCEDURE — 65270000032 HC RM SEMIPRIVATE

## 2018-07-08 PROCEDURE — 85027 COMPLETE CBC AUTOMATED: CPT | Performed by: HOSPITALIST

## 2018-07-08 RX ORDER — FENTANYL CITRATE 50 UG/ML
25 INJECTION, SOLUTION INTRAMUSCULAR; INTRAVENOUS ONCE
Status: COMPLETED | OUTPATIENT
Start: 2018-07-08 | End: 2018-07-08

## 2018-07-08 RX ORDER — INSULIN GLARGINE 100 [IU]/ML
10 INJECTION, SOLUTION SUBCUTANEOUS
Status: DISCONTINUED | OUTPATIENT
Start: 2018-07-08 | End: 2018-07-09

## 2018-07-08 RX ADMIN — LATANOPROST 1 DROP: 50 SOLUTION OPHTHALMIC at 21:33

## 2018-07-08 RX ADMIN — INSULIN LISPRO 2 UNITS: 100 INJECTION, SOLUTION INTRAVENOUS; SUBCUTANEOUS at 07:27

## 2018-07-08 RX ADMIN — FENTANYL CITRATE 25 MCG: 50 INJECTION, SOLUTION INTRAMUSCULAR; INTRAVENOUS at 21:31

## 2018-07-08 RX ADMIN — INSULIN LISPRO 3 UNITS: 100 INJECTION, SOLUTION INTRAVENOUS; SUBCUTANEOUS at 17:39

## 2018-07-08 RX ADMIN — FLUOXETINE 20 MG: 20 CAPSULE ORAL at 08:28

## 2018-07-08 RX ADMIN — SEVELAMER CARBONATE 800 MG: 800 TABLET, FILM COATED ORAL at 11:46

## 2018-07-08 RX ADMIN — Medication 10 ML: at 21:32

## 2018-07-08 RX ADMIN — INSULIN LISPRO 2 UNITS: 100 INJECTION, SOLUTION INTRAVENOUS; SUBCUTANEOUS at 21:31

## 2018-07-08 RX ADMIN — POLYETHYLENE GLYCOL 3350 17 G: 17 POWDER, FOR SOLUTION ORAL at 17:39

## 2018-07-08 RX ADMIN — Medication 10 ML: at 12:03

## 2018-07-08 RX ADMIN — FENTANYL CITRATE 25 MCG: 50 INJECTION, SOLUTION INTRAMUSCULAR; INTRAVENOUS at 12:02

## 2018-07-08 RX ADMIN — FENTANYL CITRATE 25 MCG: 50 INJECTION, SOLUTION INTRAMUSCULAR; INTRAVENOUS at 15:51

## 2018-07-08 RX ADMIN — FENTANYL CITRATE 25 MCG: 50 INJECTION, SOLUTION INTRAMUSCULAR; INTRAVENOUS at 17:40

## 2018-07-08 RX ADMIN — INSULIN LISPRO 3 UNITS: 100 INJECTION, SOLUTION INTRAVENOUS; SUBCUTANEOUS at 11:45

## 2018-07-08 RX ADMIN — GABAPENTIN 100 MG: 100 CAPSULE ORAL at 07:05

## 2018-07-08 RX ADMIN — Medication 10 ML: at 11:46

## 2018-07-08 RX ADMIN — ASPIRIN 81 MG 81 MG: 81 TABLET ORAL at 08:28

## 2018-07-08 RX ADMIN — ASCORBIC ACID, THIAMINE MONONITRATE,RIBOFLAVIN, NIACINAMIDE, PYRIDOXINE HYDROCHLORIDE, FOLIC ACID, CYANOCOBALAMIN, BIOTIN, CALCIUM PANTOTHENATE, 1 CAPSULE: 100; 1.5; 1.7; 20; 10; 1; 6000; 150000; 5 CAPSULE, LIQUID FILLED ORAL at 08:28

## 2018-07-08 RX ADMIN — SIMVASTATIN 20 MG: 20 TABLET, FILM COATED ORAL at 21:32

## 2018-07-08 RX ADMIN — SEVELAMER CARBONATE 800 MG: 800 TABLET, FILM COATED ORAL at 17:39

## 2018-07-08 RX ADMIN — Medication 10 ML: at 07:02

## 2018-07-08 RX ADMIN — INSULIN GLARGINE 10 UNITS: 100 INJECTION, SOLUTION SUBCUTANEOUS at 21:31

## 2018-07-08 RX ADMIN — OXYCODONE HYDROCHLORIDE AND ACETAMINOPHEN 1000 MG: 500 TABLET ORAL at 08:28

## 2018-07-08 RX ADMIN — FENTANYL CITRATE 25 MCG: 50 INJECTION, SOLUTION INTRAMUSCULAR; INTRAVENOUS at 07:01

## 2018-07-08 RX ADMIN — CINACALCET HYDROCHLORIDE 30 MG: 30 TABLET, COATED ORAL at 21:30

## 2018-07-08 RX ADMIN — SEVELAMER CARBONATE 800 MG: 800 TABLET, FILM COATED ORAL at 07:04

## 2018-07-08 RX ADMIN — GABAPENTIN 100 MG: 100 CAPSULE ORAL at 21:32

## 2018-07-08 RX ADMIN — NORTRIPTYLINE HYDROCHLORIDE 50 MG: 25 CAPSULE ORAL at 21:31

## 2018-07-08 NOTE — CONSULTS
Vascular Surgery  Consult Note    Impression:  PAD w/ osteo of Rt heel  With PAD, ESRD, and DM likelihood of progressing to BKA is very high  Right hand ischemia due to steal from AVF    Plan:   Will need arteriogram prior to foot debridement  Upper extremity PVR's w/ AVF compression  Dr Odell Morelos will f/u tomorrow    Eusebio Montoya MD

## 2018-07-08 NOTE — PROGRESS NOTES
Hospitalist Progress Note Benitez Bermudez MD 
Answering service: [phone] OR [phone] from in house phone Cell: [phone] Date of Service:  2018 NAME:  Desirae Regalado :  1950 MRN:  [id number] Admission Summary:  
Desirae Regalado is a 70 y.o.  female with past medical history of anemia, arthritis, asthma, anxiety, ESRD, chronic pain, depression, type 2 diabetes mellitus (DM), GERD, glaucoma, hypertension, hyperlipidemia, IBS, migraine, morbid obesity, sleep apnea presented to the ED from home with chief complaint of leg swelling. Interval history / Subjective:  
Pain lower legs,  Pain /10, no chest pain or shortness of breath Assessment & Plan:  
 
Acute bilateral foot cellulitis with chronic bilateral heel wound POA 
-continue on IV vancomycin  
-blood cx no growth 
-wound care per wound care nurse 
-doppler study negative for right leg DVT 
-MRI of foot  
-vascular surgeon  
-seen by podiatrist, Hyponatremia  
-patient on dialysis  
-HD per nephrologist 
 
ESRD DD 
-on sensipar, renvela,  sodium bicarbonate 
-HD per nephrologist 
 
Anemia of chronic disease  
-H/H trending down, no evidence of active bleeding, monitor H/H 
 
HTN 
-BP normal, continue on coreg, monitor BP 
 
DM-II 
-A1c 9.1 on 4/10/18 
-add lantus 10 units q hs 
-sliding scale and monitor finger stick glucose Elevated liver enzyme 
-multifactorial,  
-improved Hx of asthma 
-stable, stable, SpO2 94-95% Hx of anxiety, depression 
-stable, continue on prozac, add home nortriptyline Left sacral wound, left open abrasion, left lateral ankle wound POA 
-continue wound care per wound care nurse Morbid obesity 
-diet and weight loss program  
 
Hx of sleep apnea 
-CPAP q hs Code status: Full Code DVT prophylaxis: SCD Care Plan discussed with: Patient/Family and Nurse Disposition: TBD Son at beside, questions answered Hospital Problems  Date Reviewed: 7/6/2018 Codes Class Noted POA Anemia ICD-10-CM: D64.9 ICD-9-CM: 285.9  7/6/2018 Unknown ESRD (end stage renal disease) ([facility] [address] ICD-10-CM: N18.6 ICD-9-CM: 585.6  7/6/2018 Unknown Bilateral leg edema ICD-10-CM: R60.0 ICD-9-CM: 782.3  7/6/2018 Unknown LFT elevation ICD-10-CM: R79.89 ICD-9-CM: 790.6  7/6/2018 Unknown Type 2 diabetes mellitus with hyperglycemia (HCC) ICD-10-CM: E11.65 ICD-9-CM: 250.00  7/6/2018 Unknown * (Principal)Bilateral lower leg cellulitis ICD-10-CM: L03.116, O35.938 ICD-9-CM: 682.6  7/6/2018 Unknown Vital Signs:  
 Last 24hrs VS reviewed since prior progress note. Most recent are: 
Visit Vitals  BP 99/46 (BP 1 Location: Left arm, BP Patient Position: At rest)  Pulse 67  Temp 97.6 °F (36.4 °C)  Resp 18  Ht 6' 1.75\" (1.873 m)  Wt 127 kg (280 lb)  SpO2 95%  Breastfeeding No  
 BMI 36.19 kg/m2 Intake/Output Summary (Last 24 hours) at 07/08/18 1212 Last data filed at 07/08/18 1242 Gross per 24 hour Intake              360 ml Output                0 ml Net              360 ml Physical Examination:  
 
 
     
Constitutional:  No acute distress, cooperative, pleasant   
ENT:  Oral mucous moist, oropharynx benign. Neck supple, Resp:  CTA bilaterally. No wheezing/rhonchi/rales. No accessory muscle use CV:  Regular rhythm, normal rate, no murmurs, gallops, rubs GI:  Soft, non distended, non tender. normoactive bowel sounds, no hepatosplenomegaly Musculoskeletal:  Bilateral foot dressed Neurologic:  Moves all extremities. AAOx3, CN II-XII reviewed Skin:  Left sacral wound, left open abrasion, left lateral ankle wound Data Review:  
 Review and/or order of clinical lab test 
 
 
Labs:  
 
Recent Labs  
   07/08/18 
 0707  07/07/18 
 0450 WBC  11.2*  10.8 HGB  9.4*  9.1*  
HCT  30.9*  30.4* PLT  259  251 Recent Labs  
   07/08/18 0707 07/07/18 
 0450  07/06/18 
 1822 NA  128*  131*  131* K  4.3  4.0  4.7 CL  89*  91*  89* CO2  26  27  29 BUN  62*  51*  47* CREA  8.68*  6.86*  6.25* GLU  176*  136*  217* CA  7.7*  8.1*  8.2* Recent Labs  
   07/08/18 0707 07/07/18 
 0450 07/06/18 
 1822 SGOT  16  31  52* ALT  24  28  38 AP  203*  211*  256* TBILI  0.4  0.4  0.3 TP  8.0  8.3*  9.8* ALB  1.9*  1.9*  2.3*  
GLOB  6.1*  6.4*  7.5* No results for input(s): INR, PTP, APTT in the last 72 hours. No lab exists for component: INREXT, INREXT No results for input(s): FE, TIBC, PSAT, FERR in the last 72 hours. No results found for: FOL, RBCF No results for input(s): PH, PCO2, PO2 in the last 72 hours. No results for input(s): CPK, CKNDX, TROIQ in the last 72 hours. No lab exists for component: CPKMB Lab Results Component Value Date/Time Cholesterol, total 144 04/10/2018 02:53 AM  
 HDL Cholesterol 66 04/10/2018 02:53 AM  
 LDL, calculated 67.6 04/10/2018 02:53 AM  
 Triglyceride 52 04/10/2018 02:53 AM  
 CHOL/HDL Ratio 2.2 04/10/2018 02:53 AM  
 
Lab Results Component Value Date/Time Glucose (POC) 209 (H) 07/08/2018 11:26 AM  
 Glucose (POC) 189 (H) 07/08/2018 06:33 AM  
 Glucose (POC) 193 (H) 07/07/2018 09:27 PM  
 Glucose (POC) 187 (H) 07/07/2018 04:23 PM  
 Glucose (POC) 225 (H) 07/07/2018 11:14 AM  
 
Lab Results Component Value Date/Time  Color YELLOW 10/27/2012 10:25 PM  
 Appearance CLEAR 10/27/2012 10:25 PM  
 Specific gravity 1.012 10/27/2012 10:25 PM  
 Specific gravity 1.015 08/30/2010 07:10 AM  
 pH (UA) 8.0 10/27/2012 10:25 PM  
 Protein 100 (A) 10/27/2012 10:25 PM  
 Glucose NEGATIVE  10/27/2012 10:25 PM  
 Ketone NEGATIVE  10/27/2012 10:25 PM  
 Bilirubin NEGATIVE  10/27/2012 10:25 PM  
 Urobilinogen 1.0 10/27/2012 10:25 PM  
 Nitrites NEGATIVE  10/27/2012 10:25 PM  
 Leukocyte Esterase TRACE (A) 10/27/2012 10:25 PM  
 Epithelial cells 0-5 10/27/2012 10:25 PM  
 Bacteria NEGATIVE  10/27/2012 10:25 PM  
 WBC 10-20 10/27/2012 10:25 PM  
 RBC 5-10 10/27/2012 10:25 PM  
 
 
 
Medications Reviewed:  
 
Current Facility-Administered Medications Medication Dose Route Frequency  carvedilol (COREG) tablet 12.5 mg  12.5 mg Oral BID WITH MEALS  cinacalcet (SENSIPAR) tablet 30 mg  30 mg Oral DAILY  ascorbic acid (vitamin C) (VITAMIN C) tablet 1,000 mg  1,000 mg Oral DAILY  aspirin chewable tablet 81 mg  81 mg Oral DAILY  B complex-vitaminC-folic acid (NEPHROCAP) cap  1 Cap Oral DAILY  FLUoxetine (PROzac) capsule 20 mg  20 mg Oral DAILY  gabapentin (NEURONTIN) capsule 100 mg  100 mg Oral BID  latanoprost (XALATAN) 0.005 % ophthalmic solution 1 Drop  1 Drop Both Eyes QHS  [START ON 7/9/2018] midodrine (PROAMITINE) tablet 5 mg  5 mg Oral Once per day on Mon Wed Fri  polyethylene glycol (MIRALAX) packet 17 g  17 g Oral BID PRN  
 sevelamer carbonate (RENVELA) tab 800 mg  800 mg Oral TID WITH MEALS  simvastatin (ZOCOR) tablet 20 mg  20 mg Oral QHS  sodium chloride (NS) flush 5-10 mL  5-10 mL IntraVENous Q8H  
 sodium chloride (NS) flush 5-10 mL  5-10 mL IntraVENous PRN  
 insulin lispro (HUMALOG) injection   SubCUTAneous AC&HS  
 glucose chewable tablet 16 g  4 Tab Oral PRN  
 dextrose (D50W) injection syrg 12.5-25 g  12.5-25 g IntraVENous PRN  
 glucagon (GLUCAGEN) injection 1 mg  1 mg IntraMUSCular PRN  
 nortriptyline (PAMELOR) capsule 50 mg  50 mg Oral QHS  Vancomycin- pharmacy to dose   Other Rx Dosing/Monitoring  vancomycin (VANCOCIN) 1,000 mg in 0.9% sodium chloride (MBP/ADV) 250 mL  1,000 mg IntraVENous DIALYSIS PRN  
 fentaNYL citrate (PF) injection 25 mcg  25 mcg IntraVENous Q4H PRN  
 
______________________________________________________________________ EXPECTED LENGTH OF STAY: 4d 16h ACTUAL LENGTH OF STAY:          2 Joy Toribio MD

## 2018-07-09 LAB
ALBUMIN SERPL-MCNC: 2 G/DL (ref 3.5–5)
ALBUMIN/GLOB SERPL: 0.3 {RATIO} (ref 1.1–2.2)
ALP SERPL-CCNC: 234 U/L (ref 45–117)
ALT SERPL-CCNC: 21 U/L (ref 12–78)
ANION GAP SERPL CALC-SCNC: 15 MMOL/L (ref 5–15)
ANION GAP SERPL CALC-SCNC: 9 MMOL/L (ref 5–15)
AST SERPL-CCNC: 16 U/L (ref 15–37)
BILIRUB SERPL-MCNC: 0.4 MG/DL (ref 0.2–1)
BUN SERPL-MCNC: 26 MG/DL (ref 6–20)
BUN SERPL-MCNC: 75 MG/DL (ref 6–20)
BUN/CREAT SERPL: 6 (ref 12–20)
BUN/CREAT SERPL: 8 (ref 12–20)
CALCIUM SERPL-MCNC: 7.4 MG/DL (ref 8.5–10.1)
CALCIUM SERPL-MCNC: 8.6 MG/DL (ref 8.5–10.1)
CHLORIDE SERPL-SCNC: 87 MMOL/L (ref 97–108)
CHLORIDE SERPL-SCNC: 96 MMOL/L (ref 97–108)
CO2 SERPL-SCNC: 24 MMOL/L (ref 21–32)
CO2 SERPL-SCNC: 29 MMOL/L (ref 21–32)
CREAT SERPL-MCNC: 4.4 MG/DL (ref 0.55–1.02)
CREAT SERPL-MCNC: 9.84 MG/DL (ref 0.55–1.02)
ERYTHROCYTE [DISTWIDTH] IN BLOOD BY AUTOMATED COUNT: 15.9 % (ref 11.5–14.5)
GLOBULIN SER CALC-MCNC: 7.4 G/DL (ref 2–4)
GLUCOSE BLD STRIP.AUTO-MCNC: 162 MG/DL (ref 65–100)
GLUCOSE BLD STRIP.AUTO-MCNC: 176 MG/DL (ref 65–100)
GLUCOSE BLD STRIP.AUTO-MCNC: 251 MG/DL (ref 65–100)
GLUCOSE BLD STRIP.AUTO-MCNC: 292 MG/DL (ref 65–100)
GLUCOSE SERPL-MCNC: 142 MG/DL (ref 65–100)
GLUCOSE SERPL-MCNC: 157 MG/DL (ref 65–100)
HCT VFR BLD AUTO: 30 % (ref 35–47)
HGB BLD-MCNC: 9.1 G/DL (ref 11.5–16)
MCH RBC QN AUTO: 27.1 PG (ref 26–34)
MCHC RBC AUTO-ENTMCNC: 30.3 G/DL (ref 30–36.5)
MCV RBC AUTO: 89.3 FL (ref 80–99)
NRBC # BLD: 0 K/UL (ref 0–0.01)
NRBC BLD-RTO: 0 PER 100 WBC
PLATELET # BLD AUTO: 276 K/UL (ref 150–400)
PMV BLD AUTO: 8.8 FL (ref 8.9–12.9)
POTASSIUM SERPL-SCNC: 3.9 MMOL/L (ref 3.5–5.1)
POTASSIUM SERPL-SCNC: 4.9 MMOL/L (ref 3.5–5.1)
PROT SERPL-MCNC: 9.4 G/DL (ref 6.4–8.2)
RBC # BLD AUTO: 3.36 M/UL (ref 3.8–5.2)
SERVICE CMNT-IMP: ABNORMAL
SODIUM SERPL-SCNC: 126 MMOL/L (ref 136–145)
SODIUM SERPL-SCNC: 134 MMOL/L (ref 136–145)
WBC # BLD AUTO: 12.3 K/UL (ref 3.6–11)

## 2018-07-09 PROCEDURE — 74011636637 HC RX REV CODE- 636/637: Performed by: HOSPITALIST

## 2018-07-09 PROCEDURE — 74011250636 HC RX REV CODE- 250/636: Performed by: HOSPITALIST

## 2018-07-09 PROCEDURE — 85027 COMPLETE CBC AUTOMATED: CPT | Performed by: HOSPITALIST

## 2018-07-09 PROCEDURE — 5A1D70Z PERFORMANCE OF URINARY FILTRATION, INTERMITTENT, LESS THAN 6 HOURS PER DAY: ICD-10-PCS | Performed by: INTERNAL MEDICINE

## 2018-07-09 PROCEDURE — 94761 N-INVAS EAR/PLS OXIMETRY MLT: CPT

## 2018-07-09 PROCEDURE — 77030011255 HC DSG AQUACEL AG BMS -A

## 2018-07-09 PROCEDURE — 80053 COMPREHEN METABOLIC PANEL: CPT | Performed by: HOSPITALIST

## 2018-07-09 PROCEDURE — 77030011256 HC DRSG MEPILEX <16IN NO BORD MOLN -A

## 2018-07-09 PROCEDURE — 94660 CPAP INITIATION&MGMT: CPT

## 2018-07-09 PROCEDURE — 74011250636 HC RX REV CODE- 250/636: Performed by: FAMILY MEDICINE

## 2018-07-09 PROCEDURE — 74011636637 HC RX REV CODE- 636/637: Performed by: SURGERY

## 2018-07-09 PROCEDURE — 3331090002 HH PPS REVENUE DEBIT

## 2018-07-09 PROCEDURE — 82962 GLUCOSE BLOOD TEST: CPT

## 2018-07-09 PROCEDURE — 74011250637 HC RX REV CODE- 250/637: Performed by: FAMILY MEDICINE

## 2018-07-09 PROCEDURE — 36415 COLL VENOUS BLD VENIPUNCTURE: CPT | Performed by: HOSPITALIST

## 2018-07-09 PROCEDURE — 65270000032 HC RM SEMIPRIVATE

## 2018-07-09 PROCEDURE — 3331090001 HH PPS REVENUE CREDIT

## 2018-07-09 PROCEDURE — 74011250637 HC RX REV CODE- 250/637: Performed by: HOSPITALIST

## 2018-07-09 PROCEDURE — 90935 HEMODIALYSIS ONE EVALUATION: CPT

## 2018-07-09 RX ORDER — INSULIN GLARGINE 100 [IU]/ML
20 INJECTION, SOLUTION SUBCUTANEOUS
Status: DISCONTINUED | OUTPATIENT
Start: 2018-07-09 | End: 2018-07-10

## 2018-07-09 RX ORDER — DIPHENHYDRAMINE HYDROCHLORIDE 50 MG/ML
50 INJECTION, SOLUTION INTRAMUSCULAR; INTRAVENOUS ONCE
Status: COMPLETED | OUTPATIENT
Start: 2018-07-10 | End: 2018-07-10

## 2018-07-09 RX ORDER — FENTANYL CITRATE 50 UG/ML
50 INJECTION, SOLUTION INTRAMUSCULAR; INTRAVENOUS
Status: DISCONTINUED | OUTPATIENT
Start: 2018-07-09 | End: 2018-07-12

## 2018-07-09 RX ADMIN — CARVEDILOL 12.5 MG: 12.5 TABLET, FILM COATED ORAL at 19:03

## 2018-07-09 RX ADMIN — SIMVASTATIN 20 MG: 20 TABLET, FILM COATED ORAL at 20:47

## 2018-07-09 RX ADMIN — NORTRIPTYLINE HYDROCHLORIDE 50 MG: 25 CAPSULE ORAL at 20:44

## 2018-07-09 RX ADMIN — MIDODRINE HYDROCHLORIDE 5 MG: 5 TABLET ORAL at 20:44

## 2018-07-09 RX ADMIN — PREDNISONE 50 MG: 20 TABLET ORAL at 23:42

## 2018-07-09 RX ADMIN — INSULIN LISPRO 2 UNITS: 100 INJECTION, SOLUTION INTRAVENOUS; SUBCUTANEOUS at 11:25

## 2018-07-09 RX ADMIN — LATANOPROST 1 DROP: 50 SOLUTION OPHTHALMIC at 21:57

## 2018-07-09 RX ADMIN — GABAPENTIN 100 MG: 100 CAPSULE ORAL at 20:44

## 2018-07-09 RX ADMIN — Medication 10 ML: at 07:25

## 2018-07-09 RX ADMIN — ASPIRIN 81 MG 81 MG: 81 TABLET ORAL at 09:50

## 2018-07-09 RX ADMIN — GABAPENTIN 100 MG: 100 CAPSULE ORAL at 07:25

## 2018-07-09 RX ADMIN — INSULIN LISPRO 2 UNITS: 100 INJECTION, SOLUTION INTRAVENOUS; SUBCUTANEOUS at 07:45

## 2018-07-09 RX ADMIN — CINACALCET HYDROCHLORIDE 30 MG: 30 TABLET, COATED ORAL at 21:56

## 2018-07-09 RX ADMIN — INSULIN LISPRO 3 UNITS: 100 INJECTION, SOLUTION INTRAVENOUS; SUBCUTANEOUS at 21:56

## 2018-07-09 RX ADMIN — POLYETHYLENE GLYCOL 3350 17 G: 17 POWDER, FOR SOLUTION ORAL at 19:32

## 2018-07-09 RX ADMIN — Medication 10 ML: at 19:12

## 2018-07-09 RX ADMIN — SEVELAMER CARBONATE 800 MG: 800 TABLET, FILM COATED ORAL at 19:03

## 2018-07-09 RX ADMIN — ASCORBIC ACID, THIAMINE MONONITRATE,RIBOFLAVIN, NIACINAMIDE, PYRIDOXINE HYDROCHLORIDE, FOLIC ACID, CYANOCOBALAMIN, BIOTIN, CALCIUM PANTOTHENATE, 1 CAPSULE: 100; 1.5; 1.7; 20; 10; 1; 6000; 150000; 5 CAPSULE, LIQUID FILLED ORAL at 09:50

## 2018-07-09 RX ADMIN — VANCOMYCIN HYDROCHLORIDE 1000 MG: 1 INJECTION, POWDER, LYOPHILIZED, FOR SOLUTION INTRAVENOUS at 20:43

## 2018-07-09 RX ADMIN — FLUOXETINE 20 MG: 20 CAPSULE ORAL at 09:50

## 2018-07-09 RX ADMIN — INSULIN GLARGINE 20 UNITS: 100 INJECTION, SOLUTION SUBCUTANEOUS at 21:56

## 2018-07-09 RX ADMIN — PREDNISONE 50 MG: 20 TABLET ORAL at 19:03

## 2018-07-09 RX ADMIN — INSULIN LISPRO 2 UNITS: 100 INJECTION, SOLUTION INTRAVENOUS; SUBCUTANEOUS at 19:02

## 2018-07-09 RX ADMIN — FENTANYL CITRATE 25 MCG: 50 INJECTION, SOLUTION INTRAMUSCULAR; INTRAVENOUS at 05:26

## 2018-07-09 RX ADMIN — SEVELAMER CARBONATE 800 MG: 800 TABLET, FILM COATED ORAL at 07:25

## 2018-07-09 RX ADMIN — FENTANYL CITRATE 25 MCG: 50 INJECTION, SOLUTION INTRAMUSCULAR; INTRAVENOUS at 09:51

## 2018-07-09 RX ADMIN — SEVELAMER CARBONATE 800 MG: 800 TABLET, FILM COATED ORAL at 12:00

## 2018-07-09 RX ADMIN — OXYCODONE HYDROCHLORIDE AND ACETAMINOPHEN 1000 MG: 500 TABLET ORAL at 09:50

## 2018-07-09 RX ADMIN — FENTANYL CITRATE 25 MCG: 50 INJECTION, SOLUTION INTRAMUSCULAR; INTRAVENOUS at 01:33

## 2018-07-09 NOTE — DIABETES MGMT
DTC Progress Note Recommendations/ Comments: Chart reviewed due to hyperglycemia. Note pt has an order received Prednisone 50 mg. If appropriate, please consider increasing Lantus to 20 units. DTC to continue to follow. Current hospital DM medication: Lantus 10 units and correction scale Humalog with normal sensitivity. Chart reviewed on Latanya Jones. Patient is a 70 y.o. female with known diabetes on Levemir 12 units and Humalog sliding scale at home. A1c:  
Lab Results Component Value Date/Time Hemoglobin A1c 9.1 (H) 04/10/2018 02:53 AM  
 Hemoglobin A1c 7.2 (H) 10/06/2009 04:00 AM  
 
 
Recent Glucose Results: Lab Results Component Value Date/Time  (H) 07/09/2018 05:38 AM  
 GLUCPOC 292 (H) 07/09/2018 11:37 AM  
 GLUCPOC 176 (H) 07/09/2018 06:38 AM  
 GLUCPOC 219 (H) 07/08/2018 09:18 PM  
  
 
Lab Results Component Value Date/Time Creatinine 9.84 (H) 07/09/2018 05:38 AM  
 
Estimated Creatinine Clearance: 7.9 mL/min (based on Cr of 9.84). Active Orders Diet DIET DIABETIC CONSISTENT CARB Regular; FR 1200ML; 70-70-70 (House); AHA-LOW-CHOL FAT  
 DIET NPO  
  
 
PO intake: Patient Vitals for the past 72 hrs: 
 % Diet Eaten 07/08/18 1745 25 % 07/08/18 1435 50 % 07/08/18 0823 80 % 07/07/18 1805 75 % Will continue to follow as needed. Thank you Milton Solano RD, CDE

## 2018-07-09 NOTE — PROGRESS NOTES
Podiatry  -Pt seen & examined at bedside. Denies any n/v/f/ns/c.  -Vasc sx to do arteriogram to evaluate circulation in right LE.    -MRI completed -awaiting final report. -Informed Pt that if circulation can be improved, ideally she would benefit from a debridement with partial calcanectomy. However, given her co-morbidities and her right hand ischemia, she needs to decide how aggressive she wants to be. Instructed Pt to discuss the matter with her family.    -Continue wound care & antibxs.    -Will follow

## 2018-07-09 NOTE — PROCEDURES
[facility]  [phone]    Vitals   Pre   Post   Assessment   Pre   Post     Temp  Temp: 98.9 °F (37.2 °C) (07/09/18 1320)  99.6 LOC  Alert and oriented Alert and oriented   HR   Pulse (Heart Rate): 82 (07/09/18 1320) 98 Lungs   CTA bilaterally. No wheezing/rhonchi/rales  CTA bilaterally. B/P   BP: 125/62 (07/09/18 1320) 122/57 Cardiac   Regular rhythm, normal rate, no murmurs,  RRR   Resp   Resp Rate: 16 (07/09/18 1320) 16 Skin   Left sacral wound, left open abrasion, left lateral ankle wound  Multiple healed dark areas to bilateral lower extremities  Left sacral wound, left open abrasion, left lateral ankle wound  Multiple healed dark areas to bilateral lower extremities   Pain level  Pain Intensity 1: 9 (07/09/18 0134) 10 Edema      generalized generalized   Orders:    Duration:   Start:    1320 End:     Total:      Dialyzer:   Dialyzer/Set Up Inspection: Revaclear (07/09/18 1320)   K Bath:   Dialysate K (mEq/L): 3 (07/09/18 1320)   Ca Bath:   Dialysate CA (mEq/L): 2.5 (07/09/18 1320)   Na/Bicarb:   Dialysate NA (mEq/L): 140 (07/09/18 1320)   Target Fluid Removal:   Goal/Amount of Fluid to Remove (mL): 4000 mL (07/09/18 1320)   Access     Type & Location:   RUG  LARRY fistula  Access prepped and cleaned , no S&S of infection, cannulated with 15 gauge needles x 2 with ease  1435 Needles removed; bleeding stopped x20 min. +b/+t    [facility]  Dressing in place no date on last change. Will change dressing this treatment. Without S&S of infection. Catheter lumens cleaned , aspirated  and flushed with NS Lines connected and treatment initiated.  Lines reversed to achieve BFR of 400       Labs     Obtained/Reviewed   Critical Results Called   Date when labs were drawn-  Hgb-    HGB   Date Value Ref Range Status   07/09/2018 9.1 (L) 11.5 - 16.0 g/dL Final     K-    Potassium   Date Value Ref Range Status   07/09/2018 4.9 3.5 - 5.1 mmol/L Final     Ca-   Calcium   Date Value Ref Range Status 07/09/2018 7.4 (L) 8.5 - 10.1 MG/DL Final     Bun-   BUN   Date Value Ref Range Status   07/09/2018 75 (H) 6 - 20 MG/DL Final     Creat-   Creatinine   Date Value Ref Range Status   07/09/2018 9.84 (H) 0.55 - 1.02 MG/DL Final        Medications/ Blood Products Given     Name   Dose   Route and Time                     Blood Volume Processed (BVP):     Net Fluid   Removed:     Comments   Time Out Done:  2427  Primary Nurse Rpt Pre:  Abran Baig RN  Primary Nurse Rpt Post:  Pt Education: Procedural  Care Plan: ongoing  Tx Summary:  1320:  Treatment initiated via  Right upper arm graft. 1340: Increase venous pressure noted. Dialysis interrupted d/t increase pressures. And clotted lines and dialyzer  1425:  Treatment restarted via [facility] catheter. Catheter cleaned, aspirated and flushed with ease. CVC drsg changed   1450:  Access visible line secure and intact Pt resting slow to arouse patient received fentanyl 25 mg at 1000    1520:  Access visible line secure and intact Pt resting  1550:  Access visible line secure and intact Pt resting   1620:  Access visible line secure and intact Pt resting   1630:  Access visible line secure and intact Pt resting   1645:  Access visible line secure and intact Pt resting   1700:  Access visible line secure and intact Pt resting   1730: Access visible line secure and intact Pt resting   1800:  Access visible line secure and intact Pt resting   1815: Treatment ended blood returned.   Both catheter ports flushed and indwelled c 0.9% NS and capped and taped   1830  Left  Patients room with patient in no acute distress, stable vss, bed in lowest position side rails up x 3, call bell in reach, wheels locked         Admiting Diagnosis:  Pt's previous [place]  Consent signed - Informed Consent Verified: Yes (07/09/18 1320)  [facility] Consent - Yes   Hepatitis Status- HBSAG negative 6/25/18  Machine #- Machine Number: [id number] (07/09/18 1320)  Telemetry status-not on Tele  Pre-dialysis wt. - Pre-Dialysis Weight: 117.6 kg (259 lb 4.2 oz) (07/09/18 1320)

## 2018-07-09 NOTE — PROGRESS NOTES
Vascular Surgery  --unable to obtain cath lab time today; added on for 7 am tomorrow for right leg arteriogram  --patient reports contrast allergy; have ordered prednisone/benadryl premedication  --discussed with patient/family

## 2018-07-09 NOTE — PROGRESS NOTES
Faculty or Preceptor Review of Student Work    7/9/2018  - Shift times - 0730 to 1300    The student documentation of patient care for Irvin Cao has been reviewed and approved.       Sergio Chance RN

## 2018-07-09 NOTE — PROGRESS NOTES
Care Management Interventions  Last Visit to PCP: 07/03/18  Mode of Transport at Discharge:  (to be determined)  Transition of Care Consult (CM Consult): Discharge Planning  Physical Therapy Consult: No  Occupational Therapy Consult: No  Speech Therapy Consult: No  Current Support Network: Lives with Spouse  Confirm Follow Up Transport:  [facility] transportation service ([facility]))  Plan discussed with Pt/Family/Caregiver: Yes  Discharge Location  Discharge Placement:  (home)     Medical record reviewed. Patient admitted here on 7/56/18 with bilateral cellulitis of lower extremity. CM met with patient her son and daughter to introduce self and role. The patient is  and lives in a pvt residence (2 story with bedroom on the 1st floor) with her spouse, and daughter Gerardo Sabillon [phone] or [phone] The patient is a retired mental health worker. She verifies her PCP as Kailey Denny MD and her last PCP appt was last week. The patient is known to [facility] ([phone]) and has treatments on M-W-F schedule. Her family assists her with transportation to dialysis and other medical appointments. Her nephrologist is Dania Georges MD. Per patient and family, she will be using her motorized w/c for her medical appts with [facility] transportation in the future. The patient has a motorized wheelchair, standard wheelchair,  Bathroom bars, bedside commode and shower chair. She uses [address] at [facility] and [place]. Reason for Admission:                  RRAT Score:    27              Resources/supports as identified by patient/family:   Spouse and children                Top Challenges facing patient (as identified by patient/family and CM): Finances/Medication cost?   Yes. Co-pays.  Patient has only primary insurance and states she has been denied medicaid in the past.  Daughter states interest in being [employer] caregiver (Consumer Directed Care). Patient can see several specialist a week with a $45.00 copay each. .              Transportation? Family/ [facility]              Support system or lack thereof? Good support system                     Living arrangements? With family           Self-care/ADLs/Cognition? Daughter assists          Current Advanced Directive/Advance Care Plan:  none                          Plan for utilizing home health: Will use if indicated. Likelihood of readmission:                  Transition of Care Plan:   Home with home health (if needed)    Daughter had questions regarding medicaid eligibility for personal care and information regarding  other medicare insurance programs. CM will refer questions to [employer] ([employer]) and [employer] re medicaid eligibility. CM will continue to follow for d/c planning needs.  SHANNON Vargas, CRM

## 2018-07-09 NOTE — PROGRESS NOTES
[facility] 
 [address] [facility], [address] [facility] Phone: [phone]   [phone]   
  
Nephrology Progress Note Michelle Bang     1950     [id number] Date of Admission : 7/6/2018 07/09/18 CC: Follow up for ESRD Assessment and Plan Leg cellulitis Hypervolemic Hyponatremia  
  
ESRD - MWF dialysis 
  
HTN 
  
DM 
  
Obesity, FARHAT 
  
Dyslipidemia 
  
Possible steal syndrome right hand 
  
  
REC: 
HD today w/ 4.5 KG UF goal   
Fluid restriction Appreciate vascular surgery consult and f/u  
   
 
Interval History: 
Seen and examined She gained 4.5 kg She is not watching her water intake Vascular plans noted Review of Systems: A comprehensive review of systems was negative. Current Medications:  
Current Facility-Administered Medications Medication Dose Route Frequency  predniSONE (DELTASONE) tablet 50 mg  50 mg Oral ONCE  
 [START ON 7/10/2018] predniSONE (DELTASONE) tablet 50 mg  50 mg Oral ONCE  
 [START ON 7/10/2018] predniSONE (DELTASONE) tablet 50 mg  50 mg Oral ONCE  
 [START ON 7/10/2018] diphenhydrAMINE (BENADRYL) injection 50 mg  50 mg IntraVENous ONCE  
 vancomycin (VANCOCIN) 1,000 mg in 0.9% sodium chloride (MBP/ADV) 250 mL  1,000 mg IntraVENous DIALYSIS MON, WED & FRI  fentaNYL citrate (PF) injection 50 mcg  50 mcg IntraVENous Q4H PRN  
 insulin glargine (LANTUS) injection 10 Units  10 Units SubCUTAneous QHS  carvedilol (COREG) tablet 12.5 mg  12.5 mg Oral BID WITH MEALS  cinacalcet (SENSIPAR) tablet 30 mg  30 mg Oral DAILY  ascorbic acid (vitamin C) (VITAMIN C) tablet 1,000 mg  1,000 mg Oral DAILY  aspirin chewable tablet 81 mg  81 mg Oral DAILY  B complex-vitaminC-folic acid (NEPHROCAP) cap  1 Cap Oral DAILY  FLUoxetine (PROzac) capsule 20 mg  20 mg Oral DAILY  gabapentin (NEURONTIN) capsule 100 mg  100 mg Oral BID  latanoprost (XALATAN) 0.005 % ophthalmic solution 1 Drop  1 Drop Both Eyes QHS  midodrine (PROAMITINE) tablet 5 mg  5 mg Oral Once per day on Mon Wed Fri  polyethylene glycol (MIRALAX) packet 17 g  17 g Oral BID PRN  
 sevelamer carbonate (RENVELA) tab 800 mg  800 mg Oral TID WITH MEALS  simvastatin (ZOCOR) tablet 20 mg  20 mg Oral QHS  sodium chloride (NS) flush 5-10 mL  5-10 mL IntraVENous Q8H  
 sodium chloride (NS) flush 5-10 mL  5-10 mL IntraVENous PRN  
 insulin lispro (HUMALOG) injection   SubCUTAneous AC&HS  
 glucose chewable tablet 16 g  4 Tab Oral PRN  
 dextrose (D50W) injection syrg 12.5-25 g  12.5-25 g IntraVENous PRN  
 glucagon (GLUCAGEN) injection 1 mg  1 mg IntraMUSCular PRN  
 nortriptyline (PAMELOR) capsule 50 mg  50 mg Oral QHS  Vancomycin- pharmacy to dose   Other Rx Dosing/Monitoring Allergies Allergen Reactions  Latex Itching Rash, sometimes difficult to breathe  Celebrex [Celecoxib] Anaphylaxis and Swelling TONGUE. LIPS AND EYES  
 Iodine Other (comments) IV CONTRAST-LESIONS, AND SKIN SLUFFED OFF  
 Keflex [Cephalexin] Anaphylaxis and Swelling Tongue, lips, and eyes  Levaquin [Levofloxacin] Anaphylaxis and Swelling Tongue, lips, and eyes  Pcn [Penicillins] Anaphylaxis and Swelling Tongue, lips and eyes Objective: 
Vitals:   
Vitals:  
 07/09/18 3969 07/09/18 0109 07/09/18 0725 07/09/18 5742 BP: 125/67  117/63 109/65 Pulse: 75  71 71 Resp: 18   17 Temp: 98.7 °F (37.1 °C)   98.4 °F (36.9 °C) SpO2: 93%   95% Weight:  117.6 kg (259 lb 4.2 oz) Height:      
 
Intake and Output: 
  
07/07 1901 - 07/09 0700 In: 720 [P.O.:720] Out: 0 Physical Examination: 
General:Alert, No distress, HEENT:  Conjunctiva without pallor ,erythema.  The sclerae without icterus. .  
Neck:Supple,no mass palpable Lungs : Clears to auscultation Bilaterally, Normal respiratory effort CVS: RRR, S1 S2 normal, No rub, legs bandaged Abdomen: Soft, Non tender Extremities: No cyanosis, No clubbing; good thrill LUE AV graft Skin: No rash or lesions. MS: No joint swelling, erythema, warmth Neurologic: non focal, AAO x 3 Psych: normal affect 
 
[]    High complexity decision making was performed 
[]    Patient is at high-risk of decompensation with multiple organ involvement Lab Data Personally Reviewed: I have reviewed all the pertinent labs, microbiology data and radiology studies during assessment. Recent Labs  
   07/09/18 0538 07/08/18 
 0707 07/07/18 0450 07/06/18 
 1822 NA  126*  128*  131*  131* K  4.9  4.3  4.0  4.7 CL  87*  89*  91*  89* CO2  24  26  27  29 GLU  157*  176*  136*  217* BUN  75*  62*  51*  47* CREA  9.84*  8.68*  6.86*  6.25* CA  7.4*  7.7*  8.1*  8.2* ALB   --   1.9*  1.9*  2.3*  
SGOT   --   16  31  52* ALT   --   24  28  38 Recent Labs  
   07/09/18 0538 07/08/18 0707 07/07/18 0450 07/06/18 
 1822 WBC  12.3*  11.2*  10.8  9.6 HGB  9.1*  9.4*  9.1*  10.7* HCT  30.0*  30.9*  30.4*  35.5 PLT  276  259  251  286 Lab Results Component Value Date/Time Specimen Description: URINE 10/27/2012 08:15 PM  
 Specimen Description: BLOOD 06/07/2011 07:40 AM  
 Specimen Description: URINE 08/30/2010 07:10 AM  
 Specimen Description: URINE 08/09/2010 11:30 AM  
 Specimen Description: URINE 06/22/2010 11:50 PM  
 
Lab Results Component Value Date/Time Culture result: MRSA NOT PRESENT 07/07/2018 01:39 AM  
 Culture result:  07/07/2018 01:39 AM  
      Screening of patient nares for MRSA is for surveillance purposes and, if positive, to facilitate isolation considerations in high risk settings. It is not intended for automatic decolonization interventions per se as regimens are not sufficiently effective to warrant routine use.   
 Culture result: NO GROWTH 2 DAYS 07/06/2018 10:30 PM  
 Culture result: NO GROWTH 3 DAYS 07/06/2018 10:08 PM  
 Culture result: MRSA NOT PRESENT 02/13/2018 09:48 PM  
 Culture result:  02/13/2018 09:48 PM  
 Screening of patient nares for MRSA is for surveillance purposes and, if positive, to facilitate isolation considerations in high risk settings. It is not intended for automatic decolonization interventions per se as regimens are not sufficiently effective to warrant routine use. Recent Results (from the past 24 hour(s)) GLUCOSE, POC Collection Time: 07/08/18 11:26 AM  
Result Value Ref Range Glucose (POC) 209 (H) 65 - 100 mg/dL Performed by Maria Day GLUCOSE, POC Collection Time: 07/08/18  3:41 PM  
Result Value Ref Range Glucose (POC) 229 (H) 65 - 100 mg/dL Performed by Norma Richard GLUCOSE, POC Collection Time: 07/08/18  9:18 PM  
Result Value Ref Range Glucose (POC) 219 (H) 65 - 100 mg/dL Performed by Eugena Lennox CBC W/O DIFF Collection Time: 07/09/18  5:38 AM  
Result Value Ref Range WBC 12.3 (H) 3.6 - 11.0 K/uL  
 RBC 3.36 (L) 3.80 - 5.20 M/uL HGB 9.1 (L) 11.5 - 16.0 g/dL HCT 30.0 (L) 35.0 - 47.0 % MCV 89.3 80.0 - 99.0 FL  
 MCH 27.1 26.0 - 34.0 PG  
 MCHC 30.3 30.0 - 36.5 g/dL  
 RDW 15.9 (H) 11.5 - 14.5 % PLATELET 597 705 - 579 K/uL MPV 8.8 (L) 8.9 - 12.9 FL  
 NRBC 0.0 0  WBC ABSOLUTE NRBC 0.00 0.00 - 0.01 K/uL METABOLIC PANEL, BASIC Collection Time: 07/09/18  5:38 AM  
Result Value Ref Range Sodium 126 (L) 136 - 145 mmol/L Potassium 4.9 3.5 - 5.1 mmol/L Chloride 87 (L) 97 - 108 mmol/L  
 CO2 24 21 - 32 mmol/L Anion gap 15 5 - 15 mmol/L Glucose 157 (H) 65 - 100 mg/dL BUN 75 (H) 6 - 20 MG/DL Creatinine 9.84 (H) 0.55 - 1.02 MG/DL  
 BUN/Creatinine ratio 8 (L) 12 - 20 GFR est AA 5 (L) >60 ml/min/1.73m2 GFR est non-AA 4 (L) >60 ml/min/1.73m2 Calcium 7.4 (L) 8.5 - 10.1 MG/DL  
GLUCOSE, POC Collection Time: 07/09/18  6:38 AM  
Result Value Ref Range Glucose (POC) 176 (H) 65 - 100 mg/dL Performed by Eugena Lennox I have reviewed the flowsheets.  
Chart and Pertinent Notes have been reviewed. No change in PMH ,family and social history from Consult note.  
 
 
Ian Lundberg MD

## 2018-07-09 NOTE — PROGRESS NOTES
Problem: Heart Failure: Day 2  Goal: Treatments/Interventions/Procedures  Outcome: Progressing Towards Goal  Reinforced teaching on rationale for daily weight.

## 2018-07-09 NOTE — PROGRESS NOTES
Problem: Falls - Risk of  Goal: *Absence of Falls  Document Rodrick Fall Risk and appropriate interventions in the flowsheet. Outcome: Progressing Towards Goal  Fall Risk Interventions:  Mobility Interventions: Assess mobility with egress test     Personal longings within patient reach.      Medication Interventions: Evaluate medications/consider consulting pharmacy    Elimination Interventions: Call light in reach

## 2018-07-09 NOTE — PROGRESS NOTES
Bedside shift change report given to [address] [place] [address] Kareem (oncoming nurse) by Mahendra Leggett (offgoing nurse). Report included the following information SBAR, Kardex and MAR.

## 2018-07-09 NOTE — PROGRESS NOTES
Hospitalist Progress Note Maria A Ramirez MD 
Answering service: [phone] OR [phone] from in house phone Cell: [phone] Date of Service:  2018 NAME:  Hayden Marroquin :  1950 MRN:  [id number] Admission Summary:  
Hayden Marroquin is a 70 y.o.  female with past medical history of anemia, arthritis, asthma, anxiety, ESRD, chronic pain, depression, type 2 diabetes mellitus (DM), GERD, glaucoma, hypertension, hyperlipidemia, IBS, migraine, morbid obesity, sleep apnea presented to the ED from home with chief complaint of leg swelling. Interval history / Subjective:  
Pain lower legs,  Pain 10/10, no chest pain or shortness of breath Assessment & Plan:  
 
Acute bilateral foot cellulitis with acute on chronic right leg wound infection wound POA 
-continue on IV vancomycin  
-patient allergic to penicillin, levaquin and keflex, will consult ID 
-blood cx no growth 
-wound care per wound care nurse 
-doppler study negative for right leg DVT 
-MRI of foot large heel ulcer. Underlying soft tissue gas nearly extends to the cortex of 
the underlying calcaneus. No drainable fluid collection, bone marrow edema in the calcaneus is most likely reactive. However, the patient is at risk for developing calcaneus osteomyelitis given the proximity of soft tissue gas to the currently intact bony cortex. 
-vascular surgeon, plan for right leg arteriogram on 7/10 
-podiatrist on board Hyponatremia  
-patient on dialysis  
-HD per nephrologist 
 
ESRD DD 
-on sensipar, renvela,  sodium bicarbonate 
-HD per nephrologist 
 
Anemia of chronic disease  
-H/H trending down, no evidence of active bleeding, monitor H/H 
 
HTN 
-BP normal, continue on coreg, monitor BP 
 
DM-II 
-A1c 9.1 on 4/10/18 
-add lantus 10 units q hs 
-sliding scale and monitor finger stick glucose Elevated liver enzyme 
-multifactorial, -improved Hx of asthma 
-stable, stable, SpO2 94-95% Hx of anxiety, depression 
-stable, continue on prozac, add home nortriptyline Left sacral wound, left open abrasion, left lateral ankle wound POA 
-continue wound care per wound care nurse Morbid obesity 
-diet and weight loss program  
 
Hx of sleep apnea 
-CPAP q hs Code status: Full Code DVT prophylaxis: SCD Care Plan discussed with: Patient/Family and Nurse Disposition: TBD Son and daughter at beside, questions answered Hospital Problems  Date Reviewed: 7/6/2018 Codes Class Noted POA Anemia ICD-10-CM: D64.9 ICD-9-CM: 285.9  7/6/2018 Unknown ESRD (end stage renal disease) ([facility] [address] ICD-10-CM: N18.6 ICD-9-CM: 585.6  7/6/2018 Unknown Bilateral leg edema ICD-10-CM: R60.0 ICD-9-CM: 782.3  7/6/2018 Unknown LFT elevation ICD-10-CM: R79.89 ICD-9-CM: 790.6  7/6/2018 Unknown Type 2 diabetes mellitus with hyperglycemia (HCC) ICD-10-CM: E11.65 ICD-9-CM: 250.00  7/6/2018 Unknown * (Principal)Bilateral lower leg cellulitis ICD-10-CM: L03.116, A56.826 ICD-9-CM: 682.6  7/6/2018 Unknown Vital Signs:  
 Last 24hrs VS reviewed since prior progress note. Most recent are: 
Visit Vitals  /65 (BP 1 Location: Left arm, BP Patient Position: At rest)  Pulse 71  Temp 98.4 °F (36.9 °C)  Resp 17  Ht 6' 1.75\" (1.873 m)  Wt 117.6 kg (259 lb 4.2 oz)  SpO2 95%  Breastfeeding No  
 BMI 33.51 kg/m2 Intake/Output Summary (Last 24 hours) at 07/09/18 1048 Last data filed at 07/08/18 1745 Gross per 24 hour Intake              480 ml Output                0 ml Net              480 ml Physical Examination:  
 
 
     
Constitutional:  No acute distress, cooperative, pleasant   
ENT:  Oral mucous moist, oropharynx benign. Neck supple, Resp:  CTA bilaterally. No wheezing/rhonchi/rales. No accessory muscle use CV:  Regular rhythm, normal rate, no murmurs, gallops, rubs GI:  Soft, non distended, non tender. normoactive bowel sounds, no hepatosplenomegaly Musculoskeletal:  Bilateral foot dressed Neurologic:  Moves all extremities. AAOx3, CN II-XII reviewed Skin:  Left sacral wound, left open abrasion, left lateral ankle wound Data Review:  
 Review and/or order of clinical lab test 
 
 
Labs:  
 
Recent Labs  
   07/09/18 
 0538  07/08/18 
 0707 WBC  12.3*  11.2* HGB  9.1*  9.4* HCT  30.0*  30.9* PLT  276  259 Recent Labs  
   07/09/18 
 0538  07/08/18 
 5553  07/07/18 
 0450 NA  126*  128*  131* K  4.9  4.3  4.0  
CL  87*  89*  91* CO2  24  26  27 BUN  75*  62*  51* CREA  9.84*  8.68*  6.86* GLU  157*  176*  136* CA  7.4*  7.7*  8.1* Recent Labs  
   07/08/18 
 0707  07/07/18 
 0450  07/06/18 
 1822 SGOT  16  31  52* ALT  24  28  38 AP  203*  211*  256* TBILI  0.4  0.4  0.3 TP  8.0  8.3*  9.8* ALB  1.9*  1.9*  2.3*  
GLOB  6.1*  6.4*  7.5* No results for input(s): INR, PTP, APTT in the last 72 hours. No lab exists for component: INREXT, INREXT No results for input(s): FE, TIBC, PSAT, FERR in the last 72 hours. No results found for: FOL, RBCF No results for input(s): PH, PCO2, PO2 in the last 72 hours. No results for input(s): CPK, CKNDX, TROIQ in the last 72 hours. No lab exists for component: CPKMB Lab Results Component Value Date/Time Cholesterol, total 144 04/10/2018 02:53 AM  
 HDL Cholesterol 66 04/10/2018 02:53 AM  
 LDL, calculated 67.6 04/10/2018 02:53 AM  
 Triglyceride 52 04/10/2018 02:53 AM  
 CHOL/HDL Ratio 2.2 04/10/2018 02:53 AM  
 
Lab Results Component Value Date/Time Glucose (POC) 176 (H) 07/09/2018 06:38 AM  
 Glucose (POC) 219 (H) 07/08/2018 09:18 PM  
 Glucose (POC) 229 (H) 07/08/2018 03:41 PM  
 Glucose (POC) 209 (H) 07/08/2018 11:26 AM  
 Glucose (POC) 189 (H) 07/08/2018 06:33 AM  
 
Lab Results Component Value Date/Time  Color YELLOW 10/27/2012 10:25 PM  
 Appearance CLEAR 10/27/2012 10:25 PM  
 Specific gravity 1.012 10/27/2012 10:25 PM  
 Specific gravity 1.015 08/30/2010 07:10 AM  
 pH (UA) 8.0 10/27/2012 10:25 PM  
 Protein 100 (A) 10/27/2012 10:25 PM  
 Glucose NEGATIVE  10/27/2012 10:25 PM  
 Ketone NEGATIVE  10/27/2012 10:25 PM  
 Bilirubin NEGATIVE  10/27/2012 10:25 PM  
 Urobilinogen 1.0 10/27/2012 10:25 PM  
 Nitrites NEGATIVE  10/27/2012 10:25 PM  
 Leukocyte Esterase TRACE (A) 10/27/2012 10:25 PM  
 Epithelial cells 0-5 10/27/2012 10:25 PM  
 Bacteria NEGATIVE  10/27/2012 10:25 PM  
 WBC 10-20 10/27/2012 10:25 PM  
 RBC 5-10 10/27/2012 10:25 PM  
 
 
 
Medications Reviewed:  
 
Current Facility-Administered Medications Medication Dose Route Frequency  predniSONE (DELTASONE) tablet 50 mg  50 mg Oral ONCE  
 [START ON 7/10/2018] predniSONE (DELTASONE) tablet 50 mg  50 mg Oral ONCE  
 [START ON 7/10/2018] predniSONE (DELTASONE) tablet 50 mg  50 mg Oral ONCE  
 [START ON 7/10/2018] diphenhydrAMINE (BENADRYL) injection 50 mg  50 mg IntraVENous ONCE  
 vancomycin (VANCOCIN) 1,000 mg in 0.9% sodium chloride (MBP/ADV) 250 mL  1,000 mg IntraVENous DIALYSIS MON, WED & FRI  insulin glargine (LANTUS) injection 10 Units  10 Units SubCUTAneous QHS  carvedilol (COREG) tablet 12.5 mg  12.5 mg Oral BID WITH MEALS  cinacalcet (SENSIPAR) tablet 30 mg  30 mg Oral DAILY  ascorbic acid (vitamin C) (VITAMIN C) tablet 1,000 mg  1,000 mg Oral DAILY  aspirin chewable tablet 81 mg  81 mg Oral DAILY  B complex-vitaminC-folic acid (NEPHROCAP) cap  1 Cap Oral DAILY  FLUoxetine (PROzac) capsule 20 mg  20 mg Oral DAILY  gabapentin (NEURONTIN) capsule 100 mg  100 mg Oral BID  latanoprost (XALATAN) 0.005 % ophthalmic solution 1 Drop  1 Drop Both Eyes QHS  midodrine (PROAMITINE) tablet 5 mg  5 mg Oral Once per day on Mon Wed Fri  polyethylene glycol (MIRALAX) packet 17 g  17 g Oral BID PRN  
 sevelamer carbonate (RENVELA) tab 800 mg  800 mg Oral TID WITH MEALS  simvastatin (ZOCOR) tablet 20 mg  20 mg Oral QHS  sodium chloride (NS) flush 5-10 mL  5-10 mL IntraVENous Q8H  
 sodium chloride (NS) flush 5-10 mL  5-10 mL IntraVENous PRN  
 insulin lispro (HUMALOG) injection   SubCUTAneous AC&HS  
 glucose chewable tablet 16 g  4 Tab Oral PRN  
 dextrose (D50W) injection syrg 12.5-25 g  12.5-25 g IntraVENous PRN  
 glucagon (GLUCAGEN) injection 1 mg  1 mg IntraMUSCular PRN  
 nortriptyline (PAMELOR) capsule 50 mg  50 mg Oral QHS  Vancomycin- pharmacy to dose   Other Rx Dosing/Monitoring  fentaNYL citrate (PF) injection 25 mcg  25 mcg IntraVENous Q4H PRN  
 
______________________________________________________________________ EXPECTED LENGTH OF STAY: 4d 16h ACTUAL LENGTH OF STAY:          3 Enrique Sosa MD

## 2018-07-09 NOTE — WOUND CARE
WOCN Note:   New consult placed by RN for left ankle, right heel and sacrum. Chart shows:  - seen by Dr. Ctaie Prado and family informed writer that MD wrote wound care orders for left ankle and right heel. Writer did confirm and orders in chart. Dr. Catie Prado spoke with family and they are to decide by tomorrow how aggressive they want to be with wound to  right heel. Vascular performing arteriogram and Dr. Patricia Mckee: right heel is positive for osteo. Admitted for bilateral cellulitis to lower extremities with right hand ischemia secondary to steal from AVF. Patient has a motorized wheelchair, standard wheelchair, bathroom bars, bedside commode and shower chair at home. WBC = 12.3  On 7-9-18  Admitted from home. Assessment:   Patient is A&O x  communicative, incontinent,  and mobile with wheelchair and 1 assist in repositioning. Able to turn with 1 assist.  Patient on blue chux in bed. Bed: [facility] Bed. Patient reports pain to right buttock. .  Pre-medicated for pain by RN / Mitch Valentin. Bilateral heel / right heel dressed and assessed by podiatry / left ankle dressed and assessed by podiatry. MD wrote wound care orders for right heel and left ankle. Buttocks, and sacral ( see wound care note). Skin intact and without erythema. Heels offloaded on pillow. Right heel possible I&D Thursday. Left heel is dry but blanches / no opened areas. 1. POA: sacrum with two wounds / left and right sacrum / kissing wounds: match up: left: 0.5 cm x 0.6cm x 0.0cm / 100% scab with surrounding skin intact. Right: 1.1cm x 0.6cm x <0.1cm / secondary to shear and friction and moisture to area no drainage/ dry. .    2. Right lower buttock: 100% pink / Stg 3 / surrounding skin is intact. POA / 0.7cm x 0.7cm x 1.0cm : tunneling at 2 o'clock = 2.2cm. Scant clear drainage noted and indurated 2-5 o'clock.          Patient repositioned on right side with pillow between the knees. Recommendations:    1. Every 3 days change mepilex border dressing to sacrum. Clean area with NS, pat dry and apply dressing. 2. Right lower buttock dressing: daily clean with NS pack tunneled area at 2 o'clock ( 2.2cm deep) with cut strip of Aquacel Ag. Cover wound and with Aquacel Ag and dress with mepilex border dressing. Minimize layers of linen/pads under patient to optimize support surface. Turn/reposition approximately every 2 hours and offload heels / bilateral prevalon boots to feet. Manage incontinence / promote continence; Aloe Vesta to buttocks and sacrum daily and as needed with incontinence care. Specialty bed: Vers[facility] Care Bed and patient turns independently but needs 1 assist to position legs and heels. Discussed above plan with patient, family and Gregoria DURHAM.     Transition of Care: Plan to follow 2x a  week and  as needed while admitted to hospital.      Leandra FLORES RN  Wound Care Department  Office [phone]  Pager [phone]

## 2018-07-09 NOTE — PROGRESS NOTES
Bedside shift change report given to Jesus Wolfe RN (oncoming nurse) by Chan Hinton RN (offgoing nurse). Report included the following information SBAR, Kardex, Procedure Summary, Intake/Output, MAR, Recent Results and Med Rec Status. 0130: Per Respiratory therapy, this patient was placed on CPAP @ 2234. Patient was found to have mask off at this time. Patient stated that she could not tolerate wearing it any longer. Respiratory therapy aware.

## 2018-07-09 NOTE — PROGRESS NOTES
Problem: Pain  Goal: *Control of Pain  Outcome: Progressing Towards Goal  Will need to frequently reevaluate pain management plan of care. Patient voices concern that her pain will not be controlled if she has surgery (MACKENZIE NEAL).  Will share with oncoming nurse to follow up with MD.

## 2018-07-10 LAB
ABO + RH BLD: NORMAL
ALBUMIN SERPL-MCNC: 1.8 G/DL (ref 3.5–5)
ALBUMIN/GLOB SERPL: 0.3 {RATIO} (ref 1.1–2.2)
ALP SERPL-CCNC: 207 U/L (ref 45–117)
ALT SERPL-CCNC: 19 U/L (ref 12–78)
ANION GAP SERPL CALC-SCNC: 14 MMOL/L (ref 5–15)
AST SERPL-CCNC: 16 U/L (ref 15–37)
BASOPHILS # BLD: 0 K/UL (ref 0–0.1)
BASOPHILS NFR BLD: 0 % (ref 0–1)
BILIRUB SERPL-MCNC: 0.4 MG/DL (ref 0.2–1)
BLOOD GROUP ANTIBODIES SERPL: NORMAL
BUN SERPL-MCNC: 42 MG/DL (ref 6–20)
BUN/CREAT SERPL: 7 (ref 12–20)
CALCIUM SERPL-MCNC: 8.4 MG/DL (ref 8.5–10.1)
CHLORIDE SERPL-SCNC: 95 MMOL/L (ref 97–108)
CO2 SERPL-SCNC: 23 MMOL/L (ref 21–32)
CREAT SERPL-MCNC: 6.43 MG/DL (ref 0.55–1.02)
DIFFERENTIAL METHOD BLD: ABNORMAL
EOSINOPHIL # BLD: 0 K/UL (ref 0–0.4)
EOSINOPHIL NFR BLD: 0 % (ref 0–7)
ERYTHROCYTE [DISTWIDTH] IN BLOOD BY AUTOMATED COUNT: 15.9 % (ref 11.5–14.5)
GLOBULIN SER CALC-MCNC: 6.8 G/DL (ref 2–4)
GLUCOSE BLD STRIP.AUTO-MCNC: 243 MG/DL (ref 65–100)
GLUCOSE BLD STRIP.AUTO-MCNC: 260 MG/DL (ref 65–100)
GLUCOSE BLD STRIP.AUTO-MCNC: 292 MG/DL (ref 65–100)
GLUCOSE BLD STRIP.AUTO-MCNC: 405 MG/DL (ref 65–100)
GLUCOSE SERPL-MCNC: 238 MG/DL (ref 65–100)
HCT VFR BLD AUTO: 32.7 % (ref 35–47)
HGB BLD-MCNC: 9.8 G/DL (ref 11.5–16)
IMM GRANULOCYTES # BLD: 0 K/UL (ref 0–0.04)
IMM GRANULOCYTES NFR BLD AUTO: 0 % (ref 0–0.5)
LYMPHOCYTES # BLD: 0.9 K/UL (ref 0.8–3.5)
LYMPHOCYTES NFR BLD: 9 % (ref 12–49)
MCH RBC QN AUTO: 26.6 PG (ref 26–34)
MCHC RBC AUTO-ENTMCNC: 30 G/DL (ref 30–36.5)
MCV RBC AUTO: 88.9 FL (ref 80–99)
MONOCYTES # BLD: 0.1 K/UL (ref 0–1)
MONOCYTES NFR BLD: 1 % (ref 5–13)
NEUTS SEG # BLD: 8.6 K/UL (ref 1.8–8)
NEUTS SEG NFR BLD: 89 % (ref 32–75)
NRBC # BLD: 0 K/UL (ref 0–0.01)
NRBC BLD-RTO: 0 PER 100 WBC
PLATELET # BLD AUTO: 299 K/UL (ref 150–400)
PMV BLD AUTO: 9 FL (ref 8.9–12.9)
POTASSIUM SERPL-SCNC: 4.8 MMOL/L (ref 3.5–5.1)
PROT SERPL-MCNC: 8.6 G/DL (ref 6.4–8.2)
RBC # BLD AUTO: 3.68 M/UL (ref 3.8–5.2)
SERVICE CMNT-IMP: ABNORMAL
SODIUM SERPL-SCNC: 132 MMOL/L (ref 136–145)
SPECIMEN EXP DATE BLD: NORMAL
WBC # BLD AUTO: 9.7 K/UL (ref 3.6–11)

## 2018-07-10 PROCEDURE — 74011000258 HC RX REV CODE- 258: Performed by: INTERNAL MEDICINE

## 2018-07-10 PROCEDURE — 85025 COMPLETE CBC W/AUTO DIFF WBC: CPT | Performed by: HOSPITALIST

## 2018-07-10 PROCEDURE — 74011250637 HC RX REV CODE- 250/637: Performed by: FAMILY MEDICINE

## 2018-07-10 PROCEDURE — 74011636637 HC RX REV CODE- 636/637: Performed by: HOSPITALIST

## 2018-07-10 PROCEDURE — 77030013744

## 2018-07-10 PROCEDURE — 74011000250 HC RX REV CODE- 250: Performed by: INTERNAL MEDICINE

## 2018-07-10 PROCEDURE — 3331090001 HH PPS REVENUE CREDIT

## 2018-07-10 PROCEDURE — 94760 N-INVAS EAR/PLS OXIMETRY 1: CPT

## 2018-07-10 PROCEDURE — C1769 GUIDE WIRE: HCPCS

## 2018-07-10 PROCEDURE — 94660 CPAP INITIATION&MGMT: CPT

## 2018-07-10 PROCEDURE — C1894 INTRO/SHEATH, NON-LASER: HCPCS

## 2018-07-10 PROCEDURE — 82962 GLUCOSE BLOOD TEST: CPT

## 2018-07-10 PROCEDURE — 93923 UPR/LXTR ART STDY 3+ LVLS: CPT

## 2018-07-10 PROCEDURE — 65270000032 HC RM SEMIPRIVATE

## 2018-07-10 PROCEDURE — 74011636637 HC RX REV CODE- 636/637: Performed by: SURGERY

## 2018-07-10 PROCEDURE — 74011250636 HC RX REV CODE- 250/636: Performed by: SURGERY

## 2018-07-10 PROCEDURE — 99153 MOD SED SAME PHYS/QHP EA: CPT

## 2018-07-10 PROCEDURE — 80053 COMPREHEN METABOLIC PANEL: CPT | Performed by: HOSPITALIST

## 2018-07-10 PROCEDURE — 3331090002 HH PPS REVENUE DEBIT

## 2018-07-10 PROCEDURE — 74011250637 HC RX REV CODE- 250/637: Performed by: HOSPITALIST

## 2018-07-10 PROCEDURE — C1760 CLOSURE DEV, VASC: HCPCS

## 2018-07-10 PROCEDURE — 74011000250 HC RX REV CODE- 250: Performed by: SURGERY

## 2018-07-10 PROCEDURE — 86900 BLOOD TYPING SEROLOGIC ABO: CPT | Performed by: HOSPITALIST

## 2018-07-10 PROCEDURE — 74011250636 HC RX REV CODE- 250/636: Performed by: HOSPITALIST

## 2018-07-10 PROCEDURE — 77030021532 HC CATH ANGI DX IMPRS MRTM -B

## 2018-07-10 PROCEDURE — B4101ZZ FLUOROSCOPY OF ABDOMINAL AORTA USING LOW OSMOLAR CONTRAST: ICD-10-PCS | Performed by: SURGERY

## 2018-07-10 PROCEDURE — 36415 COLL VENOUS BLD VENIPUNCTURE: CPT | Performed by: HOSPITALIST

## 2018-07-10 PROCEDURE — 74011636320 HC RX REV CODE- 636/320: Performed by: SURGERY

## 2018-07-10 RX ORDER — INSULIN LISPRO 100 [IU]/ML
15 INJECTION, SOLUTION INTRAVENOUS; SUBCUTANEOUS ONCE
Status: COMPLETED | OUTPATIENT
Start: 2018-07-10 | End: 2018-07-10

## 2018-07-10 RX ORDER — FENTANYL CITRATE 50 UG/ML
25-200 INJECTION, SOLUTION INTRAMUSCULAR; INTRAVENOUS
Status: DISCONTINUED | OUTPATIENT
Start: 2018-07-10 | End: 2018-07-10

## 2018-07-10 RX ORDER — MIDAZOLAM HYDROCHLORIDE 1 MG/ML
1-10 INJECTION, SOLUTION INTRAMUSCULAR; INTRAVENOUS
Status: DISCONTINUED | OUTPATIENT
Start: 2018-07-10 | End: 2018-07-10

## 2018-07-10 RX ORDER — LIDOCAINE HYDROCHLORIDE 10 MG/ML
10-30 INJECTION INFILTRATION; PERINEURAL
Status: DISCONTINUED | OUTPATIENT
Start: 2018-07-10 | End: 2018-07-10

## 2018-07-10 RX ORDER — SODIUM CHLORIDE 0.9 % (FLUSH) 0.9 %
5 SYRINGE (ML) INJECTION AS NEEDED
Status: DISCONTINUED | OUTPATIENT
Start: 2018-07-10 | End: 2018-07-10

## 2018-07-10 RX ORDER — HEPARIN SODIUM 200 [USP'U]/100ML
1000 INJECTION, SOLUTION INTRAVENOUS AS NEEDED
Status: DISCONTINUED | OUTPATIENT
Start: 2018-07-10 | End: 2018-07-10

## 2018-07-10 RX ORDER — INSULIN LISPRO 100 [IU]/ML
3 INJECTION, SOLUTION INTRAVENOUS; SUBCUTANEOUS
Status: DISCONTINUED | OUTPATIENT
Start: 2018-07-10 | End: 2018-07-16

## 2018-07-10 RX ORDER — CLOPIDOGREL 300 MG/1
600 TABLET, FILM COATED ORAL AS NEEDED
Status: DISCONTINUED | OUTPATIENT
Start: 2018-07-10 | End: 2018-07-10

## 2018-07-10 RX ORDER — DIPHENHYDRAMINE HYDROCHLORIDE 50 MG/ML
50 INJECTION, SOLUTION INTRAMUSCULAR; INTRAVENOUS ONCE
Status: DISCONTINUED | OUTPATIENT
Start: 2018-07-10 | End: 2018-07-10

## 2018-07-10 RX ORDER — SODIUM CHLORIDE 9 MG/ML
25 INJECTION, SOLUTION INTRAVENOUS CONTINUOUS
Status: DISCONTINUED | OUTPATIENT
Start: 2018-07-10 | End: 2018-07-11

## 2018-07-10 RX ORDER — HYDROCORTISONE SODIUM SUCCINATE 100 MG/2ML
100 INJECTION, POWDER, FOR SOLUTION INTRAMUSCULAR; INTRAVENOUS ONCE
Status: DISCONTINUED | OUTPATIENT
Start: 2018-07-10 | End: 2018-07-10

## 2018-07-10 RX ORDER — HEPARIN SODIUM 1000 [USP'U]/ML
1000-10000 INJECTION, SOLUTION INTRAVENOUS; SUBCUTANEOUS AS NEEDED
Status: DISCONTINUED | OUTPATIENT
Start: 2018-07-10 | End: 2018-07-10

## 2018-07-10 RX ORDER — INSULIN GLARGINE 100 [IU]/ML
25 INJECTION, SOLUTION SUBCUTANEOUS
Status: DISCONTINUED | OUTPATIENT
Start: 2018-07-10 | End: 2018-07-16

## 2018-07-10 RX ORDER — ATROPINE SULFATE 0.1 MG/ML
.5-1 INJECTION INTRAVENOUS AS NEEDED
Status: DISCONTINUED | OUTPATIENT
Start: 2018-07-10 | End: 2018-07-10

## 2018-07-10 RX ADMIN — CARVEDILOL 12.5 MG: 12.5 TABLET, FILM COATED ORAL at 17:47

## 2018-07-10 RX ADMIN — INSULIN LISPRO 3 UNITS: 100 INJECTION, SOLUTION INTRAVENOUS; SUBCUTANEOUS at 12:32

## 2018-07-10 RX ADMIN — AZTREONAM 500 MG: 1 INJECTION, POWDER, LYOPHILIZED, FOR SOLUTION INTRAMUSCULAR; INTRAVENOUS at 20:03

## 2018-07-10 RX ADMIN — CARVEDILOL 12.5 MG: 12.5 TABLET, FILM COATED ORAL at 06:37

## 2018-07-10 RX ADMIN — GABAPENTIN 100 MG: 100 CAPSULE ORAL at 22:09

## 2018-07-10 RX ADMIN — FENTANYL CITRATE 50 MCG: 50 INJECTION, SOLUTION INTRAMUSCULAR; INTRAVENOUS at 10:37

## 2018-07-10 RX ADMIN — CINACALCET HYDROCHLORIDE 30 MG: 30 TABLET, COATED ORAL at 22:10

## 2018-07-10 RX ADMIN — Medication 10 ML: at 04:56

## 2018-07-10 RX ADMIN — FENTANYL CITRATE 25 MCG: 50 INJECTION, SOLUTION INTRAMUSCULAR; INTRAVENOUS at 04:55

## 2018-07-10 RX ADMIN — INSULIN LISPRO 5 UNITS: 100 INJECTION, SOLUTION INTRAVENOUS; SUBCUTANEOUS at 06:40

## 2018-07-10 RX ADMIN — FENTANYL CITRATE 25 MCG: 50 INJECTION, SOLUTION INTRAMUSCULAR; INTRAVENOUS at 06:35

## 2018-07-10 RX ADMIN — Medication 10 ML: at 20:09

## 2018-07-10 RX ADMIN — GABAPENTIN 100 MG: 100 CAPSULE ORAL at 06:36

## 2018-07-10 RX ADMIN — MIDAZOLAM HYDROCHLORIDE 2 MG: 1 INJECTION, SOLUTION INTRAMUSCULAR; INTRAVENOUS at 07:33

## 2018-07-10 RX ADMIN — SEVELAMER CARBONATE 800 MG: 800 TABLET, FILM COATED ORAL at 12:32

## 2018-07-10 RX ADMIN — FENTANYL CITRATE 25 MCG: 50 INJECTION, SOLUTION INTRAMUSCULAR; INTRAVENOUS at 07:49

## 2018-07-10 RX ADMIN — LATANOPROST 1 DROP: 50 SOLUTION OPHTHALMIC at 22:13

## 2018-07-10 RX ADMIN — NORTRIPTYLINE HYDROCHLORIDE 50 MG: 25 CAPSULE ORAL at 22:09

## 2018-07-10 RX ADMIN — ASPIRIN 81 MG 81 MG: 81 TABLET ORAL at 10:38

## 2018-07-10 RX ADMIN — SODIUM CHLORIDE 25 ML/HR: 900 INJECTION, SOLUTION INTRAVENOUS at 07:32

## 2018-07-10 RX ADMIN — ASCORBIC ACID, THIAMINE MONONITRATE,RIBOFLAVIN, NIACINAMIDE, PYRIDOXINE HYDROCHLORIDE, FOLIC ACID, CYANOCOBALAMIN, BIOTIN, CALCIUM PANTOTHENATE, 1 CAPSULE: 100; 1.5; 1.7; 20; 10; 1; 6000; 150000; 5 CAPSULE, LIQUID FILLED ORAL at 10:37

## 2018-07-10 RX ADMIN — SIMVASTATIN 20 MG: 20 TABLET, FILM COATED ORAL at 22:09

## 2018-07-10 RX ADMIN — MIDAZOLAM HYDROCHLORIDE 1 MG: 1 INJECTION, SOLUTION INTRAMUSCULAR; INTRAVENOUS at 07:49

## 2018-07-10 RX ADMIN — INSULIN GLARGINE 25 UNITS: 100 INJECTION, SOLUTION SUBCUTANEOUS at 22:11

## 2018-07-10 RX ADMIN — FENTANYL CITRATE 50 MCG: 50 INJECTION, SOLUTION INTRAMUSCULAR; INTRAVENOUS at 22:24

## 2018-07-10 RX ADMIN — DIPHENHYDRAMINE HYDROCHLORIDE 50 MG: 50 INJECTION, SOLUTION INTRAMUSCULAR; INTRAVENOUS at 07:26

## 2018-07-10 RX ADMIN — FLUOXETINE 20 MG: 20 CAPSULE ORAL at 10:38

## 2018-07-10 RX ADMIN — IOPAMIDOL 80 ML: 612 INJECTION, SOLUTION INTRAVENOUS at 08:08

## 2018-07-10 RX ADMIN — SEVELAMER CARBONATE 800 MG: 800 TABLET, FILM COATED ORAL at 17:48

## 2018-07-10 RX ADMIN — FENTANYL CITRATE 50 MCG: 50 INJECTION, SOLUTION INTRAMUSCULAR; INTRAVENOUS at 17:40

## 2018-07-10 RX ADMIN — LIDOCAINE HYDROCHLORIDE 10 ML: 10 INJECTION, SOLUTION INFILTRATION; PERINEURAL at 07:46

## 2018-07-10 RX ADMIN — INSULIN LISPRO 23 UNITS: 100 INJECTION, SOLUTION INTRAVENOUS; SUBCUTANEOUS at 22:11

## 2018-07-10 RX ADMIN — HEPARIN SODIUM 2000 UNITS: 200 INJECTION, SOLUTION INTRAVENOUS at 07:28

## 2018-07-10 RX ADMIN — OXYCODONE HYDROCHLORIDE AND ACETAMINOPHEN 1000 MG: 500 TABLET ORAL at 10:38

## 2018-07-10 RX ADMIN — Medication 10 ML: at 21:08

## 2018-07-10 RX ADMIN — Medication 10 ML: at 06:37

## 2018-07-10 RX ADMIN — FENTANYL CITRATE 25 MCG: 50 INJECTION, SOLUTION INTRAMUSCULAR; INTRAVENOUS at 07:33

## 2018-07-10 RX ADMIN — INSULIN LISPRO 15 UNITS: 100 INJECTION, SOLUTION INTRAVENOUS; SUBCUTANEOUS at 17:27

## 2018-07-10 RX ADMIN — PREDNISONE 50 MG: 20 TABLET ORAL at 06:36

## 2018-07-10 NOTE — PROGRESS NOTES
Cardiac Cath Lab Procedure Area Arrival Note:    Castro Pantoja arrived to Cardiac Cath Lab, Procedure Area. Patient identifiers verified with NAME and DATE OF BIRTH. Procedure verified with patient. Consent forms verified. Allergies verified. Patient informed of procedure and plan of care. Questions answered with review. Patient voiced understanding of procedure and plan of care. Patient on cardiac monitor, non-invasive blood pressure, SPO2 monitor. On RA or O2 @ 2 lpm via NC.  IV of NSS on pump at 25 ml/hr. Patient status doing well without problems. Patient is A&Ox 4. Patient reports discomfort in BLEs. Patient medicated during procedure with orders obtained and verified by Dr. Lis Varma. Refer to patients Cardiac Cath Lab PROCEDURE REPORT for vital signs, assessment, status, and response during procedure, printed at end of case. Printed report on chart or scanned into chart. 65- Transfer to [facility] RR from Procedure Area    Verbal report given to RT Kyra(R) on Castro Pantoja being transferred to Cardiac Cath Lab  for routine progression of care   Patient is post RLE AO/RO procedure. Patient stable upon transfer to . Report consisted of patients Situation, Background, Assessment and   Recommendations(SBAR). Information from the following report(s) SBAR, Procedure Summary and MAR was reviewed with the receiving nurse. Opportunity for questions and clarification was provided. Patient medicated during procedure with orders obtained and verified by Dr. Lis Varma. Refer to patient PROCEDURE REPORT for vital signs, assessment, status, and response during procedure.

## 2018-07-10 NOTE — PROGRESS NOTES
Bedside shift change report given to [facility] (oncoming nurse) by Aníbal Schultz (offgoing nurse). Report included the following information SBAR.

## 2018-07-10 NOTE — ADT AUTH CERT NOTES
Per message from nurse:    I Rk Vinicio' have progress notes for today, but can you send the operative report, please?  Thanks!        Patient Demographics        Patient Name [address] Sex  Address Phone       Kusum Mahmood [phone] Female 1950 [address]  [address],  [address] [place] [id number] (Home) *Preferred*  [phone] (Mobile)           CSN:       [id number]           Admit Date: Admit Time Room Bed       2018  7:26 PM 0380 [id number] [[id number]]           Attending Providers        Provider Pager From To       Danielle Barber MD  18       Dario Crooks MD  18       Jose Angel Mobley MD  07/07/18 07/10/18       Izabel Fung MD  07/10/18            Emergency Contact(s)        Name Relation Home Work Cele Daughter [phone]         Utilization Review           OPERATIVE REPORT by Radha Harrington RN        Review Entered Review Status       7/10/2018 In Primary       Details         OPERATIVE REPORT:  DATE OF SERVICE: 07/10/2018     PREOPERATIVE DIAGNOSIS:  Right heel ulcer.       POSTOPERATIVE DIAGNOSIS:  Right heel ulcer.       PROCEDURE PERFORMED:  Abdominal aortogram with right leg runoff.       ANESTHESIA:  Local with sedation.       ESTIMATED BLOOD LOSS:  None.       COMPLICATIONS:  None.       IMPLANTS:  None.          INDICATIONS FOR PROCEDURE: Percell Meckel is a 50-year-old female with extensive medical history including diabetes, end-stage renal disease, obesity, who has had a right heel ulcer for about a year. Darlin Burdick is to undergo heel debridement for limb salvage.  Her vascular studies suggest possible peripheral vascular disease.  She presents for arteriogram/hopeful revascularization to assist with limb salvage.  Risks and benefits of the procedure were discussed preoperatively with the patient and the family, and they voiced understanding and wished to proceed.

## 2018-07-10 NOTE — PROGRESS NOTES
TRANSFER - IN REPORT:    Verbal report received from CHELI Schmidt RN on Ger Velázquez, Procedure: right arteriogram with no stent, from the Cardiac Cath lab, for routine progression of care. Report consisted of patients Situation, Background, Assessment and Recommendations(SBAR). Information from the following report(s) Procedure Summary, MAR and Recent Results was reviewed with the receiving clinician. Opportunity for questions and clarification was provided. Assessment completed upon patients arrival to [address] [place] [address] and care assumed. Cardiac Cath Lab Recovery Arrival Note:     Ger Velázquez arrived to [facility] recovery area. Patient procedure= Right arteriogram. Patient on cardiac monitor, non-invasive blood pressure, Patient status doing well without problems. Patient is Arousable&Ox 4. Patient reports no pain, no chest pain, no n/v. Procedure site without any bleeding and no hematoma.     p

## 2018-07-10 NOTE — PROGRESS NOTES
Bedside shift change report given to linwood (oncoming nurse) by Lidia Parker (offgoing nurse). Report included the following information SBAR, Kardex, MAR and Recent Results.

## 2018-07-10 NOTE — DIABETES MGMT
DTC Progress Note Recommendations/ Comments: Chart reviewed due to hyperglycemia likely due to steroids. If appropriate, please consider increasing Lantus to 28 units. DTC to continue to follow. Current hospital DM medication: Lantus 20 units and correction scale Humalog with normal sensitivity. Chart reviewed on Yvan Barajas. Patient is a 70 y.o. female with known diabetes on Levemir 12 units and Humalog sliding scale at home. A1c:  
Lab Results Component Value Date/Time Hemoglobin A1c 9.1 (H) 04/10/2018 02:53 AM  
 Hemoglobin A1c 7.2 (H) 10/06/2009 04:00 AM  
 
 
Recent Glucose Results:  
Lab Results Component Value Date/Time  (H) 07/10/2018 04:51 AM  
  (H) 07/09/2018 06:15 PM  
 GLUCPOC 243 (H) 07/10/2018 11:22 AM  
 GLUCPOC 260 (H) 07/10/2018 05:49 AM  
 GLUCPOC 251 (H) 07/09/2018 09:21 PM  
  
 
Lab Results Component Value Date/Time Creatinine 6.43 (H) 07/10/2018 04:51 AM  
 
Estimated Creatinine Clearance: 12.1 mL/min (based on Cr of 6.43). Active Orders Diet DIET DIABETIC CONSISTENT CARB Regular; 70-70-70 (House) PO intake:  
Patient Vitals for the past 72 hrs: 
 % Diet Eaten 07/08/18 1745 25 % 07/08/18 1435 50 % 07/08/18 0823 80 % 07/07/18 1805 75 % Will continue to follow as needed. Thank you Pamila Oppenheim, RD, CDE

## 2018-07-10 NOTE — PROGRESS NOTES
Bedside shift change report given to [address] [place] [address] (oncoming nurse) by Michael Kaufman (offgoing nurse). Report included the following information SBAR, Kardex, Intake/Output, MAR and Recent Results.

## 2018-07-10 NOTE — PROGRESS NOTES
Report received from MARVA Landeros at this time. Patient being transferred to cath lab for RLE AO/RO. Report information included current VS, SBAR, MAR, and recent results. Will continue to closely monitor.

## 2018-07-10 NOTE — PROGRESS NOTES
TRANSFER - OUT REPORT:    Verbal report given to [address], Tamara 48 on Nena Hernandez being transferred to [facility] for routine progression of care       Report consisted of patients Situation, Background, Assessment and   Recommendations(SBAR). Information from the following report(s) Procedure Summary, MAR and Recent Results was reviewed with the receiving nurse. Opportunity for questions and clarification was provided.

## 2018-07-10 NOTE — PROCEDURES
[facility]  *** FINAL REPORT ***    Name: Terri Lowery  MRN: [id number]    Inpatient  : 08 May 1950  HIS Order #: [id number]  [address] [facility] Visit #: [id number]  Date: 10 Jul 2018    TYPE OF TEST: Peripheral Arterial Testing    REASON FOR TEST  Right hand numbness with right arm AV graft. Right Arm  Segmentals:                     mmHg  Brachial  Radial           135  Ulnar            144  Doppler:  PVR:  Digit press.:  Wrist/Brachial:    Left Arm  Segmentals:                     mmHg  Brachial  Radial           131  Ulnar            147  Doppler:  PVR:  Digit press.:  Wrist/Brachial:    INTERPRETATION/FINDINGS  PROCEDURE:  Evaluation of upper extremity arteries with multilevel  systolic blood pressure measurements and pulse volume recording (PVR)  plethysmography. FINDINGS:  Prior to PVR and pressure measurements, a color duplex  ultrasound scan was performed to verify the type of AV graft. The  graft appears to be a right distal brachial artery to proximal  brachial vein PTFE graft. The graft is patent, but there is evidence  of a significant stenosis at the venous anastomosis. Mid graft  velocity (peak systolic / end-diastolic) in cm/sec is 570 / 52, and  velocity at the stenosis is 676 / 285. PVR waveforms are near normal  at the right wrist and normal at the left wrist.  Pressures at the  right wrist are similar to pressures at the left wrist.  Index finger  pressure is similar on the right (117 mmHg) and the left (110 mmHg). With manual compression of the AV graft, PVR waveform at the right  wrist showed no significant change from the baseline. IMPRESSION:  1. No evidence of hemodynamically significant right or left upper  extremity arterial obstruction. 2.  No evidence of steal by the AV graft. 3.  There is a patent right distal brachial artery to proximal  brachial vein AV graft with evidence of a hemodynamically significant  stenosis at the venous anastomosis.     ADDITIONAL COMMENTS    I have personally reviewed the data relevant to the interpretation of  this  study.     TECHNOLOGIST: Batool Shah RVT  Signed: 07/10/2018 04:39 PM    PHYSICIAN: Jamar Quinones MD  Signed: 07/11/2018 07:00 AM

## 2018-07-10 NOTE — PROGRESS NOTES
TRANSFER - OUT REPORT:    Verbal report given to [facility]) on Petty Maharaj  being transferred to Cath lab(unit) for ordered procedure       Report consisted of patients Situation, Background, Assessment and   Recommendations(SBAR). Information from the following report(s) SBAR, Kardex, Intake/Output, MAR and Recent Results was reviewed with the receiving nurse. Lines:   Peripheral IV 07/09/18 Left Forearm (Active)   Site Assessment Clean, dry, & intact 7/9/2018 10:00 PM   Phlebitis Assessment 0 7/9/2018 10:00 PM   Infiltration Assessment 0 7/9/2018 10:00 PM   Dressing Status Clean, dry, & intact 7/9/2018 10:00 PM   Dressing Type Tape;Transparent 7/9/2018 10:00 PM   Hub Color/Line Status Yellow; Infusing;Flushed;Patent 7/9/2018 10:00 PM   Action Taken Open ports on tubing capped 7/9/2018 10:00 PM   Alcohol Cap Used Yes 7/9/2018 10:00 PM        Opportunity for questions and clarification was provided.       Patient transported with:

## 2018-07-10 NOTE — CDMP QUERY
Agustina Taylor,     There is documentation by nursing staff of a right heel ulcer for this patient, please specify the type of wound in your progress notes. After further study, if the wound is found to be a pressure ulcer please specify the POA status and stage of the pressure ulcer. Pressure ulcer staging:    Stage I: Intact skin with non-blanchable redness of a localized area usually over a bony prominence; darkly pigmented skin may not have visible blanching     Stage II: Partial thickness loss of dermis presenting as a shallow open ulcer with a red pink wound bed, without slough - may also present as an intact or open/ruptured serum filled blister     Stage III: Full thickness tissue loss; subcutaneous fat may be visible but bone, tendon or muscle are not exposed. [place] may be present but does not obscure the depth of tissue loss - may include undermining and tunneling     Stage IV: Full thickness tissue loss with exposed bone, tendon or muscle - slough eschar may be present on some parts of the wound bed; often includes undermining and tunneling     Unstageable: Full thickness tissue loss in which the base of the ulcer is covered by slough and/or eschar *if the \"unstageable ulcer\" is \"staged\" during the admission/encounter, report the stage     Suspected Deep Tissue Injury: Purple or maroon localized area of discolored intact skin or blood filled blister d/t damage of the underlying soft tissue from pressure and/or sheer. The wound may further evolve and become covered by a thin eschar. Evolution may be rapid exposing additional layers of tissue even with optimal treatment.       Thank you,  Stephanie Wood  [facility]  [phone]

## 2018-07-10 NOTE — OP NOTES
[facility]  OPERATIVE REPORT    Name:DAYNA CARRASQUILLO  MR#: [id number]  : 1950  ACCOUNT #: [de-identified]   DATE OF SERVICE: 07/10/2018    PREOPERATIVE DIAGNOSIS:  Right heel ulcer. POSTOPERATIVE DIAGNOSIS:  Right heel ulcer. PROCEDURE PERFORMED:  Abdominal aortogram with right leg runoff. ANESTHESIA:  Local with sedation. ESTIMATED BLOOD LOSS:  None. COMPLICATIONS:  None. IMPLANTS:  None. SURGEON:  Jamie Correa    ASSISTANT:  None. SPECIMENS REMOVED:  none    INDICATIONS FOR PROCEDURE:  Patient is a 79-year-old female with extensive medical history including diabetes, end-stage renal disease, obesity, who has had a right heel ulcer for about a year. She is to undergo heel debridement for limb salvage. Her vascular studies suggest possible peripheral vascular disease. She presents for arteriogram/hopeful revascularization to assist with limb salvage. Risks and benefits of the procedure were discussed preoperatively with the patient and the family, and they voiced understanding and wished to proceed. DESCRIPTION OF PROCEDURE:  The patient was placed supine on the angio table, and both groins were prepped and draped in standard surgical fashion. Left groin area was anesthetized with 1% lidocaine and after locating the femoral head fluoroscopically, the left common femoral artery was accessed retrograde with an 18 gauge needle with ease and a 5-Bulgarian sheath was inserted. Combination of VCF catheter and Glidewire were advanced into the abdominal aorta near the renal artery level, and abdominal aortogram was performed. This was pulled down to the aortic bifurcation and a pelvic arteriogram was performed. Using a Glidewire and the VCF catheter, the aortic bifurcation was crossed and the catheter was advanced to the right common femoral artery and a right leg arteriogram runoff was performed down to the foot.   These revealed no significant stenosis of the abdominal aorta or bilateral iliac arteries. The common femoral, superficial femoral and profunda femoris arteries are all patent without stenosis. The superficial femoral gives rise to a popliteal artery that is patent and the runoff consisted of a complete anterior tibial artery and peroneal artery without any evidence of stenosis. The anterior tibial artery crosses the ankle into the foot. The peroneal artery gives rise to posterior tibial artery collaterals that perfuse the heel. There is no stenosis that needs to be revascularized. At this point, the wires, catheters and sheaths were removed. The left groin puncture site was closed with a Mynx device. Hemostasis was excellent. A dry sterile dressing was applied. Patient tolerated the procedure well without apparent complication.       Monse Sandoval MD AM / Claretha Lesches  D: 07/10/2018 08:43     T: 07/10/2018 09:31  JOB #: [id number]  CC: Megha Valentino DPM  CC: Ilia Nicole MD  CC: Melody Lutz MD
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
## 2018-07-10 NOTE — PROGRESS NOTES
Hospitalist Progress Note Ines Marcelo MD 
Answering service: [phone] OR [phone] from in house phone Date of Service:  7/10/2018 NAME:  Malgorzata Gaytan :  1950 MRN:  [id number] Admission Summary:  
Malgorzata Gaytan is a 70 y.o.  female with past medical history of anemia, arthritis, asthma, anxiety, ESRD, chronic pain, depression, type 2 diabetes mellitus (DM), GERD, glaucoma, hypertension, hyperlipidemia, IBS, migraine, morbid obesity, sleep apnea presented to the ED from home with chief complaint of leg swelling. Interval history / Subjective:  
Ms. Maryanne Gaucher is feeling fine after her angiogram.   
 
Assessment & Plan:  
 
Acute bilateral foot cellulitis with acute on chronic right leg wound infection wound (POA) - MRI of right foot  shows large heel ulcer with underlying soft tissue gas that extends nearly to the cortex of the underlying calcaneus. No fluid collection or osteo. - LE PVR  normal but with calcifications - RLE Arteriogram 6/10 without significant stenosis - Bcx  no growth 
- Continue on IV vancomycin - Vascular surgeon, Podiatrist consulted Right hand numbness/tingling - seems like possibly some peripheral neuropathy, carpel tunnel? - Upper extremity PVR /10 with no evidence of hemodynamically significant right or left upper extremity arterial obstruction. No evidence of steal by the AV graft. - Outpatient EMG to confirm Hyponatremia - sodium still low - May need some fluid restriction ESRD (chronic) - Continue HD 
- Continue on sensipar, renvela,  sodium bicarbonate - Upper extremity PVR /10 with patent right distal brachial artery to proximal brachial vein AV graft with evidence of a hemodynamically significant stenosis at the venous anastomosis - Nephrologist consulted Anemia of chronic disease - H/H stable HTN - BP normal, continue on coreg, monitor BP 
 
DM-II - recent A1c 9.1, BG still elevated - Increase lantus - Add standing with-meal insulin - Sliding scale and monitor finger stick glucose Elevated liver enzyme - multifactorial, improved Hx of asthma - stable, nebs PRN Hx of anxiety, depression - stable, continue on home prozac, nortriptyline Left sacral wound, left open abrasion, left lateral ankle wound POA 
- Continue wound care per wound care nurse Morbid obesity - Body mass index is 33.39 kg/(m^2). -diet and weight loss program  
 
Hx of sleep apnea - CPAP q hs Code status: Full Code DVT prophylaxis: SCD Care Plan discussed with: Patient/Family and Nurse Disposition: TBD Son and daughter at beside, questions answered Hospital Problems  Date Reviewed: 7/6/2018 Codes Class Noted POA Anemia ICD-10-CM: D64.9 ICD-9-CM: 285.9  7/6/2018 Unknown ESR[facility] (end stage renal disease) [facility] ICD-10-CM: N18.6 ICD-9-CM: 585.6  7/6/2018 Unknown Bilateral leg edema ICD-10-CM: R60.0 ICD-9-CM: 782.3  7/6/2018 Unknown LFT elevation ICD-10-CM: R79.89 ICD-9-CM: 790.6  7/6/2018 Unknown Type 2 diabetes mellitus with hyperglycemia (HCC) ICD-10-CM: E11.65 ICD-9-CM: 250.00  7/6/2018 Unknown * (Principal)Bilateral lower leg cellulitis ICD-10-CM: L03.116, N36.415 ICD-9-CM: 682.6  7/6/2018 Unknown Vital Signs:  
 Last 24hrs VS reviewed since prior progress note. Most recent are: 
Visit Vitals  /62 (BP 1 Location: Left arm, BP Patient Position: At rest;Head of bed elevated (Comment degrees))  Pulse 66  Temp 97.6 °F (36.4 °C)  Resp 20  
 Ht 6' 1.75\" (1.873 m)  Wt 117.2 kg (258 lb 4.8 oz)  SpO2 98%  Breastfeeding No  
 BMI 33.39 kg/m2 Intake/Output Summary (Last 24 hours) at 07/10/18 1530 Last data filed at 07/09/18 1820 Gross per 24 hour Intake                0 ml Output             4000 ml Net            -4000 ml Physical Examination:  
     
Constitutional:  No acute distress, cooperative, pleasant   
ENT:  Oral mucous moist, oropharynx benign. Neck supple, Resp:  CTA bilaterally. No wheezing/rhonchi/rales. No accessory muscle use CV:  Regular rhythm, normal rate, no murmurs, gallops, rubs GI:  Soft, non distended, non tender. normoactive bowel sounds, no hepatosplenomegaly Musculoskeletal:  Bilateral foot dressed Neurologic:  Moves all extremities. AAOx3, CN II-XII reviewed Skin:  Left sacral wound, left open abrasion, left lateral ankle wound Data Review:  
 
Telemetry ECG Xray CT scan MRI x Echocardiogram   
Ultrasound I have reviewed the flow sheet and recent notes New labs and data personally reviewed. Labs:  
 
Recent Labs  
   07/10/18 
 0110  07/09/18 
 4077 WBC  9.7  12.3* HGB  9.8*  9.1*  
HCT  32.7*  30.0*  
PLT  299  276 Recent Labs  
   07/10/18 
 0451  07/09/18 97 [id number]  07/09/18 
 7166 NA  132*  134*  126*  
K  4.8  3.9  4.9 CL  95*  96*  87* CO2  23  29  24 BUN  42*  26*  75* CREA  6.43*  4.40*  9.84* GLU  238*  142*  157* CA  8.4*  8.6  7.4* Recent Labs  
   07/10/18 
 0451  07/09/18 97 [id number]  07/08/18 
 2186 SGOT  16  16  16 ALT  19  21  24 AP  207*  234*  203* TBILI  0.4  0.4  0.4 TP  8.6*  9.4*  8.0 ALB  1.8*  2.0*  1.9*  
GLOB  6.8*  7.4*  6.1* No results for input(s): INR, PTP, APTT in the last 72 hours. No lab exists for component: INREXT, INREXT No results for input(s): FE, TIBC, PSAT, FERR in the last 72 hours. No results found for: FOL, RBCF No results for input(s): PH, PCO2, PO2 in the last 72 hours. No results for input(s): CPK, CKNDX, TROIQ in the last 72 hours. No lab exists for component: CPKMB Lab Results Component Value Date/Time  Cholesterol, total 144 04/10/2018 02:53 AM  
 HDL Cholesterol 66 04/10/2018 02:53 AM  
 LDL, calculated 67.6 04/10/2018 02:53 AM  
 Triglyceride 52 04/10/2018 02:53 AM  
 CHOL/HDL Ratio 2.2 04/10/2018 02:53 AM  
 
Lab Results Component Value Date/Time Glucose (POC) 243 (H) 07/10/2018 11:22 AM  
 Glucose (POC) 260 (H) 07/10/2018 05:49 AM  
 Glucose (POC) 251 (H) 07/09/2018 09:21 PM  
 Glucose (POC) 162 (H) 07/09/2018 04:10 PM  
 Glucose (POC) 292 (H) 07/09/2018 11:37 AM  
 
Lab Results Component Value Date/Time Color YELLOW 10/27/2012 10:25 PM  
 Appearance CLEAR 10/27/2012 10:25 PM  
 Specific gravity 1.012 10/27/2012 10:25 PM  
 Specific gravity 1.015 08/30/2010 07:10 AM  
 pH (UA) 8.0 10/27/2012 10:25 PM  
 Protein 100 (A) 10/27/2012 10:25 PM  
 Glucose NEGATIVE  10/27/2012 10:25 PM  
 Ketone NEGATIVE  10/27/2012 10:25 PM  
 Bilirubin NEGATIVE  10/27/2012 10:25 PM  
 Urobilinogen 1.0 10/27/2012 10:25 PM  
 Nitrites NEGATIVE  10/27/2012 10:25 PM  
 Leukocyte Esterase TRACE (A) 10/27/2012 10:25 PM  
 Epithelial cells 0-5 10/27/2012 10:25 PM  
 Bacteria NEGATIVE  10/27/2012 10:25 PM  
 WBC 10-20 10/27/2012 10:25 PM  
 RBC 5-10 10/27/2012 10:25 PM  
 
 
 
Medications Reviewed:  
 
Current Facility-Administered Medications Medication Dose Route Frequency  0.9% sodium chloride infusion  25 mL/hr IntraVENous CONTINUOUS  
 [START ON 7/11/2018] Vancomycin level due @ 0400 on 7/11/18   Other ONCE  
 vancomycin (VANCOCIN) 1,000 mg in 0.9% sodium chloride (MBP/ADV) 250 mL  1,000 mg IntraVENous DIALYSIS MON, WED & FRI  fentaNYL citrate (PF) injection 50 mcg  50 mcg IntraVENous Q4H PRN  
 insulin glargine (LANTUS) injection 20 Units  20 Units SubCUTAneous QHS  carvedilol (COREG) tablet 12.5 mg  12.5 mg Oral BID WITH MEALS  cinacalcet (SENSIPAR) tablet 30 mg  30 mg Oral DAILY  ascorbic acid (vitamin C) (VITAMIN C) tablet 1,000 mg  1,000 mg Oral DAILY  aspirin chewable tablet 81 mg  81 mg Oral DAILY  B complex-vitaminC-folic acid (NEPHROCAP) cap  1 Cap Oral DAILY  FLUoxetine (PROzac) capsule 20 mg  20 mg Oral DAILY  gabapentin (NEURONTIN) capsule 100 mg  100 mg Oral BID  latanoprost (XALATAN) 0.005 % ophthalmic solution 1 Drop  1 Drop Both Eyes QHS  midodrine (PROAMITINE) tablet 5 mg  5 mg Oral Once per day on Mon Wed Fri  polyethylene glycol (MIRALAX) packet 17 g  17 g Oral BID PRN  
 sevelamer carbonate (RENVELA) tab 800 mg  800 mg Oral TID WITH MEALS  simvastatin (ZOCOR) tablet 20 mg  20 mg Oral QHS  sodium chloride (NS) flush 5-10 mL  5-10 mL IntraVENous Q8H  
 sodium chloride (NS) flush 5-10 mL  5-10 mL IntraVENous PRN  
 insulin lispro (HUMALOG) injection   SubCUTAneous AC&HS  
 glucose chewable tablet 16 g  4 Tab Oral PRN  
 dextrose (D50W) injection syrg 12.5-25 g  12.5-25 g IntraVENous PRN  
 glucagon (GLUCAGEN) injection 1 mg  1 mg IntraMUSCular PRN  
 nortriptyline (PAMELOR) capsule 50 mg  50 mg Oral QHS  Vancomycin- pharmacy to dose   Other Rx Dosing/Monitoring  
 
______________________________________________________________________ EXPECTED LENGTH OF STAY: 4d 16h ACTUAL LENGTH OF STAY:          4 Kvng Stuart MD

## 2018-07-10 NOTE — PROCEDURES
Surgeon: Alex Toney  Complications: none    Findings:  No significant stenosis of abdominal aorta, bilateral iliac arteries, right leg vasculature; anterior tibial and peroneal artery runoff that reconstitutes posterior tibial collaterals to the heel

## 2018-07-11 ENCOUNTER — ANESTHESIA EVENT (OUTPATIENT)
Dept: SURGERY | Age: 68
DRG: 628 | End: 2018-07-11
Payer: MEDICARE

## 2018-07-11 LAB
ATRIAL RATE: 66 BPM
CALCULATED P AXIS, ECG09: 42 DEGREES
CALCULATED R AXIS, ECG10: -13 DEGREES
CALCULATED T AXIS, ECG11: 16 DEGREES
DIAGNOSIS, 93000: NORMAL
GLUCOSE BLD STRIP.AUTO-MCNC: 157 MG/DL (ref 65–100)
GLUCOSE BLD STRIP.AUTO-MCNC: 163 MG/DL (ref 65–100)
GLUCOSE BLD STRIP.AUTO-MCNC: 229 MG/DL (ref 65–100)
GLUCOSE BLD STRIP.AUTO-MCNC: 252 MG/DL (ref 65–100)
P-R INTERVAL, ECG05: 180 MS
Q-T INTERVAL, ECG07: 466 MS
QRS DURATION, ECG06: 96 MS
QTC CALCULATION (BEZET), ECG08: 488 MS
SERVICE CMNT-IMP: ABNORMAL
VANCOMYCIN SERPL-MCNC: 16.7 UG/ML
VENTRICULAR RATE, ECG03: 66 BPM

## 2018-07-11 PROCEDURE — 74011250636 HC RX REV CODE- 250/636: Performed by: HOSPITALIST

## 2018-07-11 PROCEDURE — 82962 GLUCOSE BLOOD TEST: CPT

## 2018-07-11 PROCEDURE — 36415 COLL VENOUS BLD VENIPUNCTURE: CPT | Performed by: FAMILY MEDICINE

## 2018-07-11 PROCEDURE — 90935 HEMODIALYSIS ONE EVALUATION: CPT

## 2018-07-11 PROCEDURE — 74011000250 HC RX REV CODE- 250: Performed by: INTERNAL MEDICINE

## 2018-07-11 PROCEDURE — 74011250637 HC RX REV CODE- 250/637: Performed by: FAMILY MEDICINE

## 2018-07-11 PROCEDURE — C9113 INJ PANTOPRAZOLE SODIUM, VIA: HCPCS | Performed by: INTERNAL MEDICINE

## 2018-07-11 PROCEDURE — 94760 N-INVAS EAR/PLS OXIMETRY 1: CPT

## 2018-07-11 PROCEDURE — 3331090002 HH PPS REVENUE DEBIT

## 2018-07-11 PROCEDURE — 80202 ASSAY OF VANCOMYCIN: CPT | Performed by: FAMILY MEDICINE

## 2018-07-11 PROCEDURE — 74011250637 HC RX REV CODE- 250/637: Performed by: HOSPITALIST

## 2018-07-11 PROCEDURE — 93041 RHYTHM ECG TRACING: CPT

## 2018-07-11 PROCEDURE — 94660 CPAP INITIATION&MGMT: CPT

## 2018-07-11 PROCEDURE — 74011250636 HC RX REV CODE- 250/636: Performed by: INTERNAL MEDICINE

## 2018-07-11 PROCEDURE — 74011636637 HC RX REV CODE- 636/637: Performed by: HOSPITALIST

## 2018-07-11 PROCEDURE — 3331090001 HH PPS REVENUE CREDIT

## 2018-07-11 PROCEDURE — 74011000258 HC RX REV CODE- 258: Performed by: INTERNAL MEDICINE

## 2018-07-11 PROCEDURE — 65270000032 HC RM SEMIPRIVATE

## 2018-07-11 PROCEDURE — P9047 ALBUMIN (HUMAN), 25%, 50ML: HCPCS | Performed by: INTERNAL MEDICINE

## 2018-07-11 RX ORDER — PANTOPRAZOLE SODIUM 40 MG/1
40 TABLET, DELAYED RELEASE ORAL
Status: DISCONTINUED | OUTPATIENT
Start: 2018-07-11 | End: 2018-07-19 | Stop reason: HOSPADM

## 2018-07-11 RX ORDER — MAG HYDROX/ALUMINUM HYD/SIMETH 200-200-20
30 SUSPENSION, ORAL (FINAL DOSE FORM) ORAL
Status: DISCONTINUED | OUTPATIENT
Start: 2018-07-11 | End: 2018-07-19 | Stop reason: HOSPADM

## 2018-07-11 RX ORDER — ONDANSETRON 2 MG/ML
4 INJECTION INTRAMUSCULAR; INTRAVENOUS
Status: DISCONTINUED | OUTPATIENT
Start: 2018-07-11 | End: 2018-07-19 | Stop reason: HOSPADM

## 2018-07-11 RX ORDER — ALBUMIN HUMAN 250 G/1000ML
25 SOLUTION INTRAVENOUS ONCE
Status: COMPLETED | OUTPATIENT
Start: 2018-07-11 | End: 2018-07-11

## 2018-07-11 RX ORDER — ONDANSETRON 2 MG/ML
4 INJECTION INTRAMUSCULAR; INTRAVENOUS
Status: DISCONTINUED | OUTPATIENT
Start: 2018-07-11 | End: 2018-07-11 | Stop reason: SDUPTHER

## 2018-07-11 RX ADMIN — FLUOXETINE 20 MG: 20 CAPSULE ORAL at 13:18

## 2018-07-11 RX ADMIN — Medication 10 ML: at 11:49

## 2018-07-11 RX ADMIN — AZTREONAM 500 MG: 1 INJECTION, POWDER, LYOPHILIZED, FOR SOLUTION INTRAMUSCULAR; INTRAVENOUS at 17:24

## 2018-07-11 RX ADMIN — INSULIN LISPRO 3 UNITS: 100 INJECTION, SOLUTION INTRAVENOUS; SUBCUTANEOUS at 07:05

## 2018-07-11 RX ADMIN — LATANOPROST 1 DROP: 50 SOLUTION OPHTHALMIC at 21:05

## 2018-07-11 RX ADMIN — SIMVASTATIN 20 MG: 20 TABLET, FILM COATED ORAL at 21:05

## 2018-07-11 RX ADMIN — INSULIN LISPRO 5 UNITS: 100 INJECTION, SOLUTION INTRAVENOUS; SUBCUTANEOUS at 07:05

## 2018-07-11 RX ADMIN — ALBUMIN (HUMAN) 25 G: 0.25 INJECTION, SOLUTION INTRAVENOUS at 10:54

## 2018-07-11 RX ADMIN — INSULIN LISPRO 2 UNITS: 100 INJECTION, SOLUTION INTRAVENOUS; SUBCUTANEOUS at 11:48

## 2018-07-11 RX ADMIN — CARVEDILOL 12.5 MG: 12.5 TABLET, FILM COATED ORAL at 16:56

## 2018-07-11 RX ADMIN — ALUMINUM HYDROXIDE, MAGNESIUM HYDROXIDE, AND SIMETHICONE 30 ML: 200; 200; 20 SUSPENSION ORAL at 13:42

## 2018-07-11 RX ADMIN — Medication 10 ML: at 21:08

## 2018-07-11 RX ADMIN — AZTREONAM 500 MG: 1 INJECTION, POWDER, LYOPHILIZED, FOR SOLUTION INTRAMUSCULAR; INTRAVENOUS at 06:09

## 2018-07-11 RX ADMIN — FENTANYL CITRATE 50 MCG: 50 INJECTION, SOLUTION INTRAMUSCULAR; INTRAVENOUS at 13:17

## 2018-07-11 RX ADMIN — ASCORBIC ACID, THIAMINE MONONITRATE,RIBOFLAVIN, NIACINAMIDE, PYRIDOXINE HYDROCHLORIDE, FOLIC ACID, CYANOCOBALAMIN, BIOTIN, CALCIUM PANTOTHENATE, 1 CAPSULE: 100; 1.5; 1.7; 20; 10; 1; 6000; 150000; 5 CAPSULE, LIQUID FILLED ORAL at 13:17

## 2018-07-11 RX ADMIN — GABAPENTIN 100 MG: 100 CAPSULE ORAL at 07:05

## 2018-07-11 RX ADMIN — INSULIN LISPRO 3 UNITS: 100 INJECTION, SOLUTION INTRAVENOUS; SUBCUTANEOUS at 16:55

## 2018-07-11 RX ADMIN — VANCOMYCIN HYDROCHLORIDE 1000 MG: 1 INJECTION, POWDER, LYOPHILIZED, FOR SOLUTION INTRAVENOUS at 21:05

## 2018-07-11 RX ADMIN — ONDANSETRON 4 MG: 2 INJECTION INTRAMUSCULAR; INTRAVENOUS at 21:05

## 2018-07-11 RX ADMIN — INSULIN LISPRO 2 UNITS: 100 INJECTION, SOLUTION INTRAVENOUS; SUBCUTANEOUS at 16:56

## 2018-07-11 RX ADMIN — OXYCODONE HYDROCHLORIDE AND ACETAMINOPHEN 1000 MG: 500 TABLET ORAL at 13:17

## 2018-07-11 RX ADMIN — FENTANYL CITRATE 50 MCG: 50 INJECTION, SOLUTION INTRAMUSCULAR; INTRAVENOUS at 21:53

## 2018-07-11 RX ADMIN — GABAPENTIN 100 MG: 100 CAPSULE ORAL at 21:05

## 2018-07-11 RX ADMIN — INSULIN LISPRO 2 UNITS: 100 INJECTION, SOLUTION INTRAVENOUS; SUBCUTANEOUS at 21:53

## 2018-07-11 RX ADMIN — SEVELAMER CARBONATE 800 MG: 800 TABLET, FILM COATED ORAL at 13:18

## 2018-07-11 RX ADMIN — SODIUM CHLORIDE 40 MG: 9 INJECTION INTRAMUSCULAR; INTRAVENOUS; SUBCUTANEOUS at 11:49

## 2018-07-11 RX ADMIN — ASPIRIN 81 MG 81 MG: 81 TABLET ORAL at 13:30

## 2018-07-11 RX ADMIN — SEVELAMER CARBONATE 800 MG: 800 TABLET, FILM COATED ORAL at 16:56

## 2018-07-11 RX ADMIN — Medication 10 ML: at 09:59

## 2018-07-11 RX ADMIN — INSULIN GLARGINE 25 UNITS: 100 INJECTION, SOLUTION SUBCUTANEOUS at 21:53

## 2018-07-11 RX ADMIN — ONDANSETRON 4 MG: 2 INJECTION, SOLUTION INTRAMUSCULAR; INTRAVENOUS at 10:00

## 2018-07-11 RX ADMIN — CINACALCET HYDROCHLORIDE 30 MG: 30 TABLET, COATED ORAL at 21:05

## 2018-07-11 RX ADMIN — Medication 10 ML: at 13:17

## 2018-07-11 RX ADMIN — PANTOPRAZOLE SODIUM 40 MG: 40 TABLET, DELAYED RELEASE ORAL at 16:56

## 2018-07-11 RX ADMIN — NORTRIPTYLINE HYDROCHLORIDE 50 MG: 25 CAPSULE ORAL at 21:05

## 2018-07-11 RX ADMIN — FENTANYL CITRATE 50 MCG: 50 INJECTION, SOLUTION INTRAMUSCULAR; INTRAVENOUS at 17:24

## 2018-07-11 RX ADMIN — INSULIN LISPRO 3 UNITS: 100 INJECTION, SOLUTION INTRAVENOUS; SUBCUTANEOUS at 11:48

## 2018-07-11 RX ADMIN — Medication 10 ML: at 05:07

## 2018-07-11 RX ADMIN — FENTANYL CITRATE 50 MCG: 50 INJECTION, SOLUTION INTRAMUSCULAR; INTRAVENOUS at 05:07

## 2018-07-11 NOTE — PROGRESS NOTES
Bedside shift change report given to [address] [place] [address] Juan Miguel (oncoming nurse) by Michael Kaufman (offgoing nurse). Report included the following information SBAR, Kardex, Procedure Summary, Intake/Output, MAR and Recent Results.

## 2018-07-11 NOTE — PROGRESS NOTES
Podiatry  -Pt seen & examined at bedside. Son at bedside.  -Pt to OR tomorrow at 0730 for debridement of right heel with partial calcanectomy.  Will pre-op & consent today for OR tomorrow  -Continue antibxs per ID

## 2018-07-11 NOTE — PROGRESS NOTES
Hospitalist Progress Note Veronica Jones MD 
Answering service: [phone] OR [phone] from in house phone Date of Service:  2018 NAME:  Hayden Marroquin :  1950 MRN:  [id number] Admission Summary:  
Hayden Marroquin is a 70 y.o.  female with past medical history of anemia, arthritis, asthma, anxiety, ESRD, chronic pain, depression, type 2 diabetes mellitus (DM), GERD, glaucoma, hypertension, hyperlipidemia, IBS, migraine, morbid obesity, sleep apnea presented to the ED from home with chief complaint of leg swelling. Interval history / Subjective:  
Ms. Michael Shoemaker is having some indigestion and nausea. No chest pain, palpitations. Feet feel all right. Assessment & Plan:  
 
Acute bilateral foot cellulitis with acute on chronic right leg wound infection wound (POA) - MRI of right foot  shows large heel ulcer with underlying soft tissue gas that extends nearly to the cortex of the underlying calcaneus. No fluid collection or osteo. - LE PVR  normal but with calcifications - RLE Arteriogram 6/10 without significant stenosis - Bcx  no growth 
- Continue on IV vancomycin - Vascular surgeon, ID, Podiatrist consulted - plan for heel debridement tomorrow Right hand numbness/tingling - seems like possibly some peripheral neuropathy, carpel tunnel? - Upper extremity PVR 7/10 with no evidence of hemodynamically significant right or left upper extremity arterial obstruction. No evidence of steal by the AV graft. - Outpatient EMG to confirm Hyponatremia - sodium still low - May need some fluid restriction ESRD (chronic) - Continue HD 
- Continue on sensipar, renvela,  sodium bicarbonate - Upper extremity PVR /10 with patent right distal brachial artery to proximal brachial vein AV graft with evidence of a hemodynamically significant stenosis at the venous anastomosis - Nephrologist consulted Anemia of chronic disease - H/H stable HTN - BP normal, continue on coreg, monitor BP 
 
DM-II - recent A1c 9.1, BG up after steroids - Continue lantus - Added standing with-meal insulin - Sliding scale and monitor finger stick glucose Elevated liver enzyme - multifactorial, improved Hx of asthma - stable, nebs PRN Hx of anxiety, depression - stable, continue on home prozac, nortriptyline Left sacral wound, left open abrasion, left lateral ankle wound POA 
- Continue wound care per wound care nurse Morbid obesity - Body mass index is 36.27 kg/(m^2). -diet and weight loss program  
 
Hx of sleep apnea - CPAP q hs Code status: Full Code DVT prophylaxis: SCD Care Plan discussed with: Patient/Family and Nurse Disposition: likely SNF after recovered from heel debridement Son and daughter at beside, questions answered Hospital Problems  Date Reviewed: 7/6/2018 Codes Class Noted POA Anemia ICD-10-CM: D64.9 ICD-9-CM: 285.9  7/6/2018 Unknown [facility] ICD-10-CM: N18.6 ICD-9-CM: 585.6  7/6/2018 Unknown Bilateral leg edema ICD-10-CM: R60.0 ICD-9-CM: 782.3  7/6/2018 Unknown LFT elevation ICD-10-CM: R79.89 ICD-9-CM: 790.6  7/6/2018 Unknown Type 2 diabetes mellitus with hyperglycemia (HCC) ICD-10-CM: E11.65 ICD-9-CM: 250.00  7/6/2018 Unknown * (Principal)Bilateral lower leg cellulitis ICD-10-CM: L03.116, D31.098 ICD-9-CM: 682.6  7/6/2018 Unknown Vital Signs:  
 Last 24hrs VS reviewed since prior progress note. Most recent are: 
Visit Vitals  BP (!) 139/93 (BP 1 Location: Left arm, BP Patient Position: Sitting)  Pulse 64  Temp 97.1 °F (36.2 °C)  Resp 18  Ht 6' 1.75\" (1.873 m)  Wt 127.3 kg (280 lb 9.6 oz)  SpO2 98%  Breastfeeding No  
 BMI 36.27 kg/m2 Intake/Output Summary (Last 24 hours) at 07/11/18 1618 Last data filed at 07/11/18 1329 Gross per 24 hour Intake              240 ml Output             2700 ml Net            -2460 ml Physical Examination:  
     
Constitutional:  No acute distress, cooperative, pleasant   
ENT:  Oral mucous moist, oropharynx benign. Neck supple, Resp:  CTA bilaterally. No wheezing/rhonchi/rales. No accessory muscle use CV:  Regular rhythm, normal rate, no murmurs, gallops, rubs GI:  Soft, non distended, non tender. normoactive bowel sounds, no hepatosplenomegaly Musculoskeletal:  Bilateral foot dressed Neurologic:  Moves all extremities. AAOx3, CN II-XII reviewed Skin:  Left sacral wound, left open abrasion, left lateral ankle wound Data Review:  
 
Telemetry ECG Xray CT scan MRI Echocardiogram   
Ultrasound I have reviewed the flow sheet and recent notes New labs and data personally reviewed. Labs:  
 
Recent Labs  
   07/10/18 
 0110  07/09/18 
 6973 WBC  9.7  12.3* HGB  9.8*  9.1*  
HCT  32.7*  30.0*  
PLT  299  276 Recent Labs  
   07/10/18 
 0451  07/09/18 97 [id number]  07/09/18 
 7385 NA  132*  134*  126*  
K  4.8  3.9  4.9 CL  95*  96*  87* CO2  23  29  24 BUN  42*  26*  75* CREA  6.43*  4.40*  9.84* GLU  238*  142*  157* CA  8.4*  8.6  7.4* Recent Labs  
   07/10/18 
 0451  07/09/18 97 [id number] SGOT  16  16 ALT  19  21 AP  207*  234* TBILI  0.4  0.4 TP  8.6*  9.4* ALB  1.8*  2.0*  
GLOB  6.8*  7.4* No results for input(s): INR, PTP, APTT in the last 72 hours. No lab exists for component: INREXT, INREXT No results for input(s): FE, TIBC, PSAT, FERR in the last 72 hours. No results found for: FOL, RBCF No results for input(s): PH, PCO2, PO2 in the last 72 hours. No results for input(s): CPK, CKNDX, TROIQ in the last 72 hours. No lab exists for component: CPKMB Lab Results Component Value Date/Time  Cholesterol, total 144 04/10/2018 02:53 AM  
 HDL Cholesterol 66 04/10/2018 02:53 AM  
 LDL, calculated 67.6 04/10/2018 02:53 AM  
 Triglyceride 52 04/10/2018 02:53 AM  
 CHOL/HDL Ratio 2.2 04/10/2018 02:53 AM  
 
Lab Results Component Value Date/Time Glucose (POC) 163 (H) 07/11/2018 11:16 AM  
 Glucose (POC) 252 (H) 07/11/2018 05:56 AM  
 Glucose (POC) 292 (H) 07/10/2018 09:00 PM  
 Glucose (POC) 405 (H) 07/10/2018 04:28 PM  
 Glucose (POC) 243 (H) 07/10/2018 11:22 AM  
 
Lab Results Component Value Date/Time Color YELLOW 10/27/2012 10:25 PM  
 Appearance CLEAR 10/27/2012 10:25 PM  
 Specific gravity 1.012 10/27/2012 10:25 PM  
 Specific gravity 1.015 08/30/2010 07:10 AM  
 pH (UA) 8.0 10/27/2012 10:25 PM  
 Protein 100 (A) 10/27/2012 10:25 PM  
 Glucose NEGATIVE  10/27/2012 10:25 PM  
 Ketone NEGATIVE  10/27/2012 10:25 PM  
 Bilirubin NEGATIVE  10/27/2012 10:25 PM  
 Urobilinogen 1.0 10/27/2012 10:25 PM  
 Nitrites NEGATIVE  10/27/2012 10:25 PM  
 Leukocyte Esterase TRACE (A) 10/27/2012 10:25 PM  
 Epithelial cells 0-5 10/27/2012 10:25 PM  
 Bacteria NEGATIVE  10/27/2012 10:25 PM  
 WBC 10-20 10/27/2012 10:25 PM  
 RBC 5-10 10/27/2012 10:25 PM  
 
 
 
Medications Reviewed:  
 
Current Facility-Administered Medications Medication Dose Route Frequency  ondansetron (ZOFRAN) injection 4 mg  4 mg IntraVENous Q6H PRN  pantoprazole (PROTONIX) tablet 40 mg  40 mg Oral ACB&D  
 alum-mag hydroxide-simeth (MYLANTA) oral suspension 30 mL  30 mL Oral Q4H PRN  
 insulin glargine (LANTUS) injection 25 Units  25 Units SubCUTAneous QHS  insulin lispro (HUMALOG) injection 3 Units  3 Units SubCUTAneous TIDAC  aztreonam (AZACTAM) 500 mg in 0.9% sodium chloride 100 mL IVPB  500 mg IntraVENous Q12H  
 vancomycin (VANCOCIN) 1,000 mg in 0.9% sodium chloride (MBP/ADV) 250 mL  1,000 mg IntraVENous DIALYSIS MON, WED & FRI  fentaNYL citrate (PF) injection 50 mcg  50 mcg IntraVENous Q4H PRN  
 carvedilol (COREG) tablet 12.5 mg  12.5 mg Oral BID WITH MEALS  cinacalcet (SENSIPAR) tablet 30 mg  30 mg Oral DAILY  ascorbic acid (vitamin C) (VITAMIN C) tablet 1,000 mg  1,000 mg Oral DAILY  aspirin chewable tablet 81 mg  81 mg Oral DAILY  B complex-vitaminC-folic acid (NEPHROCAP) cap  1 Cap Oral DAILY  FLUoxetine (PROzac) capsule 20 mg  20 mg Oral DAILY  gabapentin (NEURONTIN) capsule 100 mg  100 mg Oral BID  latanoprost (XALATAN) 0.005 % ophthalmic solution 1 Drop  1 Drop Both Eyes QHS  midodrine (PROAMITINE) tablet 5 mg  5 mg Oral Once per day on Mon Wed Fri  polyethylene glycol (MIRALAX) packet 17 g  17 g Oral BID PRN  
 sevelamer carbonate (RENVELA) tab 800 mg  800 mg Oral TID WITH MEALS  simvastatin (ZOCOR) tablet 20 mg  20 mg Oral QHS  sodium chloride (NS) flush 5-10 mL  5-10 mL IntraVENous Q8H  
 sodium chloride (NS) flush 5-10 mL  5-10 mL IntraVENous PRN  
 insulin lispro (HUMALOG) injection   SubCUTAneous AC&HS  
 glucose chewable tablet 16 g  4 Tab Oral PRN  
 dextrose (D50W) injection syrg 12.5-25 g  12.5-25 g IntraVENous PRN  
 glucagon (GLUCAGEN) injection 1 mg  1 mg IntraMUSCular PRN  
 nortriptyline (PAMELOR) capsule 50 mg  50 mg Oral QHS  Vancomycin- pharmacy to dose   Other Rx Dosing/Monitoring  
 
______________________________________________________________________ EXPECTED LENGTH OF STAY: 4d 16h ACTUAL LENGTH OF STAY:          5 Starla Mcclain MD

## 2018-07-11 NOTE — PROGRESS NOTES
[facility] 
 [address] [address], [address] [facility] Phone: [phone]  
 
Nephrology Progress Note Castro Pantoja     1950     [id number] Date of Admission : 7/6/2018 07/11/18 CC: Follow up for ESRD Assessment and Plan Leg cellulitis Hypervolemic Hyponatremia  
  
ESRD - MWF dialysis 
  
HTN 
  
DM 
  
Obesity, FARHAT 
  
Dyslipidemia 
  
Possible steal syndrome right hand 
  
  
REC: 
HD today Zofran and Protonix for symptomatic GERD  
 
   
 
Interval History: 
Seen and examined on dialysis Results noted for  MRI and vascular studies Reports severe epigastric disconmfort, heart burn and nausea Review of Systems: A comprehensive review of systems was negative. Current Medications:  
Current Facility-Administered Medications Medication Dose Route Frequency  ondansetron (ZOFRAN) injection 4 mg  4 mg IntraVENous Q6H PRN  
 0.9% sodium chloride infusion  25 mL/hr IntraVENous CONTINUOUS  
 insulin glargine (LANTUS) injection 25 Units  25 Units SubCUTAneous QHS  insulin lispro (HUMALOG) injection 3 Units  3 Units SubCUTAneous TIDAC  aztreonam (AZACTAM) 500 mg in 0.9% sodium chloride 100 mL IVPB  500 mg IntraVENous Q12H  
 vancomycin (VANCOCIN) 1,000 mg in 0.9% sodium chloride (MBP/ADV) 250 mL  1,000 mg IntraVENous DIALYSIS MON, WED & FRI  fentaNYL citrate (PF) injection 50 mcg  50 mcg IntraVENous Q4H PRN  
 carvedilol (COREG) tablet 12.5 mg  12.5 mg Oral BID WITH MEALS  cinacalcet (SENSIPAR) tablet 30 mg  30 mg Oral DAILY  ascorbic acid (vitamin C) (VITAMIN C) tablet 1,000 mg  1,000 mg Oral DAILY  aspirin chewable tablet 81 mg  81 mg Oral DAILY  B complex-vitaminC-folic acid (NEPHROCAP) cap  1 Cap Oral DAILY  FLUoxetine (PROzac) capsule 20 mg  20 mg Oral DAILY  gabapentin (NEURONTIN) capsule 100 mg  100 mg Oral BID  latanoprost (XALATAN) 0.005 % ophthalmic solution 1 Drop  1 Drop Both Eyes QHS  midodrine (PROAMITINE) tablet 5 mg  5 mg Oral Once per day on Mon Wed Fri  polyethylene glycol (MIRALAX) packet 17 g  17 g Oral BID PRN  
 sevelamer carbonate (RENVELA) tab 800 mg  800 mg Oral TID WITH MEALS  simvastatin (ZOCOR) tablet 20 mg  20 mg Oral QHS  sodium chloride (NS) flush 5-10 mL  5-10 mL IntraVENous Q8H  
 sodium chloride (NS) flush 5-10 mL  5-10 mL IntraVENous PRN  
 insulin lispro (HUMALOG) injection   SubCUTAneous AC&HS  
 glucose chewable tablet 16 g  4 Tab Oral PRN  
 dextrose (D50W) injection syrg 12.5-25 g  12.5-25 g IntraVENous PRN  
 glucagon (GLUCAGEN) injection 1 mg  1 mg IntraMUSCular PRN  
 nortriptyline (PAMELOR) capsule 50 mg  50 mg Oral QHS  Vancomycin- pharmacy to dose   Other Rx Dosing/Monitoring Allergies Allergen Reactions  Latex Itching Rash, sometimes difficult to breathe  Celebrex [Celecoxib] Anaphylaxis and Swelling TONGUE. LIPS AND EYES  
 Iodine Other (comments) IV CONTRAST-LESIONS, AND SKIN SLUFFED OFF  
 Keflex [Cephalexin] Anaphylaxis and Swelling Tongue, lips, and eyes  Levaquin [Levofloxacin] Anaphylaxis and Swelling Tongue, lips, and eyes  Pcn [Penicillins] Anaphylaxis and Swelling Tongue, lips and eyes Objective: 
Vitals:   
Vitals:  
 07/11/18 0809 07/11/18 0847 07/11/18 0900 07/11/18 0930 BP: 139/77 139/66 127/64 126/62 Pulse: 63 60 62 62 Resp: 18 16 Temp: 97.4 °F (36.3 °C) 97.4 °F (36.3 °C) TempSrc:      
SpO2: 96% Weight:      
Height:      
 
Intake and Output: 
  
  
 
Physical Examination: 
General:Alert, No distress, HEENT:  Conjunctiva without pallor ,erythema.  The sclerae without icterus. .  
Neck: supple Lungs : Clears to auscultation Bilaterally, Normal respiratory effort CVS: RRR, S1 S2 normal, No rub, legs bandaged Abdomen: Soft, Non tender Extremities: No cyanosis, No clubbing; good thrill LUE AV graft MS: No joint swelling, erythema, warmth Neurologic: non focal, AAO x 3 Psych: normal affect 
 
[]    High complexity decision making was performed 
[]    Patient is at high-risk of decompensation with multiple organ involvement Lab Data Personally Reviewed: I have reviewed all the pertinent labs, microbiology data and radiology studies during assessment. Recent Labs  
   07/10/18 
 0451  07/09/18 [phone]  07/09/18 
 1849 NA  132*  134*  126*  
K  4.8  3.9  4.9 CL  95*  96*  87* CO2  23  29  24 GLU  238*  142*  157* BUN  42*  26*  75* CREA  6.43*  4.40*  9.84* CA  8.4*  8.6  7.4* ALB  1.8*  2.0*   --   
SGOT  16  16   --   
ALT  19  21   --   
 
Recent Labs  
   07/10/18 
 0110  07/09/18 
 0538 WBC  9.7  12.3* HGB  9.8*  9.1*  
HCT  32.7*  30.0*  
PLT  299  276 Lab Results Component Value Date/Time Specimen Description: URINE 10/27/2012 08:15 PM  
 Specimen Description: BLOOD 06/07/2011 07:40 AM  
 Specimen Description: URINE 08/30/2010 07:10 AM  
 Specimen Description: URINE 08/09/2010 11:30 AM  
 Specimen Description: URINE 06/22/2010 11:50 PM  
 
Lab Results Component Value Date/Time Culture result: MRSA NOT PRESENT 07/07/2018 01:39 AM  
 Culture result:  07/07/2018 01:39 AM  
      Screening of patient nares for MRSA is for surveillance purposes and, if positive, to facilitate isolation considerations in high risk settings. It is not intended for automatic decolonization interventions per se as regimens are not sufficiently effective to warrant routine use. Culture result: NO GROWTH 4 DAYS 07/06/2018 10:30 PM  
 Culture result: NO GROWTH 5 DAYS 07/06/2018 10:08 PM  
 Culture result: MRSA NOT PRESENT 02/13/2018 09:48 PM  
 Culture result:  02/13/2018 09:48 PM  
      Screening of patient nares for MRSA is for surveillance purposes and, if positive, to facilitate isolation considerations in high risk settings.  It is not intended for automatic decolonization interventions per se as regimens are not sufficiently effective to warrant routine use. Recent Results (from the past 24 hour(s)) GLUCOSE, POC Collection Time: 07/10/18 11:22 AM  
Result Value Ref Range Glucose (POC) 243 (H) 65 - 100 mg/dL Performed by Sarah Rand, POC Collection Time: 07/10/18  4:28 PM  
Result Value Ref Range Glucose (POC) 405 (H) 65 - 100 mg/dL Performed by Edson Jo GLUCOSE, POC Collection Time: 07/10/18  9:00 PM  
Result Value Ref Range Glucose (POC) 292 (H) 65 - 100 mg/dL Performed by Irineo Laboy Collection Time: 07/11/18  5:12 AM  
Result Value Ref Range Vancomycin, random 16.7 UG/ML  
GLUCOSE, POC Collection Time: 07/11/18  5:56 AM  
Result Value Ref Range Glucose (POC) 252 (H) 65 - 100 mg/dL Performed by Poonam Villa I have reviewed the flowsheets. Chart and Pertinent Notes have been reviewed. No change in PMH ,family and social history from Consult note.  
 
 
Marvin Montalvo MD

## 2018-07-11 NOTE — PROGRESS NOTES
ID Progress Note  2018    Subjective:     She offers no complaints at the current time    Objective:     Antibiotics:  1. Azactam  2. Vancomycin       Vitals:   Visit Vitals    /72    Pulse 62    Temp 97.4 °F (36.3 °C)    Resp 16    Ht 6' 1.75\" (1.873 m)    Wt 127.3 kg (280 lb 9.6 oz)    SpO2 96%    Breastfeeding No    BMI 36.27 kg/m2        Tmax:  Temp (24hrs), Av.6 °F (36.4 °C), Min:97.4 °F (36.3 °C), Max:98.1 °F (36.7 °C)      Exam:  Wound dressed, no proximal cellulitis. Labs:      Recent Labs      07/10/18   0451  07/10/18   0110  18   1815  18   0538   WBC   --   9.7   --   12.3*   HGB   --   9.8*   --   9.1*   PLT   --   299   --   276   BUN  42*   --   26*  75*   CREA  6.43*   --   4.40*  9.84*   SGOT  16   --   16   --    AP  207*   --   234*   --    TBILI  0.4   --   0.4   --        Cultures:     Lab Results   Component Value Date/Time    Specimen Description: URINE 10/27/2012 08:15 PM    Specimen Description: BLOOD 2011 07:40 AM    Specimen Description: URINE 2010 07:10 AM     Lab Results   Component Value Date/Time    Culture result: MRSA NOT PRESENT 2018 01:39 AM    Culture result:  2018 01:39 AM         Screening of patient nares for MRSA is for surveillance purposes and, if positive, to facilitate isolation considerations in high risk settings. It is not intended for automatic decolonization interventions per se as regimens are not sufficiently effective to warrant routine use. Culture result: NO GROWTH 4 DAYS 2018 10:30 PM       Radiology:     Line/Insert Date:           Assessment:     1. Diabetic foot wound--further debridement pending--she will need significant help post-op, it sounds like  2. Cultures without a lot of growth    Objective:     1. Continue current therapy  2.  Follow up cultures    María Smith MD

## 2018-07-11 NOTE — PROGRESS NOTES
CM met with patient and family to discuss planning for discharge,   Patient desires snf rehab before returning home. CM provided listing of facilities. Referrals sent to [facility] for snf rehab consideration. SHANNON Orantes Se, CRM    Referral also sent to [facility].   Yani Bear, CRM

## 2018-07-11 NOTE — PROGRESS NOTES
[facility] 
 [address] [address], [address] Phone: [phone]  
 
[address]   
Nephrology Progress Note Petty Maharaj     1950     [id number] Date of Admission : 7/6/2018 07/11/18 CC: Follow up for ESRD Assessment and Plan Leg cellulitis Hypervolemic Hyponatremia  
  
ESRD - MWF dialysis 
  
HTN 
  
DM 
  
Obesity, FARHAT 
  
Dyslipidemia 
  
Possible steal syndrome right hand 
  
  
REC: 
HD tomorrow  
  Interval History: 
Seen and examined Going for MRI and vascular studies Review of Systems: A comprehensive review of systems was negative. Current Medications:  
Current Facility-Administered Medications Medication Dose Route Frequency  ondansetron (ZOFRAN) injection 4 mg  4 mg IntraVENous Q6H PRN  
 0.9% sodium chloride infusion  25 mL/hr IntraVENous CONTINUOUS  
 insulin glargine (LANTUS) injection 25 Units  25 Units SubCUTAneous QHS  insulin lispro (HUMALOG) injection 3 Units  3 Units SubCUTAneous TIDAC  aztreonam (AZACTAM) 500 mg in 0.9% sodium chloride 100 mL IVPB  500 mg IntraVENous Q12H  
 vancomycin (VANCOCIN) 1,000 mg in 0.9% sodium chloride (MBP/ADV) 250 mL  1,000 mg IntraVENous DIALYSIS MON, WED & FRI  fentaNYL citrate (PF) injection 50 mcg  50 mcg IntraVENous Q4H PRN  
 carvedilol (COREG) tablet 12.5 mg  12.5 mg Oral BID WITH MEALS  cinacalcet (SENSIPAR) tablet 30 mg  30 mg Oral DAILY  ascorbic acid (vitamin C) (VITAMIN C) tablet 1,000 mg  1,000 mg Oral DAILY  aspirin chewable tablet 81 mg  81 mg Oral DAILY  B complex-vitaminC-folic acid (NEPHROCAP) cap  1 Cap Oral DAILY  FLUoxetine (PROzac) capsule 20 mg  20 mg Oral DAILY  gabapentin (NEURONTIN) capsule 100 mg  100 mg Oral BID  latanoprost (XALATAN) 0.005 % ophthalmic solution 1 Drop  1 Drop Both Eyes QHS  midodrine (PROAMITINE) tablet 5 mg  5 mg Oral Once per day on Mon Wed Fri  polyethylene glycol (MIRALAX) packet 17 g  17 g Oral BID PRN  
 sevelamer carbonate (RENVELA) tab 800 mg  800 mg Oral TID WITH MEALS  simvastatin (ZOCOR) tablet 20 mg  20 mg Oral QHS  sodium chloride (NS) flush 5-10 mL  5-10 mL IntraVENous Q8H  
 sodium chloride (NS) flush 5-10 mL  5-10 mL IntraVENous PRN  
 insulin lispro (HUMALOG) injection   SubCUTAneous AC&HS  
 glucose chewable tablet 16 g  4 Tab Oral PRN  
 dextrose (D50W) injection syrg 12.5-25 g  12.5-25 g IntraVENous PRN  
 glucagon (GLUCAGEN) injection 1 mg  1 mg IntraMUSCular PRN  
 nortriptyline (PAMELOR) capsule 50 mg  50 mg Oral QHS  Vancomycin- pharmacy to dose   Other Rx Dosing/Monitoring Allergies Allergen Reactions  Latex Itching Rash, sometimes difficult to breathe  Celebrex [Celecoxib] Anaphylaxis and Swelling TONGUE. LIPS AND EYES  
 Iodine Other (comments) IV CONTRAST-LESIONS, AND SKIN SLUFFED OFF  
 Keflex [Cephalexin] Anaphylaxis and Swelling Tongue, lips, and eyes  Levaquin [Levofloxacin] Anaphylaxis and Swelling Tongue, lips, and eyes  Pcn [Penicillins] Anaphylaxis and Swelling Tongue, lips and eyes Objective: 
Vitals:   
Vitals:  
 07/11/18 0809 07/11/18 0847 07/11/18 0900 07/11/18 0930 BP: 139/77 139/66 127/64 126/62 Pulse: 63 60 62 62 Resp: 18 16 Temp: 97.4 °F (36.3 °C) 97.4 °F (36.3 °C) TempSrc:      
SpO2: 96% Weight:      
Height:      
 
Intake and Output: 
  
  
 
Physical Examination: 
General:Alert, No distress, HEENT:  Conjunctiva without pallor ,erythema.  The sclerae without icterus. .  
Neck:Supple,no mass palpable Lungs : Clears to auscultation Bilaterally, Normal respiratory effort CVS: RRR, S1 S2 normal, No rub, legs bandaged Abdomen: Soft, Non tender Extremities: No cyanosis, No clubbing; good thrill LUE AV graft Skin: No rash or lesions. MS: No joint swelling, erythema, warmth Neurologic: non focal, AAO x 3 Psych: normal affect 
 
[]    High complexity decision making was performed 
[]    Patient is at high-risk of decompensation with multiple organ involvement Lab Data Personally Reviewed: I have reviewed all the pertinent labs, microbiology data and radiology studies during assessment. Recent Labs  
   07/10/18 
 0451  07/09/18 65  07/09/18 
 4184 NA  132*  134*  126*  
K  4.8  3.9  4.9 CL  95*  96*  87* CO2  23  29  24 GLU  238*  142*  157* BUN  42*  26*  75* CREA  6.43*  4.40*  9.84* CA  8.4*  8.6  7.4* ALB  1.8*  2.0*   --   
SGOT  16  16   --   
ALT  19  21   --   
 
Recent Labs  
   07/10/18 
 0110  07/09/18 
 0538 WBC  9.7  12.3* HGB  9.8*  9.1*  
HCT  32.7*  30.0*  
PLT  299  276 Lab Results Component Value Date/Time Specimen Description: URINE 10/27/2012 08:15 PM  
 Specimen Description: BLOOD 06/07/2011 07:40 AM  
 Specimen Description: URINE 08/30/2010 07:10 AM  
 Specimen Description: URINE 08/09/2010 11:30 AM  
 Specimen Description: URINE 06/22/2010 11:50 PM  
 
Lab Results Component Value Date/Time Culture result: MRSA NOT PRESENT 07/07/2018 01:39 AM  
 Culture result:  07/07/2018 01:39 AM  
      Screening of patient nares for MRSA is for surveillance purposes and, if positive, to facilitate isolation considerations in high risk settings. It is not intended for automatic decolonization interventions per se as regimens are not sufficiently effective to warrant routine use. Culture result: NO GROWTH 4 DAYS 07/06/2018 10:30 PM  
 Culture result: NO GROWTH 5 DAYS 07/06/2018 10:08 PM  
 Culture result: MRSA NOT PRESENT 02/13/2018 09:48 PM  
 Culture result:  02/13/2018 09:48 PM  
      Screening of patient nares for MRSA is for surveillance purposes and, if positive, to facilitate isolation considerations in high risk settings. It is not intended for automatic decolonization interventions per se as regimens are not sufficiently effective to warrant routine use.   
 
Recent Results (from the past 24 hour(s)) GLUCOSE, POC Collection Time: 07/10/18 11:22 AM  
Result Value Ref Range Glucose (POC) 243 (H) 65 - 100 mg/dL Performed by Paulina Moraes, POC Collection Time: 07/10/18  4:28 PM  
Result Value Ref Range Glucose (POC) 405 (H) 65 - 100 mg/dL Performed by Ewa Hernandez GLUCOSE, POC Collection Time: 07/10/18  9:00 PM  
Result Value Ref Range Glucose (POC) 292 (H) 65 - 100 mg/dL Performed by Jannet Oscar Collection Time: 07/11/18  5:12 AM  
Result Value Ref Range Vancomycin, random 16.7 UG/ML  
GLUCOSE, POC Collection Time: 07/11/18  5:56 AM  
Result Value Ref Range Glucose (POC) 252 (H) 65 - 100 mg/dL Performed by Seema Baez I have reviewed the flowsheets. Chart and Pertinent Notes have been reviewed. No change in PMH ,family and social history from Consult note.  
 
 
Marcy Mendosa MD

## 2018-07-11 NOTE — PROGRESS NOTES
Podiatry  -Pt seen & examined at bedside. Family at bedside. -MRI negative for osteo. -Per Dr. Claritza Sanchez, Pt has adequate circulation to proceed with foot surgery.  -Will plan for right heel debridement with partial calcanectomy on Thursday - time TBD. Post-op, Pt will require wound vac therapy, extended IV antibxs, HBO therapy, and PT upon discharge. Family and Pt agree with the plan.     -Continue antibxs per ID.  -Will follow

## 2018-07-11 NOTE — PROGRESS NOTES
Spiritual Care Partner Volunteer visited patient in Rm 602 on 7/11/2018.   Documented by:  Chaplain Pearl MDiv, MS, [address] ([id number])

## 2018-07-11 NOTE — PROGRESS NOTES
Bedside shift change report given to [facility], RN (oncoming nurse) by Coleman Negrete RN (offgoing nurse). Report included the following information SBAR.

## 2018-07-11 NOTE — DIALYSIS
[facility]  [phone]    Vitals   Pre   Post   Assessment   Pre   Post     Temp  Temp: 97.4 °F (36.3 °C) (07/11/18 0847)  98.4   LOC  Alert and oriented x4 Alert and oriented x4      HR   Pulse (Heart Rate): 60 (07/11/18 0847) 62 Lungs   CTA  CTA   B/P   BP: 139/66 (07/11/18 0847) 136/63 Cardiac   Reg rate and rhythm  reg raisa and rhythm   Resp   Resp Rate: 16 (07/11/18 0847) 18 Skin   Warm and dry  warm and dry   Pain level  Pain Intensity 1: 8 (07/11/18 0511) 0 Edema  general     General.   Orders:    Duration:   Start:  0845 Procedure Start Time: 1385 End:  7641   Total:   4 hr   Dialyzer:   Dialyzer/Set Up Inspection: Marito (07/11/18 0847)   Blu Cosme Bath:   Dialysate K (mEq/L): 3 (07/11/18 0847)   Ca Bath:   Dialysate CA (mEq/L): 2.5 (07/11/18 0847)   Na/Bicarb:   Dialysate NA (mEq/L): 140 (07/11/18 0847)   Target Fluid Removal:   Goal/Amount of Fluid to Remove (mL): 4000 mL (07/09/18 1320)   Access     Type & Location:      Labs     Obtained/Reviewed   Critical Results Called   Date when labs were drawn-  Hgb-    HGB   Date Value Ref Range Status   07/10/2018 9.8 (L) 11.5 - 16.0 g/dL Final     K-    Potassium   Date Value Ref Range Status   07/10/2018 4.8 3.5 - 5.1 mmol/L Final     Ca-   Calcium   Date Value Ref Range Status   07/10/2018 8.4 (L) 8.5 - 10.1 MG/DL Final     Bun-   BUN   Date Value Ref Range Status   07/10/2018 42 (H) 6 - 20 MG/DL Final     Creat-   Creatinine   Date Value Ref Range Status   07/10/2018 6.43 (H) 0.55 - 1.02 MG/DL Final     Comment:     INVESTIGATED PER DELTA CHECK PROTOCOL        Medications/ Blood Products Given     Name   Dose   Route and Time                     Blood Volume Processed (BVP):    81 L Net Fluid   Removed:  2700 mL   Comments   Time Out Done: 0830  Primary Nurse Rpt Pre:Diogo Fitzgerald, RN  Primary Nurse Rpt Yasemin Abdi, RN  Pt Education:procedure, safety  Care Plan:cont the orders of the nephrologist  Tx Summary:permcatanat disinfected with Alcohol per policy. Each lumen aspirated for blood return and flushed with Normal Saline per policy. Dialysis initiated. Blood pressure dropped during tx, Dr contacted and order 50mg of Albumin given. completed tx, all possible blood returned. Central line catheter flushed with normal saline per policy. Ports disinfected with Alcohol per policy and lines disconnected and capped using aseptic technique. See LDA docflowsheet for dressing information. Admiting Diagnosis:Anemia, Bilateral lower leg cellulitus  Pt's previous clinic-[facility]  Consent signed - Informed Consent Verified: Yes (07/11/18 2016)  Sinaita Consent - signed  Hepatitis Status- negative, immune  Machine #- Machine Number: [id number] (07/11/18 5680)  Telemetry status-bedside  Pre-dialysis wt. - Pre-Dialysis Weight: 127.3 kg (280 lb 10.3 oz) (07/11/18 9726)

## 2018-07-12 ENCOUNTER — APPOINTMENT (OUTPATIENT)
Dept: GENERAL RADIOLOGY | Age: 68
DRG: 628 | End: 2018-07-12
Attending: PODIATRIST
Payer: MEDICARE

## 2018-07-12 ENCOUNTER — ANESTHESIA (OUTPATIENT)
Dept: SURGERY | Age: 68
DRG: 628 | End: 2018-07-12
Payer: MEDICARE

## 2018-07-12 LAB
ALBUMIN SERPL-MCNC: 2.2 G/DL (ref 3.5–5)
ANION GAP SERPL CALC-SCNC: 11 MMOL/L (ref 5–15)
BACTERIA SPEC CULT: NORMAL
BACTERIA SPEC CULT: NORMAL
BASOPHILS # BLD: 0 K/UL (ref 0–0.1)
BASOPHILS NFR BLD: 0 % (ref 0–1)
BUN SERPL-MCNC: 34 MG/DL (ref 6–20)
BUN/CREAT SERPL: 7 (ref 12–20)
CALCIUM SERPL-MCNC: 7.2 MG/DL (ref 8.5–10.1)
CHLORIDE SERPL-SCNC: 94 MMOL/L (ref 97–108)
CO2 SERPL-SCNC: 26 MMOL/L (ref 21–32)
CREAT SERPL-MCNC: 4.56 MG/DL (ref 0.55–1.02)
DIFFERENTIAL METHOD BLD: ABNORMAL
EOSINOPHIL # BLD: 0.1 K/UL (ref 0–0.4)
EOSINOPHIL NFR BLD: 1 % (ref 0–7)
ERYTHROCYTE [DISTWIDTH] IN BLOOD BY AUTOMATED COUNT: 15.7 % (ref 11.5–14.5)
GLUCOSE BLD STRIP.AUTO-MCNC: 105 MG/DL (ref 65–100)
GLUCOSE BLD STRIP.AUTO-MCNC: 125 MG/DL (ref 65–100)
GLUCOSE BLD STRIP.AUTO-MCNC: 156 MG/DL (ref 65–100)
GLUCOSE BLD STRIP.AUTO-MCNC: 167 MG/DL (ref 65–100)
GLUCOSE BLD STRIP.AUTO-MCNC: 94 MG/DL (ref 65–100)
GLUCOSE SERPL-MCNC: 192 MG/DL (ref 65–100)
HCT VFR BLD AUTO: 31 % (ref 35–47)
HGB BLD-MCNC: 9.1 G/DL (ref 11.5–16)
IMM GRANULOCYTES # BLD: 0 K/UL (ref 0–0.04)
IMM GRANULOCYTES NFR BLD AUTO: 0 % (ref 0–0.5)
LYMPHOCYTES # BLD: 1.9 K/UL (ref 0.8–3.5)
LYMPHOCYTES NFR BLD: 19 % (ref 12–49)
MCH RBC QN AUTO: 26.5 PG (ref 26–34)
MCHC RBC AUTO-ENTMCNC: 29.4 G/DL (ref 30–36.5)
MCV RBC AUTO: 90.1 FL (ref 80–99)
MONOCYTES # BLD: 0.8 K/UL (ref 0–1)
MONOCYTES NFR BLD: 8 % (ref 5–13)
NEUTS SEG # BLD: 6.8 K/UL (ref 1.8–8)
NEUTS SEG NFR BLD: 71 % (ref 32–75)
NRBC # BLD: 0 K/UL (ref 0–0.01)
NRBC BLD-RTO: 0 PER 100 WBC
PHOSPHATE SERPL-MCNC: 4.1 MG/DL (ref 2.6–4.7)
PLATELET # BLD AUTO: 299 K/UL (ref 150–400)
PMV BLD AUTO: 8.6 FL (ref 8.9–12.9)
POTASSIUM SERPL-SCNC: 4.2 MMOL/L (ref 3.5–5.1)
RBC # BLD AUTO: 3.44 M/UL (ref 3.8–5.2)
SERVICE CMNT-IMP: ABNORMAL
SERVICE CMNT-IMP: NORMAL
SODIUM SERPL-SCNC: 131 MMOL/L (ref 136–145)
WBC # BLD AUTO: 9.7 K/UL (ref 3.6–11)

## 2018-07-12 PROCEDURE — 74011636637 HC RX REV CODE- 636/637: Performed by: HOSPITALIST

## 2018-07-12 PROCEDURE — 73630 X-RAY EXAM OF FOOT: CPT

## 2018-07-12 PROCEDURE — 94761 N-INVAS EAR/PLS OXIMETRY MLT: CPT

## 2018-07-12 PROCEDURE — 94660 CPAP INITIATION&MGMT: CPT

## 2018-07-12 PROCEDURE — 88307 TISSUE EXAM BY PATHOLOGIST: CPT | Performed by: PODIATRIST

## 2018-07-12 PROCEDURE — 74011000250 HC RX REV CODE- 250

## 2018-07-12 PROCEDURE — 82962 GLUCOSE BLOOD TEST: CPT

## 2018-07-12 PROCEDURE — 76060000033 HC ANESTHESIA 1 TO 1.5 HR: Performed by: PODIATRIST

## 2018-07-12 PROCEDURE — 0QBL0ZZ EXCISION OF RIGHT TARSAL, OPEN APPROACH: ICD-10-PCS | Performed by: PODIATRIST

## 2018-07-12 PROCEDURE — 77030020754 HC CUF TRNQT 2BLA STRY -B: Performed by: PODIATRIST

## 2018-07-12 PROCEDURE — 77030006831 HC BLD SAW SAG MCRA -B: Performed by: PODIATRIST

## 2018-07-12 PROCEDURE — 76010000138 HC OR TIME 0.5 TO 1 HR: Performed by: PODIATRIST

## 2018-07-12 PROCEDURE — 74011250637 HC RX REV CODE- 250/637: Performed by: HOSPITALIST

## 2018-07-12 PROCEDURE — 77030011640 HC PAD GRND REM COVD -A: Performed by: PODIATRIST

## 2018-07-12 PROCEDURE — 74011250636 HC RX REV CODE- 250/636: Performed by: HOSPITALIST

## 2018-07-12 PROCEDURE — 77030010509 HC AIRWY LMA MSK TELE -A: Performed by: ANESTHESIOLOGY

## 2018-07-12 PROCEDURE — 74011000250 HC RX REV CODE- 250: Performed by: INTERNAL MEDICINE

## 2018-07-12 PROCEDURE — 87077 CULTURE AEROBIC IDENTIFY: CPT | Performed by: HOSPITALIST

## 2018-07-12 PROCEDURE — 87205 SMEAR GRAM STAIN: CPT | Performed by: HOSPITALIST

## 2018-07-12 PROCEDURE — 87186 SC STD MICRODIL/AGAR DIL: CPT | Performed by: HOSPITALIST

## 2018-07-12 PROCEDURE — 85025 COMPLETE CBC W/AUTO DIFF WBC: CPT | Performed by: INTERNAL MEDICINE

## 2018-07-12 PROCEDURE — 74011250637 HC RX REV CODE- 250/637: Performed by: FAMILY MEDICINE

## 2018-07-12 PROCEDURE — 74011000250 HC RX REV CODE- 250: Performed by: ANESTHESIOLOGY

## 2018-07-12 PROCEDURE — 77030020782 HC GWN BAIR PAWS FLX 3M -B

## 2018-07-12 PROCEDURE — 74011000258 HC RX REV CODE- 258: Performed by: INTERNAL MEDICINE

## 2018-07-12 PROCEDURE — 65270000032 HC RM SEMIPRIVATE

## 2018-07-12 PROCEDURE — 88311 DECALCIFY TISSUE: CPT | Performed by: PODIATRIST

## 2018-07-12 PROCEDURE — 74011250636 HC RX REV CODE- 250/636

## 2018-07-12 PROCEDURE — 36415 COLL VENOUS BLD VENIPUNCTURE: CPT | Performed by: INTERNAL MEDICINE

## 2018-07-12 PROCEDURE — 80069 RENAL FUNCTION PANEL: CPT | Performed by: INTERNAL MEDICINE

## 2018-07-12 PROCEDURE — 3331090001 HH PPS REVENUE CREDIT

## 2018-07-12 PROCEDURE — 87075 CULTR BACTERIA EXCEPT BLOOD: CPT | Performed by: HOSPITALIST

## 2018-07-12 PROCEDURE — 3331090002 HH PPS REVENUE DEBIT

## 2018-07-12 PROCEDURE — 76210000006 HC OR PH I REC 0.5 TO 1 HR: Performed by: PODIATRIST

## 2018-07-12 PROCEDURE — 74011000250 HC RX REV CODE- 250: Performed by: PODIATRIST

## 2018-07-12 PROCEDURE — 77030018836 HC SOL IRR NACL ICUM -A: Performed by: PODIATRIST

## 2018-07-12 PROCEDURE — 74011000272 HC RX REV CODE- 272: Performed by: PODIATRIST

## 2018-07-12 PROCEDURE — 74011250637 HC RX REV CODE- 250/637: Performed by: PODIATRIST

## 2018-07-12 RX ORDER — LIDOCAINE HYDROCHLORIDE 20 MG/ML
INJECTION, SOLUTION EPIDURAL; INFILTRATION; INTRACAUDAL; PERINEURAL AS NEEDED
Status: DISCONTINUED | OUTPATIENT
Start: 2018-07-12 | End: 2018-07-12 | Stop reason: HOSPADM

## 2018-07-12 RX ORDER — MIDAZOLAM HYDROCHLORIDE 1 MG/ML
1 INJECTION, SOLUTION INTRAMUSCULAR; INTRAVENOUS AS NEEDED
Status: DISCONTINUED | OUTPATIENT
Start: 2018-07-12 | End: 2018-07-12 | Stop reason: HOSPADM

## 2018-07-12 RX ORDER — ONDANSETRON 2 MG/ML
4 INJECTION INTRAMUSCULAR; INTRAVENOUS AS NEEDED
Status: DISCONTINUED | OUTPATIENT
Start: 2018-07-12 | End: 2018-07-12 | Stop reason: HOSPADM

## 2018-07-12 RX ORDER — EPHEDRINE SULFATE 50 MG/ML
INJECTION, SOLUTION INTRAVENOUS AS NEEDED
Status: DISCONTINUED | OUTPATIENT
Start: 2018-07-12 | End: 2018-07-12 | Stop reason: HOSPADM

## 2018-07-12 RX ORDER — FENTANYL CITRATE 50 UG/ML
INJECTION, SOLUTION INTRAMUSCULAR; INTRAVENOUS AS NEEDED
Status: DISCONTINUED | OUTPATIENT
Start: 2018-07-12 | End: 2018-07-12 | Stop reason: HOSPADM

## 2018-07-12 RX ORDER — SODIUM CHLORIDE, SODIUM LACTATE, POTASSIUM CHLORIDE, CALCIUM CHLORIDE 600; 310; 30; 20 MG/100ML; MG/100ML; MG/100ML; MG/100ML
1000 INJECTION, SOLUTION INTRAVENOUS CONTINUOUS
Status: DISCONTINUED | OUTPATIENT
Start: 2018-07-12 | End: 2018-07-12 | Stop reason: HOSPADM

## 2018-07-12 RX ORDER — DIPHENHYDRAMINE HYDROCHLORIDE 50 MG/ML
12.5 INJECTION, SOLUTION INTRAMUSCULAR; INTRAVENOUS AS NEEDED
Status: DISCONTINUED | OUTPATIENT
Start: 2018-07-12 | End: 2018-07-12 | Stop reason: HOSPADM

## 2018-07-12 RX ORDER — SODIUM CHLORIDE 0.9 % (FLUSH) 0.9 %
5-10 SYRINGE (ML) INJECTION AS NEEDED
Status: DISCONTINUED | OUTPATIENT
Start: 2018-07-12 | End: 2018-07-12 | Stop reason: HOSPADM

## 2018-07-12 RX ORDER — OXYCODONE AND ACETAMINOPHEN 5; 325 MG/1; MG/1
1-2 TABLET ORAL
Status: DISCONTINUED | OUTPATIENT
Start: 2018-07-12 | End: 2018-07-19 | Stop reason: HOSPADM

## 2018-07-12 RX ORDER — EPINEPHRINE 1 MG/ML
INJECTION, SOLUTION, CONCENTRATE INTRAVENOUS AS NEEDED
Status: DISCONTINUED | OUTPATIENT
Start: 2018-07-12 | End: 2018-07-12 | Stop reason: HOSPADM

## 2018-07-12 RX ORDER — OXYCODONE AND ACETAMINOPHEN 5; 325 MG/1; MG/1
1-2 TABLET ORAL
Status: DISCONTINUED | OUTPATIENT
Start: 2018-07-12 | End: 2018-07-12

## 2018-07-12 RX ORDER — PROPOFOL 10 MG/ML
INJECTION, EMULSION INTRAVENOUS AS NEEDED
Status: DISCONTINUED | OUTPATIENT
Start: 2018-07-12 | End: 2018-07-12 | Stop reason: HOSPADM

## 2018-07-12 RX ORDER — FENTANYL CITRATE 50 UG/ML
25 INJECTION, SOLUTION INTRAMUSCULAR; INTRAVENOUS
Status: DISCONTINUED | OUTPATIENT
Start: 2018-07-12 | End: 2018-07-12 | Stop reason: HOSPADM

## 2018-07-12 RX ORDER — SODIUM CHLORIDE 9 MG/ML
25 INJECTION, SOLUTION INTRAVENOUS CONTINUOUS
Status: DISCONTINUED | OUTPATIENT
Start: 2018-07-12 | End: 2018-07-12 | Stop reason: HOSPADM

## 2018-07-12 RX ORDER — ONDANSETRON 2 MG/ML
INJECTION INTRAMUSCULAR; INTRAVENOUS AS NEEDED
Status: DISCONTINUED | OUTPATIENT
Start: 2018-07-12 | End: 2018-07-12 | Stop reason: HOSPADM

## 2018-07-12 RX ORDER — MIDAZOLAM HYDROCHLORIDE 1 MG/ML
INJECTION, SOLUTION INTRAMUSCULAR; INTRAVENOUS AS NEEDED
Status: DISCONTINUED | OUTPATIENT
Start: 2018-07-12 | End: 2018-07-12 | Stop reason: HOSPADM

## 2018-07-12 RX ORDER — SODIUM CHLORIDE, SODIUM LACTATE, POTASSIUM CHLORIDE, CALCIUM CHLORIDE 600; 310; 30; 20 MG/100ML; MG/100ML; MG/100ML; MG/100ML
100 INJECTION, SOLUTION INTRAVENOUS CONTINUOUS
Status: DISCONTINUED | OUTPATIENT
Start: 2018-07-12 | End: 2018-07-12 | Stop reason: HOSPADM

## 2018-07-12 RX ORDER — FENTANYL CITRATE 50 UG/ML
50 INJECTION, SOLUTION INTRAMUSCULAR; INTRAVENOUS AS NEEDED
Status: DISCONTINUED | OUTPATIENT
Start: 2018-07-12 | End: 2018-07-12 | Stop reason: HOSPADM

## 2018-07-12 RX ORDER — LIDOCAINE HYDROCHLORIDE 10 MG/ML
0.1 INJECTION, SOLUTION EPIDURAL; INFILTRATION; INTRACAUDAL; PERINEURAL AS NEEDED
Status: DISCONTINUED | OUTPATIENT
Start: 2018-07-12 | End: 2018-07-12 | Stop reason: HOSPADM

## 2018-07-12 RX ORDER — SODIUM CHLORIDE 9 MG/ML
INJECTION, SOLUTION INTRAVENOUS
Status: DISCONTINUED | OUTPATIENT
Start: 2018-07-12 | End: 2018-07-12 | Stop reason: HOSPADM

## 2018-07-12 RX ORDER — FENTANYL CITRATE 50 UG/ML
50 INJECTION, SOLUTION INTRAMUSCULAR; INTRAVENOUS
Status: DISCONTINUED | OUTPATIENT
Start: 2018-07-12 | End: 2018-07-19 | Stop reason: HOSPADM

## 2018-07-12 RX ORDER — SODIUM CHLORIDE 0.9 % (FLUSH) 0.9 %
5-10 SYRINGE (ML) INJECTION EVERY 8 HOURS
Status: DISCONTINUED | OUTPATIENT
Start: 2018-07-12 | End: 2018-07-12 | Stop reason: HOSPADM

## 2018-07-12 RX ORDER — PHENYLEPHRINE HCL IN 0.9% NACL 0.4MG/10ML
SYRINGE (ML) INTRAVENOUS AS NEEDED
Status: DISCONTINUED | OUTPATIENT
Start: 2018-07-12 | End: 2018-07-12 | Stop reason: HOSPADM

## 2018-07-12 RX ORDER — MORPHINE SULFATE 10 MG/ML
2 INJECTION, SOLUTION INTRAMUSCULAR; INTRAVENOUS
Status: DISCONTINUED | OUTPATIENT
Start: 2018-07-12 | End: 2018-07-12 | Stop reason: HOSPADM

## 2018-07-12 RX ORDER — BUPIVACAINE HYDROCHLORIDE 5 MG/ML
INJECTION, SOLUTION EPIDURAL; INTRACAUDAL AS NEEDED
Status: DISCONTINUED | OUTPATIENT
Start: 2018-07-12 | End: 2018-07-12 | Stop reason: HOSPADM

## 2018-07-12 RX ORDER — LIDOCAINE HYDROCHLORIDE 10 MG/ML
INJECTION INFILTRATION; PERINEURAL AS NEEDED
Status: DISCONTINUED | OUTPATIENT
Start: 2018-07-12 | End: 2018-07-12 | Stop reason: HOSPADM

## 2018-07-12 RX ORDER — ROPIVACAINE HYDROCHLORIDE 5 MG/ML
150 INJECTION, SOLUTION EPIDURAL; INFILTRATION; PERINEURAL AS NEEDED
Status: DISCONTINUED | OUTPATIENT
Start: 2018-07-12 | End: 2018-07-12 | Stop reason: HOSPADM

## 2018-07-12 RX ORDER — MIDAZOLAM HYDROCHLORIDE 1 MG/ML
0.5 INJECTION, SOLUTION INTRAMUSCULAR; INTRAVENOUS
Status: DISCONTINUED | OUTPATIENT
Start: 2018-07-12 | End: 2018-07-12 | Stop reason: HOSPADM

## 2018-07-12 RX ORDER — OXYCODONE HYDROCHLORIDE 5 MG/1
5 TABLET ORAL AS NEEDED
Status: DISCONTINUED | OUTPATIENT
Start: 2018-07-12 | End: 2018-07-12 | Stop reason: HOSPADM

## 2018-07-12 RX ADMIN — GABAPENTIN 100 MG: 100 CAPSULE ORAL at 22:06

## 2018-07-12 RX ADMIN — SIMVASTATIN 20 MG: 20 TABLET, FILM COATED ORAL at 22:06

## 2018-07-12 RX ADMIN — EPHEDRINE SULFATE 15 MG: 50 INJECTION, SOLUTION INTRAVENOUS at 07:50

## 2018-07-12 RX ADMIN — FENTANYL CITRATE 50 MCG: 50 INJECTION, SOLUTION INTRAMUSCULAR; INTRAVENOUS at 04:24

## 2018-07-12 RX ADMIN — CINACALCET HYDROCHLORIDE 30 MG: 30 TABLET, COATED ORAL at 22:06

## 2018-07-12 RX ADMIN — Medication 10 ML: at 14:00

## 2018-07-12 RX ADMIN — MIDAZOLAM HYDROCHLORIDE 1 MG: 1 INJECTION, SOLUTION INTRAMUSCULAR; INTRAVENOUS at 07:24

## 2018-07-12 RX ADMIN — INSULIN LISPRO 2 UNITS: 100 INJECTION, SOLUTION INTRAVENOUS; SUBCUTANEOUS at 17:40

## 2018-07-12 RX ADMIN — PANTOPRAZOLE SODIUM 40 MG: 40 TABLET, DELAYED RELEASE ORAL at 17:40

## 2018-07-12 RX ADMIN — Medication 10 ML: at 22:07

## 2018-07-12 RX ADMIN — INSULIN LISPRO 3 UNITS: 100 INJECTION, SOLUTION INTRAVENOUS; SUBCUTANEOUS at 12:39

## 2018-07-12 RX ADMIN — FENTANYL CITRATE 50 MCG: 50 INJECTION, SOLUTION INTRAMUSCULAR; INTRAVENOUS at 07:24

## 2018-07-12 RX ADMIN — Medication 320 MCG: at 07:36

## 2018-07-12 RX ADMIN — NORTRIPTYLINE HYDROCHLORIDE 50 MG: 25 CAPSULE ORAL at 22:06

## 2018-07-12 RX ADMIN — SEVELAMER CARBONATE 800 MG: 800 TABLET, FILM COATED ORAL at 12:39

## 2018-07-12 RX ADMIN — ASPIRIN 81 MG 81 MG: 81 TABLET ORAL at 09:49

## 2018-07-12 RX ADMIN — Medication 400 MCG: at 07:55

## 2018-07-12 RX ADMIN — FENTANYL CITRATE 50 MCG: 50 INJECTION, SOLUTION INTRAMUSCULAR; INTRAVENOUS at 09:49

## 2018-07-12 RX ADMIN — Medication 400 MCG: at 07:41

## 2018-07-12 RX ADMIN — LIDOCAINE HYDROCHLORIDE 50 MG: 20 INJECTION, SOLUTION EPIDURAL; INFILTRATION; INTRACAUDAL; PERINEURAL at 07:33

## 2018-07-12 RX ADMIN — INSULIN LISPRO 3 UNITS: 100 INJECTION, SOLUTION INTRAVENOUS; SUBCUTANEOUS at 17:40

## 2018-07-12 RX ADMIN — OXYCODONE HYDROCHLORIDE AND ACETAMINOPHEN 2 TABLET: 5; 325 TABLET ORAL at 15:18

## 2018-07-12 RX ADMIN — PROPOFOL 150 MG: 10 INJECTION, EMULSION INTRAVENOUS at 07:33

## 2018-07-12 RX ADMIN — SEVELAMER CARBONATE 800 MG: 800 TABLET, FILM COATED ORAL at 17:40

## 2018-07-12 RX ADMIN — EPINEPHRINE 0.03 MG: 1 INJECTION, SOLUTION, CONCENTRATE INTRAVENOUS at 07:55

## 2018-07-12 RX ADMIN — LATANOPROST 1 DROP: 50 SOLUTION OPHTHALMIC at 22:00

## 2018-07-12 RX ADMIN — GABAPENTIN 100 MG: 100 CAPSULE ORAL at 09:49

## 2018-07-12 RX ADMIN — ASCORBIC ACID, THIAMINE MONONITRATE,RIBOFLAVIN, NIACINAMIDE, PYRIDOXINE HYDROCHLORIDE, FOLIC ACID, CYANOCOBALAMIN, BIOTIN, CALCIUM PANTOTHENATE, 1 CAPSULE: 100; 1.5; 1.7; 20; 10; 1; 6000; 150000; 5 CAPSULE, LIQUID FILLED ORAL at 09:49

## 2018-07-12 RX ADMIN — AZTREONAM 500 MG: 1 INJECTION, POWDER, LYOPHILIZED, FOR SOLUTION INTRAMUSCULAR; INTRAVENOUS at 17:46

## 2018-07-12 RX ADMIN — EPHEDRINE SULFATE 10 MG: 50 INJECTION, SOLUTION INTRAVENOUS at 07:40

## 2018-07-12 RX ADMIN — EPHEDRINE SULFATE 10 MG: 50 INJECTION, SOLUTION INTRAVENOUS at 07:55

## 2018-07-12 RX ADMIN — FLUOXETINE 20 MG: 20 CAPSULE ORAL at 09:49

## 2018-07-12 RX ADMIN — FENTANYL CITRATE 50 MCG: 50 INJECTION, SOLUTION INTRAMUSCULAR; INTRAVENOUS at 13:49

## 2018-07-12 RX ADMIN — EPHEDRINE SULFATE 15 MG: 50 INJECTION, SOLUTION INTRAVENOUS at 07:45

## 2018-07-12 RX ADMIN — POLYETHYLENE GLYCOL 3350 17 G: 17 POWDER, FOR SOLUTION ORAL at 17:51

## 2018-07-12 RX ADMIN — OXYCODONE HYDROCHLORIDE AND ACETAMINOPHEN 1000 MG: 500 TABLET ORAL at 09:49

## 2018-07-12 RX ADMIN — SODIUM CHLORIDE: 9 INJECTION, SOLUTION INTRAVENOUS at 07:24

## 2018-07-12 RX ADMIN — Medication 400 MCG: at 07:45

## 2018-07-12 RX ADMIN — ONDANSETRON 4 MG: 2 INJECTION INTRAMUSCULAR; INTRAVENOUS at 07:36

## 2018-07-12 NOTE — ROUTINE PROCESS
Patient: Soo Ugarte MRN: [id number]  SSN: [id number]   YOB: 1950  Age: 70 y.o. Sex: female     Patient is status post Procedure(s):  DEBRIDEMENT RIGHT HEEL ULCER WITH PARTIAL CALCANECTOMY. Surgeon(s) and Role:      Maria Elena Perez DPM - Primary    Local/Dose/Irrigation:                   Peripheral IV 07/10/18 Left Forearm (Active)   Site Assessment Clean, dry, & intact 7/11/2018  8:34 PM   Phlebitis Assessment 0 7/11/2018  8:34 PM   Infiltration Assessment 0 7/11/2018  8:34 PM   Dressing Status Clean, dry, & intact 7/11/2018  8:34 PM   Dressing Type Transparent 7/11/2018  8:34 PM   Hub Color/Line Status Flushed;Capped 7/12/2018  4:24 AM   Action Taken Open ports on tubing capped 7/12/2018  4:24 AM   Alcohol Cap Used Yes 7/12/2018  4:24 AM        Hemodialysis Access 07/06/18 (Active)   Central Line Being Utilized Yes 7/11/2018  8:34 PM   Criteria for Appropriate Use Dialysis/apheresis 7/11/2018  8:34 PM   Date Accessed  07/11/18 7/11/2018  8:34 PM   Site Assessment Clean, dry, & intact 7/11/2018  8:34 PM   Date of Last Dressing Change 07/09/18 7/11/2018  8:34 PM   Dressing Status Clean, dry, & intact 7/11/2018  8:34 PM   Dressing Type Disk with Chlorhexadine gluconate (CHG); Transparent 7/11/2018  8:34 PM   Proximal Hub Color/Line Status Capped 7/11/2018  8:34 PM   Distal Hub Color/Line Status Capped 7/11/2018  8:34 PM                       Dressing/Packing:  Wound Buttocks Right-DRESSING TYPE: Aquacel; Foam (Mepilex) (07/11/18 1536)  Wound Heel Right-DRESSING TYPE: 4 x 4;Gauze;Gauze wrap (sukh) (07/11/18 1536)  Wound Ankle Left-DRESSING TYPE: 4 x 4;Aquacel;Gauze;Gauze wrap (sukh) (07/11/18 1536)  Wound Foot Right-DRESSING TYPE: 4 x 4;ABD pad;Elastic bandage (kerlix, 1' plain packing strip packed into the wound) (07/12/18 20)  Splint/Cast:  ]

## 2018-07-12 NOTE — BRIEF OP NOTE
BRIEF OPERATIVE NOTE    Date of Procedure: 7/12/2018   Preoperative Diagnosis: RIGHT HEEL ULCER, DIABETES with Neuropathy  Postoperative Diagnosis: RIGHT HEEL ULCER, DIABETES with Neuropathy  Procedure(s):  DEBRIDEMENT RIGHT HEEL ULCER WITH PARTIAL CALCANECTOMY  Surgeon(s) and Role:     * Abel Hansen DPM - Primary         Surgical Assistant: none    Surgical Staff:  Circ-1: Aury Mathew  Scrub RN-1: Sil Randhawa RN  Event Time In   Incision Start 7475   Incision Close 0807     Anesthesia: General   Estimated Blood Loss: < 20cc  Specimens:   ID Type Source Tests Collected by Time Destination   1 : Right foot calcanus bone Fresh Foot, Right  Abel Hansen DPM 7/12/2018 0759 Pathology   1 : right heel wound culture Wound  CULTURE, ANAEROBIC, CULTURE, WOUND W [address] [place], [place] 7/12/2018 0800 Microbiology   2 : Right Partial Calcanus bone Bone  AEROBIC/ANAEROBIC CULTURE Abel Hansen DPM 7/12/2018 0800 Microbiology      Findings: dry, necrotic plantar heel ulcer. No deep abscess. Plantar calcaneus semi-soft. Adequate bleeding intra-op. Tissue and bone cxs sent.    Complications: none  Implants: * No implants in log *

## 2018-07-12 NOTE — PROGRESS NOTES
ID Progress Note  2018    Subjective:     She is status post OR and is having pain    Objective:     Antibiotics:  1. Azactam  2. Vancomycin       Vitals:   Visit Vitals    /52 (BP 1 Location: Left leg, BP Patient Position: At rest;Head of bed elevated (Comment degrees))    Pulse 73    Temp 97.8 °F (36.6 °C)    Resp 18    Ht 6' 1.75\" (1.873 m)    Wt 128.1 kg (282 lb 6.6 oz)    SpO2 95%    Breastfeeding No    BMI 36.51 kg/m2        Tmax:  Temp (24hrs), Av °F (36.7 °C), Min:97.1 °F (36.2 °C), Max:98.7 °F (37.1 °C)      Exam:  Wound dressed, no proximal cellulitis. Labs:      Recent Labs      18   0427  07/10/18   0451  07/10/18   0110  18   1815   WBC  9.7   --   9.7   --    HGB  9.1*   --   9.8*   --    PLT  299   --   299   --    BUN  34*  42*   --   26*   CREA  4.56*  6.43*   --   4.40*   SGOT   --   16   --   16   AP   --   207*   --   234*   TBILI   --   0.4   --   0.4       Cultures:     Lab Results   Component Value Date/Time    Specimen Description: URINE 10/27/2012 08:15 PM    Specimen Description: BLOOD 2011 07:40 AM    Specimen Description: URINE 2010 07:10 AM     Lab Results   Component Value Date/Time    Culture result: PENDING 2018 08:00 AM    Culture result: PENDING 2018 08:00 AM    Culture result: MRSA NOT PRESENT 2018 01:39 AM    Culture result:  2018 01:39 AM         Screening of patient nares for MRSA is for surveillance purposes and, if positive, to facilitate isolation considerations in high risk settings. It is not intended for automatic decolonization interventions per se as regimens are not sufficiently effective to warrant routine use. Radiology:     Line/Insert Date:           Assessment:     1. Diabetic foot wound--further debridement pending--she will need significant help post-op, it sounds like  2. Cultures without a lot of growth    Objective:     1. Continue current therapy  2.  Follow up cultures    [place] [facility] Jacquelyn Weir MD

## 2018-07-12 NOTE — ROUTINE PROCESS
TRANSFER - IN REPORT:    Verbal report received from Kaya(name) on Cecilia Seay  being received from 6E(unit) for ordered procedure      Report consisted of patients Situation, Background, Assessment and   Recommendations(SBAR). Information from the following report(s) SBAR, Kardex, Procedure Summary, Intake/Output and MAR was reviewed with the receiving nurse. Opportunity for questions and clarification was provided. Assessment completed upon patients arrival to unit and care assumed.

## 2018-07-12 NOTE — PROGRESS NOTES
Hospitalist Progress Note Dalila Omalley MD 
Answering service: [phone] OR [phone] from in house phone Date of Service:  2018 NAME:  Lucía Vergara :  1950 MRN:  [id number] Admission Summary:  
Lucía Vergara is a 70 y.o.  female with past medical history of anemia, arthritis, asthma, anxiety, ESRD, chronic pain, depression, type 2 diabetes mellitus (DM), GERD, glaucoma, hypertension, hyperlipidemia, IBS, migraine, morbid obesity, sleep apnea presented to the ED from home with chief complaint of leg swelling. Interval history / Subjective:  
Ms. Darrick Prince is sleepy and having pain in her heel after surgery. No SOB, fever, nausea. Assessment & Plan:  
 
Acute bilateral foot cellulitis with acute on chronic right leg wound infection wound (POA) - MRI of right foot  shows large heel ulcer with underlying soft tissue gas that extends nearly to the cortex of the underlying calcaneus. No fluid collection or osteo. - LE PVR  normal but with calcifications - RLE Arteriogram 6/10 without significant stenosis - Debridement of the calcaneus  
- Bcx  no growth 
- Continue on IV vancomycin, aztreonam 
- Vascular surgeon, ID, Podiatrist consulted - plan for wound vac tomorrow Right hand numbness/tingling - seems like possibly some peripheral neuropathy, carpel tunnel? - Upper extremity PVR 7/10 with no evidence of hemodynamically significant right or left upper extremity arterial obstruction. No evidence of steal by the AV graft. - Outpatient EMG to confirm Hyponatremia - sodium still low - May need some fluid restriction ESRD (chronic) - Continue HD 
- Continue on sensipar, renvela,  sodium bicarbonate - Upper extremity PVR 7/10 with patent right distal brachial artery to proximal brachial vein AV graft with evidence of a hemodynamically significant stenosis at the venous anastomosis - Nephrologist consulted Anemia of chronic disease - H/H stable HTN - BP normal, continue on coreg, monitor BP 
 
DM-II - recent A1c 9.1, BG up after steroids - Continue lantus - Added standing with-meal insulin - Sliding scale and monitor finger stick glucose Elevated liver enzyme - multifactorial, improved Hx of asthma - stable, nebs PRN Hx of anxiety, depression - stable, continue on home prozac, nortriptyline Left sacral wound, left open abrasion, left lateral ankle wound POA 
- Continue wound care per wound care nurse Morbid obesity - Body mass index is 36.51 kg/(m^2). -diet and weight loss program  
 
Hx of sleep apnea - CPAP q hs Code status: Full Code DVT prophylaxis: SCD Care Plan discussed with: Patient/Family and Nurse Disposition: likely SNF after recovered from heel debridement Son and daughter at beside, questions answered Hospital Problems  Date Reviewed: 7/6/2018 Codes Class Noted POA Anemia ICD-10-CM: D64.9 ICD-9-CM: 285.9  7/6/2018 Unknown ESRD (end stage renal disease) ([facility] [address] ICD-10-CM: N18.6 ICD-9-CM: 585.6  7/6/2018 Unknown Bilateral leg edema ICD-10-CM: R60.0 ICD-9-CM: 782.3  7/6/2018 Unknown LFT elevation ICD-10-CM: R79.89 ICD-9-CM: 790.6  7/6/2018 Unknown Type 2 diabetes mellitus with hyperglycemia (HCC) ICD-10-CM: E11.65 ICD-9-CM: 250.00  7/6/2018 Unknown * (Principal)Bilateral lower leg cellulitis ICD-10-CM: L03.116, H90.196 ICD-9-CM: 682.6  7/6/2018 Unknown Vital Signs:  
 Last 24hrs VS reviewed since prior progress note. Most recent are: 
Visit Vitals  /62 (BP 1 Location: Left leg, BP Patient Position: At rest)  Pulse 74  Temp 98.1 °F (36.7 °C)  Resp 12  Ht 6' 1.75\" (1.873 m)  Wt 128.1 kg (282 lb 6.6 oz)  SpO2 100%  Breastfeeding No  
 BMI 36.51 kg/m2 Intake/Output Summary (Last 24 hours) at 07/12/18 1038 Last data filed at 07/12/18 6406 Gross per 24 hour Intake             1027 ml Output             2700 ml Net            -1673 ml Physical Examination:  
     
Constitutional:  No acute distress, cooperative, pleasant   
ENT:  Oral mucous moist, oropharynx benign. Neck supple, Resp:  CTA bilaterally. No wheezing/rhonchi/rales. No accessory muscle use CV:  Regular rhythm, normal rate, no murmurs, gallops, rubs GI:  Soft, non distended, non tender. normoactive bowel sounds, no hepatosplenomegaly Musculoskeletal:  Bilateral foot dressed Neurologic:  Moves all extremities. AAOx3, CN II-XII reviewed Skin:  Left sacral wound, left open abrasion, left lateral ankle wound Data Review:  
 
Telemetry ECG Xray CT scan MRI Echocardiogram   
Ultrasound I have reviewed the flow sheet and recent notes New labs and data personally reviewed. Labs:  
 
Recent Labs  
   07/12/18 
 0427  07/10/18 
 0110 WBC  9.7  9.7 HGB  9.1*  9.8* HCT  31.0*  32.7*  
PLT  299  299 Recent Labs  
   07/12/18 
 0427  07/10/18 
 0451  07/09/18 [address] [place] [address] NA  131*  132*  134* K  4.2  4.8  3.9 CL  94*  95*  96* CO2  26  23  29 BUN  34*  42*  26* CREA  4.56*  6.43*  4.40* GLU  192*  238*  142* CA  7.2*  8.4*  8.6 PHOS  4.1   --    --   
 
Recent Labs  
   07/12/18 
 0427  07/10/18 
 0451  07/09/18 [address] [place] [address] SGOT   --   16  16 ALT   --   19  21 AP   --   207*  234* TBILI   --   0.4  0.4 TP   --   8.6*  9.4* ALB  2.2*  1.8*  2.0*  
GLOB   --   6.8*  7.4* No results for input(s): INR, PTP, APTT in the last 72 hours. No lab exists for component: INREXT, INREXT No results for input(s): FE, TIBC, PSAT, FERR in the last 72 hours. No results found for: FOL, RBCF No results for input(s): PH, PCO2, PO2 in the last 72 hours. No results for input(s): CPK, CKNDX, TROIQ in the last 72 hours. No lab exists for component: CPKMB Lab Results Component Value Date/Time Cholesterol, total 144 04/10/2018 02:53 AM  
 HDL Cholesterol 66 04/10/2018 02:53 AM  
 LDL, calculated 67.6 04/10/2018 02:53 AM  
 Triglyceride 52 04/10/2018 02:53 AM  
 CHOL/HDL Ratio 2.2 04/10/2018 02:53 AM  
 
Lab Results Component Value Date/Time Glucose (POC) 125 (H) 07/12/2018 08:44 AM  
 Glucose (POC) 156 (H) 07/12/2018 06:19 AM  
 Glucose (POC) 229 (H) 07/11/2018 09:16 PM  
 Glucose (POC) 157 (H) 07/11/2018 04:20 PM  
 Glucose (POC) 163 (H) 07/11/2018 11:16 AM  
 
Lab Results Component Value Date/Time Color YELLOW 10/27/2012 10:25 PM  
 Appearance CLEAR 10/27/2012 10:25 PM  
 Specific gravity 1.012 10/27/2012 10:25 PM  
 Specific gravity 1.015 08/30/2010 07:10 AM  
 pH (UA) 8.0 10/27/2012 10:25 PM  
 Protein 100 (A) 10/27/2012 10:25 PM  
 Glucose NEGATIVE  10/27/2012 10:25 PM  
 Ketone NEGATIVE  10/27/2012 10:25 PM  
 Bilirubin NEGATIVE  10/27/2012 10:25 PM  
 Urobilinogen 1.0 10/27/2012 10:25 PM  
 Nitrites NEGATIVE  10/27/2012 10:25 PM  
 Leukocyte Esterase TRACE (A) 10/27/2012 10:25 PM  
 Epithelial cells 0-5 10/27/2012 10:25 PM  
 Bacteria NEGATIVE  10/27/2012 10:25 PM  
 WBC 10-20 10/27/2012 10:25 PM  
 RBC 5-10 10/27/2012 10:25 PM  
 
 
 
Medications Reviewed:  
 
Current Facility-Administered Medications Medication Dose Route Frequency  oxyCODONE-acetaminophen (PERCOCET) 5-325 mg per tablet 1-2 Tab  1-2 Tab Oral Q4H PRN  
 ondansetron (ZOFRAN) injection 4 mg  4 mg IntraVENous Q6H PRN  pantoprazole (PROTONIX) tablet 40 mg  40 mg Oral ACB&D  
 alum-mag hydroxide-simeth (MYLANTA) oral suspension 30 mL  30 mL Oral Q4H PRN  
 insulin glargine (LANTUS) injection 25 Units  25 Units SubCUTAneous QHS  insulin lispro (HUMALOG) injection 3 Units  3 Units SubCUTAneous TIDAC  aztreonam (AZACTAM) 500 mg in 0.9% sodium chloride 100 mL IVPB  500 mg IntraVENous Q12H  
 vancomycin (VANCOCIN) 1,000 mg in 0.9% sodium chloride (MBP/ADV) 250 mL  1,000 mg IntraVENous DIALYSIS MON, WED & FRI  fentaNYL citrate (PF) injection 50 mcg  50 mcg IntraVENous Q4H PRN  
 carvedilol (COREG) tablet 12.5 mg  12.5 mg Oral BID WITH MEALS  cinacalcet (SENSIPAR) tablet 30 mg  30 mg Oral DAILY  ascorbic acid (vitamin C) (VITAMIN C) tablet 1,000 mg  1,000 mg Oral DAILY  aspirin chewable tablet 81 mg  81 mg Oral DAILY  B complex-vitaminC-folic acid (NEPHROCAP) cap  1 Cap Oral DAILY  FLUoxetine (PROzac) capsule 20 mg  20 mg Oral DAILY  gabapentin (NEURONTIN) capsule 100 mg  100 mg Oral BID  latanoprost (XALATAN) 0.005 % ophthalmic solution 1 Drop  1 Drop Both Eyes QHS  midodrine (PROAMITINE) tablet 5 mg  5 mg Oral Once per day on Mon Wed Fri  polyethylene glycol (MIRALAX) packet 17 g  17 g Oral BID PRN  
 sevelamer carbonate (RENVELA) tab 800 mg  800 mg Oral TID WITH MEALS  simvastatin (ZOCOR) tablet 20 mg  20 mg Oral QHS  sodium chloride (NS) flush 5-10 mL  5-10 mL IntraVENous Q8H  
 sodium chloride (NS) flush 5-10 mL  5-10 mL IntraVENous PRN  
 insulin lispro (HUMALOG) injection   SubCUTAneous AC&HS  
 glucose chewable tablet 16 g  4 Tab Oral PRN  
 dextrose (D50W) injection syrg 12.5-25 g  12.5-25 g IntraVENous PRN  
 glucagon (GLUCAGEN) injection 1 mg  1 mg IntraMUSCular PRN  
 nortriptyline (PAMELOR) capsule 50 mg  50 mg Oral QHS  Vancomycin- pharmacy to dose   Other Rx Dosing/Monitoring  
 
______________________________________________________________________ EXPECTED LENGTH OF STAY: 4d 16h ACTUAL LENGTH OF STAY:          6 Omari Fox MD

## 2018-07-12 NOTE — PROGRESS NOTES
Labs reviewed and stable  For HD tomorrow  Will f/u in AM  Call with any questions        Sally Smith MD  [facility]   [address] [address]  Phone - [phone]   Fax - [phone]  [email or web address]

## 2018-07-12 NOTE — PROGRESS NOTES
Response received form [facility]. Patient approved pending insurance auth. Will need updated PT/OT notes to submit with auth. Liaison from [facility] in to visit patient this evening. Patient and family to make decision about facility. Patient will be here over the weekend. Wound Vac will be delivered tomorrow. CM will continue to follow.   SHANNON Vargas, CRM

## 2018-07-12 NOTE — ANESTHESIA PREPROCEDURE EVALUATION
Anesthetic History   No history of anesthetic complications            Review of Systems / Medical History  Patient summary reviewed, nursing notes reviewed and pertinent labs reviewed    Pulmonary        Sleep apnea: No treatment    Asthma        Neuro/Psych         TIA, headaches and psychiatric history     Cardiovascular    Hypertension                   GI/Hepatic/Renal     GERD    Renal disease: ESRD and dialysis       Endo/Other    Diabetes  Hypothyroidism  Obesity, arthritis and anemia     Other Findings            Physical Exam    Airway  Mallampati: II  TM Distance: > 6 cm  Neck ROM: normal range of motion   Mouth opening: Normal     Cardiovascular  Regular rate and rhythm,  S1 and S2 normal,  no murmur, click, rub, or gallop             Dental  No notable dental hx       Pulmonary  Breath sounds clear to auscultation               Abdominal  GI exam deferred       Other Findings            Anesthetic Plan    ASA: 3  Anesthesia type: general          Induction: Intravenous  Anesthetic plan and risks discussed with: Patient

## 2018-07-12 NOTE — PERIOP NOTES
TRANSFER - OUT REPORT:    Verbal report given to Elena(jane) on Lucía Vergara  being transferred to (unit) for routine post - op     Report consisted of patients Situation, Background, Assessment and   Recommendations(SBAR). Time Pre op antibiotic given:with dialysis  Anesthesia Stop time: 0827  De Leon Present on Transfer to floor:no  Order for De Leon on Chart:no  Discharge Prescriptions with Chart:no    Information from the following report(s) SBAR, OR Summary and Recent Results was reviewed with the receiving nurse. Opportunity for questions and clarification was provided. Is the patient on 02? NO         Is the patient on a monitor? NO    Is the nurse transporting with the patient? NO    Surgical Waiting Area notified of patient's transfer from PACU? YES      The following personal items collected during your admission accompanied patient upon transfer:   Dental Appliance: Dental Appliances: Uppers, Lowers, At home  Vision: Visual Aid: Glasses, At bedside  Hearing Aid:    Jewelry: Jewelry: None  Clothing: Clothing: Sent home  Other Valuables:  Other Valuables: Eyeglasses  Valuables sent to safe:

## 2018-07-12 NOTE — PROGRESS NOTES
Problem: Falls - Risk of  Goal: *Absence of Falls  Document Rodrick Fall Risk and appropriate interventions in the flowsheet. Outcome: Progressing Towards Goal  Fall Risk Interventions:  Mobility Interventions: Communicate number of staff needed for ambulation/transfer, Patient to call before getting OOB, Strengthening exercises (ROM-active/passive)         Medication Interventions: Evaluate medications/consider consulting pharmacy, Patient to call before getting OOB, Teach patient to arise slowly    Elimination Interventions: Call light in reach             Problem: Pressure Injury - Risk of  Goal: *Prevention of pressure injury  Document Rob Scale and appropriate interventions in the flowsheet. Turn and reposition q2h and float heels. Outcome: Progressing Towards Goal  Pressure Injury Interventions:  Sensory Interventions: Assess changes in LOC, Float heels, Keep linens dry and wrinkle-free, Check visual cues for pain, Pad between skin to skin, Pressure redistribution bed/mattress (bed type), Turn and reposition approx.  every two hours (pillows and wedges if needed)    Moisture Interventions: Absorbent underpads, Maintain skin hydration (lotion/cream), Limit adult briefs, Moisture barrier, Contain wound drainage    Activity Interventions: Pressure redistribution bed/mattress(bed type)    Mobility Interventions: HOB 30 degrees or less, Pressure redistribution bed/mattress (bed type), PT/OT evaluation, Float heels    Nutrition Interventions: Document food/fluid/supplement intake, Offer support with meals,snacks and hydration    Friction and Shear Interventions: HOB 30 degrees or less, Apply protective barrier, creams and emollients, Minimize layers

## 2018-07-12 NOTE — ANESTHESIA POSTPROCEDURE EVALUATION
Post-Anesthesia Evaluation and Assessment    Patient: Leola Spurling MRN: [id number]  SSN: [id number]    YOB: 1950  Age: 70 y.o. Sex: female       Cardiovascular Function/Vital Signs  Visit Vitals    /63    Pulse 78    Temp 36.9 °C (98.4 °F)    Resp 13    Ht 6' 1.75\" (1.873 m)    Wt 128.1 kg (282 lb 6.6 oz)    SpO2 100%    Breastfeeding No    BMI 36.51 kg/m2       Patient is status post general anesthesia for Procedure(s):  DEBRIDEMENT RIGHT HEEL ULCER WITH PARTIAL CALCANECTOMY. Nausea/Vomiting: None    Postoperative hydration reviewed and adequate. Pain:  Pain Scale 1: Numeric (0 - 10) (07/12/18 0827)  Pain Intensity 1: 0 (07/12/18 0827)   Managed    Neurological Status:   Neuro (WDL): Within Defined Limits (07/12/18 0827)  Neuro  Neurologic State: Alert (07/12/18 0827)  Orientation Level: Oriented X4 (07/12/18 0827)  Cognition: Follows commands (07/12/18 0827)  Speech: Clear (07/12/18 0827)  Assessment L Pupil: Brisk (07/07/18 0031)  Size L Pupil (mm): 3 (07/07/18 0031)  Assessment R Pupil: Brisk (07/07/18 0031)  Size R Pupil (mm): 3 (07/07/18 0031)  LUE Motor Response: Purposeful (07/12/18 0827)  LLE Motor Response: Purposeful (07/12/18 0827)  RUE Motor Response: Purposeful (07/12/18 0827)  RLE Motor Response: Purposeful (07/12/18 0827)   At baseline    Mental Status and Level of Consciousness: Arousable    Pulmonary Status:   O2 Device: Nasal cannula (07/12/18 0827)   Adequate oxygenation and airway patent    Complications related to anesthesia: None    Post-anesthesia assessment completed.  No concerns    Signed By: Nelly Travis MD     July 12, 2018

## 2018-07-12 NOTE — PROGRESS NOTES
Physical Therapy Screening:    [facility] based on results obtained during the nursing admission assessment. The patients chart was reviewed and the patient is appropriate for a skilled therapy evaluation if there is a decline in functional mobility from baseline. Please order a consult for physical therapy if you are in agreement and would like an evaluation to be completed. Thank you.     Louann Santoyo, PT

## 2018-07-12 NOTE — PROGRESS NOTES
Bedside shift change report given to [address] [place] [address] Drive (oncoming nurse) by Ana Ni (offgoing nurse). Report included the following information SBAR, Kardex, Procedure Summary, Intake/Output, MAR and Recent Results.      6162 - paged hospitalist  [phone] - spoke with dr Garrick Kanner, ordered to hold scheduled 25u lantus      0160 - paged hospitalist for pt calcium level  0504 - no new orders per np self

## 2018-07-12 NOTE — OP NOTES
[address]  OPERATIVE REPORT    Name:DAYNA CARRASQUILLO  MR#: [id number]  : 1950  ACCOUNT #: [de-identified]   DATE OF SERVICE: 2018    ATTENDING SURGEON:  Azucena Dexter MD    ASSISTANT:  None. PREOPERATIVE DIAGNOSES:  1. Right heel ulcer. 2.  Diabetes and peripheral neuropathy. POSTOPERATIVE DIAGNOSES:  1. Right heel ulcer. 2.  Diabetes and peripheral neuropathy. PROCEDURES PERFORMED:  Debridement of right heel ulcer with partial calcanectomy. SPECIMENS REMOVED:  Include bone, soft tissue, right foot. ANESTHESIA:  General with 20 mL of 1% lidocaine plain. HEMOSTASIS:  Anatomic. ESTIMATED BLOOD LOSS:  Was less than 20 mL. MATERIALS USED:  Included half inch plain packing. INJECTABLES:  Included 10 mL of 0.5% Marcaine plain. COMPLICATIONS:  None. IMPLANTS:  none    INDICATIONS FOR PROCEDURE:  This is a 79-year-old female who presents with a stage III pressure ulcer along the plantar aspect of the right heel. She is here today for surgical intervention. DESCRIPTION OF PROCEDURE:  After the patient was properly identified by myself and my initials were placed on the right foot, she was brought back to the operating room under mild sedation. The patient was left in a supine position in her hospital bed. Next, general anesthesia was administered. Once general anesthesia was administered, 20 mL of 1% lidocaine plain was used to perform a regional block along the plantar aspect of the right heel. Next, the right foot was then prepped and draped in normal sterile fashion. Using a 15 blade, the entire plantar ulcer which consisted of a dry eschar was excised down to bone using a 15 blade. This wedge of tissue was removed and the underlying bone was exposed. Soft tissue cultures were taken of the area at this time and then passed off to the surgical table to be sent to microbiology.   Next, a combination of a 15 blade and key elevator were used to free the soft tissue attachments along the tuber of the right heel. Next, a sagittal saw was then used to resect a portion of the bone. This bone was then split into 2 pieces and 1 was sent to pathology. The other 1 ne was sent to microbiology. The surgical sites were thoroughly irrigated with normal sterile saline mixed with bacitracin solution. Next, the wound was then packed with half inch plain packing. At this time, 10 mL of 0.5 Marcaine plain were injected into the surgical sites for additional postoperative anesthesia. The foot was then dressed with a bulky compression dressing. The patient tolerated the procedure well and was transferred to the recovery room, afebrile, vital signs stable. She will be kept on strict bed rest and will be transferred back up to the hospital room where she will continue to receive IV antibiotics with dialysis while I follow her culture results. I will reassess her tomorrow morning and as long she is doing well, I will initiate wound VAC therapy at that time.       VIKA Malhotra  D: 07/12/2018 11:21     T: 07/12/2018 14:23  JOB #: [id number]

## 2018-07-13 LAB
ALBUMIN SERPL-MCNC: 1.9 G/DL (ref 3.5–5)
ANION GAP SERPL CALC-SCNC: 13 MMOL/L (ref 5–15)
BASOPHILS # BLD: 0 K/UL (ref 0–0.1)
BASOPHILS NFR BLD: 0 % (ref 0–1)
BUN SERPL-MCNC: 44 MG/DL (ref 6–20)
BUN/CREAT SERPL: 7 (ref 12–20)
CALCIUM SERPL-MCNC: 7.4 MG/DL (ref 8.5–10.1)
CHLORIDE SERPL-SCNC: 91 MMOL/L (ref 97–108)
CO2 SERPL-SCNC: 24 MMOL/L (ref 21–32)
CREAT SERPL-MCNC: 5.9 MG/DL (ref 0.55–1.02)
DIFFERENTIAL METHOD BLD: ABNORMAL
EOSINOPHIL # BLD: 0.2 K/UL (ref 0–0.4)
EOSINOPHIL NFR BLD: 2 % (ref 0–7)
ERYTHROCYTE [DISTWIDTH] IN BLOOD BY AUTOMATED COUNT: 15.8 % (ref 11.5–14.5)
GLUCOSE BLD STRIP.AUTO-MCNC: 121 MG/DL (ref 65–100)
GLUCOSE BLD STRIP.AUTO-MCNC: 168 MG/DL (ref 65–100)
GLUCOSE BLD STRIP.AUTO-MCNC: 201 MG/DL (ref 65–100)
GLUCOSE SERPL-MCNC: 94 MG/DL (ref 65–100)
HCT VFR BLD AUTO: 29.7 % (ref 35–47)
HGB BLD-MCNC: 8.7 G/DL (ref 11.5–16)
IMM GRANULOCYTES # BLD: 0.1 K/UL (ref 0–0.04)
IMM GRANULOCYTES NFR BLD AUTO: 1 % (ref 0–0.5)
LYMPHOCYTES # BLD: 3.3 K/UL (ref 0.8–3.5)
LYMPHOCYTES NFR BLD: 27 % (ref 12–49)
MCH RBC QN AUTO: 26.9 PG (ref 26–34)
MCHC RBC AUTO-ENTMCNC: 29.3 G/DL (ref 30–36.5)
MCV RBC AUTO: 92 FL (ref 80–99)
MONOCYTES # BLD: 1.2 K/UL (ref 0–1)
MONOCYTES NFR BLD: 10 % (ref 5–13)
NEUTS SEG # BLD: 7.5 K/UL (ref 1.8–8)
NEUTS SEG NFR BLD: 60 % (ref 32–75)
NRBC # BLD: 0 K/UL (ref 0–0.01)
NRBC BLD-RTO: 0 PER 100 WBC
PHOSPHATE SERPL-MCNC: 4.6 MG/DL (ref 2.6–4.7)
PLATELET # BLD AUTO: 305 K/UL (ref 150–400)
PMV BLD AUTO: 8.9 FL (ref 8.9–12.9)
POTASSIUM SERPL-SCNC: 4.8 MMOL/L (ref 3.5–5.1)
RBC # BLD AUTO: 3.23 M/UL (ref 3.8–5.2)
RBC MORPH BLD: ABNORMAL
SERVICE CMNT-IMP: ABNORMAL
SODIUM SERPL-SCNC: 128 MMOL/L (ref 136–145)
WBC # BLD AUTO: 12.3 K/UL (ref 3.6–11)
WBC MORPH BLD: ABNORMAL

## 2018-07-13 PROCEDURE — 74011636637 HC RX REV CODE- 636/637: Performed by: HOSPITALIST

## 2018-07-13 PROCEDURE — 74011250636 HC RX REV CODE- 250/636: Performed by: HOSPITALIST

## 2018-07-13 PROCEDURE — 74011250637 HC RX REV CODE- 250/637: Performed by: HOSPITALIST

## 2018-07-13 PROCEDURE — 97530 THERAPEUTIC ACTIVITIES: CPT

## 2018-07-13 PROCEDURE — 82962 GLUCOSE BLOOD TEST: CPT

## 2018-07-13 PROCEDURE — 97161 PT EVAL LOW COMPLEX 20 MIN: CPT

## 2018-07-13 PROCEDURE — 90935 HEMODIALYSIS ONE EVALUATION: CPT

## 2018-07-13 PROCEDURE — 74011250636 HC RX REV CODE- 250/636: Performed by: INTERNAL MEDICINE

## 2018-07-13 PROCEDURE — 65270000032 HC RM SEMIPRIVATE

## 2018-07-13 PROCEDURE — 80069 RENAL FUNCTION PANEL: CPT | Performed by: PODIATRIST

## 2018-07-13 PROCEDURE — 3331090002 HH PPS REVENUE DEBIT

## 2018-07-13 PROCEDURE — 3331090001 HH PPS REVENUE CREDIT

## 2018-07-13 PROCEDURE — 74011250637 HC RX REV CODE- 250/637: Performed by: FAMILY MEDICINE

## 2018-07-13 PROCEDURE — 97165 OT EVAL LOW COMPLEX 30 MIN: CPT

## 2018-07-13 PROCEDURE — 94760 N-INVAS EAR/PLS OXIMETRY 1: CPT

## 2018-07-13 PROCEDURE — 74011250636 HC RX REV CODE- 250/636: Performed by: FAMILY MEDICINE

## 2018-07-13 PROCEDURE — 97606 NEG PRS WND THER DME>50 SQCM: CPT

## 2018-07-13 PROCEDURE — 74011000258 HC RX REV CODE- 258: Performed by: INTERNAL MEDICINE

## 2018-07-13 PROCEDURE — 74011000250 HC RX REV CODE- 250: Performed by: INTERNAL MEDICINE

## 2018-07-13 PROCEDURE — 36415 COLL VENOUS BLD VENIPUNCTURE: CPT | Performed by: PODIATRIST

## 2018-07-13 PROCEDURE — 85025 COMPLETE CBC W/AUTO DIFF WBC: CPT | Performed by: PODIATRIST

## 2018-07-13 RX ORDER — DIPHENHYDRAMINE HYDROCHLORIDE 50 MG/ML
12.5 INJECTION, SOLUTION INTRAMUSCULAR; INTRAVENOUS ONCE
Status: COMPLETED | OUTPATIENT
Start: 2018-07-13 | End: 2018-07-13

## 2018-07-13 RX ADMIN — INSULIN GLARGINE 25 UNITS: 100 INJECTION, SOLUTION SUBCUTANEOUS at 22:28

## 2018-07-13 RX ADMIN — LATANOPROST 1 DROP: 50 SOLUTION OPHTHALMIC at 22:29

## 2018-07-13 RX ADMIN — FENTANYL CITRATE 50 MCG: 50 INJECTION, SOLUTION INTRAMUSCULAR; INTRAVENOUS at 11:06

## 2018-07-13 RX ADMIN — OXYCODONE AND ACETAMINOPHEN 2 TABLET: 5; 325 TABLET ORAL at 03:17

## 2018-07-13 RX ADMIN — PANTOPRAZOLE SODIUM 40 MG: 40 TABLET, DELAYED RELEASE ORAL at 20:26

## 2018-07-13 RX ADMIN — NORTRIPTYLINE HYDROCHLORIDE 50 MG: 25 CAPSULE ORAL at 20:20

## 2018-07-13 RX ADMIN — OXYCODONE AND ACETAMINOPHEN 2 TABLET: 5; 325 TABLET ORAL at 07:08

## 2018-07-13 RX ADMIN — OXYCODONE HYDROCHLORIDE AND ACETAMINOPHEN 1000 MG: 500 TABLET ORAL at 10:21

## 2018-07-13 RX ADMIN — AZTREONAM 500 MG: 1 INJECTION, POWDER, LYOPHILIZED, FOR SOLUTION INTRAMUSCULAR; INTRAVENOUS at 20:20

## 2018-07-13 RX ADMIN — Medication 10 ML: at 06:39

## 2018-07-13 RX ADMIN — SEVELAMER CARBONATE 800 MG: 800 TABLET, FILM COATED ORAL at 20:26

## 2018-07-13 RX ADMIN — VANCOMYCIN HYDROCHLORIDE 1000 MG: 1 INJECTION, POWDER, LYOPHILIZED, FOR SOLUTION INTRAVENOUS at 20:10

## 2018-07-13 RX ADMIN — OXYCODONE AND ACETAMINOPHEN 2 TABLET: 5; 325 TABLET ORAL at 13:38

## 2018-07-13 RX ADMIN — PANTOPRAZOLE SODIUM 40 MG: 40 TABLET, DELAYED RELEASE ORAL at 06:41

## 2018-07-13 RX ADMIN — EPOETIN ALFA 20000 UNITS: 20000 SOLUTION INTRAVENOUS; SUBCUTANEOUS at 22:26

## 2018-07-13 RX ADMIN — ASPIRIN 81 MG 81 MG: 81 TABLET ORAL at 10:21

## 2018-07-13 RX ADMIN — SEVELAMER CARBONATE 800 MG: 800 TABLET, FILM COATED ORAL at 07:08

## 2018-07-13 RX ADMIN — Medication 10 ML: at 20:20

## 2018-07-13 RX ADMIN — INSULIN LISPRO 2 UNITS: 100 INJECTION, SOLUTION INTRAVENOUS; SUBCUTANEOUS at 13:38

## 2018-07-13 RX ADMIN — FENTANYL CITRATE 50 MCG: 50 INJECTION, SOLUTION INTRAMUSCULAR; INTRAVENOUS at 20:09

## 2018-07-13 RX ADMIN — AZTREONAM 500 MG: 1 INJECTION, POWDER, LYOPHILIZED, FOR SOLUTION INTRAMUSCULAR; INTRAVENOUS at 06:39

## 2018-07-13 RX ADMIN — Medication 10 ML: at 13:38

## 2018-07-13 RX ADMIN — POLYETHYLENE GLYCOL 3350 17 G: 17 POWDER, FOR SOLUTION ORAL at 11:06

## 2018-07-13 RX ADMIN — FLUOXETINE 20 MG: 20 CAPSULE ORAL at 10:21

## 2018-07-13 RX ADMIN — GABAPENTIN 100 MG: 100 CAPSULE ORAL at 20:09

## 2018-07-13 RX ADMIN — SIMVASTATIN 20 MG: 20 TABLET, FILM COATED ORAL at 20:09

## 2018-07-13 RX ADMIN — ASCORBIC ACID, THIAMINE MONONITRATE,RIBOFLAVIN, NIACINAMIDE, PYRIDOXINE HYDROCHLORIDE, FOLIC ACID, CYANOCOBALAMIN, BIOTIN, CALCIUM PANTOTHENATE, 1 CAPSULE: 100; 1.5; 1.7; 20; 10; 1; 6000; 150000; 5 CAPSULE, LIQUID FILLED ORAL at 10:20

## 2018-07-13 RX ADMIN — MIDODRINE HYDROCHLORIDE 5 MG: 5 TABLET ORAL at 16:00

## 2018-07-13 RX ADMIN — GABAPENTIN 100 MG: 100 CAPSULE ORAL at 07:08

## 2018-07-13 RX ADMIN — INSULIN LISPRO 3 UNITS: 100 INJECTION, SOLUTION INTRAVENOUS; SUBCUTANEOUS at 13:38

## 2018-07-13 RX ADMIN — DIPHENHYDRAMINE HYDROCHLORIDE 12.5 MG: 50 INJECTION, SOLUTION INTRAMUSCULAR; INTRAVENOUS at 23:22

## 2018-07-13 RX ADMIN — SEVELAMER CARBONATE 800 MG: 800 TABLET, FILM COATED ORAL at 11:06

## 2018-07-13 RX ADMIN — CINACALCET HYDROCHLORIDE 30 MG: 30 TABLET, COATED ORAL at 22:29

## 2018-07-13 NOTE — PROGRESS NOTES
[facility] 
 [address] [facility], [address] [facility], [facility] Phone: [phone]  
 
Nephrology Progress Note Castro Pantoja     1950     [id number] Date of Admission : 7/6/2018 07/13/18 CC: Follow up for ESRD Assessment and Plan Diabetic Foot wound w/ infection - s/p Debridement Hypervolemic Hyponatremia : better w/ HD  
  
ESRD - MWF dialysis 
- HD today  
  
HTN 
  
DM 
  
Obesity, FARHAT 
  
Dyslipidemia 
  
Possible steal syndrome right hand Anemia of CKD 
- resume epogen at higher dose  
   
 
Interval History: 
Seen and examined Just had wound vac placed Denies any N/V/CP. SOB Review of Systems: A comprehensive review of systems was negative. Current Medications:  
Current Facility-Administered Medications Medication Dose Route Frequency  fentaNYL citrate (PF) injection 50 mcg  50 mcg IntraVENous Q2H PRN  
 oxyCODONE-acetaminophen (PERCOCET) 5-325 mg per tablet 1-2 Tab  1-2 Tab Oral Q4H PRN  
 ondansetron (ZOFRAN) injection 4 mg  4 mg IntraVENous Q6H PRN  pantoprazole (PROTONIX) tablet 40 mg  40 mg Oral ACB&D  
 alum-mag hydroxide-simeth (MYLANTA) oral suspension 30 mL  30 mL Oral Q4H PRN  
 insulin glargine (LANTUS) injection 25 Units  25 Units SubCUTAneous QHS  insulin lispro (HUMALOG) injection 3 Units  3 Units SubCUTAneous TIDAC  aztreonam (AZACTAM) 500 mg in 0.9% sodium chloride 100 mL IVPB  500 mg IntraVENous Q12H  
 vancomycin (VANCOCIN) 1,000 mg in 0.9% sodium chloride (MBP/ADV) 250 mL  1,000 mg IntraVENous DIALYSIS MON, WED & FRI  carvedilol (COREG) tablet 12.5 mg  12.5 mg Oral BID WITH MEALS  cinacalcet (SENSIPAR) tablet 30 mg  30 mg Oral DAILY  ascorbic acid (vitamin C) (VITAMIN C) tablet 1,000 mg  1,000 mg Oral DAILY  aspirin chewable tablet 81 mg  81 mg Oral DAILY  B complex-vitaminC-folic acid (NEPHROCAP) cap  1 Cap Oral DAILY  FLUoxetine (PROzac) capsule 20 mg  20 mg Oral DAILY  gabapentin (NEURONTIN) capsule 100 mg  100 mg Oral BID  latanoprost (XALATAN) 0.005 % ophthalmic solution 1 Drop  1 Drop Both Eyes QHS  midodrine (PROAMITINE) tablet 5 mg  5 mg Oral Once per day on Mon Wed Fri  polyethylene glycol (MIRALAX) packet 17 g  17 g Oral BID PRN  
 sevelamer carbonate (RENVELA) tab 800 mg  800 mg Oral TID WITH MEALS  simvastatin (ZOCOR) tablet 20 mg  20 mg Oral QHS  sodium chloride (NS) flush 5-10 mL  5-10 mL IntraVENous Q8H  
 sodium chloride (NS) flush 5-10 mL  5-10 mL IntraVENous PRN  
 insulin lispro (HUMALOG) injection   SubCUTAneous AC&HS  
 glucose chewable tablet 16 g  4 Tab Oral PRN  
 dextrose (D50W) injection syrg 12.5-25 g  12.5-25 g IntraVENous PRN  
 glucagon (GLUCAGEN) injection 1 mg  1 mg IntraMUSCular PRN  
 nortriptyline (PAMELOR) capsule 50 mg  50 mg Oral QHS  Vancomycin- pharmacy to dose   Other Rx Dosing/Monitoring Allergies Allergen Reactions  Latex Itching Rash, sometimes difficult to breathe  Celebrex [Celecoxib] Anaphylaxis and Swelling TONGUE. LIPS AND EYES  
 Iodine Other (comments) IV CONTRAST-LESIONS, AND SKIN SLUFFED OFF  
 Keflex [Cephalexin] Anaphylaxis and Swelling Tongue, lips, and eyes  Levaquin [Levofloxacin] Anaphylaxis and Swelling Tongue, lips, and eyes  Pcn [Penicillins] Anaphylaxis and Swelling Tongue, lips and eyes Objective: 
Vitals:   
Vitals:  
 07/12/18 2332 07/13/18 2120 07/13/18 0532 07/13/18 3061 BP: 101/55 112/49  (!) 85/43 Pulse:  71  70 Resp:  19  18 Temp:  97.4 °F (36.3 °C)  98.5 °F (36.9 °C) TempSrc:      
SpO2:  97%  95% Weight:   130.9 kg (288 lb 9.3 oz) Height:      
 
Intake and Output: 
  
07/11 1901 - 07/13 0700 In: 6676 [P.O.:700; I.V.:667] Out: - Physical Examination: 
General:Alert, No distress, HEENT:  pale Neck:RIJ permacath Lungs : Clears to auscultation Bilaterally, Normal respiratory effort CVS: RRR, S1 S2 normal, No rub, legs bandaged Abdomen: Soft, Non tender Extremities: No cyanosis, No clubbing; good thrill LUE AV graft MS: No joint swelling, erythema, warmth Neurologic: non focal, AAO x 3 Psych: normal affect 
 
[]    High complexity decision making was performed 
[]    Patient is at high-risk of decompensation with multiple organ involvement Lab Data Personally Reviewed: I have reviewed all the pertinent labs, microbiology data and radiology studies during assessment. Recent Labs  
   07/13/18 
 0320  07/12/18 
 1190 NA  128*  131* K  4.8  4.2 CL  91*  94* CO2  24  26 GLU  94  192* BUN  44*  34* CREA  5.90*  4.56* CA  7.4*  7.2*  
PHOS  4.6  4.1 ALB  1.9*  2.2* Recent Labs  
   07/13/18 
 0320  07/12/18 
 9660 WBC  12.3*  9.7 HGB  8.7*  9.1*  
HCT  29.7*  31.0*  
PLT  305  299 Lab Results Component Value Date/Time Specimen Description: URINE 10/27/2012 08:15 PM  
 Specimen Description: BLOOD 06/07/2011 07:40 AM  
 Specimen Description: URINE 08/30/2010 07:10 AM  
 Specimen Description: URINE 08/09/2010 11:30 AM  
 Specimen Description: URINE 06/22/2010 11:50 PM  
 
Lab Results Component Value Date/Time Culture result: PENDING 07/12/2018 08:00 AM  
 Culture result: PENDING 07/12/2018 08:00 AM  
 Culture result: MRSA NOT PRESENT 07/07/2018 01:39 AM  
 Culture result:  07/07/2018 01:39 AM  
      Screening of patient nares for MRSA is for surveillance purposes and, if positive, to facilitate isolation considerations in high risk settings. It is not intended for automatic decolonization interventions per se as regimens are not sufficiently effective to warrant routine use. Culture result: NO GROWTH 5 DAYS 07/06/2018 10:30 PM  
 
Recent Results (from the past 24 hour(s)) GLUCOSE, POC Collection Time: 07/12/18 11:34 AM  
Result Value Ref Range Glucose (POC) 94 65 - 100 mg/dL Performed by Sarah Rand, POC  Collection Time: 07/12/18  4:22 PM  
Result Value Ref Range Glucose (POC) 167 (H) 65 - 100 mg/dL Performed by Alejandrina Vazquez GLUCOSE, POC Collection Time: 07/12/18  9:05 PM  
Result Value Ref Range Glucose (POC) 105 (H) 65 - 100 mg/dL Performed by Sarah Peng CBC WITH AUTOMATED DIFF Collection Time: 07/13/18  3:20 AM  
Result Value Ref Range WBC 12.3 (H) 3.6 - 11.0 K/uL  
 RBC 3.23 (L) 3.80 - 5.20 M/uL HGB 8.7 (L) 11.5 - 16.0 g/dL HCT 29.7 (L) 35.0 - 47.0 % MCV 92.0 80.0 - 99.0 FL  
 MCH 26.9 26.0 - 34.0 PG  
 MCHC 29.3 (L) 30.0 - 36.5 g/dL  
 RDW 15.8 (H) 11.5 - 14.5 % PLATELET 546 555 - 850 K/uL MPV 8.9 8.9 - 12.9 FL  
 NRBC 0.0 0  WBC ABSOLUTE NRBC 0.00 0.00 - 0.01 K/uL NEUTROPHILS PENDING % LYMPHOCYTES PENDING % MONOCYTES PENDING % EOSINOPHILS PENDING % BASOPHILS PENDING % IMMATURE GRANULOCYTES PENDING %  
 ABS. NEUTROPHILS PENDING K/UL  
 ABS. LYMPHOCYTES PENDING K/UL  
 ABS. MONOCYTES PENDING K/UL  
 ABS. EOSINOPHILS PENDING K/UL  
 ABS. BASOPHILS PENDING K/UL  
 ABS. IMM. GRANS. PENDING K/UL  
 DF PENDING   
RENAL FUNCTION PANEL Collection Time: 07/13/18  3:20 AM  
Result Value Ref Range Sodium 128 (L) 136 - 145 mmol/L Potassium 4.8 3.5 - 5.1 mmol/L Chloride 91 (L) 97 - 108 mmol/L  
 CO2 24 21 - 32 mmol/L Anion gap 13 5 - 15 mmol/L Glucose 94 65 - 100 mg/dL BUN 44 (H) 6 - 20 MG/DL Creatinine 5.90 (H) 0.55 - 1.02 MG/DL  
 BUN/Creatinine ratio 7 (L) 12 - 20 GFR est AA 9 (L) >60 ml/min/1.73m2 GFR est non-AA 7 (L) >60 ml/min/1.73m2 Calcium 7.4 (L) 8.5 - 10.1 MG/DL Phosphorus 4.6 2.6 - 4.7 MG/DL Albumin 1.9 (L) 3.5 - 5.0 g/dL GLUCOSE, POC Collection Time: 07/13/18  6:00 AM  
Result Value Ref Range Glucose (POC) 121 (H) 65 - 100 mg/dL Performed by Sarah Peng I have reviewed the flowsheets. Chart and Pertinent Notes have been reviewed. No change in PMH ,family and social history from Consult note.  
 
 
Alvaro MANN Aric Esqueda MD

## 2018-07-13 NOTE — WOUND CARE
Wound Consult:  Re-consult Visit. Chart reviewed. Consulted for wound VAC application to right heel ulcer; Dr. Trupti Crespo at bedside; family at bedside; patient alert and oriented; most pleasant. Spoke with patients nurse,  Miguel Angel Sheets to hand off on visit at bedside shift report. Patient is resting on a Versacare bed with accumax mattress. She has some discomfort in heel with care and buttocks (R) where she has healing abscess. Assessment:  Right heel - 7 x 10 x 1.8 cm, full thickness ulcer (Stage 4) with bone exposure 25%; remaining wound red with some dark discoloration along medial edge - wound was smaller upon initial assessment however while prepping skin noted that lateral side had lifting macerated skin - this was easily peeled away to expose additional wound bed - pink at edge with soft boggy dusky tissue moving inward. Dorsal foot has discoloration - hyperpigmentation from previous skin injury. POA. Right lateral buttock - 1 x 1 x 0.5 cm, undermines towards 3 o'clock to 2 cm. Area identified by daughter as having been abscess that has failed to completely heal.  POA. Bilateral inner buttocks - dry somewhat callused skin in 1 cm areas that mirror each other - patient's daughter identified as \"kissing ulcers\" - appear as such that are nearly healed. POA per daughter. Sacrum is intact, no discoloration. Treatment:  Protected right heel noemi wound skin with skin prep; then VAC drape - two pieces of black foam to wound bed; secured with drape; initiated suction at 100 mmHg; used ABD pad to pad tubing and brought tubing up lateral aspect of foot / lower leg - wrapped loosely with gauze/tape. Placed back in [address] [place] [address] and Mepilex Border to right buttock and a plain Mepilex to inner buttocks.   Wound Recommendations:  Continue wound care as ordered; wound VAC for right foot - watch for any bleeding on post surgical wound - hemostasis was achieved with VAC placement however, remains at risk for bleeding - discussed with Jorge Gill. Skin Care Recommendations:  1. Minimize friction/shear: minimize layers of linen/pads under patient. Ordered Total Care Bariatric Plus bed for patient. Insure bed zeroed prior to placing patient on bed for accurate weights. Flat sheet as bottom sheet to minimize shear/friction. 2. Off load pressure/reposition: continue to turn and reposition approximately every 2 hours; use Prevalon boots. 3. Manage Moisture - keep skin folds dry; incontinence skin care as needed. 4. Continue to monitor nutrition, pain, and skin risk scale, and skin assessment. Plan: We will continue to reassess routinely and as needed. Addendum:  Back to check on VAC this afternoon - dressing intact, no drainage in canister and no bleeding noted at site. Bariatric plus bed had arrived and we moved patient to that surface.   Ghazal Gar, [facility] [place] [facility] Office [phone]  Pager [phone]_

## 2018-07-13 NOTE — DIALYSIS
[facility]  [phone]    Vitals   Pre   Post   Assessment   Pre   Post     Temp  Temp: 97.7 °F (36.5 °C) (07/13/18 1524)  @1847 97.5 LOC  A/OX3  A/Ox3   HR   Pulse (Heart Rate): 64 (07/13/18 1524) 62 Lungs   CLEAR DIMINISHED BASES  Clear Diminish   B/P   99/66 97/27 Cardiac   NORMAL SINUS  Normal Sinus   Resp   Resp Rate: 18 (07/13/18 1530) 18 Skin   DRY  Dry   Pain level  0 OUT OF 10 10 out of 10 primary RN notified Edema    GENERALIZED   Generalized   Orders:    Duration:   Start:   9277 End:    1847 Total:   3.5   Dialyzer:   Dialyzer/Set Up Inspection: Revaclear (07/13/18 1524)   K Bath:   Dialysate K (mEq/L): 3 (07/13/18 1524)   Ca Bath:   Dialysate CA (mEq/L): 2.5 (07/13/18 1524)   Na/Bicarb:   Dialysate NA (mEq/L): 140 (07/13/18 1524)   Target Fluid Removal:   Goal/Amount of Fluid to Remove (mL): 2500 mL (07/13/18 1524)   Access     Type & Location:   RUSUBCLV CVC PATENT WITH GOOD FLOW,SOIL DRESSING. Labs     Obtained/Reviewed   Critical Results Called   Date when labs were drawn-  Hgb-    HGB   Date Value Ref Range Status   07/13/2018 8.7 (L) 11.5 - 16.0 g/dL Final     K-    Potassium   Date Value Ref Range Status   07/13/2018 4.8 3.5 - 5.1 mmol/L Final     Ca-   Calcium   Date Value Ref Range Status   07/13/2018 7.4 (L) 8.5 - 10.1 MG/DL Final     Bun-   BUN   Date Value Ref Range Status   07/13/2018 44 (H) 6 - 20 MG/DL Final     Creat-   Creatinine   Date Value Ref Range Status   07/13/2018 5.90 (H) 0.55 - 1.02 MG/DL Final        Medications/ Blood Products Given     Name   Dose   Route and Time                     Blood Volume Processed (BVP):    77 Net Fluid   Removed:  2000   Comments   Time Out Done: 4642  Primary Nurse Rpt Pre: Bebeto PAZ  Primary Nurse Rpt Post:SHOBHA PAZ  Pt Education:PROCEDURAL, S/S/ OF INFECTION  Care Plan:CONTINUE TO FOLLOW THE ORDERS OF NEPHROLOGIST    Tx Summary: Assess patients rusubclv cvc.  Garth/jorden disinfected with Alcohol per policy. Each lumen aspirated for blood return and flushed with Normal Saline per policy. Dialysis initiated. Patient tolerated treatment well. Moments of hypotension where UF was decreased. Changed patient RUE CVC dressing. Central line catheter flushed with normal saline per policy. Ports disinfected with Alcohol per policy and lines disconnected and capped using aseptic technique. See LDA docflowsheet for dressing information. Admiting Diagnosis: CELLULITIS OF FOOT  Pt's previous clinic- [facility]  Consent signed - Informed Consent Verified: Yes (07/13/18 1524)  [facility] Consent - YES  Hepatitis Status- NEGATIVE   Antigen 06/25/18  Machine #- Machine Number: [id number] (07/13/18 1524)  Telemetry status- non telemetry  Pre-dialysis wt. - Pre-Dialysis Weight: 130 kg (286 lb 9.6 oz) (07/13/18 1524)

## 2018-07-13 NOTE — PROGRESS NOTES
Bedside and Verbal shift change report given to Juarez (oncoming nurse) by Jose Alfredo Cardozo (offgoing nurse). Report included the following information SBAR, Kardex, Intake/Output and MAR.
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
## 2018-07-13 NOTE — PROGRESS NOTES
Hospitalist Progress Note Veronica Jones MD 
Answering service: [phone] OR [phone] from in house phone Date of Service:  2018 NAME:  Hayden Marroquin :  1950 MRN:  [id number] Admission Summary:  
Hayden Marroquin is a 70 y.o.  female with past medical history of anemia, arthritis, asthma, anxiety, ESRD, chronic pain, depression, type 2 diabetes mellitus (DM), GERD, glaucoma, hypertension, hyperlipidemia, IBS, migraine, morbid obesity, sleep apnea presented to the ED from home with chief complaint of leg swelling. Interval history / Subjective:  
Ms. Michael Shoemaker is having Assessment & Plan:  
 
Acute bilateral foot cellulitis with acute on chronic right leg wound infection wound (POA) - MRI of right foot  shows large heel ulcer with underlying soft tissue gas that extends nearly to the cortex of the underlying calcaneus. No fluid collection or osteo. - LE PVR  normal but with calcifications - RLE Arteriogram 6/10 without significant stenosis - Debridement of the calcaneus  
- Bcx  no growth 
- Continue on IV vancomycin, aztreonam 
- Vascular surgeon, ID, Podiatrist consulted Right hand numbness/tingling - seems like possibly some peripheral neuropathy, carpel tunnel? - Upper extremity PVR 7/10 with no evidence of hemodynamically significant right or left upper extremity arterial obstruction. No evidence of steal by the AV graft. - Outpatient EMG to confirm Hyponatremia - sodium still low - May need some fluid restriction ESRD (chronic) - Continue HD 
- Continue on sensipar, renvela,  sodium bicarbonate - Upper extremity PVR 7/10 with patent right distal brachial artery to proximal brachial vein AV graft with evidence of a hemodynamically significant stenosis at the venous anastomosis - Nephrologist consulted Anemia of chronic disease - H/H stable HTN - BP normal, continue on coreg, monitor BP 
 
DM-II - recent A1c 9.1, BG up after steroids - Continue lantus - Added standing with-meal insulin - Sliding scale and monitor finger stick glucose Elevated liver enzyme - multifactorial, improved Hx of asthma - stable, nebs PRN Hx of anxiety, depression - stable, continue on home prozac, nortriptyline Left sacral wound, left open abrasion, left lateral ankle wound POA 
- Continue wound care per wound care nurse Morbid obesity - Body mass index is 37.3 kg/(m^2). -diet and weight loss program  
 
Hx of sleep apnea - CPAP q hs Code status: Full Code DVT prophylaxis: SCD Care Plan discussed with: Patient/Family and Nurse Disposition: to SNF Daughter at beside, questions answered Hospital Problems  Date Reviewed: 7/6/2018 Codes Class Noted POA Anemia ICD-10-CM: D64.9 ICD-9-CM: 285.9  7/6/2018 Unknown [facility]end stage renal disease) [facility] ICD-10-CM: N18.6 ICD-9-CM: 585.6  7/6/2018 Unknown Bilateral leg edema ICD-10-CM: R60.0 ICD-9-CM: 782.3  7/6/2018 Unknown LFT elevation ICD-10-CM: R79.89 ICD-9-CM: 790.6  7/6/2018 Unknown Type 2 diabetes mellitus with hyperglycemia (HCC) ICD-10-CM: E11.65 ICD-9-CM: 250.00  7/6/2018 Unknown * (Principal)Bilateral lower leg cellulitis ICD-10-CM: L03.116, I35.971 ICD-9-CM: 682.6  7/6/2018 Unknown Vital Signs:  
 Last 24hrs VS reviewed since prior progress note. Most recent are: 
Visit Vitals  BP (!) 85/43 (BP 1 Location: Left arm)  Pulse 70  Temp 98.5 °F (36.9 °C)  Resp 18  Ht 6' 1.75\" (1.873 m)  Wt 130.9 kg (288 lb 9.3 oz)  SpO2 95%  Breastfeeding No  
 BMI 37.3 kg/m2 Intake/Output Summary (Last 24 hours) at 07/13/18 1032 Last data filed at 07/12/18 1746 Gross per 24 hour Intake              360 ml Output                0 ml Net              360 ml Physical Examination:  
     
Constitutional:  No acute distress, cooperative, pleasant   
ENT:  Oral mucous moist, oropharynx benign. Neck supple, Resp:  CTA bilaterally. No wheezing/rhonchi/rales. No accessory muscle use CV:  Regular rhythm, normal rate, no murmurs, gallops, rubs GI:  Soft, non distended, non tender. normoactive bowel sounds, no hepatosplenomegaly Musculoskeletal:  Bilateral foot dressed, wound vacuum on right heel Neurologic:  Moves all extremities. AAOx3, CN II-XII reviewed Skin:  Left sacral wound, left open abrasion, left lateral ankle wound Data Review:  
 
Telemetry ECG Xray CT scan MRI Echocardiogram   
Ultrasound I have reviewed the flow sheet and recent notes New labs and data personally reviewed. Labs:  
 
Recent Labs  
   07/13/18 
 0320  07/12/18 
 5943 WBC  12.3*  9.7 HGB  8.7*  9.1*  
HCT  29.7*  31.0*  
PLT  305  299 Recent Labs  
   07/13/18 
 0320  07/12/18 
 0885 NA  128*  131* K  4.8  4.2 CL  91*  94* CO2  24  26 BUN  44*  34* CREA  5.90*  4.56* GLU  94  192* CA  7.4*  7.2*  
PHOS  4.6  4.1 Recent Labs  
   07/13/18 
 0320  07/12/18 
 0500 ALB  1.9*  2.2* No results for input(s): INR, PTP, APTT in the last 72 hours. No lab exists for component: INREXT, INREXT No results for input(s): FE, TIBC, PSAT, FERR in the last 72 hours. No results found for: FOL, RBCF No results for input(s): PH, PCO2, PO2 in the last 72 hours. No results for input(s): CPK, CKNDX, TROIQ in the last 72 hours. No lab exists for component: CPKMB Lab Results Component Value Date/Time Cholesterol, total 144 04/10/2018 02:53 AM  
 HDL Cholesterol 66 04/10/2018 02:53 AM  
 LDL, calculated 67.6 04/10/2018 02:53 AM  
 Triglyceride 52 04/10/2018 02:53 AM  
 CHOL/HDL Ratio 2.2 04/10/2018 02:53 AM  
 
Lab Results Component Value Date/Time  Glucose (POC) 121 (H) 07/13/2018 06:00 AM  
 Glucose (POC) 105 (H) 07/12/2018 09:05 PM  
 Glucose (POC) 167 (H) 07/12/2018 04:22 PM  
 Glucose (POC) 94 07/12/2018 11:34 AM  
 Glucose (POC) 125 (H) 07/12/2018 08:44 AM  
 
Lab Results Component Value Date/Time Color YELLOW 10/27/2012 10:25 PM  
 Appearance CLEAR 10/27/2012 10:25 PM  
 Specific gravity 1.012 10/27/2012 10:25 PM  
 Specific gravity 1.015 08/30/2010 07:10 AM  
 pH (UA) 8.0 10/27/2012 10:25 PM  
 Protein 100 (A) 10/27/2012 10:25 PM  
 Glucose NEGATIVE  10/27/2012 10:25 PM  
 Ketone NEGATIVE  10/27/2012 10:25 PM  
 Bilirubin NEGATIVE  10/27/2012 10:25 PM  
 Urobilinogen 1.0 10/27/2012 10:25 PM  
 Nitrites NEGATIVE  10/27/2012 10:25 PM  
 Leukocyte Esterase TRACE (A) 10/27/2012 10:25 PM  
 Epithelial cells 0-5 10/27/2012 10:25 PM  
 Bacteria NEGATIVE  10/27/2012 10:25 PM  
 WBC 10-20 10/27/2012 10:25 PM  
 RBC 5-10 10/27/2012 10:25 PM  
 
 
 
Medications Reviewed:  
 
Current Facility-Administered Medications Medication Dose Route Frequency  epoetin joi (EPOGEN;PROCRIT) injection 20,000 Units  20,000 Units SubCUTAneous Q MON, WED & FRI  fentaNYL citrate (PF) injection 50 mcg  50 mcg IntraVENous Q2H PRN  
 oxyCODONE-acetaminophen (PERCOCET) 5-325 mg per tablet 1-2 Tab  1-2 Tab Oral Q4H PRN  
 ondansetron (ZOFRAN) injection 4 mg  4 mg IntraVENous Q6H PRN  pantoprazole (PROTONIX) tablet 40 mg  40 mg Oral ACB&D  
 alum-mag hydroxide-simeth (MYLANTA) oral suspension 30 mL  30 mL Oral Q4H PRN  
 insulin glargine (LANTUS) injection 25 Units  25 Units SubCUTAneous QHS  insulin lispro (HUMALOG) injection 3 Units  3 Units SubCUTAneous TIDAC  aztreonam (AZACTAM) 500 mg in 0.9% sodium chloride 100 mL IVPB  500 mg IntraVENous Q12H  
 vancomycin (VANCOCIN) 1,000 mg in 0.9% sodium chloride (MBP/ADV) 250 mL  1,000 mg IntraVENous DIALYSIS MON, WED & FRI  carvedilol (COREG) tablet 12.5 mg  12.5 mg Oral BID WITH MEALS  cinacalcet (SENSIPAR) tablet 30 mg  30 mg Oral DAILY  ascorbic acid (vitamin C) (VITAMIN C) tablet 1,000 mg  1,000 mg Oral DAILY  aspirin chewable tablet 81 mg  81 mg Oral DAILY  B complex-vitaminC-folic acid (NEPHROCAP) cap  1 Cap Oral DAILY  FLUoxetine (PROzac) capsule 20 mg  20 mg Oral DAILY  gabapentin (NEURONTIN) capsule 100 mg  100 mg Oral BID  latanoprost (XALATAN) 0.005 % ophthalmic solution 1 Drop  1 Drop Both Eyes QHS  midodrine (PROAMITINE) tablet 5 mg  5 mg Oral Once per day on Mon Wed Fri  polyethylene glycol (MIRALAX) packet 17 g  17 g Oral BID PRN  
 sevelamer carbonate (RENVELA) tab 800 mg  800 mg Oral TID WITH MEALS  simvastatin (ZOCOR) tablet 20 mg  20 mg Oral QHS  sodium chloride (NS) flush 5-10 mL  5-10 mL IntraVENous Q8H  
 sodium chloride (NS) flush 5-10 mL  5-10 mL IntraVENous PRN  
 insulin lispro (HUMALOG) injection   SubCUTAneous AC&HS  
 glucose chewable tablet 16 g  4 Tab Oral PRN  
 dextrose (D50W) injection syrg 12.5-25 g  12.5-25 g IntraVENous PRN  
 glucagon (GLUCAGEN) injection 1 mg  1 mg IntraMUSCular PRN  
 nortriptyline (PAMELOR) capsule 50 mg  50 mg Oral QHS  Vancomycin- pharmacy to dose   Other Rx Dosing/Monitoring  
 
______________________________________________________________________ EXPECTED LENGTH OF STAY: 4d 16h ACTUAL LENGTH OF STAY:          7 Ana Montelongo MD

## 2018-07-13 NOTE — PROGRESS NOTES
Occupational Therapy Note 4544 -   7.13.2018    Orders acknowledged and chart reviewed in prep for OT evaluation. RN caring for patient responding to code blue on another patient, unavailable to give information at this time, will f/u later in AM/PM for evaluation. Thank you. Anuja Pascual MS, OTR/L

## 2018-07-13 NOTE — PROGRESS NOTES
Problem: Falls - Risk of  Goal: *Absence of Falls  Document Rodrick Fall Risk and appropriate interventions in the flowsheet.    Fall Risk Interventions:  Mobility Interventions: Bed/chair exit alarm         Medication Interventions: Bed/chair exit alarm, Patient to call before getting OOB    Elimination Interventions: Call light in reach, Toilet paper/wipes in reach

## 2018-07-13 NOTE — PROGRESS NOTES
ID Progress Note  2018    Subjective:     She is still with a lot of pain    Objective:     Antibiotics:  1. Azactam  2. Vancomycin       Vitals:   Visit Vitals    BP (!) 85/70 (BP 1 Location: Left arm, BP Patient Position: At rest;Sitting)  Comment (BP 1 Location): forearm    Pulse 63    Temp 98.5 °F (36.9 °C)    Resp 18    Ht 6' 1.75\" (1.873 m)    Wt 130.9 kg (288 lb 9.3 oz)    SpO2 95%    Breastfeeding No    BMI 37.3 kg/m2        Tmax:  Temp (24hrs), Av.2 °F (36.8 °C), Min:97.4 °F (36.3 °C), Max:98.7 °F (37.1 °C)      Exam:  Wound dressed, no proximal cellulitis. Labs:      Recent Labs      18   0320  18   0427   WBC  12.3*  9.7   HGB  8.7*  9.1*   PLT  305  299   BUN  44*  34*   CREA  5.90*  4.56*       Cultures:     Lab Results   Component Value Date/Time    Specimen Description: URINE 10/27/2012 08:15 PM    Specimen Description: BLOOD 2011 07:40 AM    Specimen Description: URINE 2010 07:10 AM     Lab Results   Component Value Date/Time    Culture result: HEAVY GRAM NEGATIVE RODS (A) 2018 08:00 AM    Culture result: LIGHT PROBABLE 2ND GRAM NEGATIVE ANNE-MARIE (A) 2018 08:00 AM    Culture result: CHECKING FOR POSSIBLE  OTHER ORGANISMS   2018 08:00 AM    Culture result: MODERATE GRAM NEGATIVE RODS (A) 2018 08:00 AM    Culture result: LIGHT PROBABLE 2ND GRAM NEGATIVE ANNE-MARIE (A) 2018 08:00 AM    Culture result: (A) 2018 08:00 AM     CHECKING FOR POSSIBLE  FEW  3RD GRAM NEGATIVE ANNE-MARIE      Culture result: CHECKING FOR POSSIBLE  OTHER ORGANISMS   2018 08:00 AM       Radiology:     Line/Insert Date:           Assessment:     1. Diabetic foot wound--further debridement pending--she will need significant help post-op, it sounds like  2. Cultures without several different organisms growing    Objective:     1. Continue current therapy  2. Follow up cultures  3.  Group will check cultures over the weekend and adjust meds as needed--call if she specifically needs to be seen    Logan Ballesteros MD
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
## 2018-07-13 NOTE — PROGRESS NOTES
NUTRITION COMPLETE ASSESSMENT    RECOMMENDATIONS:   1. Diabetic Consistent Carb 2000 kcal; Renal 70-70-70  -- Trial of Prosource Supplements and Glucerna Shake    2. Continue daily weights (rezero bedscale as able secondary to large weight fluctuations)     Interventions/Plan:   Food/Nutrient Delivery: Prosource, Glucerna, Diet adjustment as above    Assessment:   Reason for Assessment:   [x]BPA/MST Referral   [x]LOS/Risk/Wounds     Diet: Consistent carb, Renal  Nutritionally Significant Medications: [x] Reviewed & Includes: ascorbic acid 1000 mg; Nephrocap daily; sensipar daily; lantus 25 units daily; humalog correction scale; humalog 3 units 3x/day; zofran q6 hours prn; protonix twice/day; miralax twice/day; renvela 3x/day with meals; vancomycin    Meal Intake: Patient Vitals for the past 100 hrs:   % Diet Eaten   07/12/18 1746 100 %   07/12/18 1239 50 %   07/12/18 0930 0 %   07/11/18 1321 75 %   07/11/18 1000 25 %   07/10/18 1232 75 %     Pre-Hospitalization:  Usual Appetite: Excellent  Diet at Home: Regular, Renal, DM  Vitamins/Supplements: No    Current Hospitalization:   Fluid Restriction:  N/A  Appetite: Fair  PO Ability: Independent      Subjective:  Spoke with pt, daughter and son at the bedside. Pt's intake and appetite is variable. She is struggling to get foods she likes. Preferences obtained and breakfast selections for tomorrow entered. -- pt dislikes Nepro and says Ensure Clear spikes her BG, but is receptive to trial of Prosource Liquid BID and Glucerna Shake trial    Objective:  Chart reviewed, discussed with RN. Pt admitted with bilateral lower leg cellulitis. PMHx: DM, ESRD on HD, others noted. Pt is s/p debridement of foot wound and wound vac placement. Multiple wounds also noted by WOCN. Discussed the importance of additional protein to meet estimated needs for healing and for HD.     Pt's supplements as ordered to provide 340 kcal, 40 g protein per day-- meeting 17% and 31% of estimated kcal and protein needs, respectively. Encouraged her to focus on protein first at all meals and snacks. Noted large weight fluctuations. Will recalculate needs as appropriate. Estimated Nutrition Needs:   Kcals/day: 1964 Kcals/day (15 kcal/kg)  Protein: 131 g (131 g/day (1 g/kg))  Fluid: 1900 ml (1 mL/kcal or per MD)  Based On: Kcal/kg - specify (Comment)  Weight Used: Actual wt (130.9 kg)    Pt expected to meet estimated nutrient needs:  [x]   Yes     []  No [] Unable to predict at this time    Nutrition Diagnosis:   1. Inadequate protein-energy intake related to increased energy expenditure secondary to wounds as evidenced by limited po intake % of meals and benefits of supplements    Goals:     Pt to consume at least 50% of meals and 2 supplements per day over the next 5-7 days     Monitoring & Evaluation:    - Total energy intake, Liquid meal replacement   - Weight/weight change      Previous Nutrition Goals Met:   N/A  Previous Recommendations:    N/A    Education & Discharge Needs:   [] None Identified   [x] Identified and addressed    [x] Participated in care plan, discharge planning, and/or interdisciplinary rounds        Cultural, Yazdanism and ethnic food preferences identified: None    Skin Integrity: []Intact  [x]Other: see wound/wound vac documentation from WOCN  Edema: [x]None []Other  Last BM: 7/11/18  Food Allergies: [x]None []Other  Diet Restrictions: Cultural/Pentecostal Preference(s): None     Anthropometrics:    Weight Loss Metrics 7/13/2018 7/6/2018 6/27/2018 4/12/2018 2/26/2018 2/14/2018 1/31/2018   Today's Wt 288 lb 9.3 oz - 251 lb 5.2 oz 262 lb 4.8 oz 248 lb 3.8 oz 248 lb 3.8 oz 248 lb   BMI - 37.3 kg/m2 32.49 kg/m2 34.61 kg/m2 32.75 kg/m2 32.75 kg/m2 32.28 kg/m2      Weight Source: Bed  Height: 6' 1.75\" (187.3 cm),    Body mass index is 37.3 kg/(m^2).   IBW : 74.8 kg (165 lb),     ,      Labs:  Lab Results   Component Value Date/Time    Sodium 128 (L) 07/13/2018 03:20 AM Potassium 4.8 07/13/2018 03:20 AM    Chloride 91 (L) 07/13/2018 03:20 AM    CO2 24 07/13/2018 03:20 AM    Glucose 94 07/13/2018 03:20 AM    BUN 44 (H) 07/13/2018 03:20 AM    Creatinine 5.90 (H) 07/13/2018 03:20 AM    Calcium 7.4 (L) 07/13/2018 03:20 AM    Magnesium 2.0 06/27/2018 11:39 AM    Phosphorus 4.6 07/13/2018 03:20 AM    Albumin 1.9 (L) 07/13/2018 03:20 AM     Lab Results   Component Value Date/Time    Hemoglobin A1c 9.1 (H) 04/10/2018 02:53 AM     Markel Chan, [address]

## 2018-07-13 NOTE — PROGRESS NOTES
Progress Note    Patient: Belinda Munoz MRN: [id number]  SSN: [id number]    YOB: 1950  Age: 70 y.o. Sex: female      Admit Date: 2018  1 Day Post-Op     Procedure:   Procedure(s):  DEBRIDEMENT RIGHT HEEL ULCER WITH PARTIAL CALCANECTOMY    Subjective:     Patient seen  & examined at bedside. Denies any n/v/f/ns/c. Family at bedside. Pt states she has some pain in her right leg & foot. Objective:     Visit Vitals    /49 (BP 1 Location: Left leg, BP Patient Position: At rest)    Pulse 71    Temp 97.4 °F (36.3 °C)    Resp 19    Ht 6' 1.75\" (1.873 m)    Wt 130.9 kg (288 lb 9.3 oz)    SpO2 97%    Breastfeeding No    BMI 37.3 kg/m2      Right Foot Exam: +large plantar heel wound with exposed bone. No pus, no malodor. Surrounding tissue is semi-fibrotic but viable    Labs/Radiology Review: images and reports reviewed    Assessment:     Hospital Problems  Date Reviewed: 2018          Codes Class Noted POA    Anemia ICD-10-CM: D64.9  ICD-9-CM: 285.9  2018 Unknown        ESRD (end stage renal disease) ([facility] ICD-10-CM: N18.6  ICD-9-CM: 585.6  2018 Unknown        Bilateral leg edema ICD-10-CM: R60.0  ICD-9-CM: 782.3  2018 Unknown        LFT elevation ICD-10-CM: R79.89  ICD-9-CM: 790.6  2018 Unknown        Type 2 diabetes mellitus with hyperglycemia ([facility] ICD-10-CM: E11.65  ICD-9-CM: 250.00  2018 Unknown        * (Principal)Bilateral lower leg cellulitis ICD-10-CM: L03.116, L03.115  ICD-9-CM: 682.6  2018 Unknown              Plan/Recommendations/Medical Decision Makin.) Continue antibxs per ID. Sx cxs & path pending.  2.) Will order wound vac to right heel. 3.) Pt to be strict nwb to right foot.  Will consult PT.  4.) Will follow    Signed By: Von Cuevas DPM     2018

## 2018-07-14 VITALS
SYSTOLIC BLOOD PRESSURE: 100 MMHG | DIASTOLIC BLOOD PRESSURE: 58 MMHG | HEART RATE: 84 BPM | RESPIRATION RATE: 18 BRPM | TEMPERATURE: 99.4 F | OXYGEN SATURATION: 97 %

## 2018-07-14 LAB
ALBUMIN SERPL-MCNC: 2 G/DL (ref 3.5–5)
ANION GAP SERPL CALC-SCNC: 9 MMOL/L (ref 5–15)
BASOPHILS # BLD: 0.1 K/UL (ref 0–0.1)
BASOPHILS NFR BLD: 1 % (ref 0–1)
BUN SERPL-MCNC: 31 MG/DL (ref 6–20)
BUN/CREAT SERPL: 7 (ref 12–20)
CALCIUM SERPL-MCNC: 7.9 MG/DL (ref 8.5–10.1)
CHLORIDE SERPL-SCNC: 94 MMOL/L (ref 97–108)
CO2 SERPL-SCNC: 26 MMOL/L (ref 21–32)
CREAT SERPL-MCNC: 4.34 MG/DL (ref 0.55–1.02)
DIFFERENTIAL METHOD BLD: ABNORMAL
EOSINOPHIL # BLD: 0.5 K/UL (ref 0–0.4)
EOSINOPHIL NFR BLD: 4 % (ref 0–7)
ERYTHROCYTE [DISTWIDTH] IN BLOOD BY AUTOMATED COUNT: 15.5 % (ref 11.5–14.5)
GLUCOSE BLD STRIP.AUTO-MCNC: 132 MG/DL (ref 65–100)
GLUCOSE BLD STRIP.AUTO-MCNC: 144 MG/DL (ref 65–100)
GLUCOSE BLD STRIP.AUTO-MCNC: 225 MG/DL (ref 65–100)
GLUCOSE BLD STRIP.AUTO-MCNC: 84 MG/DL (ref 65–100)
GLUCOSE SERPL-MCNC: 60 MG/DL (ref 65–100)
HCT VFR BLD AUTO: 29.7 % (ref 35–47)
HGB BLD-MCNC: 8.8 G/DL (ref 11.5–16)
IMM GRANULOCYTES # BLD: 0.1 K/UL (ref 0–0.04)
IMM GRANULOCYTES NFR BLD AUTO: 1 % (ref 0–0.5)
LYMPHOCYTES # BLD: 2.8 K/UL (ref 0.8–3.5)
LYMPHOCYTES NFR BLD: 26 % (ref 12–49)
MCH RBC QN AUTO: 26.5 PG (ref 26–34)
MCHC RBC AUTO-ENTMCNC: 29.6 G/DL (ref 30–36.5)
MCV RBC AUTO: 89.5 FL (ref 80–99)
MONOCYTES # BLD: 1 K/UL (ref 0–1)
MONOCYTES NFR BLD: 9 % (ref 5–13)
NEUTS SEG # BLD: 6.3 K/UL (ref 1.8–8)
NEUTS SEG NFR BLD: 59 % (ref 32–75)
NRBC # BLD: 0 K/UL (ref 0–0.01)
NRBC BLD-RTO: 0 PER 100 WBC
PHOSPHATE SERPL-MCNC: 4.1 MG/DL (ref 2.6–4.7)
PLATELET # BLD AUTO: 308 K/UL (ref 150–400)
PMV BLD AUTO: 8.6 FL (ref 8.9–12.9)
POTASSIUM SERPL-SCNC: 4.4 MMOL/L (ref 3.5–5.1)
RBC # BLD AUTO: 3.32 M/UL (ref 3.8–5.2)
SERVICE CMNT-IMP: ABNORMAL
SERVICE CMNT-IMP: NORMAL
SODIUM SERPL-SCNC: 129 MMOL/L (ref 136–145)
WBC # BLD AUTO: 10.6 K/UL (ref 3.6–11)

## 2018-07-14 PROCEDURE — 74011250636 HC RX REV CODE- 250/636: Performed by: HOSPITALIST

## 2018-07-14 PROCEDURE — 65270000032 HC RM SEMIPRIVATE

## 2018-07-14 PROCEDURE — 74011250637 HC RX REV CODE- 250/637: Performed by: HOSPITALIST

## 2018-07-14 PROCEDURE — 36415 COLL VENOUS BLD VENIPUNCTURE: CPT | Performed by: PODIATRIST

## 2018-07-14 PROCEDURE — 82962 GLUCOSE BLOOD TEST: CPT

## 2018-07-14 PROCEDURE — 94660 CPAP INITIATION&MGMT: CPT

## 2018-07-14 PROCEDURE — 3331090002 HH PPS REVENUE DEBIT

## 2018-07-14 PROCEDURE — 74011636637 HC RX REV CODE- 636/637: Performed by: HOSPITALIST

## 2018-07-14 PROCEDURE — 94760 N-INVAS EAR/PLS OXIMETRY 1: CPT

## 2018-07-14 PROCEDURE — 74011000250 HC RX REV CODE- 250: Performed by: INTERNAL MEDICINE

## 2018-07-14 PROCEDURE — 74011000258 HC RX REV CODE- 258: Performed by: INTERNAL MEDICINE

## 2018-07-14 PROCEDURE — 74011250637 HC RX REV CODE- 250/637: Performed by: FAMILY MEDICINE

## 2018-07-14 PROCEDURE — 3331090001 HH PPS REVENUE CREDIT

## 2018-07-14 PROCEDURE — 80069 RENAL FUNCTION PANEL: CPT | Performed by: PODIATRIST

## 2018-07-14 PROCEDURE — 85025 COMPLETE CBC W/AUTO DIFF WBC: CPT | Performed by: PODIATRIST

## 2018-07-14 RX ADMIN — Medication 10 ML: at 22:23

## 2018-07-14 RX ADMIN — NORTRIPTYLINE HYDROCHLORIDE 50 MG: 25 CAPSULE ORAL at 22:21

## 2018-07-14 RX ADMIN — GABAPENTIN 100 MG: 100 CAPSULE ORAL at 22:22

## 2018-07-14 RX ADMIN — FENTANYL CITRATE 50 MCG: 50 INJECTION, SOLUTION INTRAMUSCULAR; INTRAVENOUS at 18:14

## 2018-07-14 RX ADMIN — AZTREONAM 500 MG: 1 INJECTION, POWDER, LYOPHILIZED, FOR SOLUTION INTRAMUSCULAR; INTRAVENOUS at 05:38

## 2018-07-14 RX ADMIN — FENTANYL CITRATE 50 MCG: 50 INJECTION, SOLUTION INTRAMUSCULAR; INTRAVENOUS at 22:21

## 2018-07-14 RX ADMIN — INSULIN LISPRO 3 UNITS: 100 INJECTION, SOLUTION INTRAVENOUS; SUBCUTANEOUS at 12:06

## 2018-07-14 RX ADMIN — OXYCODONE AND ACETAMINOPHEN 2 TABLET: 5; 325 TABLET ORAL at 09:19

## 2018-07-14 RX ADMIN — OXYCODONE HYDROCHLORIDE AND ACETAMINOPHEN 1000 MG: 500 TABLET ORAL at 09:19

## 2018-07-14 RX ADMIN — CINACALCET HYDROCHLORIDE 30 MG: 30 TABLET, COATED ORAL at 22:22

## 2018-07-14 RX ADMIN — FENTANYL CITRATE 50 MCG: 50 INJECTION, SOLUTION INTRAMUSCULAR; INTRAVENOUS at 02:56

## 2018-07-14 RX ADMIN — SEVELAMER CARBONATE 800 MG: 800 TABLET, FILM COATED ORAL at 07:01

## 2018-07-14 RX ADMIN — SEVELAMER CARBONATE 800 MG: 800 TABLET, FILM COATED ORAL at 17:19

## 2018-07-14 RX ADMIN — SIMVASTATIN 20 MG: 20 TABLET, FILM COATED ORAL at 22:00

## 2018-07-14 RX ADMIN — FLUOXETINE 20 MG: 20 CAPSULE ORAL at 09:19

## 2018-07-14 RX ADMIN — CARVEDILOL 12.5 MG: 12.5 TABLET, FILM COATED ORAL at 17:19

## 2018-07-14 RX ADMIN — PANTOPRAZOLE SODIUM 40 MG: 40 TABLET, DELAYED RELEASE ORAL at 17:19

## 2018-07-14 RX ADMIN — AZTREONAM 500 MG: 1 INJECTION, POWDER, LYOPHILIZED, FOR SOLUTION INTRAMUSCULAR; INTRAVENOUS at 17:19

## 2018-07-14 RX ADMIN — POLYETHYLENE GLYCOL 3350 17 G: 17 POWDER, FOR SOLUTION ORAL at 14:22

## 2018-07-14 RX ADMIN — ASCORBIC ACID, THIAMINE MONONITRATE,RIBOFLAVIN, NIACINAMIDE, PYRIDOXINE HYDROCHLORIDE, FOLIC ACID, CYANOCOBALAMIN, BIOTIN, CALCIUM PANTOTHENATE, 1 CAPSULE: 100; 1.5; 1.7; 20; 10; 1; 6000; 150000; 5 CAPSULE, LIQUID FILLED ORAL at 09:19

## 2018-07-14 RX ADMIN — GABAPENTIN 100 MG: 100 CAPSULE ORAL at 07:01

## 2018-07-14 RX ADMIN — Medication 10 ML: at 14:23

## 2018-07-14 RX ADMIN — Medication 10 ML: at 05:39

## 2018-07-14 RX ADMIN — PANTOPRAZOLE SODIUM 40 MG: 40 TABLET, DELAYED RELEASE ORAL at 07:01

## 2018-07-14 RX ADMIN — ASPIRIN 81 MG 81 MG: 81 TABLET ORAL at 09:19

## 2018-07-14 RX ADMIN — OXYCODONE AND ACETAMINOPHEN 2 TABLET: 5; 325 TABLET ORAL at 23:39

## 2018-07-14 RX ADMIN — LATANOPROST 1 DROP: 50 SOLUTION OPHTHALMIC at 22:00

## 2018-07-14 RX ADMIN — CARVEDILOL 12.5 MG: 12.5 TABLET, FILM COATED ORAL at 07:01

## 2018-07-14 RX ADMIN — INSULIN LISPRO 3 UNITS: 100 INJECTION, SOLUTION INTRAVENOUS; SUBCUTANEOUS at 17:19

## 2018-07-14 RX ADMIN — OXYCODONE AND ACETAMINOPHEN 2 TABLET: 5; 325 TABLET ORAL at 14:22

## 2018-07-14 RX ADMIN — INSULIN GLARGINE 25 UNITS: 100 INJECTION, SOLUTION SUBCUTANEOUS at 22:22

## 2018-07-14 RX ADMIN — SEVELAMER CARBONATE 800 MG: 800 TABLET, FILM COATED ORAL at 12:06

## 2018-07-14 NOTE — PROGRESS NOTES
[facility] 
 [address] [facility], [facility] Phone: [phone]  
 
Nephrology Progress Note Castro Pantoja     1950     [id number] Date of Admission : 7/6/2018 07/14/18 CC: Follow up for ESRD Assessment and Plan Diabetic Foot wound w/ infection - s/p Debridement Hypervolemic Hyponatremia  
- fluid restriction 1000 cc/ day - UF scheduled for tomorrow  
  
ESRD - MWF dialysis 
- HD today  
  
HTN 
  
DM 
  
Obesity, FARHAT 
  
Dyslipidemia 
  
Possible steal syndrome right hand Anemia of CKD 
- resume epogen at higher dose  
   
 
Interval History: 
Seen and examined Only 2 kg removed during HD yesterday  Due to low BP Denies any N/V/CP. SOB Review of Systems: A comprehensive review of systems was negative. Current Medications:  
Current Facility-Administered Medications Medication Dose Route Frequency  epoetin joi (EPOGEN;PROCRIT) injection 20,000 Units  20,000 Units SubCUTAneous Q MON, WED & FRI  fentaNYL citrate (PF) injection 50 mcg  50 mcg IntraVENous Q2H PRN  
 oxyCODONE-acetaminophen (PERCOCET) 5-325 mg per tablet 1-2 Tab  1-2 Tab Oral Q4H PRN  
 ondansetron (ZOFRAN) injection 4 mg  4 mg IntraVENous Q6H PRN  pantoprazole (PROTONIX) tablet 40 mg  40 mg Oral ACB&D  
 alum-mag hydroxide-simeth (MYLANTA) oral suspension 30 mL  30 mL Oral Q4H PRN  
 insulin glargine (LANTUS) injection 25 Units  25 Units SubCUTAneous QHS  insulin lispro (HUMALOG) injection 3 Units  3 Units SubCUTAneous TIDAC  aztreonam (AZACTAM) 500 mg in 0.9% sodium chloride 100 mL IVPB  500 mg IntraVENous Q12H  
 vancomycin (VANCOCIN) 1,000 mg in 0.9% sodium chloride (MBP/ADV) 250 mL  1,000 mg IntraVENous DIALYSIS MON, WED & FRI  carvedilol (COREG) tablet 12.5 mg  12.5 mg Oral BID WITH MEALS  cinacalcet (SENSIPAR) tablet 30 mg  30 mg Oral DAILY  ascorbic acid (vitamin C) (VITAMIN C) tablet 1,000 mg  1,000 mg Oral DAILY  aspirin chewable tablet 81 mg  81 mg Oral DAILY  B complex-vitaminC-folic acid (NEPHROCAP) cap  1 Cap Oral DAILY  FLUoxetine (PROzac) capsule 20 mg  20 mg Oral DAILY  gabapentin (NEURONTIN) capsule 100 mg  100 mg Oral BID  latanoprost (XALATAN) 0.005 % ophthalmic solution 1 Drop  1 Drop Both Eyes QHS  midodrine (PROAMITINE) tablet 5 mg  5 mg Oral Once per day on Mon Wed Fri  polyethylene glycol (MIRALAX) packet 17 g  17 g Oral BID PRN  
 sevelamer carbonate (RENVELA) tab 800 mg  800 mg Oral TID WITH MEALS  simvastatin (ZOCOR) tablet 20 mg  20 mg Oral QHS  sodium chloride (NS) flush 5-10 mL  5-10 mL IntraVENous Q8H  
 sodium chloride (NS) flush 5-10 mL  5-10 mL IntraVENous PRN  
 insulin lispro (HUMALOG) injection   SubCUTAneous AC&HS  
 glucose chewable tablet 16 g  4 Tab Oral PRN  
 dextrose (D50W) injection syrg 12.5-25 g  12.5-25 g IntraVENous PRN  
 glucagon (GLUCAGEN) injection 1 mg  1 mg IntraMUSCular PRN  
 nortriptyline (PAMELOR) capsule 50 mg  50 mg Oral QHS  Vancomycin- pharmacy to dose   Other Rx Dosing/Monitoring Allergies Allergen Reactions  Latex Itching Rash, sometimes difficult to breathe  Celebrex [Celecoxib] Anaphylaxis and Swelling TONGUE. LIPS AND EYES  
 Iodine Other (comments) IV CONTRAST-LESIONS, AND SKIN SLUFFED OFF  
 Keflex [Cephalexin] Anaphylaxis and Swelling Tongue, lips, and eyes  Levaquin [Levofloxacin] Anaphylaxis and Swelling Tongue, lips, and eyes  Pcn [Penicillins] Anaphylaxis and Swelling Tongue, lips and eyes Objective: 
Vitals:   
Vitals:  
 07/14/18 0102 07/14/18 7386 07/14/18 4603 07/14/18 0914 BP: 122/60  127/68 Pulse: 68  71 Resp: 20  19 Temp: 98.5 °F (36.9 °C)  97.8 °F (36.6 °C) TempSrc:      
SpO2: 99% 97% 97% 97% Weight:      
Height:      
 
Intake and Output: 
  
07/12 1901 - 07/14 0700 In: 240 [P.O.:240] Out: 2000 Physical Examination: 
General:Alert, No distress, HEENT:  pale Neck:RIJ permacath Lungs : Clears to auscultation Bilaterally, Normal respiratory effort CVS: RRR, S1 S2 normal, No rub, legs bandaged Abdomen: Soft, Non tender Extremities: No cyanosis, No clubbing; good thrill LUE AV graft MS: No joint swelling, erythema, warmth Neurologic: non focal, AAO x 3 Psych: normal affect 
 
[]    High complexity decision making was performed 
[]    Patient is at high-risk of decompensation with multiple organ involvement Lab Data Personally Reviewed: I have reviewed all the pertinent labs, microbiology data and radiology studies during assessment. Recent Labs  
   07/14/18 
 0547  07/13/18 
 0320  07/12/18 
 2677 NA  129*  128*  131* K  4.4  4.8  4.2 CL  94*  91*  94* CO2  26  24  26 GLU  60*  94  192* BUN  31*  44*  34* CREA  4.34*  5.90*  4.56* CA  7.9*  7.4*  7.2*  
PHOS  4.1  4.6  4.1 ALB  2.0*  1.9*  2.2* Recent Labs  
   07/14/18 
 0547  07/13/18 
 0320  07/12/18 
 3750 WBC  10.6  12.3*  9.7 HGB  8.8*  8.7*  9.1*  
HCT  29.7*  29.7*  31.0*  
PLT  308  305  299 Lab Results Component Value Date/Time Specimen Description: URINE 10/27/2012 08:15 PM  
 Specimen Description: BLOOD 06/07/2011 07:40 AM  
 Specimen Description: URINE 08/30/2010 07:10 AM  
 Specimen Description: URINE 08/09/2010 11:30 AM  
 Specimen Description: URINE 06/22/2010 11:50 PM  
 
Lab Results Component Value Date/Time Culture result: HEAVY GRAM NEGATIVE RODS (A) 07/12/2018 08:00 AM  
 Culture result: LIGHT PROBABLE 2ND GRAM NEGATIVE ANNE-MARIE (A) 07/12/2018 08:00 AM  
 Culture result: CHECKING FOR POSSIBLE 
OTHER ORGANISMS 
 07/12/2018 08:00 AM  
 Culture result: MODERATE GRAM NEGATIVE RODS (A) 07/12/2018 08:00 AM  
 Culture result: LIGHT PROBABLE 2ND GRAM NEGATIVE ANNE-MARIE (A) 07/12/2018 08:00 AM  
 Culture result: (A) 07/12/2018 08:00 AM  
  CHECKING FOR POSSIBLE 
FEW 
3RD GRAM NEGATIVE ANNE-MARIE  Culture result: CHECKING FOR POSSIBLE 
OTHER ORGANISMS 
 07/12/2018 08:00 AM  
 
Recent Results (from the past 24 hour(s)) GLUCOSE, POC Collection Time: 07/13/18 11:52 AM  
Result Value Ref Range Glucose (POC) 201 (H) 65 - 100 mg/dL Performed by Natividad Mcintyre, POC Collection Time: 07/13/18 10:23 PM  
Result Value Ref Range Glucose (POC) 168 (H) 65 - 100 mg/dL Performed by Clement Matos CBC WITH AUTOMATED DIFF Collection Time: 07/14/18  5:47 AM  
Result Value Ref Range WBC 10.6 3.6 - 11.0 K/uL  
 RBC 3.32 (L) 3.80 - 5.20 M/uL HGB 8.8 (L) 11.5 - 16.0 g/dL HCT 29.7 (L) 35.0 - 47.0 % MCV 89.5 80.0 - 99.0 FL  
 MCH 26.5 26.0 - 34.0 PG  
 MCHC 29.6 (L) 30.0 - 36.5 g/dL  
 RDW 15.5 (H) 11.5 - 14.5 % PLATELET 957 399 - 053 K/uL MPV 8.6 (L) 8.9 - 12.9 FL  
 NRBC 0.0 0  WBC ABSOLUTE NRBC 0.00 0.00 - 0.01 K/uL NEUTROPHILS 59 32 - 75 % LYMPHOCYTES 26 12 - 49 % MONOCYTES 9 5 - 13 % EOSINOPHILS 4 0 - 7 % BASOPHILS 1 0 - 1 % IMMATURE GRANULOCYTES 1 (H) 0.0 - 0.5 % ABS. NEUTROPHILS 6.3 1.8 - 8.0 K/UL  
 ABS. LYMPHOCYTES 2.8 0.8 - 3.5 K/UL  
 ABS. MONOCYTES 1.0 0.0 - 1.0 K/UL  
 ABS. EOSINOPHILS 0.5 (H) 0.0 - 0.4 K/UL  
 ABS. BASOPHILS 0.1 0.0 - 0.1 K/UL  
 ABS. IMM. GRANS. 0.1 (H) 0.00 - 0.04 K/UL  
 DF AUTOMATED RENAL FUNCTION PANEL Collection Time: 07/14/18  5:47 AM  
Result Value Ref Range Sodium 129 (L) 136 - 145 mmol/L Potassium 4.4 3.5 - 5.1 mmol/L Chloride 94 (L) 97 - 108 mmol/L  
 CO2 26 21 - 32 mmol/L Anion gap 9 5 - 15 mmol/L Glucose 60 (L) 65 - 100 mg/dL BUN 31 (H) 6 - 20 MG/DL Creatinine 4.34 (H) 0.55 - 1.02 MG/DL  
 BUN/Creatinine ratio 7 (L) 12 - 20 GFR est AA 12 (L) >60 ml/min/1.73m2 GFR est non-AA 10 (L) >60 ml/min/1.73m2 Calcium 7.9 (L) 8.5 - 10.1 MG/DL Phosphorus 4.1 2.6 - 4.7 MG/DL Albumin 2.0 (L) 3.5 - 5.0 g/dL GLUCOSE, POC Collection Time: 07/14/18  6:43 AM  
Result Value Ref Range Glucose (POC) 84 65 - 100 mg/dL Performed by Dawn Smalls I have reviewed the flowsheets. Chart and Pertinent Notes have been reviewed. No change in PMH ,family and social history from Consult note.  
 
 
Wolfgang Browning MD

## 2018-07-14 NOTE — PROGRESS NOTES
Problem: Falls - Risk of  Goal: *Absence of Falls  Document Rodrick Fall Risk and appropriate interventions in the flowsheet.    Outcome: Progressing Towards Goal  Fall Risk Interventions:  Mobility Interventions: Bed/chair exit alarm, PT Consult for assist device competence         Medication Interventions: Bed/chair exit alarm, Evaluate medications/consider consulting pharmacy    Elimination Interventions: Call light in reach

## 2018-07-14 NOTE — PROGRESS NOTES
Dialyzed yesterday   Fluid restriction for Hyponatremia   Next HD Monday   Hb stable       [facility]  Office :[phone]  Fax: [phone]

## 2018-07-14 NOTE — PROGRESS NOTES
Progress Note    Patient: Moustapha Banuelos MRN: [id number]  SSN: [id number]    YOB: 1950  Age: 70 y.o. Sex: female      Admit Date: 2018  2 Days Post-Op     Procedure:   Procedure(s):  DEBRIDEMENT RIGHT HEEL ULCER WITH PARTIAL CALCANECTOMY    Subjective:     Patient seen & examined at bedside. Denies any n/v/f/ns/c.    Objective:     Visit Vitals    /60 (BP 1 Location: Left arm, BP Patient Position: At rest)    Pulse 68    Temp 98.5 °F (36.9 °C)    Resp 20    Ht 6' 1.75\" (1.873 m)    Wt 130 kg (286 lb 9.6 oz)    SpO2 97%    Breastfeeding No    BMI 37.05 kg/m2      Right Foot Exam: wound vac dressing intact and vac running at continuous therapy    Labs/Radiology Review: images and reports reviewed    Assessment:     Hospital Problems  Date Reviewed: 2018          Codes Class Noted POA    Anemia ICD-10-CM: D64.9  ICD-9-CM: 285.9  2018 Unknown        [facility] ICD-10-CM: N18.6  ICD-9-CM: 585.6  2018 Unknown        Bilateral leg edema ICD-10-CM: R60.0  ICD-9-CM: 782.3  2018 Unknown        LFT elevation ICD-10-CM: R79.89  ICD-9-CM: 790.6  2018 Unknown        Type 2 diabetes mellitus with hyperglycemia (HCC) ICD-10-CM: E11.65  ICD-9-CM: 250.00  2018 Unknown        * (Principal)Bilateral lower leg cellulitis ICD-10-CM: L03.116, L03.115  ICD-9-CM: 682.6  2018 Unknown              Plan/Recommendations/Medical Decision Makin.) Continue antibxs per ID. Sx cxs = gram neg rods (from bone and soft tissue). Path pending. WBC 10.6  2.) Continue wound vac therapy. 3.) Continue strict nwb to right foot  4.) Vac dressing to be changed on Tuesday - will reassess heel ulcer at that time.    5.) Will follow    Signed By: Ammon Vargas DPM     2018

## 2018-07-14 NOTE — PROGRESS NOTES
Hospitalist Progress Note Jennie Bass MD 
Answering service: [phone] OR [phone] from in house phone Date of Service:  2018 NAME:  Cathleen Karimi :  1950 MRN:  [id number] Admission Summary:  
Cathleen Karimi is a 70 y.o.  female with past medical history of anemia, arthritis, asthma, anxiety, ESRD, chronic pain, depression, type 2 diabetes mellitus (DM), GERD, glaucoma, hypertension, hyperlipidemia, IBS, migraine, morbid obesity, sleep apnea presented to the ED from home with chief complaint of leg swelling. Interval history / Subjective:  
Ms. Cuco Puga is having a little back pain but it is controlled. She wants to stand up to go to the rest room but understands she cannot bear any weight on her right heel. Assessment & Plan:  
 
Acute bilateral foot cellulitis with acute on chronic right leg wound infection wound (POA) - seems to be improving - MRI of right foot  shows large heel ulcer with underlying soft tissue gas that extends nearly to the cortex of the underlying calcaneus. No fluid collection or osteo. - LE PVR  normal but with calcifications - RLE Arteriogram 6/10 without significant stenosis - Debridement of the calcaneus  
- Bcx  no growth - Wound cx  with multiple GNR 
- Continue on IV vancomycin, aztreonam 
- Vascular surgeon, ID, Podiatrist consulted Right hand numbness/tingling with possible steal syndrome - seems like possibly some peripheral neuropathy, carpel tunnel? - Upper extremity PVR 7/10 with no evidence of hemodynamically significant right or left upper extremity arterial obstruction. No evidence of steal by the AV graft. - Fixing the venous flow might exacerbate any steal 
- Outpatient EMG to eval for neuropathy Hyponatremia - sodium still low - I agree with fluid restriction ESRD (chronic) - Continue HD 
- Continue on sensipar, renvela,  sodium bicarbonate - Upper extremity PVR 7/10 with patent right distal brachial artery to proximal brachial vein AV graft with evidence of a hemodynamically significant stenosis at the venous anastomosis - Nephrologist consulted Anemia of chronic disease - H/H stable HTN - BP normal, continue on coreg, monitor BP 
 
DM-II - recent A1c 9.1, BG up after steroids - Continue lantus - Added standing with-meal insulin - Sliding scale and monitor finger stick glucose Elevated liver enzyme - multifactorial, improved Hx of asthma - stable, nebs PRN Hx of anxiety, depression - stable, continue on home prozac, nortriptyline Left sacral wound, left open abrasion, left lateral ankle wound POA 
- Continue wound care per wound care nurse Morbid obesity - Body mass index is 37.05 kg/(m^2). -diet and weight loss program  
 
Hx of sleep apnea - CPAP q hs Code status: Full Code DVT prophylaxis: SCD Care Plan discussed with: Patient/Family and Nurse Disposition: to SNF, likely Tuesday after wound vac change Hospital Problems  Date Reviewed: 7/6/2018 Codes Class Noted POA Anemia ICD-10-CM: D64.9 ICD-9-CM: 285.9  7/6/2018 Unknown ESRD (end stage renal disease) ([facility] ICD-10-CM: N18.6 ICD-9-CM: 585.6  7/6/2018 Unknown Bilateral leg edema ICD-10-CM: R60.0 ICD-9-CM: 782.3  7/6/2018 Unknown LFT elevation ICD-10-CM: R79.89 ICD-9-CM: 790.6  7/6/2018 Unknown Type 2 diabetes mellitus with hyperglycemia (HCC) ICD-10-CM: E11.65 ICD-9-CM: 250.00  7/6/2018 Unknown * (Principal)Bilateral lower leg cellulitis ICD-10-CM: L03.116, U60.127 ICD-9-CM: 682.6  7/6/2018 Unknown Vital Signs:  
 Last 24hrs VS reviewed since prior progress note. Most recent are: 
Visit Vitals  /68 (BP 1 Location: Left arm, BP Patient Position: At rest)  Pulse 71  Temp 97.8 °F (36.6 °C)  Resp 19  
 Ht 6' 1.75\" (1.873 m)  Wt 130 kg (286 lb 9.6 oz)  SpO2 97%  Breastfeeding No  
 BMI 37.05 kg/m2 Intake/Output Summary (Last 24 hours) at 07/14/18 1011 Last data filed at 07/13/18 2100 Gross per 24 hour Intake              240 ml Output             2000 ml Net            -1760 ml Physical Examination:  
     
Constitutional:  No acute distress, cooperative, pleasant   
ENT:  Oral mucous moist, oropharynx benign. Neck supple, Resp:  CTA bilaterally. No wheezing/rhonchi/rales. No accessory muscle use CV:  Regular rhythm, normal rate, no murmurs, gallops, rubs GI:  Soft, non distended, non tender. normoactive bowel sounds, no hepatosplenomegaly Musculoskeletal:  Bilateral foot dressed, wound vacuum on right heel Neurologic:  Moves all extremities. AAOx3, CN II-XII reviewed Skin:  Left sacral wound, left open abrasion, left lateral ankle wound dressed Data Review:  
 
Telemetry ECG Xray CT scan MRI Echocardiogram   
Ultrasound I have reviewed the flow sheet and recent notes New labs and data personally reviewed. Labs:  
 
Recent Labs  
   07/14/18 
 0547  07/13/18 
 0320 WBC  10.6  12.3* HGB  8.8*  8.7* HCT  29.7*  29.7*  
PLT  308  305 Recent Labs  
   07/14/18 
 0547  07/13/18 
 0320  07/12/18 
 9004 NA  129*  128*  131* K  4.4  4.8  4.2 CL  94*  91*  94* CO2  26  24  26 BUN  31*  44*  34* CREA  4.34*  5.90*  4.56* GLU  60*  94  192* CA  7.9*  7.4*  7.2*  
PHOS  4.1  4.6  4.1 Recent Labs  
   07/14/18 
 0547  07/13/18 
 0320  07/12/18 
 0533 ALB  2.0*  1.9*  2.2* No results for input(s): INR, PTP, APTT in the last 72 hours. No lab exists for component: INREXT, INREXT No results for input(s): FE, TIBC, PSAT, FERR in the last 72 hours. No results found for: FOL, RBCF No results for input(s): PH, PCO2, PO2 in the last 72 hours. No results for input(s): CPK, CKNDX, TROIQ in the last 72 hours.  
 
No lab exists for component: CPKMB Lab Results Component Value Date/Time Cholesterol, total 144 04/10/2018 02:53 AM  
 HDL Cholesterol 66 04/10/2018 02:53 AM  
 LDL, calculated 67.6 04/10/2018 02:53 AM  
 Triglyceride 52 04/10/2018 02:53 AM  
 CHOL/HDL Ratio 2.2 04/10/2018 02:53 AM  
 
Lab Results Component Value Date/Time Glucose (POC) 84 07/14/2018 06:43 AM  
 Glucose (POC) 168 (H) 07/13/2018 10:23 PM  
 Glucose (POC) 201 (H) 07/13/2018 11:52 AM  
 Glucose (POC) 121 (H) 07/13/2018 06:00 AM  
 Glucose (POC) 105 (H) 07/12/2018 09:05 PM  
 
Lab Results Component Value Date/Time Color YELLOW 10/27/2012 10:25 PM  
 Appearance CLEAR 10/27/2012 10:25 PM  
 Specific gravity 1.012 10/27/2012 10:25 PM  
 Specific gravity 1.015 08/30/2010 07:10 AM  
 pH (UA) 8.0 10/27/2012 10:25 PM  
 Protein 100 (A) 10/27/2012 10:25 PM  
 Glucose NEGATIVE  10/27/2012 10:25 PM  
 Ketone NEGATIVE  10/27/2012 10:25 PM  
 Bilirubin NEGATIVE  10/27/2012 10:25 PM  
 Urobilinogen 1.0 10/27/2012 10:25 PM  
 Nitrites NEGATIVE  10/27/2012 10:25 PM  
 Leukocyte Esterase TRACE (A) 10/27/2012 10:25 PM  
 Epithelial cells 0-5 10/27/2012 10:25 PM  
 Bacteria NEGATIVE  10/27/2012 10:25 PM  
 WBC 10-20 10/27/2012 10:25 PM  
 RBC 5-10 10/27/2012 10:25 PM  
 
 
 
Medications Reviewed:  
 
Current Facility-Administered Medications Medication Dose Route Frequency  epoetin joi (EPOGEN;PROCRIT) injection 20,000 Units  20,000 Units SubCUTAneous Q MON, WED & FRI  fentaNYL citrate (PF) injection 50 mcg  50 mcg IntraVENous Q2H PRN  
 oxyCODONE-acetaminophen (PERCOCET) 5-325 mg per tablet 1-2 Tab  1-2 Tab Oral Q4H PRN  
 ondansetron (ZOFRAN) injection 4 mg  4 mg IntraVENous Q6H PRN  pantoprazole (PROTONIX) tablet 40 mg  40 mg Oral ACB&D  
 alum-mag hydroxide-simeth (MYLANTA) oral suspension 30 mL  30 mL Oral Q4H PRN  
 insulin glargine (LANTUS) injection 25 Units  25 Units SubCUTAneous QHS  insulin lispro (HUMALOG) injection 3 Units  3 Units SubCUTAneous TIDAC  aztreonam (AZACTAM) 500 mg in 0.9% sodium chloride 100 mL IVPB  500 mg IntraVENous Q12H  
 vancomycin (VANCOCIN) 1,000 mg in 0.9% sodium chloride (MBP/ADV) 250 mL  1,000 mg IntraVENous DIALYSIS MON, WED & FRI  carvedilol (COREG) tablet 12.5 mg  12.5 mg Oral BID WITH MEALS  cinacalcet (SENSIPAR) tablet 30 mg  30 mg Oral DAILY  ascorbic acid (vitamin C) (VITAMIN C) tablet 1,000 mg  1,000 mg Oral DAILY  aspirin chewable tablet 81 mg  81 mg Oral DAILY  B complex-vitaminC-folic acid (NEPHROCAP) cap  1 Cap Oral DAILY  FLUoxetine (PROzac) capsule 20 mg  20 mg Oral DAILY  gabapentin (NEURONTIN) capsule 100 mg  100 mg Oral BID  latanoprost (XALATAN) 0.005 % ophthalmic solution 1 Drop  1 Drop Both Eyes QHS  midodrine (PROAMITINE) tablet 5 mg  5 mg Oral Once per day on Mon Wed Fri  polyethylene glycol (MIRALAX) packet 17 g  17 g Oral BID PRN  
 sevelamer carbonate (RENVELA) tab 800 mg  800 mg Oral TID WITH MEALS  simvastatin (ZOCOR) tablet 20 mg  20 mg Oral QHS  sodium chloride (NS) flush 5-10 mL  5-10 mL IntraVENous Q8H  
 sodium chloride (NS) flush 5-10 mL  5-10 mL IntraVENous PRN  
 insulin lispro (HUMALOG) injection   SubCUTAneous AC&HS  
 glucose chewable tablet 16 g  4 Tab Oral PRN  
 dextrose (D50W) injection syrg 12.5-25 g  12.5-25 g IntraVENous PRN  
 glucagon (GLUCAGEN) injection 1 mg  1 mg IntraMUSCular PRN  
 nortriptyline (PAMELOR) capsule 50 mg  50 mg Oral QHS  Vancomycin- pharmacy to dose   Other Rx Dosing/Monitoring  
 
______________________________________________________________________ EXPECTED LENGTH OF STAY: 3d 19h ACTUAL LENGTH OF STAY:          8 Adrian Tran MD

## 2018-07-14 NOTE — PROGRESS NOTES
Bedside and Verbal shift change report given to Jazmine Paula (oncoming nurse) by Corin Swan (offgoing nurse). Report included the following information SBAR, Kardex, Intake/Output and MAR.

## 2018-07-15 LAB
ALBUMIN SERPL-MCNC: 1.8 G/DL (ref 3.5–5)
ANION GAP SERPL CALC-SCNC: 11 MMOL/L (ref 5–15)
BACTERIA SPEC CULT: ABNORMAL
BACTERIA SPEC CULT: NORMAL
BUN SERPL-MCNC: 41 MG/DL (ref 6–20)
BUN/CREAT SERPL: 7 (ref 12–20)
CALCIUM SERPL-MCNC: 7.7 MG/DL (ref 8.5–10.1)
CHLORIDE SERPL-SCNC: 92 MMOL/L (ref 97–108)
CO2 SERPL-SCNC: 23 MMOL/L (ref 21–32)
CREAT SERPL-MCNC: 5.56 MG/DL (ref 0.55–1.02)
GLUCOSE BLD STRIP.AUTO-MCNC: 102 MG/DL (ref 65–100)
GLUCOSE BLD STRIP.AUTO-MCNC: 112 MG/DL (ref 65–100)
GLUCOSE BLD STRIP.AUTO-MCNC: 116 MG/DL (ref 65–100)
GLUCOSE BLD STRIP.AUTO-MCNC: 84 MG/DL (ref 65–100)
GLUCOSE BLD STRIP.AUTO-MCNC: 90 MG/DL (ref 65–100)
GLUCOSE SERPL-MCNC: 103 MG/DL (ref 65–100)
GRAM STN SPEC: ABNORMAL
GRAM STN SPEC: ABNORMAL
PHOSPHATE SERPL-MCNC: 4.2 MG/DL (ref 2.6–4.7)
POTASSIUM SERPL-SCNC: 4.8 MMOL/L (ref 3.5–5.1)
SERVICE CMNT-IMP: ABNORMAL
SERVICE CMNT-IMP: NORMAL
SODIUM SERPL-SCNC: 126 MMOL/L (ref 136–145)

## 2018-07-15 PROCEDURE — 74011000258 HC RX REV CODE- 258: Performed by: INTERNAL MEDICINE

## 2018-07-15 PROCEDURE — 80069 RENAL FUNCTION PANEL: CPT | Performed by: PODIATRIST

## 2018-07-15 PROCEDURE — 94660 CPAP INITIATION&MGMT: CPT

## 2018-07-15 PROCEDURE — 74011250637 HC RX REV CODE- 250/637: Performed by: FAMILY MEDICINE

## 2018-07-15 PROCEDURE — 74011250637 HC RX REV CODE- 250/637: Performed by: HOSPITALIST

## 2018-07-15 PROCEDURE — 74011250637 HC RX REV CODE- 250/637: Performed by: INTERNAL MEDICINE

## 2018-07-15 PROCEDURE — 65270000032 HC RM SEMIPRIVATE

## 2018-07-15 PROCEDURE — 90935 HEMODIALYSIS ONE EVALUATION: CPT

## 2018-07-15 PROCEDURE — 74011636637 HC RX REV CODE- 636/637: Performed by: HOSPITALIST

## 2018-07-15 PROCEDURE — 74011250636 HC RX REV CODE- 250/636: Performed by: INTERNAL MEDICINE

## 2018-07-15 PROCEDURE — P9047 ALBUMIN (HUMAN), 25%, 50ML: HCPCS | Performed by: INTERNAL MEDICINE

## 2018-07-15 PROCEDURE — 3331090002 HH PPS REVENUE DEBIT

## 2018-07-15 PROCEDURE — 3331090001 HH PPS REVENUE CREDIT

## 2018-07-15 PROCEDURE — 94760 N-INVAS EAR/PLS OXIMETRY 1: CPT

## 2018-07-15 PROCEDURE — 82962 GLUCOSE BLOOD TEST: CPT

## 2018-07-15 PROCEDURE — 36415 COLL VENOUS BLD VENIPUNCTURE: CPT | Performed by: PODIATRIST

## 2018-07-15 PROCEDURE — 74011000250 HC RX REV CODE- 250: Performed by: INTERNAL MEDICINE

## 2018-07-15 PROCEDURE — 74011250636 HC RX REV CODE- 250/636: Performed by: HOSPITALIST

## 2018-07-15 RX ORDER — HEPARIN SODIUM 1000 [USP'U]/ML
2000 INJECTION, SOLUTION INTRAVENOUS; SUBCUTANEOUS
Status: DISCONTINUED | OUTPATIENT
Start: 2018-07-15 | End: 2018-07-19 | Stop reason: HOSPADM

## 2018-07-15 RX ORDER — DOCUSATE SODIUM 100 MG/1
100 CAPSULE, LIQUID FILLED ORAL 2 TIMES DAILY
Status: DISCONTINUED | OUTPATIENT
Start: 2018-07-15 | End: 2018-07-19 | Stop reason: HOSPADM

## 2018-07-15 RX ORDER — ALBUMIN HUMAN 250 G/1000ML
50 SOLUTION INTRAVENOUS
Status: DISCONTINUED | OUTPATIENT
Start: 2018-07-15 | End: 2018-07-19 | Stop reason: HOSPADM

## 2018-07-15 RX ORDER — MIDODRINE HYDROCHLORIDE 5 MG/1
5 TABLET ORAL ONCE
Status: COMPLETED | OUTPATIENT
Start: 2018-07-15 | End: 2018-07-15

## 2018-07-15 RX ORDER — POLYETHYLENE GLYCOL 3350 17 G/17G
17 POWDER, FOR SOLUTION ORAL DAILY
Status: DISCONTINUED | OUTPATIENT
Start: 2018-07-15 | End: 2018-07-19 | Stop reason: HOSPADM

## 2018-07-15 RX ADMIN — Medication 10 ML: at 14:00

## 2018-07-15 RX ADMIN — PANTOPRAZOLE SODIUM 40 MG: 40 TABLET, DELAYED RELEASE ORAL at 06:38

## 2018-07-15 RX ADMIN — AZTREONAM 500 MG: 1 INJECTION, POWDER, LYOPHILIZED, FOR SOLUTION INTRAMUSCULAR; INTRAVENOUS at 17:50

## 2018-07-15 RX ADMIN — NORTRIPTYLINE HYDROCHLORIDE 50 MG: 25 CAPSULE ORAL at 21:56

## 2018-07-15 RX ADMIN — AZTREONAM 500 MG: 1 INJECTION, POWDER, LYOPHILIZED, FOR SOLUTION INTRAMUSCULAR; INTRAVENOUS at 05:24

## 2018-07-15 RX ADMIN — INSULIN LISPRO 3 UNITS: 100 INJECTION, SOLUTION INTRAVENOUS; SUBCUTANEOUS at 11:30

## 2018-07-15 RX ADMIN — FENTANYL CITRATE 50 MCG: 50 INJECTION, SOLUTION INTRAMUSCULAR; INTRAVENOUS at 05:25

## 2018-07-15 RX ADMIN — FENTANYL CITRATE 50 MCG: 50 INJECTION, SOLUTION INTRAMUSCULAR; INTRAVENOUS at 21:57

## 2018-07-15 RX ADMIN — FENTANYL CITRATE 50 MCG: 50 INJECTION, SOLUTION INTRAMUSCULAR; INTRAVENOUS at 01:27

## 2018-07-15 RX ADMIN — FLUOXETINE 20 MG: 20 CAPSULE ORAL at 08:43

## 2018-07-15 RX ADMIN — POLYETHYLENE GLYCOL 3350 17 G: 17 POWDER, FOR SOLUTION ORAL at 12:36

## 2018-07-15 RX ADMIN — ASCORBIC ACID, THIAMINE MONONITRATE,RIBOFLAVIN, NIACINAMIDE, PYRIDOXINE HYDROCHLORIDE, FOLIC ACID, CYANOCOBALAMIN, BIOTIN, CALCIUM PANTOTHENATE, 1 CAPSULE: 100; 1.5; 1.7; 20; 10; 1; 6000; 150000; 5 CAPSULE, LIQUID FILLED ORAL at 08:42

## 2018-07-15 RX ADMIN — GABAPENTIN 100 MG: 100 CAPSULE ORAL at 21:57

## 2018-07-15 RX ADMIN — SEVELAMER CARBONATE 800 MG: 800 TABLET, FILM COATED ORAL at 12:36

## 2018-07-15 RX ADMIN — PANTOPRAZOLE SODIUM 40 MG: 40 TABLET, DELAYED RELEASE ORAL at 17:50

## 2018-07-15 RX ADMIN — SEVELAMER CARBONATE 800 MG: 800 TABLET, FILM COATED ORAL at 06:38

## 2018-07-15 RX ADMIN — OXYCODONE AND ACETAMINOPHEN 2 TABLET: 5; 325 TABLET ORAL at 13:55

## 2018-07-15 RX ADMIN — HEPARIN SODIUM 2000 UNITS: 1000 INJECTION, SOLUTION INTRAVENOUS; SUBCUTANEOUS at 08:51

## 2018-07-15 RX ADMIN — INSULIN LISPRO 3 UNITS: 100 INJECTION, SOLUTION INTRAVENOUS; SUBCUTANEOUS at 17:49

## 2018-07-15 RX ADMIN — Medication 10 ML: at 05:24

## 2018-07-15 RX ADMIN — GABAPENTIN 100 MG: 100 CAPSULE ORAL at 06:38

## 2018-07-15 RX ADMIN — DOCUSATE SODIUM 100 MG: 100 CAPSULE, LIQUID FILLED ORAL at 12:36

## 2018-07-15 RX ADMIN — SEVELAMER CARBONATE 800 MG: 800 TABLET, FILM COATED ORAL at 17:50

## 2018-07-15 RX ADMIN — CINACALCET HYDROCHLORIDE 30 MG: 30 TABLET, COATED ORAL at 21:57

## 2018-07-15 RX ADMIN — Medication 10 ML: at 21:58

## 2018-07-15 RX ADMIN — INSULIN GLARGINE 25 UNITS: 100 INJECTION, SOLUTION SUBCUTANEOUS at 21:56

## 2018-07-15 RX ADMIN — LATANOPROST 1 DROP: 50 SOLUTION OPHTHALMIC at 21:58

## 2018-07-15 RX ADMIN — SIMVASTATIN 20 MG: 20 TABLET, FILM COATED ORAL at 22:00

## 2018-07-15 RX ADMIN — OXYCODONE AND ACETAMINOPHEN 2 TABLET: 5; 325 TABLET ORAL at 08:43

## 2018-07-15 RX ADMIN — MIDODRINE HYDROCHLORIDE 5 MG: 5 TABLET ORAL at 08:43

## 2018-07-15 RX ADMIN — ALBUMIN (HUMAN) 50 G: 0.25 INJECTION, SOLUTION INTRAVENOUS at 08:51

## 2018-07-15 RX ADMIN — DOCUSATE SODIUM 100 MG: 100 CAPSULE, LIQUID FILLED ORAL at 17:50

## 2018-07-15 RX ADMIN — OXYCODONE HYDROCHLORIDE AND ACETAMINOPHEN 1000 MG: 500 TABLET ORAL at 08:43

## 2018-07-15 RX ADMIN — ASPIRIN 81 MG 81 MG: 81 TABLET ORAL at 08:43

## 2018-07-15 NOTE — PROGRESS NOTES
[facility] 
 [address] [facility], [facility] [address] [facility] Phone: [phone]  
 
Nephrology Progress Note Lucía Vergara     1950     [id number] Date of Admission : 7/6/2018 
07/15/18 CC: Follow up for ESRD Assessment and Plan Diabetic Foot wound w/ infection - s/p Debridement Hypervolemic Hyponatremia - UF today  
  
ESRD - MWF dialysis 
- HD today  
  
Hypotension  
- hold all BP meds 
- midodrine prior to HD 
 
DM 
  
Obesity, FARHAT 
  
Dyslipidemia 
  
Possible steal syndrome right hand Anemia of CKD 
-On epogen  
   
 
Interval History: 
Seen and examined on dialysis Na at 126 today BP low, got a dose of coreg last evening Denies any N/V/CP. SOB Review of Systems: A comprehensive review of systems was negative. Current Medications:  
Current Facility-Administered Medications Medication Dose Route Frequency  heparin (porcine) 1,000 unit/mL injection 2,000 Units  2,000 Units Hemodialysis DIALYSIS PRN  
 albumin human 25% (BUMINATE) solution 50 g  50 g IntraVENous DIALYSIS PRN  
 [START ON 7/16/2018] Vancomycin pre-HD level due 7/16 @ 0600. Thanks! Other ONCE  
 epoetin joi (EPOGEN;PROCRIT) injection 20,000 Units  20,000 Units SubCUTAneous Q MON, WED & FRI  fentaNYL citrate (PF) injection 50 mcg  50 mcg IntraVENous Q2H PRN  
 oxyCODONE-acetaminophen (PERCOCET) 5-325 mg per tablet 1-2 Tab  1-2 Tab Oral Q4H PRN  
 ondansetron (ZOFRAN) injection 4 mg  4 mg IntraVENous Q6H PRN  pantoprazole (PROTONIX) tablet 40 mg  40 mg Oral ACB&D  
 alum-mag hydroxide-simeth (MYLANTA) oral suspension 30 mL  30 mL Oral Q4H PRN  
 insulin glargine (LANTUS) injection 25 Units  25 Units SubCUTAneous QHS  insulin lispro (HUMALOG) injection 3 Units  3 Units SubCUTAneous TIDAC  aztreonam (AZACTAM) 500 mg in 0.9% sodium chloride 100 mL IVPB  500 mg IntraVENous Q12H  
 vancomycin (VANCOCIN) 1,000 mg in 0.9% sodium chloride (MBP/ADV) 250 mL  1,000 mg IntraVENous DIALYSIS MON, WED & FRI  carvedilol (COREG) tablet 12.5 mg  12.5 mg Oral BID WITH MEALS  cinacalcet (SENSIPAR) tablet 30 mg  30 mg Oral DAILY  ascorbic acid (vitamin C) (VITAMIN C) tablet 1,000 mg  1,000 mg Oral DAILY  aspirin chewable tablet 81 mg  81 mg Oral DAILY  B complex-vitaminC-folic acid (NEPHROCAP) cap  1 Cap Oral DAILY  FLUoxetine (PROzac) capsule 20 mg  20 mg Oral DAILY  gabapentin (NEURONTIN) capsule 100 mg  100 mg Oral BID  latanoprost (XALATAN) 0.005 % ophthalmic solution 1 Drop  1 Drop Both Eyes QHS  midodrine (PROAMITINE) tablet 5 mg  5 mg Oral Once per day on Mon Wed Fri  polyethylene glycol (MIRALAX) packet 17 g  17 g Oral BID PRN  
 sevelamer carbonate (RENVELA) tab 800 mg  800 mg Oral TID WITH MEALS  simvastatin (ZOCOR) tablet 20 mg  20 mg Oral QHS  sodium chloride (NS) flush 5-10 mL  5-10 mL IntraVENous Q8H  
 sodium chloride (NS) flush 5-10 mL  5-10 mL IntraVENous PRN  
 insulin lispro (HUMALOG) injection   SubCUTAneous AC&HS  
 glucose chewable tablet 16 g  4 Tab Oral PRN  
 dextrose (D50W) injection syrg 12.5-25 g  12.5-25 g IntraVENous PRN  
 glucagon (GLUCAGEN) injection 1 mg  1 mg IntraMUSCular PRN  
 nortriptyline (PAMELOR) capsule 50 mg  50 mg Oral QHS  Vancomycin- pharmacy to dose   Other Rx Dosing/Monitoring Allergies Allergen Reactions  Latex Itching Rash, sometimes difficult to breathe  Celebrex [Celecoxib] Anaphylaxis and Swelling TONGUE. LIPS AND EYES  
 Iodine Other (comments) IV CONTRAST-LESIONS, AND SKIN SLUFFED OFF  
 Keflex [Cephalexin] Anaphylaxis and Swelling Tongue, lips, and eyes  Levaquin [Levofloxacin] Anaphylaxis and Swelling Tongue, lips, and eyes  Pcn [Penicillins] Anaphylaxis and Swelling Tongue, lips and eyes Objective: 
Vitals:   
Vitals:  
 07/15/18 6568 07/15/18 0932 07/15/18 8379 07/15/18 1006 BP: (!) 84/48 99/49 (!) 87/53 92/53 Pulse: 60 60 60 60 Resp: 18 18 18 18 Temp:      
TempSrc:      
SpO2:      
Weight:      
Height:      
 
Intake and Output: 
  
07/13 1901 - 07/15 0700 In: 360 [P.O.:360] Out: 150 [Drains:150] Physical Examination: 
General:Alert, No distress, HEENT:  pale Neck:RIJ permacath Lungs : Clears to auscultation Bilaterally, Normal respiratory effort CVS: RRR, S1 S2 normal, No rub, legs bandaged Abdomen: Soft, Non tender Extremities: No cyanosis, No clubbing; good thrill LUE AV graft MS: No joint swelling, erythema, warmth Neurologic: non focal, AAO x 3 Psych: normal affect 
 
[]    High complexity decision making was performed 
[]    Patient is at high-risk of decompensation with multiple organ involvement Lab Data Personally Reviewed: I have reviewed all the pertinent labs, microbiology data and radiology studies during assessment. Recent Labs  
   07/15/18 
 0533  07/14/18 
 0547  07/13/18 
 0320 NA  126*  129*  128* K  4.8  4.4  4.8  
CL  92*  94*  91* CO2  23  26  24 GLU  103*  60*  94 BUN  41*  31*  44* CREA  5.56*  4.34*  5.90* CA  7.7*  7.9*  7.4* PHOS  4.2  4.1  4.6 ALB  1.8*  2.0*  1.9* Recent Labs  
   07/14/18 
 0547  07/13/18 
 0320 WBC  10.6  12.3* HGB  8.8*  8.7* HCT  29.7*  29.7*  
PLT  308  305 Lab Results Component Value Date/Time Specimen Description: URINE 10/27/2012 08:15 PM  
 Specimen Description: BLOOD 06/07/2011 07:40 AM  
 Specimen Description: URINE 08/30/2010 07:10 AM  
 Specimen Description: URINE 08/09/2010 11:30 AM  
 Specimen Description: URINE 06/22/2010 11:50 PM  
 
Lab Results Component Value Date/Time  Culture result: NO ANAEROBES ISOLATED SO FAR 07/12/2018 08:00 AM  
 Culture result: HEAVY PROTEUS MIRABILIS (A) 07/12/2018 08:00 AM  
 Culture result: CHECKING FOR POSSIBLE LIGHT 2ND GRAM NEGATIVE ANNE-MARIE (A) 07/12/2018 08:00 AM  
 Culture result: LIGHT POSSIBLE STAPHYLOCOCCUS SPECIES (A) 07/12/2018 08:00 AM  
 Culture result: LIGHT POSSIBLE ENTEROCOCCUS SPECIES (A) 07/12/2018 08:00 AM  
 Culture result: (A) 07/12/2018 08:00 AM  
  CHECKING FOR POSSIBLE 
LIGHT 
3RD GRAM NEGATIVE ANNE-MARIE Culture result: MODERATE PROTEUS MIRABILIS (A) 07/12/2018 08:00 AM  
 Culture result: LIGHT PROBABLE 2ND GRAM NEGATIVE ANNE-MARIE (A) 07/12/2018 08:00 AM  
 Culture result: FEW PSEUDOMONAS AERUGINOSA (A) 07/12/2018 08:00 AM  
 Culture result: LIGHT POSSIBLE ENTEROCOCCUS SPECIES (A) 07/12/2018 08:00 AM  
 Culture result: (A) 07/12/2018 08:00 AM  
  LIGHT STAPHYLOCOCCUS SPECIES, COAGULASE NEGATIVE (2 DIFFERENT COLONY TYPES/STRAINS) Recent Results (from the past 24 hour(s)) GLUCOSE, POC Collection Time: 07/14/18 11:02 AM  
Result Value Ref Range Glucose (POC) 225 (H) 65 - 100 mg/dL Performed by Leland ROBERT, POC Collection Time: 07/14/18  4:16 PM  
Result Value Ref Range Glucose (POC) 132 (H) 65 - 100 mg/dL Performed by Ilda Leblanc, POC Collection Time: 07/14/18  9:21 PM  
Result Value Ref Range Glucose (POC) 144 (H) 65 - 100 mg/dL Performed by Concha Lee RENAL FUNCTION PANEL Collection Time: 07/15/18  5:33 AM  
Result Value Ref Range Sodium 126 (L) 136 - 145 mmol/L Potassium 4.8 3.5 - 5.1 mmol/L Chloride 92 (L) 97 - 108 mmol/L  
 CO2 23 21 - 32 mmol/L Anion gap 11 5 - 15 mmol/L Glucose 103 (H) 65 - 100 mg/dL BUN 41 (H) 6 - 20 MG/DL Creatinine 5.56 (H) 0.55 - 1.02 MG/DL  
 BUN/Creatinine ratio 7 (L) 12 - 20 GFR est AA 9 (L) >60 ml/min/1.73m2 GFR est non-AA 8 (L) >60 ml/min/1.73m2 Calcium 7.7 (L) 8.5 - 10.1 MG/DL Phosphorus 4.2 2.6 - 4.7 MG/DL Albumin 1.8 (L) 3.5 - 5.0 g/dL GLUCOSE, POC Collection Time: 07/15/18  6:12 AM  
Result Value Ref Range Glucose (POC) 112 (H) 65 - 100 mg/dL Performed by Tami Hendrix I have reviewed the flowsheets. Chart and Pertinent Notes have been reviewed.   
No change in PMH ,family and social history from Consult note.  
 
 
Leonardo Gibson MD

## 2018-07-15 NOTE — PROGRESS NOTES
Problem: Falls - Risk of  Goal: *Absence of Falls  Document Rodrick Fall Risk and appropriate interventions in the flowsheet.    Outcome: Progressing Towards Goal  Fall Risk Interventions:  Mobility Interventions: Bed/chair exit alarm, Strengthening exercises (ROM-active/passive)         Medication Interventions: Bed/chair exit alarm, Evaluate medications/consider consulting pharmacy    Elimination Interventions: Call light in reach, Toileting schedule/hourly rounds

## 2018-07-15 NOTE — PROGRESS NOTES
Bedside shift change report given to [address] [place] [address] (oncoming nurse) by Jorge Gill RN (offgoing nurse). Report included the following information SBAR and Kardex.

## 2018-07-15 NOTE — PROGRESS NOTES
Bedside and Verbal shift change report given to Juarez (oncoming nurse) by Vanesa Jimenez (offgoing nurse). Report included the following information SBAR, Kardex, Intake/Output and MAR.

## 2018-07-15 NOTE — PROGRESS NOTES
Hospitalist Progress Note Wende Dakin, MD 
Answering service: [phone] OR [phone] from in house phone Date of Service:  7/15/2018 NAME:  Lior Ordonez :  1950 MRN:  [id number] Admission Summary:  
Lior Ordonez is a 70 y.o.  female with past medical history of anemia, arthritis, asthma, anxiety, ESRD, chronic pain, depression, type 2 diabetes mellitus (DM), GERD, glaucoma, hypertension, hyperlipidemia, IBS, migraine, morbid obesity, sleep apnea presented to the ED from home with chief complaint of leg swelling. Interval history / Subjective:  
Ms. Hawa Mcguire is frustrated. She tried to get up with PT yesterday but ended up putting some weight on her right foot and had some blood in the wound vac. The foot is  even elevated. No fever. Assessment & Plan:  
 
Acute bilateral foot cellulitis with acute on chronic right leg wound infection wound (POA) - seems to be improving - MRI of right foot  shows large heel ulcer with underlying soft tissue gas that extends nearly to the cortex of the underlying calcaneus. No fluid collection or osteo. - LE PVR  normal but with calcifications - RLE Arteriogram 6/10 without significant stenosis - Debridement of the calcaneus  
- Bcx  no growth - Wound cx  with proteus, klebsiella, pseudomonas, enterococcus, coag negative staph, MRSA - Continue on IV vancomycin, aztreonam 
- Vascular surgeon, ID, Podiatrist consulted Right hand numbness/tingling with possible steal syndrome - seems like possibly some peripheral neuropathy, carpel tunnel? - Upper extremity PVR 7/10 with no evidence of hemodynamically significant right or left upper extremity arterial obstruction. No evidence of steal by the AV graft. - Fixing the venous flow might exacerbate any steal 
- Outpatient EMG to eval for neuropathy Hyponatremia - sodium still low - I agree with fluid restriction ESRD (chronic) - Continue HD 
- Continue on sensipar, renvela,  sodium bicarbonate - Upper extremity PVR 7/10 with patent right distal brachial artery to proximal brachial vein AV graft with evidence of a hemodynamically significant stenosis at the venous anastomosis - Nephrologist consulted Anemia of chronic disease - H/H stable HTN - BP normal, continue on coreg, monitor BP 
 
DM-II - recent A1c 9.1, BG up after steroids - Continue lantus - Added standing with-meal insulin - Sliding scale and monitor finger stick glucose Elevated liver enzyme - multifactorial, improved Hx of asthma - stable, nebs PRN Hx of anxiety, depression - stable, continue on home prozac, nortriptyline Left sacral wound, left open abrasion, left lateral ankle wound POA 
- Continue wound care per wound care nurse Morbid obesity - Body mass index is 31.26 kg/(m^2). -diet and weight loss program  
 
Hx of sleep apnea - CPAP q hs Code status: Full Code DVT prophylaxis: SCD Care Plan discussed with: Patient/Family and Nurse Disposition: to SNF, likely Tuesday after wound vac change Hospital Problems  Date Reviewed: 7/6/2018 Codes Class Noted POA Anemia ICD-10-CM: D64.9 ICD-9-CM: 285.9  7/6/2018 Unknown [facility] ICD-10-CM: N18.6 ICD-9-CM: 585.6  7/6/2018 Unknown Bilateral leg edema ICD-10-CM: R60.0 ICD-9-CM: 782.3  7/6/2018 Unknown LFT elevation ICD-10-CM: R79.89 ICD-9-CM: 790.6  7/6/2018 Unknown Type 2 diabetes mellitus with hyperglycemia (HCC) ICD-10-CM: E11.65 ICD-9-CM: 250.00  7/6/2018 Unknown * (Principal)Bilateral lower leg cellulitis ICD-10-CM: L03.116, B65.076 ICD-9-CM: 682.6  7/6/2018 Unknown Vital Signs:  
 Last 24hrs VS reviewed since prior progress note. Most recent are: 
Visit Vitals  BP 99/55  Pulse 63  Temp 97.8 °F (36.6 °C)  Resp 18  Ht 6' 1.75\" (1.873 m)  Wt 109.7 kg (241 lb 13.5 oz)  SpO2 97%  Breastfeeding No  
 BMI 31.26 kg/m2 Intake/Output Summary (Last 24 hours) at 07/15/18 7618 Last data filed at 07/14/18 2234 Gross per 24 hour Intake              120 ml Output              150 ml Net              -30 ml Physical Examination:  
     
Constitutional:  No acute distress, cooperative, pleasant   
ENT:  Oral mucous moist, oropharynx benign. Neck supple, Resp:  CTA bilaterally. No wheezing/rhonchi/rales. No accessory muscle use CV:  Regular rhythm, normal rate, no murmurs, gallops, rubs GI:  Soft, non distended, non tender. normoactive bowel sounds, no hepatosplenomegaly Musculoskeletal:  Bilateral foot dressed, wound vacuum on right heel with a little serosanguinous fluid in the vac tubing Neurologic:  Moves all extremities. AAOx3, CN II-XII reviewed Skin:  Left sacral wound, left open abrasion, left lateral ankle wound dressed Data Review:  
 
Telemetry ECG Xray CT scan MRI Echocardiogram   
Ultrasound I have reviewed the flow sheet and recent notes New labs and data personally reviewed. Labs:  
 
Recent Labs  
   07/14/18 
 0547  07/13/18 
 0320 WBC  10.6  12.3* HGB  8.8*  8.7* HCT  29.7*  29.7*  
PLT  308  305 Recent Labs  
   07/15/18 
 0533  07/14/18 
 0547  07/13/18 
 0320 NA  126*  129*  128* K  4.8  4.4  4.8  
CL  92*  94*  91* CO2  23  26  24 BUN  41*  31*  44* CREA  5.56*  4.34*  5.90* GLU  103*  60*  94  
CA  7.7*  7.9*  7.4* PHOS  4.2  4.1  4.6 Recent Labs  
   07/15/18 
 0533  07/14/18 
 0547  07/13/18 
 0320 ALB  1.8*  2.0*  1.9* No results for input(s): INR, PTP, APTT in the last 72 hours. No lab exists for component: INREXT, INREXT No results for input(s): FE, TIBC, PSAT, FERR in the last 72 hours. No results found for: FOL, RBCF No results for input(s): PH, PCO2, PO2 in the last 72 hours. No results for input(s): CPK, CKNDX, TROIQ in the last 72 hours. No lab exists for component: CPKMB Lab Results Component Value Date/Time Cholesterol, total 144 04/10/2018 02:53 AM  
 HDL Cholesterol 66 04/10/2018 02:53 AM  
 LDL, calculated 67.6 04/10/2018 02:53 AM  
 Triglyceride 52 04/10/2018 02:53 AM  
 CHOL/HDL Ratio 2.2 04/10/2018 02:53 AM  
 
Lab Results Component Value Date/Time Glucose (POC) 112 (H) 07/15/2018 06:12 AM  
 Glucose (POC) 144 (H) 07/14/2018 09:21 PM  
 Glucose (POC) 132 (H) 07/14/2018 04:16 PM  
 Glucose (POC) 225 (H) 07/14/2018 11:02 AM  
 Glucose (POC) 84 07/14/2018 06:43 AM  
 
Lab Results Component Value Date/Time Color YELLOW 10/27/2012 10:25 PM  
 Appearance CLEAR 10/27/2012 10:25 PM  
 Specific gravity 1.012 10/27/2012 10:25 PM  
 Specific gravity 1.015 08/30/2010 07:10 AM  
 pH (UA) 8.0 10/27/2012 10:25 PM  
 Protein 100 (A) 10/27/2012 10:25 PM  
 Glucose NEGATIVE  10/27/2012 10:25 PM  
 Ketone NEGATIVE  10/27/2012 10:25 PM  
 Bilirubin NEGATIVE  10/27/2012 10:25 PM  
 Urobilinogen 1.0 10/27/2012 10:25 PM  
 Nitrites NEGATIVE  10/27/2012 10:25 PM  
 Leukocyte Esterase TRACE (A) 10/27/2012 10:25 PM  
 Epithelial cells 0-5 10/27/2012 10:25 PM  
 Bacteria NEGATIVE  10/27/2012 10:25 PM  
 WBC 10-20 10/27/2012 10:25 PM  
 RBC 5-10 10/27/2012 10:25 PM  
 
 
 
Medications Reviewed:  
 
Current Facility-Administered Medications Medication Dose Route Frequency  heparin (porcine) 1,000 unit/mL injection 2,000 Units  2,000 Units Hemodialysis DIALYSIS PRN  
 albumin human 25% (BUMINATE) solution 50 g  50 g IntraVENous DIALYSIS PRN  
 [START ON 7/16/2018] Vancomycin pre-HD level due 7/16 @ 0600. Thanks! Other ONCE  
 epoetin joi (EPOGEN;PROCRIT) injection 20,000 Units  20,000 Units SubCUTAneous Q MON, WED & FRI  fentaNYL citrate (PF) injection 50 mcg  50 mcg IntraVENous Q2H PRN  
 oxyCODONE-acetaminophen (PERCOCET) 5-325 mg per tablet 1-2 Tab  1-2 Tab Oral Q4H PRN  
 ondansetron (ZOFRAN) injection 4 mg  4 mg IntraVENous Q6H PRN  pantoprazole (PROTONIX) tablet 40 mg  40 mg Oral ACB&D  
 alum-mag hydroxide-simeth (MYLANTA) oral suspension 30 mL  30 mL Oral Q4H PRN  
 insulin glargine (LANTUS) injection 25 Units  25 Units SubCUTAneous QHS  insulin lispro (HUMALOG) injection 3 Units  3 Units SubCUTAneous TIDAC  aztreonam (AZACTAM) 500 mg in 0.9% sodium chloride 100 mL IVPB  500 mg IntraVENous Q12H  
 vancomycin (VANCOCIN) 1,000 mg in 0.9% sodium chloride (MBP/ADV) 250 mL  1,000 mg IntraVENous DIALYSIS MON, WED & FRI  carvedilol (COREG) tablet 12.5 mg  12.5 mg Oral BID WITH MEALS  cinacalcet (SENSIPAR) tablet 30 mg  30 mg Oral DAILY  ascorbic acid (vitamin C) (VITAMIN C) tablet 1,000 mg  1,000 mg Oral DAILY  aspirin chewable tablet 81 mg  81 mg Oral DAILY  B complex-vitaminC-folic acid (NEPHROCAP) cap  1 Cap Oral DAILY  FLUoxetine (PROzac) capsule 20 mg  20 mg Oral DAILY  gabapentin (NEURONTIN) capsule 100 mg  100 mg Oral BID  latanoprost (XALATAN) 0.005 % ophthalmic solution 1 Drop  1 Drop Both Eyes QHS  midodrine (PROAMITINE) tablet 5 mg  5 mg Oral Once per day on Mon Wed Fri  polyethylene glycol (MIRALAX) packet 17 g  17 g Oral BID PRN  
 sevelamer carbonate (RENVELA) tab 800 mg  800 mg Oral TID WITH MEALS  simvastatin (ZOCOR) tablet 20 mg  20 mg Oral QHS  sodium chloride (NS) flush 5-10 mL  5-10 mL IntraVENous Q8H  
 sodium chloride (NS) flush 5-10 mL  5-10 mL IntraVENous PRN  
 insulin lispro (HUMALOG) injection   SubCUTAneous AC&HS  
 glucose chewable tablet 16 g  4 Tab Oral PRN  
 dextrose (D50W) injection syrg 12.5-25 g  12.5-25 g IntraVENous PRN  
 glucagon (GLUCAGEN) injection 1 mg  1 mg IntraMUSCular PRN  
 nortriptyline (PAMELOR) capsule 50 mg  50 mg Oral QHS  Vancomycin- pharmacy to dose   Other Rx Dosing/Monitoring  
 
______________________________________________________________________ EXPECTED LENGTH OF STAY: 3d 22h ACTUAL LENGTH OF STAY:          9 Omari Fox MD

## 2018-07-15 NOTE — PROCEDURES
[facility]  [phone]    Vitals   Pre   Post   Assessment   Pre   Post     Temp  Temp: 97.8 °F (36.6 °C) (07/15/18 0845)  97.5 LOC  Alert and oriented x4 but very forgetful Alert and oriented x4 but forgetful   HR   Pulse (Heart Rate): 67 (07/15/18 0845) 59 Lungs   Diminished on room florentin Diminished on room air   B/P   BP: 108/53 (07/15/18 0845) 107/53 Cardiac   B/p low, meds given     Resp   Resp Rate: 20 (07/15/18 0845) 18 Skin   intact intact   Pain level  0 0 Edema  +2/+3 in lower extr's     +2/+3 in lower ext's   Orders:    Duration:   Start:   4788 End:    1050 Total:   2hrs   Dialyzer:   Dialyzer/Set Up Inspection: Pauline Brandon (07/15/18 0845)   K Bath:   Dialysate K (mEq/L): 0 (Uf) (07/15/18 0845)   Ca Bath:   Dialysate CA (mEq/L):  (Uf) (07/15/18 0845)   Na/Bicarb:   Dialysate NA (mEq/L): 140 (07/15/18 0845)   Target Fluid Removal:   Goal/Amount of Fluid to Remove (mL): 2500 mL (07/15/18 0845)   Access     Type & Location:   Rt subclavian perma-cath, dressing clean dry and intact from 7/13. Ports cleaned, blood aspirated and lines flushed without any issues. No signs of infection noted.   Heparin bolus given at start   Labs     Obtained/Reviewed   Critical Results Called   Date when labs were drawn-  Hgb-    HGB   Date Value Ref Range Status   07/14/2018 8.8 (L) 11.5 - 16.0 g/dL Final     K-    Potassium   Date Value Ref Range Status   07/15/2018 4.8 3.5 - 5.1 mmol/L Final     Ca-   Calcium   Date Value Ref Range Status   07/15/2018 7.7 (L) 8.5 - 10.1 MG/DL Final     Bun-   BUN   Date Value Ref Range Status   07/15/2018 41 (H) 6 - 20 MG/DL Final     Creat-   Creatinine   Date Value Ref Range Status   07/15/2018 5.56 (H) 0.55 - 1.02 MG/DL Final        Medications/ Blood Products Given     Name   Dose   Route and Time     Heparin 2000units Bolus given at start   Albumin 12.5g Given at 0849,0922,0950,1025        Blood Volume Processed (BVP):    0 Net Fluid   Removed:  1.4kg Comments   Time Out Done: 0815  Primary Nurse Rpt Pre: Paige Seals  Primary Nurse Rpt Post:Jessenia Seals  Pt Education:ESRD, educated on the purpose of UF treatment and albumin administration to help with b/p and fluid removal  Care Plan:ESRD  Tx Summary:  56 Dr Gela Baltazar Paged for orders  Aguila Baltazar called to inform of low b/p and request for albumin and clarification of heparin order. Dr will order albumin, midodrine and heparin order is bolus at start of treatment to prevent clotting. Patient informed about the purpose of UF, albumin and midodrine  0849 Ultrafiltration started after pt received midodrine. Albumin 12.5g given at start of treatment to help with fluid removal  0922 another dose of albumin given with 50ml ns flush. Pt denies any distress b/p 85/48 p60  0950 another dose of albumin given with 50ml ns flush bp 87/53 p60, pt denies any distress at this time . Uf turned off  1002 b/p now 92/53 p 59  1025 Uf tunred back on with albumin and ns flush given. Pt denies any distress. B/p 93/53 p61  1050 UF completed and blood rinsed back. New caps placed on each port, pt stable. Unable to pull target goal due to low b/p even with albumin 50g and midodrine given for treatment  Admiting Diagnosis: Celliutis  Pt's previous clinic-[facility]  Consent signed - Informed Consent Verified: Yes (07/15/18 0845)  [facility] Consent - on file  Hepatitis Status-  NEG AG 6/25/18 from [employer] #- Machine Number: [id number] (07/15/18 0845)  Telemetry status-NSR  Pre-dialysis wt. - Pre-Dialysis Weight: 130 kg (286 lb 9.6 oz) (07/13/18 2680)

## 2018-07-16 LAB
ALBUMIN SERPL-MCNC: 2.3 G/DL (ref 3.5–5)
ANION GAP SERPL CALC-SCNC: 14 MMOL/L (ref 5–15)
BACTERIA SPEC CULT: ABNORMAL
BUN SERPL-MCNC: 57 MG/DL (ref 6–20)
BUN/CREAT SERPL: 9 (ref 12–20)
CALCIUM SERPL-MCNC: 7.5 MG/DL (ref 8.5–10.1)
CHLORIDE SERPL-SCNC: 87 MMOL/L (ref 97–108)
CO2 SERPL-SCNC: 21 MMOL/L (ref 21–32)
CREAT SERPL-MCNC: 6.42 MG/DL (ref 0.55–1.02)
ERYTHROCYTE [DISTWIDTH] IN BLOOD BY AUTOMATED COUNT: 15.7 % (ref 11.5–14.5)
GLUCOSE BLD STRIP.AUTO-MCNC: 125 MG/DL (ref 65–100)
GLUCOSE BLD STRIP.AUTO-MCNC: 129 MG/DL (ref 65–100)
GLUCOSE BLD STRIP.AUTO-MCNC: 145 MG/DL (ref 65–100)
GLUCOSE BLD STRIP.AUTO-MCNC: 74 MG/DL (ref 65–100)
GLUCOSE BLD STRIP.AUTO-MCNC: 85 MG/DL (ref 65–100)
GLUCOSE SERPL-MCNC: 66 MG/DL (ref 65–100)
GRAM STN SPEC: ABNORMAL
GRAM STN SPEC: ABNORMAL
HCT VFR BLD AUTO: 26.7 % (ref 35–47)
HGB BLD-MCNC: 8.1 G/DL (ref 11.5–16)
MCH RBC QN AUTO: 26.6 PG (ref 26–34)
MCHC RBC AUTO-ENTMCNC: 30.3 G/DL (ref 30–36.5)
MCV RBC AUTO: 87.5 FL (ref 80–99)
NRBC # BLD: 0 K/UL (ref 0–0.01)
NRBC BLD-RTO: 0 PER 100 WBC
PHOSPHATE SERPL-MCNC: 4.8 MG/DL (ref 2.6–4.7)
PLATELET # BLD AUTO: 325 K/UL (ref 150–400)
PMV BLD AUTO: 8.7 FL (ref 8.9–12.9)
POTASSIUM SERPL-SCNC: 5.2 MMOL/L (ref 3.5–5.1)
RBC # BLD AUTO: 3.05 M/UL (ref 3.8–5.2)
SERVICE CMNT-IMP: ABNORMAL
SERVICE CMNT-IMP: NORMAL
SERVICE CMNT-IMP: NORMAL
SODIUM SERPL-SCNC: 122 MMOL/L (ref 136–145)
VANCOMYCIN SERPL-MCNC: 18.5 UG/ML
WBC # BLD AUTO: 9.6 K/UL (ref 3.6–11)

## 2018-07-16 PROCEDURE — 74011000250 HC RX REV CODE- 250: Performed by: INTERNAL MEDICINE

## 2018-07-16 PROCEDURE — 74011250636 HC RX REV CODE- 250/636: Performed by: INTERNAL MEDICINE

## 2018-07-16 PROCEDURE — 74011636637 HC RX REV CODE- 636/637: Performed by: HOSPITALIST

## 2018-07-16 PROCEDURE — 74011000258 HC RX REV CODE- 258: Performed by: INTERNAL MEDICINE

## 2018-07-16 PROCEDURE — 97530 THERAPEUTIC ACTIVITIES: CPT | Performed by: PHYSICAL THERAPIST

## 2018-07-16 PROCEDURE — 85027 COMPLETE CBC AUTOMATED: CPT | Performed by: HOSPITALIST

## 2018-07-16 PROCEDURE — 80202 ASSAY OF VANCOMYCIN: CPT | Performed by: HOSPITALIST

## 2018-07-16 PROCEDURE — 94760 N-INVAS EAR/PLS OXIMETRY 1: CPT

## 2018-07-16 PROCEDURE — 80069 RENAL FUNCTION PANEL: CPT | Performed by: PODIATRIST

## 2018-07-16 PROCEDURE — 82962 GLUCOSE BLOOD TEST: CPT

## 2018-07-16 PROCEDURE — 74011250637 HC RX REV CODE- 250/637: Performed by: FAMILY MEDICINE

## 2018-07-16 PROCEDURE — 74011250637 HC RX REV CODE- 250/637: Performed by: HOSPITALIST

## 2018-07-16 PROCEDURE — 65270000032 HC RM SEMIPRIVATE

## 2018-07-16 PROCEDURE — 94660 CPAP INITIATION&MGMT: CPT

## 2018-07-16 PROCEDURE — 36415 COLL VENOUS BLD VENIPUNCTURE: CPT | Performed by: PODIATRIST

## 2018-07-16 PROCEDURE — 3331090001 HH PPS REVENUE CREDIT

## 2018-07-16 PROCEDURE — 3331090002 HH PPS REVENUE DEBIT

## 2018-07-16 RX ORDER — INSULIN LISPRO 100 [IU]/ML
2 INJECTION, SOLUTION INTRAVENOUS; SUBCUTANEOUS
Status: DISCONTINUED | OUTPATIENT
Start: 2018-07-16 | End: 2018-07-19

## 2018-07-16 RX ORDER — INSULIN GLARGINE 100 [IU]/ML
20 INJECTION, SOLUTION SUBCUTANEOUS
Status: DISCONTINUED | OUTPATIENT
Start: 2018-07-16 | End: 2018-07-19

## 2018-07-16 RX ADMIN — INSULIN GLARGINE 20 UNITS: 100 INJECTION, SOLUTION SUBCUTANEOUS at 23:05

## 2018-07-16 RX ADMIN — FLUOXETINE 20 MG: 20 CAPSULE ORAL at 08:32

## 2018-07-16 RX ADMIN — INSULIN LISPRO 2 UNITS: 100 INJECTION, SOLUTION INTRAVENOUS; SUBCUTANEOUS at 17:12

## 2018-07-16 RX ADMIN — Medication 10 ML: at 05:18

## 2018-07-16 RX ADMIN — INSULIN LISPRO 2 UNITS: 100 INJECTION, SOLUTION INTRAVENOUS; SUBCUTANEOUS at 11:42

## 2018-07-16 RX ADMIN — CARVEDILOL 12.5 MG: 12.5 TABLET, FILM COATED ORAL at 08:33

## 2018-07-16 RX ADMIN — OXYCODONE AND ACETAMINOPHEN 2 TABLET: 5; 325 TABLET ORAL at 07:17

## 2018-07-16 RX ADMIN — GABAPENTIN 100 MG: 100 CAPSULE ORAL at 22:59

## 2018-07-16 RX ADMIN — LATANOPROST 1 DROP: 50 SOLUTION OPHTHALMIC at 22:00

## 2018-07-16 RX ADMIN — GABAPENTIN 100 MG: 100 CAPSULE ORAL at 07:11

## 2018-07-16 RX ADMIN — OXYCODONE AND ACETAMINOPHEN 2 TABLET: 5; 325 TABLET ORAL at 02:56

## 2018-07-16 RX ADMIN — INSULIN LISPRO 2 UNITS: 100 INJECTION, SOLUTION INTRAVENOUS; SUBCUTANEOUS at 11:26

## 2018-07-16 RX ADMIN — PANTOPRAZOLE SODIUM 40 MG: 40 TABLET, DELAYED RELEASE ORAL at 17:12

## 2018-07-16 RX ADMIN — MIDODRINE HYDROCHLORIDE 5 MG: 5 TABLET ORAL at 17:11

## 2018-07-16 RX ADMIN — SEVELAMER CARBONATE 800 MG: 800 TABLET, FILM COATED ORAL at 07:11

## 2018-07-16 RX ADMIN — NORTRIPTYLINE HYDROCHLORIDE 50 MG: 25 CAPSULE ORAL at 22:59

## 2018-07-16 RX ADMIN — ASPIRIN 81 MG 81 MG: 81 TABLET ORAL at 08:32

## 2018-07-16 RX ADMIN — AZTREONAM 500 MG: 1 INJECTION, POWDER, LYOPHILIZED, FOR SOLUTION INTRAMUSCULAR; INTRAVENOUS at 05:18

## 2018-07-16 RX ADMIN — EPOETIN ALFA 20000 UNITS: 20000 SOLUTION INTRAVENOUS; SUBCUTANEOUS at 17:12

## 2018-07-16 RX ADMIN — DOCUSATE SODIUM 100 MG: 100 CAPSULE, LIQUID FILLED ORAL at 08:32

## 2018-07-16 RX ADMIN — Medication 10 ML: at 21:54

## 2018-07-16 RX ADMIN — SIMVASTATIN 20 MG: 20 TABLET, FILM COATED ORAL at 23:00

## 2018-07-16 RX ADMIN — SEVELAMER CARBONATE 800 MG: 800 TABLET, FILM COATED ORAL at 17:12

## 2018-07-16 RX ADMIN — SEVELAMER CARBONATE 800 MG: 800 TABLET, FILM COATED ORAL at 11:26

## 2018-07-16 RX ADMIN — OXYCODONE HYDROCHLORIDE AND ACETAMINOPHEN 1000 MG: 500 TABLET ORAL at 06:17

## 2018-07-16 RX ADMIN — Medication 10 ML: at 20:08

## 2018-07-16 RX ADMIN — POLYETHYLENE GLYCOL 3350 17 G: 17 POWDER, FOR SOLUTION ORAL at 07:17

## 2018-07-16 RX ADMIN — AZTREONAM 500 MG: 1 INJECTION, POWDER, LYOPHILIZED, FOR SOLUTION INTRAMUSCULAR; INTRAVENOUS at 20:08

## 2018-07-16 RX ADMIN — ASCORBIC ACID, THIAMINE MONONITRATE,RIBOFLAVIN, NIACINAMIDE, PYRIDOXINE HYDROCHLORIDE, FOLIC ACID, CYANOCOBALAMIN, BIOTIN, CALCIUM PANTOTHENATE, 1 CAPSULE: 100; 1.5; 1.7; 20; 10; 1; 6000; 150000; 5 CAPSULE, LIQUID FILLED ORAL at 08:32

## 2018-07-16 RX ADMIN — DOCUSATE SODIUM 100 MG: 100 CAPSULE, LIQUID FILLED ORAL at 17:12

## 2018-07-16 RX ADMIN — CINACALCET HYDROCHLORIDE 30 MG: 30 TABLET, COATED ORAL at 23:05

## 2018-07-16 RX ADMIN — PANTOPRAZOLE SODIUM 40 MG: 40 TABLET, DELAYED RELEASE ORAL at 07:11

## 2018-07-16 NOTE — PROGRESS NOTES
Verbal shift change report given to [facility] (oncoming nurse) by Micky Herr (offgoing nurse). Report included the following information SBAR.     3187 Spoke with Mario Sharma in pharmacy about pt's procrit and azactam.  Procrit may be given prior to dialysis. Azactam to be given after dialysis. 56 Spoke with Dr. Scott Lopez about midodrine and coreg. Coreg to be held and midodrine to be given. 1945 Spoke with Lynn Marinelli in pharmacy about azactam, pt's dialysis time changed to [id number]. She states pt may receive her azactam at this time.

## 2018-07-16 NOTE — PROGRESS NOTES
CM continuing to follow. Noted Dr. Shobha Saini notation of patient readiness for d/c from his standpoint. Patient has been approved for admission to 2 facilities [facility] and [facility]). Family was to visit facilities over this past weekend. CM will meet with patient/family to discuss. Accepting facility will need to obtain insurance authorization. Will continue to follow. SHANNON Chew, [place]    Family has accepted [facility]. Nohemi Sanders is requesting PT/OT current progress notes. Facility will need to cost out IV abx before final approval. Facility will submit for auth to insurance company after therapy notes and determination if iv abx medications have been reviewed. CM will continue to follow.

## 2018-07-16 NOTE — PROGRESS NOTES
Hospitalist Progress Note Sandi Sutton MD 
Answering service: [phone] OR [phone] from in house phone Date of Service:  2018 NAME:  Cecilia Seay :  1950 MRN:  [id number] Admission Summary:  
Cecilia Seay is a 70 y.o.  female with past medical history of anemia, arthritis, asthma, anxiety, ESRD, chronic pain, depression, type 2 diabetes mellitus (DM), GERD, glaucoma, hypertension, hyperlipidemia, IBS, migraine, morbid obesity, sleep apnea presented to the ED from home with chief complaint of leg swelling. Interval history / Subjective:  
Ms. Namita Kee is still having some pain in her heel but it is controlled. No nausea, vomiting. Assessment & Plan:  
 
Acute bilateral foot cellulitis with infected right heel wound and osteomyelitis (POA) - still with pain - MRI of right foot  shows large heel ulcer with underlying soft tissue gas that extends nearly to the cortex of the underlying calcaneus. No fluid collection or osteo. - LE PVR  normal but with calcifications - RLE Arteriogram 6/10 without significant stenosis - Debridement of the calcaneus  
- Bcx  no growth - Wound cx  with proteus, klebsiella, enterococcus, MRSA and alcaligenes - Bone cx  with proteus, klebsiella, pseudomonas, enterococcus and 2 strains of coag negative staph 
- Continue on IV vancomycin, aztreonam - will need a laura for 6 weeks of IV abx 
- Vascular surgeon, ID, Podiatrist consulted Right hand numbness/tingling with possible steal syndrome - seems like possibly some peripheral neuropathy, carpel tunnel? - Upper extremity PVR 7/10 with no evidence of hemodynamically significant right or left upper extremity arterial obstruction. No evidence of steal by the AV graft. - Fixing the venous flow might exacerbate any steal 
- Outpatient EMG to eval for neuropathy Hyponatremia - sodium dropping - I agree with fluid restriction ESRD (chronic) - Continue HD 
- Continue on sensipar, renvela,  sodium bicarbonate - Upper extremity PVR 7/10 with patent right distal brachial artery to proximal brachial vein AV graft with evidence of a hemodynamically significant stenosis at the venous anastomosis - Nephrologist consulted Anemia of chronic disease - H/H stable HTN - BP normal, continue on coreg, monitor BP 
 
DM-II - recent A1c 9.1, BG up after steroids - Continue lantus - Added standing with-meal insulin - Sliding scale and monitor finger stick glucose Elevated liver enzyme - multifactorial, improved Hx of asthma - stable, nebs PRN Hx of anxiety, depression - stable, continue on home prozac, nortriptyline Left sacral wound, left open abrasion, left lateral ankle wound POA 
- Continue wound care per wound care nurse Morbid obesity - Body mass index is 29.55 kg/(m^2). -diet and weight loss program  
 
Hx of sleep apnea - CPAP q hs Code status: Full Code DVT prophylaxis: SCD Care Plan discussed with: Patient/Family and Nurse Disposition: to SNF, likely Tuesday after wound vac change Hospital Problems  Date Reviewed: 7/6/2018 Codes Class Noted POA Anemia ICD-10-CM: D64.9 ICD-9-CM: 285.9  7/6/2018 Unknown ESRD (end stage renal disease) ([facility] ICD-10-CM: N18.6 ICD-9-CM: 585.6  7/6/2018 Unknown Bilateral leg edema ICD-10-CM: R60.0 ICD-9-CM: 782.3  7/6/2018 Unknown LFT elevation ICD-10-CM: R79.89 ICD-9-CM: 790.6  7/6/2018 Unknown Type 2 diabetes mellitus with hyperglycemia (HCC) ICD-10-CM: E11.65 ICD-9-CM: 250.00  7/6/2018 Unknown * (Principal)Bilateral lower leg cellulitis ICD-10-CM: L03.116, A85.884 ICD-9-CM: 682.6  7/6/2018 Unknown Vital Signs:  
 Last 24hrs VS reviewed since prior progress note. Most recent are: 
Visit Vitals  /67 (BP 1 Location: Left arm, BP Patient Position:  At rest)  Pulse 60  Temp 97.5 °F (36.4 °C)  Resp 18  Ht 6' 1.75\" (1.873 m)  Wt 103.7 kg (228 lb 9.9 oz)  SpO2 98%  Breastfeeding No  
 BMI 29.55 kg/m2 Intake/Output Summary (Last 24 hours) at 07/16/18 1623 Last data filed at 07/15/18 2014 Gross per 24 hour Intake              120 ml Output                0 ml Net              120 ml Physical Examination:  
     
Constitutional:  No acute distress, cooperative, pleasant   
ENT:  Oral mucous moist, oropharynx benign. Neck supple, Resp:  CTA bilaterally. No wheezing/rhonchi/rales. No accessory muscle use CV:  Regular rhythm, normal rate, no murmurs, gallops, rubs GI:  Soft, non distended, non tender. normoactive bowel sounds, no hepatosplenomegaly Musculoskeletal:  Bilateral foot dressed, wound vacuum on right heel with a little serosanguinous fluid in the vac tubing Neurologic:  Moves all extremities. AAOx3, CN II-XII reviewed Skin:  Left sacral wound, left open abrasion, left lateral ankle wound dressed Data Review:  
 
Telemetry ECG Xray CT scan MRI Echocardiogram   
Ultrasound I have reviewed the flow sheet and recent notes New labs and data personally reviewed. Labs:  
 
Recent Labs  
   07/16/18 
 0505  07/14/18 
 0547 WBC  9.6  10.6 HGB  8.1*  8.8* HCT  26.7*  29.7*  
PLT  325  308 Recent Labs  
   07/16/18 
 0505  07/15/18 
 0533  07/14/18 
 3185 NA  122*  126*  129*  
K  5.2*  4.8  4.4  
CL  87*  92*  94* CO2  21  23  26 BUN  57*  41*  31* CREA  6.42*  5.56*  4.34* GLU  66  103*  60* CA  7.5*  7.7*  7.9*  
PHOS  4.8*  4.2  4.1 Recent Labs  
   07/16/18 
 0505  07/15/18 
 0533  07/14/18 
 4101 ALB  2.3*  1.8*  2.0* No results for input(s): INR, PTP, APTT in the last 72 hours. No lab exists for component: INREXT, INREXT No results for input(s): FE, TIBC, PSAT, FERR in the last 72 hours.   
No results found for: FOL, RBCF No results for input(s): PH, PCO2, PO2 in the last 72 hours. No results for input(s): CPK, CKNDX, TROIQ in the last 72 hours. No lab exists for component: CPKMB Lab Results Component Value Date/Time Cholesterol, total 144 04/10/2018 02:53 AM  
 HDL Cholesterol 66 04/10/2018 02:53 AM  
 LDL, calculated 67.6 04/10/2018 02:53 AM  
 Triglyceride 52 04/10/2018 02:53 AM  
 CHOL/HDL Ratio 2.2 04/10/2018 02:53 AM  
 
Lab Results Component Value Date/Time Glucose (POC) 129 (H) 07/16/2018 04:10 PM  
 Glucose (POC) 145 (H) 07/16/2018 11:25 AM  
 Glucose (POC) 85 07/16/2018 07:06 AM  
 Glucose (POC) 74 07/16/2018 06:46 AM  
 Glucose (POC) 102 (H) 07/15/2018 09:03 PM  
 
Lab Results Component Value Date/Time Color YELLOW 10/27/2012 10:25 PM  
 Appearance CLEAR 10/27/2012 10:25 PM  
 Specific gravity 1.012 10/27/2012 10:25 PM  
 Specific gravity 1.015 08/30/2010 07:10 AM  
 pH (UA) 8.0 10/27/2012 10:25 PM  
 Protein 100 (A) 10/27/2012 10:25 PM  
 Glucose NEGATIVE  10/27/2012 10:25 PM  
 Ketone NEGATIVE  10/27/2012 10:25 PM  
 Bilirubin NEGATIVE  10/27/2012 10:25 PM  
 Urobilinogen 1.0 10/27/2012 10:25 PM  
 Nitrites NEGATIVE  10/27/2012 10:25 PM  
 Leukocyte Esterase TRACE (A) 10/27/2012 10:25 PM  
 Epithelial cells 0-5 10/27/2012 10:25 PM  
 Bacteria NEGATIVE  10/27/2012 10:25 PM  
 WBC 10-20 10/27/2012 10:25 PM  
 RBC 5-10 10/27/2012 10:25 PM  
 
 
 
Medications Reviewed:  
 
Current Facility-Administered Medications Medication Dose Route Frequency  insulin glargine (LANTUS) injection 20 Units  20 Units SubCUTAneous QHS  insulin lispro (HUMALOG) injection 2 Units  2 Units SubCUTAneous TIDAC  
 [START ON 7/17/2018] linaclotide (LINZESS) capsule 290 mcg (Patient Supplied)  290 mcg Oral ACB  heparin (porcine) 1,000 unit/mL injection 2,000 Units  2,000 Units Hemodialysis DIALYSIS PRN  
 albumin human 25% (BUMINATE) solution 50 g  50 g IntraVENous DIALYSIS PRN  polyethylene glycol (MIRALAX) packet 17 g  17 g Oral DAILY  docusate sodium (COLACE) capsule 100 mg  100 mg Oral BID  epoetin joi (EPOGEN;PROCRIT) injection 20,000 Units  20,000 Units SubCUTAneous Q MON, WED & FRI  fentaNYL citrate (PF) injection 50 mcg  50 mcg IntraVENous Q2H PRN  
 oxyCODONE-acetaminophen (PERCOCET) 5-325 mg per tablet 1-2 Tab  1-2 Tab Oral Q4H PRN  
 ondansetron (ZOFRAN) injection 4 mg  4 mg IntraVENous Q6H PRN  pantoprazole (PROTONIX) tablet 40 mg  40 mg Oral ACB&D  
 alum-mag hydroxide-simeth (MYLANTA) oral suspension 30 mL  30 mL Oral Q4H PRN  
 aztreonam (AZACTAM) 500 mg in 0.9% sodium chloride 100 mL IVPB  500 mg IntraVENous Q12H  
 vancomycin (VANCOCIN) 1,000 mg in 0.9% sodium chloride (MBP/ADV) 250 mL  1,000 mg IntraVENous DIALYSIS MON, WED & FRI  carvedilol (COREG) tablet 12.5 mg  12.5 mg Oral BID WITH MEALS  cinacalcet (SENSIPAR) tablet 30 mg  30 mg Oral DAILY  ascorbic acid (vitamin C) (VITAMIN C) tablet 1,000 mg  1,000 mg Oral DAILY  aspirin chewable tablet 81 mg  81 mg Oral DAILY  B complex-vitaminC-folic acid (NEPHROCAP) cap  1 Cap Oral DAILY  FLUoxetine (PROzac) capsule 20 mg  20 mg Oral DAILY  gabapentin (NEURONTIN) capsule 100 mg  100 mg Oral BID  latanoprost (XALATAN) 0.005 % ophthalmic solution 1 Drop  1 Drop Both Eyes QHS  midodrine (PROAMITINE) tablet 5 mg  5 mg Oral Once per day on Mon Wed Fri  polyethylene glycol (MIRALAX) packet 17 g  17 g Oral BID PRN  
 sevelamer carbonate (RENVELA) tab 800 mg  800 mg Oral TID WITH MEALS  simvastatin (ZOCOR) tablet 20 mg  20 mg Oral QHS  sodium chloride (NS) flush 5-10 mL  5-10 mL IntraVENous Q8H  
 sodium chloride (NS) flush 5-10 mL  5-10 mL IntraVENous PRN  
 insulin lispro (HUMALOG) injection   SubCUTAneous AC&HS  
 glucose chewable tablet 16 g  4 Tab Oral PRN  
 dextrose (D50W) injection syrg 12.5-25 g  12.5-25 g IntraVENous PRN  
 glucagon (GLUCAGEN) injection 1 mg  1 mg IntraMUSCular PRN  
 nortriptyline (PAMELOR) capsule 50 mg  50 mg Oral QHS  Vancomycin- pharmacy to dose   Other Rx Dosing/Monitoring  
 
______________________________________________________________________ EXPECTED LENGTH OF STAY: 3d 19h ACTUAL LENGTH OF STAY:          10 Alta Matson MD

## 2018-07-16 NOTE — WOUND CARE
Responded to consult for [facility] troubleshooting. VAC is functioning appropriately, no alarms, good seal.   Due for VAC dressing change tomorrow.    Anastasia Terry

## 2018-07-16 NOTE — PROGRESS NOTES
Problem: Mobility Impaired (Adult and Pediatric)  Goal: *Acute Goals and Plan of Care (Insert Text)  Physical Therapy Goals  Initiated 7/13/2018  1. Patient will move from supine to sit and sit to supine  and roll side to side in bed with supervision/set-up within 7 day(s). 2.  Patient will transfer from bed to chair and chair to bed with moderate assistance  using the least restrictive device within 7 day(s). 3.  Patient will perform sit to stand with moderate assistance  within 7 day(s). physical Therapy TREATMENT  Patient: Anna Cherry (35 y.o. female)  Date: 7/16/2018  Diagnosis: Bilateral lower leg cellulitis  Bilateral leg edema  ESRD (end stage renal disease) (HCC)  Anemia  LFT elevation  Type 2 diabetes mellitus with hyperglycemia (HCC)  RIGHT HEEL ULCER Bilateral lower leg cellulitis  Procedure(s) (LRB):  DEBRIDEMENT RIGHT HEEL ULCER WITH PARTIAL CALCANECTOMY (Right) 4 Days Post-Op  Precautions:    Chart, physical therapy assessment, plan of care and goals were reviewed. ASSESSMENT:  Patient making steady progress toward goals. Continues to be drowsy but overall less drowsy than last session. Currently needing modA for bed mobility and cues for technique. Able to sit EOB and work on numerous exercises and work on trunk control. Unable to maintain NWB on heel at this time and did not attempt standing. Recommend bed to chair position until able to attempt stand pivot out of bed. Certainly appropriate for SNF level rehab at [place]. Progression toward goals:  [x]    Improving appropriately and progressing toward goals  []    Improving slowly and progressing toward goals  []    Not making progress toward goals and plan of care will be adjusted     PLAN:  Patient continues to benefit from skilled intervention to address the above impairments. Continue treatment per established plan of care.   Discharge Recommendations:  Rajeev Zambrano  Further Equipment Recommendations for Discharge: TBD     SUBJECTIVE:   Patient stated I don't even remember anyone trying to work with me the other day.     OBJECTIVE DATA SUMMARY:   Critical Behavior:  Neurologic State: Alert  Orientation Level: Oriented X4  Cognition: Appropriate decision making, Appropriate for age attention/concentration, Appropriate safety awareness, Follows commands  Safety/Judgement: Awareness of environment, Fall prevention, Insight into deficits  Functional Mobility Training:  Bed Mobility:  Rolling: Moderate assistance;Assist x1  Supine to Sit: Moderate assistance;Assist x2  Sit to Supine: Moderate assistance;Assist x2  Scooting: Minimum assistance; Additional time        Transfers:      not tested                             Balance:  Sitting: Impaired  Sitting - Static: Fair (occasional)  Sitting - Dynamic: Poor (constant support)  Ambulation/Gait Training:       Pain:  Pain Scale 1: Numeric (0 - 10)  Pain Intensity 1: 9  Pain Location 1: Foot  Pain Orientation 1: Right  Pain Description 1: Throbbing  Pain Intervention(s) 1: Medication (see MAR)  Activity Tolerance:   VSS  Please refer to the flowsheet for vital signs taken during this treatment.   After treatment:   []    Patient left in no apparent distress sitting up in chair  [x]    Patient left in no apparent distress in bed  [x]    Call bell left within reach  [x]    Nursing notified  [x]    Caregiver present  []    Bed alarm activated    COMMUNICATION/COLLABORATION:   The patients plan of care was discussed with: Physical Therapist, Occupational Therapist and Registered Nurse    Hadley Levi PT, DPT   Time Calculation: 24 mins

## 2018-07-16 NOTE — WOUND CARE
Wound Visit: in earlier this a.m to check on wound VAC as staff called to report it was alarming; system check/update performed on machine; negative pressure functioning well; dark drainage in canister in small amount; serosanguinous in tubing; no active bleeding. Plan is to change tomorrow and take photo for chart for Dr. Lou Sharif.  Patient is on her bariatric air support surface.   Clayton Garcia RN,CWCN

## 2018-07-16 NOTE — PROGRESS NOTES
Progress Note    Patient: Anisa Medrano MRN: [id number]  SSN: [id number]    YOB: 1950  Age: 70 y.o. Sex: female      Admit Date: 2018  4 Days Post-Op     Procedure:   Procedure(s):  DEBRIDEMENT RIGHT HEEL ULCER WITH PARTIAL CALCANECTOMY    Subjective:     Patient seen & examined at bedside. Daughter at bedside. Pt denies any n/v/f/ns/c.    Objective:     Visit Vitals    /67 (BP 1 Location: Left arm, BP Patient Position: At rest)    Pulse 62    Temp 98.2 °F (36.8 °C)    Resp 20    Ht 6' 1.75\" (1.873 m)    Wt 109.7 kg (241 lb 13.5 oz)    SpO2 99%    Breastfeeding No    BMI 31.26 kg/m2      Right Foot Exam: wound vac dressing intact and vac running at continuous therapy. Labs/Radiology Review: images and reports reviewed    Assessment:     Hospital Problems  Date Reviewed: 2018          Codes Class Noted POA    Anemia ICD-10-CM: D64.9  ICD-9-CM: 285.9  2018 Unknown        ESRD (end stage renal disease) ([facility] ICD-10-CM: N18.6  ICD-9-CM: 585.6  2018 Unknown        Bilateral leg edema ICD-10-CM: R60.0  ICD-9-CM: 782.3  2018 Unknown        LFT elevation ICD-10-CM: R79.89  ICD-9-CM: 790.6  2018 Unknown        Type 2 diabetes mellitus with hyperglycemia ([facility] ICD-10-CM: E11.65  ICD-9-CM: 250.00  2018 Unknown        * (Principal)Bilateral lower leg cellulitis ICD-10-CM: L03.116, L03.115  ICD-9-CM: 682.6  2018 Unknown              Plan/Recommendations/Medical Decision Makin.) Continue antibxs per ID. Sx cxs = Proteus, Klebsiella, MRSA, Enterococcus. Bone cx = Proteus, Klebsiella, Pseudomonas, Enterococcus, coag neg staph. Path pending.  2.) Continue wound vac therapy. Vac to be changed tomorrow. 3.) Continue strict nwb to right heel  4.) Ok to d/c from my standpoint once final antibx orders given by ID and vac arranged upon discharge. See below for d/c instructions.     Discharge Instructions  1.) Follow up with Dr. Ruff Seen (Foot Doc) at [facility]. [address] [place] [address] ([phone]) in one week. 2.) Apply wound vac to right heel wound and set vac to 125 mmHg continuous therapy. Change vac dressing per protocol. Apply moistened aquacell Ag to left ankle wound and wrap with gauze dressing every other day. 3.) Strict non weightbearing to right foot.   Elevate foot when at rest.    Signed By: Lori Agudelo DPM     July 16, 2018

## 2018-07-16 NOTE — PROGRESS NOTES
Bedside shift change report given to [address] [place] [address] (oncoming nurse) by Wilmer Jhaveri RN (offgoing nurse). Report included the following information SBAR and Kardex.

## 2018-07-16 NOTE — PROGRESS NOTES
Problem: Falls - Risk of  Goal: *Absence of Falls  Document Rodrick Fall Risk and appropriate interventions in the flowsheet.    Outcome: Progressing Towards Goal  Fall Risk Interventions:  Mobility Interventions: Communicate number of staff needed for ambulation/transfer         Medication Interventions: Bed/chair exit alarm    Elimination Interventions: Call light in reach

## 2018-07-16 NOTE — PROGRESS NOTES
Pharmacist Note - Vancomycin Dosing  Therapy day 11  Indication: LE cellulitis; diabetic foot wound   Current regimen: 1000 mg post-HD on Mon-Wed-Fri    A pre-hemodialysis resulted at 18.5 mcg/mL. Goal trough: 15 - 20 mcg/mL for therapeutic goal of 10 - 15 mcg/mL (assuming ~35% removal by dialysis)  Plan: Continue current regimen. Pharmacy will continue to monitor this patient daily for changes in clinical status and renal function.

## 2018-07-16 NOTE — PROGRESS NOTES
ID Progress Note  2018    Subjective:     She is still with a lot of pain    Objective:     Antibiotics:  1. Azactam  2. Vancomycin       Vitals:   Visit Vitals    /67 (BP 1 Location: Left arm, BP Patient Position: At rest)    Pulse 60    Temp 97.5 °F (36.4 °C)    Resp 18    Ht 6' 1.75\" (1.873 m)    Wt 103.7 kg (228 lb 9.9 oz)    SpO2 98%    Breastfeeding No    BMI 29.55 kg/m2        Tmax:  Temp (24hrs), Av.8 °F (36.6 °C), Min:97.5 °F (36.4 °C), Max:98.2 °F (36.8 °C)      Exam:  Wound dressed, no proximal cellulitis. Labs:      Recent Labs      18   0505  07/15/18   0533  18   0547   WBC  9.6   --   10.6   HGB  8.1*   --   8.8*   PLT  325   --   308   BUN  57*  41*  31*   CREA  6.42*  5.56*  4.34*       Cultures:     Lab Results   Component Value Date/Time    Specimen Description: URINE 10/27/2012 08:15 PM    Specimen Description: BLOOD 2011 07:40 AM    Specimen Description: URINE 2010 07:10 AM     Lab Results   Component Value Date/Time    Culture result: NO ANAEROBES ISOLATED 2018 08:00 AM    Culture result: HEAVY PROTEUS MIRABILIS (A) 2018 08:00 AM    Culture result: LIGHT KLEBSIELLA OXYTOCA (A) 2018 08:00 AM    Culture result: (A) 2018 08:00 AM     LIGHT **METHICILLIN RESISTANT STAPHYLOCOCCUS AUREUS**    Culture result: LIGHT ENTEROCOCCUS FAECALIS GROUP D (A) 2018 08:00 AM    Culture result: LIGHT ALCALIGENES FAECALIS (A) 2018 08:00 AM    Culture result:  2018 08:00 AM     **[facility] REPORT**  CALLED TO AND READ BACK BY  JAELYN GUTIERREZ RN ON 7/15/18 AT 1440 TA.       Culture result: MODERATE PROTEUS MIRABILIS (A) 2018 08:00 AM    Culture result: LIGHT KLEBSIELLA OXYTOCA (A) 2018 08:00 AM    Culture result: FEW PSEUDOMONAS AERUGINOSA (A) 2018 08:00 AM    Culture result: LIGHT ENTEROCOCCUS FAECALIS GROUP D (A) 2018 08:00 AM    Culture result: (A) 2018 08:00 AM     LIGHT STAPHYLOCOCCUS SPECIES, COAGULASE NEGATIVE (2 DIFFERENT COLONY TYPES/STRAINS)       Radiology:     Line/Insert Date:           Assessment:     1. Diabetic foot wound--polymicrobial case  2. Will be on 2 antibiotics    Objective:     1. Continue current therapy  2.  Will need to be on Azactam and vancomycin in the outpatient setting for 6 weeks or so--vancomycin orders will need to come from nephrology to be given with dialysis--azactam orders on chart    Abbie Cartwright MD

## 2018-07-16 NOTE — PROGRESS NOTES
[facility] 
 [address] [facility], [facility] [address] [facility] Phone: [phone]  
 
Nephrology Progress Note Anisa Medrano     1950     [id number] Date of Admission : 7/6/2018 07/16/18 CC: Follow up for ESRD Assessment and Plan Diabetic Foot wound w/ infection - s/p Debridement Hypervolemic Hyponatremia - UF today should help 
  
ESRD - MWF dialysis 
- HD today  
  
Hypotension   
- midodrine prior to HD 
- albumin PRN 
 
DM 
  
Obesity, FARHAT 
  
Dyslipidemia 
  
Possible steal syndrome right hand Anemia of CKD 
-On epogen  
   
 
Interval History: 
Seen and examined. Feeling better. UF yesterday, 1.4 liters removed. For HD today. Na down to 122 this AM.  No cp, sob, n/v/d reported. Review of Systems: A comprehensive review of systems was negative. Current Medications:  
Current Facility-Administered Medications Medication Dose Route Frequency  insulin glargine (LANTUS) injection 20 Units  20 Units SubCUTAneous QHS  insulin lispro (HUMALOG) injection 2 Units  2 Units SubCUTAneous TIDAC  
 [START ON 7/17/2018] linaclotide (LINZESS) capsule 290 mcg (Patient Supplied)  290 mcg Oral ACB  heparin (porcine) 1,000 unit/mL injection 2,000 Units  2,000 Units Hemodialysis DIALYSIS PRN  
 albumin human 25% (BUMINATE) solution 50 g  50 g IntraVENous DIALYSIS PRN  polyethylene glycol (MIRALAX) packet 17 g  17 g Oral DAILY  docusate sodium (COLACE) capsule 100 mg  100 mg Oral BID  epoetin joi (EPOGEN;PROCRIT) injection 20,000 Units  20,000 Units SubCUTAneous Q MON, WED & FRI  fentaNYL citrate (PF) injection 50 mcg  50 mcg IntraVENous Q2H PRN  
 oxyCODONE-acetaminophen (PERCOCET) 5-325 mg per tablet 1-2 Tab  1-2 Tab Oral Q4H PRN  
 ondansetron (ZOFRAN) injection 4 mg  4 mg IntraVENous Q6H PRN  pantoprazole (PROTONIX) tablet 40 mg  40 mg Oral ACB&D  
 alum-mag hydroxide-simeth (MYLANTA) oral suspension 30 mL  30 mL Oral Q4H PRN  
 aztreonam (AZACTAM) 500 mg in 0.9% sodium chloride 100 mL IVPB  500 mg IntraVENous Q12H  
 vancomycin (VANCOCIN) 1,000 mg in 0.9% sodium chloride (MBP/ADV) 250 mL  1,000 mg IntraVENous DIALYSIS MON, WED & FRI  carvedilol (COREG) tablet 12.5 mg  12.5 mg Oral BID WITH MEALS  cinacalcet (SENSIPAR) tablet 30 mg  30 mg Oral DAILY  ascorbic acid (vitamin C) (VITAMIN C) tablet 1,000 mg  1,000 mg Oral DAILY  aspirin chewable tablet 81 mg  81 mg Oral DAILY  B complex-vitaminC-folic acid (NEPHROCAP) cap  1 Cap Oral DAILY  FLUoxetine (PROzac) capsule 20 mg  20 mg Oral DAILY  gabapentin (NEURONTIN) capsule 100 mg  100 mg Oral BID  latanoprost (XALATAN) 0.005 % ophthalmic solution 1 Drop  1 Drop Both Eyes QHS  midodrine (PROAMITINE) tablet 5 mg  5 mg Oral Once per day on Mon Wed Fri  polyethylene glycol (MIRALAX) packet 17 g  17 g Oral BID PRN  
 sevelamer carbonate (RENVELA) tab 800 mg  800 mg Oral TID WITH MEALS  simvastatin (ZOCOR) tablet 20 mg  20 mg Oral QHS  sodium chloride (NS) flush 5-10 mL  5-10 mL IntraVENous Q8H  
 sodium chloride (NS) flush 5-10 mL  5-10 mL IntraVENous PRN  
 insulin lispro (HUMALOG) injection   SubCUTAneous AC&HS  
 glucose chewable tablet 16 g  4 Tab Oral PRN  
 dextrose (D50W) injection syrg 12.5-25 g  12.5-25 g IntraVENous PRN  
 glucagon (GLUCAGEN) injection 1 mg  1 mg IntraMUSCular PRN  
 nortriptyline (PAMELOR) capsule 50 mg  50 mg Oral QHS  Vancomycin- pharmacy to dose   Other Rx Dosing/Monitoring Allergies Allergen Reactions  Latex Itching Rash, sometimes difficult to breathe  Celebrex [Celecoxib] Anaphylaxis and Swelling TONGUE. LIPS AND EYES  
 Iodine Other (comments) IV CONTRAST-LESIONS, AND SKIN SLUFFED OFF  
 Keflex [Cephalexin] Anaphylaxis and Swelling Tongue, lips, and eyes  Levaquin [Levofloxacin] Anaphylaxis and Swelling Tongue, lips, and eyes  Pcn [Penicillins] Anaphylaxis and Swelling Tongue, lips and eyes Objective: 
Vitals:   
Vitals:  
 07/16/18 6024 07/16/18 0138 07/16/18 0746 07/16/18 1025 BP: 122/67  134/60 Pulse: 62  70 Resp: 20  19 Temp: 98.2 °F (36.8 °C)  97.5 °F (36.4 °C) TempSrc:      
SpO2: 99% 99% 97% Weight:    (P) 103.7 kg (228 lb 9.9 oz) Height:    (P) 6' 1.75\" (1.873 m) Intake and Output: 
  
07/14 1901 - 07/16 0700 In: 240 [P.O.:240] Out: 0778 [Drains:150] Physical Examination: 
General:Alert, No distress, HEENT:  pale Neck:RIJ permacath Lungs : Clears to auscultation Bilaterally, Normal respiratory effort CVS: RRR, S1 S2 normal, No rub, legs bandaged Abdomen: Soft, Non tender Extremities: No cyanosis, No clubbing; good thrill LUE AV graft MS: No joint swelling, erythema, warmth Neurologic: non focal, AAO x 3 Psych: normal affect 
 
[]    High complexity decision making was performed 
[]    Patient is at high-risk of decompensation with multiple organ involvement Lab Data Personally Reviewed: I have reviewed all the pertinent labs, microbiology data and radiology studies during assessment. Recent Labs  
   07/16/18 
 0505  07/15/18 
 0533  07/14/18 
 3875 NA  122*  126*  129*  
K  5.2*  4.8  4.4  
CL  87*  92*  94* CO2  21  23  26 GLU  66  103*  60* BUN  57*  41*  31* CREA  6.42*  5.56*  4.34* CA  7.5*  7.7*  7.9*  
PHOS  4.8*  4.2  4.1 ALB  2.3*  1.8*  2.0* Recent Labs  
   07/16/18 
 0505  07/14/18 
 0547 WBC  9.6  10.6 HGB  8.1*  8.8* HCT  26.7*  29.7*  
PLT  325  308 Lab Results Component Value Date/Time Specimen Description: URINE 10/27/2012 08:15 PM  
 Specimen Description: BLOOD 06/07/2011 07:40 AM  
 Specimen Description: URINE 08/30/2010 07:10 AM  
 Specimen Description: URINE 08/09/2010 11:30 AM  
 Specimen Description: URINE 06/22/2010 11:50 PM  
 
Lab Results Component Value Date/Time  Culture result: NO ANAEROBES ISOLATED 07/12/2018 08:00 AM  
 Culture result: HEAVY PROTEUS MIRABILIS (A) 07/12/2018 08:00 AM  
 Culture result: LIGHT KLEBSIELLA OXYTOCA (A) 07/12/2018 08:00 AM  
 Culture result: (A) 07/12/2018 08:00 AM  
  LIGHT **METHICILLIN RESISTANT STAPHYLOCOCCUS AUREUS**  
 Culture result: LIGHT ENTEROCOCCUS FAECALIS GROUP D (A) 07/12/2018 08:00 AM  
 Culture result: LIGHT ALCALIGENES FAECALIS (A) 07/12/2018 08:00 AM  
 Culture result:  07/12/2018 08:00 AM  
  **[facility] REPORT** 
CALLED TO AND READ BACK BY 
JAELYN GUTIERREZ RN ON 7/15/18 AT 1440 TA. Culture result: MODERATE PROTEUS MIRABILIS (A) 07/12/2018 08:00 AM  
 Culture result: LIGHT KLEBSIELLA OXYTOCA (A) 07/12/2018 08:00 AM  
 Culture result: FEW PSEUDOMONAS AERUGINOSA (A) 07/12/2018 08:00 AM  
 Culture result: LIGHT ENTEROCOCCUS FAECALIS GROUP D (A) 07/12/2018 08:00 AM  
 Culture result: (A) 07/12/2018 08:00 AM  
  LIGHT STAPHYLOCOCCUS SPECIES, COAGULASE NEGATIVE (2 DIFFERENT COLONY TYPES/STRAINS) Recent Results (from the past 24 hour(s)) GLUCOSE, POC Collection Time: 07/15/18  2:04 PM  
Result Value Ref Range Glucose (POC) 116 (H) 65 - 100 mg/dL Performed by Loree Patten GLUCOSE, POC Collection Time: 07/15/18  4:11 PM  
Result Value Ref Range Glucose (POC) 90 65 - 100 mg/dL Performed by Suzi Sage GLUCOSE, POC Collection Time: 07/15/18  9:03 PM  
Result Value Ref Range Glucose (POC) 102 (H) 65 - 100 mg/dL Performed by Collette Concha RENAL FUNCTION PANEL Collection Time: 07/16/18  5:05 AM  
Result Value Ref Range Sodium 122 (L) 136 - 145 mmol/L Potassium 5.2 (H) 3.5 - 5.1 mmol/L Chloride 87 (L) 97 - 108 mmol/L  
 CO2 21 21 - 32 mmol/L Anion gap 14 5 - 15 mmol/L Glucose 66 65 - 100 mg/dL BUN 57 (H) 6 - 20 MG/DL Creatinine 6.42 (H) 0.55 - 1.02 MG/DL  
 BUN/Creatinine ratio 9 (L) 12 - 20 GFR est AA 8 (L) >60 ml/min/1.73m2 GFR est non-AA 6 (L) >60 ml/min/1.73m2 Calcium 7.5 (L) 8.5 - 10.1 MG/DL Phosphorus 4.8 (H) 2.6 - 4.7 MG/DL  Albumin 2.3 (L) 3.5 - 5.0 g/dL Toby Chuchoch Collection Time: 07/16/18  5:05 AM  
Result Value Ref Range Vancomycin, random 18.5 UG/ML  
CBC W/O DIFF Collection Time: 07/16/18  5:05 AM  
Result Value Ref Range WBC 9.6 3.6 - 11.0 K/uL  
 RBC 3.05 (L) 3.80 - 5.20 M/uL HGB 8.1 (L) 11.5 - 16.0 g/dL HCT 26.7 (L) 35.0 - 47.0 % MCV 87.5 80.0 - 99.0 FL  
 MCH 26.6 26.0 - 34.0 PG  
 MCHC 30.3 30.0 - 36.5 g/dL  
 RDW 15.7 (H) 11.5 - 14.5 % PLATELET 238 162 - 949 K/uL MPV 8.7 (L) 8.9 - 12.9 FL  
 NRBC 0.0 0  WBC ABSOLUTE NRBC 0.00 0.00 - 0.01 K/uL GLUCOSE, POC Collection Time: 07/16/18  6:46 AM  
Result Value Ref Range Glucose (POC) 74 65 - 100 mg/dL Performed by [facility] MONTY CHIKI   
GLUCOSE, POC Collection Time: 07/16/18  7:06 AM  
Result Value Ref Range Glucose (POC) 85 65 - 100 mg/dL Performed by [facility] MONTY CHIKI   
GLUCOSE, POC Collection Time: 07/16/18 11:25 AM  
Result Value Ref Range Glucose (POC) 145 (H) 65 - 100 mg/dL Performed by Melba Landaverde I have reviewed the flowsheets. Chart and Pertinent Notes have been reviewed. No change in PMH ,family and social history from Consult note.  
 
 
Arjun Boston MD

## 2018-07-17 ENCOUNTER — APPOINTMENT (OUTPATIENT)
Dept: INTERVENTIONAL RADIOLOGY/VASCULAR | Age: 68
DRG: 628 | End: 2018-07-17
Attending: HOSPITALIST
Payer: MEDICARE

## 2018-07-17 LAB
GLUCOSE BLD STRIP.AUTO-MCNC: 112 MG/DL (ref 65–100)
GLUCOSE BLD STRIP.AUTO-MCNC: 128 MG/DL (ref 65–100)
GLUCOSE BLD STRIP.AUTO-MCNC: 218 MG/DL (ref 65–100)
SERVICE CMNT-IMP: ABNORMAL

## 2018-07-17 PROCEDURE — 36558 INSERT TUNNELED CV CATH: CPT

## 2018-07-17 PROCEDURE — 02HV33Z INSERTION OF INFUSION DEVICE INTO SUPERIOR VENA CAVA, PERCUTANEOUS APPROACH: ICD-10-PCS | Performed by: RADIOLOGY

## 2018-07-17 PROCEDURE — C1769 GUIDE WIRE: HCPCS

## 2018-07-17 PROCEDURE — 74011250636 HC RX REV CODE- 250/636: Performed by: RADIOLOGY

## 2018-07-17 PROCEDURE — 74011250636 HC RX REV CODE- 250/636: Performed by: HOSPITALIST

## 2018-07-17 PROCEDURE — 94761 N-INVAS EAR/PLS OXIMETRY MLT: CPT

## 2018-07-17 PROCEDURE — 97606 NEG PRS WND THER DME>50 SQCM: CPT

## 2018-07-17 PROCEDURE — 77030011256 HC DRSG MEPILEX <16IN NO BORD MOLN -A

## 2018-07-17 PROCEDURE — 74011250637 HC RX REV CODE- 250/637: Performed by: HOSPITALIST

## 2018-07-17 PROCEDURE — 76937 US GUIDE VASCULAR ACCESS: CPT

## 2018-07-17 PROCEDURE — 77030018836 HC SOL IRR NACL ICUM -A

## 2018-07-17 PROCEDURE — 3331090001 HH PPS REVENUE CREDIT

## 2018-07-17 PROCEDURE — 77030018719 HC DRSG PTCH ANTIMIC J&J -A

## 2018-07-17 PROCEDURE — C1892 INTRO/SHEATH,FIXED,PEEL-AWAY: HCPCS

## 2018-07-17 PROCEDURE — 74011636637 HC RX REV CODE- 636/637: Performed by: HOSPITALIST

## 2018-07-17 PROCEDURE — 74011000250 HC RX REV CODE- 250: Performed by: INTERNAL MEDICINE

## 2018-07-17 PROCEDURE — 77030002986 HC SUT PROL J&J -A

## 2018-07-17 PROCEDURE — 74011000258 HC RX REV CODE- 258: Performed by: INTERNAL MEDICINE

## 2018-07-17 PROCEDURE — 90935 HEMODIALYSIS ONE EVALUATION: CPT

## 2018-07-17 PROCEDURE — C1751 CATH, INF, PER/CENT/MIDLINE: HCPCS

## 2018-07-17 PROCEDURE — 82962 GLUCOSE BLOOD TEST: CPT

## 2018-07-17 PROCEDURE — 74011000250 HC RX REV CODE- 250: Performed by: RADIOLOGY

## 2018-07-17 PROCEDURE — 74011250637 HC RX REV CODE- 250/637: Performed by: FAMILY MEDICINE

## 2018-07-17 PROCEDURE — 77030039266 HC ADH SKN EXOFIN S2SG -A

## 2018-07-17 PROCEDURE — 3331090002 HH PPS REVENUE DEBIT

## 2018-07-17 PROCEDURE — 65270000032 HC RM SEMIPRIVATE

## 2018-07-17 PROCEDURE — 94660 CPAP INITIATION&MGMT: CPT

## 2018-07-17 RX ORDER — SODIUM CHLORIDE 9 MG/ML
25 INJECTION, SOLUTION INTRAVENOUS CONTINUOUS
Status: CANCELLED | OUTPATIENT
Start: 2018-07-17

## 2018-07-17 RX ORDER — SODIUM CHLORIDE 9 MG/ML
25 INJECTION, SOLUTION INTRAVENOUS ONCE
Status: COMPLETED | OUTPATIENT
Start: 2018-07-17 | End: 2018-07-17

## 2018-07-17 RX ORDER — HEPARIN 100 UNIT/ML
500 SYRINGE INTRAVENOUS AS NEEDED
Status: DISCONTINUED | OUTPATIENT
Start: 2018-07-17 | End: 2018-07-17 | Stop reason: HOSPADM

## 2018-07-17 RX ORDER — FENTANYL CITRATE 50 UG/ML
25 INJECTION, SOLUTION INTRAMUSCULAR; INTRAVENOUS
Status: DISCONTINUED | OUTPATIENT
Start: 2018-07-17 | End: 2018-07-17 | Stop reason: HOSPADM

## 2018-07-17 RX ORDER — HEPARIN 100 UNIT/ML
SYRINGE INTRAVENOUS
Status: DISPENSED
Start: 2018-07-17 | End: 2018-07-17

## 2018-07-17 RX ORDER — FENTANYL CITRATE 50 UG/ML
200 INJECTION, SOLUTION INTRAMUSCULAR; INTRAVENOUS
Status: CANCELLED | OUTPATIENT
Start: 2018-07-17

## 2018-07-17 RX ORDER — LIDOCAINE HYDROCHLORIDE 10 MG/ML
10 INJECTION, SOLUTION EPIDURAL; INFILTRATION; INTRACAUDAL; PERINEURAL
Status: COMPLETED | OUTPATIENT
Start: 2018-07-17 | End: 2018-07-17

## 2018-07-17 RX ORDER — MIDAZOLAM HYDROCHLORIDE 1 MG/ML
5 INJECTION, SOLUTION INTRAMUSCULAR; INTRAVENOUS
Status: CANCELLED | OUTPATIENT
Start: 2018-07-17

## 2018-07-17 RX ORDER — MIDAZOLAM HYDROCHLORIDE 1 MG/ML
5 INJECTION, SOLUTION INTRAMUSCULAR; INTRAVENOUS
Status: DISCONTINUED | OUTPATIENT
Start: 2018-07-17 | End: 2018-07-17 | Stop reason: HOSPADM

## 2018-07-17 RX ADMIN — FENTANYL CITRATE 25 MCG: 50 INJECTION, SOLUTION INTRAMUSCULAR; INTRAVENOUS at 11:02

## 2018-07-17 RX ADMIN — ASPIRIN 81 MG 81 MG: 81 TABLET ORAL at 13:16

## 2018-07-17 RX ADMIN — SODIUM CHLORIDE 25 ML/HR: 900 INJECTION, SOLUTION INTRAVENOUS at 10:52

## 2018-07-17 RX ADMIN — DOCUSATE SODIUM 100 MG: 100 CAPSULE, LIQUID FILLED ORAL at 17:21

## 2018-07-17 RX ADMIN — FLUOXETINE 20 MG: 20 CAPSULE ORAL at 13:16

## 2018-07-17 RX ADMIN — POLYETHYLENE GLYCOL 3350 17 G: 17 POWDER, FOR SOLUTION ORAL at 13:14

## 2018-07-17 RX ADMIN — FENTANYL CITRATE 50 MCG: 50 INJECTION, SOLUTION INTRAMUSCULAR; INTRAVENOUS at 09:26

## 2018-07-17 RX ADMIN — OXYCODONE AND ACETAMINOPHEN 2 TABLET: 5; 325 TABLET ORAL at 17:21

## 2018-07-17 RX ADMIN — OXYCODONE AND ACETAMINOPHEN 2 TABLET: 5; 325 TABLET ORAL at 22:48

## 2018-07-17 RX ADMIN — OXYCODONE HYDROCHLORIDE AND ACETAMINOPHEN 1000 MG: 500 TABLET ORAL at 13:14

## 2018-07-17 RX ADMIN — MIDAZOLAM HYDROCHLORIDE 1 MG: 1 INJECTION, SOLUTION INTRAMUSCULAR; INTRAVENOUS at 10:51

## 2018-07-17 RX ADMIN — LIDOCAINE HYDROCHLORIDE 10 ML: 10 INJECTION, SOLUTION EPIDURAL; INFILTRATION; INTRACAUDAL; PERINEURAL at 11:12

## 2018-07-17 RX ADMIN — FENTANYL CITRATE 25 MCG: 50 INJECTION, SOLUTION INTRAMUSCULAR; INTRAVENOUS at 11:05

## 2018-07-17 RX ADMIN — PANTOPRAZOLE SODIUM 40 MG: 40 TABLET, DELAYED RELEASE ORAL at 17:21

## 2018-07-17 RX ADMIN — CARVEDILOL 12.5 MG: 12.5 TABLET, FILM COATED ORAL at 17:21

## 2018-07-17 RX ADMIN — NORTRIPTYLINE HYDROCHLORIDE 50 MG: 25 CAPSULE ORAL at 22:49

## 2018-07-17 RX ADMIN — SEVELAMER CARBONATE 800 MG: 800 TABLET, FILM COATED ORAL at 13:15

## 2018-07-17 RX ADMIN — ASCORBIC ACID, THIAMINE MONONITRATE,RIBOFLAVIN, NIACINAMIDE, PYRIDOXINE HYDROCHLORIDE, FOLIC ACID, CYANOCOBALAMIN, BIOTIN, CALCIUM PANTOTHENATE, 1 CAPSULE: 100; 1.5; 1.7; 20; 10; 1; 6000; 150000; 5 CAPSULE, LIQUID FILLED ORAL at 13:14

## 2018-07-17 RX ADMIN — SEVELAMER CARBONATE 800 MG: 800 TABLET, FILM COATED ORAL at 17:21

## 2018-07-17 RX ADMIN — CINACALCET HYDROCHLORIDE 30 MG: 30 TABLET, COATED ORAL at 22:53

## 2018-07-17 RX ADMIN — AZTREONAM 500 MG: 1 INJECTION, POWDER, LYOPHILIZED, FOR SOLUTION INTRAMUSCULAR; INTRAVENOUS at 14:23

## 2018-07-17 RX ADMIN — GABAPENTIN 100 MG: 100 CAPSULE ORAL at 22:49

## 2018-07-17 RX ADMIN — FENTANYL CITRATE 25 MCG: 50 INJECTION, SOLUTION INTRAMUSCULAR; INTRAVENOUS at 10:50

## 2018-07-17 RX ADMIN — Medication 10 ML: at 14:24

## 2018-07-17 RX ADMIN — VANCOMYCIN HYDROCHLORIDE 1000 MG: 1 INJECTION, POWDER, LYOPHILIZED, FOR SOLUTION INTRAVENOUS at 08:23

## 2018-07-17 RX ADMIN — FENTANYL CITRATE 25 MCG: 50 INJECTION, SOLUTION INTRAMUSCULAR; INTRAVENOUS at 10:23

## 2018-07-17 RX ADMIN — SIMVASTATIN 20 MG: 20 TABLET, FILM COATED ORAL at 22:49

## 2018-07-17 RX ADMIN — INSULIN LISPRO 2 UNITS: 100 INJECTION, SOLUTION INTRAVENOUS; SUBCUTANEOUS at 17:22

## 2018-07-17 RX ADMIN — INSULIN LISPRO 3 UNITS: 100 INJECTION, SOLUTION INTRAVENOUS; SUBCUTANEOUS at 17:20

## 2018-07-17 RX ADMIN — MIDAZOLAM HYDROCHLORIDE 1 MG: 1 INJECTION, SOLUTION INTRAMUSCULAR; INTRAVENOUS at 11:00

## 2018-07-17 RX ADMIN — OXYCODONE AND ACETAMINOPHEN 2 TABLET: 5; 325 TABLET ORAL at 13:16

## 2018-07-17 RX ADMIN — GABAPENTIN 100 MG: 100 CAPSULE ORAL at 13:15

## 2018-07-17 NOTE — PROGRESS NOTES
Progress Note    Patient: Bobo Cabrera MRN: [id number]  SSN: [id number]    YOB: 1950  Age: 70 y.o. Sex: female      Admit Date: 2018  5 Days Post-Op     Procedure:   Procedure(s):  DEBRIDEMENT RIGHT HEEL ULCER WITH PARTIAL CALCANECTOMY    Subjective:     Patient seen & examined at bedside. Daughter at bedside. Wound care nurse at bedside also. Objective:     Visit Vitals    /69 (BP 1 Location: Left arm, BP Patient Position: At rest)    Pulse 77    Temp 98.8 °F (37.1 °C)    Resp 16    Ht 6' 1.75\" (1.873 m)    Wt 103.9 kg (229 lb 0.9 oz)    SpO2 98%    Breastfeeding No    BMI 29.61 kg/m2      Right Foot Exam: plantar wound granulating in appropriately. +mild sanguinous drainage, no pus.  +exposed bone. Labs/Radiology Review: images and reports reviewed    Assessment:     Hospital Problems  Date Reviewed: 2018          Codes Class Noted POA    Anemia ICD-10-CM: D64.9  ICD-9-CM: 285.9  2018 Unknown        ESRD (end stage renal disease) ([facility] ICD-10-CM: N18.6  ICD-9-CM: 585.6  2018 Unknown        Bilateral leg edema ICD-10-CM: R60.0  ICD-9-CM: 782.3  2018 Unknown        LFT elevation ICD-10-CM: R79.89  ICD-9-CM: 790.6  2018 Unknown        Type 2 diabetes mellitus with hyperglycemia ([facility] ICD-10-CM: E11.65  ICD-9-CM: 250.00  2018 Unknown        * (Principal)Bilateral lower leg cellulitis ICD-10-CM: L03.116, L03.115  ICD-9-CM: 682.6  2018 Unknown              Plan/Recommendations/Medical Decision Makin.) Continue antibxs per ID.   2.) Continue wound vac therapy. 3.) Continue strict nwb to right foot. 4.) Ok to d/c from my standpoint. See below for d/c instructions. Discharge Instructions  1.) Follow up with Dr. Catie Prado (Foot Doc) at [address] ([phone]) in 1-2 weeks. 2.) Apply wound vac to right heel wound and set vac to 125 mmHg continuous therapy. Change vac dressing per protocol.   Apply moistened aquacell Ag to left ankle wound and wrap with gauze dressing every other day. 3.) Strict non weightbearing to right foot.   Elevate foot when at rest.    Signed By: Jesica Cuevas DPM     July 17, 2018

## 2018-07-17 NOTE — H&P
Radiology History and Physical    Patient: Michelle Bang 70 y.o. female       Chief Complaint: Fever and Wound Check      History of Present Illness: long term antibiotics     History:    Past Medical History:   Diagnosis Date    Abscess of abdominal wall 2006?  Adverse effect of anesthesia     DIFFICULTY WAKING 20 YEARS AGO    Anal cryptitis 06/04/2012    Anemia     Arthritis 10/14/2010    back, neck, knees, hands    Asthma     \"TOUCH OF\"    Axillary abscess     right axillary    Blood transfusion 1999    [facility], NO REACTION    Blood [place] 1980'S    [place] NO REACTION    [facility] kidney [id number]    Chronic pain     BACK, NECK, HANDS, KNEES    Depression     Diabetes mellitus type 2, insulin dependent ([address] 10/14/2010    Dialysis patient ([address] Since 3/3/2010    M, W, F    GERD (gastroesophageal reflux disease)     Glaucoma     Heart failure ([address] 2004    IN PAST-CHF; PT WAS 412lb AT THE TIME.     High cholesterol     HTN (hypertension) 10/14/2010    IBS (irritable bowel syndrome)     Migraine     Morbid obesity ([address] 10/14/2010    HAS LOST 150+ POUNDS SINCE 2010    Nausea 04/14/2017    PERSISTENT    Nausea & vomiting     Neuropathy 10/14/2010    FEET, LEGS & FACE    Other ill-defined conditions(799.89)     facial neuropathy STATES PN 1/17/11 HAS NEUROPATHY OF FEET/ LEGS     Other ill-defined conditions(799.89)     glaucoma and cateracts    Other ill-defined conditions(799.89) 04/14/2017    ANEMIA    Perineal abscess 1/25/2017    Psychiatric disorder     ANXIETY AND DEPRESSION    s/p debridement of midline abd wound 6/24/2011    Serratia wound infection, 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 06/14/2011    Stroke ([address]     TIA, NO RESIDUAL    Thromboembolus ([address] 2007    LEFT LEG    Thyroid disease     LOW THYROID    Unspecified adverse effect of anesthesia [address] AFTER OTHER SURGERY; WEIGHT 400+ POUNDS    Unspecified sleep apnea     HAS NOT USED CPAP SINCE LOSING WEIGHT, PT STATES ON 4/14/17     Family History   Problem Relation Age of Onset    Diabetes Mother     Hypertension Mother     Dementia Mother     Psychiatric Disorder Mother      DEMENTIA    Cancer Father      colon STATED ON 1/17/11-PROSTATE CANCER NOT COLON    Hypertension Father     Diabetes Father     Cancer Brother      colon    Cancer Sister      BREAST    Other Sister      FIBROMYALGIA AND RA    Hypertension Sister     Thyroid Disease Sister     Hypertension Sister     Cancer Sister      COLON    Thyroid Disease Sister     Hypertension Sister     Diabetes Sister     Hypertension Sister     Diabetes Sister     Hypertension Sister     Diabetes Sister     Hypertension Daughter     Hypertension Son     Hypertension Son     Anesth Problems Neg Hx      Social History     Social History    Marital status:      Spouse name: N/A    Number of children: N/A    Years of education: N/A     Occupational History    Not on file. Social History Main Topics    Smoking status: Never Smoker    Smokeless tobacco: Never Used    Alcohol use No    Drug use: No    Sexual activity: Not on file     Other Topics Concern    Not on file     Social History Narrative       Allergies: Allergies   Allergen Reactions    Latex Itching     Rash, sometimes difficult to breathe    Celebrex [Celecoxib] Anaphylaxis and Swelling     TONGUE.  LIPS AND EYES    Iodine Other (comments)     IV CONTRAST-LESIONS, AND SKIN SLUFFED OFF    Keflex [Cephalexin] Anaphylaxis and Swelling     Tongue, lips, and eyes    Levaquin [Levofloxacin] Anaphylaxis and Swelling     Tongue, lips, and eyes    Pcn [Penicillins] Anaphylaxis and Swelling     Tongue, lips and eyes       Current Medications:  Current Facility-Administered Medications   Medication Dose Route Frequency    heparin (porcine) pf 100 unit/mL        [START ON 7/18/2018] vancomycin random level 7/18/18 with AM labs    Other ONCE    insulin glargine (LANTUS) injection 20 Units  20 Units SubCUTAneous QHS    insulin lispro (HUMALOG) injection 2 Units  2 Units SubCUTAneous TIDAC    linaclotide (LINZESS) capsule 290 mcg (Patient Supplied)  290 mcg Oral ACB    heparin (porcine) 1,000 unit/mL injection 2,000 Units  2,000 Units Hemodialysis DIALYSIS PRN    albumin human 25% (BUMINATE) solution 50 g  50 g IntraVENous DIALYSIS PRN    polyethylene glycol (MIRALAX) packet 17 g  17 g Oral DAILY    docusate sodium (COLACE) capsule 100 mg  100 mg Oral BID    epoetin joi (EPOGEN;PROCRIT) injection 20,000 Units  20,000 Units SubCUTAneous Q MON, WED & FRI    fentaNYL citrate (PF) injection 50 mcg  50 mcg IntraVENous Q2H PRN    oxyCODONE-acetaminophen (PERCOCET) 5-325 mg per tablet 1-2 Tab  1-2 Tab Oral Q4H PRN    ondansetron (ZOFRAN) injection 4 mg  4 mg IntraVENous Q6H PRN    pantoprazole (PROTONIX) tablet 40 mg  40 mg Oral ACB&D    alum-mag hydroxide-simeth (MYLANTA) oral suspension 30 mL  30 mL Oral Q4H PRN    aztreonam (AZACTAM) 500 mg in 0.9% sodium chloride 100 mL IVPB  500 mg IntraVENous Q12H    vancomycin (VANCOCIN) 1,000 mg in 0.9% sodium chloride (MBP/ADV) 250 mL  1,000 mg IntraVENous DIALYSIS MON, WED & FRI    carvedilol (COREG) tablet 12.5 mg  12.5 mg Oral BID WITH MEALS    cinacalcet (SENSIPAR) tablet 30 mg  30 mg Oral DAILY    ascorbic acid (vitamin C) (VITAMIN C) tablet 1,000 mg  1,000 mg Oral DAILY    aspirin chewable tablet 81 mg  81 mg Oral DAILY    B complex-vitaminC-folic acid (NEPHROCAP) cap  1 Cap Oral DAILY    FLUoxetine (PROzac) capsule 20 mg  20 mg Oral DAILY    gabapentin (NEURONTIN) capsule 100 mg  100 mg Oral BID    latanoprost (XALATAN) 0.005 % ophthalmic solution 1 Drop  1 Drop Both Eyes QHS    midodrine (PROAMITINE) tablet 5 mg  5 mg Oral Once per day on Mon Wed Fri    polyethylene glycol (MIRALAX) packet 17 g  17 g Oral BID PRN    sevelamer carbonate (RENVELA) tab 800 mg  800 mg Oral TID WITH MEALS    simvastatin (ZOCOR) tablet 20 mg  20 mg Oral QHS    sodium chloride (NS) flush 5-10 mL  5-10 mL IntraVENous Q8H    sodium chloride (NS) flush 5-10 mL  5-10 mL IntraVENous PRN    insulin lispro (HUMALOG) injection   SubCUTAneous AC&HS    glucose chewable tablet 16 g  4 Tab Oral PRN    dextrose (D50W) injection syrg 12.5-25 g  12.5-25 g IntraVENous PRN    glucagon (GLUCAGEN) injection 1 mg  1 mg IntraMUSCular PRN    nortriptyline (PAMELOR) capsule 50 mg  50 mg Oral QHS    Vancomycin- pharmacy to dose   Other Rx Dosing/Monitoring        Physical Exam:  Blood pressure 126/69, pulse 77, temperature 98.8 °F (37.1 °C), resp. rate 16, height 6' 1.75\" (1.873 m), weight 103.9 kg (229 lb 0.9 oz), SpO2 98 %, not currently breastfeeding. LUNG: clear to auscultation bilaterally, HEART: regular rate and rhythm, S1, S2 normal, no murmur, click, rub or gallop      Alerts:    Hospital Problems  Date Reviewed: 7/6/2018          Codes Class Noted POA    Anemia ICD-10-CM: D64.9  ICD-9-CM: 285.9  7/6/2018 Unknown        ESRD (end stage renal disease) ([facility] ICD-10-CM: N18.6  ICD-9-CM: 585.6  7/6/2018 Unknown        Bilateral leg edema ICD-10-CM: R60.0  ICD-9-CM: 782.3  7/6/2018 Unknown        LFT elevation ICD-10-CM: R79.89  ICD-9-CM: 790.6  7/6/2018 Unknown        Type 2 diabetes mellitus with hyperglycemia ([facility] ICD-10-CM: E11.65  ICD-9-CM: 250.00  7/6/2018 Unknown        * (Principal)Bilateral lower leg cellulitis ICD-10-CM: L03.116, L03.115  ICD-9-CM: 682.6  7/6/2018 Unknown              Laboratory:      Recent Labs      07/16/18   0505   HGB  8.1*   HCT  26.7*   WBC  9.6   PLT  325   BUN  57*   CREA  6.42*   K  5.2*         Plan of Care/Planned Procedure:  Risks, benefits, and alternatives reviewed with patient and she agrees to proceed with the procedure.      Plan is for azar catheter placement       Lidya Wong MD

## 2018-07-17 NOTE — PROGRESS NOTES
Follow-up call placed to Cat Ontiveros (admissions at [address]). Faxes did not go through on yesterday. Message left to return call to this CM with correct fax numbers. CM noted Dr. Joy Chino progress note with patient being on Azactam 500mg IV Q 12 hrs for 6 weeks. Areas to be addressed before final admission approval:  1. Facility will need to cost out this medication before giving final approval.  2. Coordination of Ambulance transportation to [address]. Reason:  Patient is non-weight-bearing and has a wound vac.   3. When will patient start hyperbaric chamber treatments? 4. Insurance authorization upon receipt of therapy notes. CM will continue to follow. SHANNON Molina, CRM    9:50a  CM called back to the facility. Cat Ontiveros was in a mtg. CM confirmed fax numbers as being correct. Medical record faxed again. SHANNON Molina, CRM    4:41p Above items have been addressed to the facility's satisfaction. Facility submitting request for insurance authorization.

## 2018-07-17 NOTE — PROGRESS NOTES
Jacob Catheter placed by Dr. Allison Ash. DDI Left upper chest.  Denies discomfort at site. Complains of pain right foot. Both feet elevated on one pillow. Wound VAC intact.

## 2018-07-17 NOTE — PROGRESS NOTES
Hospitalist Progress Note Rodolfo Durant MD 
Answering service: [phone] OR [phone] from in house phone Date of Service:  2018 NAME:  Irvin Cao :  1950 MRN:  [id number] Admission Summary:  
71 yo woman with anemia, arthritis, asthma, anxiety, ESRD on TTS HD, chronic pain, depression, DM2, GERD, glaucoma, HTN, HLD, IBS, migraine, morbid obesity, sleep apnea presented to the ED from home on 18 with leg swelling. She was admitted with b/l LE cellulitis, right heel wound, left ankle wound, hyperglycemia. Interval history / Subjective:  
  
C/o pain in the right heel, left ankle, chest and constipation but had BM today; just returned from IR after getting Jacob catheter; d/w nurse and CM Assessment & Plan:  
 
Acute bilateral foot cellulitis with infected right heel wound and osteomyelitis (POA) - still with pain - MRI of right foot  large heel ulcer with underlying soft tissue gas that extends nearly to the cortex of the underlying calcaneus. No fluid collection or osteo. - Vascular Surgery, Podiatry, ID following - LE PVR  normal but with calcifications - s/p abdominal aortogram with right leg runoff 7/10 without significant stenosis - s/p debridement of right heel ulcer with partial calcanectomy  
- BCx  negative x2 
- wound Cx  Proteus, Klebsiella, Enterococcus, MRSA, Alcaligenes  
- bone Cx  Proteus, Klebsiella, Pseudomonas, Enterococcus, CoNS x2 strains - currently on vancomycin, aztreonam 
- s/p Jacob catheter placement  for aztreonam x6 weeks per ID 
- plan for vancomycin in HD 
  
Right hand numbness/tingling with possible steal syndrome - seems like possibly some peripheral neuropathy, carpel tunnel? 
- upper extremity PVR 7/10 no evidence of hemodynamically significant right or left upper extremity arterial obstruction. No evidence of steal by the AV graft.   
- Fixing the venous flow might exacerbate any steal 
- Outpatient EMG to eval for neuropathy 
  
Hyponatremia - Na dropping; continue fluid restriction 
- might be able to fix with HD 
  
ESRD (chronic) - TTS HD as per Renal 
- continue Sensipar, Renvela, sodium bicarbonate - Upper extremity PVR 7/10 with patent right distal brachial artery to proximal brachial vein AV graft with evidence of a hemodynamically significant stenosis at the venous anastomosis 
- has R chest wall Permacath 
  
Anemia of chronic disease - H/H stable 
  
HTN - BP controlled on Coreg 
  
DM2 - recent A1c 9.1, BG up after steroids 
- continue Lantus and SSI plus scheduled premeal insulin 
  
Transaminitis - resolved 
  
Asthma - stable, nebs PRN 
  
Anxiety, depression - stable, continue on home Prozac, nortriptyline  
  
Left sacral wound, left open abrasion, left lateral ankle wound (POA) - wound care per nurse 
  
Morbid obesity - Body mass index is 29.55 kg/(m^2). -diet and weight loss program  
  
FARHAT on CPAP - CPAP qhs 
 
Constipation - bowel regimen 
  
Code status: Full Code DVT prophylaxis: SCD 
  
Care Plan discussed with: Patient/Family, Nurse and  Disposition: [facility] when medically stable, possibly tomorrow Hospital Problems  Date Reviewed: 7/6/2018 Codes Class Noted POA Anemia ICD-10-CM: D64.9 ICD-9-CM: 285.9  7/6/2018 Unknown ESR[facility] (end stage renal [facility]) [facility] ICD-10-CM: N18.6 ICD-9-CM: 585.6  7/6/2018 Unknown Bilateral leg edema ICD-10-CM: R60.0 ICD-9-CM: 782.3  7/6/2018 Unknown LFT elevation ICD-10-CM: R79.89 ICD-9-CM: 790.6  7/6/2018 Unknown Type 2 diabetes mellitus with hyperglycemia (HCC) ICD-10-CM: E11.65 ICD-9-CM: 250.00  7/6/2018 Unknown * (Principal)Bilateral lower leg cellulitis ICD-10-CM: L03.116, N96.484 ICD-9-CM: 682.6  7/6/2018 Unknown Review of Systems:  
Pertinent items are noted in HPI.   
 
 
Vital Signs:  
 Last 24hrs VS reviewed since prior progress note. Most recent are: 
Visit Vitals  /79  Pulse 74  Temp 98.2 °F (36.8 °C)  Resp 16  
 Ht 6' 1.75\" (1.873 m)  Wt 103.9 kg (229 lb 0.9 oz)  SpO2 97%  Breastfeeding No  
 BMI 29.61 kg/m2 Intake/Output Summary (Last 24 hours) at 07/17/18 1747 Last data filed at 07/17/18 5650 Gross per 24 hour Intake              120 ml Output             2500 ml Net            -2380 ml Physical Examination:  
 
 
     
Constitutional:  awake, no acute distress, cooperative, pleasant, obese ENT:  oral mucosa moist, oropharynx benign Resp:  CTA bilaterally anteriorly, no wheezing/rhonchi/rales CV:  regular rhythm, normal rate, no m/r/g appreciated GI:  +BS, soft, non distended, non tender, obese Musculoskeletal:  moves all extremities with reduced ROM b/l LE Neurologic:  AAOx3, responds appropriately to questions and commands Skin:  left sacral wound, b/l LE dressings c/d/i, RLE wound vac; right chest wall Permacath, left chest wall Jacob catheter Data Review:  
 Review and/or order of clinical lab test 
Review and/or order of tests in the radiology section of CPT Review and/or order of tests in the medicine section of CPT Labs:  
 
Recent Labs  
   07/16/18 
 0505 WBC  9.6 HGB  8.1* HCT  26.7*  
PLT  325 Recent Labs  
   07/16/18 
 0505  07/15/18 
 0533 NA  122*  126*  
K  5.2*  4.8  
CL  87*  92* CO2  21  23 BUN  57*  41* CREA  6.42*  5.56* GLU  66  103* CA  7.5*  7.7* PHOS  4.8*  4.2 Recent Labs  
   07/16/18 
 0505  07/15/18 
 0533 ALB  2.3*  1.8* No results for input(s): INR, PTP, APTT in the last 72 hours. No lab exists for component: INREXT No results for input(s): FE, TIBC, PSAT, FERR in the last 72 hours. No results found for: FOL, RBCF No results for input(s): PH, PCO2, PO2 in the last 72 hours. No results for input(s): CPK, CKNDX, TROIQ in the last 72 hours.  
 
No lab exists for component: CPKMB Lab Results Component Value Date/Time Cholesterol, total 144 04/10/2018 02:53 AM  
 HDL Cholesterol 66 04/10/2018 02:53 AM  
 LDL, calculated 67.6 04/10/2018 02:53 AM  
 Triglyceride 52 04/10/2018 02:53 AM  
 CHOL/HDL Ratio 2.2 04/10/2018 02:53 AM  
 
Lab Results Component Value Date/Time Glucose (POC) 218 (H) 07/17/2018 04:00 PM  
 Glucose (POC) 128 (H) 07/17/2018 07:01 AM  
 Glucose (POC) 125 (H) 07/16/2018 09:39 PM  
 Glucose (POC) 129 (H) 07/16/2018 04:10 PM  
 Glucose (POC) 145 (H) 07/16/2018 11:25 AM  
 
Lab Results Component Value Date/Time Color YELLOW 10/27/2012 10:25 PM  
 Appearance CLEAR 10/27/2012 10:25 PM  
 Specific gravity 1.012 10/27/2012 10:25 PM  
 Specific gravity 1.015 08/30/2010 07:10 AM  
 pH (UA) 8.0 10/27/2012 10:25 PM  
 Protein 100 (A) 10/27/2012 10:25 PM  
 Glucose NEGATIVE  10/27/2012 10:25 PM  
 Ketone NEGATIVE  10/27/2012 10:25 PM  
 Bilirubin NEGATIVE  10/27/2012 10:25 PM  
 Urobilinogen 1.0 10/27/2012 10:25 PM  
 Nitrites NEGATIVE  10/27/2012 10:25 PM  
 Leukocyte Esterase TRACE (A) 10/27/2012 10:25 PM  
 Epithelial cells 0-5 10/27/2012 10:25 PM  
 Bacteria NEGATIVE  10/27/2012 10:25 PM  
 WBC 10-20 10/27/2012 10:25 PM  
 RBC 5-10 10/27/2012 10:25 PM  
 
 
 
Medications Reviewed:  
 
Current Facility-Administered Medications Medication Dose Route Frequency  heparin (porcine) pf 100 unit/mL  [START ON 7/18/2018] vancomycin random level 7/18/18 with AM labs    Other ONCE  
 insulin glargine (LANTUS) injection 20 Units  20 Units SubCUTAneous QHS  insulin lispro (HUMALOG) injection 2 Units  2 Units SubCUTAneous TIDAC  linaclotide (LINZESS) capsule 290 mcg (Patient Supplied)  290 mcg Oral ACB  heparin (porcine) 1,000 unit/mL injection 2,000 Units  2,000 Units Hemodialysis DIALYSIS PRN  
 albumin human 25% (BUMINATE) solution 50 g  50 g IntraVENous DIALYSIS PRN  polyethylene glycol (MIRALAX) packet 17 g  17 g Oral DAILY  docusate sodium (COLACE) capsule 100 mg  100 mg Oral BID  epoetin joi (EPOGEN;PROCRIT) injection 20,000 Units  20,000 Units SubCUTAneous Q MON, WED & FRI  fentaNYL citrate (PF) injection 50 mcg  50 mcg IntraVENous Q2H PRN  
 oxyCODONE-acetaminophen (PERCOCET) 5-325 mg per tablet 1-2 Tab  1-2 Tab Oral Q4H PRN  
 ondansetron (ZOFRAN) injection 4 mg  4 mg IntraVENous Q6H PRN  pantoprazole (PROTONIX) tablet 40 mg  40 mg Oral ACB&D  
 alum-mag hydroxide-simeth (MYLANTA) oral suspension 30 mL  30 mL Oral Q4H PRN  
 aztreonam (AZACTAM) 500 mg in 0.9% sodium chloride 100 mL IVPB  500 mg IntraVENous Q12H  
 vancomycin (VANCOCIN) 1,000 mg in 0.9% sodium chloride (MBP/ADV) 250 mL  1,000 mg IntraVENous DIALYSIS MON, WED & FRI  carvedilol (COREG) tablet 12.5 mg  12.5 mg Oral BID WITH MEALS  cinacalcet (SENSIPAR) tablet 30 mg  30 mg Oral DAILY  ascorbic acid (vitamin C) (VITAMIN C) tablet 1,000 mg  1,000 mg Oral DAILY  aspirin chewable tablet 81 mg  81 mg Oral DAILY  B complex-vitaminC-folic acid (NEPHROCAP) cap  1 Cap Oral DAILY  FLUoxetine (PROzac) capsule 20 mg  20 mg Oral DAILY  gabapentin (NEURONTIN) capsule 100 mg  100 mg Oral BID  latanoprost (XALATAN) 0.005 % ophthalmic solution 1 Drop  1 Drop Both Eyes QHS  midodrine (PROAMITINE) tablet 5 mg  5 mg Oral Once per day on Mon Wed Fri  polyethylene glycol (MIRALAX) packet 17 g  17 g Oral BID PRN  
 sevelamer carbonate (RENVELA) tab 800 mg  800 mg Oral TID WITH MEALS  simvastatin (ZOCOR) tablet 20 mg  20 mg Oral QHS  sodium chloride (NS) flush 5-10 mL  5-10 mL IntraVENous Q8H  
 sodium chloride (NS) flush 5-10 mL  5-10 mL IntraVENous PRN  
 insulin lispro (HUMALOG) injection   SubCUTAneous AC&HS  
 glucose chewable tablet 16 g  4 Tab Oral PRN  
 dextrose (D50W) injection syrg 12.5-25 g  12.5-25 g IntraVENous PRN  
 glucagon (GLUCAGEN) injection 1 mg  1 mg IntraMUSCular PRN  
 nortriptyline (PAMELOR) capsule 50 mg 50 mg Oral QHS  Vancomycin- pharmacy to dose   Other Rx Dosing/Monitoring  
 
______________________________________________________________________ EXPECTED LENGTH OF STAY: 3d 19h ACTUAL LENGTH OF STAY:          Emeli Chamberlain MD

## 2018-07-17 NOTE — PROGRESS NOTES
IV Antibiotic Orders  1. Diagnosis:  Osteomyelitis of heel  2. Routine Jacob care  3. Antibiotic:  Azactam 500 mg IV Q 12 hours  4. Lab each Monday:   CBC/diff/platelets   BMP   ESR  5. Lab each Thursday:   CBC/diff/platelets   BMP  6. Fax lab to Dr. Ed Ramey @ [phone].  7.  Call Dr. Ed Ramey @ [phone] for WBC under 4.  8.  Duration of therapy: 6 weeks   Please call Dr. Ed Ramey @ [phone] before stopping therapy. 9.  Allergies: Allergies   Allergen Reactions    Latex Itching     Rash, sometimes difficult to breathe    Celebrex [Celecoxib] Anaphylaxis and Swelling     TONGUE. LIPS AND EYES    Iodine Other (comments)     IV CONTRAST-LESIONS, AND SKIN SLUFFED OFF    Keflex [Cephalexin] Anaphylaxis and Swelling     Tongue, lips, and eyes    Levaquin [Levofloxacin] Anaphylaxis and Swelling     Tongue, lips, and eyes    Pcn [Penicillins] Anaphylaxis and Swelling     Tongue, lips and eyes     10.   Call [phone] with name of [facility] and to set up 2-3 week appointment with tram Rodriguez MD

## 2018-07-17 NOTE — PROGRESS NOTES
[facility] 
 [address] [address], [address] [facility] Phone: [phone]  
 
Nephrology Progress Note Lucía Vergara     1950     [id number] Date of Admission : 7/6/2018 07/17/18 CC: Follow up for ESRD Assessment and Plan Diabetic Foot wound w/ infection - s/p Debridement  
- will need abx - aztreonam and vanco x 6 weeks per ID Hypervolemic Hyponatremia  
- repeat labs in AM 
 
ESRD - MWF dialysis 
- HD tomorrow 
  
Hypotension   
- midodrine prior to HD 
- albumin PRN 
 
DM 
  
Obesity, FARHAT 
  
Dyslipidemia 
  
Possible steal syndrome right hand Anemia of CKD 
-On epogen  
   
 
Interval History: 
Seen and examined. Dialysis done early this AM,  UF 2.5kg. Feeling better. Getting Jacob cath this AM for long-term abx. No cp or sob, n/v/d reported. Review of Systems: A comprehensive review of systems was negative. Current Medications:  
Current Facility-Administered Medications Medication Dose Route Frequency  heparin (porcine) pf 500 Units  500 Units InterCATHeter PRN  
 lidocaine (PF) (XYLOCAINE) 10 mg/mL (1 %) injection 10 mL  10 mL SubCUTAneous RAD ONCE  
 heparin (porcine) pf 100 unit/mL  [START ON 7/18/2018] vancomycin random level 7/18/18 with AM labs    Other ONCE  
 insulin glargine (LANTUS) injection 20 Units  20 Units SubCUTAneous QHS  insulin lispro (HUMALOG) injection 2 Units  2 Units SubCUTAneous TIDAC  linaclotide (LINZESS) capsule 290 mcg (Patient Supplied)  290 mcg Oral ACB  heparin (porcine) 1,000 unit/mL injection 2,000 Units  2,000 Units Hemodialysis DIALYSIS PRN  
 albumin human 25% (BUMINATE) solution 50 g  50 g IntraVENous DIALYSIS PRN  polyethylene glycol (MIRALAX) packet 17 g  17 g Oral DAILY  docusate sodium (COLACE) capsule 100 mg  100 mg Oral BID  epoetin joi (EPOGEN;PROCRIT) injection 20,000 Units  20,000 Units SubCUTAneous Q MON, WED & FRI  fentaNYL citrate (PF) injection 50 mcg  50 mcg IntraVENous Q2H PRN  
 oxyCODONE-acetaminophen (PERCOCET) 5-325 mg per tablet 1-2 Tab  1-2 Tab Oral Q4H PRN  
 ondansetron (ZOFRAN) injection 4 mg  4 mg IntraVENous Q6H PRN  pantoprazole (PROTONIX) tablet 40 mg  40 mg Oral ACB&D  
 alum-mag hydroxide-simeth (MYLANTA) oral suspension 30 mL  30 mL Oral Q4H PRN  
 aztreonam (AZACTAM) 500 mg in 0.9% sodium chloride 100 mL IVPB  500 mg IntraVENous Q12H  
 vancomycin (VANCOCIN) 1,000 mg in 0.9% sodium chloride (MBP/ADV) 250 mL  1,000 mg IntraVENous DIALYSIS MON, WED & FRI  carvedilol (COREG) tablet 12.5 mg  12.5 mg Oral BID WITH MEALS  cinacalcet (SENSIPAR) tablet 30 mg  30 mg Oral DAILY  ascorbic acid (vitamin C) (VITAMIN C) tablet 1,000 mg  1,000 mg Oral DAILY  aspirin chewable tablet 81 mg  81 mg Oral DAILY  B complex-vitaminC-folic acid (NEPHROCAP) cap  1 Cap Oral DAILY  FLUoxetine (PROzac) capsule 20 mg  20 mg Oral DAILY  gabapentin (NEURONTIN) capsule 100 mg  100 mg Oral BID  latanoprost (XALATAN) 0.005 % ophthalmic solution 1 Drop  1 Drop Both Eyes QHS  midodrine (PROAMITINE) tablet 5 mg  5 mg Oral Once per day on Mon Wed Fri  polyethylene glycol (MIRALAX) packet 17 g  17 g Oral BID PRN  
 sevelamer carbonate (RENVELA) tab 800 mg  800 mg Oral TID WITH MEALS  simvastatin (ZOCOR) tablet 20 mg  20 mg Oral QHS  sodium chloride (NS) flush 5-10 mL  5-10 mL IntraVENous Q8H  
 sodium chloride (NS) flush 5-10 mL  5-10 mL IntraVENous PRN  
 insulin lispro (HUMALOG) injection   SubCUTAneous AC&HS  
 glucose chewable tablet 16 g  4 Tab Oral PRN  
 dextrose (D50W) injection syrg 12.5-25 g  12.5-25 g IntraVENous PRN  
 glucagon (GLUCAGEN) injection 1 mg  1 mg IntraMUSCular PRN  
 nortriptyline (PAMELOR) capsule 50 mg  50 mg Oral QHS  Vancomycin- pharmacy to dose   Other Rx Dosing/Monitoring Allergies Allergen Reactions  Latex Itching Rash, sometimes difficult to breathe  Celebrex [Celecoxib] Anaphylaxis and Swelling TONGUE. LIPS AND EYES  
 Iodine Other (comments) IV CONTRAST-LESIONS, AND SKIN SLUFFED OFF  
 Keflex [Cephalexin] Anaphylaxis and Swelling Tongue, lips, and eyes  Levaquin [Levofloxacin] Anaphylaxis and Swelling Tongue, lips, and eyes  Pcn [Penicillins] Anaphylaxis and Swelling Tongue, lips and eyes Objective: 
Vitals:   
Vitals:  
 07/17/18 3094 07/17/18 8302 07/17/18 9429 07/17/18 3794 BP: 136/71 140/69 124/64 Pulse: 84 84 86 Resp:   18 Temp:   99.2 °F (37.3 °C) TempSrc:      
SpO2:    (P) 99% Weight:      
Height:      
 
Intake and Output: 
07/17 0701 - 07/17 1900 In: -  
Out: 2500  
07/15 1901 - 07/17 0700 In: 240 [P.O.:240] Out: - Physical Examination: 
General:Alert, No distress, HEENT:  pale Neck:RIJ permacath Lungs : Clears to auscultation Bilaterally, Normal respiratory effort CVS: RRR, S1 S2 normal, No rub, legs bandaged Abdomen: Soft, Non tender Extremities: No cyanosis, No clubbing; good thrill LUE AV graft MS: No joint swelling, erythema, warmth Neurologic: non focal, AAO x 3 Psych: normal affect 
 
[]    High complexity decision making was performed 
[]    Patient is at high-risk of decompensation with multiple organ involvement Lab Data Personally Reviewed: I have reviewed all the pertinent labs, microbiology data and radiology studies during assessment. Recent Labs  
   07/16/18 
 0505  07/15/18 
 0533 NA  122*  126*  
K  5.2*  4.8  
CL  87*  92* CO2  21  23 GLU  66  103* BUN  57*  41* CREA  6.42*  5.56* CA  7.5*  7.7* PHOS  4.8*  4.2 ALB  2.3*  1.8* Recent Labs  
   07/16/18 
 0505 WBC  9.6 HGB  8.1* HCT  26.7*  
PLT  325 Lab Results Component Value Date/Time  Specimen Description: URINE 10/27/2012 08:15 PM  
 Specimen Description: BLOOD 06/07/2011 07:40 AM  
 Specimen Description: URINE 08/30/2010 07:10 AM  
 Specimen Description: URINE 08/09/2010 11:30 AM  
 Specimen Description: URINE 06/22/2010 11:50 PM  
 
Lab Results Component Value Date/Time Culture result: NO ANAEROBES ISOLATED 07/12/2018 08:00 AM  
 Culture result: HEAVY PROTEUS MIRABILIS (A) 07/12/2018 08:00 AM  
 Culture result: LIGHT KLEBSIELLA OXYTOCA (A) 07/12/2018 08:00 AM  
 Culture result: (A) 07/12/2018 08:00 AM  
  LIGHT **METHICILLIN RESISTANT STAPHYLOCOCCUS AUREUS**  
 Culture result: LIGHT ENTEROCOCCUS FAECALIS GROUP D (A) 07/12/2018 08:00 AM  
 Culture result: LIGHT ALCALIGENES FAECALIS (A) 07/12/2018 08:00 AM  
 Culture result:  07/12/2018 08:00 AM  
  **[facility] REPORT** 
CALLED TO AND READ BACK BY 
JAELYN GUTIERREZ RN ON 7/15/18 AT 1440 TA. Culture result: MODERATE PROTEUS MIRABILIS (A) 07/12/2018 08:00 AM  
 Culture result: LIGHT KLEBSIELLA OXYTOCA (A) 07/12/2018 08:00 AM  
 Culture result: FEW PSEUDOMONAS AERUGINOSA (A) 07/12/2018 08:00 AM  
 Culture result: LIGHT ENTEROCOCCUS FAECALIS GROUP D (A) 07/12/2018 08:00 AM  
 Culture result: (A) 07/12/2018 08:00 AM  
  LIGHT STAPHYLOCOCCUS SPECIES, COAGULASE NEGATIVE (2 DIFFERENT COLONY TYPES/STRAINS) Recent Results (from the past 24 hour(s)) GLUCOSE, POC Collection Time: 07/16/18 11:25 AM  
Result Value Ref Range Glucose (POC) 145 (H) 65 - 100 mg/dL Performed by Cielo Chan GLUCOSE, POC Collection Time: 07/16/18  4:10 PM  
Result Value Ref Range Glucose (POC) 129 (H) 65 - 100 mg/dL Performed by Zora Mireles GLUCOSE, POC Collection Time: 07/16/18  9:39 PM  
Result Value Ref Range Glucose (POC) 125 (H) 65 - 100 mg/dL Performed by Zora Mireles GLUCOSE, POC Collection Time: 07/17/18  7:01 AM  
Result Value Ref Range Glucose (POC) 128 (H) 65 - 100 mg/dL Performed by Uziel Kothari I have reviewed the flowsheets. Chart and Pertinent Notes have been reviewed. No change in PMH ,family and social history from Consult note.  
 
 
Job Verdin MD

## 2018-07-17 NOTE — PROGRESS NOTES
Rcvd. In angio piror to [facility] Catheter placement for vascular access. Assessment as noted. Dr. Steven Mazariegos at bedside to evaluate patient and obtain consent. Verbalizes understanding of procedure. Consent signed by patient.

## 2018-07-17 NOTE — ADT AUTH CERT NOTES
Patient Demographics        Patient Name [address] Sex  Address Phone       Mannie Welch [phone] Female 1950 [address]  [address], [address] [place] [address] [phone] (Home) *Preferred*  [phone] (Mobile)           CSN:       [id number]           Admit Date: Admit Time Room Bed       2018  7:26  [97930] 01 [[id number]]           Attending Providers        Provider Pager From To       Juan Mcdaniel MD  18       Rubi Mata MD  18       Maria A Ramirez MD  07/07/18 07/10/18       Veronica Jones MD  07/10/18 07/17/18       Manuela Steele MD  18            Emergency Contact(s)        Name Relation Home Work Mobile       [address] [facility] Daughter [phone]         Utilization Review           Wound and Skin Management GRG - Care Day 11 (2018) by Asia Noland RN        Review Entered Review Status       2018 Completed       Details              Care Day: 11 Care Date: 2018 Level of Care: Inpatient Floor       Guideline Day 3        Level Of Care       (X) * Activity level acceptable       ( ) * Complete discharge planning              Clinical Status       (X) * Temperature status acceptable       (X) * No infection, or status acceptable       (X) * Pain and nausea absent or adequately managed       (X) * Wound and ulcer problems absent or status acceptable       (X) * No burn injury, or status acceptable       (X) * Traumatic injury absent or status appropriate       (X) * Skin disease absent or status appropriate       ( ) * General Discharge Criteria met              Interventions       (X) * Intake acceptable       ( ) * No inpatient interventions needed                                   * Milestone              Additional Notes       18              VS:  97.1  64  18  975  132/76              RESULTS:       RBC 3.05       HGB 8.1       HCT 26.7       RDW 15.7       MPV 8.7              K 5.2       CL 87       BUN 57       CR 6.42       CA 7.5       PHOS 4.8       ALB 2.3              MEDS:       AZACTAM 500 MG IV EVERY 12 HOURS              IM:       Acute bilateral foot cellulitis with infected right heel wound and osteomyelitis (POA) - still with pain       - MRI of right foot 7/8 shows large heel ulcer with underlying soft tissue gas that extends nearly to the cortex of the underlying calcaneus. No fluid collection or osteo.       - LE PVR 7/8 normal but with calcifications       - RLE Arteriogram 6/10 without significant stenosis       - Debridement of the calcaneus 7/12       - Bcx 7/6 no growth       - Wound cx 7/12 with proteus, klebsiella, enterococcus, MRSA and alcaligenes        - Bone cx 7/12 with proteus, klebsiella, pseudomonas, enterococcus and 2 strains of coag negative staph       - Continue on IV vancomycin, aztreonam - will need a laura for 6 weeks of IV abx       - Vascular surgeon, ID, Podiatrist consulted               Right hand numbness/tingling with possible steal syndrome - seems like possibly some peripheral neuropathy, carpel tunnel?       - Upper extremity PVR 7/10 with no evidence of hemodynamically significant right or left upper extremity arterial obstruction.  No evidence of steal by the AV graft.        - Fixing the venous flow might exacerbate any steal       - Outpatient EMG to eval for neuropathy               Hyponatremia - sodium dropping       - I agree with fluid restriction                ESRD (chronic)       - Continue HD       - Continue on sensipar, renvela,  sodium bicarbonate       - Upper extremity PVR 7/10 with patent right distal brachial artery to proximal brachial vein AV graft with evidence of a hemodynamically significant stenosis at the venous anastomosis       - Nephrologist consulted               Anemia of chronic disease - H/H stable               HTN - BP normal, continue on coreg, monitor BP               DM-II - recent A1c 9.1, BG up after steroids       - Continue lantus       - Added standing with-meal insulin       - Sliding scale and monitor finger stick glucose               Elevated liver enzyme - multifactorial, improved               Hx of asthma - stable, nebs PRN               Hx of anxiety, depression - stable, continue on home prozac, nortriptyline                Left sacral wound, left open abrasion, left lateral ankle wound POA       - Continue wound care per wound care nurse               Morbid obesity - Body mass index is 29.55 kg/(m^2).       -diet and weight loss program                Hx of sleep apnea - CPAP q hs                Code status: Full Code       DVT prophylaxis: SCD               Care Plan discussed with: Patient/Family and Nurse       Disposition: to SNF, likely Tuesday after wound vac change              NEPHROLOGY:       Diabetic Foot wound w/ infection        - s/p Debridement                Hypervolemic Hyponatremia        - UF today should help                ESRD - MWF dialysis       - HD today                 Hypotension         - midodrine prior to HD       - albumin PRN               DM                Obesity, FARHAT                Dyslipidemia                Possible steal syndrome right hand               Anemia of CKD       -On epogen                        PODIATRY:       1.) Continue antibxs per ID.  Sx cxs = Proteus, Klebsiella, MRSA, Enterococcus.  Bone cx = Proteus, Klebsiella, Pseudomonas, Enterococcus, coag neg staph.  Path pending.       2.) Continue wound vac therapy.  Vac to be changed tomorrow.       3.) Continue strict nwb to right heel       4.) Ok to d/c from my standpoint once final antibx orders given by ID and vac arranged upon discharge.  See below for d/c instructions.           Wound and Skin Management GRG - Care Day 5 (7/10/2018) by Soraya Maya RN        Review Entered Review Status       7/11/2018 Completed       Details              Care Day: 5 Care Date: 7/10/2018 Level of Care: Inpatient Floor       Guideline Day 2        Clinical Status       (X) * No ICU or intermediate care needs                                   * Milestone              Additional Notes       INTERNAL MED:       Assessment & Plan:               Acute bilateral foot cellulitis with acute on chronic right leg wound infection wound (POA)       - MRI of right foot 7/8 shows large heel ulcer with underlying soft tissue gas that extends nearly to the cortex of the underlying calcaneus. No fluid collection or osteo.       - LE PVR 7/8 normal but with calcifications       - RLE Arteriogram 6/10 without significant stenosis       - Bcx 7/6 no growth       - Continue on IV vancomycin        - Vascular surgeon, Podiatrist consulted               Right hand numbness/tingling - seems like possibly some peripheral neuropathy, carpel tunnel?       - Upper extremity PVR 7/10 with no evidence of hemodynamically significant right or left upper extremity arterial obstruction.  No evidence of steal by the AV graft.        - Outpatient EMG to confirm               Hyponatremia - sodium still low       - May need some fluid restriction                ESRD (chronic)       - Continue HD       - Continue on sensipar, renvela,  sodium bicarbonate       - Upper extremity PVR 7/10 with patent right distal brachial artery to proximal brachial vein AV graft with evidence of a hemodynamically significant stenosis at the venous anastomosis       - Nephrologist consulted               Anemia of chronic disease - H/H stable               HTN - BP normal, continue on coreg, monitor BP               DM-II - recent A1c 9.1, BG still elevated       - Increase lantus       - Add standing with-meal insulin       - Sliding scale and monitor finger stick glucose               Elevated liver enzyme - multifactorial, improved               Hx of asthma - stable, nebs PRN               Hx of anxiety, depression - stable, continue on home prozac, nortriptyline              Left sacral wound, left open abrasion, left lateral ankle wound POA       - Continue wound care per wound care nurse               Morbid obesity - Body mass index is 33.39 kg/(m^2).       -diet and weight loss program                Hx of sleep apnea - CPAP q hs                Code status: Full Code       DVT prophylaxis: SCD               Care Plan discussed with: Patient/Family and Nurse       Disposition: TBD               INFECTIOUS DISEASE:        Impression:       \" Chronic bilateral foot wounds       \" DM        \" PVD       \" Episodic fevers--may be graft associated       \" Multiple antibiotic allergies               Plan:       \" Continue vancomycin       \" Add aztreonam, as she has tolerated this in the past       \" OR pending       \" Follow up cultures              PODIATRY:       Podiatry       -Pt seen & examined at bedside. Family at bedside.        -MRI negative for osteo.       -Per Dr. Jamie Correa, Pt has adequate circulation to proceed with foot surgery.       -Will plan for right heel debridement with partial calcanectomy on Thursday - time TBD. Post-op, Pt will require wound vac therapy, extended IV antibxs, HBO therapy, and PT upon discharge.  Family and Pt agree with the plan.         -Continue antibxs per ID.              LABS: , BUN 42, CR 6.43       MEDS: NS 25ML/HR IV CONT, AZACTAM 500MG IV Q12H, NEPHROCAP PO DAILY, FENTANYL 25MCG IV x7, NEURONTIN PO BID, PAMELOR PO QHS, DELTASON PO ONCE, HEPARIN 2000 UNITS IR       VITALS: T97.7, P 60, RR 18 /55, 98% RA

## 2018-07-17 NOTE — PROGRESS NOTES
Attempted IV access x 3 in left arm but catheter would not thread completely. 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 Nurse advised.  Patient to receive Jacob in the am.

## 2018-07-17 NOTE — PROGRESS NOTES
Report called to Karyle Hausen regarding Jacob Catheter placement, medications given, VS, pain level, mental status, IV site and post orders. Transport requested.

## 2018-07-17 NOTE — PROGRESS NOTES
ID Progress Note  2018    Subjective:     She is still with a lot of pain--she did get her Jacob placed earlier    Objective:     Antibiotics:  1. Azactam  2. Vancomycin       Vitals:   Visit Vitals    /69 (BP 1 Location: Left arm, BP Patient Position: At rest)    Pulse 77    Temp 98.8 °F (37.1 °C)    Resp 16    Ht 6' 1.75\" (1.873 m)    Wt 103.9 kg (229 lb 0.9 oz)    SpO2 98%    Breastfeeding No    BMI 29.61 kg/m2        Tmax:  Temp (24hrs), Av.4 °F (36.9 °C), Min:97.1 °F (36.2 °C), Max:99.2 °F (37.3 °C)      Exam:  Wound dressed, no proximal cellulitis. Labs:      Recent Labs      18   0505  07/15/18   0533   WBC  9.6   --    HGB  8.1*   --    PLT  325   --    BUN  57*  41*   CREA  6.42*  5.56*       Cultures:     Lab Results   Component Value Date/Time    Specimen Description: URINE 10/27/2012 08:15 PM    Specimen Description: BLOOD 2011 07:40 AM    Specimen Description: URINE 2010 07:10 AM     Lab Results   Component Value Date/Time    Culture result: NO ANAEROBES ISOLATED 2018 08:00 AM    Culture result: HEAVY PROTEUS MIRABILIS (A) 2018 08:00 AM    Culture result: LIGHT KLEBSIELLA OXYTOCA (A) 2018 08:00 AM    Culture result: (A) 2018 08:00 AM     LIGHT **METHICILLIN RESISTANT STAPHYLOCOCCUS AUREUS**    Culture result: LIGHT ENTEROCOCCUS FAECALIS GROUP D (A) 2018 08:00 AM    Culture result: LIGHT ALCALIGENES FAECALIS (A) 2018 08:00 AM    Culture result:  2018 08:00 AM     **[facility] REPORT**  CALLED TO AND READ BACK BY  JAELYN GUTIERREZ RN ON 7/15/18 AT 1440 TA.       Culture result: MODERATE PROTEUS MIRABILIS (A) 2018 08:00 AM    Culture result: LIGHT KLEBSIELLA OXYTOCA (A) 2018 08:00 AM    Culture result: FEW PSEUDOMONAS AERUGINOSA (A) 2018 08:00 AM    Culture result: LIGHT ENTEROCOCCUS FAECALIS GROUP D (A) 2018 08:00 AM    Culture result: (A) 2018 08:00 AM     LIGHT STAPHYLOCOCCUS SPECIES, COAGULASE NEGATIVE (2 DIFFERENT COLONY TYPES/STRAINS)       Radiology:     Line/Insert Date:           Assessment:     1. Diabetic foot wound--polymicrobial case  2. Will be on 2 antibiotics    Objective:     1. Continue current therapy  2. Will need to be on Azactam and vancomycin in the outpatient setting for 6 weeks or so--vancomycin orders will need to come from nephrology to be given with dialysis (1 gram with each dialysis)--azactam orders on chart  3.  OK for discharge from my standpoint    Jose Baer MD

## 2018-07-17 NOTE — PROGRESS NOTES
Occupational Therapy: Patient at dialysis this AM and off the floor for placement of a Jacob catheter at second attempt. Will follow.   SONIA Ramirez Ma/L

## 2018-07-17 NOTE — PROGRESS NOTES
Bedside shift change report given to [address] [place] [address] (oncoming nurse) by Amy Foster RN (offgoing nurse). Report included the following information SBAR, Kardex, Intake/Output, MAR and Recent Results.

## 2018-07-17 NOTE — DIABETES MGMT
DTC Progress Note Recommendations/ Comments: Chart reviewed due to hypoglycemia. Pt with a blood sugar of 74 mg/dl yesterday morning. Noted Lantus decreased to 20 units. DTC to continue to follow. Current hospital DM medication: Lantus 20 units, Humalog 2 units at meals and correction scale Humalog with normal sensitivity. Chart reviewed on Cecilia Seay. Patient is a 70 y.o. female with known diabetes on Levemir 12 units and Humalog sliding scale at home. A1c:  
Lab Results Component Value Date/Time Hemoglobin A1c 9.1 (H) 04/10/2018 02:53 AM  
 Hemoglobin A1c 7.2 (H) 10/06/2009 04:00 AM  
 
 
Recent Glucose Results:  
Lab Results Component Value Date/Time GLUCPOC 128 (H) 07/17/2018 07:01 AM  
 GLUCPOC 125 (H) 07/16/2018 09:39 PM  
 GLUCPOC 129 (H) 07/16/2018 04:10 PM  
  
 
Lab Results Component Value Date/Time Creatinine 6.42 (H) 07/16/2018 05:05 AM  
 
Estimated Creatinine Clearance: 11.5 mL/min (based on Cr of 6.42). Active Orders Diet DIET NPO  
  
 
PO intake:  
Patient Vitals for the past 72 hrs: 
 % Diet Eaten  
07/16/18 1924 50 % Will continue to follow as needed. Thank you Liset Galindo RD, CDE

## 2018-07-17 NOTE — DIALYSIS
[facility]  [phone]    Vitals   Pre   Post   Assessment   Pre   Post     Temp  Temp: 98.4 °F (36.9 °C) (07/17/18 0355)  96.9 LOC  AXOX3 AXOX3   HR   Pulse (Heart Rate): 65 (07/17/18 0355) 88 Lungs   CTA, diminished  Ant, lobes  CTA   B/P   BP: 118/70 (07/17/18 0355) 116/88 Cardiac   S1S2 S1S2   Resp   Resp Rate: 20 (07/17/18 0355) 18 Skin   RLE wound vac, sacral decub  Unchanged   Pain level  0 0 Edema  Generalized     Traces   Orders:    Duration:   Start:   4092 End:    0725 Total:   3.5 hours   Dialyzer:   Dialyzer/Set Up Inspection: Jeremy Samayoaans (07/17/18 0355)   K Bath:   Dialysate K (mEq/L): 3 (07/17/18 0355)   Ca Bath:   Dialysate CA (mEq/L): 2.5 (07/17/18 0355)   Na/Bicarb:   Dialysate NA (mEq/L): 140 (07/17/18 0355)   Target Fluid Removal:   Goal/Amount of Fluid to Remove (mL): 2500 mL (07/17/18 0355)   Access     Type & Location:   R subcl CVC , no s/sx infection, ports patent x 2, dsg XCDI with visible biopatch dated 07/13/18   Labs     Obtained/Reviewed   Critical Results Called   Date when labs were drawn-  Hgb-    HGB   Date Value Ref Range Status   07/16/2018 8.1 (L) 11.5 - 16.0 g/dL Final     K-    Potassium   Date Value Ref Range Status   07/16/2018 5.2 (H) 3.5 - 5.1 mmol/L Final     Ca-   Calcium   Date Value Ref Range Status   07/16/2018 7.5 (L) 8.5 - 10.1 MG/DL Final     Bun-   BUN   Date Value Ref Range Status   07/16/2018 57 (H) 6 - 20 MG/DL Final     Creat-   Creatinine   Date Value Ref Range Status   07/16/2018 6.42 (H) 0.55 - 1.02 MG/DL Final        Medications/ Blood Products Given     Name   Dose   Route and Time                     Blood Volume Processed (BVP):    77.5 Net Fluid   Removed:  2500 ml   Comments   Time Out Done: Yes, ICEBOAT  Primary Nurse Rpt Pre:Zamzam Sweet, RN  Primary Nurse Rpt Post:FAVIO Sweet RN  Pt Education:Fluid management  Care Plan:HD as ordered by nephrologists  Tx Summary:Pt refused for prescribed 4 hours run.  Stated she only gets 3.5 hours of HD per Dr. Ramya Vargas. Tx completed without complications and pt stable throughout. All blood returned AET. Daughter at bedside. Admiting Diagnosis:  Pt's previous clinic[facility]  Consent signed - Informed Consent Verified: Yes (07/17/18 8084)  [facility] - Verified 07/17/18  Hepatitis Status- HBsAg neg on 06/25/18. Machine #- Machine Number: [id number] (07/17/18 6869)  Telemetry status-None  Pre-dialysis wt. - Pre-Dialysis Weight: 103.7 kg (228 lb 9.9 oz) (07/17/18 7057)

## 2018-07-17 NOTE — WOUND CARE
WOCN Note:     Follow-up visit for wound vac sponge change. Patient just returned from renetta cath placement. Assessment:   - patient , daughter, wound care nurse and Dr. Salma Yuan at bedside. Dr. Laureano Look removed dressing and vac dressing to assess right heel. Dr. Laureano Look pleased with assessment of wound. Full thickness Stage 4 with bone exposure. 25% bone exposure . Wound bed is pink and bleeding slightly with soft boggy tissue inward. Dorsal foot too has discoloration. Treatment: cleaned wound with NS: applied skin prep and picture framed right heel. Applied 2 pieces of black granufoam to right heel wound and 1 piece black foam up shin to apply suction / offsetting from bottom of foot. Dressed occlusively: setting are continuous at 100mm/Hg. Applied ABD to bottom of Prevalon boot and abd under tubing to offset pressure. Next change is Friday. Recommendations:    - continue current wound vac orders as placed in chart. Minimize layers of linen/pads under patient to optimize support surface. Turn/reposition approximately every 2 hours and offload heels with Prevalon Boots. Specialty bed: TC Bariatric Plus  Discussed above plan with patient and RN / Barbie Stevens. Transition of Care: Plan to follow twice weekly and as needed while admitted to hospital. Vac sponge change Tuesday and Fridays.     Chandler ODOMN RN  Wound Care Department  Office: [phone]  Pager: [phone]

## 2018-07-17 NOTE — PROGRESS NOTES
Physical Therapy  Patient currently off the floor. Will continue to follow.   Domingo Salamanca, PT, DPT

## 2018-07-18 LAB
ALBUMIN SERPL-MCNC: 1.9 G/DL (ref 3.5–5)
ALBUMIN/GLOB SERPL: 0.4 {RATIO} (ref 1.1–2.2)
ALP SERPL-CCNC: 173 U/L (ref 45–117)
ALT SERPL-CCNC: 14 U/L (ref 12–78)
ANION GAP SERPL CALC-SCNC: 12 MMOL/L (ref 5–15)
AST SERPL-CCNC: 19 U/L (ref 15–37)
BILIRUB SERPL-MCNC: 0.3 MG/DL (ref 0.2–1)
BUN SERPL-MCNC: 40 MG/DL (ref 6–20)
BUN/CREAT SERPL: 8 (ref 12–20)
CALCIUM SERPL-MCNC: 7 MG/DL (ref 8.5–10.1)
CHLORIDE SERPL-SCNC: 94 MMOL/L (ref 97–108)
CO2 SERPL-SCNC: 23 MMOL/L (ref 21–32)
CREAT SERPL-MCNC: 5.21 MG/DL (ref 0.55–1.02)
ERYTHROCYTE [DISTWIDTH] IN BLOOD BY AUTOMATED COUNT: 15.8 % (ref 11.5–14.5)
GLOBULIN SER CALC-MCNC: 5.2 G/DL (ref 2–4)
GLUCOSE BLD STRIP.AUTO-MCNC: 134 MG/DL (ref 65–100)
GLUCOSE BLD STRIP.AUTO-MCNC: 142 MG/DL (ref 65–100)
GLUCOSE BLD STRIP.AUTO-MCNC: 74 MG/DL (ref 65–100)
GLUCOSE BLD STRIP.AUTO-MCNC: 77 MG/DL (ref 65–100)
GLUCOSE BLD STRIP.AUTO-MCNC: 90 MG/DL (ref 65–100)
GLUCOSE BLD STRIP.AUTO-MCNC: 92 MG/DL (ref 65–100)
GLUCOSE SERPL-MCNC: 80 MG/DL (ref 65–100)
HCT VFR BLD AUTO: 24.7 % (ref 35–47)
HGB BLD-MCNC: 7.5 G/DL (ref 11.5–16)
MAGNESIUM SERPL-MCNC: 2.1 MG/DL (ref 1.6–2.4)
MCH RBC QN AUTO: 26.7 PG (ref 26–34)
MCHC RBC AUTO-ENTMCNC: 30.4 G/DL (ref 30–36.5)
MCV RBC AUTO: 87.9 FL (ref 80–99)
NRBC # BLD: 0 K/UL (ref 0–0.01)
NRBC BLD-RTO: 0 PER 100 WBC
PHOSPHATE SERPL-MCNC: 3.8 MG/DL (ref 2.6–4.7)
PLATELET # BLD AUTO: 318 K/UL (ref 150–400)
PMV BLD AUTO: 8.4 FL (ref 8.9–12.9)
POTASSIUM SERPL-SCNC: 4.8 MMOL/L (ref 3.5–5.1)
PROT SERPL-MCNC: 7.1 G/DL (ref 6.4–8.2)
RBC # BLD AUTO: 2.81 M/UL (ref 3.8–5.2)
SERVICE CMNT-IMP: ABNORMAL
SERVICE CMNT-IMP: ABNORMAL
SERVICE CMNT-IMP: NORMAL
SODIUM SERPL-SCNC: 129 MMOL/L (ref 136–145)
VANCOMYCIN SERPL-MCNC: 19.8 UG/ML
WBC # BLD AUTO: 7.2 K/UL (ref 3.6–11)

## 2018-07-18 PROCEDURE — 90935 HEMODIALYSIS ONE EVALUATION: CPT

## 2018-07-18 PROCEDURE — 94760 N-INVAS EAR/PLS OXIMETRY 1: CPT

## 2018-07-18 PROCEDURE — 3331090001 HH PPS REVENUE CREDIT

## 2018-07-18 PROCEDURE — 74011250637 HC RX REV CODE- 250/637: Performed by: HOSPITALIST

## 2018-07-18 PROCEDURE — 74011636637 HC RX REV CODE- 636/637: Performed by: HOSPITALIST

## 2018-07-18 PROCEDURE — 85027 COMPLETE CBC AUTOMATED: CPT | Performed by: INTERNAL MEDICINE

## 2018-07-18 PROCEDURE — 82962 GLUCOSE BLOOD TEST: CPT

## 2018-07-18 PROCEDURE — 74011250636 HC RX REV CODE- 250/636: Performed by: INTERNAL MEDICINE

## 2018-07-18 PROCEDURE — 80202 ASSAY OF VANCOMYCIN: CPT | Performed by: INTERNAL MEDICINE

## 2018-07-18 PROCEDURE — 36415 COLL VENOUS BLD VENIPUNCTURE: CPT | Performed by: INTERNAL MEDICINE

## 2018-07-18 PROCEDURE — 94761 N-INVAS EAR/PLS OXIMETRY MLT: CPT

## 2018-07-18 PROCEDURE — 74011250637 HC RX REV CODE- 250/637: Performed by: FAMILY MEDICINE

## 2018-07-18 PROCEDURE — 74011250636 HC RX REV CODE- 250/636: Performed by: HOSPITALIST

## 2018-07-18 PROCEDURE — 74011000250 HC RX REV CODE- 250: Performed by: INTERNAL MEDICINE

## 2018-07-18 PROCEDURE — 97530 THERAPEUTIC ACTIVITIES: CPT | Performed by: PHYSICAL THERAPIST

## 2018-07-18 PROCEDURE — 94660 CPAP INITIATION&MGMT: CPT

## 2018-07-18 PROCEDURE — 84100 ASSAY OF PHOSPHORUS: CPT | Performed by: INTERNAL MEDICINE

## 2018-07-18 PROCEDURE — 97530 THERAPEUTIC ACTIVITIES: CPT

## 2018-07-18 PROCEDURE — 80053 COMPREHEN METABOLIC PANEL: CPT | Performed by: INTERNAL MEDICINE

## 2018-07-18 PROCEDURE — 83735 ASSAY OF MAGNESIUM: CPT | Performed by: INTERNAL MEDICINE

## 2018-07-18 PROCEDURE — 97535 SELF CARE MNGMENT TRAINING: CPT

## 2018-07-18 PROCEDURE — 3331090002 HH PPS REVENUE DEBIT

## 2018-07-18 PROCEDURE — 74011000258 HC RX REV CODE- 258: Performed by: INTERNAL MEDICINE

## 2018-07-18 PROCEDURE — 65270000032 HC RM SEMIPRIVATE

## 2018-07-18 RX ADMIN — CARVEDILOL 12.5 MG: 12.5 TABLET, FILM COATED ORAL at 07:33

## 2018-07-18 RX ADMIN — DOCUSATE SODIUM 100 MG: 100 CAPSULE, LIQUID FILLED ORAL at 09:30

## 2018-07-18 RX ADMIN — Medication 10 ML: at 21:25

## 2018-07-18 RX ADMIN — INSULIN GLARGINE 20 UNITS: 100 INJECTION, SOLUTION SUBCUTANEOUS at 00:22

## 2018-07-18 RX ADMIN — ASPIRIN 81 MG 81 MG: 81 TABLET ORAL at 09:29

## 2018-07-18 RX ADMIN — GABAPENTIN 100 MG: 100 CAPSULE ORAL at 20:25

## 2018-07-18 RX ADMIN — SEVELAMER CARBONATE 800 MG: 800 TABLET, FILM COATED ORAL at 07:33

## 2018-07-18 RX ADMIN — ASCORBIC ACID, THIAMINE MONONITRATE,RIBOFLAVIN, NIACINAMIDE, PYRIDOXINE HYDROCHLORIDE, FOLIC ACID, CYANOCOBALAMIN, BIOTIN, CALCIUM PANTOTHENATE, 1 CAPSULE: 100; 1.5; 1.7; 20; 10; 1; 6000; 150000; 5 CAPSULE, LIQUID FILLED ORAL at 09:29

## 2018-07-18 RX ADMIN — VANCOMYCIN HYDROCHLORIDE 1000 MG: 1 INJECTION, POWDER, LYOPHILIZED, FOR SOLUTION INTRAVENOUS at 20:25

## 2018-07-18 RX ADMIN — PANTOPRAZOLE SODIUM 40 MG: 40 TABLET, DELAYED RELEASE ORAL at 07:33

## 2018-07-18 RX ADMIN — Medication 10 ML: at 05:40

## 2018-07-18 RX ADMIN — OXYCODONE HYDROCHLORIDE AND ACETAMINOPHEN 1000 MG: 500 TABLET ORAL at 09:29

## 2018-07-18 RX ADMIN — Medication 10 ML: at 00:22

## 2018-07-18 RX ADMIN — NORTRIPTYLINE HYDROCHLORIDE 50 MG: 25 CAPSULE ORAL at 21:25

## 2018-07-18 RX ADMIN — LATANOPROST 1 DROP: 50 SOLUTION OPHTHALMIC at 21:25

## 2018-07-18 RX ADMIN — DOCUSATE SODIUM 100 MG: 100 CAPSULE, LIQUID FILLED ORAL at 17:49

## 2018-07-18 RX ADMIN — Medication 10 ML: at 20:33

## 2018-07-18 RX ADMIN — Medication 10 ML: at 05:41

## 2018-07-18 RX ADMIN — OXYCODONE AND ACETAMINOPHEN 2 TABLET: 5; 325 TABLET ORAL at 07:33

## 2018-07-18 RX ADMIN — INSULIN GLARGINE 20 UNITS: 100 INJECTION, SOLUTION SUBCUTANEOUS at 21:35

## 2018-07-18 RX ADMIN — CINACALCET HYDROCHLORIDE 30 MG: 30 TABLET, COATED ORAL at 20:33

## 2018-07-18 RX ADMIN — AZTREONAM 500 MG: 1 INJECTION, POWDER, LYOPHILIZED, FOR SOLUTION INTRAMUSCULAR; INTRAVENOUS at 17:54

## 2018-07-18 RX ADMIN — EPOETIN ALFA 20000 UNITS: 20000 SOLUTION INTRAVENOUS; SUBCUTANEOUS at 17:53

## 2018-07-18 RX ADMIN — FLUOXETINE 20 MG: 20 CAPSULE ORAL at 09:29

## 2018-07-18 RX ADMIN — SEVELAMER CARBONATE 800 MG: 800 TABLET, FILM COATED ORAL at 12:12

## 2018-07-18 RX ADMIN — GABAPENTIN 100 MG: 100 CAPSULE ORAL at 07:33

## 2018-07-18 RX ADMIN — OXYCODONE AND ACETAMINOPHEN 2 TABLET: 5; 325 TABLET ORAL at 02:45

## 2018-07-18 RX ADMIN — OXYCODONE AND ACETAMINOPHEN 2 TABLET: 5; 325 TABLET ORAL at 17:46

## 2018-07-18 RX ADMIN — SIMVASTATIN 20 MG: 20 TABLET, FILM COATED ORAL at 21:25

## 2018-07-18 RX ADMIN — SEVELAMER CARBONATE 800 MG: 800 TABLET, FILM COATED ORAL at 17:50

## 2018-07-18 RX ADMIN — CARVEDILOL 12.5 MG: 12.5 TABLET, FILM COATED ORAL at 17:57

## 2018-07-18 RX ADMIN — AZTREONAM 500 MG: 1 INJECTION, POWDER, LYOPHILIZED, FOR SOLUTION INTRAMUSCULAR; INTRAVENOUS at 02:00

## 2018-07-18 RX ADMIN — PANTOPRAZOLE SODIUM 40 MG: 40 TABLET, DELAYED RELEASE ORAL at 17:50

## 2018-07-18 RX ADMIN — LATANOPROST 1 DROP: 50 SOLUTION OPHTHALMIC at 00:22

## 2018-07-18 NOTE — PROGRESS NOTES
Problem: Self Care Deficits Care Plan (Adult)  Goal: *Acute Goals and Plan of Care (Insert Text)  Occupational Therapy Goals  Initiated 7/13/2018  1. Patient will perform self-feeding using appropriate AE PRN with supervision/set-up within 7 day(s). 2.  Patient will perform upper body ADLs with supervision/set-up within 7 day(s). 3.  Patient will perform lower body ADLs with minimal assistance within 7 day(s). 4.  Patient will perform toilet transfers with minimal assistance/contact guard assist within 7 day(s). 5.  Patient will perform all aspects of toileting with supervision/set-up within 7 day(s). 6.  Patient will participate in upper extremity therapeutic exercise/activities with independence for 5 minutes within 7 day(s). 7.  Patient will utilize energy conservation techniques during functional activities with verbal cues within 7 day(s). Occupational Therapy TREATMENT  Patient: Odin Nichole (8 y.o. female)  Date: 7/18/2018  Diagnosis: Bilateral lower leg cellulitis  Bilateral leg edema  ESRD (end stage renal disease) (HCC)  Anemia  LFT elevation  Type 2 diabetes mellitus with hyperglycemia (HCC)  RIGHT HEEL ULCER Bilateral lower leg cellulitis  Procedure(s) (LRB):  DEBRIDEMENT RIGHT HEEL ULCER WITH PARTIAL CALCANECTOMY (Right) 6 Days Post-Op  Precautions:    Chart, occupational therapy assessment, plan of care, and goals were reviewed. ASSESSMENT:  Cleared by RN to see pt for therapy session. Pt is progressing well with therapy, today demonstrating improved bed mobility and sitting balance during functional tasks. Received semisupine in bed, drowsy but easily awakening to voice and agreeable to participate. Performed bed mobility with min A x2, increased time and HOB elevated, cues to scoot forward for more functional position. Pt with good-fair sitting balance EOB for approx 15 minutes while performing grooming ADLs with CGA and setup.  Unable to stand due to pt not maintaining NWB on RLE, so scooted upward towards [facility] with mod A x2 and pt pushing through UEs. Pt then returned to supine with mod A to move LEs up to bed, and scooted upward with mod A x2 and pushing with L foot on bed (cues to avoid pushing with R). Pt in bed at end of session, call bell in reach and daughter present. She puts forth good effort during session and is motivated to work with therapy to improve her functional performance. Recommend skilled setting at discharge to increase independence and safety. Progression toward goals:  [x]       Improving appropriately and progressing toward goals  []       Improving slowly and progressing toward goals  []       Not making progress toward goals and plan of care will be adjusted     PLAN:  Patient continues to benefit from skilled intervention to address the above impairments. Continue treatment per established plan of care. Discharge Recommendations:  Skilled Nursing Facility  Further Equipment Recommendations for Discharge:  TBD at [facility]     SUBJECTIVE:   Patient stated I need to do my morning routine.     OBJECTIVE DATA SUMMARY:   Cognitive/Behavioral Status:  Neurologic State: Alert;Drowsy  Orientation Level: Oriented X4  Cognition: Follows commands  Perception: Appears intact  Perseveration: No perseveration noted  Safety/Judgement: Awareness of environment; Fall prevention    Functional Mobility and Transfers for ADLs:  Bed Mobility:  Rolling: Minimum assistance;Assist x2  Supine to Sit: Minimum assistance;Assist x2 (with HOB elevated)  Sit to Supine: Moderate assistance;Assist x2  Scooting: Moderate assistance;Assist x2    Transfers:             Balance:  Sitting: Impaired  Sitting - Static: Good (unsupported)  Sitting - Dynamic: Fair (occasional)    ADL Intervention:   Cues for hand placement during supine<>sit transfers and to avoid WB on RLE during scooting in bed.      Grooming  Washing Face: Contact guard assistance (sitting EOB)  Brushing Teeth: Contact guard assistance (sitting EOB)           Cognitive Retraining  Safety/Judgement: Awareness of environment; Fall prevention    Pain:  Pain Scale 1: Numeric (0 - 10)  Pain Intensity 1: 0  Pain Location 1: Foot;Leg  Pain Orientation 1: Right  Pain Description 1: Aching  Pain Intervention(s) 1: Medication (see MAR)  Activity Tolerance:   Good  Please refer to the flowsheet for vital signs taken during this treatment.   After treatment:   [] Patient left in no apparent distress sitting up in chair  [x] Patient left in no apparent distress in bed  [x] Call bell left within reach  [x] Nursing notified  [x] Caregiver present  [x] Bed alarm activated    COMMUNICATION/COLLABORATION:   The patients plan of care was discussed with: Physical Therapist and Registered Nurse    Za Jonas OT  Time Calculation: 33 mins

## 2018-07-18 NOTE — PROGRESS NOTES
ID Progress Note  2018    Subjective:     Seen at dialysis--she is sleepyr    Objective:     Antibiotics:  1. Azactam  2. Vancomycin       Vitals:   Visit Vitals    /68    Pulse 64    Temp 97.7 °F (36.5 °C) (Oral)    Resp 18    Ht 6' 1.75\" (1.873 m)    Wt 102.9 kg (226 lb 13.7 oz)    SpO2 97%    Breastfeeding No    BMI 29.32 kg/m2        Tmax:  Temp (24hrs), Av.2 °F (36.2 °C), Min:96 °F (35.6 °C), Max:97.7 °F (36.5 °C)      Exam:  Wound vac in place with serous drainage    Labs:      Recent Labs      18   0410  18   0505   WBC  7.2  9.6   HGB  7.5*  8.1*   PLT  318  325   BUN  40*  57*   CREA  5.21*  6.42*   SGOT  19   --    AP  173*   --    TBILI  0.3   --        Cultures:     Lab Results   Component Value Date/Time    Specimen Description: URINE 10/27/2012 08:15 PM    Specimen Description: BLOOD 2011 07:40 AM    Specimen Description: URINE 2010 07:10 AM     Lab Results   Component Value Date/Time    Culture result: NO ANAEROBES ISOLATED 2018 08:00 AM    Culture result: HEAVY PROTEUS MIRABILIS (A) 2018 08:00 AM    Culture result: LIGHT KLEBSIELLA OXYTOCA (A) 2018 08:00 AM    Culture result: (A) 2018 08:00 AM     LIGHT **METHICILLIN RESISTANT STAPHYLOCOCCUS AUREUS**    Culture result: LIGHT ENTEROCOCCUS FAECALIS GROUP D (A) 2018 08:00 AM    Culture result: LIGHT ALCALIGENES FAECALIS (A) 2018 08:00 AM    Culture result:  2018 08:00 AM     **MRSA REPORT**  CALLED TO AND READ BACK BY  JAELYN GUTIERREZ RN ON 7/15/18 AT 1440 TA.       Culture result: MODERATE PROTEUS MIRABILIS (A) 2018 08:00 AM    Culture result: LIGHT KLEBSIELLA OXYTOCA (A) 2018 08:00 AM    Culture result: FEW PSEUDOMONAS AERUGINOSA (A) 2018 08:00 AM    Culture result: LIGHT ENTEROCOCCUS FAECALIS GROUP D (A) 2018 08:00 AM    Culture result: (A) 2018 08:00 AM     LIGHT STAPHYLOCOCCUS SPECIES, COAGULASE NEGATIVE (2 DIFFERENT COLONY TYPES/STRAINS)       Radiology:     Line/Insert Date:           Assessment:     1. Diabetic foot wound--polymicrobial case  2. Will be on 2 antibiotics    Objective:     1. Continue current therapy  2. Will need to be on Azactam and vancomycin in the outpatient setting for 6 weeks or so--vancomycin orders will need to come from nephrology to be given with dialysis (1 gram with each dialysis)--azactam orders on chart  3.  OK for discharge from my standpoint    Carina Patel MD

## 2018-07-18 NOTE — PROGRESS NOTES
[facility] 
 [address] [address] [facility] Phone: [phone]  
 
Nephrology Progress Note Cecilia Seay     1950     [id number] Date of Admission : 7/6/2018 07/18/18 CC: Follow up for ESRD Assessment and Plan Diabetic Foot wound w/ infection - s/p Debridement  
- will need abx - aztreonam and vanco x 6 weeks per ID 
- will arrange for outpt vanco 
 
Hypervolemic Hyponatremia  
- Na improving ESRD - MWF dialysis 
- HD for today 
  
Hypotension   
- midodrine prior to HD 
- albumin PRN 
 
DM 
  
Obesity, FARHAT 
  
Dyslipidemia 
  
Possible steal syndrome right hand Anemia of CKD 
-On epogen  
   
 
Interval History: 
Seen and examined. Dialysis done early this AM,  UF 2.5kg. Feeling better. Getting Jacob cath this AM for long-term abx. No cp or sob, n/v/d reported. Review of Systems: A comprehensive review of systems was negative. Current Medications:  
Current Facility-Administered Medications Medication Dose Route Frequency  insulin glargine (LANTUS) injection 20 Units  20 Units SubCUTAneous QHS  insulin lispro (HUMALOG) injection 2 Units  2 Units SubCUTAneous TIDAC  linaclotide (LINZESS) capsule 290 mcg (Patient Supplied)  290 mcg Oral ACB  heparin (porcine) 1,000 unit/mL injection 2,000 Units  2,000 Units Hemodialysis DIALYSIS PRN  
 albumin human 25% (BUMINATE) solution 50 g  50 g IntraVENous DIALYSIS PRN  polyethylene glycol (MIRALAX) packet 17 g  17 g Oral DAILY  docusate sodium (COLACE) capsule 100 mg  100 mg Oral BID  epoetin joi (EPOGEN;PROCRIT) injection 20,000 Units  20,000 Units SubCUTAneous Q MON, WED & FRI  fentaNYL citrate (PF) injection 50 mcg  50 mcg IntraVENous Q2H PRN  
 oxyCODONE-acetaminophen (PERCOCET) 5-325 mg per tablet 1-2 Tab  1-2 Tab Oral Q4H PRN  
 ondansetron (ZOFRAN) injection 4 mg  4 mg IntraVENous Q6H PRN  pantoprazole (PROTONIX) tablet 40 mg  40 mg Oral ACB&D  
 alum-mag hydroxide-simeth (MYLANTA) oral suspension 30 mL  30 mL Oral Q4H PRN  
 aztreonam (AZACTAM) 500 mg in 0.9% sodium chloride 100 mL IVPB  500 mg IntraVENous Q12H  
 vancomycin (VANCOCIN) 1,000 mg in 0.9% sodium chloride (MBP/ADV) 250 mL  1,000 mg IntraVENous DIALYSIS MON, WED & FRI  carvedilol (COREG) tablet 12.5 mg  12.5 mg Oral BID WITH MEALS  cinacalcet (SENSIPAR) tablet 30 mg  30 mg Oral DAILY  ascorbic acid (vitamin C) (VITAMIN C) tablet 1,000 mg  1,000 mg Oral DAILY  aspirin chewable tablet 81 mg  81 mg Oral DAILY  B complex-vitaminC-folic acid (NEPHROCAP) cap  1 Cap Oral DAILY  FLUoxetine (PROzac) capsule 20 mg  20 mg Oral DAILY  gabapentin (NEURONTIN) capsule 100 mg  100 mg Oral BID  latanoprost (XALATAN) 0.005 % ophthalmic solution 1 Drop  1 Drop Both Eyes QHS  midodrine (PROAMITINE) tablet 5 mg  5 mg Oral Once per day on Mon Wed Fri  polyethylene glycol (MIRALAX) packet 17 g  17 g Oral BID PRN  
 sevelamer carbonate (RENVELA) tab 800 mg  800 mg Oral TID WITH MEALS  simvastatin (ZOCOR) tablet 20 mg  20 mg Oral QHS  sodium chloride (NS) flush 5-10 mL  5-10 mL IntraVENous Q8H  
 sodium chloride (NS) flush 5-10 mL  5-10 mL IntraVENous PRN  
 insulin lispro (HUMALOG) injection   SubCUTAneous AC&HS  
 glucose chewable tablet 16 g  4 Tab Oral PRN  
 dextrose (D50W) injection syrg 12.5-25 g  12.5-25 g IntraVENous PRN  
 glucagon (GLUCAGEN) injection 1 mg  1 mg IntraMUSCular PRN  
 nortriptyline (PAMELOR) capsule 50 mg  50 mg Oral QHS  Vancomycin- pharmacy to dose   Other Rx Dosing/Monitoring Allergies Allergen Reactions  Latex Itching Rash, sometimes difficult to breathe  Celebrex [Celecoxib] Anaphylaxis and Swelling TONGUE. LIPS AND EYES  
 Iodine Other (comments) IV CONTRAST-LESIONS, AND SKIN SLUFFED OFF  
 Keflex [Cephalexin] Anaphylaxis and Swelling Tongue, lips, and eyes  Levaquin [Levofloxacin] Anaphylaxis and Swelling Tongue, lips, and eyes  Pcn [Penicillins] Anaphylaxis and Swelling Tongue, lips and eyes Objective: 
Vitals:   
Vitals:  
 07/18/18 8398 07/18/18 0725 07/18/18 3714 07/18/18 5804 BP:   126/57 150/66 Pulse:   64 61 Resp:    18 Temp:    97.6 °F (36.4 °C) TempSrc:      
SpO2: 99%   99% Weight:  102.9 kg (226 lb 13.7 oz) Height:      
 
Intake and Output: 
  
07/16 1901 - 07/18 0700 In: 120 [P.O.:120] Out: 2600 [Drains:100] Physical Examination: 
General:Alert, No distress, HEENT:  pale Neck:RIJ permacath Lungs : Clears to auscultation Bilaterally, Normal respiratory effort CVS: RRR, S1 S2 normal, No rub, legs bandaged Abdomen: Soft, Non tender Extremities: No cyanosis, No clubbing; good thrill LUE AV graft MS: No joint swelling, erythema, warmth Neurologic: non focal, AAO x 3 Psych: normal affect 
 
[]    High complexity decision making was performed 
[]    Patient is at high-risk of decompensation with multiple organ involvement Lab Data Personally Reviewed: I have reviewed all the pertinent labs, microbiology data and radiology studies during assessment. Recent Labs  
   07/18/18 
 0410  07/16/18 
 0505 NA  129*  122* K  4.8  5.2*  
CL  94*  87* CO2  23  21 GLU  80  66 BUN  40*  57* CREA  5.21*  6.42* CA  7.0*  7.5* MG  2.1   --   
PHOS  3.8  4.8* ALB  1.9*  2.3*  
SGOT  19   --   
ALT  14   --   
 
Recent Labs  
   07/18/18 
 0410  07/16/18 
 0505 WBC  7.2  9.6 HGB  7.5*  8.1* HCT  24.7*  26.7*  
PLT  318  325 Lab Results Component Value Date/Time Specimen Description: URINE 10/27/2012 08:15 PM  
 Specimen Description: BLOOD 06/07/2011 07:40 AM  
 Specimen Description: URINE 08/30/2010 07:10 AM  
 Specimen Description: URINE 08/09/2010 11:30 AM  
 Specimen Description: URINE 06/22/2010 11:50 PM  
 
Lab Results Component Value Date/Time  Culture result: NO ANAEROBES ISOLATED 07/12/2018 08:00 AM  
 Culture result: HEAVY PROTEUS MIRABILIS (A) 07/12/2018 08:00 AM  
 Culture result: LIGHT KLEBSIELLA OXYTOCA (A) 07/12/2018 08:00 AM  
 Culture result: (A) 07/12/2018 08:00 AM  
  LIGHT **METHICILLIN RESISTANT STAPHYLOCOCCUS AUREUS**  
 Culture result: LIGHT ENTEROCOCCUS FAECALIS GROUP D (A) 07/12/2018 08:00 AM  
 Culture result: LIGHT ALCALIGENES FAECALIS (A) 07/12/2018 08:00 AM  
 Culture result:  07/12/2018 08:00 AM  
  **MRSA REPORT** 
CALLED TO AND READ BACK BY 
JAELYN GUTIERREZ RN ON 7/15/18 AT 1440 TA. Culture result: MODERATE PROTEUS MIRABILIS (A) 07/12/2018 08:00 AM  
 Culture result: LIGHT KLEBSIELLA OXYTOCA (A) 07/12/2018 08:00 AM  
 Culture result: FEW PSEUDOMONAS AERUGINOSA (A) 07/12/2018 08:00 AM  
 Culture result: LIGHT ENTEROCOCCUS FAECALIS GROUP D (A) 07/12/2018 08:00 AM  
 Culture result: (A) 07/12/2018 08:00 AM  
  LIGHT STAPHYLOCOCCUS SPECIES, COAGULASE NEGATIVE (2 DIFFERENT COLONY TYPES/STRAINS) Recent Results (from the past 24 hour(s)) GLUCOSE, POC Collection Time: 07/17/18  4:00 PM  
Result Value Ref Range Glucose (POC) 218 (H) 65 - 100 mg/dL Performed by Lopez FindAprilage GLUCOSE, POC Collection Time: 07/17/18 10:09 PM  
Result Value Ref Range Glucose (POC) 112 (H) 65 - 100 mg/dL Performed by Lizbeth Torres Collection Time: 07/18/18  4:10 AM  
Result Value Ref Range Vancomycin, random 19.8 UG/ML  
CBC W/O DIFF Collection Time: 07/18/18  4:10 AM  
Result Value Ref Range WBC 7.2 3.6 - 11.0 K/uL  
 RBC 2.81 (L) 3.80 - 5.20 M/uL HGB 7.5 (L) 11.5 - 16.0 g/dL HCT 24.7 (L) 35.0 - 47.0 % MCV 87.9 80.0 - 99.0 FL  
 MCH 26.7 26.0 - 34.0 PG  
 MCHC 30.4 30.0 - 36.5 g/dL  
 RDW 15.8 (H) 11.5 - 14.5 % PLATELET 167 648 - 866 K/uL MPV 8.4 (L) 8.9 - 12.9 FL  
 NRBC 0.0 0  WBC ABSOLUTE NRBC 0.00 0.00 - 0.01 K/uL METABOLIC PANEL, COMPREHENSIVE Collection Time: 07/18/18  4:10 AM  
Result Value Ref Range  Sodium 129 (L) 136 - 145 mmol/L Potassium 4.8 3.5 - 5.1 mmol/L Chloride 94 (L) 97 - 108 mmol/L  
 CO2 23 21 - 32 mmol/L Anion gap 12 5 - 15 mmol/L Glucose 80 65 - 100 mg/dL BUN 40 (H) 6 - 20 MG/DL Creatinine 5.21 (H) 0.55 - 1.02 MG/DL  
 BUN/Creatinine ratio 8 (L) 12 - 20 GFR est AA 10 (L) >60 ml/min/1.73m2 GFR est non-AA 8 (L) >60 ml/min/1.73m2 Calcium 7.0 (L) 8.5 - 10.1 MG/DL Bilirubin, total 0.3 0.2 - 1.0 MG/DL  
 ALT (SGPT) 14 12 - 78 U/L  
 AST (SGOT) 19 15 - 37 U/L Alk. phosphatase 173 (H) 45 - 117 U/L Protein, total 7.1 6.4 - 8.2 g/dL Albumin 1.9 (L) 3.5 - 5.0 g/dL Globulin 5.2 (H) 2.0 - 4.0 g/dL A-G Ratio 0.4 (L) 1.1 - 2.2 MAGNESIUM Collection Time: 07/18/18  4:10 AM  
Result Value Ref Range Magnesium 2.1 1.6 - 2.4 mg/dL PHOSPHORUS Collection Time: 07/18/18  4:10 AM  
Result Value Ref Range Phosphorus 3.8 2.6 - 4.7 MG/DL  
GLUCOSE, POC Collection Time: 07/18/18  6:50 AM  
Result Value Ref Range Glucose (POC) 74 65 - 100 mg/dL Performed by ForMune, POC Collection Time: 07/18/18  7:07 AM  
Result Value Ref Range Glucose (POC) 77 65 - 100 mg/dL Performed by ForMune, POC Collection Time: 07/18/18  8:32 AM  
Result Value Ref Range Glucose (POC) 142 (H) 65 - 100 mg/dL Performed by Sven Martinez I have reviewed the flowsheets. Chart and Pertinent Notes have been reviewed. No change in PMH ,family and social history from Consult note.  
 
 
Stephanie Goss MD

## 2018-07-18 NOTE — DIABETES MGMT
Progress Note Chart reviewed for low blood glucose ( < 80 mg/dL x 1 in the past 24 hours) . Recommendations/ Comments: If appropriate, please consider the followin. Discontinuing mealtime insulin 2. Changing correction from normal sensitivity (starting at >139) to high sensitivity (starting at >199), considering Cr ClJose Cruz Parikh Morning glucose: 74 mg/dL (per am POCT Glucose). Required 3 units of correction in the last 24 hours. Inpatient medications for glucose management: 1. Correction Scale: Lispro (Humalog) Normal Sensitivity scale to cover for glucose > 139 mg/dL before meals and for glucose >199 at bedtime 2. Lantus 20 units at bedtime 3. Humalog 2 units before meals POC Glucose last 24hrs:  
Lab Results Component Value Date/Time GLU 80 2018 04:10 AM  
 GLUCPOC 134 (H) 2018 11:21 AM  
 GLUCPOC 142 (H) 2018 08:32 AM  
 GLUCPOC 77 2018 07:07 AM  
  
 
Estimated Creatinine Clearance: 14.1 mL/min (based on Cr of 5.21). Diet order: Active Orders Diet DIET DIABETIC WITH OPTIONS Consistent Carb 2000kcal; Regular; FR 1000ML; 70-70-70 (House) PO intake: Patient Vitals for the past 72 hrs: 
 % Diet Eaten  
18 1924 50 % History of Diabetes: 
 Christopher Peoples is a 70 y.o. female with a past medical history significant for DM per Eris Cruz MD 's H&P dated 2018. Prior to admission medications for diabetes: per past medical records - Levemir 12 units at noon - Humalog  SLIDING SCALE  
100-150 = 4 units 151-200 = 5 units Then 1 UNIT INCREASE FOR EVERY 50 POINTS 
 
A1C:  
Lab Results Component Value Date/Time Hemoglobin A1c 9.1 (H) 04/10/2018 02:53 AM  
 Hemoglobin A1c 7.2 (H) 10/06/2009 04:00 AM  
 
 
Reference range*: 
Increased risk for diabetes: 5.7 - 6.4% Diabetes: >6.4% Glycemic control for adults with diabetes: <7.0 % *BRAD JUAERZ (2014). Diagnosis and classification of diabetes mellitus.  Diabetes care, 37, S81. 
 
 
Thank you. Deven Romano. Otto Cunningham MPH, RN, BSN, [facility] [phone] 
 
-A target glucose range of 140180 mg/dL (7.810.0 mmol/L) is recommended for the majority of critically ill patients and noncritically ill patients. -More stringent goals, such as 110140 mg/dL (6.17.8 mmol/L), may be appropriate for selected patients, if this can be achieved without significant hypoglycemia. * *American Diabetes Association. 14. Diabetes care in the hospital:Standards of Medical Care in [id number]. Diabetes Care 2018;41(Suppl. 1):S144S108

## 2018-07-18 NOTE — PROCEDURES
[facility] [place]  [phone]    Vitals   Pre   Post   Assessment   Pre   Post     Temp  Temp: 97.7 °F (36.5 °C) (07/18/18 1250)  98.5 LOC  cooperative, pleasant    AAOx3 cooperative, pleasant    AAOx3   HR   Pulse (Heart Rate): 63 (07/18/18 1250) 73 Lungs   CTA bilaterally  CTA bilaterally   B/P   BP: 134/77 (07/18/18 1250) 128/67 Cardiac   Regular rhythm, normal rate, no murmurs  Regular rhythm, normal rate, no murmurs   Resp   Resp Rate: 17 (07/18/18 1250) 18 Skin   Wound vac in place  wound vac in place   Pain level  Pain Intensity 1: 0 (07/18/18 1059) 8 Edema      Generalized Gerneralized   Orders:    Duration:   Start:   Procedure Start Time: 2204 End:   Procedure End Time: 1628 Total:   3.5   Dialyzer:   Dialyzer/Set Up Inspection: Revaclear (07/18/18 1250)   K Bath:   Dialysate K (mEq/L): 3 (07/18/18 1258)   Ca Bath:   Dialysate CA (mEq/L): 2.5 (07/18/18 1258)   Na/Bicarb:   Dialysate NA (mEq/L): 140 (07/18/18 1258)   Target Fluid Removal:   Goal/Amount of Fluid to Remove (mL): 3000 mL (07/18/18 1250)   Access     Type & Location:   [facility]  Dressing in place date of last change. 7/10 will change dressing today  Without S&S of infection. Catheter lumens cleaned , aspirated  and flushed with NS Lines connected and treatment initiated.   BFR of 350   Labs     Obtained/Reviewed   Critical Results Called   Date when labs were drawn-  Hgb-    HGB   Date Value Ref Range Status   07/18/2018 7.5 (L) 11.5 - 16.0 g/dL Final     K-    Potassium   Date Value Ref Range Status   07/18/2018 4.8 3.5 - 5.1 mmol/L Final     Ca-   Calcium   Date Value Ref Range Status   07/18/2018 7.0 (L) 8.5 - 10.1 MG/DL Final     Bun-   BUN   Date Value Ref Range Status   07/18/2018 40 (H) 6 - 20 MG/DL Final     Creat-   Creatinine   Date Value Ref Range Status   07/18/2018 5.21 (H) 0.55 - 1.02 MG/DL Final        Medications/ Blood Products Given     Name   Dose   Route and Time                     Blood Volume Processed (BVP):    76.8 Net Fluid   Removed:  2500   Comments   Time Out Done: 5824  Primary Nurse Rpt Pre: Radha Burt  RN  Primary Nurse Rpt Post: Bertram Kahn  Pt Education: Procedural  Care Plan: Continue current hemodialysis plan of care. Tx Summary:  1215:  received report from primary RN  1250:  Pretreatment vitals taken. Safety checks completed  1258:  Treatment started via [facility] catheter. Catheter cleaned, aspirated and flushed with ease. CVC drsg changed BFR a350  Access visible lines secure and intact pt resting easy to arouse  1312:  Access visible lines secure and intact pt resting easy to arouse   1330:  Access visible lines secure and intact pt resting  1400:  Access visible lines secure and intact pt resting  1430:  Access visible lines secure and intact pt resting  1500:  Access visible lines secure and intact pt resting easy to arouse   1530:  Treatment ended blood returned. Both catheter ports flushed and indwelled c 0.9% NS and capped     Patient transported back to room with patient left in no acute distress, stable vss, Report given to Primary nurse   Admiting Diagnosis:  Pt's previous clinic- [facility]  Consent signed - Informed Consent Verified: Yes (07/18/18 1250)  Sinaita Consent - yes on file  Hepatitis Status-   Results for Coral Calderon (MRN [id number]) as of 7/18/2018 13:56   6/25/18   Hepatitis B surface Ag <0.10   Hep B surface Ag Interp. NEGATIVE     Machine #- Machine Number: [id number] (07/18/18 1250)    Pre-dialysis wt. - Pre-Dialysis Weight: 147.1 kg (324 lb 4.8 oz) (07/18/18 1250)

## 2018-07-18 NOTE — PROGRESS NOTES
Bedside and Verbal shift change report given to Kary DURHAM (oncoming nurse) by Tono Le (offgoing nurse). Report included the following information SBAR, Kardex, Procedure Summary, Intake/Output and MAR.

## 2018-07-18 NOTE — PROGRESS NOTES
Results for Mindy Romero (MRN [id number]) as of 7/18/2018 07:40   Ref. Range 7/18/2018 06:50 7/18/2018 07:07   GLUCOSE,FAST - POC Latest Ref Range: 65 - 100 mg/dL 74 Mariam 9170    Patient with hypoglycemic episode(s) at 0650 (time) on 07/18(date). BG value(s) pre-treatment 76    Was patient symptomatic? [] yes, [x] no  Patient was treated with the following rescue medications/treatments: [] D50                [] Glucose tablets                [] Glucagon                [x] 4oz juice                [] 6oz reg soda                [] 8oz low fat milk  BG value post-treatment: 77 mg/dL  Once BG treated and value greater than 80mg/dl, pt was provided with the following:  [x] snack plus coffee with 4 sugar packets. [x] meal  Name of MD notified:Paged Dr. Lupe Toney @ [phone]  The following orders were received: recheck, monitor.     Recheck at 0850 was 142 mg/dL

## 2018-07-18 NOTE — PROGRESS NOTES
Assumed care of pt this am. Pt is drowsy but able to answer orientation questions. Per pt and family pt did not rest well last night. Pt also given pain medication this morning per report. Pt BS little low this morning, night shift to recheck. Pt wound vac is functioning without error at this time. Pt to have dialysis today, Alberto Machado called by previous nurse, no call back yet with time. Pt hopeful to be discharged today. Family is present at bedside. Dialysis nurse called and pt to be brought down to 5th for dialysis shortly. Pt now in dialysis.

## 2018-07-18 NOTE — PROGRESS NOTES
FER spoke with [facility] ([place]admissions). Insurance company is requesting current therapy notes. Last notes received were faxed on 7/16/18. CM told Suzi Rinne that patient did not receive therapy on yesterday as she was off the floor for procedures at the time of the therapist visit. CM faxing brief therapy notes and MD progress notes. SHANNON De La Paz, CRM    2pm  FER spoke with Mackie Dakins at [facility] and confirmed receipt of faxed medical record. Patient will keep same dialysis treatment time. Facility can accomodate patient on a stretcher. Patient will keep her chair time for 6:45am.    3:06p  CM spoke with Candie Rinne at [facility] ([phone]) and patient is approved for admission tomorrow (Thursday 7/19/18). Ambulance transport is requested for 10am via [facility]. Patient and family to be informed of plans. The assigned nurse to call report to Jg Naik RN at [facility] at [phone]. Discharge packet on hard chart.    SHANNON De La Paz, CRM

## 2018-07-18 NOTE — PROGRESS NOTES
Problem: Mobility Impaired (Adult and Pediatric)  Goal: *Acute Goals and Plan of Care (Insert Text)  Physical Therapy Goals  Initiated 7/13/2018  1. Patient will move from supine to sit and sit to supine  and roll side to side in bed with supervision/set-up within 7 day(s). 2.  Patient will transfer from bed to chair and chair to bed with moderate assistance  using the least restrictive device within 7 day(s). 3.  Patient will perform sit to stand with moderate assistance  within 7 day(s). physical Therapy TREATMENT  Patient: Desirae Regalado (73 y.o. female)  Date: 7/18/2018  Diagnosis: Bilateral lower leg cellulitis  Bilateral leg edema  ESRD (end stage renal disease) (HCC)  Anemia  LFT elevation  Type 2 diabetes mellitus with hyperglycemia (HCC)  RIGHT HEEL ULCER Bilateral lower leg cellulitis  Procedure(s) (LRB):  DEBRIDEMENT RIGHT HEEL ULCER WITH PARTIAL CALCANECTOMY (Right) 6 Days Post-Op  Precautions:    Chart, physical therapy assessment, plan of care and goals were reviewed. ASSESSMENT:  Patient progressing well toward goals. Continues to be lethargic but wakes to voice and agreeable to participate. Supine to sit with HOB elevated and Clarence x 2. Able to scoot self to EOB and extra time and cues. Sits EOB x 15 mins working on dynamic task with supervision and no overt LOB. Unable to attempt sit to stand as she cannot maintain NWB on R LE. Scoots on EOB with modA x 2 and again cues for technique. ModA sit to supine. May be ready to attempt sliding board transfers in near future to begin [address] [place] [address]. Patient can benefit from skilled rehab in SNF setting as she is below her baseline and requires assist x 2 for safe mobility. Patient is highly motivated and will do well in skilled setting.   Progression toward goals:  [x]    Improving appropriately and progressing toward goals  []    Improving slowly and progressing toward goals  []    Not making progress toward goals and plan of care will be adjusted     PLAN:  Patient continues to benefit from skilled intervention to address the above impairments. Continue treatment per established plan of care. Discharge Recommendations:  Rajeev Zambrano  Further Equipment Recommendations for Discharge:  TBD     SUBJECTIVE:   Patient stated I'm doing a little better today and I'm waking up.     OBJECTIVE DATA SUMMARY:   Critical Behavior:  Neurologic State: Drowsy  Orientation Level: Oriented X4  Cognition: Appropriate decision making, Follows commands, Appropriate safety awareness  Safety/Judgement: Awareness of environment, Fall prevention, Insight into deficits  Functional Mobility Training:  Bed Mobility:  Rolling: Minimum assistance;Assist x2  Supine to Sit: Minimum assistance;Assist x2 (with HOB elevated)  Sit to Supine: Moderate assistance;Assist x2  Scooting: Moderate assistance;Assist x2        Transfers:   not tested                                Balance:  Sitting: Impaired  Sitting - Static: Good (unsupported)  Sitting - Dynamic: Fair (occasional)  Ambulation/Gait Training:       Pain:  Pain Scale 1: Visual  Pain Intensity 1: 0  Pain Location 1: Foot;Leg  Pain Orientation 1: Right  Pain Description 1: Aching  Pain Intervention(s) 1: Medication (see MAR)  Activity Tolerance:   VSS  Please refer to the flowsheet for vital signs taken during this treatment.   After treatment:   []    Patient left in no apparent distress sitting up in chair  [x]    Patient left in no apparent distress in bed  [x]    Call bell left within reach  [x]    Nursing notified  [x]    Caregiver present  []    Bed alarm activated    COMMUNICATION/COLLABORATION:   The patients plan of care was discussed with: Physical Therapist, Occupational Therapist and Registered Nurse    Jarek Marcano PT, DPT   Time Calculation: 32 mins

## 2018-07-18 NOTE — PROGRESS NOTES
Hospitalist Progress Note Sal Gill MD 
Answering service: [phone] OR [phone] from in house phone Date of Service:  2018 NAME:  Desirae Regalado :  1950 MRN:  [id number] Admission Summary:  
69 yo woman with anemia, arthritis, asthma, anxiety, ESRD on TTS HD, chronic pain, depression, DM2, GERD, glaucoma, HTN, HLD, IBS, migraine, morbid obesity, sleep apnea presented to the ED from home on 18 with leg swelling. She was admitted with b/l LE cellulitis, right heel wound, left ankle wound, hyperglycemia. Interval history / Subjective:  
  
C/o pain in the right heel, left ankle, chest at catheter sites; did not sleep at all last night, had 2 BMs yesterday; d/w nurse, Accucheks in 70s overnight and this morning but pt asymptomatic Assessment & Plan:  
 
Acute bilateral foot cellulitis with infected right heel wound and osteomyelitis (POA) - still with pain - MRI of right foot  large heel ulcer with underlying soft tissue gas that extends nearly to the cortex of the underlying calcaneus. No fluid collection or osteo. - Vascular Surgery, Podiatry, ID following - LE PVR  normal but with calcifications - s/p abdominal aortogram with right leg runoff 7/10 without significant stenosis - s/p debridement of right heel ulcer with partial calcanectomy  
- BCx  negative x2 
- wound Cx  Proteus, Klebsiella, Enterococcus, MRSA, Alcaligenes  
- bone Cx  Proteus, Klebsiella, Pseudomonas, Enterococcus, CoNS x2 strains - currently on vancomycin, aztreonam 
- s/p Jacob catheter placement  for aztreonam x6 weeks per ID 
- plan for vancomycin in HD 
  
Right hand numbness/tingling with possible steal syndrome - seems like possibly some peripheral neuropathy, carpel tunnel? 
- upper extremity PVR 7/10 no evidence of hemodynamically significant right or left upper extremity arterial obstruction.  No evidence of steal by the AV graft. - fixing the venous flow might exacerbate any steal 
- outpatient EMG to eval for neuropathy 
  
Hyponatremia - Na improving; continue fluid restriction 
- might be able to fix with HD 
  
ESRD (chronic) - TTS HD as per Renal 
- continue Sensipar, Renvela, sodium bicarbonate - Upper extremity PVR 7/10 with patent right distal brachial artery to proximal brachial vein AV graft with evidence of a hemodynamically significant stenosis at the venous anastomosis 
- has R chest wall Permacath 
  
Anemia of chronic disease - H/H stable 
  
HTN - BP controlled on Coreg 
  
DM2 - recent A1c 9.1, BG up after steroids 
- continue Lantus and SSI plus scheduled premeal insulin 
  
Transaminitis - resolved 
  
Asthma - stable, nebs PRN 
  
Anxiety, depression - stable, continue on home Prozac, nortriptyline  
  
Left sacral wound, left open abrasion, left lateral ankle wound (POA) - wound care per nurse 
  
Morbid obesity - Body mass index is 29.55 kg/(m^2). - diet and weight loss program  
  
FARHAT on CPAP - CPAP qhs 
 
Constipation - bowel regimen 
  
Code status: Full Code DVT prophylaxis: SCDs 
  
Care Plan discussed with: Patient/Family, Nurse and  Disposition: Dc to [facility] as Prakash Chris obtained today Hospital Problems  Date Reviewed: 7/6/2018 Codes Class Noted POA Anemia ICD-10-CM: D64.9 ICD-9-CM: 285.9  7/6/2018 Unknown ESRD (end stage renal disease) ([facility] [address] ICD-10-CM: N18.6 ICD-9-CM: 585.6  7/6/2018 Unknown Bilateral leg edema ICD-10-CM: R60.0 ICD-9-CM: 782.3  7/6/2018 Unknown LFT elevation ICD-10-CM: R79.89 ICD-9-CM: 790.6  7/6/2018 Unknown Type 2 diabetes mellitus with hyperglycemia (HCC) ICD-10-CM: E11.65 ICD-9-CM: 250.00  7/6/2018 Unknown * (Principal)Bilateral lower leg cellulitis ICD-10-CM: L03.116, A84.300 ICD-9-CM: 682.6  7/6/2018 Unknown Review of Systems:  
Pertinent items are noted in HPI. Vital Signs:  
 Last 24hrs VS reviewed since prior progress note. Most recent are: 
Visit Vitals  /67  Pulse 73  Temp 98.5 °F (36.9 °C) (Oral)  Resp 18  Ht 6' 1.75\" (1.873 m)  Wt 102.9 kg (226 lb 13.7 oz)  SpO2 98%  Breastfeeding No  
 BMI 29.32 kg/m2 Intake/Output Summary (Last 24 hours) at 07/18/18 1912 Last data filed at 07/18/18 1624 Gross per 24 hour Intake                0 ml Output             2600 ml Net            -2600 ml Physical Examination:  
 
Constitutional:  awake, no acute distress, cooperative, pleasant, obese ENT:  oral mucosa moist, oropharynx benign Resp:  CTA bilaterally anteriorly, no wheezing/rhonchi/rales CV:  regular rhythm, normal rate, no m/r/g appreciated GI:  +BS, soft, non distended, non tender, obese Musculoskeletal:  moves all extremities with reduced ROM b/l LE Neurologic:  AAOx3, responds appropriately to questions and commands Skin:  left sacral wound, b/l LE dressings c/d/i, RLE wound vac; right chest wall Permacath, left chest wall Jacob catheter Data Review:  
 Review and/or order of clinical lab test 
Review and/or order of tests in the radiology section of CPT Review and/or order of tests in the medicine section of CPT Labs:  
 
Recent Labs  
   07/18/18 
 0410  07/16/18 
 0505 WBC  7.2  9.6 HGB  7.5*  8.1* HCT  24.7*  26.7*  
PLT  318  325 Recent Labs  
   07/18/18 
 0410  07/16/18 
 0505 NA  129*  122* K  4.8  5.2*  
CL  94*  87* CO2  23  21 BUN  40*  57* CREA  5.21*  6.42* GLU  80  66  
CA  7.0*  7.5* MG  2.1   --   
PHOS  3.8  4.8* Recent Labs  
   07/18/18 
 0410  07/16/18 
 0505 SGOT  19   --   
ALT  14   --   
AP  173*   --   
TBILI  0.3   --   
TP  7.1   --   
ALB  1.9*  2.3*  
GLOB  5.2*   -- No results for input(s): INR, PTP, APTT in the last 72 hours. No lab exists for component: INREXT, INREXT No results for input(s): FE, TIBC, PSAT, FERR in the last 72 hours. No results found for: FOL, RBCF No results for input(s): PH, PCO2, PO2 in the last 72 hours. No results for input(s): CPK, CKNDX, TROIQ in the last 72 hours. No lab exists for component: CPKMB Lab Results Component Value Date/Time Cholesterol, total 144 04/10/2018 02:53 AM  
 HDL Cholesterol 66 04/10/2018 02:53 AM  
 LDL, calculated 67.6 04/10/2018 02:53 AM  
 Triglyceride 52 04/10/2018 02:53 AM  
 CHOL/HDL Ratio 2.2 04/10/2018 02:53 AM  
 
Lab Results Component Value Date/Time Glucose (POC) 90 07/18/2018 05:04 PM  
 Glucose (POC) 134 (H) 07/18/2018 11:21 AM  
 Glucose (POC) 142 (H) 07/18/2018 08:32 AM  
 Glucose (POC) 77 07/18/2018 07:07 AM  
 Glucose (POC) 74 07/18/2018 06:50 AM  
 
Lab Results Component Value Date/Time Color YELLOW 10/27/2012 10:25 PM  
 Appearance CLEAR 10/27/2012 10:25 PM  
 Specific gravity 1.012 10/27/2012 10:25 PM  
 Specific gravity 1.015 08/30/2010 07:10 AM  
 pH (UA) 8.0 10/27/2012 10:25 PM  
 Protein 100 (A) 10/27/2012 10:25 PM  
 Glucose NEGATIVE  10/27/2012 10:25 PM  
 Ketone NEGATIVE  10/27/2012 10:25 PM  
 Bilirubin NEGATIVE  10/27/2012 10:25 PM  
 Urobilinogen 1.0 10/27/2012 10:25 PM  
 Nitrites NEGATIVE  10/27/2012 10:25 PM  
 Leukocyte Esterase TRACE (A) 10/27/2012 10:25 PM  
 Epithelial cells 0-5 10/27/2012 10:25 PM  
 Bacteria NEGATIVE  10/27/2012 10:25 PM  
 WBC 10-20 10/27/2012 10:25 PM  
 RBC 5-10 10/27/2012 10:25 PM  
 
Medications Reviewed:  
 
Current Facility-Administered Medications Medication Dose Route Frequency  insulin glargine (LANTUS) injection 20 Units  20 Units SubCUTAneous QHS  insulin lispro (HUMALOG) injection 2 Units  2 Units SubCUTAneous TIDAC  linaclotide (LINZESS) capsule 290 mcg (Patient Supplied)  290 mcg Oral ACB  heparin (porcine) 1,000 unit/mL injection 2,000 Units  2,000 Units Hemodialysis DIALYSIS PRN  
 albumin human 25% (BUMINATE) solution 50 g  50 g IntraVENous DIALYSIS PRN  
 polyethylene glycol (MIRALAX) packet 17 g  17 g Oral DAILY  docusate sodium (COLACE) capsule 100 mg  100 mg Oral BID  epoetin joi (EPOGEN;PROCRIT) injection 20,000 Units  20,000 Units SubCUTAneous Q MON, WED & FRI  fentaNYL citrate (PF) injection 50 mcg  50 mcg IntraVENous Q2H PRN  
 oxyCODONE-acetaminophen (PERCOCET) 5-325 mg per tablet 1-2 Tab  1-2 Tab Oral Q4H PRN  
 ondansetron (ZOFRAN) injection 4 mg  4 mg IntraVENous Q6H PRN  pantoprazole (PROTONIX) tablet 40 mg  40 mg Oral ACB&D  
 alum-mag hydroxide-simeth (MYLANTA) oral suspension 30 mL  30 mL Oral Q4H PRN  
 aztreonam (AZACTAM) 500 mg in 0.9% sodium chloride 100 mL IVPB  500 mg IntraVENous Q12H  
 vancomycin (VANCOCIN) 1,000 mg in 0.9% sodium chloride (MBP/ADV) 250 mL  1,000 mg IntraVENous DIALYSIS MON, WED & FRI  carvedilol (COREG) tablet 12.5 mg  12.5 mg Oral BID WITH MEALS  cinacalcet (SENSIPAR) tablet 30 mg  30 mg Oral DAILY  ascorbic acid (vitamin C) (VITAMIN C) tablet 1,000 mg  1,000 mg Oral DAILY  aspirin chewable tablet 81 mg  81 mg Oral DAILY  B complex-vitaminC-folic acid (NEPHROCAP) cap  1 Cap Oral DAILY  FLUoxetine (PROzac) capsule 20 mg  20 mg Oral DAILY  gabapentin (NEURONTIN) capsule 100 mg  100 mg Oral BID  latanoprost (XALATAN) 0.005 % ophthalmic solution 1 Drop  1 Drop Both Eyes QHS  midodrine (PROAMITINE) tablet 5 mg  5 mg Oral Once per day on Mon Wed Fri  polyethylene glycol (MIRALAX) packet 17 g  17 g Oral BID PRN  
 sevelamer carbonate (RENVELA) tab 800 mg  800 mg Oral TID WITH MEALS  simvastatin (ZOCOR) tablet 20 mg  20 mg Oral QHS  sodium chloride (NS) flush 5-10 mL  5-10 mL IntraVENous Q8H  
 sodium chloride (NS) flush 5-10 mL  5-10 mL IntraVENous PRN  
 insulin lispro (HUMALOG) injection   SubCUTAneous AC&HS  
 glucose chewable tablet 16 g  4 Tab Oral PRN  
 dextrose (D50W) injection syrg 12.5-25 g  12.5-25 g IntraVENous PRN  
 glucagon (GLUCAGEN) injection 1 mg  1 mg IntraMUSCular PRN  
 nortriptyline (PAMELOR) capsule 50 mg  50 mg Oral QHS  Vancomycin- pharmacy to dose   Other Rx Dosing/Monitoring  
 
______________________________________________________________________ EXPECTED LENGTH OF STAY: 3d 19h ACTUAL LENGTH OF STAY:          Mendoza Beard MD

## 2018-07-19 VITALS
TEMPERATURE: 97.8 F | OXYGEN SATURATION: 98 % | HEART RATE: 77 BPM | RESPIRATION RATE: 18 BRPM | WEIGHT: 241.84 LBS | SYSTOLIC BLOOD PRESSURE: 139 MMHG | DIASTOLIC BLOOD PRESSURE: 60 MMHG | HEIGHT: 72 IN | BODY MASS INDEX: 32.76 KG/M2

## 2018-07-19 PROBLEM — M86.171 OSTEOMYELITIS OF FOOT, RIGHT, ACUTE (HCC): Status: ACTIVE | Noted: 2018-07-19

## 2018-07-19 PROBLEM — Z99.89 OSA ON CPAP: Chronic | Status: ACTIVE | Noted: 2018-07-19

## 2018-07-19 PROBLEM — S91.002A ANKLE WOUND, LEFT, INITIAL ENCOUNTER: Status: ACTIVE | Noted: 2018-07-19

## 2018-07-19 PROBLEM — E87.1 HYPONATREMIA: Status: ACTIVE | Noted: 2018-07-19

## 2018-07-19 PROBLEM — S31.000A SACRAL WOUND: Status: ACTIVE | Noted: 2018-07-19

## 2018-07-19 PROBLEM — G47.33 OSA ON CPAP: Chronic | Status: ACTIVE | Noted: 2018-07-19

## 2018-07-19 LAB
ALBUMIN SERPL-MCNC: 2 G/DL (ref 3.5–5)
ANION GAP SERPL CALC-SCNC: 8 MMOL/L (ref 5–15)
BUN SERPL-MCNC: 29 MG/DL (ref 6–20)
BUN/CREAT SERPL: 7 (ref 12–20)
CALCIUM SERPL-MCNC: 7 MG/DL (ref 8.5–10.1)
CHLORIDE SERPL-SCNC: 95 MMOL/L (ref 97–108)
CO2 SERPL-SCNC: 27 MMOL/L (ref 21–32)
CREAT SERPL-MCNC: 4.12 MG/DL (ref 0.55–1.02)
ERYTHROCYTE [DISTWIDTH] IN BLOOD BY AUTOMATED COUNT: 15.7 % (ref 11.5–14.5)
GLUCOSE BLD STRIP.AUTO-MCNC: 124 MG/DL (ref 65–100)
GLUCOSE BLD STRIP.AUTO-MCNC: 64 MG/DL (ref 65–100)
GLUCOSE BLD STRIP.AUTO-MCNC: 72 MG/DL (ref 65–100)
GLUCOSE SERPL-MCNC: 85 MG/DL (ref 65–100)
HCT VFR BLD AUTO: 25.1 % (ref 35–47)
HGB BLD-MCNC: 7.4 G/DL (ref 11.5–16)
MCH RBC QN AUTO: 26.3 PG (ref 26–34)
MCHC RBC AUTO-ENTMCNC: 29.5 G/DL (ref 30–36.5)
MCV RBC AUTO: 89.3 FL (ref 80–99)
NRBC # BLD: 0 K/UL (ref 0–0.01)
NRBC BLD-RTO: 0 PER 100 WBC
PHOSPHATE SERPL-MCNC: 3.1 MG/DL (ref 2.6–4.7)
PLATELET # BLD AUTO: 321 K/UL (ref 150–400)
PMV BLD AUTO: 8.6 FL (ref 8.9–12.9)
POTASSIUM SERPL-SCNC: 4.4 MMOL/L (ref 3.5–5.1)
RBC # BLD AUTO: 2.81 M/UL (ref 3.8–5.2)
SERVICE CMNT-IMP: ABNORMAL
SERVICE CMNT-IMP: ABNORMAL
SERVICE CMNT-IMP: NORMAL
SODIUM SERPL-SCNC: 130 MMOL/L (ref 136–145)
WBC # BLD AUTO: 7.4 K/UL (ref 3.6–11)

## 2018-07-19 PROCEDURE — 80069 RENAL FUNCTION PANEL: CPT | Performed by: INTERNAL MEDICINE

## 2018-07-19 PROCEDURE — 94760 N-INVAS EAR/PLS OXIMETRY 1: CPT

## 2018-07-19 PROCEDURE — 82962 GLUCOSE BLOOD TEST: CPT

## 2018-07-19 PROCEDURE — 94660 CPAP INITIATION&MGMT: CPT

## 2018-07-19 PROCEDURE — 74011250637 HC RX REV CODE- 250/637: Performed by: FAMILY MEDICINE

## 2018-07-19 PROCEDURE — 74011000250 HC RX REV CODE- 250: Performed by: INTERNAL MEDICINE

## 2018-07-19 PROCEDURE — 85027 COMPLETE CBC AUTOMATED: CPT | Performed by: INTERNAL MEDICINE

## 2018-07-19 PROCEDURE — 36415 COLL VENOUS BLD VENIPUNCTURE: CPT | Performed by: INTERNAL MEDICINE

## 2018-07-19 PROCEDURE — 3331090002 HH PPS REVENUE DEBIT

## 2018-07-19 PROCEDURE — 74011250637 HC RX REV CODE- 250/637: Performed by: HOSPITALIST

## 2018-07-19 PROCEDURE — 74011000258 HC RX REV CODE- 258: Performed by: INTERNAL MEDICINE

## 2018-07-19 PROCEDURE — 3331090001 HH PPS REVENUE CREDIT

## 2018-07-19 RX ORDER — OXYCODONE AND ACETAMINOPHEN 5; 325 MG/1; MG/1
1-2 TABLET ORAL
Qty: 5 TAB | Refills: 0 | Status: ON HOLD | OUTPATIENT
Start: 2018-07-19 | End: 2018-07-28

## 2018-07-19 RX ORDER — INSULIN GLARGINE 100 [IU]/ML
12 INJECTION, SOLUTION SUBCUTANEOUS DAILY
Status: DISCONTINUED | OUTPATIENT
Start: 2018-07-20 | End: 2018-07-19 | Stop reason: HOSPADM

## 2018-07-19 RX ORDER — INSULIN LISPRO 100 [IU]/ML
INJECTION, SOLUTION INTRAVENOUS; SUBCUTANEOUS
Status: DISCONTINUED | OUTPATIENT
Start: 2018-07-19 | End: 2018-07-19 | Stop reason: HOSPADM

## 2018-07-19 RX ORDER — ALBUMIN HUMAN 250 G/1000ML
50 SOLUTION INTRAVENOUS
Qty: 200 ML | Refills: 0 | Status: SHIPPED
Start: 2018-07-19

## 2018-07-19 RX ORDER — PANTOPRAZOLE SODIUM 40 MG/1
40 TABLET, DELAYED RELEASE ORAL
Qty: 60 TAB | Refills: 0 | Status: SHIPPED
Start: 2018-07-19

## 2018-07-19 RX ADMIN — AZTREONAM 500 MG: 1 INJECTION, POWDER, LYOPHILIZED, FOR SOLUTION INTRAMUSCULAR; INTRAVENOUS at 05:26

## 2018-07-19 RX ADMIN — GABAPENTIN 100 MG: 100 CAPSULE ORAL at 07:14

## 2018-07-19 RX ADMIN — OXYCODONE HYDROCHLORIDE AND ACETAMINOPHEN 1000 MG: 500 TABLET ORAL at 09:24

## 2018-07-19 RX ADMIN — OXYCODONE AND ACETAMINOPHEN 1 TABLET: 5; 325 TABLET ORAL at 08:30

## 2018-07-19 RX ADMIN — ASCORBIC ACID, THIAMINE MONONITRATE,RIBOFLAVIN, NIACINAMIDE, PYRIDOXINE HYDROCHLORIDE, FOLIC ACID, CYANOCOBALAMIN, BIOTIN, CALCIUM PANTOTHENATE, 1 CAPSULE: 100; 1.5; 1.7; 20; 10; 1; 6000; 150000; 5 CAPSULE, LIQUID FILLED ORAL at 09:24

## 2018-07-19 RX ADMIN — SEVELAMER CARBONATE 800 MG: 800 TABLET, FILM COATED ORAL at 07:14

## 2018-07-19 RX ADMIN — DOCUSATE SODIUM 100 MG: 100 CAPSULE, LIQUID FILLED ORAL at 09:24

## 2018-07-19 RX ADMIN — FLUOXETINE 20 MG: 20 CAPSULE ORAL at 09:24

## 2018-07-19 RX ADMIN — PANTOPRAZOLE SODIUM 40 MG: 40 TABLET, DELAYED RELEASE ORAL at 07:14

## 2018-07-19 RX ADMIN — Medication 10 ML: at 05:26

## 2018-07-19 RX ADMIN — OXYCODONE AND ACETAMINOPHEN 1 TABLET: 5; 325 TABLET ORAL at 09:31

## 2018-07-19 RX ADMIN — ASPIRIN 81 MG 81 MG: 81 TABLET ORAL at 09:24

## 2018-07-19 NOTE — PROGRESS NOTES
Bedside shift change report given to Malina Ayala RN  (oncoming nurse) by Gail Almonte RN  (offgoing nurse). Report included the following information SBAR and MAR.

## 2018-07-19 NOTE — DISCHARGE SUMMARY
Discharge Summary       PATIENT ID: Yvan Barajas  MRN: [id number]   YOB: 1950    DATE OF ADMISSION: 7/6/2018  7:26 PM    DATE OF DISCHARGE: 7/19/18   PRIMARY CARE PROVIDER: Mireille August MD     DISCHARGING PHYSICIAN: Gerardo Contreras MD    To contact this individual call [phone] and ask the  to page. If unavailable ask to be transferred the Adult Hospitalist Department. CONSULTATIONS: IP CONSULT TO HOSPITALIST  IP CONSULT TO NEPHROLOGY  IP CONSULT TO PODIATRY  IP CONSULT TO INFECTIOUS DISEASES  IP CONSULT TO VASCULAR SURGERY    PROCEDURES/SURGERIES: Procedure(s):  7/12 DEBRIDEMENT RIGHT HEEL ULCER WITH PARTIAL CALCANECTOMY  7/7 PVR/peripheral arterial testing  7/8 bilateral LE venous duplex  Hemodialysis  7/10 abdominal aortogram with right leg runoff  7/11 bilateral UE PVR  1 unit PRBC transfusion 7/5 7/17 left chest wall Jacob catheter placement  7/8 MRI right foot wo contrast    [address] COURSE:   71 yo woman with anemia, arthritis, asthma, anxiety, ESRD on TTS HD, chronic pain, depression, DM2, GERD, glaucoma, HTN, HLD, IBS, migraine, morbid obesity, sleep apnea presented to the ED from home on 7/6/18 with leg swelling. She was admitted with b/l LE cellulitis, right heel wound, left ankle wound, hyperglycemia. Acute bilateral foot cellulitis with infected right heel wound and osteomyelitis (POA) - pain controlled with current medication regimen  - MRI of right foot 7/8 large heel ulcer with underlying soft tissue gas that extends nearly to the cortex of the underlying calcaneus. No fluid collection or osteo.   - Vascular Surgery, Podiatry signed off  - ID following  - LE PVR 7/8 normal but with calcifications  - s/p abdominal aortogram with right leg runoff 7/10 without significant stenosis  - s/p debridement of right heel ulcer with partial calcanectomy 7/12  - BCx 7/6 negative x2  - wound Cx 7/12 Proteus, Klebsiella, Enterococcus, MRSA, Alcaligenes   - bone Cx 7/12 Proteus, Klebsiella, Pseudomonas, Enterococcus, CoNS x2 strains   - currently on vancomycin, aztreonam  - s/p Jacob catheter placement 7/17 for aztreonam x6 weeks per ID  - plan for vancomycin in HD x6 weeks: 1 gram TIW in HD  - currently with wound vac  ~~~~~~~~~~~~~~~~~~~~~~~~~~~~~~~~~~~~~~~~~~~~~~~~~~~~~~~~~~~~~~~~~~~~~~~~~~~~~~~~~~~~~~  IV Antibiotic Orders  1. Diagnosis:  Osteomyelitis of heel  2. Routine Jacob care  3. Antibiotic:  Azactam 500 mg IV Q 12 hours  4. Lab each Monday:                        CBC/diff/platelets                        BMP                        ESR  5. Lab each Thursday:                        CBC/diff/platelets                        BMP  6. Fax lab to Dr. Suman Hernández @ [phone].  7.  Call Dr. Suman Hernández @ [phone] for WBC under 4.  8.  Duration of therapy: 6 weeks                        Please call Dr. Suman Hernández @ [phone] before stopping therapy. 9.  Allergies: Allergies   Allergen Reactions    Latex Itching       Rash, sometimes difficult to breathe    Celebrex [Celecoxib] Anaphylaxis and Swelling       TONGUE. LIPS AND EYES    Iodine Other (comments)       IV CONTRAST-LESIONS, AND SKIN SLUFFED OFF    Keflex [Cephalexin] Anaphylaxis and Swelling       Tongue, lips, and eyes    Levaquin [Levofloxacin] Anaphylaxis and Swelling       Tongue, lips, and eyes    Pcn [Penicillins] Anaphylaxis and Swelling       Tongue, lips and eyes      10. Call [phone] with name of [facility] and to set up 2-3 week appointment with tram Rausch MD  ~~~~~~~~~~~~~~~~~~~~~~~~~~~~~~~~~~~~~~~~~~~~~~~~~~~~~~~~~~~~~~~~~~~~~~~~~~~~~~~~~~~~~~~~~~~      Right hand numbness/tingling with possible steal syndrome - seems like possibly some peripheral neuropathy, carpel tunnel  - upper extremity PVR 7/10 no evidence of hemodynamically significant right or left upper extremity arterial obstruction. No evidence of steal by the AV graft.    - fixing the venous flow might exacerbate any steal  - outpatient EMG to eval for neuropathy      Hyponatremia - Na improving; continue fluid restriction  - correcting with HD      ESRD (chronic) - TTS HD as per Renal  - continue Sensipar, Renvela, sodium bicarbonate  - upper extremity PVR 7/10 with patent right distal brachial artery to proximal brachial vein AV graft with evidence of a hemodynamically significant stenosis at the venous anastomosis  - has R chest wall Permacath      Anemia of chronic disease - H/H stable; s/p 1 unit PRBC transfusion      HTN - BP controlled on Coreg      DM2 with labile glycemia - recent A1c 9.1, BG up after steroids  - stop premeal scheduled insulin and resume home Levemir 12 units daily      Transaminitis - resolved      Asthma - stable, nebs PRN      Anxiety, depression - stable, continue on home Prozac, nortriptyline       Left sacral wound, left open abrasion, left lateral ankle wound (POA) - wound care per nurse      Morbid obesity - Body mass index is 29.55 kg/(m^2). - diet and weight loss program       FARHAT on CPAP - CPAP qhs     Constipation - resolved with bowel regimen        DISCHARGE DIAGNOSES / PLAN:      Stable for discharge to [facility]. Follow up with PCP, hemodialysis, ID. PENDING TEST RESULTS:   At the time of discharge the following test results are still pending: none    FOLLOW UP APPOINTMENTS:    Follow-up Information     Follow up With Details Comments Contact Info    Mallory Treadwell MD In 1 week 1 week following discharge from hospital/SNF [facility]  [phone]      MWF hemodialysis as scheduled       Pravin Kurtz MD  infectious diseases; hospital follow up as needed/instructed [facility]  [phone]             ADDITIONAL CARE RECOMMENDATIONS:   1. Take medications as prescribed. 2. Keep appointment(s) as recommended/scheduled.   3. Accucheks qACHS with low dose/high sensitivity insulin correction scale  4 Outpatient IV antibiotic orders as below:  ~~~~~~~~~~~~~~~~~~~~~~~~~~~~~~~~~~~~~~~~~~~~~~~~~~~~~~~~~~~~~~~~~~~~  IV Antibiotic Orders  1. Diagnosis:  Osteomyelitis of heel  2. Routine Jacob care  3. Antibiotic:  Azactam 500 mg IV Q 12 hours  4. Lab each Monday:                        CBC/diff/platelets                        BMP                        ESR  5. Lab each Thursday:                        CBC/diff/platelets                        BMP  6. Fax lab to Dr. Yenny Galan @ [phone].  7.  Call Dr. Yenny Galan @ [phone] for WBC under 4.  8.  Duration of therapy: 6 weeks                        Please call Dr. Yenny Galan @ [phone] before stopping therapy. 9.  Allergies: Allergies   Allergen Reactions    Latex Itching       Rash, sometimes difficult to breathe    Celebrex [Celecoxib] Anaphylaxis and Swelling       TONGUE. LIPS AND EYES    Iodine Other (comments)       IV CONTRAST-LESIONS, AND SKIN SLUFFED OFF    Keflex [Cephalexin] Anaphylaxis and Swelling       Tongue, lips, and eyes    Levaquin [Levofloxacin] Anaphylaxis and Swelling       Tongue, lips, and eyes    Pcn [Penicillins] Anaphylaxis and Swelling       Tongue, lips and eyes      10. Call [phone] with name of [facility] and to set up 2-3 week appointment with tram Choi MD  ~~~~~~~~~~~~~~~~~~~~~~~~~~~~~~~~~~~~~~~~~~~~~~~~~~~~~~~~~~~~~~~~~~~~~~~~~~~~~~~~~~~~~~~~  5. CPAP qhs  6. Patient needs Total Care Bariatric Plus bed. 7. Routine Jacob catheter care. 8. Elevate right foot and reinforce dressing prn.  9. Bariatric commode at bedside. 10. Bilateral Prevalon boots. 11. Vancomycin to be given qMWF in hemodialysis. 12. 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     DIET: diabetic consistent carb 2000kcal/day, fluid restriction 1000ml/day; renal diet 70-70-70 protein/sodium/potassium    Oral Nutritional Supplements: Prosource with breakfast and dinner; Glucerna Shake with lunch    ACTIVITY: PT/OT Eval and Treat    WOUND CARE: Wound vac:  VAC (NPWT) Dressing Management Orders:  Settings at 100 mmHg, continuous negative pressure. Dressing of black foam and use white or contact layer to base as needed. Dressing change Friday and Tuesday and prn as needed by WOCN. Change canisters when full. (located in 5E, PSBU and Main ICU supply rooms). Measure amount of drainage Qshift. For sudden development of blood or an increased amount of tawnya blood:   1.  Immediately turn off VAC system. 2.  Leave dressing in place. 3.  Hold pressure over wound. 4.  Call physician. For graft and flap wounds, if vacuum fails to maintain seal or unable to manage alarm for clogged tubing for >2hrs: Contact Physician   For all other wounds, if vacuum fails to maintain seal or unable to manage alarm for clogged tubing for >2hrs:   1.  Remove dressing and all foam from wound bed. 2.  Cleanse wound with normal saline. 3.  Apply saline soaked gauze to wound bed. 4.  Cover with dry dressing. 5.  Secure with tape. 6.  Change daily and as needed. 7.  Notify Physician and WOCN that wound VAC dressing needs to be reapplied. When patient is discharged from our facility:  1.  Same as above for dressing removal and wound care. 2.  Place VAC system and plug in clear bag in utility room. (no red bags)  3. This is a convenience VAC. Yunge Aisha will . Do not call [employer]. 4. Wound VAC cannot leave the facility with the patient. For procedures only:   Patient may be disconnected from [facility] system for less than 2 hours by clamping and disconnecting tubing.  VAC machine cannot go into MRI area. Battery backup on our current inpatient systems lasts for 4 hours and may be used to prevent interruption of treatment. Right lower buttock: daily and PRN incontinence: clean with NS, apply Aquacel Ag to tunnel and wound. Cover with mepilex border dressing. Every third day: PRN incontinence: mepilex border dressing to 2 sacral wounds after cleaning with NS.     Apply dry gauze dressing to right heel wound daily.  Apply moistened aquacell Ag and gauze dressing to left ankle daily. EQUIPMENT needed: as per PT/OT, wound care      DISCHARGE MEDICATIONS:  Current Discharge Medication List      START taking these medications    Details   oxyCODONE-acetaminophen (PERCOCET) 5-325 mg per tablet Take 1-2 Tabs by mouth every four (4) hours as needed. Max Daily Amount: 12 Tabs. Indications: Pain  Qty: 5 Tab, Refills: 0    Associated Diagnoses: Osteomyelitis of foot, right, acute (HCC)      albumin human 25% (BUMINATE) 25 % solution 200 mL by IntraVENous route DIALYSIS PRN (Hypotension during dialysis). Qty: 200 mL, Refills: 0      aztreonam 1 gram 500 mg ivpb 500 mg by IntraVENous route every twelve (12) hours every twelve (12) hours. Qty: 1 Dose, Refills: 0      epoetin joi (EPOGEN;PROCRIT) 20,000 unit/mL injection 1 mL by SubCUTAneous route every Monday, Wednesday, Friday. Indications: ANEMIA DUE TO RENAL FAILURE, Renal Dialysis  Qty: 1 mL, Refills: 0      pantoprazole (PROTONIX) 40 mg tablet Take 1 Tab by mouth Before breakfast and dinner. Qty: 60 Tab, Refills: 0      vancomycin 1,000 mg 1,000 mg, ADDaptor 1 Device IVPB 1,000 mg by IntraVENous route DIALYSIS MON, WED & FRI. Qty: 1 Dose, Refills: 0         CONTINUE these medications which have NOT CHANGED    Details   gabapentin (NEURONTIN) 100 mg capsule Take 100 mg by mouth two (2) times a day. Indications: NEUROPATHIC PAIN      ferric citrate (AURYXIA) 210 mg iron tablet Take 210 mg by mouth three (3) times daily (with meals). nortriptyline (PAMELOR) 25 mg capsule Take 50 mg by mouth nightly. OXYGEN-AIR DELIVERY SYSTEMS 2 L by Nasal route as needed (shortness of breath). cyclobenzaprine (FLEXERIL) 10 mg tablet Take 10 mg by mouth every eight (8) hours as needed for Muscle Spasm(s). lidocaine (ASPERCREME, LIDOCAINE,) 4 % patch 1 Patch by TransDERmal route every twelve (12) hours.       midodrine (PROAMITINE) 5 mg tablet Take 5 mg by mouth three (3) days a week. take before dialysis as directed. carvedilol (COREG) 12.5 mg tablet Take 12.5 mg by mouth two (2) times daily (with meals). Sun., Tues and Thurs, no evening dose      aspirin 81 mg chewable tablet Take 81 mg by mouth daily. polyethylene glycol (MIRALAX) 17 gram packet Take 17 g by mouth two (2) times daily as needed (constipation). FLUoxetine (PROZAC) 20 mg capsule Take 20 mg by mouth daily. sevelamer carbonate (RENVELA) 800 mg tab tab Take 800 mg by mouth three (3) times daily (with meals). linaclotide (LINZESS) 290 mcg cap capsule Take 290 mcg by mouth daily. insulin detemir (LEVEMIR FLEXTOUCH) 100 unit/mL (3 mL) inpn 12 Units by SubCUTAneous route Daily (before lunch). NOON DAILY      simvastatin (ZOCOR) 20 mg tablet Take 20 mg by mouth nightly. ascorbic acid, vitamin C, (VITAMIN C) 500 mg tablet Take 1,000 mg by mouth daily. latanoprost (XALATAN) 0.005 % ophthalmic solution Administer 1 Drop to both eyes nightly. cinacalcet (SENSIPAR) 30 mg tablet Take 30 mg by mouth daily. fexofenadine (ALLEGRA) 180 mg tablet Take 180 mg by mouth nightly. b complex-vitamin c-folic acid (RENAL SOFTGELS) 1 mg capsule Take 1 Cap by mouth daily. docusate sodium (DULCOLAX STOOL SOFTENER) 100 mg capsule Take 100 mg by mouth two (2) times a day. INSULIN LISPRO (HUMALOG SC) by SubCUTAneous route Before breakfast, lunch, and dinner.  SLIDING SCALE  100-150 = 4 units   151-200 = 5 units  Then 1 UNIT INCREASE FOR EVERY 50 POINTS         STOP taking these medications       oxyCODONE IR (ROXICODONE) 5 mg immediate release tablet Comments:   Reason for Stopping:         ketoconazole (NIZORAL) 2 % topical cream Comments:   Reason for Stopping:         oxyCODONE-acetaminophen (PERCOCET) 7.5-325 mg per tablet Comments:   Reason for Stopping:         terbinafine HCl (LAMISIL) 1 % topical cream Comments:   Reason for Stopping: OTHER Comments:   Reason for Stopping:               NOTIFY YOUR PHYSICIAN FOR ANY OF THE FOLLOWING:   Fever over 101 degrees for 24 hours. Chest pain, shortness of breath, fever, chills, nausea, vomiting, diarrhea, change in mentation, falling, weakness, bleeding. Severe pain or pain not relieved by medications. Or, any other signs or symptoms that you may have questions about.     DISPOSITION:    Home With:   OT  PT  HH  RN      x [facility] SNF    Independent/assisted living    Hospice    Other:       PATIENT CONDITION AT DISCHARGE:     Functional status    Poor    x Deconditioned     Independent      Cognition   x  Lucid     Forgetful     Dementia      Catheters/lines (plus indication)    [place]    x Left chest wall Jacob catheter   x Right chest wall Permacath   x Wound vac     Code status   x  Full code     DNR      PHYSICAL EXAMINATION AT DISCHARGE:  Visit Vitals    /60 (BP 1 Location: Left arm, BP Patient Position: At rest)    Pulse 77    Temp 97.8 °F (36.6 °C)    Resp 18    Ht 6' 1.75\" (1.873 m)    Wt 109.7 kg (241 lb 13.5 oz)    SpO2 98%    Breastfeeding No    BMI 31.26 kg/m2     Constitutional:  awake, no acute distress, cooperative, pleasant, obese   ENT:  oral mucosa moist, oropharynx benign    Resp:  CTA bilaterally anteriorly, no wheezing/rhonchi/rales   CV:  regular rhythm, normal rate, no m/r/g appreciated    GI:  +BS, soft, non distended, non tender, obese     Musculoskeletal:  moves all extremities with reduced ROM b/l LE    Neurologic:  AAOx3, responds appropriately to questions and commands                                             Skin:  left sacral wound, b/l LE dressings c/d/i, RLE wound vac; right chest wall Permacath, left chest wall Jacob catheter    Labs  Recent Results (from the past 24 hour(s))   GLUCOSE, POC    Collection Time: 07/18/18 11:21 AM   Result Value Ref Range    Glucose (POC) 134 (H) 65 - 100 mg/dL    Performed by Sheila Lennox    GLUCOSE, POC Collection Time: 07/18/18  5:04 PM   Result Value Ref Range    Glucose (POC) 90 65 - 100 mg/dL    Performed by Kamille Camarillo    GLUCOSE, POC    Collection Time: 07/18/18  8:56 PM   Result Value Ref Range    Glucose (POC) 92 65 - 100 mg/dL    Performed by DIYA HICKS    CBC W/O DIFF    Collection Time: 07/19/18  2:15 AM   Result Value Ref Range    WBC 7.4 3.6 - 11.0 K/uL    RBC 2.81 (L) 3.80 - 5.20 M/uL    HGB 7.4 (L) 11.5 - 16.0 g/dL    HCT 25.1 (L) 35.0 - 47.0 %    MCV 89.3 80.0 - 99.0 FL    MCH 26.3 26.0 - 34.0 PG    MCHC 29.5 (L) 30.0 - 36.5 g/dL    RDW 15.7 (H) 11.5 - 14.5 %    PLATELET 063 711 - 036 K/uL    MPV 8.6 (L) 8.9 - 12.9 FL    NRBC 0.0 0  WBC    ABSOLUTE NRBC 0.00 0.00 - 0.01 K/uL   RENAL FUNCTION PANEL    Collection Time: 07/19/18  2:15 AM   Result Value Ref Range    Sodium 130 (L) 136 - 145 mmol/L    Potassium 4.4 3.5 - 5.1 mmol/L    Chloride 95 (L) 97 - 108 mmol/L    CO2 27 21 - 32 mmol/L    Anion gap 8 5 - 15 mmol/L    Glucose 85 65 - 100 mg/dL    BUN 29 (H) 6 - 20 MG/DL    Creatinine 4.12 (H) 0.55 - 1.02 MG/DL    BUN/Creatinine ratio 7 (L) 12 - 20      GFR est AA 13 (L) >60 ml/min/1.73m2    GFR est non-AA 11 (L) >60 ml/min/1.73m2    Calcium 7.0 (L) 8.5 - 10.1 MG/DL    Phosphorus 3.1 2.6 - 4.7 MG/DL    Albumin 2.0 (L) 3.5 - 5.0 g/dL   GLUCOSE, POC    Collection Time: 07/19/18  7:15 AM   Result Value Ref Range    Glucose (POC) 64 (L) 65 - 100 mg/dL    Performed by Edgar Smith    GLUCOSE, POC    Collection Time: 07/19/18  7:29 AM   Result Value Ref Range    Glucose (POC) 72 65 - 100 mg/dL    Performed by KIMBERLY GASTON    GLUCOSE, POC    Collection Time: 07/19/18  8:17 AM   Result Value Ref Range    Glucose (POC) 124 (H) 65 - 100 mg/dL    Performed by [address]  Problem List as of 7/19/2018  Date Reviewed: 7/19/2018          Codes Class Noted - Resolved    Osteomyelitis of foot, right, acute ([facility] ICD-10-CM: M86.171  ICD-9-CM: 730.07  7/19/2018 - Present Hyponatremia ICD-10-CM: E87.1  ICD-9-CM: 276.1  7/19/2018 - Present        Sacral wound ICD-10-CM: S31.000A  ICD-9-CM: 959.19  7/19/2018 - Present        Ankle wound, left, initial encounter ICD-10-CM: S91.002A  ICD-9-CM: 891.0  7/19/2018 - Present        FARHAT on CPAP (Chronic) ICD-10-CM: G47.33, Z99.89  ICD-9-CM: 327.23, V46.8  7/19/2018 - Present        Anemia ICD-10-CM: D64.9  ICD-9-CM: 285.9  7/6/2018 - Present        Bilateral leg edema ICD-10-CM: R60.0  ICD-9-CM: 782.3  7/6/2018 - Present        LFT elevation ICD-10-CM: R79.89  ICD-9-CM: 790.6  7/6/2018 - Present        Type 2 diabetes mellitus with hyperglycemia ([facility] ICD-10-CM: E11.65  ICD-9-CM: 250.00  7/6/2018 - Present        * (Principal)Bilateral lower leg cellulitis ICD-10-CM: L03.116, L03.115  ICD-9-CM: 682.6  7/6/2018 - Present        Fecal impaction ([facility] ICD-10-CM: K56.41  ICD-9-CM: 560.32  2/13/2018 - Present        ESRD needing dialysis ([facility].) ICD-10-CM: N18.6, Z99.2  ICD-9-CM: 585.6  2/13/2018 - Present        Type 2 diabetes mellitus with nephropathy ([facility].) ICD-10-CM: E11.21  ICD-9-CM: 250.40, 583.81  1/31/2018 - Present        Perianal abscess ICD-10-CM: K61.0  ICD-9-CM: 823  10/26/2017 - Present        ESRD (end stage renal disease) on dialysis [facility]) ICD-10-CM: N18.6, Z99.2  ICD-9-CM: 585.6, V45.11  6/20/2017 - Present        Abscess of deep perineal space ICD-10-CM: N34.0  ICD-9-CM: 597.0  6/17/2017 - Present        Perineal abscess ICD-10-CM: L02.215  ICD-9-CM: 682.2  1/25/2017 - Present        Obstructive sleep apnea (adult) (pediatric) ICD-10-CM: G47.33  ICD-9-CM: 327.23  12/13/2011 - Present        Serratia wound infection, 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 ICD-10-CM: A49.8  ICD-9-CM: 041.85  6/14/2011 - Present        Abdominal pain, chronic, epigastric ICD-10-CM: R10.13, G89.29  ICD-9-CM: 789.06, 338.29  1/12/2011 - Present        Morbid obesity ([facility] ICD-10-CM: E66.01  ICD-9-CM: 278.01  10/14/2010 - Present    Overview Signed 6/20/2017 11:05 AM by Hannah Lombardi NP     Body mass index is 36.4 kg/(m^2). Diabetes mellitus type 2, insulin dependent ([facility] ICD-10-CM: E11.9, Z79.4  ICD-9-CM: 250.00, V58.67  10/14/2010 - Present        HTN (hypertension) ICD-10-CM: I10  ICD-9-CM: 401.9  10/14/2010 - Present        Neuropathy ICD-10-CM: G62.9  ICD-9-CM: 355.9  10/14/2010 - Present        Arthritis ICD-10-CM: M19.90  ICD-9-CM: 716.90  10/14/2010 - Present        RESOLVED: Weakness ICD-10-CM: R53.1  ICD-9-CM: 780.79  4/9/2018 - 4/11/2018        RESOLVED: Hyperkalemia ICD-10-CM: E87.5  ICD-9-CM: 276.7  2/13/2018 - 4/11/2018        RESOLVED: Anal cryptitis ICD-10-CM: K62.89  ICD-9-CM: 569.49  6/4/2012 - 7/21/2017        RESOLVED: s/p debridement of midline abd wound ICD-9-CM: [id number]  6/24/2011 - 7/21/2017              Greater than 30 minutes were spent with the patient on counseling and coordination of care.     Signed:   Elise Garcia MD  7/19/2018  9:13 AM

## 2018-07-19 NOTE — DISCHARGE INSTRUCTIONS
ADDITIONAL CARE RECOMMENDATIONS:   1. Take medications as prescribed. 2. Keep appointment(s) as recommended/scheduled. 3. Accucheks qACHS with low dose/high sensitivity insulin correction scale  4 Outpatient IV antibiotic orders as below:  ~~~~~~~~~~~~~~~~~~~~~~~~~~~~~~~~~~~~~~~~~~~~~~~~~~~~~~~~~~~~~~~~~~~~  IV Antibiotic Orders  1. Diagnosis:  Osteomyelitis of heel  2. Routine Jacob care  3. Antibiotic:  Azactam 500 mg IV Q 12 hours  4. Lab each Monday:                        CBC/diff/platelets                        BMP                        ESR  5. Lab each Thursday:                        CBC/diff/platelets                        BMP  6. Fax lab to Dr. Nedra Francis @ [phone].  7.  Call Dr. Nedra Francis @ [phone] for WBC under 4.  8.  Duration of therapy: 6 weeks                        Please call Dr. Nedra Francis @ [phone] before stopping therapy. 9.  Allergies: Allergies   Allergen Reactions    Latex Itching       Rash, sometimes difficult to breathe    Celebrex [Celecoxib] Anaphylaxis and Swelling       TONGUE. LIPS AND EYES    Iodine Other (comments)       IV CONTRAST-LESIONS, AND SKIN SLUFFED OFF    Keflex [Cephalexin] Anaphylaxis and Swelling       Tongue, lips, and eyes    Levaquin [Levofloxacin] Anaphylaxis and Swelling       Tongue, lips, and eyes    Pcn [Penicillins] Anaphylaxis and Swelling       Tongue, lips and eyes      10. Call [phone] with name of [facility] and to set up 2-3 week appointment with tram Grant MD  ~~~~~~~~~~~~~~~~~~~~~~~~~~~~~~~~~~~~~~~~~~~~~~~~~~~~~~~~~~~~~~~~~~~~~~~~~~~~~~~~~~~~~~~~  5. CPAP qhs  6. Patient needs Total Care Bariatric Plus bed. 7. Routine Jacob catheter care. 8. Elevate right foot and reinforce dressing prn.  9. Bariatric commode at bedside. 10. Bilateral Prevalon boots. 11. Vancomycin to be given qMWF in hemodialysis. 12. 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     DIET: diabetic consistent carb 2000kcal/day, fluid restriction 1000ml/day; renal diet 70-70-70 protein/sodium/potassium    Oral Nutritional Supplements: Prosource with breakfast and dinner; Glucerna Shake with lunch    ACTIVITY: PT/OT Eval and Treat    WOUND CARE:     Wound vac:  VAC (NPWT) Dressing Management Orders:  Settings at 100 mmHg, continuous negative pressure. Dressing of black foam and use white or contact layer to base as needed. Dressing change Friday and Tuesday and prn as needed by WOCN. Change canisters when full. (located in , [facility] and Main ICU supply rooms). Measure amount of drainage Qshift. For sudden development of blood or an increased amount of tawnya blood:   1.  Immediately turn off VAC system. 2.  Leave dressing in place. 3.  Hold pressure over wound. 4.  Call physician. For graft and flap wounds, if vacuum fails to maintain seal or unable to manage alarm for clogged tubing for >2hrs: Contact Physician   For all other wounds, if vacuum fails to maintain seal or unable to manage alarm for clogged tubing for >2hrs:   1.  Remove dressing and all foam from wound bed. 2.  Cleanse wound with normal saline. 3.  Apply saline soaked gauze to wound bed. 4.  Cover with dry dressing. 5.  Secure with tape. 6.  Change daily and as needed. 7.  Notify Physician and WOCN that wound VAC dressing needs to be reapplied. When patient is discharged from our facility:  1.  Same as above for dressing removal and wound care. 2.  Place VAC system and plug in clear bag in utility room. (no red bags)  3. This is a convenience VAC. Evita Bird will . Do not call [facility]. 4. Wound VAC cannot leave the facility with the patient. For procedures only:   Patient may be disconnected from [employer] system for less than 2 hours by clamping and disconnecting tubing.  VAC machine cannot go into MRI area. Battery backup on our current inpatient systems lasts for 4 hours and may be used to prevent interruption of treatment.     Right lower buttock: daily and PRN incontinence: clean with NS, apply Aquacel Ag to tunnel and wound. Cover with mepilex border dressing. Every third day: PRN incontinence: mepilex border dressing to 2 sacral wounds after cleaning with NS. Apply dry gauze dressing to right heel wound daily.  Apply moistened aquacell Ag and gauze dressing to left ankle daily. EQUIPMENT needed: as per PT/OT, wound care         Osteomyelitis: Care Instructions  Your Care Instructions  Osteomyelitis (say \"tl-nxox-vg-ek-ht-XC-tus\") is a bone infection. It is caused by bacteria. The bacteria can infect the bone where it has been injured, or they can be carried through the blood from another area in the body. Osteomyelitis can be a short- or long-term problem. It is treated with antibiotics. You may get the antibiotics as pills or through a needle in a vein (IV). You will probably get treatment in the hospital at first. The type of treatment depends on the type of bacteria causing the infection, the bones affected, and how bad the infection is. Sometimes people need surgery to drain pus from bone or to fix damaged bone. Short-term osteomyelitis that is treated right away usually can be cured. But the long-term form sometimes comes back after treatment. You can help your chances of stopping the infection by taking your medicines as directed. Follow-up care is a key part of your treatment and safety. Be sure to make and go to all appointments, and call your doctor if you are having problems. It's also a good idea to know your test results and keep a list of the medicines you take. How can you care for yourself at home? · Take your antibiotics as directed. Do not stop taking them just because you feel better. You need to take the full course of antibiotics. · Take pain medicines exactly as directed. ¨ If the doctor gave you a prescription medicine for pain, take it as prescribed.   ¨ If you are not taking a prescription pain medicine, ask your doctor if you can take an over-the-counter medicine. · Do mild exercise and stretching if your doctor says it is okay. This can help keep your bones and muscles healthy. Avoid strenuous work or exercise until your doctor says you can do it. · Consider physical therapy if your doctor suggests it. Physical therapy may help you have a normal range of movement. · Do not smoke. Smoking can slow healing of the infection. If you need help quitting, talk to your doctor about stop-smoking programs and medicines. These can increase your chances of quitting for good. When should you call for help? Call 911 anytime you think you may need emergency care. For example, call if:    · You have severe bone pain.    Call your doctor now or seek immediate medical care if:    · You continue to have bone pain.     · You have signs of infection, such as:  ¨ Increased pain, swelling, warmth, or redness. ¨ Red streaks leading from a wound. ¨ Pus draining from a wound. ¨ A fever.    Watch closely for changes in your health, and be sure to contact your doctor if:    · You do not get better as expected. Where can you learn more? Go to http://brandan-liang.info/. Enter E409 in the search box to learn more about \"Osteomyelitis: Care Instructions. \"  Current as of: November 21, 2017  Content Version: 11.7  © 9966-4131 [employer]. Care instructions adapted under license by "[employer]" (which disclaims liability or warranty for this information). If you have questions about a medical condition or this instruction, always ask your healthcare professional. [place] [address] any warranty or liability for your use of this information.

## 2018-07-20 PROCEDURE — 3331090002 HH PPS REVENUE DEBIT

## 2018-07-20 PROCEDURE — 3331090001 HH PPS REVENUE CREDIT

## 2018-07-21 PROCEDURE — 3331090002 HH PPS REVENUE DEBIT

## 2018-07-21 PROCEDURE — 3331090001 HH PPS REVENUE CREDIT

## 2018-07-22 ENCOUNTER — HOSPITAL ENCOUNTER (INPATIENT)
Age: 68
LOS: 6 days | Discharge: NURSING, REHAB OR LONG-TERM CARE | DRG: 919 | End: 2018-07-28
Attending: EMERGENCY MEDICINE | Admitting: INTERNAL MEDICINE
Payer: MEDICARE

## 2018-07-22 ENCOUNTER — APPOINTMENT (OUTPATIENT)
Dept: GENERAL RADIOLOGY | Age: 68
DRG: 919 | End: 2018-07-22
Attending: HOSPITALIST
Payer: MEDICARE

## 2018-07-22 DIAGNOSIS — S91.301A OPEN WOUND OF HEEL, RIGHT, INITIAL ENCOUNTER: Primary | ICD-10-CM

## 2018-07-22 DIAGNOSIS — D64.9 ANEMIA, UNSPECIFIED TYPE: ICD-10-CM

## 2018-07-22 DIAGNOSIS — T14.8XXA BLEEDING FROM WOUND: ICD-10-CM

## 2018-07-22 DIAGNOSIS — N18.6 ESRD (END STAGE RENAL DISEASE) (HCC): ICD-10-CM

## 2018-07-22 DIAGNOSIS — M86.171 OSTEOMYELITIS OF FOOT, RIGHT, ACUTE (HCC): ICD-10-CM

## 2018-07-22 PROBLEM — D62 ACUTE BLOOD LOSS AS CAUSE OF POSTOPERATIVE ANEMIA: Status: ACTIVE | Noted: 2018-07-22

## 2018-07-22 PROBLEM — D62 ACUTE BLOOD LOSS ANEMIA: Status: ACTIVE | Noted: 2018-07-22

## 2018-07-22 PROBLEM — M86.9 FOOT OSTEOMYELITIS, RIGHT (HCC): Status: ACTIVE | Noted: 2018-07-22

## 2018-07-22 LAB
ANION GAP SERPL CALC-SCNC: 9 MMOL/L (ref 5–15)
APTT PPP: 34.7 SEC (ref 22.1–32)
BASOPHILS # BLD: 0 K/UL (ref 0–0.1)
BASOPHILS NFR BLD: 0 % (ref 0–1)
BUN SERPL-MCNC: 36 MG/DL (ref 6–20)
BUN/CREAT SERPL: 6 (ref 12–20)
CALCIUM SERPL-MCNC: 7.5 MG/DL (ref 8.5–10.1)
CHLORIDE SERPL-SCNC: 94 MMOL/L (ref 97–108)
CO2 SERPL-SCNC: 29 MMOL/L (ref 21–32)
CREAT SERPL-MCNC: 5.65 MG/DL (ref 0.55–1.02)
DIFFERENTIAL METHOD BLD: ABNORMAL
EOSINOPHIL # BLD: 0.2 K/UL (ref 0–0.4)
EOSINOPHIL NFR BLD: 3 % (ref 0–7)
ERYTHROCYTE [DISTWIDTH] IN BLOOD BY AUTOMATED COUNT: 16.6 % (ref 11.5–14.5)
GLUCOSE BLD STRIP.AUTO-MCNC: 117 MG/DL (ref 65–100)
GLUCOSE BLD STRIP.AUTO-MCNC: 131 MG/DL (ref 65–100)
GLUCOSE BLD STRIP.AUTO-MCNC: 141 MG/DL (ref 65–100)
GLUCOSE SERPL-MCNC: 99 MG/DL (ref 65–100)
HCT VFR BLD AUTO: 24 % (ref 35–47)
HGB BLD-MCNC: 6.9 G/DL (ref 11.5–16)
IMM GRANULOCYTES # BLD: 0.1 K/UL (ref 0–0.04)
IMM GRANULOCYTES NFR BLD AUTO: 1 % (ref 0–0.5)
INR PPP: 1.2 (ref 0.9–1.1)
LYMPHOCYTES # BLD: 2.5 K/UL (ref 0.8–3.5)
LYMPHOCYTES NFR BLD: 31 % (ref 12–49)
MCH RBC QN AUTO: 26.7 PG (ref 26–34)
MCHC RBC AUTO-ENTMCNC: 28.8 G/DL (ref 30–36.5)
MCV RBC AUTO: 93 FL (ref 80–99)
MONOCYTES # BLD: 0.8 K/UL (ref 0–1)
MONOCYTES NFR BLD: 10 % (ref 5–13)
NEUTS SEG # BLD: 4.4 K/UL (ref 1.8–8)
NEUTS SEG NFR BLD: 55 % (ref 32–75)
NRBC # BLD: 0 K/UL (ref 0–0.01)
NRBC BLD-RTO: 0 PER 100 WBC
PLATELET # BLD AUTO: 332 K/UL (ref 150–400)
PMV BLD AUTO: 8 FL (ref 8.9–12.9)
POTASSIUM SERPL-SCNC: 4.6 MMOL/L (ref 3.5–5.1)
PROTHROMBIN TIME: 11.8 SEC (ref 9–11.1)
RBC # BLD AUTO: 2.58 M/UL (ref 3.8–5.2)
RBC MORPH BLD: ABNORMAL
SERVICE CMNT-IMP: ABNORMAL
SODIUM SERPL-SCNC: 132 MMOL/L (ref 136–145)
THERAPEUTIC RANGE,PTTT: ABNORMAL SECS (ref 58–77)
VANCOMYCIN SERPL-MCNC: 24.2 UG/ML
WBC # BLD AUTO: 8 K/UL (ref 3.6–11)
WBC MORPH BLD: ABNORMAL

## 2018-07-22 PROCEDURE — 85025 COMPLETE CBC W/AUTO DIFF WBC: CPT | Performed by: EMERGENCY MEDICINE

## 2018-07-22 PROCEDURE — 80202 ASSAY OF VANCOMYCIN: CPT | Performed by: HOSPITALIST

## 2018-07-22 PROCEDURE — 99284 EMERGENCY DEPT VISIT MOD MDM: CPT

## 2018-07-22 PROCEDURE — 86923 COMPATIBILITY TEST ELECTRIC: CPT | Performed by: EMERGENCY MEDICINE

## 2018-07-22 PROCEDURE — 85610 PROTHROMBIN TIME: CPT | Performed by: EMERGENCY MEDICINE

## 2018-07-22 PROCEDURE — 71045 X-RAY EXAM CHEST 1 VIEW: CPT

## 2018-07-22 PROCEDURE — 90935 HEMODIALYSIS ONE EVALUATION: CPT

## 2018-07-22 PROCEDURE — 82962 GLUCOSE BLOOD TEST: CPT

## 2018-07-22 PROCEDURE — 5A1D70Z PERFORMANCE OF URINARY FILTRATION, INTERMITTENT, LESS THAN 6 HOURS PER DAY: ICD-10-PCS | Performed by: INTERNAL MEDICINE

## 2018-07-22 PROCEDURE — 99218 HC RM OBSERVATION: CPT

## 2018-07-22 PROCEDURE — 74011000250 HC RX REV CODE- 250: Performed by: HOSPITALIST

## 2018-07-22 PROCEDURE — P9016 RBC LEUKOCYTES REDUCED: HCPCS | Performed by: EMERGENCY MEDICINE

## 2018-07-22 PROCEDURE — 96374 THER/PROPH/DIAG INJ IV PUSH: CPT

## 2018-07-22 PROCEDURE — 36415 COLL VENOUS BLD VENIPUNCTURE: CPT | Performed by: HOSPITALIST

## 2018-07-22 PROCEDURE — 74011000258 HC RX REV CODE- 258: Performed by: HOSPITALIST

## 2018-07-22 PROCEDURE — 96375 TX/PRO/DX INJ NEW DRUG ADDON: CPT

## 2018-07-22 PROCEDURE — 85730 THROMBOPLASTIN TIME PARTIAL: CPT | Performed by: EMERGENCY MEDICINE

## 2018-07-22 PROCEDURE — 74011250636 HC RX REV CODE- 250/636: Performed by: EMERGENCY MEDICINE

## 2018-07-22 PROCEDURE — 30233N1 TRANSFUSION OF NONAUTOLOGOUS RED BLOOD CELLS INTO PERIPHERAL VEIN, PERCUTANEOUS APPROACH: ICD-10-PCS | Performed by: EMERGENCY MEDICINE

## 2018-07-22 PROCEDURE — 74011250637 HC RX REV CODE- 250/637: Performed by: HOSPITALIST

## 2018-07-22 PROCEDURE — 80048 BASIC METABOLIC PNL TOTAL CA: CPT | Performed by: EMERGENCY MEDICINE

## 2018-07-22 PROCEDURE — 3331090002 HH PPS REVENUE DEBIT

## 2018-07-22 PROCEDURE — 77030022017 HC DRSG HEMO QCLOT ZMED -A

## 2018-07-22 PROCEDURE — 77030018719 HC DRSG PTCH ANTIMIC J&J -A

## 2018-07-22 PROCEDURE — 74011250637 HC RX REV CODE- 250/637: Performed by: INTERNAL MEDICINE

## 2018-07-22 PROCEDURE — 3331090001 HH PPS REVENUE CREDIT

## 2018-07-22 PROCEDURE — 86900 BLOOD TYPING SEROLOGIC ABO: CPT | Performed by: EMERGENCY MEDICINE

## 2018-07-22 PROCEDURE — 74011250636 HC RX REV CODE- 250/636: Performed by: HOSPITALIST

## 2018-07-22 PROCEDURE — 36430 TRANSFUSION BLD/BLD COMPNT: CPT

## 2018-07-22 PROCEDURE — 74011636637 HC RX REV CODE- 636/637: Performed by: HOSPITALIST

## 2018-07-22 RX ORDER — SODIUM CHLORIDE 9 MG/ML
250 INJECTION, SOLUTION INTRAVENOUS AS NEEDED
Status: DISCONTINUED | OUTPATIENT
Start: 2018-07-22 | End: 2018-07-28 | Stop reason: HOSPADM

## 2018-07-22 RX ORDER — POLYETHYLENE GLYCOL 3350 17 G/17G
17 POWDER, FOR SOLUTION ORAL
Status: DISCONTINUED | OUTPATIENT
Start: 2018-07-22 | End: 2018-07-28 | Stop reason: HOSPADM

## 2018-07-22 RX ORDER — SEVELAMER CARBONATE 800 MG/1
800 TABLET, FILM COATED ORAL
Status: DISCONTINUED | OUTPATIENT
Start: 2018-07-22 | End: 2018-07-28 | Stop reason: HOSPADM

## 2018-07-22 RX ORDER — OXYCODONE AND ACETAMINOPHEN 5; 325 MG/1; MG/1
1-2 TABLET ORAL
Status: DISCONTINUED | OUTPATIENT
Start: 2018-07-22 | End: 2018-07-28 | Stop reason: HOSPADM

## 2018-07-22 RX ORDER — SIMVASTATIN 20 MG/1
20 TABLET, FILM COATED ORAL
Status: DISCONTINUED | OUTPATIENT
Start: 2018-07-22 | End: 2018-07-28 | Stop reason: HOSPADM

## 2018-07-22 RX ORDER — NORTRIPTYLINE HYDROCHLORIDE 25 MG/1
50 CAPSULE ORAL
Status: DISCONTINUED | OUTPATIENT
Start: 2018-07-22 | End: 2018-07-28 | Stop reason: HOSPADM

## 2018-07-22 RX ORDER — ALBUMIN HUMAN 250 G/1000ML
50 SOLUTION INTRAVENOUS
Status: DISCONTINUED | OUTPATIENT
Start: 2018-07-22 | End: 2018-07-28 | Stop reason: HOSPADM

## 2018-07-22 RX ORDER — ASCORBIC ACID 500 MG
1000 TABLET ORAL DAILY
Status: DISCONTINUED | OUTPATIENT
Start: 2018-07-23 | End: 2018-07-28 | Stop reason: HOSPADM

## 2018-07-22 RX ORDER — CINACALCET 30 MG/1
30 TABLET, FILM COATED ORAL DAILY
Status: DISCONTINUED | OUTPATIENT
Start: 2018-07-23 | End: 2018-07-28 | Stop reason: HOSPADM

## 2018-07-22 RX ORDER — MAG HYDROX/ALUMINUM HYD/SIMETH 200-200-20
30 SUSPENSION, ORAL (FINAL DOSE FORM) ORAL
Status: DISCONTINUED | OUTPATIENT
Start: 2018-07-22 | End: 2018-07-28 | Stop reason: HOSPADM

## 2018-07-22 RX ORDER — GUAIFENESIN 100 MG/5ML
81 LIQUID (ML) ORAL DAILY
Status: DISCONTINUED | OUTPATIENT
Start: 2018-07-22 | End: 2018-07-22

## 2018-07-22 RX ORDER — INSULIN LISPRO 100 [IU]/ML
INJECTION, SOLUTION INTRAVENOUS; SUBCUTANEOUS
Status: DISCONTINUED | OUTPATIENT
Start: 2018-07-22 | End: 2018-07-28 | Stop reason: HOSPADM

## 2018-07-22 RX ORDER — MORPHINE SULFATE 10 MG/ML
4 INJECTION, SOLUTION INTRAMUSCULAR; INTRAVENOUS ONCE
Status: COMPLETED | OUTPATIENT
Start: 2018-07-22 | End: 2018-07-22

## 2018-07-22 RX ORDER — MAGNESIUM SULFATE 100 %
4 CRYSTALS MISCELLANEOUS AS NEEDED
Status: DISCONTINUED | OUTPATIENT
Start: 2018-07-22 | End: 2018-07-28 | Stop reason: HOSPADM

## 2018-07-22 RX ORDER — MIDODRINE HYDROCHLORIDE 5 MG/1
5 TABLET ORAL ONCE
Status: COMPLETED | OUTPATIENT
Start: 2018-07-22 | End: 2018-07-22

## 2018-07-22 RX ORDER — FENTANYL CITRATE 50 UG/ML
50 INJECTION, SOLUTION INTRAMUSCULAR; INTRAVENOUS
Status: DISCONTINUED | OUTPATIENT
Start: 2018-07-22 | End: 2018-07-28 | Stop reason: HOSPADM

## 2018-07-22 RX ORDER — LATANOPROST 50 UG/ML
1 SOLUTION/ DROPS OPHTHALMIC
Status: DISCONTINUED | OUTPATIENT
Start: 2018-07-22 | End: 2018-07-28 | Stop reason: HOSPADM

## 2018-07-22 RX ORDER — POLYETHYLENE GLYCOL 3350 17 G/17G
17 POWDER, FOR SOLUTION ORAL DAILY
Status: DISCONTINUED | OUTPATIENT
Start: 2018-07-22 | End: 2018-07-28 | Stop reason: HOSPADM

## 2018-07-22 RX ORDER — MIDODRINE HYDROCHLORIDE 5 MG/1
5 TABLET ORAL
Status: DISCONTINUED | OUTPATIENT
Start: 2018-07-23 | End: 2018-07-28 | Stop reason: HOSPADM

## 2018-07-22 RX ORDER — DEXTROSE 50 % IN WATER (D50W) INTRAVENOUS SYRINGE
12.5-25 AS NEEDED
Status: DISCONTINUED | OUTPATIENT
Start: 2018-07-22 | End: 2018-07-28 | Stop reason: HOSPADM

## 2018-07-22 RX ORDER — ONDANSETRON 2 MG/ML
4 INJECTION INTRAMUSCULAR; INTRAVENOUS
Status: DISCONTINUED | OUTPATIENT
Start: 2018-07-22 | End: 2018-07-28 | Stop reason: HOSPADM

## 2018-07-22 RX ADMIN — LATANOPROST 1 DROP: 50 SOLUTION OPHTHALMIC at 23:21

## 2018-07-22 RX ADMIN — MIDODRINE HYDROCHLORIDE 5 MG: 5 TABLET ORAL at 18:52

## 2018-07-22 RX ADMIN — AZTREONAM 500 MG: 1 INJECTION, POWDER, LYOPHILIZED, FOR SOLUTION INTRAMUSCULAR; INTRAVENOUS at 17:19

## 2018-07-22 RX ADMIN — INSULIN LISPRO 2 UNITS: 100 INJECTION, SOLUTION INTRAVENOUS; SUBCUTANEOUS at 16:30

## 2018-07-22 RX ADMIN — SEVELAMER CARBONATE 800 MG: 800 TABLET, FILM COATED ORAL at 17:07

## 2018-07-22 RX ADMIN — SIMVASTATIN 20 MG: 20 TABLET, FILM COATED ORAL at 22:04

## 2018-07-22 RX ADMIN — ONDANSETRON 4 MG: 2 INJECTION INTRAMUSCULAR; INTRAVENOUS at 23:02

## 2018-07-22 RX ADMIN — SODIUM CHLORIDE 750 MG: 900 INJECTION, SOLUTION INTRAVENOUS at 23:02

## 2018-07-22 RX ADMIN — ONDANSETRON 4 MG: 2 INJECTION INTRAMUSCULAR; INTRAVENOUS at 10:17

## 2018-07-22 RX ADMIN — MORPHINE SULFATE 4 MG: 10 INJECTION INTRAVENOUS at 09:18

## 2018-07-22 RX ADMIN — OXYCODONE AND ACETAMINOPHEN 2 TABLET: 5; 325 TABLET ORAL at 12:30

## 2018-07-22 RX ADMIN — OXYCODONE AND ACETAMINOPHEN 1 TABLET: 5; 325 TABLET ORAL at 22:48

## 2018-07-22 RX ADMIN — NORTRIPTYLINE HYDROCHLORIDE 50 MG: 25 CAPSULE ORAL at 22:04
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
## 2018-07-22 NOTE — ROUTINE PROCESS
TRANSFER - OUT REPORT:    Verbal report given to Judith Thapa RN(name) on Nena Hernandez  being transferred to (unit) for routine progression of care       Report consisted of patients Situation, Background, Assessment and   Recommendations(SBAR). Information from the following report(s) SBAR, Kardex, ED Summary and MAR was reviewed with the receiving nurse. Opportunity for questions and clarification was provided.       Patient transported with:   [employer]

## 2018-07-22 NOTE — ED TRIAGE NOTES
Pt arrived from [facility] via EMS, c/o bleeding from right heel ulcer. Pt was discharged from this hospital this past Thursday; had a wound vac placed to right heel, but started to bleeding this morning around 0615. Staff removed wound vac, placed pressure to site. Pt c/o 10/10 pain from site.

## 2018-07-22 NOTE — PROGRESS NOTES
Spoke with Dr. Ricardo Gray and he stated to hold the second unit of blood and have dialysis transfuse that unit while having dialysis this afternoon.

## 2018-07-22 NOTE — CONSULTS
[address] [facility]  MR#: [id number]  : 1950  ACCOUNT #: [de-identified]   DATE OF SERVICE: 2018    CONSULTING PHYSICIAN:  Ricardo Mayers DPM    REFERRING PHYSICIAN:  Sadaf Traylor MD    REASON FOR CONSULTATION:  Evaluation and treatment of right heel bleeding wound 9 days status post bone debridement and heel wound debridement. HISTORY OF PRESENT ILLNESS:  The patient is a pleasant 53-year-old -American female who presented to the emergency department with bleeding from her right heel. She had surgical debridement by Dr. Tunde Marion. approximately 9 days ago. She was placed on antibiotics. She has osteomyelitis and was prescribed a wound VAC. She relates that the actual wound VAC was unable to be obtained due to her need to go to dialysis and that a smaller, different version of the wound VAC was ordered instead. She relates that she has a sore on her backside that necessitates that she use a bedside commode versus a bedpan, and so at her rehabilitation facility she went to put her foot down on the floor to go to the bathroom and the foot started to bleed. The nursing staff had difficulty stopping the bleeding and then subsequently she was sent to the emergency room for evaluation and subsequent admission. She is a dialysis patient and was dialyzed yesterday. PAST MEDICAL HISTORY:  Diabetes mellitus with peripheral neuropathy, morbid obesity, FARHAT on CPAP, hypertension, osteoarthritis, abdominal pain, perineal abscess, end-stage renal disease, anemia, bilateral lower extremity edema, LFT elevation, sacral wound. SOCIAL HISTORY:  Negative for smoking or alcohol use. ALLERGIES:  LATEX, CELEBREX, IODINE, KEFLEX, LEVAQUIN, PENICILLIN.     PAST SURGICAL HISTORY:  Significant for femoral fracture, abdominal abscess, right axillary abscess I and D, laparoscopic surgery, appendectomy, right arm dialysis fistula, cataract removal with lens implant, left cataract extraction, left implant, abscess from back, axilla and abdomen, fistula surgery, perineal mass excision, epidermal inclusion cysts, shunt inserted in the left shoulder, perineal abscess drained. MEDICATIONS:  As follows:  Vitamin C, Azactam 500 mg IV, Sensipar, Epogen, Humalog, Xalatan, Linzess, ProAmatine, Pamelor, MiraLax, Renvela, Zocor, vancomycin. REVIEW OF SYSTEMS:  Negative except for that written in the history of present illness. PHYSICAL EXAMINATION:  VITAL SIGNS:  As follows:  Oral temperature 97.7, pulse 70, blood pressure 136/84, pulse ox 100% on room air. GENERAL:  The patient is awake, alert and oriented x3. HEAD, EARS, EYES, NOSE AND THROAT:  Normocephalic, atraumatic. Lower extremity examination reveals no wounds on the left foot. EXTREMITIES:  Intact vascular status to both lower extremities. The right foot has a compression wrap on it with no evidence of strikethrough. LABORATORY DATA:  Show a white blood cell count of 8, a hemoglobin of 6.9, which is down from her normal of about 8. Neutrophil count is normal.  INR is 1.2. ASSESSMENT AND PLAN:  Status post right foot ulcer debridement and bone debridement for osteomyelitis. At bedside, I explained to the patient the need to continue to elevate the right foot, remain nonweightbearing and the need for strict bed rest.  She has explained that she needs to use the bedside commode in light of her sacral wounds and I explained to her that if she does she needs to only put weight on to the forefoot and not put any weight on the heel. She does have a Prevalon boot, which is at her facility, and her daughter is going to get this and bring it to the hospital as it was not brought with her upon admission. At this point, I have explained to her the need to keep the dressing intact, keep the foot elevated.   I did reinforce the dressing again with more 4 x 4's, Kerlix roll and multiple Ace bandages even though there was no current strikethrough. I did explain to her that oftentimes these need to be kept on for several days and then assuming that there is no strikethrough that Dr. Tod Carrel. would assess her in the next few days to see if there was benefit to bring her back to the OR to cauterize any of the bleeding vessels. At this point, she is being dialyzed per Nephrology for a second time, as even though she had dialysis yesterday it appears that she needs it again today. I will convey this to Dr. Tod Carrel. to determine whether she needs any type of surgical cauterization.       VIKA Walters /   D: 07/22/2018 15:40     T: 07/22/2018 16:07  JOB #: [id number]  CC: Manan Quiroz DPM

## 2018-07-22 NOTE — PROGRESS NOTES
Primary Nurse Donato Rodriguez RN and Gita Perez RN performed a dual skin assessment on this patient Impairment noted- see wound doc flow sheet  Wound on Right buttock covered with mepilex and open to air healing wound to left outer ankle.

## 2018-07-22 NOTE — IP AVS SNAPSHOT
[address] [facility] [address] [place] [address] 
[phone] Patient: Lior Ordonez MRN: [id number] MFB:[id number]/3/[id number] A check betty indicates which time of day the medication should be taken. My Medications CONTINUE taking these medications Instructions Each Dose to Equal  
 Morning Noon Evening Bedtime  
 albumin human 25% 25 % solution Commonly known as:  Joycelyn Pulse Your last dose was: Your next dose is:    
   
   
 200 mL by IntraVENous route DIALYSIS PRN (Hypotension during dialysis). 50 g ALLEGRA 180 mg tablet Generic drug:  fexofenadine Your last dose was: Your next dose is: Take 180 mg by mouth nightly. 180 mg  
    
   
   
   
  
 ASPERCREME (LIDOCAINE) 4 % patch Generic drug:  lidocaine Your last dose was: Your next dose is:    
   
   
 1 Patch by TransDERmal route every twelve (12) hours. 1 Patch  
    
   
   
   
  
 aspirin 81 mg chewable tablet Your last dose was: Your next dose is: Take 81 mg by mouth daily. 81 mg  
    
   
   
   
  
 aztreonam 1 gram 500 mg ivpb Your last dose was: Your next dose is:    
   
   
 500 mg by IntraVENous route every twelve (12) hours every twelve (12) hours. 500 mg  
    
   
   
   
  
 cyclobenzaprine 10 mg tablet Commonly known as:  FLEXERIL Your last dose was: Your next dose is: Take 10 mg by mouth every eight (8) hours as needed for Muscle Spasm(s). 10 mg  
    
   
   
   
  
 DULCOLAX STOOL SOFTENER (DSS) 100 mg capsule Generic drug:  docusate sodium Your last dose was: Your next dose is: Take 100 mg by mouth two (2) times a day. 100 mg  
    
   
   
   
  
 epoetin joi 20,000 unit/mL injection Commonly known as:  EPOGEN;PROCRIT Your last dose was: Your next dose is: 1 mL by SubCUTAneous route every Monday, Wednesday, Friday. Indications: ANEMIA DUE TO RENAL FAILURE, Renal Dialysis 37791 Units  
    
   
   
   
  
 ferric citrate 210 mg iron tablet Commonly known as:  Kim Ambriz Your last dose was: Your next dose is: Take 210 mg by mouth three (3) times daily (with meals). 210 mg FLUoxetine 20 mg capsule Commonly known as:  PROzac Your last dose was: Your next dose is: Take 20 mg by mouth daily. 20 mg  
    
   
   
   
  
 gabapentin 100 mg capsule Commonly known as:  NEURONTIN Your last dose was: Your next dose is: Take 100 mg by mouth two (2) times a day. Indications: NEUROPATHIC PAIN  
 100 mg HUMALOG SC Your last dose was: Your next dose is:    
   
   
 by SubCUTAneous route Before breakfast, lunch, and dinner. SLIDING SCALE 100-150 = 4 units  151-200 = 5 units Then 1 UNIT INCREASE FOR EVERY 50 POINTS  
     
   
   
   
  
 insulin detemir U-100 100 unit/mL (3 mL) Inpn Commonly known as:  Mikaela Wylie Your last dose was: Your next dose is:    
   
   
 12 Units by SubCUTAneous route Daily (before lunch). NOON DAILY  
 12 Units  
    
   
   
   
  
 latanoprost 0.005 % ophthalmic solution Commonly known as:  Shashi Corryton Your last dose was: Your next dose is:    
   
   
 Administer 1 Drop to both eyes nightly. 1 Drop LINZESS 290 mcg Cap capsule Generic drug:  linaclotide Your last dose was: Your next dose is: Take 290 mcg by mouth daily. 290 mcg  
    
   
   
   
  
 midodrine 5 mg tablet Commonly known as:  Don Cobia Your last dose was: Your next dose is: Take 5 mg by mouth three (3) days a week. take before dialysis as directed. 5 mg  
    
   
   
   
  
 nortriptyline 25 mg capsule Commonly known as:  PAMELOR Your last dose was: Your next dose is: Take 50 mg by mouth nightly. 50 mg  
    
   
   
   
  
 oxyCODONE-acetaminophen 5-325 mg per tablet Commonly known as:  PERCOCET Your last dose was: Your next dose is: Take 1-2 Tabs by mouth every four (4) hours as needed. Max Daily Amount: 12 Tabs. Indications: Pain 1-2 Tab OXYGEN-AIR DELIVERY SYSTEMS Your last dose was: Your next dose is:    
   
   
 2 L by Nasal route as needed (shortness of breath). 2 L  
    
   
   
   
  
 pantoprazole 40 mg tablet Commonly known as:  PROTONIX Your last dose was: Your next dose is: Take 1 Tab by mouth Before breakfast and dinner. 40 mg  
    
   
   
   
  
 polyethylene glycol 17 gram packet Commonly known as:  Namrata Mackeysten Your last dose was: Your next dose is: Take 17 g by mouth two (2) times daily as needed (constipation). 17 g RENAL SOFTGELS 1 mg capsule Generic drug:  b complex-vitamin c-folic acid Your last dose was: Your next dose is: Take 1 Cap by mouth daily. 1 Cap SENSIPAR 30 mg tablet Generic drug:  cinacalcet Your last dose was: Your next dose is: Take 30 mg by mouth daily. 30 mg  
    
   
   
   
  
 sevelamer carbonate 800 mg Tab tab Commonly known as:  Lennox Georges Your last dose was: Your next dose is: Take 800 mg by mouth three (3) times daily (with meals). 800 mg  
    
   
   
   
  
 simvastatin 20 mg tablet Commonly known as:  ZOCOR Your last dose was: Your next dose is: Take 20 mg by mouth nightly. 20 mg  
    
   
   
   
  
 vancomycin 1,000 mg 1,000 mg, ADDaptor 1 Device IVPB Your last dose was: Your next dose is: 1,000 mg by IntraVENous route DIALYSIS MON, WED & FRI. 1000 mg  
    
   
   
   
  
 VITAMIN C 500 mg tablet Generic drug:  ascorbic acid (vitamin C) Your last dose was: Your next dose is: Take 1,000 mg by mouth daily. 1000 mg  
    
   
   
   
  
  
STOP taking these medications   
 carvedilol 12.5 mg tablet Commonly known as:  Shi Hernandez Where to Get Your Medications Information on where to get these meds will be given to you by the nurse or doctor. ! Ask your nurse or doctor about these medications  
  oxyCODONE-acetaminophen 5-325 mg per tablet

## 2018-07-22 NOTE — H&P
[address] [facility]  HISTORY AND PHYSICAL      Name:DAYNA CARRASQUILLO  MR#: [id number]  : 1950  ACCOUNT #: [de-identified]   ADMIT DATE: 2018    CHIEF COMPLAINT:  Right heel profuse bleeding from the right surgical site. HISTORY OF PRESENT ILLNESS:  The patient is a 79-year-old -American female with past medical history of anemia, asthma, anxiety, end-stage renal disease on hemodialysis ,  and , chronic pain, depression, GERD. She presented to the ER from [facility] rehabilitation center on account of profuse bleeding from the right heel surgical site. She reports that she woke up this morning. Patient was recently discharged from [facility] on 2018 after hospital admission for acute bilateral foot cellulitis with infected right heel wound requiring debridement of right heel ulcer with partial calcanectomy. She returned again to the ER stating that this morning she woke up and she went to the bedside commode and while she was on the commode, she noticed profuse bleeding from her right heel. The CNA notes this at the rehabilitation center came decided to call EMS. She denies associated lightheadedness, dizziness or syncope. Today in the emergency department, hemoglobin was 6.9 and she has been typed and crossed with 2 units of packed red blood and 1 unit of packed red blood cell transfusion is currently ongoing. She denies chest pain, shortness of breath, although she tells me that she feels swollen all over. She denies any fever, chills or rigors. Recent hospitalization as mentioned above, admitted on 2018 and discharged on the  for right heel infected wound with osteomyelitis. She was discharged home on aztreonam until 2018 and vancomycin. PAST MEDICAL HISTORY:  1. Anemia. 2.  End-stage renal disease, on hemodialysis ,  and . 3.  Gastroesophageal reflux disease.   4. Glaucoma. 5.  Dyslipidemia. 6.  Hypertension. 7.  Irritable bowel syndrome. 8.  Morbid obesity. 9.  Migraine headaches. PAST SURGICAL HISTORY:  She is status post colonoscopy, appendectomy, back surgery, cataract removal, cervical fusion, cholecystectomy, cystectomy, removal of adhesions in the abdominal wall area, torn left Achilles tendon. HOME MEDICATIONS:  Includes vitamin C 1000 mg daily, aztreonam 500 mg every 12 hours, B complex vitamin C one cap by mouth daily, Coreg 12.5 mg 2 times a day, docusate sodium 100 mg 2 times a day, ferric citrate 210 mg 3 times daily, fexofenadine 118 mg by mouth nightly, gabapentin 100 mg 2 times a day, lidocaine patch every 12 hours, linaclotide 290 mcg daily, nortriptyline 50 mg by mouth nightly, pantoprazole 40 mg before breakfast and dinner, sevelamer carbonate 800 mg 3 times a day. ALLERGIES:  LATEX, CELEBREX, IODINE, KEFLEX, LEVOFLOXACIN, PENICILLIN, REACTION ANAPHYLAXIS. FAMILY HISTORY:  She has no pertinent family history to report. SOCIAL HISTORY:  She is currently a resident of [facility]. She does not smoke cigarettes. No alcohol use. Her daughter is surrogate and decision maker. REVIEW OF SYSTEMS:  A 12-point review of systems was done and this was found to be negative apart from what was stated in the body of the history and physical.    PHYSICAL EXAMINATION:  VITAL SIGNS:  Triage vital signs with a temperature of 97.9, pulse 76 beats per minute, blood pressure 136/39 mmHg, oxygen saturation is 98% on room air. GENERAL APPEARANCE:  She is lying on the bed. She is not in any obvious distress. She is very pleasant. HEENT:  Atraumatic, normocephalic head. Pupils equal, round, reactive to light and accommodation. Extraocular muscles are intact. Oral mucosa is moist.  NECK:  Supple, no JVD. RESPIRATION:  Clear to auscultation. CARDIOVASCULAR:  S1 and S2 heard. Regular rate and rhythm.   ABDOMEN:  Soft, but obese, nontender. EXTREMITIES:  No dependent pedal edema, +2 dorsalis pedis pulses. NEUROLOGIC:  She is alert, awake and oriented x3. No focal neurologic deficits. SKIN:  She has a right lower extremity on pressure dressings. No obvious bleeding noted. LABORATORY DATA:  WBC is 8.0, hemoglobin 6.9, platelets 281. Chemistry:  Sodium 132, potassium 4.6, chloride 94, bicarbonate 29, glucose 99, BUN 36, creatinine 5.65.    ASSESSMENT AND PLAN:  The patient is a 70-year-old -American female who was recently discharged home from [address] [place] [address] on 07/19/2018 after hospital admission for bilateral foot cellulitis with infected right heel wound and osteomyelitis, status post debridement of right heel ulcer with partial calcanectomy on 07/12/2018. She is presenting again to the ER on account of profuse bleeding from the right heel site. 1.  Acute blood loss anemia in the setting of likely anemia of kidney disease. Hemoglobin on admission is 6.9 due to bleeding from the right heel surgical site. She has been typed and cross matched for 2 units of packed red blood. We will continue blood transfusion. Repeat hemoglobin 2 hours post-transfusion. 2.  Status post right heel debridement with partial calcanectomy now with ongoing profuse bleeding from surgical site. Bleeding seems to have stopped. We will continue pressure dressing to the right heel. Hold home aspirin for now. 3.  History of end-stage renal disease on hemodialysis Mondays, Wednesdays and Fridays. We will consult nephrology for hemodialysis orders. 4.  Deep venous thrombosis prophylaxis. Sequential compression devices. DISPOSITION:  The patient will be admitted on observation to the medical floor for routine progression of care.       MD BELEN Knight/LUIS A  D: 07/22/2018 15:42     T: 07/22/2018 16:43  JOB #: [id number]

## 2018-07-22 NOTE — PROGRESS NOTES
Patient seen in the ED for Bilateral lower leg cellulitis and bleeding at wound vac site. The patient is known to this CM from recent admission on [facility]  7/6/18-7/19/18. From the previous admission the patient was d/c'd to [facility]. She is known to [facility] and is on a M-W-F schedule. She travels by ambulance to the dialysis center. CM visited briefly with patient and daughter Otto Palma). Plan for discharge:  Return to SNF and continuation of dialysis outpatient following this hospitalization. CM will continue to follow. SHANNON Gil, CRM    Reason for Readmission:     Bilateral lower leg cellulitis         RRAT Score and Risk Level:     32     Level of Readmission:          Care Conference scheduled:   Not indicated       Resources/supports as identified by patient/family:   Spouse, son and daughter. SNF (SW) and Dialysis SW       Top Challenges facing patient (as identified by patient/family and CM): Finances/Medication cost?     Yes  Patient was given information and medicaid application initiated during previous admission via [employer] rep Bladimir Cagle). Daughter to be returning financial information in next few days. Transportation      No--ambulance transport to-and-from outpatient dialysis  Support system or lack thereof?    family   Living arrangements? Lives with spous and daughter Otto Palma). Dotty Randolph visits patient daily while hospitalized. Self-care/ADLs/Cognition? Current Advanced Directive/Advance Care Plan:             Plan for utilizing home health:   Home health services will be utilized following snf stay             Likelihood of additional readmission:   moderate             Transition of Care Plan:    Based on readmission, the patient's previous Plan of Care   has been evaluated and/or modified.  The current Transition of Care Plan is:      IV abx, continuation of dialysis (M-W-F) return to snf once medically stable.

## 2018-07-22 NOTE — CONSULTS
Nephrology Progress Note  Irvin Cao  Date of Admission : 7/22/2018    CC: Follow up for ESRD       Assessment and Plan     ESRD on HD:  - MWF at [facility]  - dialysis tonight w/ blood  - HD again in AM     Diabetic Foot wound w/ infection:  - s/p Debridement, now with bleeding  - podiatry consulted  - on aztreonam and vanco x 6 weeks per ID     Hypervolemic Hyponatremia:  - Na stable     Hypotension:    - cont midodrine prior to HD     Anemia:  - 2/2 blood loss and CKD  - give 2nd unit of blood on HD  - cont Epogen    Secondary HPT:  - cont sensipar and binders    DM      Obesity, FARHAT      Dyslipidemia      Possible steal syndrome right hand            Interval History: This is a 71 yo AAF with a hx of ESRD on HD MWF at [facility] 12, hx of DM2, obesity, chronic anemia, recent R diabetic foot infection s/p debridement. She presented today with profuse bleeding from her foot wound. Her hgb is down to 6.9. She has 1 unit of blood running now and a 2nd ordered. Her last HD was on Friday with 5kg UF per pt. However, she feels SOB and her increasing edema. Mild SOB, no cp, n/v/d, fevers or chills. Current Medications: all current  Medications have been eviewed in EPIC  Review of Systems: Pertinent items are noted in HPI.     Objective:  Vitals:    Vitals:    07/22/18 1020 07/22/18 1119 07/22/18 1157 07/22/18 1227   BP: 159/67 144/84 143/83 142/85   Pulse: 77 82 71 72   Resp: 18 18 16 16   Temp: 97.6 °F (36.4 °C) 97.8 °F (36.6 °C) 97.8 °F (36.6 °C) 97.8 °F (36.6 °C)   SpO2: 93% 94% 98% 98%   Weight:       Height:         Intake and Output:          Physical Examination:  General: NAD,Conversant   Neck:  Supple, no mass  Resp:  Lungs CTA B/L, no wheezing , normal respiratory effort  CV:  RRR,  no murmur or rub, 1+ LE edema  GI:  Soft, NT, + Bowel sounds, no hepatosplenomegaly  Neurologic:  Non focal  Psych:             AAO x 3 appropriate affect   Skin:  No Rash  :  No fontenot    [] High complexity decision making was performed  []    Patient is at high-risk of decompensation with multiple organ involvement    Lab Data Personally Reviewed: I have reviewed all the pertinent labs, microbiology data and radiology studies during assessment. Recent Labs      07/22/18   0909   NA  132*   K  4.6   CL  94*   CO2  29   GLU  99   BUN  36*   CREA  5.65*   CA  7.5*   INR  1.2*     Recent Labs      07/22/18   0909   WBC  8.0   HGB  6.9*   HCT  24.0*   PLT  332     Lab Results   Component Value Date/Time    Specimen Description: URINE 10/27/2012 08:15 PM    Specimen Description: BLOOD 06/07/2011 07:40 AM    Specimen Description: URINE 08/30/2010 07:10 AM     Lab Results   Component Value Date/Time    Culture result: NO ANAEROBES ISOLATED 07/12/2018 08:00 AM    Culture result: HEAVY PROTEUS MIRABILIS (A) 07/12/2018 08:00 AM    Culture result: LIGHT KLEBSIELLA OXYTOCA (A) 07/12/2018 08:00 AM    Culture result: (A) 07/12/2018 08:00 AM     LIGHT **METHICILLIN RESISTANT STAPHYLOCOCCUS AUREUS**    Culture result: LIGHT ENTEROCOCCUS FAECALIS GROUP D (A) 07/12/2018 08:00 AM    Culture result: LIGHT ALCALIGENES FAECALIS (A) 07/12/2018 08:00 AM    Culture result:  07/12/2018 08:00 AM     **[facility]SA REPORT**  CALLED TO AND READ BACK BY  JAELYN GUTIERREZ RN ON 7/15/18 AT 1440 TA.       Culture result: MODERATE PROTEUS MIRABILIS (A) 07/12/2018 08:00 AM    Culture result: LIGHT KLEBSIELLA OXYTOCA (A) 07/12/2018 08:00 AM    Culture result: FEW PSEUDOMONAS AERUGINOSA (A) 07/12/2018 08:00 AM    Culture result: LIGHT ENTEROCOCCUS FAECALIS GROUP D (A) 07/12/2018 08:00 AM    Culture result: (A) 07/12/2018 08:00 AM     LIGHT STAPHYLOCOCCUS SPECIES, COAGULASE NEGATIVE (2 DIFFERENT COLONY TYPES/STRAINS)     Recent Results (from the past 24 hour(s))   TYPE + CROSSMATCH    Collection Time: 07/22/18  9:08 AM   Result Value Ref Range    Crossmatch Expiration 07/25/2018     ABO/Rh(D) [place] POSITIVE     Antibody screen NEG     Unit number [id number]     Blood component type [facility]     Unit division 00     Status of unit ISSUED     Crossmatch result Compatible     Unit number [id number]     Blood component type [facility]     Unit division 00     Status of unit ALLOCATED     Crossmatch result Compatible    CBC WITH AUTOMATED DIFF    Collection Time: 07/22/18  9:09 AM   Result Value Ref Range    WBC 8.0 3.6 - 11.0 K/uL    RBC 2.58 (L) 3.80 - 5.20 M/uL    HGB 6.9 (L) 11.5 - 16.0 g/dL    HCT 24.0 (L) 35.0 - 47.0 %    MCV 93.0 80.0 - 99.0 FL    MCH 26.7 26.0 - 34.0 PG    MCHC 28.8 (L) 30.0 - 36.5 g/dL    RDW 16.6 (H) 11.5 - 14.5 %    PLATELET 080 438 - 128 K/uL    MPV 8.0 (L) 8.9 - 12.9 FL    NRBC 0.0 0  WBC    ABSOLUTE NRBC 0.00 0.00 - 0.01 K/uL    NEUTROPHILS 55 32 - 75 %    LYMPHOCYTES 31 12 - 49 %    MONOCYTES 10 5 - 13 %    EOSINOPHILS 3 0 - 7 %    BASOPHILS 0 0 - 1 %    IMMATURE GRANULOCYTES 1 (H) 0.0 - 0.5 %    ABS. NEUTROPHILS 4.4 1.8 - 8.0 K/UL    ABS. LYMPHOCYTES 2.5 0.8 - 3.5 K/UL    ABS. MONOCYTES 0.8 0.0 - 1.0 K/UL    ABS. EOSINOPHILS 0.2 0.0 - 0.4 K/UL    ABS. BASOPHILS 0.0 0.0 - 0.1 K/UL    ABS. IMM.  GRANS. 0.1 (H) 0.00 - 0.04 K/UL    DF SMEAR SCANNED      RBC COMMENTS ANISOCYTOSIS  1+        RBC COMMENTS OVALOCYTES  PRESENT        RBC COMMENTS HYPOCHROMIA  1+        WBC COMMENTS REACTIVE LYMPHS     METABOLIC PANEL, BASIC    Collection Time: 07/22/18  9:09 AM   Result Value Ref Range    Sodium 132 (L) 136 - 145 mmol/L    Potassium 4.6 3.5 - 5.1 mmol/L    Chloride 94 (L) 97 - 108 mmol/L    CO2 29 21 - 32 mmol/L    Anion gap 9 5 - 15 mmol/L    Glucose 99 65 - 100 mg/dL    BUN 36 (H) 6 - 20 MG/DL    Creatinine 5.65 (H) 0.55 - 1.02 MG/DL    BUN/Creatinine ratio 6 (L) 12 - 20      GFR est AA 9 (L) >60 ml/min/1.73m2    GFR est non-AA 7 (L) >60 ml/min/1.73m2    Calcium 7.5 (L) 8.5 - 10.1 MG/DL   PROTHROMBIN TIME + INR    Collection Time: 07/22/18  9:09 AM   Result Value Ref Range    INR 1.2 (H) 0.9 - 1.1      Prothrombin time 11.8 (H) 9.0 - 11.1 sec   PTT    Collection Time: 07/22/18  9:09 AM   Result Value Ref Range    aPTT 34.7 (H) 22.1 - 32.0 sec    aPTT, therapeutic range     58.0 - 77.0 SECS   GLUCOSE, POC    Collection Time: 07/22/18 11:31 AM   Result Value Ref Range    Glucose (POC) 117 (H) 65 - 100 mg/dL    Performed by Jose Angel Stoll MD  [address]  Phone - [phone]   Fax - [phone]  www. [facility]

## 2018-07-22 NOTE — PROGRESS NOTES
TRANSFER - IN REPORT:    Verbal report received from Maryse(name) on Anna Cherry  being received from ED(unit) for routine progression of care      Report consisted of patients Situation, Background, Assessment and   Recommendations(SBAR). Information from the following report(s) SBAR, Kardex, Intake/Output and MAR was reviewed with the receiving nurse. Opportunity for questions and clarification was provided. Assessment completed upon patients arrival to unit and care assumed.

## 2018-07-23 LAB
BACTERIA SPEC CULT: NORMAL
BACTERIA SPEC CULT: NORMAL
GLUCOSE BLD STRIP.AUTO-MCNC: 142 MG/DL (ref 65–100)
GLUCOSE BLD STRIP.AUTO-MCNC: 145 MG/DL (ref 65–100)
GLUCOSE BLD STRIP.AUTO-MCNC: 191 MG/DL (ref 65–100)
HCT VFR BLD AUTO: 23.2 % (ref 35–47)
HGB BLD-MCNC: 7.2 G/DL (ref 11.5–16)
SERVICE CMNT-IMP: ABNORMAL
SERVICE CMNT-IMP: NORMAL

## 2018-07-23 PROCEDURE — 3331090001 HH PPS REVENUE CREDIT

## 2018-07-23 PROCEDURE — 74011250637 HC RX REV CODE- 250/637: Performed by: INTERNAL MEDICINE

## 2018-07-23 PROCEDURE — 36415 COLL VENOUS BLD VENIPUNCTURE: CPT | Performed by: HOSPITALIST

## 2018-07-23 PROCEDURE — 90935 HEMODIALYSIS ONE EVALUATION: CPT

## 2018-07-23 PROCEDURE — 85018 HEMOGLOBIN: CPT | Performed by: HOSPITALIST

## 2018-07-23 PROCEDURE — 74011250636 HC RX REV CODE- 250/636: Performed by: HOSPITALIST

## 2018-07-23 PROCEDURE — 77030011256 HC DRSG MEPILEX <16IN NO BORD MOLN -A

## 2018-07-23 PROCEDURE — 97110 THERAPEUTIC EXERCISES: CPT

## 2018-07-23 PROCEDURE — 77030011255 HC DSG AQUACEL AG BMS -A

## 2018-07-23 PROCEDURE — 97530 THERAPEUTIC ACTIVITIES: CPT

## 2018-07-23 PROCEDURE — 97162 PT EVAL MOD COMPLEX 30 MIN: CPT

## 2018-07-23 PROCEDURE — 82962 GLUCOSE BLOOD TEST: CPT

## 2018-07-23 PROCEDURE — 3331090002 HH PPS REVENUE DEBIT

## 2018-07-23 PROCEDURE — 99218 HC RM OBSERVATION: CPT

## 2018-07-23 PROCEDURE — L4396 STATIC OR DYNAMI AFO PRE CST: HCPCS

## 2018-07-23 PROCEDURE — 74011250637 HC RX REV CODE- 250/637: Performed by: HOSPITALIST

## 2018-07-23 PROCEDURE — 74011000258 HC RX REV CODE- 258: Performed by: HOSPITALIST

## 2018-07-23 PROCEDURE — 74011250636 HC RX REV CODE- 250/636: Performed by: INTERNAL MEDICINE

## 2018-07-23 PROCEDURE — 74011000250 HC RX REV CODE- 250: Performed by: HOSPITALIST

## 2018-07-23 PROCEDURE — 65270000029 HC RM PRIVATE

## 2018-07-23 PROCEDURE — 74011636637 HC RX REV CODE- 636/637: Performed by: HOSPITALIST

## 2018-07-23 RX ORDER — GABAPENTIN 100 MG/1
100 CAPSULE ORAL 2 TIMES DAILY
Status: DISCONTINUED | OUTPATIENT
Start: 2018-07-23 | End: 2018-07-28 | Stop reason: HOSPADM

## 2018-07-23 RX ADMIN — SODIUM CHLORIDE 750 MG: 900 INJECTION, SOLUTION INTRAVENOUS at 21:34

## 2018-07-23 RX ADMIN — SIMVASTATIN 20 MG: 20 TABLET, FILM COATED ORAL at 23:08

## 2018-07-23 RX ADMIN — OXYCODONE AND ACETAMINOPHEN 2 TABLET: 5; 325 TABLET ORAL at 21:37

## 2018-07-23 RX ADMIN — NEPHROCAP 1 CAPSULE: 1 CAP ORAL at 09:52

## 2018-07-23 RX ADMIN — INSULIN LISPRO 2 UNITS: 100 INJECTION, SOLUTION INTRAVENOUS; SUBCUTANEOUS at 12:38

## 2018-07-23 RX ADMIN — AZTREONAM 500 MG: 1 INJECTION, POWDER, LYOPHILIZED, FOR SOLUTION INTRAMUSCULAR; INTRAVENOUS at 05:07

## 2018-07-23 RX ADMIN — NORTRIPTYLINE HYDROCHLORIDE 50 MG: 25 CAPSULE ORAL at 23:08

## 2018-07-23 RX ADMIN — AZTREONAM 500 MG: 1 INJECTION, POWDER, LYOPHILIZED, FOR SOLUTION INTRAMUSCULAR; INTRAVENOUS at 19:12

## 2018-07-23 RX ADMIN — GABAPENTIN 100 MG: 100 CAPSULE ORAL at 19:04

## 2018-07-23 RX ADMIN — INSULIN LISPRO 2 UNITS: 100 INJECTION, SOLUTION INTRAVENOUS; SUBCUTANEOUS at 07:30

## 2018-07-23 RX ADMIN — SEVELAMER CARBONATE 800 MG: 800 TABLET, FILM COATED ORAL at 19:04

## 2018-07-23 RX ADMIN — POLYETHYLENE GLYCOL 3350 17 G: 17 POWDER, FOR SOLUTION ORAL at 09:54

## 2018-07-23 RX ADMIN — OXYCODONE AND ACETAMINOPHEN 2 TABLET: 5; 325 TABLET ORAL at 16:25

## 2018-07-23 RX ADMIN — OXYCODONE AND ACETAMINOPHEN 1 TABLET: 5; 325 TABLET ORAL at 09:52

## 2018-07-23 RX ADMIN — SEVELAMER CARBONATE 800 MG: 800 TABLET, FILM COATED ORAL at 12:39

## 2018-07-23 RX ADMIN — GABAPENTIN 100 MG: 100 CAPSULE ORAL at 23:08

## 2018-07-23 RX ADMIN — EPOETIN ALFA 20000 UNITS: 20000 SOLUTION INTRAVENOUS; SUBCUTANEOUS at 19:04

## 2018-07-23 RX ADMIN — OXYCODONE HYDROCHLORIDE AND ACETAMINOPHEN 1000 MG: 500 TABLET ORAL at 09:52

## 2018-07-23 RX ADMIN — OXYCODONE AND ACETAMINOPHEN 1 TABLET: 5; 325 TABLET ORAL at 12:39

## 2018-07-23 RX ADMIN — LATANOPROST 1 DROP: 50 SOLUTION OPHTHALMIC at 23:09

## 2018-07-23 RX ADMIN — CINACALCET HYDROCHLORIDE 30 MG: 30 TABLET, COATED ORAL at 21:00

## 2018-07-23 NOTE — PROGRESS NOTES
Pharmacist Note - Vancomycin Dosing    A Random Level pre- dialysis resulted at 24.2 mcg/mL    Goal trough:  pre- HD level of 15 - 20 mcg/mL for therapeutic goal of 10 - 15 mcg/mL as approximately 35% of drug will be removed by HD filtration    Plan: Change to 750 mg after each HD . Pharmacy will continue to monitor this patient daily for changes in clinical status and renal function.
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
## 2018-07-23 NOTE — PROCEDURES
[facility] Ac[facility]  [phone]    Vitals   Pre   Post   Assessment   Pre   Post     Temp  Temp: 98.8 °F (37.1 °C) (07/22/18 1950)  98.6 LOC  A&OX3 A&OX3   HR   Pulse (Heart Rate): 80 (07/22/18 1950) 83 Lungs   CTA  CTA   B/P   BP: 172/78 (07/22/18 1950) 184/64 Cardiac   RRR  RRR   Resp   Resp Rate: 18 (07/22/18 1950) 16 Skin   WARM/DRY  INTACT  WARM/DRY  INTACT   Pain level  Pain Intensity 1: 8 (07/22/18 1630) 8   Edema  1+ LE     1+ LE    Orders:    Duration:   Start:    1950 End:   2250 Total:   3HRS   Dialyzer:   Dialyzer/Set Up Inspection: Revaclear (07/22/18 1950)   K Bath:   Dialysate K (mEq/L): 2 (07/22/18 1950)   Ca Bath:   Dialysate CA (mEq/L): 2.5 (07/22/18 1950)   Na/Bicarb:   Dialysate NA (mEq/L): 140 (07/22/18 1950)   Target Fluid Removal:   Goal/Amount of Fluid to Remove (mL): 4000 mL (07/22/18 1950)   [facility]   Labs     Obtained/Reviewed   Critical Results Called   Date when labs were drawn-  Hgb-    HGB   Date Value Ref Range Status   07/22/2018 6.9 (L) 11.5 - 16.0 g/dL Final     K-    Potassium   Date Value Ref Range Status   07/22/2018 4.6 3.5 - 5.1 mmol/L Final     Ca-   Calcium   Date Value Ref Range Status   07/22/2018 7.5 (L) 8.5 - 10.1 MG/DL Final     Bun-   BUN   Date Value Ref Range Status   07/22/2018 36 (H) 6 - 20 MG/DL Final     Creat-   Creatinine   Date Value Ref Range Status   07/22/2018 5.65 (H) 0.55 - 1.02 MG/DL Final     Comment:     INVESTIGATED PER DELTA CHECK PROTOCOL        Medications/ Blood Products Given     Name   Dose   Route and Time     PRBC 1 UNIT RIGHT [address] [place] [address] ; 2000             Blood Volume Processed (BVP):    70 LITERS Net Fluid   Removed:  4000 LITERS   Comments   Time Out Done: 1900  Primary Nurse Rpt Pre: ROWDY Pak, RN  Primary Nurse Rpt Post: Saintclair Funk, RN  Pt Education: PROCEDURAL  Care Plan: FOLLOW UP WITH NEPHROLOGY  Tx Summary: PATIENT TOLERATED [address] RIGHT [address] [facility]. DRESSING CHANGED. +ASPIRATION/+FLUSH X 2 PORTS. ACCESS VISIBLE AND LINES SECURE DURING TX. ALL POSSIBLE BLOOD RINSED BACK POST TX. CATHETER FLUSHED WITH NS AND CAPPED. REPORT EXCHANGED WITH PRIMARY NURSE  Admiting Diagnosis: FOOT ULCER  Pt's previous clinic- [facility]  Consent signed - Informed Consent Verified: Yes (07/22/18 1950)  [facility] Consent - OBTAINED  Hepatitis Status- NEG 6/25/18  Machine #- Machine Number: [id number] (07/22/18 1950)  Telemetry status- REMOTE  Pre-dialysis wt.-  134.3 KG     All dialysis related medications have been reviewed.

## 2018-07-23 NOTE — PROGRESS NOTES
Hospitalist Progress Note Rosa Sol MD 
Answering service: [phone] OR 0923 from in house phone Date of Service:  2018 NAME:  Rob Lorenz :  1950 MRN:  [id number] Admission Summary:  
The patient is a 40-year-old -American female with past medical history of anemia, asthma, anxiety, end-stage renal disease on hemodialysis ,  and , chronic pain, depression, GERD. She presented to the ER from [facility] rehabilitation center on account of profuse bleeding from the right heel surgical site. She reports that she woke up this morning. Patient was recently discharged from [facility] on 2018 after hospital admission for acute bilateral foot cellulitis with infected right heel wound requiring debridement of right heel ulcer with partial calcanectomy. She returned again to the ER stating that this morning she woke up and she went to the bedside commode and while she was on the commode, she noticed profuse bleeding from her right heel. The CNA notes this at the rehabilitation center came decided to call EMS. She denies associated lightheadedness, dizziness or syncope. Today in the emergency department, hemoglobin was 6.9 and she has been typed and crossed with 2 units of packed red blood and 1 unit of packed red blood cell transfusion is currently ongoing. She denies chest pain, shortness of breath, although she tells me that she feels swollen all over. She denies any fever, chills or rigors. 
  
Recent hospitalization as mentioned above, admitted on 2018 and discharged on the  for right heel infected wound with osteomyelitis. She was discharged home on aztreonam until 2018 and vancomycin. Interval history / Subjective:  
  Patient seen and examined this morning; daughter in the room States she feels fine, no new complaint.  Right heel bleeding has stopped. Assessment & Plan: #. Acute blood loss anemia on underlying chronic anemia due to ESRD: 
-Bleed from recently operated right heel. -S/p two unit pRBCs 
-Monitor CBC and transfuse as needed #. S/p right heel debridement with partial calcanectomy on 7/12/18 for infected right heel wound and OM. -Had profuse bleeding from the surgical site after she put pressure on her foot,  She was 100% non weightbearing on her right foot after her surgery.  
-Continue antibiotics aztreonam and vancomycin until 07/29/2018 . 
-Podiatry following, will reevaluate the wound tomorrow #. ESRD on HD 
-Nephrology on board. Code status: full DVT prophylaxis: scd Care Plan discussed with: Patient/Family and Nurse Disposition: TBD Hospital Problems  Date Reviewed: 7/19/2018 Codes Class Noted POA * (Principal)Acute blood loss anemia ICD-10-CM: D62 
ICD-9-CM: 285.1  7/22/2018 Yes Foot osteomyelitis, right ([facility] [address] ICD-10-CM: M86.9 ICD-9-CM: 730.27  7/22/2018 Unknown Acute blood loss as cause of postoperative anemia ICD-10-CM: D62 
ICD-9-CM: 285.1  7/22/2018 Unknown Review of Systems: A comprehensive review of systems was negative except for that written in the HPI. Vital Signs:  
 Last 24hrs VS reviewed since prior progress note. Most recent are: 
Visit Vitals  /78 (BP 1 Location: Left leg, BP Patient Position: At rest)  Pulse 78  Temp 98 °F (36.7 °C)  Resp 16  
 Ht 6' 1\" (1.854 m)  Wt 134.5 kg (296 lb 9.6 oz)  SpO2 98%  BMI 39.13 kg/m2 Intake/Output Summary (Last 24 hours) at 07/23/18 1116 Last data filed at 07/22/18 2250 Gross per 24 hour Intake                0 ml Output             4000 ml Net            -4000 ml Physical Examination:  
 
 
     
Constitutional:  No acute distress, cooperative, pleasant   
ENT:  Oral mucous moist, oropharynx benign. Neck supple, Resp:  CTA bilaterally.  No wheezing/rhonchi/rales. No accessory muscle use CV:  Regular rhythm, normal rate, no murmurs, gallops, rubs GI:  Soft, non distended, non tender. normoactive bowel sounds, no hepatosplenomegaly Musculoskeletal:  right foot dressing Neurologic:  Moves all extremities. AAOx3, CN II-XII reviewed Data Review:  
 Review and/or order of clinical lab test 
 
 
Labs:  
 
Recent Labs  
   07/23/18 
 0309  07/22/18 
 0909 WBC   --   8.0 HGB  7.2*  6.9*  
HCT  23.2*  24.0*  
PLT   --   332 Recent Labs  
   07/22/18 
 3413 NA  132* K  4.6 CL  94* CO2  29 BUN  36* CREA  5.65* GLU  99  
CA  7.5* No results for input(s): SGOT, GPT, ALT, AP, TBIL, TBILI, TP, ALB, GLOB, GGT, AML, LPSE in the last 72 hours. No lab exists for component: AMYP, HLPSE Recent Labs  
   07/22/18 
 9437 INR  1.2* PTP  11.8* APTT  34.7* No results for input(s): FE, TIBC, PSAT, FERR in the last 72 hours. No results found for: FOL, RBCF No results for input(s): PH, PCO2, PO2 in the last 72 hours. No results for input(s): CPK, CKNDX, TROIQ in the last 72 hours. No lab exists for component: CPKMB Lab Results Component Value Date/Time Cholesterol, total 144 04/10/2018 02:53 AM  
 HDL Cholesterol 66 04/10/2018 02:53 AM  
 LDL, calculated 67.6 04/10/2018 02:53 AM  
 Triglyceride 52 04/10/2018 02:53 AM  
 CHOL/HDL Ratio 2.2 04/10/2018 02:53 AM  
 
Lab Results Component Value Date/Time Glucose (POC) 142 (H) 07/23/2018 06:07 AM  
 Glucose (POC) 131 (H) 07/22/2018 09:25 PM  
 Glucose (POC) 141 (H) 07/22/2018 04:43 PM  
 Glucose (POC) 117 (H) 07/22/2018 11:31 AM  
 Glucose (POC) 124 (H) 07/19/2018 08:17 AM  
 
Lab Results Component Value Date/Time  Color YELLOW 10/27/2012 10:25 PM  
 Appearance CLEAR 10/27/2012 10:25 PM  
 Specific gravity 1.012 10/27/2012 10:25 PM  
 Specific gravity 1.015 08/30/2010 07:10 AM  
 pH (UA) 8.0 10/27/2012 10:25 PM  
 Protein 100 (A) 10/27/2012 10:25 PM Glucose NEGATIVE  10/27/2012 10:25 PM  
 Ketone NEGATIVE  10/27/2012 10:25 PM  
 Bilirubin NEGATIVE  10/27/2012 10:25 PM  
 Urobilinogen 1.0 10/27/2012 10:25 PM  
 Nitrites NEGATIVE  10/27/2012 10:25 PM  
 Leukocyte Esterase TRACE (A) 10/27/2012 10:25 PM  
 Epithelial cells 0-5 10/27/2012 10:25 PM  
 Bacteria NEGATIVE  10/27/2012 10:25 PM  
 WBC 10-20 10/27/2012 10:25 PM  
 RBC 5-10 10/27/2012 10:25 PM  
 
 
 
Medications Reviewed:  
 
Current Facility-Administered Medications Medication Dose Route Frequency  0.9% sodium chloride infusion 250 mL  250 mL IntraVENous PRN  
 midodrine (PROAMITINE) tablet 5 mg  5 mg Oral Once per day on Mon Wed Fri  
 oxyCODONE-acetaminophen (PERCOCET) 5-325 mg per tablet 1-2 Tab  1-2 Tab Oral Q4H PRN  
 albumin human 25% (BUMINATE) solution 50 g  50 g IntraVENous DIALYSIS PRN  
 alum-mag hydroxide-simeth (MYLANTA) oral suspension 30 mL  30 mL Oral Q4H PRN  
 ascorbic acid (vitamin C) (VITAMIN C) tablet 1,000 mg  1,000 mg Oral DAILY  aztreonam (AZACTAM) 500 mg in 0.9% sodium chloride 100 mL IVPB  500 mg IntraVENous Q12H  B complex-vitaminC-folic acid (NEPHROCAP) cap  1 Cap Oral DAILY  cinacalcet (SENSIPAR) tablet 30 mg  30 mg Oral DAILY  fentaNYL citrate (PF) injection 50 mcg  50 mcg IntraVENous Q2H PRN  
 latanoprost (XALATAN) 0.005 % ophthalmic solution 1 Drop  1 Drop Both Eyes QHS  linaclotide (LINZESS) capsule 290 mcg  290 mcg Oral ACB  nortriptyline (PAMELOR) capsule 50 mg  50 mg Oral QHS  ondansetron (ZOFRAN) injection 4 mg  4 mg IntraVENous Q6H PRN  polyethylene glycol (MIRALAX) packet 17 g  17 g Oral DAILY  polyethylene glycol (MIRALAX) packet 17 g  17 g Oral BID PRN  
 sevelamer carbonate (RENVELA) tab 800 mg  800 mg Oral TID WITH MEALS  simvastatin (ZOCOR) tablet 20 mg  20 mg Oral QHS  VANCOMYCIN INFORMATION NOTE   Other Rx Dosing/Monitoring  glucose chewable tablet 16 g  4 Tab Oral PRN  
 dextrose (D50W) injection syrg 12.5-25 g  12.5-25 g IntraVENous PRN  
 glucagon (GLUCAGEN) injection 1 mg  1 mg IntraMUSCular PRN  
 insulin lispro (HUMALOG) injection   SubCUTAneous AC&HS  epoetin joi (EPOGEN;PROCRIT) injection 20,000 Units  20,000 Units SubCUTAneous Q MON, WED & FRI  vancomycin (VANCOCIN) 750 mg in 0.9% sodium chloride (MBP/ADV) 250 mL ADV  750 mg IntraVENous DIALYSIS PRN  
 
______________________________________________________________________ EXPECTED LENGTH OF STAY: - - - 
ACTUAL LENGTH OF STAY:          1 Sharan Walsh MD

## 2018-07-23 NOTE — DIALYSIS
[facility] D[facility] Team [facility]  [phone]    Vitals   Pre   Post   Assessment   Pre   Post     Temp  Temp: 98.2 °F (36.8 °C) (07/23/18 1355)   LOC  A&O x 3 A&O x3   HR   95  Lungs   congestion  clear   B/P   147/76  Cardiac   regular  regular   Resp   20  Skin   warm  warm   Pain level  3/10  Edema  generalized     generalized   Orders:    Duration:   Start:    1400 End:    1730 Total:   3.5 hr   Dialyzer:   Dialyzer/Set Up Inspection: Earnest Lerma ([id number]) (07/23/18 1355)   K Bath:   Dialysate K (mEq/L): 2 (07/23/18 1355)   Ca Bath:   Dialysate CA (mEq/L): 2.5 (07/23/18 1355)   Na/Bicarb:   Dialysate NA (mEq/L): 140 (07/23/18 1355)   Target Fluid Removal:   Goal/Amount of Fluid to Remove (mL): 4500 mL (07/23/18 1355)   Access     Type & Location:    RIJ cath, + aspir/NS flushes. Drsg/biopatch in place, no drainage noted. Tolerated tx well. At end, blood in circuit returned w/300 ml NS. Hep dwells instilled, steriile caps applied, clamps secured. Drsg/biopatch still CDI, medicaided for pain, some effectivnest noted.    Labs     Obtained/Reviewed   Critical Results Called   Date when labs were drawn-  Hgb-    HGB   Date Value Ref Range Status   07/23/2018 7.2 (L) 11.5 - 16.0 g/dL Final     K-    Potassium   Date Value Ref Range Status   07/22/2018 4.6 3.5 - 5.1 mmol/L Final     Ca-   Calcium   Date Value Ref Range Status   07/22/2018 7.5 (L) 8.5 - 10.1 MG/DL Final     Bun-   BUN   Date Value Ref Range Status   07/22/2018 36 (H) 6 - 20 MG/DL Final     Creat-   Creatinine   Date Value Ref Range Status   07/22/2018 5.65 (H) 0.55 - 1.02 MG/DL Final     Comment:     INVESTIGATED PER DELTA CHECK PROTOCOL        Medications/ Blood Products Given     Name   Dose   Route and Time     No HD meds ordered                Blood Volume Processed (BVP):     Net Fluid   Removed:  4500 ml   Comments   Time Out Done: yes [id number]  Primary Nurse Rpt Kamilla DURHAM  Primary Nurse Rpt Post: Mike Anglin RN  Pt Education: discussed procedure and cath care  Care Plan: continue HD as ordered  Tx Summary: Tolerated tx well, BP stable  Admiting Diagnosis: renal failure  Pt's previous clinic-  Consent signed - Informed Consent Verified: Yes (07/23/18 9601)  [facility] Consent - yes  Hepatitis Status- negative 07/23/2018  Machine #- Machine Number: [id number] (07/23/18 1621)  Telemetry status- N/A  Pre-dialysis wt. - Pre-Dialysis Weight: 135.6 kg (298 lb 15.1 oz) (07/23/18 2845)

## 2018-07-23 NOTE — PROGRESS NOTES
Bedside shift change report given to Pan(oncoming nurse) by Adrian Randolph (offgoing nurse). Report included the following information SBAR, Kardex, Intake/Output and MAR.

## 2018-07-23 NOTE — PROGRESS NOTES
Bedside and Verbal shift change report given to Joshua Morales RN and Yari RN (oncoming nurse) by Karlos Marcelino RN (offgoing nurse). Report included the following information SBAR, Kardex, ED Summary, Procedure Summary, Intake/Output, MAR and Recent Results.

## 2018-07-23 NOTE — PROGRESS NOTES
Podiatry  -Pt seen & examined at bedside. Denies any n/v/f/ns/c.  Daughter at bedside.   -Pt is s/p right heel debridement with partial calcanectomy (POD#11). States she went to use her bedside commode over the weekend and accidentally put pressure on her heel, which led to excessive bleeding from her wound.    -Right foot dressing clean and dry. Prevalon boot in place.  -Prior to the surgery, I informed the Pt and her family that she will need to be 100% non weightbearing on her right foot post-operatively. I reiterated this again this morning.    -Will order a multi-podus boot for right foot - should allow Pt to transfer to bedside commode without having to put pressure on the heel.  -Will leaving dressing on today. Will change it tomorrow.

## 2018-07-23 NOTE — PROGRESS NOTES
Pharmacy still has not verified 0730 Linaclotide (Sable Curtiss). Can not give medication to pt at this time because of this.

## 2018-07-23 NOTE — WOUND CARE
Wound Care Note:     New consult placed by nurse request for right buttock wound and left ankle wound    Chart shows:  Admitted for acute blood loss anemia   Past Medical History:   Diagnosis Date    Abscess of abdominal wall 2006?  Adverse effect of anesthesia     DIFFICULTY WAKING 20 YEARS AGO    Anal cryptitis 06/04/2012    Anemia     Arthritis 10/14/2010    back, neck, knees, hands    Asthma     \"TOUCH OF\"    Axillary abscess     right axillary    Blood transfusion 1999    [facility], NO REACTION    Blood transfusion 1980'S    [place] NO REACTION    Chronic kidney disease     [id number]    Chronic pain     BACK, NECK, HANDS, KNEES    Depression     Diabetes mellitus type 2, insulin dependent ([address] 10/14/2010    Dialysis patient ([address] Since 3/3/2010    M, W, F    GERD (gastroesophageal reflux disease)     Glaucoma     Heart failure ([address] 2004    IN PAST-CHF; PT WAS 412lb AT THE TIME.     High cholesterol     HTN (hypertension) 10/14/2010    IBS (irritable bowel syndrome)     Migraine     Morbid obesity ([address] 10/14/2010    HAS LOST 150+ POUNDS SINCE 2010    Nausea 04/14/2017    PERSISTENT    Nausea & vomiting     Neuropathy 10/14/2010    FEET, LEGS & FACE    Other ill-defined conditions(799.89)     facial neuropathy STATES PN 1/17/11 HAS NEUROPATHY OF FEET/ LEGS     Other ill-defined conditions(799.89)     glaucoma and cateracts    Other ill-defined conditions(799.89) 04/14/2017    ANEMIA    Perineal abscess 1/25/2017    Psychiatric disorder     ANXIETY AND DEPRESSION    s/p debridement of midline abd wound 6/24/2011    Serratia wound infection, 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 06/14/2011    Stroke ([address]     TIA, NO RESIDUAL    Thromboembolus ([address] 2007    LEFT LEG    Thyroid disease     LOW THYROID    Unspecified adverse effect of anesthesia 1999    DIFFICULTY WAKING AFTER 2ND SURGERY SHORTLY AFTER OTHER SURGERY; WEIGHT 400+ POUNDS    Unspecified sleep apnea     HAS NOT USED CPAP SINCE LOSING WEIGHT, PT STATES ON 4/14/17     WBC = 8.0 on 7/22/18  Admitted from [facility] [address] ADAME:   Patient is A&O x 4, communicative, continent with some assistance needed in repositioning. Patient wearing briefs for incontinence  Diet: Renal regular; consistent carb 1800 kcal  Patient reports shooting neuropathy pain; nurse notified     Left heel and sacral skin intact and without erythema. 1. POA right foot wound not assessed; will be assessed tomorrow by Dr. Ana Laura Chamorro    2. POA right buttock abscess is approximately 0.7 cm x 0.1 cm x 0.1 cm, wound bed is not visible, no drainage, noemi-wound intact, wound edges are rolled, undermining at 2 o'clock measures 2 cm. Aquacel AG packing placed in wound, covered with foam dressing. 3.  POA left lateral ankle wound measures approximately 1 cm x 0.7 cm x 0 cm, wound bed is eschar, no drainage, noemi-wound with epitheliazed tissue. Left open to air. 4.  POA bilateral buttocks with dry, crusted area, no drainage, noemi-wound intact, no open area. Aloe Vesta lotion applied. Spoke with Dr. Trav Sparks, wound care orders obtained. Patient repositioned on left side   Heels offloaded in boots and pillows. Recommendations:    Right buttock- Every other day on ODD days cleanse with normal saline, loosely fill undermining at 2 o'clock with small strip of Aquacel AG making sure to include the stitching in the place being packed, cover with foam dressing. Bilateral buttocks- Apply Aloe Vesta lotion every 12 hours    Skin Care & Pressure Prevention:  Minimize layers of linen/pads under patient to optimize support surface. Turn/reposition approximately every 2 hours and offload heels.   Manage incontinence / promote continence     Discussed above plan with patient & Alvarez Dasilva RN    Transition of Care: Plan to follow as needed while admitted to hospital.    Toney Calixto \"Traci\" MARK Clay, RN, [facility].  office [phone]  pager [phone] or call  to page

## 2018-07-23 NOTE — PHYSICIAN ADVISORY
Letter of Status Determination:   Recommend hospitalization status upgraded from   OBSERVATION  to INPATIENT  Status     Pt Name:  Ronn La   MR#   [address] [facility] # [id number] /  [id number]   [facility]#  [id number]   [address] [place] [address]  558/01  @ . [facility]   Hospitalization date  7/22/2018  8:20 AM   Current Attending Physician  Cielo Romano MD   Principal diagnosis  Acute blood loss anemia      Clinicals  70 y.o. y.o  female hospitalized with above diagnosis   The pt developed acute blood loss anemia in addition to her known chronic anemia. She had active bleeding from the heel that was recently operated. She received TWO units PRBC after she was hospitalized. [place] ([facility]) criteria   Does   apply Anemia    STATUS DETERMINATION  Based on documented presenting clinical data, comorbid conditions, high risk of adverse events and deterioration, it is our recommendation that the patient's status should be upgraded from OBSERVATION to INPATIENT status. The final decision of the patient's hospitalization status depends on the attending physician's judgment. Additional comments     Payor: Ej Daniel / Plan: [employer] MEDICARE CHOICE PPO/PFFS / Product Type: Managed Care Medicare /         Sendy Bernal MD MPH FACP     Physician Advisor    [address]   [facility] [employer], [address] [place] [address]    Cell  [phone]        [phone]    .

## 2018-07-23 NOTE — PROGRESS NOTES
Nephrology Progress Note Latanya Jones Date of Admission : 7/22/2018 CC: Follow up for ESRD Assessment and Plan ESRD on HD: 
- MWF at [facility], [place] 
- HD again today x 3.5 hours - UF 4-5kg as tolerated Diabetic Foot wound w/ infection: 
- s/p Debridement, now with bleeding - podiatry consulted 
- on aztreonam and vanco x 6 weeks per ID 
  
Hypervolemic Hyponatremia: 
- Na stable 
  
Hypotension:   
- cont midodrine prior to HD 
  
Anemia: 
- 2/2 blood loss and CKD 
- s/p 2 units of blood so far 
- cont ELTON 
- monitor and transfuse PRN on dialysis Secondary HPT: 
- cont sensipar and binders DM 
   
Obesity, FARHAT 
   
Dyslipidemia 
   
Possible steal syndrome right hand 
  
  
 
 
Interval History: 
Seen and examined. Feeling better. HD last night, UF 4kg, hgb improving. No cp, sob, n/v/d reported. Current Medications: all current  Medications have been eviewed in [facility] Review of Systems: Pertinent items are noted in HPI. Objective: 
Vitals:   
Vitals:  
 07/22/18 2220 07/22/18 2250 07/23/18 0301 07/23/18 0600 BP: 146/62 173/58 92/59 Pulse: 85 80 80 Resp: 16 16 16 Temp:  98.6 °F (37 °C) 97.7 °F (36.5 °C) SpO2:   94% Weight:    134.5 kg (296 lb 9.6 oz) Height:      
 
Intake and Output: 
  
07/21 1901 - 07/23 0700 In: -  
Out: 4000 Physical Examination: 
General: NAD,Conversant Neck:  Supple, no mass Resp:  Lungs CTA B/L, no wheezing , normal respiratory effort CV:  RRR,  no murmur or rub, 1+ LE edema GI:  Soft, NT, + Bowel sounds, no hepatosplenomegaly Neurologic:  Non focal 
Psych:             AAO x 3 appropriate affect Skin:  No Rash Ext:                  R foot in bandage :  No fontenot []    High complexity decision making was performed 
[]    Patient is at high-risk of decompensation with multiple organ involvement Lab Data Personally Reviewed: I have reviewed all the pertinent labs, microbiology data and radiology studies during assessment. Recent Labs  
   07/22/18 
 0646 NA  132* K  4.6 CL  94* CO2  29  
GLU  99 BUN  36* CREA  5.65* CA  7.5* INR  1.2* Recent Labs  
   07/23/18 
 0309  07/22/18 
 2937 WBC   --   8.0 HGB  7.2*  6.9*  
HCT  23.2*  24.0*  
PLT   --   332 Lab Results Component Value Date/Time Specimen Description: URINE 10/27/2012 08:15 PM  
 Specimen Description: BLOOD 06/07/2011 07:40 AM  
 Specimen Description: URINE 08/30/2010 07:10 AM  
 
Lab Results Component Value Date/Time Culture result: MRSA NOT PRESENT AT 14 HOURS 07/22/2018 03:35 PM  
 Culture result: NO ANAEROBES ISOLATED 07/12/2018 08:00 AM  
 Culture result: HEAVY PROTEUS MIRABILIS (A) 07/12/2018 08:00 AM  
 Culture result: LIGHT KLEBSIELLA OXYTOCA (A) 07/12/2018 08:00 AM  
 Culture result: (A) 07/12/2018 08:00 AM  
  LIGHT **METHICILLIN RESISTANT STAPHYLOCOCCUS AUREUS**  
 Culture result: LIGHT ENTEROCOCCUS FAECALIS GROUP D (A) 07/12/2018 08:00 AM  
 Culture result: LIGHT ALCALIGENES FAECALIS (A) 07/12/2018 08:00 AM  
 Culture result:  07/12/2018 08:00 AM  
  **MRSA REPORT** 
CALLED TO AND READ BACK BY 
JAELYN GUTIERREZ RN ON 7/15/18 AT 1440 TA. Culture result: MODERATE PROTEUS MIRABILIS (A) 07/12/2018 08:00 AM  
 Culture result: LIGHT KLEBSIELLA OXYTOCA (A) 07/12/2018 08:00 AM  
 Culture result: FEW PSEUDOMONAS AERUGINOSA (A) 07/12/2018 08:00 AM  
 Culture result: LIGHT ENTEROCOCCUS FAECALIS GROUP D (A) 07/12/2018 08:00 AM  
 Culture result: (A) 07/12/2018 08:00 AM  
  LIGHT STAPHYLOCOCCUS SPECIES, COAGULASE NEGATIVE (2 DIFFERENT COLONY TYPES/STRAINS) Recent Results (from the past 24 hour(s)) TYPE + CROSSMATCH Collection Time: 07/22/18  9:08 AM  
Result Value Ref Range Crossmatch Expiration 07/25/2018 ABO/Rh(D) O POSITIVE Antibody screen NEG Unit number [id number] Blood component type  LR Unit division 00 Status of unit TRANSFUSED Crossmatch result Compatible Unit number [id number] Blood component type  LR Unit division 00 Status of unit TRANSFUSED Crossmatch result Compatible CBC WITH AUTOMATED DIFF Collection Time: 07/22/18  9:09 AM  
Result Value Ref Range WBC 8.0 3.6 - 11.0 K/uL  
 RBC 2.58 (L) 3.80 - 5.20 M/uL HGB 6.9 (L) 11.5 - 16.0 g/dL HCT 24.0 (L) 35.0 - 47.0 % MCV 93.0 80.0 - 99.0 FL  
 MCH 26.7 26.0 - 34.0 PG  
 MCHC 28.8 (L) 30.0 - 36.5 g/dL  
 RDW 16.6 (H) 11.5 - 14.5 % PLATELET 665 008 - 563 K/uL MPV 8.0 (L) 8.9 - 12.9 FL  
 NRBC 0.0 0  WBC ABSOLUTE NRBC 0.00 0.00 - 0.01 K/uL NEUTROPHILS 55 32 - 75 % LYMPHOCYTES 31 12 - 49 % MONOCYTES 10 5 - 13 % EOSINOPHILS 3 0 - 7 % BASOPHILS 0 0 - 1 % IMMATURE GRANULOCYTES 1 (H) 0.0 - 0.5 % ABS. NEUTROPHILS 4.4 1.8 - 8.0 K/UL  
 ABS. LYMPHOCYTES 2.5 0.8 - 3.5 K/UL  
 ABS. MONOCYTES 0.8 0.0 - 1.0 K/UL  
 ABS. EOSINOPHILS 0.2 0.0 - 0.4 K/UL  
 ABS. BASOPHILS 0.0 0.0 - 0.1 K/UL  
 ABS. IMM. GRANS. 0.1 (H) 0.00 - 0.04 K/UL  
 DF SMEAR SCANNED    
 RBC COMMENTS ANISOCYTOSIS 1+ 
    
 RBC COMMENTS OVALOCYTES PRESENT 
    
 RBC COMMENTS HYPOCHROMIA 1+ WBC COMMENTS REACTIVE LYMPHS    
METABOLIC PANEL, BASIC Collection Time: 07/22/18  9:09 AM  
Result Value Ref Range Sodium 132 (L) 136 - 145 mmol/L Potassium 4.6 3.5 - 5.1 mmol/L Chloride 94 (L) 97 - 108 mmol/L  
 CO2 29 21 - 32 mmol/L Anion gap 9 5 - 15 mmol/L Glucose 99 65 - 100 mg/dL BUN 36 (H) 6 - 20 MG/DL Creatinine 5.65 (H) 0.55 - 1.02 MG/DL  
 BUN/Creatinine ratio 6 (L) 12 - 20 GFR est AA 9 (L) >60 ml/min/1.73m2 GFR est non-AA 7 (L) >60 ml/min/1.73m2 Calcium 7.5 (L) 8.5 - 10.1 MG/DL PROTHROMBIN TIME + INR Collection Time: 07/22/18  9:09 AM  
Result Value Ref Range INR 1.2 (H) 0.9 - 1.1 Prothrombin time 11.8 (H) 9.0 - 11.1 sec PTT Collection Time: 07/22/18  9:09 AM  
Result Value Ref Range  aPTT 34.7 (H) 22.1 - 32.0 sec  
 aPTT, therapeutic range     58.0 - 77.0 SECS  
GLUCOSE, POC Collection Time: 07/22/18 11:31 AM  
Result Value Ref Range Glucose (POC) 117 (H) 65 - 100 mg/dL Performed by Edurado Golden CULTURE, MRSA Collection Time: 07/22/18  3:35 PM  
Result Value Ref Range Special Requests: NO SPECIAL REQUESTS Culture result: MRSA NOT PRESENT AT 14 HOURS    
GLUCOSE, POC Collection Time: 07/22/18  4:43 PM  
Result Value Ref Range Glucose (POC) 141 (H) 65 - 100 mg/dL Performed by Orlin Macario Collection Time: 07/22/18  7:49 PM  
Result Value Ref Range Vancomycin, random 24.2 UG/ML  
GLUCOSE, POC Collection Time: 07/22/18  9:25 PM  
Result Value Ref Range Glucose (POC) 131 (H) 65 - 100 mg/dL Performed by Keesha Ramirez   
HGB & HCT Collection Time: 07/23/18  3:09 AM  
Result Value Ref Range HGB 7.2 (L) 11.5 - 16.0 g/dL HCT 23.2 (L) 35.0 - 47.0 % GLUCOSE, POC Collection Time: 07/23/18  6:07 AM  
Result Value Ref Range Glucose (POC) 142 (H) 65 - 100 mg/dL Performed by Keesha Martinez MD 
[address], [facility] [address] Phone - [phone] Fax - [phone] 
[email or web address]

## 2018-07-24 LAB
GLUCOSE BLD STRIP.AUTO-MCNC: 110 MG/DL (ref 65–100)
GLUCOSE BLD STRIP.AUTO-MCNC: 117 MG/DL (ref 65–100)
GLUCOSE BLD STRIP.AUTO-MCNC: 190 MG/DL (ref 65–100)
HCT VFR BLD AUTO: 23.3 % (ref 35–47)
HGB BLD-MCNC: 6.8 G/DL (ref 11.5–16)
SERVICE CMNT-IMP: ABNORMAL

## 2018-07-24 PROCEDURE — 77030018717 HC DRSG GRNUFM KCON -B

## 2018-07-24 PROCEDURE — 85018 HEMOGLOBIN: CPT | Performed by: HOSPITALIST

## 2018-07-24 PROCEDURE — 3331090001 HH PPS REVENUE CREDIT

## 2018-07-24 PROCEDURE — 74011636637 HC RX REV CODE- 636/637: Performed by: HOSPITALIST

## 2018-07-24 PROCEDURE — 74011250637 HC RX REV CODE- 250/637: Performed by: HOSPITALIST

## 2018-07-24 PROCEDURE — 65270000029 HC RM PRIVATE

## 2018-07-24 PROCEDURE — 74011250637 HC RX REV CODE- 250/637: Performed by: INTERNAL MEDICINE

## 2018-07-24 PROCEDURE — 82962 GLUCOSE BLOOD TEST: CPT

## 2018-07-24 PROCEDURE — 97530 THERAPEUTIC ACTIVITIES: CPT

## 2018-07-24 PROCEDURE — 77030019952 HC CANSTR VAC ASST KCON -B

## 2018-07-24 PROCEDURE — 74011000258 HC RX REV CODE- 258: Performed by: HOSPITALIST

## 2018-07-24 PROCEDURE — 3331090002 HH PPS REVENUE DEBIT

## 2018-07-24 PROCEDURE — 74011000250 HC RX REV CODE- 250: Performed by: HOSPITALIST

## 2018-07-24 PROCEDURE — 97606 NEG PRS WND THER DME>50 SQCM: CPT

## 2018-07-24 PROCEDURE — 36415 COLL VENOUS BLD VENIPUNCTURE: CPT | Performed by: HOSPITALIST

## 2018-07-24 RX ADMIN — AZTREONAM 500 MG: 1 INJECTION, POWDER, LYOPHILIZED, FOR SOLUTION INTRAMUSCULAR; INTRAVENOUS at 19:07

## 2018-07-24 RX ADMIN — INSULIN LISPRO 2 UNITS: 100 INJECTION, SOLUTION INTRAVENOUS; SUBCUTANEOUS at 17:28

## 2018-07-24 RX ADMIN — OXYCODONE AND ACETAMINOPHEN 2 TABLET: 5; 325 TABLET ORAL at 06:25

## 2018-07-24 RX ADMIN — NEPHROCAP 1 CAPSULE: 1 CAP ORAL at 09:43

## 2018-07-24 RX ADMIN — CINACALCET HYDROCHLORIDE 30 MG: 30 TABLET, COATED ORAL at 21:10

## 2018-07-24 RX ADMIN — SIMVASTATIN 20 MG: 20 TABLET, FILM COATED ORAL at 21:44

## 2018-07-24 RX ADMIN — POLYETHYLENE GLYCOL 3350 17 G: 17 POWDER, FOR SOLUTION ORAL at 09:43

## 2018-07-24 RX ADMIN — INSULIN LISPRO 2 UNITS: 100 INJECTION, SOLUTION INTRAVENOUS; SUBCUTANEOUS at 13:24

## 2018-07-24 RX ADMIN — OXYCODONE AND ACETAMINOPHEN 2 TABLET: 5; 325 TABLET ORAL at 09:42

## 2018-07-24 RX ADMIN — SEVELAMER CARBONATE 800 MG: 800 TABLET, FILM COATED ORAL at 09:43

## 2018-07-24 RX ADMIN — OXYCODONE AND ACETAMINOPHEN 2 TABLET: 5; 325 TABLET ORAL at 13:26

## 2018-07-24 RX ADMIN — AZTREONAM 500 MG: 1 INJECTION, POWDER, LYOPHILIZED, FOR SOLUTION INTRAMUSCULAR; INTRAVENOUS at 07:16

## 2018-07-24 RX ADMIN — GABAPENTIN 100 MG: 100 CAPSULE ORAL at 08:19

## 2018-07-24 RX ADMIN — GABAPENTIN 100 MG: 100 CAPSULE ORAL at 17:28

## 2018-07-24 RX ADMIN — NORTRIPTYLINE HYDROCHLORIDE 50 MG: 25 CAPSULE ORAL at 21:44

## 2018-07-24 RX ADMIN — OXYCODONE AND ACETAMINOPHEN 2 TABLET: 5; 325 TABLET ORAL at 01:43

## 2018-07-24 RX ADMIN — SEVELAMER CARBONATE 800 MG: 800 TABLET, FILM COATED ORAL at 13:25

## 2018-07-24 RX ADMIN — OXYCODONE AND ACETAMINOPHEN 2 TABLET: 5; 325 TABLET ORAL at 19:07

## 2018-07-24 RX ADMIN — LATANOPROST 1 DROP: 50 SOLUTION OPHTHALMIC at 22:00

## 2018-07-24 RX ADMIN — SEVELAMER CARBONATE 800 MG: 800 TABLET, FILM COATED ORAL at 17:28

## 2018-07-24 RX ADMIN — OXYCODONE HYDROCHLORIDE AND ACETAMINOPHEN 1000 MG: 500 TABLET ORAL at 09:43

## 2018-07-24 NOTE — PROGRESS NOTES
Hospitalist Progress Note Sigrid Mcnair MD 
Answering service: [phone] or [phone] from in house phone Date of Service:  2018 NAME:  David Gallagher :  1950 MRN:  [id number] Admission Summary:  
The patient is a 39-year-old -American female with past medical history of anemia, asthma, anxiety, end-stage renal disease on hemodialysis ,  and , chronic pain, depression, GERD. She presented to the ER from [facility] on account of profuse bleeding from the right heel surgical site. She reports that she woke up this morning. Patient was recently discharged from [facility] on 2018 after hospital admission for acute bilateral foot cellulitis with infected right heel wound requiring debridement of right heel ulcer with partial calcanectomy. She returned again to the ER stating that this morning she woke up and she went to the bedside commode and while she was on the commode, she noticed profuse bleeding from her right heel. The CNA notes this at the rehabilitation center came decided to call EMS. She denies associated lightheadedness, dizziness or syncope. Today in the emergency department, hemoglobin was 6.9 and she has been typed and crossed with 2 units of packed red blood and 1 unit of packed red blood cell transfusion is currently ongoing. She denies chest pain, shortness of breath, although she tells me that she feels swollen all over. She denies any fever, chills or rigors. 
  
Recent hospitalization as mentioned above, admitted on 2018 and discharged on the  for right heel infected wound with osteomyelitis. She was discharged home on aztreonam until 2018 and vancomycin. Interval history / Subjective:  
  F/u anemia Patient seen and examined this morning; daughter in the room States she feels fine, no new complaint.  Right heel bleeding has stopped. Assessment & Plan: #. Acute blood loss anemia on underlying chronic anemia due to ESRD/GI bleed: 
-Bleed from recently operated right heel. -S/p two unit pRBCs. Will transfuse with 2 more units tomorrow with HD (spoke with Dr Pallavi Bradford) -Monitor CBC and transfuse as needed Probable GI bleed 
-has reported history of hemorrhoids 
-Will consult GI 
 
#. S/p right heel debridement with partial calcanectomy on 7/12/18 for infected right heel wound and OM. -Had profuse bleeding from the surgical site after she put pressure on her foot,  She was 100% non weightbearing on her right foot after her surgery.  
-Continue antibiotics aztreonam and vancomycin until 07/29/2018 . 
-Podiatry cleared the patient for discharge #. ESRD on HD 
-Nephrology on board. Spoke to Dr Pallavi Bradford, early am blood transfusion with HD Code status: full DVT prophylaxis: scd Plan: Likely discharge tomorrow Care Plan discussed with: Patient/Family and Nurse Disposition: as above Hospital Problems  Date Reviewed: 7/19/2018 Codes Class Noted POA * (Principal)Acute blood loss anemia ICD-10-CM: D62 
ICD-9-CM: 285.1  7/22/2018 Yes Foot osteomyelitis, right ([address] ICD-10-CM: M86.9 ICD-9-CM: 730.27  7/22/2018 Unknown Acute blood loss as cause of postoperative anemia ICD-10-CM: D62 
ICD-9-CM: 285.1  7/22/2018 Unknown Review of Systems: A comprehensive review of systems was negative except for that written in the HPI. Vital Signs:  
 Last 24hrs VS reviewed since prior progress note. Most recent are: 
Visit Vitals  /74  Pulse 75  Temp 97.5 °F (36.4 °C)  Resp 18  Ht 6' 1\" (1.854 m)  Wt 131.9 kg (290 lb 12.6 oz)  SpO2 96%  BMI 38.36 kg/m2 Intake/Output Summary (Last 24 hours) at 07/24/18 1013 Last data filed at 07/23/18 1730 Gross per 24 hour Intake                0 ml Output             4500 ml Net            -4500 ml Physical Examination:  
 
 
     
Constitutional:  No acute distress, cooperative, pleasant   
ENT:  Oral mucous moist, oropharynx benign. Neck supple, Resp:  CTA bilaterally. No wheezing/rhonchi/rales. No accessory muscle use CV:  Regular rhythm, normal rate, no murmurs, gallops, rubs GI:  Soft, non distended, non tender. normoactive bowel sounds, no hepatosplenomegaly Musculoskeletal:  right foot dressing Neurologic:  Moves all extremities. AAOx3, CN II-XII reviewed Data Review:  
 Review and/or order of clinical lab test 
 
 
Labs:  
 
Recent Labs  
   07/24/18 
 0437  07/23/18 
 0309  07/22/18 
 5528 WBC   --    --   8.0 HGB  6.8*  7.2*  6.9*  
HCT  23.3*  23.2*  24.0*  
PLT   --    --   332 Recent Labs  
   07/22/18 
 8654 NA  132* K  4.6 CL  94* CO2  29 BUN  36* CREA  5.65* GLU  99  
CA  7.5* No results for input(s): SGOT, GPT, ALT, AP, TBIL, TBILI, TP, ALB, GLOB, GGT, AML, LPSE in the last 72 hours. No lab exists for component: AMYP, HLPSE Recent Labs  
   07/22/18 
 0605 INR  1.2* PTP  11.8* APTT  34.7* No results for input(s): FE, TIBC, PSAT, FERR in the last 72 hours. No results found for: FOL, RBCF No results for input(s): PH, PCO2, PO2 in the last 72 hours. No results for input(s): CPK, CKNDX, TROIQ in the last 72 hours. No lab exists for component: CPKMB Lab Results Component Value Date/Time Cholesterol, total 144 04/10/2018 02:53 AM  
 HDL Cholesterol 66 04/10/2018 02:53 AM  
 LDL, calculated 67.6 04/10/2018 02:53 AM  
 Triglyceride 52 04/10/2018 02:53 AM  
 CHOL/HDL Ratio 2.2 04/10/2018 02:53 AM  
 
Lab Results Component Value Date/Time Glucose (POC) 110 (H) 07/24/2018 06:36 AM  
 Glucose (POC) 145 (H) 07/23/2018 09:41 PM  
 Glucose (POC) 191 (H) 07/23/2018 12:21 PM  
 Glucose (POC) 142 (H) 07/23/2018 06:07 AM  
 Glucose (POC) 131 (H) 07/22/2018 09:25 PM  
 
Lab Results Component Value Date/Time  Color YELLOW 10/27/2012 10:25 PM  
 Appearance CLEAR 10/27/2012 10:25 PM  
 Specific gravity 1.012 10/27/2012 10:25 PM  
 Specific gravity 1.015 08/30/2010 07:10 AM  
 pH (UA) 8.0 10/27/2012 10:25 PM  
 Protein 100 (A) 10/27/2012 10:25 PM  
 Glucose NEGATIVE  10/27/2012 10:25 PM  
 Ketone NEGATIVE  10/27/2012 10:25 PM  
 Bilirubin NEGATIVE  10/27/2012 10:25 PM  
 Urobilinogen 1.0 10/27/2012 10:25 PM  
 Nitrites NEGATIVE  10/27/2012 10:25 PM  
 Leukocyte Esterase TRACE (A) 10/27/2012 10:25 PM  
 Epithelial cells 0-5 10/27/2012 10:25 PM  
 Bacteria NEGATIVE  10/27/2012 10:25 PM  
 WBC 10-20 10/27/2012 10:25 PM  
 RBC 5-10 10/27/2012 10:25 PM  
 
 
 
Medications Reviewed:  
 
Current Facility-Administered Medications Medication Dose Route Frequency  [START ON 7/25/2018] Vancomycin, Random - Please draw pre-dialysis level @ 0400 Wednesday 7/25   Other ONCE  
 gabapentin (NEURONTIN) capsule 100 mg  100 mg Oral BID  
 0.9% sodium chloride infusion 250 mL  250 mL IntraVENous PRN  
 midodrine (PROAMITINE) tablet 5 mg  5 mg Oral Once per day on Mon Wed Fri  
 oxyCODONE-acetaminophen (PERCOCET) 5-325 mg per tablet 1-2 Tab  1-2 Tab Oral Q4H PRN  
 albumin human 25% (BUMINATE) solution 50 g  50 g IntraVENous DIALYSIS PRN  
 alum-mag hydroxide-simeth (MYLANTA) oral suspension 30 mL  30 mL Oral Q4H PRN  
 ascorbic acid (vitamin C) (VITAMIN C) tablet 1,000 mg  1,000 mg Oral DAILY  aztreonam (AZACTAM) 500 mg in 0.9% sodium chloride 100 mL IVPB  500 mg IntraVENous Q12H  B complex-vitaminC-folic acid (NEPHROCAP) cap  1 Cap Oral DAILY  cinacalcet (SENSIPAR) tablet 30 mg  30 mg Oral DAILY  fentaNYL citrate (PF) injection 50 mcg  50 mcg IntraVENous Q2H PRN  
 latanoprost (XALATAN) 0.005 % ophthalmic solution 1 Drop  1 Drop Both Eyes QHS  linaclotide (LINZESS) capsule 290 mcg (Patient Supplied)  290 mcg Oral ACB  nortriptyline (PAMELOR) capsule 50 mg  50 mg Oral QHS  ondansetron (ZOFRAN) injection 4 mg  4 mg IntraVENous Q6H PRN  polyethylene glycol (MIRALAX) packet 17 g  17 g Oral DAILY  polyethylene glycol (MIRALAX) packet 17 g  17 g Oral BID PRN  
 sevelamer carbonate (RENVELA) tab 800 mg  800 mg Oral TID WITH MEALS  simvastatin (ZOCOR) tablet 20 mg  20 mg Oral QHS  VANCOMYCIN INFORMATION NOTE   Other Rx Dosing/Monitoring  glucose chewable tablet 16 g  4 Tab Oral PRN  
 dextrose (D50W) injection syrg 12.5-25 g  12.5-25 g IntraVENous PRN  
 glucagon (GLUCAGEN) injection 1 mg  1 mg IntraMUSCular PRN  
 insulin lispro (HUMALOG) injection   SubCUTAneous AC&HS  epoetin joi (EPOGEN;PROCRIT) injection 20,000 Units  20,000 Units SubCUTAneous Q MON, WED & FRI  vancomycin (VANCOCIN) 750 mg in 0.9% sodium chloride (MBP/ADV) 250 mL ADV  750 mg IntraVENous DIALYSIS PRN  
 
______________________________________________________________________ EXPECTED LENGTH OF STAY: - - - 
ACTUAL LENGTH OF STAY:          2 Onelia George MD

## 2018-07-24 NOTE — WOUND CARE
WOCN Note:     Follow-up visit for [facility] dressing placement to left heel per Dr. Mark Rene request.   Previously seen by Rosie Ramirez, [facility] for several areas - see her note. Pre-medicated by RN for pain and she dozed off and on during dressing change. 1. Left heel ulcer = 9 x 10 x 1 cm  60% adelaide moist pink & 40% tan devitalized toward medial margin. Scant serosanguinous exudate. Periwound intact with some dyschromia and dry flaky skin. Dyschromia noted to flexure on top of foot, dry & flaky. Skin prep applied to periwound, 125 mmHg continuous suction achieved using 1 piece of black foam.  ABD placed to cushion tubing and ACE wrap loosly applied. Multi Podis boot reapplied with VAC tubing coming out of medial side. Recommendations:    Maintain VAC as ordered @ 125 mmHg continuous suction using black foam and changed twice weekly while in hospital.   Minimize layers of linen/pads under patient to optimize support surface. Turn/reposition approximately every 2 hours and offload heels. Discussed above plan with patient's daughter. Transition of Care: Plan to follow as needed while admitted to hospital with another dressing change this week unless discharged before. Will follow-up with Dr. Krunal Jc in [address] [place] [address] [phone] in 1-2 weeks.      Leonardo Orozco, ILENEN, RN, [employer]  Certified Wound, Ostomy, Continence Nurse  office [phone]  pager [phone] or call  to page

## 2018-07-24 NOTE — PROGRESS NOTES
Nephrology Progress Note Ger Velázquez Date of Admission : 7/22/2018 CC: Follow up for ESRD Assessment and Plan ESRD on HD: 
- MWF at [facility], [place] 
- HD tomorrow - blood with HD Diabetic Foot wound w/ infection: 
- s/p Debridement, now with bleeding - podiatry consulted 
- on aztreonam and vanco x 6 weeks per ID 
  
Hypervolemic Hyponatremia: 
- Na stable 
  
Hypotension:   
- cont midodrine prior to HD 
  
Anemia: 
- 2/2 blood loss and CKD 
- s/p 2 units of blood so far 
- cont ELTON 
- transfuse on HD tomorrow 
- may need GI eval 
 
Secondary HPT: 
- cont sensipar and binders DM 
   
Obesity, FARHAT 
   
Dyslipidemia 
   
Possible steal syndrome right hand 
  
  
 
 
Interval History: 
Seen and examined. C/o neuropathic pain. HD yesterday, 4.5kg UF. Hgb 6.9 today. No cp, sob, n/v/d reported. Current Medications: all current  Medications have been eviewed in [facility] Review of Systems: Pertinent items are noted in HPI. Objective: 
Vitals:   
Vitals:  
 07/23/18 1830 07/24/18 0252 07/24/18 0602 07/24/18 4428 BP: 139/68 163/72  138/74 Pulse: 81 77  75 Resp: 20 18 18 Temp: 98.2 °F (36.8 °C) 98.4 °F (36.9 °C)  97.5 °F (36.4 °C) TempSrc:      
SpO2: 97% 94%  96% Weight:   131.9 kg (290 lb 12.6 oz) Height:      
 
Intake and Output: 
  
07/22 1901 - 07/24 0700 In: 5 [P.O.:420] Out: 8500 Physical Examination: 
General: NAD,Conversant Neck:  Supple, no mass Resp:  Lungs CTA B/L, no wheezing , normal respiratory effort CV:  RRR,  no murmur or rub, 1+ LE edema GI:  Soft, NT, + Bowel sounds, no hepatosplenomegaly Neurologic:  Non focal 
Psych:             AAO x 3 appropriate affect Skin:  No Rash Ext:                  R foot in bandage :  No fontenot []    High complexity decision making was performed 
[]    Patient is at high-risk of decompensation with multiple organ involvement Lab Data Personally Reviewed: I have reviewed all the pertinent labs, microbiology data and radiology studies during assessment. Recent Labs  
   07/22/18 
 0816 NA  132* K  4.6 CL  94* CO2  29  
GLU  99 BUN  36* CREA  5.65* CA  7.5* INR  1.2* Recent Labs  
   07/24/18 
 0437  07/23/18 
 0309  07/22/18 
 1813 WBC   --    --   8.0 HGB  6.8*  7.2*  6.9*  
HCT  23.3*  23.2*  24.0*  
PLT   --    --   332 Lab Results Component Value Date/Time Specimen Description: URINE 10/27/2012 08:15 PM  
 Specimen Description: BLOOD 06/07/2011 07:40 AM  
 Specimen Description: URINE 08/30/2010 07:10 AM  
 
Lab Results Component Value Date/Time Culture result: MRSA NOT PRESENT 07/22/2018 03:35 PM  
 Culture result:  07/22/2018 03:35 PM  
      Screening of patient nares for MRSA is for surveillance purposes and, if positive, to facilitate isolation considerations in high risk settings. It is not intended for automatic decolonization interventions per se as regimens are not sufficiently effective to warrant routine use. Culture result: NO ANAEROBES ISOLATED 07/12/2018 08:00 AM  
 Culture result: HEAVY PROTEUS MIRABILIS (A) 07/12/2018 08:00 AM  
 Culture result: LIGHT KLEBSIELLA OXYTOCA (A) 07/12/2018 08:00 AM  
 Culture result: (A) 07/12/2018 08:00 AM  
  LIGHT **METHICILLIN RESISTANT STAPHYLOCOCCUS AUREUS**  
 Culture result: LIGHT ENTEROCOCCUS FAECALIS GROUP D (A) 07/12/2018 08:00 AM  
 Culture result: LIGHT ALCALIGENES FAECALIS (A) 07/12/2018 08:00 AM  
 Culture result:  07/12/2018 08:00 AM  
  **MRSA REPORT** 
CALLED TO AND READ BACK BY 
JAELYN GUTIERREZ RN ON 7/15/18 AT 1440 TA.  
  
 Culture result: MODERATE PROTEUS MIRABILIS (A) 07/12/2018 08:00 AM  
 Culture result: LIGHT KLEBSIELLA OXYTOCA (A) 07/12/2018 08:00 AM  
 Culture result: FEW PSEUDOMONAS AERUGINOSA (A) 07/12/2018 08:00 AM  
 Culture result: LIGHT ENTEROCOCCUS FAECALIS GROUP D (A) 07/12/2018 08:00 AM  
 Culture result: (A) 07/12/2018 08:00 AM  
  LIGHT STAPHYLOCOCCUS SPECIES, COAGULASE NEGATIVE (2 DIFFERENT COLONY TYPES/STRAINS) Recent Results (from the past 24 hour(s)) GLUCOSE, POC Collection Time: 07/23/18 12:21 PM  
Result Value Ref Range Glucose (POC) 191 (H) 65 - 100 mg/dL Performed by Kylie Soler, POC Collection Time: 07/23/18  9:41 PM  
Result Value Ref Range Glucose (POC) 145 (H) 65 - 100 mg/dL Performed by Christal Carrasco HGB & HCT Collection Time: 07/24/18  4:37 AM  
Result Value Ref Range HGB 6.8 (L) 11.5 - 16.0 g/dL HCT 23.3 (L) 35.0 - 47.0 % GLUCOSE, POC Collection Time: 07/24/18  6:36 AM  
Result Value Ref Range Glucose (POC) 110 (H) 65 - 100 mg/dL Performed by Beatriz Quijano MD 
[address] [place] [address], [facility] Phone - [phone] Fax - [phone] 
www. [email or web address]

## 2018-07-24 NOTE — PROGRESS NOTES
Noted that the patient was transferred to [address] [place] [address]. The patient is form [address] [facility]. CRM will continue to follow for discharge planning.  LAURA

## 2018-07-24 NOTE — PROGRESS NOTES
Bedside and Verbal shift change report given to Ninoska Holden (oncoming nurse) by Adam Coley (offgoing nurse). Report included the following information SBAR.

## 2018-07-24 NOTE — PROGRESS NOTES
Problem: Mobility Impaired (Adult and Pediatric)  Goal: *Acute Goals and Plan of Care (Insert Text)  Physical Therapy Goals  Initiated 7/23/2018  1. Patient will move from supine to sit and sit to supine  and roll side to side in bed with modified independence within 7 day(s). 2.  Patient will transfer from bed to chair and chair to bed with modified independence using the least restrictive device within 7 day(s). physical Therapy TREATMENT  Patient: Hemal Stringer (40 y.o. female)  Date: 7/24/2018  Diagnosis: Foot osteomyelitis, right ([address]  Acute blood loss as cause of postoperative anemia  Acute blood loss as cause of postoperative anemia Acute blood loss anemia       Precautions: Contact, Fall, NWB (RLE)  Chart, physical therapy assessment, plan of care and goals were reviewed. ASSESSMENT:  Patient reports considerable pain related to her neuropathy in her lower legs today. Multiple times throughout the session, she cried out in pain. In the past, she was able to get some relief from compression stockings. Nothing can be applied to her RLE due to heel wound, but LLE was wrapped with mild compression with stockinette underwrap. She reported improved pain after wrapping and instructed her and daughter to remove it after several hours today. In addition, she performed modified chair push ups in chair position and hand strengthening exercises using theraputty and foam.  Reviewed importance of continued NWB, and patient reports that she was up with nursing to the bedside commode immediately before PT session. We will continue to work on strength and mobility, while limiting excessive risk of weight bearing.   Progression toward goals:  []    Improving appropriately and progressing toward goals  [x]    Improving slowly and progressing toward goals  []    Not making progress toward goals and plan of care will be adjusted     PLAN:  Patient continues to benefit from skilled intervention to address the above impairments. Continue treatment per established plan of care. Discharge Recommendations:  Rajeev Zambrano  Further Equipment Recommendations for Discharge: To be determined in rehab     SUBJECTIVE:   Patient stated The pain is the worst it has been in a long time.     OBJECTIVE DATA SUMMARY:   Critical Behavior:              Functional Mobility Training:  Bed Mobility:                    Transfers:                                   Balance:     Ambulation/Gait Training:                                                               Neuro Re-Education:    Therapeutic Exercises:   Chair push ups,  exercises  Pain:  Pain Scale 1: Numeric (0 - 10)  Pain Intensity 1: 5           Pain Intervention(s) 1: Medication (see MAR); Distraction;Elevation;Rest;Repositioned  Activity Tolerance:   Fair, limited by neuropathic pain in LEs  Please refer to the flowsheet for vital signs taken during this treatment.   After treatment:   []    Patient left in no apparent distress sitting up in chair  [x]    Patient left in no apparent distress in bed in chair position  [x]    Call bell left within reach  [x]    Nursing notified  [x]    Caregiver present  []    Bed alarm activated    COMMUNICATION/COLLABORATION:   The patients plan of care was discussed with: Registered Nurse    Cindy Ty, PT   Time Calculation: 27 mins

## 2018-07-24 NOTE — DIABETES MGMT
[facility] Elevated A1C Visit Note Recommendations/ Comments: Patient reports that she has been in rehab and will return to rehab after discharge, but will eventually go back home. She states that she had not been doing well with checking her blood sugar and using Humalog scale, but that her daughter was planning to help her manage her diabetes when she goes home. She avoids sweet drinks. Reviewed importance of good blood sugar control in prevention of infections and with healing. Current Hospital Diabetes Medications: Lispro correction, normal sensitivity Patient is a 70 y.o. female with a history of Type 2 Diabetes on intensive insulin injection program (Levemir 12 units and Lispro scale) at home. A1c: New A1C would not be accurate due to transfusion 7/22/18 Lab Results Component Value Date/Time Hemoglobin A1c 9.1 (H) 04/10/2018 02:53 AM  
 Hemoglobin A1c 7.2 (H) 10/06/2009 04:00 AM  
 
 
Recent Glucose Results:  
Lab Results Component Value Date/Time GLUCPOC 190 (H) 07/24/2018 11:45 AM  
 GLUCPOC 110 (H) 07/24/2018 06:36 AM  
 GLUCPOC 145 (H) 07/23/2018 09:41 PM  
  
 
Lab Results Component Value Date/Time Creatinine 5.65 (H) 07/22/2018 09:09 AM  
 
 
Active Orders Diet DIET RENAL Regular; Consistent Carb 1800kcal  
  
 
 
Assessed and instructed patient on the following:  
·  interpretation of lab results, blood sugar goals, complications of diabetes mellitus, hypoglycemia prevention and treatment and nutrition Provided patient with the following: [x]          Diabetes Self-Care Guide 
e\"               [x]          Outpatient DTC contact number Patient to follow up with Endocrinologist after discharge. Will continue to follow as needed. Thank you.  
Bk Waters, MS, RN, CDE

## 2018-07-24 NOTE — PROGRESS NOTES
Podiatry  -Pt seen & examined at bedside. States her neuropathy pain is acting up. Daughter at bedside.  -Right heel ulcer evaluated - more granulation tissue noticed compared to before. No active bleeding, no pus, no erythema. Anterior ankle with signs of skin irritation.    -Carasyn gel/DSD/Ace applied. Multi-podus boot applied.    -Ok to d/c from my standpoint once medically stable. If Pt is going to be in house for the next few days, wound vac should be restarted. Otherwise, restart wound vac in rehab facility. See below for d/c instructions. Discharge Instructions    1.) Follow up with Dr. Tl Colon @ [address] in 1-2 weeks. 2.) Apply wound vac to right heel ulcer and set vac to 125 mmHg continuous therapy. Change vac dressing every other day. 3.) Strict non weightbearing to right foot. Keep multi-podus boot on right foot at all times.

## 2018-07-24 NOTE — PROGRESS NOTES
Bedside and Verbal shift change report given to Yennifer Wallace RN (oncoming nurse) by Elham Hunter RN (offgoing nurse). Report included the following information SBAR, Kardex and MAR.
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
## 2018-07-25 LAB
GLUCOSE BLD STRIP.AUTO-MCNC: 129 MG/DL (ref 65–100)
GLUCOSE BLD STRIP.AUTO-MCNC: 142 MG/DL (ref 65–100)
GLUCOSE BLD STRIP.AUTO-MCNC: 145 MG/DL (ref 65–100)
GLUCOSE BLD STRIP.AUTO-MCNC: 190 MG/DL (ref 65–100)
HBV SURFACE AG SER QL: <0.1 INDEX
HBV SURFACE AG SER QL: NEGATIVE
HCT VFR BLD AUTO: 22.6 % (ref 35–47)
HGB BLD-MCNC: 6.8 G/DL (ref 11.5–16)
SERVICE CMNT-IMP: ABNORMAL
VANCOMYCIN SERPL-MCNC: 25.1 UG/ML

## 2018-07-25 PROCEDURE — 3331090002 HH PPS REVENUE DEBIT

## 2018-07-25 PROCEDURE — P9016 RBC LEUKOCYTES REDUCED: HCPCS | Performed by: EMERGENCY MEDICINE

## 2018-07-25 PROCEDURE — 74011250637 HC RX REV CODE- 250/637: Performed by: INTERNAL MEDICINE

## 2018-07-25 PROCEDURE — 87340 HEPATITIS B SURFACE AG IA: CPT | Performed by: INTERNAL MEDICINE

## 2018-07-25 PROCEDURE — 3331090001 HH PPS REVENUE CREDIT

## 2018-07-25 PROCEDURE — 82962 GLUCOSE BLOOD TEST: CPT

## 2018-07-25 PROCEDURE — 97530 THERAPEUTIC ACTIVITIES: CPT

## 2018-07-25 PROCEDURE — 77030011255 HC DSG AQUACEL AG BMS -A

## 2018-07-25 PROCEDURE — 85018 HEMOGLOBIN: CPT | Performed by: HOSPITALIST

## 2018-07-25 PROCEDURE — 36415 COLL VENOUS BLD VENIPUNCTURE: CPT | Performed by: HOSPITALIST

## 2018-07-25 PROCEDURE — 74011250636 HC RX REV CODE- 250/636: Performed by: INTERNAL MEDICINE

## 2018-07-25 PROCEDURE — 97116 GAIT TRAINING THERAPY: CPT

## 2018-07-25 PROCEDURE — 74011250637 HC RX REV CODE- 250/637: Performed by: HOSPITALIST

## 2018-07-25 PROCEDURE — 74011636637 HC RX REV CODE- 636/637: Performed by: HOSPITALIST

## 2018-07-25 PROCEDURE — 65270000029 HC RM PRIVATE

## 2018-07-25 PROCEDURE — 74011250636 HC RX REV CODE- 250/636: Performed by: HOSPITALIST

## 2018-07-25 PROCEDURE — 74011000250 HC RX REV CODE- 250: Performed by: HOSPITALIST

## 2018-07-25 PROCEDURE — 77030011256 HC DRSG MEPILEX <16IN NO BORD MOLN -A

## 2018-07-25 PROCEDURE — 74011000258 HC RX REV CODE- 258: Performed by: HOSPITALIST

## 2018-07-25 PROCEDURE — 80202 ASSAY OF VANCOMYCIN: CPT | Performed by: HOSPITALIST

## 2018-07-25 RX ADMIN — SEVELAMER CARBONATE 800 MG: 800 TABLET, FILM COATED ORAL at 13:32

## 2018-07-25 RX ADMIN — OXYCODONE HYDROCHLORIDE AND ACETAMINOPHEN 1000 MG: 500 TABLET ORAL at 08:34

## 2018-07-25 RX ADMIN — OXYCODONE AND ACETAMINOPHEN 2 TABLET: 5; 325 TABLET ORAL at 04:27

## 2018-07-25 RX ADMIN — OXYCODONE AND ACETAMINOPHEN 2 TABLET: 5; 325 TABLET ORAL at 22:22

## 2018-07-25 RX ADMIN — LATANOPROST 1 DROP: 50 SOLUTION OPHTHALMIC at 21:21

## 2018-07-25 RX ADMIN — SEVELAMER CARBONATE 800 MG: 800 TABLET, FILM COATED ORAL at 18:19

## 2018-07-25 RX ADMIN — NEPHROCAP 1 CAPSULE: 1 CAP ORAL at 08:34

## 2018-07-25 RX ADMIN — GABAPENTIN 100 MG: 100 CAPSULE ORAL at 18:19

## 2018-07-25 RX ADMIN — SEVELAMER CARBONATE 800 MG: 800 TABLET, FILM COATED ORAL at 08:35

## 2018-07-25 RX ADMIN — OXYCODONE AND ACETAMINOPHEN 2 TABLET: 5; 325 TABLET ORAL at 08:34

## 2018-07-25 RX ADMIN — AZTREONAM 500 MG: 1 INJECTION, POWDER, LYOPHILIZED, FOR SOLUTION INTRAMUSCULAR; INTRAVENOUS at 07:00

## 2018-07-25 RX ADMIN — INSULIN LISPRO 2 UNITS: 100 INJECTION, SOLUTION INTRAVENOUS; SUBCUTANEOUS at 18:31

## 2018-07-25 RX ADMIN — OXYCODONE AND ACETAMINOPHEN 2 TABLET: 5; 325 TABLET ORAL at 13:32

## 2018-07-25 RX ADMIN — NORTRIPTYLINE HYDROCHLORIDE 50 MG: 25 CAPSULE ORAL at 21:20

## 2018-07-25 RX ADMIN — AZTREONAM 500 MG: 1 INJECTION, POWDER, LYOPHILIZED, FOR SOLUTION INTRAMUSCULAR; INTRAVENOUS at 20:16

## 2018-07-25 RX ADMIN — ONDANSETRON 4 MG: 2 INJECTION INTRAMUSCULAR; INTRAVENOUS at 22:22

## 2018-07-25 RX ADMIN — SIMVASTATIN 20 MG: 20 TABLET, FILM COATED ORAL at 21:20

## 2018-07-25 RX ADMIN — EPOETIN ALFA 20000 UNITS: 20000 SOLUTION INTRAVENOUS; SUBCUTANEOUS at 18:31

## 2018-07-25 RX ADMIN — OXYCODONE AND ACETAMINOPHEN 2 TABLET: 5; 325 TABLET ORAL at 18:19

## 2018-07-25 RX ADMIN — VANCOMYCIN HYDROCHLORIDE 500 MG: 500 INJECTION, POWDER, LYOPHILIZED, FOR SOLUTION INTRAVENOUS at 21:20

## 2018-07-25 RX ADMIN — GABAPENTIN 100 MG: 100 CAPSULE ORAL at 08:34

## 2018-07-25 RX ADMIN — INSULIN LISPRO 2 UNITS: 100 INJECTION, SOLUTION INTRAVENOUS; SUBCUTANEOUS at 14:13

## 2018-07-25 RX ADMIN — CINACALCET HYDROCHLORIDE 30 MG: 30 TABLET, COATED ORAL at 21:21

## 2018-07-25 NOTE — PROGRESS NOTES
Problem: Mobility Impaired (Adult and Pediatric)  Goal: *Acute Goals and Plan of Care (Insert Text)  Physical Therapy Goals  Initiated 7/23/2018  1. Patient will move from supine to sit and sit to supine  and roll side to side in bed with modified independence within 7 day(s). 2.  Patient will transfer from bed to chair and chair to bed with modified independence using the least restrictive device within 7 day(s). physical Therapy TREATMENT  Patient: Kun Flores (7 y.o. female)  Date: 7/25/2018  Diagnosis: Foot osteomyelitis, right ([address]  Acute blood loss as cause of postoperative anemia  Acute blood loss as cause of postoperative anemia Acute blood loss anemia       Precautions: Contact, Fall, NWB (RLE)  Chart, physical therapy assessment, plan of care and goals were reviewed. ASSESSMENT:  Patient agreeable to working with PT, no significant c/o pain in LEs today, notes it is much more intermittent. Patient came to sitting EOB via partial log roll technique to reduce shear. Good sitting balance EOB. 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 Overall required MAX A for set up and MIN A for actual sliding over with cues regarding not bearing any weight through RLE, patient very compliant with this, used LLE appropriately for unloading buttocks to scoot. Positioned in comfort with RLE propped on pillows and heel free. Performed 10 reps of chair push ups and encouraged her to repeat 10 every 30 min while sitting up for strengthening and pressure relief. Progression toward goals:  [x]    Improving appropriately and progressing toward goals  []    Improving slowly and progressing toward goals  []    Not making progress toward goals and plan of care will be adjusted     PLAN:  Patient continues to benefit from skilled intervention to address the above impairments.   Continue treatment per established plan of care. Discharge Recommendations:  Skilled Nursing Facility  Further Equipment Recommendations for Discharge:  TBD     SUBJECTIVE:   Patient stated I'd love to get up in the w/c. [facility]    OBJECTIVE DATA SUMMARY:   Critical Behavior:  Neurologic State: Alert  Orientation Level: Oriented X4  Cognition: Appropriate decision making, Appropriate safety awareness, Follows commands     Functional Mobility Training:  Bed Mobility:     Supine to Sit: Contact guard assistance              Transfers:                 Lateral Transfers: Minimum assistance                 Balance:  Sitting: Intact; Without support  Ambulation/Gait Training:                                                        Therapeutic Exercises:   Chair push ups x 10    Pain:  Pain Scale 1: Numeric (0 - 10)  Pain Intensity 1: 0  Pain Location 1: Foot  Pain Orientation 1: Left;Right  Pain Description 1: Hypersensitivity  Pain Intervention(s) 1: Medication (see MAR)  Activity Tolerance:   Low - transfer to chair, BSC only    Please refer to the flowsheet for vital signs taken during this treatment.   After treatment:   [x]    Patient left in no apparent distress sitting up in chair  []    Patient left in no apparent distress in bed  [x]    Call bell left within reach  [x]    Nursing notified  [x]    Caregiver present  []    Bed alarm activated    COMMUNICATION/COLLABORATION:   The patients plan of care was discussed with: Registered Nurse    Michael Alford, PT   Time Calculation: 16 mins

## 2018-07-25 NOTE — PROGRESS NOTES
Podiatry  -Pt seen & examined at bedside. Wound vac on right heel.   -Continue wound vac therapy and continue strict nwb to right foot.   -Wound vac to be changed on Friday - will reassess ulcer at that time. If Pt gets discharged prior to that, see below for d/c instructions. Discharge Instructions     1.) Follow up with Dr. Deisy Crump @ [address] in 1-2 weeks. 2.) Apply traditional wound vac (not RAYMUNDO vac) to right heel ulcer and set vac to 125 mmHg continuous therapy. Change vac dressing every other day. 3.) Strict non weightbearing to right foot. Keep multi-podus boot on right foot at all times.

## 2018-07-25 NOTE — PROGRESS NOTES
Spiritual Care Partner Volunteer visited patient in Rm [id number] on 7/25/18.   Documented by:  Chaplain Aguirre MDiv, MS, [employer]  [address] [place] [address] [phone])

## 2018-07-25 NOTE — PROGRESS NOTES
Occupational Therapy Note:  Orders received and appreciated. Chart reviewed. Attempted to see pt this pm for OT darion and pt currently receiving HD with approx 3 hours left. Will follow up tomorrow with OT darion.  Thank you for the consult.   Hina Conrad, OTR/L, CBIS

## 2018-07-25 NOTE — PROGRESS NOTES
Nephrology Progress Note Hemal Stringer Date of Admission : 7/22/2018 CC: Follow up for ESRD Assessment and Plan ESRD on HD: 
- MWF at [facility], [facility] 
- HD today with blood - UF 4-5kg Diabetic Foot wound w/ infection: 
- s/p Debridement, now with bleeding - podiatry consulted 
- on aztreonam and vanco x 6 weeks per ID 
  
Hypervolemic Hyponatremia: 
- Na stable 
  
Hypotension:   
- cont midodrine prior to HD 
  
Anemia: 
- 2/2 blood loss and CKD 
- cont ELTON 
- 2 units of blood on HD today Secondary HPT: 
- cont sensipar and binders DM 
   
Obesity, FARHAT 
   
Dyslipidemia 
   
Possible steal syndrome right hand 
  
  
 
 
Interval History: 
Seen and examined. C/o neuropathic pain. Improving. .  hgb stable at 6.8 today. No further bleeding. No cp, sob, n/v/d reported. Current Medications: all current  Medications have been eviewed in [facility] Review of Systems: Pertinent items are noted in HPI. Objective: 
Vitals:   
Vitals:  
 07/24/18 2101 07/25/18 9109 07/25/18 3604 07/25/18 4266 BP: 136/75 116/58  121/52 Pulse: 67 60  75 Resp: 18 16  14 Temp: 97.3 °F (36.3 °C) 97.5 °F (36.4 °C)  98.5 °F (36.9 °C) TempSrc:      
SpO2: 98% 97%  97% Weight:   129.3 kg (285 lb) Height:      
 
Intake and Output: 
  
07/23 1901 - 07/25 0700 In: 480 [P.O.:480] Out: - Physical Examination: 
General: NAD,Conversant Neck:  Supple, no mass Resp:  Lungs CTA B/L, no wheezing , normal respiratory effort CV:  RRR,  no murmur or rub, 1+ LE edema GI:  Soft, NT, + Bowel sounds, no hepatosplenomegaly Neurologic:  Non focal 
Psych:             AAO x 3 appropriate affect Skin:  No Rash Ext:                  R foot in bandage :  No fontenot []    High complexity decision making was performed 
[]    Patient is at high-risk of decompensation with multiple organ involvement Lab Data Personally Reviewed: I have reviewed all the pertinent labs, microbiology data and radiology studies during assessment. No results for input(s): NA, K, CL, CO2, GLU, BUN, CREA, CA, MG, PHOS, ALB, TBIL, SGOT, ALT, INR in the last 72 hours. No lab exists for component: INREXT, INREXT Recent Labs  
   07/25/18 
 0434  07/24/18 
 0437  07/23/18 
 0309 HGB  6.8*  6.8*  7.2* HCT  22.6*  23.3*  23.2* Lab Results Component Value Date/Time Specimen Description: URINE 10/27/2012 08:15 PM  
 Specimen Description: BLOOD 06/07/2011 07:40 AM  
 Specimen Description: URINE 08/30/2010 07:10 AM  
 
Lab Results Component Value Date/Time Culture result: MRSA NOT PRESENT 07/22/2018 03:35 PM  
 Culture result:  07/22/2018 03:35 PM  
      Screening of patient nares for MRSA is for surveillance purposes and, if positive, to facilitate isolation considerations in high risk settings. It is not intended for automatic decolonization interventions per se as regimens are not sufficiently effective to warrant routine use. Culture result: NO ANAEROBES ISOLATED 07/12/2018 08:00 AM  
 Culture result: HEAVY PROTEUS MIRABILIS (A) 07/12/2018 08:00 AM  
 Culture result: LIGHT KLEBSIELLA OXYTOCA (A) 07/12/2018 08:00 AM  
 Culture result: (A) 07/12/2018 08:00 AM  
  LIGHT **METHICILLIN RESISTANT STAPHYLOCOCCUS AUREUS**  
 Culture result: LIGHT ENTEROCOCCUS FAECALIS GROUP D (A) 07/12/2018 08:00 AM  
 Culture result: LIGHT ALCALIGENES FAECALIS (A) 07/12/2018 08:00 AM  
 Culture result:  07/12/2018 08:00 AM  
  **MRSA REPORT** 
CALLED TO AND READ BACK BY 
JAELYN GUTIERREZ RN ON 7/15/18 AT 1440 TA.  
  
 Culture result: MODERATE PROTEUS MIRABILIS (A) 07/12/2018 08:00 AM  
 Culture result: LIGHT KLEBSIELLA OXYTOCA (A) 07/12/2018 08:00 AM  
 Culture result: FEW PSEUDOMONAS AERUGINOSA (A) 07/12/2018 08:00 AM  
 Culture result: LIGHT ENTEROCOCCUS FAECALIS GROUP D (A) 07/12/2018 08:00 AM  
 Culture result: (A) 07/12/2018 08:00 AM  
  LIGHT STAPHYLOCOCCUS SPECIES, COAGULASE NEGATIVE (2 DIFFERENT COLONY TYPES/STRAINS) Recent Results (from the past 24 hour(s)) GLUCOSE, POC Collection Time: 07/24/18 11:45 AM  
Result Value Ref Range Glucose (POC) 190 (H) 65 - 100 mg/dL Performed by Pina Koch, POC Collection Time: 07/24/18  9:05 PM  
Result Value Ref Range Glucose (POC) 117 (H) 65 - 100 mg/dL Performed by Leo Mejia HGB & HCT Collection Time: 07/25/18  4:34 AM  
Result Value Ref Range HGB 6.8 (L) 11.5 - 16.0 g/dL HCT 22.6 (L) 35.0 - 47.0 % Juarez Smith Collection Time: 07/25/18  4:34 AM  
Result Value Ref Range Vancomycin, random 25.1 UG/ML  
GLUCOSE, POC Collection Time: 07/25/18  6:31 AM  
Result Value Ref Range Glucose (POC) 129 (H) 65 - 100 mg/dL Performed by Janina Raman Ivonne Hammans, MD 
[address], [address] [facility] Phone - [phone] Fax - [phone] 
[email or web address]

## 2018-07-25 NOTE — DIALYSIS
[facility]  [phone]    Vitals   Pre   Post   Assessment   Pre   Post     Temp  Temp: 97.5 °F (36.4 °C) (07/25/18 1415)  97.9 oral LOC  Alert and oriented x 4; calm and cooperative Alert and oriented x 4; calm and cooperative   HR   Pulse (Heart Rate): 76 (07/25/18 1415) 79 Lungs   Diminished on RA and w/o c/o SOB CTAB upper lobes and diminished bases   B/P   BP: 171/79 (07/25/18 1415) 125/61 Cardiac   S1S2 and w/o c/o CP S1S2 and w/o c/o CP   Resp   Resp Rate: 16 (07/25/18 1415) 16 Skin   Warm and dry with healing surgical wound on R heel Warm and dry with healing surgical wound on R heel   Pain level  Pain Intensity 1: 0 (07/25/18 0520) No complaints Edema  Moderate generalized Improved; achieved 5 kg fluid removal w/o issue   Orders:    Duration:   Start:   1415 End:   1750 Total:   3.5 hours     Dialyzer:   Dialyzer/Set Up Inspection: [place] (07/25/18 1415)   K Bath:   Dialysate K (mEq/L): 2 (07/25/18 1415)   Ca Bath:   Dialysate CA (mEq/L): 2.5 (07/25/18 1415)   Na/Bicarb:   Dialysate NA (mEq/L): 140 (07/25/18 1415)   Target Fluid Removal:   Goal/Amount of Fluid to Remove (mL): 4000 mL (07/25/18 1415)   Access     Type & Location:   [facility]   Labs     Obtained/Reviewed   Critical Results Called   Date when labs were drawn-  Hgb-    HGB   Date Value Ref Range Status   07/25/2018 6.8 (L) 11.5 - 16.0 g/dL Final     K-    Potassium   Date Value Ref Range Status   07/22/2018 4.6 3.5 - 5.1 mmol/L Final     Ca-   Calcium   Date Value Ref Range Status   07/22/2018 7.5 (L) 8.5 - 10.1 MG/DL Final     Bun-   BUN   Date Value Ref Range Status   07/22/2018 36 (H) 6 - 20 MG/DL Final     Creat-   Creatinine   Date Value Ref Range Status   07/22/2018 5.65 (H) 0.55 - 1.02 MG/DL Final     Comment:     INVESTIGATED PER DELTA CHECK PROTOCOL        Medications/ Blood Products Given     Name   Dose   Route and Time     PRBC unit 1 of 2 300 mL Started at 1415   PRBC unit 2 of 2 300 mL Started at 1545        Blood Volume Processed (BVP):   76 Net Fluid   Removed:  5000 mL   Comments   Time Out Done: yes, at 1400  Primary Nurse Rpt Pre: Rozina Bay RN  Primary Nurse Rpt Post: Rozina Bay RN  Pt Education: discussed aseptic technique and infection control  Care Plan: patient will complete the ordered tx as prescribed  Tx Summary: arrived to pt room at or about 1300 and set up and tested equipment per order and policy; order includes RTD 3.5 hours on 2251 bath and attempt to achieve 4 kg fluid removal, as tolerated by pt, , ; pt assessed prior to tx and found to be stable for HD and to have a RIJ, which was found to be patent and aseptically accessed and tx initiated w/o issue; began PRBC unit 1 of 2 at 1415 with test does and then waited 15 minutes to assure no reactions to same and completed at 1530 w/o any s/s of pyrogenic or other reactions; began PRBC unit 2 of 2 at 1545 with test dose and waited for 15 minutes to assure no reaction and completed at 1700 w/o s/s of pyrogenic or other reaction; pt completed all of the ordered tx and achieved 5 kg fluid removal w/o issue; all blood was rinsed back and catheter care performed per order and policy; pt was stable at the time of my departure and report given to the primary nurse  Admiting Diagnosis: infected and bleeding R heel wound  Pt's previous clinic- MWF at [facility]   Consent signed - Informed Consent Verified: Yes (07/25/18 1415)  [facility] Consent - visualized  Hepatitis Status- Ag neg as of 6/25/2018 and lab drawn today and result neg   Machine #- Machine Number: [id number] (07/25/18 1415)  Telemetry status- n/a  Pre-dialysis wt. - Pre-Dialysis Weight: 131 kg (288 lb 12.8 oz) (07/25/18 1415)

## 2018-07-25 NOTE — PROGRESS NOTES
Hospitalist Progress Note Armand Ricardo MD 
Answering service: [phone] or [phone] from in house phone Date of Service:  2018 NAME:  Cecilia Seay :  1950 MRN:  [id number] Admission Summary:  
The patient is a 59-year-old -American female with past medical history of anemia, asthma, anxiety, end-stage renal disease on hemodialysis ,  and , chronic pain, depression, GERD. She presented to the ER from [facility] center on account of profuse bleeding from the right heel surgical site. She reports that she woke up this morning. Patient was recently discharged from [facility] [address] [facility] [address] on 2018 after hospital admission for acute bilateral foot cellulitis with infected right heel wound requiring debridement of right heel ulcer with partial calcanectomy. She returned again to the ER stating that this morning she woke up and she went to the bedside commode and while she was on the commode, she noticed profuse bleeding from her right heel. The CNA notes this at the rehabilitation center came decided to call EMS. She denies associated lightheadedness, dizziness or syncope. Today in the emergency department, hemoglobin was 6.9 and she has been typed and crossed with 2 units of packed red blood and 1 unit of packed red blood cell transfusion is currently ongoing. She denies chest pain, shortness of breath, although she tells me that she feels swollen all over. She denies any fever, chills or rigors. 
  
Recent hospitalization as mentioned above, admitted on 2018 and discharged on the  for right heel infected wound with osteomyelitis. She was discharged home on aztreonam until 2018 and vancomycin. Interval history / Subjective:  
  F/u anemia Patient seen and examined this morning; daughter in the room States she feels fine, no new complaint.  Right heel bleeding has stopped. Assessment & Plan: #. Acute blood loss anemia on underlying chronic anemia due to ESRD/GI bleed: 
-Bleed from recently operated right heel. -S/p two unit pRBCs. Will transfuse with 2 more units tomorrow with HD (spoke with Dr Charles Chiu) -Monitor CBC and transfuse as needed Probable GI bleed 
-has reported history of hemorrhoids 
-Appreciate GI 
 
#. S/p right heel debridement with partial calcanectomy on 7/12/18 for infected right heel wound and OM. -Had profuse bleeding from the surgical site after she put pressure on her foot,  She was 100% non weightbearing on her right foot after her surgery.  
-Continue antibiotics aztreonam and vancomycin until 07/29/2018 . 
-Podiatry cleared the patient for discharge #. ESRD on HD 
-Nephrology on board. Spoke to Dr Charles Chiu, early am blood transfusion with HD Code status: full DVT prophylaxis: scd Plan: Likely discharge tomorrow Care Plan discussed with: Patient/Family and Nurse Disposition: as above Hospital Problems  Date Reviewed: 7/19/2018 Codes Class Noted POA * (Principal)Acute blood loss anemia ICD-10-CM: D62 
ICD-9-CM: 285.1  7/22/2018 Yes Foot osteomyelitis, right ([address] ICD-10-CM: M86.9 ICD-9-CM: 730.27  7/22/2018 Unknown Acute blood loss as cause of postoperative anemia ICD-10-CM: D62 
ICD-9-CM: 285.1  7/22/2018 Unknown Review of Systems: A comprehensive review of systems was negative except for that written in the HPI. Vital Signs:  
 Last 24hrs VS reviewed since prior progress note. Most recent are: 
Visit Vitals  /73  Pulse 71  Temp 97.7 °F (36.5 °C) (Oral)  Resp 16  
 Ht 6' 1\" (1.854 m)  Wt 129.3 kg (285 lb)  SpO2 97%  BMI 37.6 kg/m2 Intake/Output Summary (Last 24 hours) at 07/25/18 1650 Last data filed at 07/25/18 6273 Gross per 24 hour Intake              780 ml Output                0 ml Net              780 ml  
 Physical Examination:  
 
 
     
Constitutional:  No acute distress, cooperative, pleasant   
ENT:  Oral mucous moist, oropharynx benign. Neck supple, Resp:  CTA bilaterally. No wheezing/rhonchi/rales. No accessory muscle use CV:  Regular rhythm, normal rate, no murmurs, gallops, rubs GI:  Soft, non distended, non tender. normoactive bowel sounds, no hepatosplenomegaly Musculoskeletal:  right foot dressing Neurologic:  Moves all extremities. AAOx3, CN II-XII reviewed Data Review:  
 Review and/or order of clinical lab test 
 
 
Labs:  
 
Recent Labs  
   07/25/18 
 0434  07/24/18 
 0437 HGB  6.8*  6.8* HCT  22.6*  23.3* No results for input(s): NA, K, CL, CO2, BUN, CREA, GLU, CA, MG, PHOS, URICA in the last 72 hours. No results for input(s): SGOT, GPT, ALT, AP, TBIL, TBILI, TP, ALB, GLOB, GGT, AML, LPSE in the last 72 hours. No lab exists for component: AMYP, HLPSE No results for input(s): INR, PTP, APTT in the last 72 hours. No lab exists for component: INREXT, INREXT No results for input(s): FE, TIBC, PSAT, FERR in the last 72 hours. No results found for: FOL, RBCF No results for input(s): PH, PCO2, PO2 in the last 72 hours. No results for input(s): CPK, CKNDX, TROIQ in the last 72 hours. No lab exists for component: CPKMB Lab Results Component Value Date/Time Cholesterol, total 144 04/10/2018 02:53 AM  
 HDL Cholesterol 66 04/10/2018 02:53 AM  
 LDL, calculated 67.6 04/10/2018 02:53 AM  
 Triglyceride 52 04/10/2018 02:53 AM  
 CHOL/HDL Ratio 2.2 04/10/2018 02:53 AM  
 
Lab Results Component Value Date/Time Glucose (POC) 145 (H) 07/25/2018 03:59 PM  
 Glucose (POC) 142 (H) 07/25/2018 11:37 AM  
 Glucose (POC) 129 (H) 07/25/2018 06:31 AM  
 Glucose (POC) 117 (H) 07/24/2018 09:05 PM  
 Glucose (POC) 190 (H) 07/24/2018 11:45 AM  
 
Lab Results Component Value Date/Time  Color YELLOW 10/27/2012 10:25 PM  
 Appearance CLEAR 10/27/2012 10:25 PM  
 Specific gravity 1.012 10/27/2012 10:25 PM  
 Specific gravity 1.015 08/30/2010 07:10 AM  
 pH (UA) 8.0 10/27/2012 10:25 PM  
 Protein 100 (A) 10/27/2012 10:25 PM  
 Glucose NEGATIVE  10/27/2012 10:25 PM  
 Ketone NEGATIVE  10/27/2012 10:25 PM  
 Bilirubin NEGATIVE  10/27/2012 10:25 PM  
 Urobilinogen 1.0 10/27/2012 10:25 PM  
 Nitrites NEGATIVE  10/27/2012 10:25 PM  
 Leukocyte Esterase TRACE (A) 10/27/2012 10:25 PM  
 Epithelial cells 0-5 10/27/2012 10:25 PM  
 Bacteria NEGATIVE  10/27/2012 10:25 PM  
 WBC 10-20 10/27/2012 10:25 PM  
 RBC 5-10 10/27/2012 10:25 PM  
 
 
 
Medications Reviewed:  
 
Current Facility-Administered Medications Medication Dose Route Frequency  vancomycin (VANCOCIN) 500 mg in 0.9% sodium chloride (MBP/ADV) 100 mL  500 mg IntraVENous DIALYSIS MON, WED & FRI  gabapentin (NEURONTIN) capsule 100 mg  100 mg Oral BID  
 0.9% sodium chloride infusion 250 mL  250 mL IntraVENous PRN  
 midodrine (PROAMITINE) tablet 5 mg  5 mg Oral Once per day on Mon Wed Fri  
 oxyCODONE-acetaminophen (PERCOCET) 5-325 mg per tablet 1-2 Tab  1-2 Tab Oral Q4H PRN  
 albumin human 25% (BUMINATE) solution 50 g  50 g IntraVENous DIALYSIS PRN  
 alum-mag hydroxide-simeth (MYLANTA) oral suspension 30 mL  30 mL Oral Q4H PRN  
 ascorbic acid (vitamin C) (VITAMIN C) tablet 1,000 mg  1,000 mg Oral DAILY  aztreonam (AZACTAM) 500 mg in 0.9% sodium chloride 100 mL IVPB  500 mg IntraVENous Q12H  B complex-vitaminC-folic acid (NEPHROCAP) cap  1 Cap Oral DAILY  cinacalcet (SENSIPAR) tablet 30 mg  30 mg Oral DAILY  fentaNYL citrate (PF) injection 50 mcg  50 mcg IntraVENous Q2H PRN  
 latanoprost (XALATAN) 0.005 % ophthalmic solution 1 Drop  1 Drop Both Eyes QHS  linaclotide (LINZESS) capsule 290 mcg (Patient Supplied)  290 mcg Oral ACB  nortriptyline (PAMELOR) capsule 50 mg  50 mg Oral QHS  ondansetron (ZOFRAN) injection 4 mg  4 mg IntraVENous Q6H PRN  polyethylene glycol (MIRALAX) packet 17 g 17 g Oral DAILY  polyethylene glycol (MIRALAX) packet 17 g  17 g Oral BID PRN  
 sevelamer carbonate (RENVELA) tab 800 mg  800 mg Oral TID WITH MEALS  simvastatin (ZOCOR) tablet 20 mg  20 mg Oral QHS  VANCOMYCIN INFORMATION NOTE   Other Rx Dosing/Monitoring  glucose chewable tablet 16 g  4 Tab Oral PRN  
 dextrose (D50W) injection syrg 12.5-25 g  12.5-25 g IntraVENous PRN  
 glucagon (GLUCAGEN) injection 1 mg  1 mg IntraMUSCular PRN  
 insulin lispro (HUMALOG) injection   SubCUTAneous AC&HS  epoetin joi (EPOGEN;PROCRIT) injection 20,000 Units  20,000 Units SubCUTAneous Q MON, WED & FRI  
 
______________________________________________________________________ EXPECTED LENGTH OF STAY: 3d 16h ACTUAL LENGTH OF STAY:          3 Tsering Bhakta MD

## 2018-07-25 NOTE — PROGRESS NOTES
[facility] 
[address] [facility], [address] [facility] PROGRESS NOTE Will Vanessa Whalen, [address] [facility] office [phone] NP/PA in-hospital cell phone M-F until 4:30PM 
After 5PM or on weekends, please call  for physician on call NAME: Castro Pantoja :  1950 MRN:  [id number] Subjective:  
Patient is sitting in the wheelchair and appears comfortable. Daughter is at the bedside. She has persistent nausea. No reflux, vomiting, or abdominal pain. Patient reports a brown loose bowel movement today. No melena or hematochezia. She is scheduled for dialysis today. Objective: VITALS:  
Last 24hrs VS reviewed since prior progress note. Most recent are: 
Visit Vitals  /52 (BP 1 Location: Left leg, BP Patient Position: At rest)  Pulse 75  Temp 98.5 °F (36.9 °C)  Resp 14  
 Ht 6' 1\" (1.854 m)  Wt 129.3 kg (285 lb)  SpO2 97%  BMI 37.6 kg/m2 PHYSICAL EXAM: 
General: Cooperative, no acute distress   
Neurologic:  Alert and oriented X 3 HEENT: EOMI, no scleral icterus Lungs:  CTA bilaterally anteriorly Heart:  S1 S2, regular rate and rhythm Abdomen: Soft, obese, non-distended, no tenderness, no guarding, no rebound. +Bowel sounds. Extremities: Right lower extremity boot in place Psych:   Not anxious or agitated Lab Data Reviewed:  
 
Recent Results (from the past 24 hour(s)) GLUCOSE, POC Collection Time: 18  9:05 PM  
Result Value Ref Range Glucose (POC) 117 (H) 65 - 100 mg/dL Performed by Bronson Leyva HGB & HCT Collection Time: 18  4:34 AM  
Result Value Ref Range HGB 6.8 (L) 11.5 - 16.0 g/dL HCT 22.6 (L) 35.0 - 47.0 % Maria R Fox Collection Time: 18  4:34 AM  
Result Value Ref Range Vancomycin, random 25.1 UG/ML  
GLUCOSE, POC Collection Time: 18  6:31 AM  
Result Value Ref Range Glucose (POC) 129 (H) 65 - 100 mg/dL  Performed by Josef Weaver GLUCOSE, POC Collection Time: 07/25/18 11:37 AM  
Result Value Ref Range Glucose (POC) 142 (H) 65 - 100 mg/dL Performed by [facility] Assessment: · Acute blood loss anemia: Status post 2 units PRBCs. No reflux, upper abdominal pain, or melena. No NSAIDs. No anticoagulation. Hgb 6.8. · Status post right heel debridement with partial calcanectomy with ongoing bleeding from surgical site · History of end stage renal disease on MWF hemodialysis: nephrology following. Scheduled for dialysis today with 2 units PRBCs. Patient Active Problem List  
Diagnosis Code  Morbid obesity ([facility]) E66.01  
 Diabetes mellitus type 2, insulin dependent ([facility]) E11.9, Z79.4  
 HTN (hypertension) I10  
 Neuropathy G62.9  Arthritis M19.90  Abdominal pain, chronic, epigastric R10.13, G89.29  
 Serratia wound infection, 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 A49.8  Obstructive sleep apnea (adult) (pediatric) G47.33  
 Perineal abscess L02.215  Abscess of deep perineal space N34.0  ESRD (end stage renal disease) on dialysis ([facility]) N18.6, Z99.2  Perianal abscess K61.0  Type 2 diabetes mellitus with nephropathy ([facility]) E11.21  
 Fecal impaction ([facility] K56.41  
 ESRD needing dialysis ([facility] N18.6, Z99.2  Anemia D64.9  Bilateral leg edema R60.0  LFT elevation R79.89  Type 2 diabetes mellitus with hyperglycemia ([facility]) E11.65  Bilateral lower leg cellulitis L03.116, L03.115  
 Osteomyelitis of foot, right, acute ([facility]) M86.171  
 Hyponatremia E87.1  Sacral wound S31.000A  Ankle wound, left, initial encounter S91.002A  FARHAT on CPAP G47.33, Z99.89  
 Acute blood loss anemia D62  Foot osteomyelitis, right ([address] M86.9  Acute blood loss as cause of postoperative anemia D62 Plan: · 2 units PRBCs ordered with dialysis today. Trend CBC and transfuse as necessary. · No signs of active GI bleeding. Consider EGD if progressive anemia or signs of GI bleeding.  Further recommendations per  Abou-Assi. Signed By: Won Mohamud   
 7/25/2018  12:54 PM 
  
 
 
 
I have examined the patient. I have reviewed the chart and agree with the documentation recorded by the NP, including the assessment, treatment plan, and disposition. No evidence of GI bleed Her anemia is from foot bleeding Will follow as needed Marlo Bustillos MD

## 2018-07-26 LAB
ABO + RH BLD: NORMAL
BLD PROD TYP BPU: NORMAL
BLOOD GROUP ANTIBODIES SERPL: NORMAL
BLOOD GROUP ANTIBODIES SERPL: NORMAL
BPU ID: NORMAL
CROSSMATCH RESULT,%XM: NORMAL
DAT POLY-SP REAG RBC QL: NORMAL
ERYTHROCYTE [DISTWIDTH] IN BLOOD BY AUTOMATED COUNT: 16.6 % (ref 11.5–14.5)
GLUCOSE BLD STRIP.AUTO-MCNC: 119 MG/DL (ref 65–100)
GLUCOSE BLD STRIP.AUTO-MCNC: 152 MG/DL (ref 65–100)
GLUCOSE BLD STRIP.AUTO-MCNC: 171 MG/DL (ref 65–100)
GLUCOSE BLD STRIP.AUTO-MCNC: 192 MG/DL (ref 65–100)
HAPTOGLOB SERPL-MCNC: 199 MG/DL (ref 30–200)
HCT VFR BLD AUTO: 26.6 % (ref 35–47)
HCT VFR BLD AUTO: 28.4 % (ref 35–47)
HGB BLD-MCNC: 7.8 G/DL (ref 11.5–16)
HGB BLD-MCNC: 8.5 G/DL (ref 11.5–16)
LDH SERPL L TO P-CCNC: 203 U/L (ref 81–246)
MCH RBC QN AUTO: 27 PG (ref 26–34)
MCHC RBC AUTO-ENTMCNC: 29.9 G/DL (ref 30–36.5)
MCV RBC AUTO: 90.2 FL (ref 80–99)
NRBC # BLD: 0 K/UL (ref 0–0.01)
NRBC BLD-RTO: 0 PER 100 WBC
PLATELET # BLD AUTO: 247 K/UL (ref 150–400)
PMV BLD AUTO: 8.6 FL (ref 8.9–12.9)
RBC # BLD AUTO: 3.15 M/UL (ref 3.8–5.2)
SERVICE CMNT-IMP: ABNORMAL
SPECIMEN EXP DATE BLD: NORMAL
STATUS OF UNIT,%ST: NORMAL
UNIT DIVISION, %UDIV: 0
WBC # BLD AUTO: 9 K/UL (ref 3.6–11)

## 2018-07-26 PROCEDURE — 83615 LACTATE (LD) (LDH) ENZYME: CPT | Performed by: HOSPITALIST

## 2018-07-26 PROCEDURE — G8988 SELF CARE GOAL STATUS: HCPCS

## 2018-07-26 PROCEDURE — 3331090002 HH PPS REVENUE DEBIT

## 2018-07-26 PROCEDURE — 36415 COLL VENOUS BLD VENIPUNCTURE: CPT | Performed by: HOSPITALIST

## 2018-07-26 PROCEDURE — 74011250637 HC RX REV CODE- 250/637: Performed by: HOSPITALIST

## 2018-07-26 PROCEDURE — 85018 HEMOGLOBIN: CPT | Performed by: HOSPITALIST

## 2018-07-26 PROCEDURE — 3331090001 HH PPS REVENUE CREDIT

## 2018-07-26 PROCEDURE — 97535 SELF CARE MNGMENT TRAINING: CPT

## 2018-07-26 PROCEDURE — 83010 ASSAY OF HAPTOGLOBIN QUANT: CPT | Performed by: HOSPITALIST

## 2018-07-26 PROCEDURE — 97530 THERAPEUTIC ACTIVITIES: CPT

## 2018-07-26 PROCEDURE — 65270000029 HC RM PRIVATE

## 2018-07-26 PROCEDURE — G8987 SELF CARE CURRENT STATUS: HCPCS

## 2018-07-26 PROCEDURE — 97165 OT EVAL LOW COMPLEX 30 MIN: CPT

## 2018-07-26 PROCEDURE — 74011000250 HC RX REV CODE- 250: Performed by: HOSPITALIST

## 2018-07-26 PROCEDURE — 86880 COOMBS TEST DIRECT: CPT | Performed by: HOSPITALIST

## 2018-07-26 PROCEDURE — 85027 COMPLETE CBC AUTOMATED: CPT | Performed by: HOSPITALIST

## 2018-07-26 PROCEDURE — 74011636637 HC RX REV CODE- 636/637: Performed by: HOSPITALIST

## 2018-07-26 PROCEDURE — 74011000258 HC RX REV CODE- 258: Performed by: HOSPITALIST

## 2018-07-26 PROCEDURE — 82962 GLUCOSE BLOOD TEST: CPT

## 2018-07-26 PROCEDURE — 74011250637 HC RX REV CODE- 250/637: Performed by: INTERNAL MEDICINE

## 2018-07-26 RX ORDER — DIPHENHYDRAMINE HCL 12.5MG/5ML
12.5 ELIXIR ORAL
Status: DISCONTINUED | OUTPATIENT
Start: 2018-07-26 | End: 2018-07-28 | Stop reason: HOSPADM

## 2018-07-26 RX ADMIN — OXYCODONE AND ACETAMINOPHEN 2 TABLET: 5; 325 TABLET ORAL at 06:55

## 2018-07-26 RX ADMIN — SEVELAMER CARBONATE 800 MG: 800 TABLET, FILM COATED ORAL at 09:38

## 2018-07-26 RX ADMIN — INSULIN LISPRO 2 UNITS: 100 INJECTION, SOLUTION INTRAVENOUS; SUBCUTANEOUS at 12:35

## 2018-07-26 RX ADMIN — AZTREONAM 500 MG: 1 INJECTION, POWDER, LYOPHILIZED, FOR SOLUTION INTRAMUSCULAR; INTRAVENOUS at 06:55

## 2018-07-26 RX ADMIN — OXYCODONE HYDROCHLORIDE AND ACETAMINOPHEN 1000 MG: 500 TABLET ORAL at 09:38

## 2018-07-26 RX ADMIN — GABAPENTIN 100 MG: 100 CAPSULE ORAL at 09:38

## 2018-07-26 RX ADMIN — INSULIN LISPRO 2 UNITS: 100 INJECTION, SOLUTION INTRAVENOUS; SUBCUTANEOUS at 17:15

## 2018-07-26 RX ADMIN — LATANOPROST 1 DROP: 50 SOLUTION OPHTHALMIC at 21:46

## 2018-07-26 RX ADMIN — POLYETHYLENE GLYCOL 3350 17 G: 17 POWDER, FOR SOLUTION ORAL at 09:39

## 2018-07-26 RX ADMIN — OXYCODONE AND ACETAMINOPHEN 2 TABLET: 5; 325 TABLET ORAL at 11:41

## 2018-07-26 RX ADMIN — SEVELAMER CARBONATE 800 MG: 800 TABLET, FILM COATED ORAL at 17:11

## 2018-07-26 RX ADMIN — GABAPENTIN 100 MG: 100 CAPSULE ORAL at 17:11

## 2018-07-26 RX ADMIN — SEVELAMER CARBONATE 800 MG: 800 TABLET, FILM COATED ORAL at 12:35

## 2018-07-26 RX ADMIN — DIPHENHYDRAMINE HYDROCHLORIDE 12.5 MG: 12.5 SOLUTION ORAL at 16:19

## 2018-07-26 RX ADMIN — OXYCODONE AND ACETAMINOPHEN 2 TABLET: 5; 325 TABLET ORAL at 18:01

## 2018-07-26 RX ADMIN — CINACALCET HYDROCHLORIDE 30 MG: 30 TABLET, COATED ORAL at 21:44

## 2018-07-26 RX ADMIN — AZTREONAM 500 MG: 1 INJECTION, POWDER, LYOPHILIZED, FOR SOLUTION INTRAMUSCULAR; INTRAVENOUS at 19:25

## 2018-07-26 RX ADMIN — SIMVASTATIN 20 MG: 20 TABLET, FILM COATED ORAL at 21:44

## 2018-07-26 RX ADMIN — NEPHROCAP 1 CAPSULE: 1 CAP ORAL at 09:38

## 2018-07-26 RX ADMIN — NORTRIPTYLINE HYDROCHLORIDE 50 MG: 25 CAPSULE ORAL at 21:44
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
## 2018-07-26 NOTE — PROGRESS NOTES
Bedside and Verbal shift change report given to [facility] (oncoming nurse) by Lalito Villegas RN(offgoing nurse). Report included the following information SBAR, Kardex and MAR.

## 2018-07-26 NOTE — PROGRESS NOTES
Bedside shift change report given to Vidhya (oncoming nurse) by Jarett Powers (offgoing nurse). Report included the following information SBAR, Kardex, Intake/Output, MAR and Recent Results.

## 2018-07-26 NOTE — PROGRESS NOTES
Bedside and Verbal shift change report given to Kaylan Jacobo RN (oncoming nurse) by Kari Wynne RN(offgoing nurse). Report included the following information SBAR, Kardex and MAR.

## 2018-07-26 NOTE — PROGRESS NOTES
SIERRA spoke with Dr. Hugo Paez and alerted [facility] and Rehab to start the insurance authorization for re-admission. The patient will need PT/PT therapies and a wound vac to her heel.  LAURA

## 2018-07-26 NOTE — PROGRESS NOTES
Nephrology Progress Note Hayden Marroquin Date of Admission : 7/22/2018 CC: Follow up for ESRD Assessment and Plan ESRD on HD: 
- MWF at [facility], [place] 
- HD tomorrow Diabetic Foot wound w/ infection: 
- s/p Debridement last admission - a/w bleed 
- on aztreonam and vanco x 6 weeks per ID 
  
Hypervolemic Hyponatremia: 
- Na stable 
  
Hypotension:   
- cont midodrine prior to HD 
  
Anemia: 
- 2/2 blood loss and CKD 
- cont ELTON Secondary HPT: 
- cont sensipar and binders DM 
   
Obesity, FARHAT 
   
Dyslipidemia 
   
Possible steal syndrome right hand 
  
  
 
 
Interval History: 
Seen and examined. S/p HD with 2 units last night, hgb up to 7.8. No active bleeding now from stump. No cp, sob, n/v/d reported. Current Medications: all current  Medications have been eviewed in [facility] Review of Systems: Pertinent items are noted in HPI. Objective: 
Vitals:   
Vitals:  
 07/25/18 2118 07/26/18 2129 07/26/18 8291 07/26/18 9637 BP: 114/66 141/70  128/59 Pulse: 89 72  68 Resp: 16 16  16 Temp: 98.1 °F (36.7 °C) 98 °F (36.7 °C)  97.8 °F (36.6 °C) TempSrc:      
SpO2: 96% 96%  97% Weight:   133.5 kg (294 lb 5 oz) Height:      
 
Intake and Output: 
  
07/24 1901 - 07/26 0700 In: 1320 [P.O.:720] Out: 5000 Physical Examination: 
General: NAD,Conversant Neck:  Supple, no mass Resp:  Lungs CTA B/L, no wheezing , normal respiratory effort CV:  RRR,  no murmur or rub, 1+ LE edema GI:  Soft, NT, + Bowel sounds, no hepatosplenomegaly Neurologic:  Non focal 
Psych:             AAO x 3 appropriate affect Skin:  No Rash Ext:                  R foot in bandage :  No fontenot []    High complexity decision making was performed 
[]    Patient is at high-risk of decompensation with multiple organ involvement Lab Data Personally Reviewed: I have reviewed all the pertinent labs, microbiology data and radiology studies during assessment.  
 
No results for input(s): NA, K, CL, CO2, GLU, BUN, CREA, CA, MG, PHOS, ALB, TBIL, SGOT, ALT, INR in the last 72 hours. No lab exists for component: INREXT, INREXT Recent Labs  
   07/26/18 
 0701  07/25/18 
 0434  07/24/18 
 1234 HGB  7.8*  6.8*  6.8* HCT  26.6*  22.6*  23.3* Lab Results Component Value Date/Time Specimen Description: URINE 10/27/2012 08:15 PM  
 Specimen Description: BLOOD 06/07/2011 07:40 AM  
 Specimen Description: URINE 08/30/2010 07:10 AM  
 
Lab Results Component Value Date/Time Culture result: MRSA NOT PRESENT 07/22/2018 03:35 PM  
 Culture result:  07/22/2018 03:35 PM  
      Screening of patient nares for MRSA is for surveillance purposes and, if positive, to facilitate isolation considerations in high risk settings. It is not intended for automatic decolonization interventions per se as regimens are not sufficiently effective to warrant routine use. Culture result: NO ANAEROBES ISOLATED 07/12/2018 08:00 AM  
 Culture result: HEAVY PROTEUS MIRABILIS (A) 07/12/2018 08:00 AM  
 Culture result: LIGHT KLEBSIELLA OXYTOCA (A) 07/12/2018 08:00 AM  
 Culture result: (A) 07/12/2018 08:00 AM  
  LIGHT **METHICILLIN RESISTANT STAPHYLOCOCCUS AUREUS**  
 Culture result: LIGHT ENTEROCOCCUS FAECALIS GROUP D (A) 07/12/2018 08:00 AM  
 Culture result: LIGHT ALCALIGENES FAECALIS (A) 07/12/2018 08:00 AM  
 Culture result:  07/12/2018 08:00 AM  
  **MRSA REPORT** 
CALLED TO AND READ BACK BY 
JAELYN GUTIERREZ RN ON 7/15/18 AT 1440 TA. Culture result: MODERATE PROTEUS MIRABILIS (A) 07/12/2018 08:00 AM  
 Culture result: LIGHT KLEBSIELLA OXYTOCA (A) 07/12/2018 08:00 AM  
 Culture result: FEW PSEUDOMONAS AERUGINOSA (A) 07/12/2018 08:00 AM  
 Culture result: LIGHT ENTEROCOCCUS FAECALIS GROUP D (A) 07/12/2018 08:00 AM  
 Culture result: (A) 07/12/2018 08:00 AM  
  LIGHT STAPHYLOCOCCUS SPECIES, COAGULASE NEGATIVE (2 DIFFERENT COLONY TYPES/STRAINS) Recent Results (from the past 24 hour(s)) GLUCOSE, POC  
 Collection Time: 07/25/18 11:37 AM  
Result Value Ref Range Glucose (POC) 142 (H) 65 - 100 mg/dL Performed by [facility] HEP B SURFACE AG Collection Time: 07/25/18  2:45 PM  
Result Value Ref Range Hepatitis B surface Ag <0.10 Index Hep B surface Ag Interp. NEGATIVE  NEG    
GLUCOSE, POC Collection Time: 07/25/18  3:59 PM  
Result Value Ref Range Glucose (POC) 145 (H) 65 - 100 mg/dL Performed by Sangita Jenkins GLUCOSE, POC Collection Time: 07/25/18  9:21 PM  
Result Value Ref Range Glucose (POC) 190 (H) 65 - 100 mg/dL Performed by Don Gaines GLUCOSE, POC Collection Time: 07/26/18  6:30 AM  
Result Value Ref Range Glucose (POC) 119 (H) 65 - 100 mg/dL Performed by Hallie Drummond   
HGB & HCT Collection Time: 07/26/18  7:01 AM  
Result Value Ref Range HGB 7.8 (L) 11.5 - 16.0 g/dL HCT 26.6 (L) 35.0 - 47.0 % Karmen Haddad MD 
[address] [place] [address], [facility] Phone - [phone] Fax - [phone] 
[email or web address]

## 2018-07-26 NOTE — PROGRESS NOTES
Spiritual Care Assessment/Progress Note  [facility]      NAME: Kun Flores      MRN: [id number]  AGE: 70 y.o. SEX: female  Mormon Affiliation: Sabianist   Language: English     7/26/2018     Total Time (in minutes): 5     Spiritual Assessment begun in [address] [address] through conversation with:         [x]Patient        [] Family    [] Friend(s)        Reason for Consult: Initial/Spiritual assessment, patient floor     Spiritual beliefs: (Please include comment if needed)     [x] Identifies with a kwadwo tradition:         [] Supported by a kwadwo community:            [] Claims no spiritual orientation:           [] Seeking spiritual identity:                [] Adheres to an individual form of spirituality:           [] Not able to assess:                           Identified resources for coping:      [] Prayer                               [] Music                  [] Guided Imagery     [x] Family/friends                 [] Pet visits     [] Devotional reading                         [] Unknown     [] Other:                                               Interventions offered during this visit: (See comments for more details)    Patient Interventions: Affirmation of emotions/emotional suffering, Normalization of emotional/spiritual concerns           Plan of Care:     [] Support spiritual and/or cultural needs    [] Support AMD and/or advance care planning process      [] Support grieving process   [] Coordinate Rites and/or Rituals    [] Coordination with community clergy   [] No spiritual needs identified at this time   [] Detailed Plan of Care below (See Comments)  [] Make referral to Music Therapy  [] Make referral to Pet Therapy     [] Make referral to Addiction services  [] Make referral to [facility]  [] Make referral to Spiritual Care Partner  [] No future visits requested        [x] Follow up visits as needed     Comments:  for routine visit. Pt was grateful for visit.  provided pastoral support.  follow up as needed.      Scarlet Cotter, [facility] [place] [facility]   [phone]

## 2018-07-26 NOTE — PROGRESS NOTES
Hospitalist Progress Note Estephania Ding MD 
Answering service: [phone] from in house phone Date of Service:  2018 NAME:  Christopher Peoples :  1950 MRN:  [id number] Admission Summary:  
The patient is a 16-year-old -American female with past medical history of anemia, asthma, anxiety, end-stage renal disease on hemodialysis ,  and , chronic pain, depression, GERD. She presented to the ER from [facility] rehabilitation center on account of profuse bleeding from the right heel surgical site. She reports that she woke up this morning. Patient was recently discharged from [facility] on 2018 after hospital admission for acute bilateral foot cellulitis with infected right heel wound requiring debridement of right heel ulcer with partial calcanectomy. She returned again to the ER stating that this morning she woke up and she went to the bedside commode and while she was on the commode, she noticed profuse bleeding from her right heel. The CNA notes this at the rehabilitation center came decided to call EMS. She denies associated lightheadedness, dizziness or syncope. Today in the emergency department, hemoglobin was 6.9 and she has been typed and crossed with 2 units of packed red blood and 1 unit of packed red blood cell transfusion is currently ongoing. She denies chest pain, shortness of breath, although she tells me that she feels swollen all over. She denies any fever, chills or rigors. 
  
Recent hospitalization as mentioned above, admitted on 2018 and discharged on the  for right heel infected wound with osteomyelitis. She was discharged home on aztreonam until 2018 and vancomycin. Interval history / Subjective:  
  F/u anemia No new issues No apparent bleeding 
wound vac in place Assessment & Plan: #.  Acute blood loss anemia on underlying chronic anemia due to ESRD/GI bleed: 
-Bleed from recently operated right heel. -S/p two unit pRBCs 7/22. Transfused with 2 units PRBC during HD 7/25 
-Monitor CBC and transfuse as needed Probable GI bleed 
-has reported history of hemorrhoids 
-Appreciate discussion with GI 
 
#. S/p right heel debridement with partial calcanectomy on 7/12/18 for infected right heel wound and OM. -Had profuse bleeding from the surgical site after she put pressure on her foot,  She was 100% non weightbearing on her right foot after her surgery.  
-Continue antibiotics aztreonam and vancomycin until 07/29/2018 . 
-Podiatry cleared the patient for discharge #. ESRD on HD 
-Nephrology on board. Code status: full DVT prophylaxis: scd Plan: The patient is medically stable, awaiting placement Care Plan discussed with: Patient/Family and Nurse Disposition: as above Hospital Problems  Date Reviewed: 7/19/2018 Codes Class Noted POA * (Principal)Acute blood loss anemia ICD-10-CM: D62 
ICD-9-CM: 285.1  7/22/2018 Yes Foot osteomyelitis, right ([facility] [address] ICD-10-CM: M86.9 ICD-9-CM: 730.27  7/22/2018 Unknown Acute blood loss as cause of postoperative anemia ICD-10-CM: D62 
ICD-9-CM: 285.1  7/22/2018 Unknown Review of Systems: A comprehensive review of systems was negative except for that written in the HPI. Vital Signs:  
 Last 24hrs VS reviewed since prior progress note. Most recent are: 
Visit Vitals  /70 (BP 1 Location: Left arm, BP Patient Position: At rest)  Pulse 72  Temp 98 °F (36.7 °C)  Resp 16  
 Ht 6' 1\" (1.854 m)  Wt 133.5 kg (294 lb 5 oz)  SpO2 96%  BMI 38.83 kg/m2 Intake/Output Summary (Last 24 hours) at 07/26/18 0757 Last data filed at 07/25/18 2472 Gross per 24 hour Intake             1320 ml Output             5000 ml Net            -3680 ml Physical Examination:  
 
 
     
Constitutional:  No acute distress, cooperative, pleasant   
ENT:  Oral mucous moist, oropharynx benign. Neck supple, Resp:  CTA bilaterally. No wheezing/rhonchi/rales. No accessory muscle use CV:  Regular rhythm, normal rate, no murmurs, gallops, rubs GI:  Soft, non distended, non tender. normoactive bowel sounds, no hepatosplenomegaly Musculoskeletal:  right foot dressing Neurologic:  Moves all extremities. AAOx3, CN II-XII reviewed Data Review:  
 Review and/or order of clinical lab test 
 
 
Labs:  
 
Recent Labs  
   07/26/18 
 0701 07/25/18 
 0434 HGB  7.8*  6.8* HCT  26.6*  22.6* No results for input(s): NA, K, CL, CO2, BUN, CREA, GLU, CA, MG, PHOS, URICA in the last 72 hours. No results for input(s): SGOT, GPT, ALT, AP, TBIL, TBILI, TP, ALB, GLOB, GGT, AML, LPSE in the last 72 hours. No lab exists for component: AMYP, HLPSE No results for input(s): INR, PTP, APTT in the last 72 hours. No lab exists for component: INREXT, INREXT No results for input(s): FE, TIBC, PSAT, FERR in the last 72 hours. No results found for: FOL, RBCF No results for input(s): PH, PCO2, PO2 in the last 72 hours. No results for input(s): CPK, CKNDX, TROIQ in the last 72 hours. No lab exists for component: CPKMB Lab Results Component Value Date/Time Cholesterol, total 144 04/10/2018 02:53 AM  
 HDL Cholesterol 66 04/10/2018 02:53 AM  
 LDL, calculated 67.6 04/10/2018 02:53 AM  
 Triglyceride 52 04/10/2018 02:53 AM  
 CHOL/HDL Ratio 2.2 04/10/2018 02:53 AM  
 
Lab Results Component Value Date/Time Glucose (POC) 119 (H) 07/26/2018 06:30 AM  
 Glucose (POC) 190 (H) 07/25/2018 09:21 PM  
 Glucose (POC) 145 (H) 07/25/2018 03:59 PM  
 Glucose (POC) 142 (H) 07/25/2018 11:37 AM  
 Glucose (POC) 129 (H) 07/25/2018 06:31 AM  
 
Lab Results Component Value Date/Time  Color YELLOW 10/27/2012 10:25 PM  
 Appearance CLEAR 10/27/2012 10:25 PM  
 Specific gravity 1.012 10/27/2012 10:25 PM  
 Specific gravity 1.015 08/30/2010 07:10 AM  
 pH (UA) 8.0 10/27/2012 10:25 PM  
 Protein 100 (A) 10/27/2012 10:25 PM  
 Glucose NEGATIVE  10/27/2012 10:25 PM  
 Ketone NEGATIVE  10/27/2012 10:25 PM  
 Bilirubin NEGATIVE  10/27/2012 10:25 PM  
 Urobilinogen 1.0 10/27/2012 10:25 PM  
 Nitrites NEGATIVE  10/27/2012 10:25 PM  
 Leukocyte Esterase TRACE (A) 10/27/2012 10:25 PM  
 Epithelial cells 0-5 10/27/2012 10:25 PM  
 Bacteria NEGATIVE  10/27/2012 10:25 PM  
 WBC 10-20 10/27/2012 10:25 PM  
 RBC 5-10 10/27/2012 10:25 PM  
 
 
 
Medications Reviewed:  
 
Current Facility-Administered Medications Medication Dose Route Frequency  vancomycin (VANCOCIN) 500 mg in 0.9% sodium chloride (MBP/ADV) 100 mL  500 mg IntraVENous DIALYSIS MON, WED & FRI  gabapentin (NEURONTIN) capsule 100 mg  100 mg Oral BID  
 0.9% sodium chloride infusion 250 mL  250 mL IntraVENous PRN  
 midodrine (PROAMITINE) tablet 5 mg  5 mg Oral Once per day on Mon Wed Fri  
 oxyCODONE-acetaminophen (PERCOCET) 5-325 mg per tablet 1-2 Tab  1-2 Tab Oral Q4H PRN  
 albumin human 25% (BUMINATE) solution 50 g  50 g IntraVENous DIALYSIS PRN  
 alum-mag hydroxide-simeth (MYLANTA) oral suspension 30 mL  30 mL Oral Q4H PRN  
 ascorbic acid (vitamin C) (VITAMIN C) tablet 1,000 mg  1,000 mg Oral DAILY  aztreonam (AZACTAM) 500 mg in 0.9% sodium chloride 100 mL IVPB  500 mg IntraVENous Q12H  B complex-vitaminC-folic acid (NEPHROCAP) cap  1 Cap Oral DAILY  cinacalcet (SENSIPAR) tablet 30 mg  30 mg Oral DAILY  fentaNYL citrate (PF) injection 50 mcg  50 mcg IntraVENous Q2H PRN  
 latanoprost (XALATAN) 0.005 % ophthalmic solution 1 Drop  1 Drop Both Eyes QHS  linaclotide (LINZESS) capsule 290 mcg (Patient Supplied)  290 mcg Oral ACB  nortriptyline (PAMELOR) capsule 50 mg  50 mg Oral QHS  ondansetron (ZOFRAN) injection 4 mg  4 mg IntraVENous Q6H PRN  polyethylene glycol (MIRALAX) packet 17 g  17 g Oral DAILY  polyethylene glycol (MIRALAX) packet 17 g 17 g Oral BID PRN  
 sevelamer carbonate (RENVELA) tab 800 mg  800 mg Oral TID WITH MEALS  simvastatin (ZOCOR) tablet 20 mg  20 mg Oral QHS  VANCOMYCIN INFORMATION NOTE   Other Rx Dosing/Monitoring  glucose chewable tablet 16 g  4 Tab Oral PRN  
 dextrose (D50W) injection syrg 12.5-25 g  12.5-25 g IntraVENous PRN  
 glucagon (GLUCAGEN) injection 1 mg  1 mg IntraMUSCular PRN  
 insulin lispro (HUMALOG) injection   SubCUTAneous AC&HS  epoetin joi (EPOGEN;PROCRIT) injection 20,000 Units  20,000 Units SubCUTAneous Q MON, WED & FRI  
 
______________________________________________________________________ EXPECTED LENGTH OF STAY: 3d 16h ACTUAL LENGTH OF STAY:          4 Era Lewis MD

## 2018-07-26 NOTE — PROGRESS NOTES
Problem: Mobility Impaired (Adult and Pediatric)  Goal: *Acute Goals and Plan of Care (Insert Text)  Physical Therapy Goals  Initiated 7/23/2018  1. Patient will move from supine to sit and sit to supine  and roll side to side in bed with modified independence within 7 day(s). 2.  Patient will transfer from bed to chair and chair to bed with modified independence using the least restrictive device within 7 day(s). physical Therapy TREATMENT  Patient: Devante Sandhu (63 y.o. female)  Date: 7/26/2018  Diagnosis: Foot osteomyelitis, right ([address]  Acute blood loss as cause of postoperative anemia  Acute blood loss as cause of postoperative anemia Acute blood loss anemia       Precautions: Contact, Fall, NWB (RLE)  Chart, physical therapy assessment, plan of care and goals were reviewed. ASSESSMENT:  Patient transferred to wheelchair with OT earlier today and was up in chair on arrival.  After some AROM and modified chair push ups, she was able to transfer back to the bed using the sliding board to the L side with mod assist.  Assist was primarily needed for board placement and somewhat for scooting assist.  Her bed mobility is considerably improved and her neuropathic pain is quite a bit better today compared to last session, as well. We will continue to progress her independence with lateral transfers and progress to a stand pivot has her strength improves and she can safely maintain NWB RLE. Progression toward goals:  []    Improving appropriately and progressing toward goals  [x]    Improving slowly and progressing toward goals  []    Not making progress toward goals and plan of care will be adjusted     PLAN:  Patient continues to benefit from skilled intervention to address the above impairments. Continue treatment per established plan of care. Discharge Recommendations:  Rajeev Zambrano  Further Equipment Recommendations for Discharge:   To be determined in rehab     SUBJECTIVE:   Patient stated I have been up for a while, I need to get back in bed before I get too tired.     OBJECTIVE DATA SUMMARY:   Critical Behavior:  Neurologic State: Alert  Orientation Level: Oriented X4  Cognition: Appropriate decision making, Appropriate for age attention/concentration, Appropriate safety awareness, Follows commands     Functional Mobility Training:  Bed Mobility:  Rolling: Minimum assistance     Sit to Supine: Stand-by assistance  Scooting: Minimum assistance        Transfers:                       Sliding Board : Moderate assistance           Balance:  Sitting: Intact; Without support  Standing:  (no attempt due to NWB RLE status)  Ambulation/Gait Training:                                                        Stairs:              Neuro Re-Education:    Therapeutic Exercises:   AROM UEs, modified chair push ups  Pain:  Pain Scale 1: Visual  Pain Intensity 1: 8  Pain Location 1: Foot  Pain Orientation 1: Left;Right  Pain Description 1: Hypersensitivity  Pain Intervention(s) 1: Medication (see MAR)  Activity Tolerance:   Good overall  Please refer to the flowsheet for vital signs taken during this treatment.   After treatment:   []    Patient left in no apparent distress sitting up in chair  [x]    Patient left in no apparent distress in bed, RLE elevated  [x]    Call bell left within reach  [x]    Nursing notified  [x]    Caregiver present  []    Bed alarm activated    COMMUNICATION/COLLABORATION:   The patients plan of care was discussed with: Occupational Therapist and Registered Nurse    Anthony Clarke, PT   Time Calculation: 20 mins

## 2018-07-26 NOTE — PROGRESS NOTES
Abel Hall [address] [place] [address], [address] GI PROGRESS NOTE Will Woodrow Stein, [address] [facility] office [phone] NP/PA in-hospital cell phone M-F until 4:30PM 
After 5PM or on weekends, please call  for physician on call NAME: Jo Lucio :  1950 MRN:  [id number] Subjective:  
Patient is lying with head elevated eating lunch and appears comfortable. No nausea, reflux, vomiting, or abdominal pain. She had a formed bowel movement this morning. No melena or hematochezia. Objective: VITALS:  
Last 24hrs VS reviewed since prior progress note. Most recent are: 
Visit Vitals  /59  Pulse 68  Temp 97.8 °F (36.6 °C)  Resp 16  
 Ht 6' 1\" (1.854 m)  Wt 133.5 kg (294 lb 5 oz)  SpO2 97%  BMI 38.83 kg/m2 PHYSICAL EXAM: 
General: Cooperative, no acute distress   
Neurologic:  Alert and oriented HEENT: EOMI, no scleral icterus Lungs:  CTA bilaterally anteriorly Heart:  S1 S2, regular rate and rhythm Abdomen: Soft, obese, non-distended, no tenderness, no guarding, no rebound. +Bowel sounds. Extremities: Warm Psych:   Not anxious or agitated. Lab Data Reviewed:  
 
Recent Results (from the past 24 hour(s)) HEP B SURFACE AG Collection Time: 18  2:45 PM  
Result Value Ref Range Hepatitis B surface Ag <0.10 Index Hep B surface Ag Interp. NEGATIVE  NEG    
GLUCOSE, POC Collection Time: 18  3:59 PM  
Result Value Ref Range Glucose (POC) 145 (H) 65 - 100 mg/dL Performed by Mireya Home GLUCOSE, POC Collection Time: 18  9:21 PM  
Result Value Ref Range Glucose (POC) 190 (H) 65 - 100 mg/dL Performed by Mikayla Raltiff GLUCOSE, POC Collection Time: 18  6:30 AM  
Result Value Ref Range Glucose (POC) 119 (H) 65 - 100 mg/dL Performed by Yoel Moore   
HGB & HCT Collection Time: 18  7:01 AM  
Result Value Ref Range  HGB 7.8 (L) 11.5 - 16.0 g/dL HCT 26.6 (L) 35.0 - 47.0 % CBC W/O DIFF Collection Time: 07/26/18 11:32 AM  
Result Value Ref Range WBC 9.0 3.6 - 11.0 K/uL  
 RBC 3.15 (L) 3.80 - 5.20 M/uL HGB 8.5 (L) 11.5 - 16.0 g/dL HCT 28.4 (L) 35.0 - 47.0 % MCV 90.2 80.0 - 99.0 FL  
 MCH 27.0 26.0 - 34.0 PG  
 MCHC 29.9 (L) 30.0 - 36.5 g/dL  
 RDW 16.6 (H) 11.5 - 14.5 % PLATELET 604 471 - 767 K/uL MPV 8.6 (L) 8.9 - 12.9 FL  
 NRBC 0.0 0  WBC ABSOLUTE NRBC 0.00 0.00 - 0.01 K/uL LD Collection Time: 07/26/18 11:32 AM  
Result Value Ref Range  81 - 246 U/L  
DIRECT & INDIRECT DANIEL Collection Time: 07/26/18 11:32 AM  
Result Value Ref Range FLOR Poly NEG Antibody screen NEG   
GLUCOSE, POC Collection Time: 07/26/18 11:33 AM  
Result Value Ref Range Glucose (POC) 192 (H) 65 - 100 mg/dL Performed by Rita Argueta Assessment: · Acute blood loss anemia: Status post 4 units PRBCs. No reflux, upper abdominal pain, or melena. No NSAIDs. No anticoagulation. Hgb 8.5 (6.8 yesterday). · Status post right heel debridement with partial calcanectomy with ongoing bleeding from surgical site · History of end stage renal disease on MWF hemodialysis: nephrology following. Patient Active Problem List  
Diagnosis Code  Morbid obesity ([facility]) E66.01  
 Diabetes mellitus type 2, insulin dependent ([facility]) E11.9, Z79.4  
 HTN (hypertension) I10  
 Neuropathy G62.9  Arthritis M19.90  Abdominal pain, chronic, epigastric R10.13, G89.29  
 Serratia wound infection, 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 A49.8  Obstructive sleep apnea (adult) (pediatric) G47.33  
 Perineal abscess L02.215  Abscess of deep perineal space N34.0  ESRD (end stage renal disease) on dialysis ([facility]) N18.6, Z99.2  Perianal abscess K61.0  Type 2 diabetes mellitus with nephropathy (HCC) E11.21  
 Fecal impaction ([facility] [address] K56.41  
 [facility] N18.6, Z99.2  Anemia D64.9  Bilateral leg edema R60.0  LFT elevation R79.89  Type 2 diabetes mellitus with hyperglycemia (HCC) E11.65  Bilateral lower leg cellulitis L03.116, L03.115  
 Osteomyelitis of foot, right, acute (HCC) M86.171  
 Hyponatremia E87.1  Sacral wound S31.000A  Ankle wound, left, initial encounter S91.002A  FARHAT on CPAP G47.33, Z99.89  
 Acute blood loss anemia D62  Foot osteomyelitis, right ([address] M86.9  Acute blood loss as cause of postoperative anemia D62 Plan: · Trend CBC and transfuse as necessary · No signs of active GI bleeding. Colonoscopy (1/2018) was normal. Further recommendations per Dr. Donnie Escalante. Signed By: Won Massey   
 7/26/2018  12:46 PM 
  
 
I have examined the patient. I have reviewed the chart and agree with the documentation recorded by the NP, including the assessment, treatment plan, and disposition. No evidence of GI bleed Will follow Hina Cruz MD

## 2018-07-27 LAB
ANION GAP SERPL CALC-SCNC: 9 MMOL/L (ref 5–15)
BASOPHILS # BLD: 0.1 K/UL (ref 0–0.1)
BASOPHILS NFR BLD: 1 % (ref 0–1)
BUN SERPL-MCNC: 25 MG/DL (ref 6–20)
BUN/CREAT SERPL: 5 (ref 12–20)
CALCIUM SERPL-MCNC: 7.4 MG/DL (ref 8.5–10.1)
CHLORIDE SERPL-SCNC: 98 MMOL/L (ref 97–108)
CO2 SERPL-SCNC: 28 MMOL/L (ref 21–32)
CREAT SERPL-MCNC: 5.31 MG/DL (ref 0.55–1.02)
DIFFERENTIAL METHOD BLD: ABNORMAL
EOSINOPHIL # BLD: 0.3 K/UL (ref 0–0.4)
EOSINOPHIL NFR BLD: 4 % (ref 0–7)
ERYTHROCYTE [DISTWIDTH] IN BLOOD BY AUTOMATED COUNT: 16.6 % (ref 11.5–14.5)
GLUCOSE BLD STRIP.AUTO-MCNC: 125 MG/DL (ref 65–100)
GLUCOSE BLD STRIP.AUTO-MCNC: 135 MG/DL (ref 65–100)
GLUCOSE BLD STRIP.AUTO-MCNC: 148 MG/DL (ref 65–100)
GLUCOSE BLD STRIP.AUTO-MCNC: 179 MG/DL (ref 65–100)
GLUCOSE SERPL-MCNC: 128 MG/DL (ref 65–100)
HCT VFR BLD AUTO: 26.8 % (ref 35–47)
HGB BLD-MCNC: 7.9 G/DL (ref 11.5–16)
IMM GRANULOCYTES # BLD: 0 K/UL (ref 0–0.04)
IMM GRANULOCYTES NFR BLD AUTO: 0 % (ref 0–0.5)
LYMPHOCYTES # BLD: 2.3 K/UL (ref 0.8–3.5)
LYMPHOCYTES NFR BLD: 31 % (ref 12–49)
MCH RBC QN AUTO: 27.1 PG (ref 26–34)
MCHC RBC AUTO-ENTMCNC: 29.5 G/DL (ref 30–36.5)
MCV RBC AUTO: 92.1 FL (ref 80–99)
MONOCYTES # BLD: 0.8 K/UL (ref 0–1)
MONOCYTES NFR BLD: 11 % (ref 5–13)
NEUTS SEG # BLD: 3.9 K/UL (ref 1.8–8)
NEUTS SEG NFR BLD: 52 % (ref 32–75)
NRBC # BLD: 0 K/UL (ref 0–0.01)
NRBC BLD-RTO: 0 PER 100 WBC
PLATELET # BLD AUTO: 211 K/UL (ref 150–400)
PMV BLD AUTO: 8.7 FL (ref 8.9–12.9)
POTASSIUM SERPL-SCNC: 4.1 MMOL/L (ref 3.5–5.1)
RBC # BLD AUTO: 2.91 M/UL (ref 3.8–5.2)
SERVICE CMNT-IMP: ABNORMAL
SODIUM SERPL-SCNC: 135 MMOL/L (ref 136–145)
VANCOMYCIN SERPL-MCNC: 21.4 UG/ML
WBC # BLD AUTO: 7.4 K/UL (ref 3.6–11)

## 2018-07-27 PROCEDURE — 74011000250 HC RX REV CODE- 250: Performed by: HOSPITALIST

## 2018-07-27 PROCEDURE — 97110 THERAPEUTIC EXERCISES: CPT

## 2018-07-27 PROCEDURE — 3331090002 HH PPS REVENUE DEBIT

## 2018-07-27 PROCEDURE — 82962 GLUCOSE BLOOD TEST: CPT

## 2018-07-27 PROCEDURE — 74011250636 HC RX REV CODE- 250/636: Performed by: HOSPITALIST

## 2018-07-27 PROCEDURE — 74011250637 HC RX REV CODE- 250/637: Performed by: INTERNAL MEDICINE

## 2018-07-27 PROCEDURE — 85025 COMPLETE CBC W/AUTO DIFF WBC: CPT | Performed by: HOSPITALIST

## 2018-07-27 PROCEDURE — 80202 ASSAY OF VANCOMYCIN: CPT | Performed by: HOSPITALIST

## 2018-07-27 PROCEDURE — 97530 THERAPEUTIC ACTIVITIES: CPT

## 2018-07-27 PROCEDURE — 65270000029 HC RM PRIVATE

## 2018-07-27 PROCEDURE — 74011636637 HC RX REV CODE- 636/637: Performed by: HOSPITALIST

## 2018-07-27 PROCEDURE — 3331090001 HH PPS REVENUE CREDIT

## 2018-07-27 PROCEDURE — 77030018836 HC SOL IRR NACL ICUM -A

## 2018-07-27 PROCEDURE — 90935 HEMODIALYSIS ONE EVALUATION: CPT

## 2018-07-27 PROCEDURE — 74011250636 HC RX REV CODE- 250/636: Performed by: INTERNAL MEDICINE

## 2018-07-27 PROCEDURE — P9047 ALBUMIN (HUMAN), 25%, 50ML: HCPCS | Performed by: HOSPITALIST

## 2018-07-27 PROCEDURE — 74011000258 HC RX REV CODE- 258: Performed by: PODIATRIST

## 2018-07-27 PROCEDURE — 74011250636 HC RX REV CODE- 250/636: Performed by: PODIATRIST

## 2018-07-27 PROCEDURE — 97535 SELF CARE MNGMENT TRAINING: CPT

## 2018-07-27 PROCEDURE — 80048 BASIC METABOLIC PNL TOTAL CA: CPT | Performed by: HOSPITALIST

## 2018-07-27 PROCEDURE — 74011000258 HC RX REV CODE- 258: Performed by: HOSPITALIST

## 2018-07-27 PROCEDURE — 36415 COLL VENOUS BLD VENIPUNCTURE: CPT | Performed by: HOSPITALIST

## 2018-07-27 PROCEDURE — 97606 NEG PRS WND THER DME>50 SQCM: CPT

## 2018-07-27 PROCEDURE — 74011250637 HC RX REV CODE- 250/637: Performed by: HOSPITALIST

## 2018-07-27 RX ADMIN — NEPHROCAP 1 CAPSULE: 1 CAP ORAL at 09:27

## 2018-07-27 RX ADMIN — GABAPENTIN 100 MG: 100 CAPSULE ORAL at 22:10

## 2018-07-27 RX ADMIN — INSULIN LISPRO 2 UNITS: 100 INJECTION, SOLUTION INTRAVENOUS; SUBCUTANEOUS at 13:23

## 2018-07-27 RX ADMIN — ALBUMIN (HUMAN) 12.5 G: 0.25 INJECTION, SOLUTION INTRAVENOUS at 19:00

## 2018-07-27 RX ADMIN — MIDODRINE HYDROCHLORIDE 5 MG: 5 TABLET ORAL at 16:40

## 2018-07-27 RX ADMIN — SIMVASTATIN 20 MG: 20 TABLET, FILM COATED ORAL at 22:10

## 2018-07-27 RX ADMIN — OXYCODONE AND ACETAMINOPHEN 2 TABLET: 5; 325 TABLET ORAL at 13:28

## 2018-07-27 RX ADMIN — OXYCODONE HYDROCHLORIDE AND ACETAMINOPHEN 1000 MG: 500 TABLET ORAL at 09:27

## 2018-07-27 RX ADMIN — ONDANSETRON 4 MG: 2 INJECTION INTRAMUSCULAR; INTRAVENOUS at 22:55

## 2018-07-27 RX ADMIN — OXYCODONE AND ACETAMINOPHEN 2 TABLET: 5; 325 TABLET ORAL at 04:01

## 2018-07-27 RX ADMIN — SEVELAMER CARBONATE 800 MG: 800 TABLET, FILM COATED ORAL at 08:00

## 2018-07-27 RX ADMIN — NORTRIPTYLINE HYDROCHLORIDE 50 MG: 25 CAPSULE ORAL at 22:10

## 2018-07-27 RX ADMIN — GABAPENTIN 100 MG: 100 CAPSULE ORAL at 09:27

## 2018-07-27 RX ADMIN — EPOETIN ALFA 20000 UNITS: 20000 SOLUTION INTRAVENOUS; SUBCUTANEOUS at 22:45

## 2018-07-27 RX ADMIN — OXYCODONE AND ACETAMINOPHEN 2 TABLET: 5; 325 TABLET ORAL at 07:51

## 2018-07-27 RX ADMIN — LATANOPROST 1 DROP: 50 SOLUTION OPHTHALMIC at 22:45

## 2018-07-27 RX ADMIN — AZTREONAM 500 MG: 1 INJECTION, POWDER, LYOPHILIZED, FOR SOLUTION INTRAMUSCULAR; INTRAVENOUS at 07:13

## 2018-07-27 RX ADMIN — CINACALCET HYDROCHLORIDE 30 MG: 30 TABLET, COATED ORAL at 22:45

## 2018-07-27 RX ADMIN — VANCOMYCIN HYDROCHLORIDE 500 MG: 500 INJECTION, POWDER, LYOPHILIZED, FOR SOLUTION INTRAVENOUS at 22:00

## 2018-07-27 RX ADMIN — SEVELAMER CARBONATE 800 MG: 800 TABLET, FILM COATED ORAL at 13:21

## 2018-07-27 NOTE — PROGRESS NOTES
Pharmacist Note - Vancomycin Dosing Therapy day 6 Indication:  R heel ulcer with drain Current regimen: Vancomycin and Azactam 
 
A Trough Level resulted at 21.4 mcg/mL which was obtained prior to HD. Goal trough = 15 - 20 mcg/mL for therapeutic goal of 10 - 15 mcg/mL (assuming ~35% removal by dialysis) - aim for upper limit ~ 20 mcg/ml due to risk for OM, however no OM per notes from previous admission Plan:  Patient slightly above trough goal however given the extensive wound, patient with DM and on HD will continue current regimen. Pharmacy will continue to monitor this patient daily for changes in clinical status and renal function.

## 2018-07-27 NOTE — PROGRESS NOTES
Hospitalist Progress Note Onelia George MD 
Answering service: [phone] from in house phone Date of Service:  2018 NAME:  Digna Charles :  1950 MRN:  [id number] Admission Summary:  
The patient is a 50-year-old -American female with past medical history of anemia, asthma, anxiety, end-stage renal disease on hemodialysis ,  and , chronic pain, depression, GERD. She presented to the ER from [facility] center on account of profuse bleeding from the right heel surgical site. She reports that she woke up this morning. Patient was recently discharged from [facility] [address] [facility] [address] on 2018 after hospital admission for acute bilateral foot cellulitis with infected right heel wound requiring debridement of right heel ulcer with partial calcanectomy. She returned again to the ER stating that this morning she woke up and she went to the bedside commode and while she was on the commode, she noticed profuse bleeding from her right heel. The CNA notes this at the rehabilitation center came decided to call EMS. She denies associated lightheadedness, dizziness or syncope. Today in the emergency department, hemoglobin was 6.9 and she has been typed and crossed with 2 units of packed red blood and 1 unit of packed red blood cell transfusion is currently ongoing. She denies chest pain, shortness of breath, although she tells me that she feels swollen all over. She denies any fever, chills or rigors. 
  
Recent hospitalization as mentioned above, admitted on 2018 and discharged on the  for right heel infected wound with osteomyelitis. She was discharged home on aztreonam until 2018 and vancomycin. Interval history / Subjective:  
  F/u anemia No new issues No apparent bleeding 
wound vac in place No new issues, awaiting placement Assessment & Plan: Acute blood loss anemia on underlying chronic anemia due to ESRD/GI bleed: 
-Bleed from recently operated right heel. -S/p two unit pRBCs 7/22. Transfused with 2 units PRBC during HD 7/25 
-Monitor CBC and transfuse as needed Probable GI bleed 
-has reported history of hemorrhoids 
-Appreciate discussion with GI 
 
S/p right heel debridement with partial calcanectomy on 7/12/18 for infected right heel wound and OM. -Had profuse bleeding from the surgical site after she put pressure on her foot,  She was 100% non weightbearing on her right foot after her surgery.  
-Continue antibiotics aztreonam and vancomycin until 07/29/2018 . 
-Podiatry cleared the patient for discharge ESRD on HD 
-Nephrology on board. DM type 2 
-on Levemir/SSI at home 
-Currently on SSI, blood sugars reasonable 
-Monitor Right hand possible steal syndrome 
-Outpatient follow up Peripheral neuropathy 
-likely diabetic/renal 
-on neurontin HTN 
-stable Morbid obesity 
-diet and exercise FARHAT -on CPAP Anemia of chronic disease 
-on Epo Code status: full DVT prophylaxis: scd Plan: The patient is medically stable, awaiting placement Care Plan discussed with: Patient/Family and Nurse Disposition: as above Hospital Problems  Date Reviewed: 7/19/2018 Codes Class Noted POA * (Principal)Acute blood loss anemia ICD-10-CM: D62 
ICD-9-CM: 285.1  7/22/2018 Yes Foot osteomyelitis, right ([address] ICD-10-CM: M86.9 ICD-9-CM: 730.27  7/22/2018 Unknown Acute blood loss as cause of postoperative anemia ICD-10-CM: D62 
ICD-9-CM: 285.1  7/22/2018 Unknown Review of Systems: A comprehensive review of systems was negative except for that written in the HPI. Vital Signs:  
 Last 24hrs VS reviewed since prior progress note. Most recent are: 
Visit Vitals  BP (!) 120/39  Pulse 69  Temp 95.6 °F (35.3 °C)  Resp 16  
 Ht 6' 1\" (1.854 m)  Wt 137.8 kg (303 lb 12.7 oz)  
 SpO2 97%  BMI 40.08 kg/m2 Intake/Output Summary (Last 24 hours) at 07/27/18 1047 Last data filed at 07/26/18 1800 Gross per 24 hour Intake              480 ml Output                0 ml Net              480 ml Physical Examination:  
 
 
     
Constitutional:  No acute distress, cooperative, pleasant   
ENT:  Oral mucous moist, oropharynx benign. Neck supple, Resp:  CTA bilaterally. No wheezing/rhonchi/rales. No accessory muscle use CV:  Regular rhythm, normal rate, no murmurs, gallops, rubs GI:  Soft, non distended, non tender. normoactive bowel sounds, no hepatosplenomegaly Musculoskeletal:  right foot dressing Neurologic:  Moves all extremities. AAOx3, CN II-XII reviewed Data Review:  
 Review and/or order of clinical lab test 
 
 
Labs:  
 
Recent Labs  
   07/27/18 
 0404  07/26/18 
 1132 WBC  7.4  9.0 HGB  7.9*  8.5* HCT  26.8*  28.4*  
PLT  211  247 Recent Labs  
   07/27/18 
 0404 NA  135* K  4.1 CL  98  
CO2  28 BUN  25* CREA  5.31* GLU  128* CA  7.4* No results for input(s): SGOT, GPT, ALT, AP, TBIL, TBILI, TP, ALB, GLOB, GGT, AML, LPSE in the last 72 hours. No lab exists for component: AMYP, HLPSE No results for input(s): INR, PTP, APTT in the last 72 hours. No lab exists for component: INREXT, INREXT No results for input(s): FE, TIBC, PSAT, FERR in the last 72 hours. No results found for: FOL, RBCF No results for input(s): PH, PCO2, PO2 in the last 72 hours. No results for input(s): CPK, CKNDX, TROIQ in the last 72 hours. No lab exists for component: CPKMB Lab Results Component Value Date/Time Cholesterol, total 144 04/10/2018 02:53 AM  
 HDL Cholesterol 66 04/10/2018 02:53 AM  
 LDL, calculated 67.6 04/10/2018 02:53 AM  
 Triglyceride 52 04/10/2018 02:53 AM  
 CHOL/HDL Ratio 2.2 04/10/2018 02:53 AM  
 
Lab Results Component Value Date/Time  Glucose (POC) 135 (H) 07/27/2018 06:31 AM  
 Glucose (POC) 171 (H) 07/26/2018 09:26 PM  
 Glucose (POC) 152 (H) 07/26/2018 04:10 PM  
 Glucose (POC) 192 (H) 07/26/2018 11:33 AM  
 Glucose (POC) 119 (H) 07/26/2018 06:30 AM  
 
Lab Results Component Value Date/Time Color YELLOW 10/27/2012 10:25 PM  
 Appearance CLEAR 10/27/2012 10:25 PM  
 Specific gravity 1.012 10/27/2012 10:25 PM  
 Specific gravity 1.015 08/30/2010 07:10 AM  
 pH (UA) 8.0 10/27/2012 10:25 PM  
 Protein 100 (A) 10/27/2012 10:25 PM  
 Glucose NEGATIVE  10/27/2012 10:25 PM  
 Ketone NEGATIVE  10/27/2012 10:25 PM  
 Bilirubin NEGATIVE  10/27/2012 10:25 PM  
 Urobilinogen 1.0 10/27/2012 10:25 PM  
 Nitrites NEGATIVE  10/27/2012 10:25 PM  
 Leukocyte Esterase TRACE (A) 10/27/2012 10:25 PM  
 Epithelial cells 0-5 10/27/2012 10:25 PM  
 Bacteria NEGATIVE  10/27/2012 10:25 PM  
 WBC 10-20 10/27/2012 10:25 PM  
 RBC 5-10 10/27/2012 10:25 PM  
 
 
 
Medications Reviewed:  
 
Current Facility-Administered Medications Medication Dose Route Frequency  aztreonam (AZACTAM) 500 mg in 0.9% sodium chloride 100 mL IVPB  500 mg IntraVENous Q12H  
 vancomycin (VANCOCIN) 500 mg in 0.9% sodium chloride (MBP/ADV) 100 mL  500 mg IntraVENous DIALYSIS MON, WED & FRI  diphenhydrAMINE (BENADRYL) 12.5 mg/5 mL oral elixir 12.5 mg  12.5 mg Oral Q6H PRN  
 gabapentin (NEURONTIN) capsule 100 mg  100 mg Oral BID  
 0.9% sodium chloride infusion 250 mL  250 mL IntraVENous PRN  
 midodrine (PROAMITINE) tablet 5 mg  5 mg Oral Once per day on Mon Wed Fri  
 oxyCODONE-acetaminophen (PERCOCET) 5-325 mg per tablet 1-2 Tab  1-2 Tab Oral Q4H PRN  
 albumin human 25% (BUMINATE) solution 50 g  50 g IntraVENous DIALYSIS PRN  
 alum-mag hydroxide-simeth (MYLANTA) oral suspension 30 mL  30 mL Oral Q4H PRN  
 ascorbic acid (vitamin C) (VITAMIN C) tablet 1,000 mg  1,000 mg Oral DAILY  B complex-vitaminC-folic acid (NEPHROCAP) cap  1 Cap Oral DAILY  cinacalcet (SENSIPAR) tablet 30 mg  30 mg Oral DAILY  fentaNYL citrate (PF) injection 50 mcg  50 mcg IntraVENous Q2H PRN  
 latanoprost (XALATAN) 0.005 % ophthalmic solution 1 Drop  1 Drop Both Eyes QHS  linaclotide (LINZESS) capsule 290 mcg (Patient Supplied)  290 mcg Oral ACB  nortriptyline (PAMELOR) capsule 50 mg  50 mg Oral QHS  ondansetron (ZOFRAN) injection 4 mg  4 mg IntraVENous Q6H PRN  polyethylene glycol (MIRALAX) packet 17 g  17 g Oral DAILY  polyethylene glycol (MIRALAX) packet 17 g  17 g Oral BID PRN  
 sevelamer carbonate (RENVELA) tab 800 mg  800 mg Oral TID WITH MEALS  simvastatin (ZOCOR) tablet 20 mg  20 mg Oral QHS  VANCOMYCIN INFORMATION NOTE   Other Rx Dosing/Monitoring  glucose chewable tablet 16 g  4 Tab Oral PRN  
 dextrose (D50W) injection syrg 12.5-25 g  12.5-25 g IntraVENous PRN  
 glucagon (GLUCAGEN) injection 1 mg  1 mg IntraMUSCular PRN  
 insulin lispro (HUMALOG) injection   SubCUTAneous AC&HS  epoetin joi (EPOGEN;PROCRIT) injection 20,000 Units  20,000 Units SubCUTAneous Q MON, WED & FRI  
 
______________________________________________________________________ EXPECTED LENGTH OF STAY: 3d 16h ACTUAL LENGTH OF STAY:          5 Filiberto Everett MD

## 2018-07-27 NOTE — WOUND CARE
Wound Care Note: Follow-up visit for wound VAC dressing change with Dr. Pawel Bellamy at bedside. Chart shows: 
Admitted for acute blood loss with a history of abscess of abdominal wall, anemia, arthritis, asthma, axillary abscess, chronic kideny disease, chronic pain, depression , DM, dialysis, GERD, glaucoma, heart failure, high cholesterol, HTN, IBS, migraine, morbid obesity, neuropathy, perineal abscess, psychiatric disorder, stroke, thromboembolus, thyroid disease, sleep apnea. WBC = 7.4 on 7/27/18 Admitted from [facility] Assessment:  
Patient is A&O x 4, communicative, incontinent with some assistance needed in repositioning. Patient wearing briefs (pull ups) for incontinence Diet: Renal regular; consistent carb 1800 kcal 
Patient reports pain 9/10 Left heel, left buttocks, and sacral skin intact and without erythema. 1. POA right foot ulcer wound bed is 75% pink tissue some granulation tissue and 25% tan, scant sero/sang drainage, noemi-wound intact, wound edges are open. Dorsal foot with some black, crusting noted. Skin prep applied to noemi-wound, wound picture framed with drape, 2 pieces of black granufoam placed in wound bed and an additional piece used to bridge wound to medial ankle, wound occlusively sealed at 125 mmhg continuous. 2.  POA right buttock abscess appears to be the same, unable to visualize wound bed, Aquacel AG placed in tunnel at 2 o'clock which is approximately 2 cm, no drainage, wound edges are rolled, noemi-wound intact. Covered with foam dressing. 3.  POA left lateral ankle looks the same, wound bed covered with eschar, no drainage, noemi-wound with epithealized tissue. Left open to air. Patient repositioned supine. Heels offloaded; left in Prevalon boot and right in multi-podus boot Recommendations:   
Maintain wound VAC- Prior to discharge 1.  Remove dressing and all foam from wound bed. 2.  Clean wound with normal saline. 3.  Apply saline-moistened gauze to wound bed. 4.  Cover with dry dressing. 5.  Secure with tape. 6.  Remove cannister and place VAC and plug in utility room (DO NOT BAG). 7.  Wound VAC cannot leave the facility with the patient. Right buttock- Every other day on ODD days cleanse with normal saline, loosely fill undermining at 2 o'clock with small strip of Aquacel AG making sure to include the stitching in the place being packed, cover with foam dressing. 
  
Bilateral buttocks- Apply Aloe Vesta lotion every 12 hours 
  
Skin Care & Pressure Prevention: 
Minimize layers of linen/pads under patient to optimize support surface. Turn/reposition approximately every 2 hours and offload heels. Manage incontinence / promote continence Discussed above plan with patient & Dhara Davila RN Transition of Care: Plan to follow as needed while admitted to hospital. 
 
MARK cMcullough, RN, [facility], [facility]. 
office [phone] 
pager [phone] or call  to page

## 2018-07-27 NOTE — PROGRESS NOTES
3:25 PM 
MARVA Crowley attempted to call report x2 to [facility]. Phone was not answered either times until it disconnected after several minutes.

## 2018-07-27 NOTE — PROGRESS NOTES
Podiatry 
-Pt seen & examined with wound care. Pt denies any n/v/f/ns/c. Pt in good spirits. States her neuropathy pain is better. 
-Right heel granulating in appropriately. No pus, no erythema. No active bleeding.   
-Ok to d/c from my standpoint once medically stable. See below for d/c instructions. Discharge Instructions 
   
1.) Follow up with Dr. Allyson Armijo @ [address] in 1-2 weeks. 2.) Apply traditional wound vac (not RAYMUNDO vac) to right heel ulcer and set vac to 125 mmHg continuous therapy.  Change vac dressing every other day or per facility protocol.   
3.) Strict non weightbearing to right foot.  Keep multi-podus boot on right foot at all times.

## 2018-07-27 NOTE — PROGRESS NOTES
Problem: Self Care Deficits Care Plan (Adult) Goal: *Acute Goals and Plan of Care (Insert Text) Occupational Therapy Goals Initiated 7/26/2018 1. Patient will complete BSC transfer using drop arm commode and sliding board with min A within 7 days. 2.  Patient will complete lower body dressing with min A within 7 days. 3.  Patient will complete bathing from w/c level with supervision within 7 days. 4.  Patient will complete UE exercise independently to assist with functional activities within 7 days. 5.  Patient will complete grooming at the sink with mod I from w/c level within 7 days. 6.  Patient will complete toileting activities with min A from [facility] within 7 days. Occupational Therapy TREATMENT Patient: Lior Ordonez (89 y.o. female) Date: 7/27/2018 Diagnosis: Foot osteomyelitis, right ([address] Acute blood loss as cause of postoperative anemia Acute blood loss as cause of postoperative anemia Acute blood loss anemia Precautions: Fall, Contact, NWB Chart, occupational therapy assessment, plan of care, and goals were reviewed. ASSESSMENT: 
Pt was progressed from supine in bed to EOB and provided education for sliding board transfer. Encouraged patient to attempt transfer to w/c using sliding board and she was able to complete setup with mod A overall. Pt did great with training and education. Pt remained sitting up in w/c following intervention with foot propped on step stool and she reports feeling comfortable in w/c. Progression toward goals: 
[x]       Improving appropriately and progressing toward goals 
[]       Improving slowly and progressing toward goals 
[]       Not making progress toward goals and plan of care will be adjusted PLAN: 
Patient continues to benefit from skilled intervention to address the above impairments. Continue treatment per established plan of care. Discharge Recommendations:  Rehab Further Equipment Recommendations for Discharge:  TBD SUBJECTIVE:  
Patient stated Yes I want to get out of the bed. I need clean linens too.  OBJECTIVE DATA SUMMARY:  
Cognitive/Behavioral Status: 
Neurologic State: Alert Orientation Level: Oriented X4 Cognition: Appropriate for age attention/concentration Perception: Appears intact Perseveration: No perseveration noted Safety/Judgement: Good awareness of safety precautions Functional Mobility and Transfers for ADLs: 
Bed Mobility: 
Supine to Sit: Contact guard assistance Sit to Supine:  (remained sitting in w/c) Scooting: Contact guard assistance Transfers: 
Sit to Stand:  (unable to progress with standing due to NWB status) Bed to Chair: Contact guard assistance (sliding board to w/c with mod A to setup transfer) Balance: 
Sitting: Intact Standing:  (unable to progress with standing due to NWB status) ADL Intervention: W/c transfer from EOB. Pt asked to begin to try to setup transfer and she is able to lean and lift but unable to really place the sliding board under her buttocks while leaning at the same time. Pt then able to scoot to w/c with CG and cues. Pt did good with training and education overall. Cognitive Retraining Safety/Judgement: Good awareness of safety precautions Pain: 
Pain Scale 1: Numeric (0 - 10) Pain Intensity 1: 7 Activity Tolerance: VSS throughout session. After treatment:  
[x] Patient left in no apparent distress sitting up in chair 
[] Patient left in no apparent distress in bed 
[x] Call bell left within reach [x] Nursing notified 
[] Caregiver present 
[] Bed alarm activated COMMUNICATION/COLLABORATION:  
The patients plan of care was discussed with: Physical Therapist and Registered Nurse Floyd Faustin OT Time Calculation: 25 mins

## 2018-07-27 NOTE — PROGRESS NOTES
CRM received a message from [employer] with Ana and [address] [place], [place] [address]. They have the insurance authorization for re-admission. Count includes the [facility] has scheduled [facility] for tomorrow at 12 noon. Sat July 28th. Count includes the [facility] will need a discharge order, plan for IVabx (resumption orders) on the AVS. The patient was on IV abx at the facility prior to admission. Wound vac to be disconnected by nursing. (facility will apply a new one)  Discharge folder located on the hard chart.  LAURA

## 2018-07-27 NOTE — PROGRESS NOTES
Nephrology Progress Note Ferny Cori Date of Admission : 7/22/2018 CC: Follow up for ESRD Assessment and Plan ESRD on HD: 
- MWF at [facility] 
- HD today Diabetic Foot wound w/ infection: 
- s/p Debridement last admission - a/w bleed 
- on aztreonam and vanco x 6 weeks per ID 
  
Hypervolemic Hyponatremia: 
- Na stable 
  
Hypotension:   
- cont midodrine prior to HD 
  
Anemia: 
- 2/2 blood loss and CKD 
- cont ELTON 
- no further blood transfusions 
- no active bleeding per GI - w/u neg in Jan of this year Secondary HPT: 
- cont sensipar and binders DM 
   
Obesity, FARHAT 
   
Dyslipidemia 
   
Possible steal syndrome right hand 
  
  
 
 
Interval History: 
Seen and examined. Looks better this AM. Less edema,  S/p wound vac change and debridement of wound at bedside this AM.  For HD later today. Hgb 7.9 this AM. Current Medications: all current  Medications have been eviewed in [facility] Review of Systems: Pertinent items are noted in HPI. Objective: 
Vitals:   
Vitals:  
 07/26/18 1432 07/26/18 1929 07/27/18 5308 07/27/18 7570 BP: 106/69 108/69 105/51 Pulse: 71 69 70 Resp: 16 16 16 Temp: 97.2 °F (36.2 °C) 98 °F (36.7 °C) 97.9 °F (36.6 °C) TempSrc:      
SpO2: 97% 97% 96% Weight:    137.8 kg (303 lb 12.7 oz) Height:      
 
Intake and Output: 
  
07/25 1901 - 07/27 0700 In: 720 [P.O.:720] Out: - Physical Examination: 
General: NAD,Conversant Neck:  Supple, no mass Resp:  Lungs CTA B/L, no wheezing , normal respiratory effort CV:  RRR,  no murmur or rub, 1+ LE edema GI:  Soft, NT, + Bowel sounds, no hepatosplenomegaly Neurologic:  Non focal 
Psych:             AAO x 3 appropriate affect Skin:  No Rash Ext:                  R foot in bandage :  No fontenot []    High complexity decision making was performed 
[]    Patient is at high-risk of decompensation with multiple organ involvement Lab Data Personally Reviewed: I have reviewed all the pertinent labs, microbiology data and radiology studies during assessment. Recent Labs  
   07/27/18 
 0404 NA  135* K  4.1 CL  98  
CO2  28 GLU  128* BUN  25* CREA  5.31* CA  7.4* Recent Labs  
   07/27/18 
 0404  07/26/18 
 1132  07/26/18 
 0701 WBC  7.4  9.0   --   
HGB  7.9*  8.5*  7.8* HCT  26.8*  28.4*  26.6*  
PLT  211  247   --   
 
Lab Results Component Value Date/Time Specimen Description: URINE 10/27/2012 08:15 PM  
 Specimen Description: BLOOD 06/07/2011 07:40 AM  
 Specimen Description: URINE 08/30/2010 07:10 AM  
 
Lab Results Component Value Date/Time Culture result: MRSA NOT PRESENT 07/22/2018 03:35 PM  
 Culture result:  07/22/2018 03:35 PM  
      Screening of patient nares for MRSA is for surveillance purposes and, if positive, to facilitate isolation considerations in high risk settings. It is not intended for automatic decolonization interventions per se as regimens are not sufficiently effective to warrant routine use. Culture result: NO ANAEROBES ISOLATED 07/12/2018 08:00 AM  
 Culture result: HEAVY PROTEUS MIRABILIS (A) 07/12/2018 08:00 AM  
 Culture result: LIGHT KLEBSIELLA OXYTOCA (A) 07/12/2018 08:00 AM  
 Culture result: (A) 07/12/2018 08:00 AM  
  LIGHT **METHICILLIN RESISTANT STAPHYLOCOCCUS AUREUS**  
 Culture result: LIGHT ENTEROCOCCUS FAECALIS GROUP D (A) 07/12/2018 08:00 AM  
 Culture result: LIGHT ALCALIGENES FAECALIS (A) 07/12/2018 08:00 AM  
 Culture result:  07/12/2018 08:00 AM  
  **MRSA REPORT** 
CALLED TO AND READ BACK BY 
JAELYN GUTIERREZ RN ON 7/15/18 AT 1440 TA.  
  
 Culture result: MODERATE PROTEUS MIRABILIS (A) 07/12/2018 08:00 AM  
 Culture result: LIGHT KLEBSIELLA OXYTOCA (A) 07/12/2018 08:00 AM  
 Culture result: FEW PSEUDOMONAS AERUGINOSA (A) 07/12/2018 08:00 AM  
 Culture result: LIGHT ENTEROCOCCUS FAECALIS GROUP D (A) 07/12/2018 08:00 AM  
 Culture result: (A) 07/12/2018 08:00 AM  
  LIGHT STAPHYLOCOCCUS SPECIES, COAGULASE NEGATIVE (2 DIFFERENT COLONY TYPES/STRAINS) Recent Results (from the past 24 hour(s)) CBC W/O DIFF Collection Time: 07/26/18 11:32 AM  
Result Value Ref Range WBC 9.0 3.6 - 11.0 K/uL  
 RBC 3.15 (L) 3.80 - 5.20 M/uL HGB 8.5 (L) 11.5 - 16.0 g/dL HCT 28.4 (L) 35.0 - 47.0 % MCV 90.2 80.0 - 99.0 FL  
 MCH 27.0 26.0 - 34.0 PG  
 MCHC 29.9 (L) 30.0 - 36.5 g/dL  
 RDW 16.6 (H) 11.5 - 14.5 % PLATELET 757 592 - 526 K/uL MPV 8.6 (L) 8.9 - 12.9 FL  
 NRBC 0.0 0  WBC ABSOLUTE NRBC 0.00 0.00 - 0.01 K/uL LD Collection Time: 07/26/18 11:32 AM  
Result Value Ref Range  81 - 246 U/L  
HAPTOGLOBIN Collection Time: 07/26/18 11:32 AM  
Result Value Ref Range Haptoglobin 199 30 - 200 mg/dL DIRECT & INDIRECT DANIEL Collection Time: 07/26/18 11:32 AM  
Result Value Ref Range FLOR Poly NEG Antibody screen NEG   
GLUCOSE, POC Collection Time: 07/26/18 11:33 AM  
Result Value Ref Range Glucose (POC) 192 (H) 65 - 100 mg/dL Performed by Chan 139, POC Collection Time: 07/26/18  4:10 PM  
Result Value Ref Range Glucose (POC) 152 (H) 65 - 100 mg/dL Performed by Chan 139, POC Collection Time: 07/26/18  9:26 PM  
Result Value Ref Range Glucose (POC) 171 (H) 65 - 100 mg/dL Performed by Brad Pacheco [place] Collection Time: 07/27/18  4:04 AM  
Result Value Ref Range Vancomycin, random 21.4 UG/ML  
CBC WITH AUTOMATED DIFF Collection Time: 07/27/18  4:04 AM  
Result Value Ref Range WBC 7.4 3.6 - 11.0 K/uL  
 RBC 2.91 (L) 3.80 - 5.20 M/uL HGB 7.9 (L) 11.5 - 16.0 g/dL HCT 26.8 (L) 35.0 - 47.0 % MCV 92.1 80.0 - 99.0 FL  
 MCH 27.1 26.0 - 34.0 PG  
 MCHC 29.5 (L) 30.0 - 36.5 g/dL  
 RDW 16.6 (H) 11.5 - 14.5 % PLATELET 560 520 - 981 K/uL MPV 8.7 (L) 8.9 - 12.9 FL  
 NRBC 0.0 0  WBC ABSOLUTE NRBC 0.00 0.00 - 0.01 K/uL NEUTROPHILS 52 32 - 75 %  LYMPHOCYTES 31 12 - 49 % MONOCYTES 11 5 - 13 % EOSINOPHILS 4 0 - 7 % BASOPHILS 1 0 - 1 % IMMATURE GRANULOCYTES 0 0.0 - 0.5 % ABS. NEUTROPHILS 3.9 1.8 - 8.0 K/UL  
 ABS. LYMPHOCYTES 2.3 0.8 - 3.5 K/UL  
 ABS. MONOCYTES 0.8 0.0 - 1.0 K/UL  
 ABS. EOSINOPHILS 0.3 0.0 - 0.4 K/UL  
 ABS. BASOPHILS 0.1 0.0 - 0.1 K/UL  
 ABS. IMM. GRANS. 0.0 0.00 - 0.04 K/UL  
 DF AUTOMATED METABOLIC PANEL, BASIC Collection Time: 07/27/18  4:04 AM  
Result Value Ref Range Sodium 135 (L) 136 - 145 mmol/L Potassium 4.1 3.5 - 5.1 mmol/L Chloride 98 97 - 108 mmol/L  
 CO2 28 21 - 32 mmol/L Anion gap 9 5 - 15 mmol/L Glucose 128 (H) 65 - 100 mg/dL BUN 25 (H) 6 - 20 MG/DL Creatinine 5.31 (H) 0.55 - 1.02 MG/DL  
 BUN/Creatinine ratio 5 (L) 12 - 20 GFR est AA 10 (L) >60 ml/min/1.73m2 GFR est non-AA 8 (L) >60 ml/min/1.73m2 Calcium 7.4 (L) 8.5 - 10.1 MG/DL  
GLUCOSE, POC Collection Time: 07/27/18  6:31 AM  
Result Value Ref Range Glucose (POC) 135 (H) 65 - 100 mg/dL Performed by Iona Hunter MD 
[address] [place] [address], [address] [facility] Phone - [phone] Fax - [phone] 
[email or web address]

## 2018-07-27 NOTE — PROGRESS NOTES
Bedside and Verbal shift change report given to Cass Resendez RN (oncoming nurse) by Venancio Jon RN (offgoing nurse). Report included the following information SBAR, Kardex, Procedure Summary, Intake/Output, MAR and Recent Results.

## 2018-07-27 NOTE — PROGRESS NOTES
[facility] Dialysis Team [facility]  [phone] Vitals   Pre   Post   Assessment   Pre   Post    
Temp  97.5  97.3 LOC  Alert and Oriented x 3 Alert and Oriented x 3 HR   72 78 Lungs   Even and Unlabored  Even and Unlabored B/P   126/57 98/58 Cardiac   S1 S2  S1 S2 Resp   16 16 Skin   Dry and Intact  Dry and Intact Pain level  0/10 0/10 Edema  +1 Lt Leg/ Pedal 
+2 Rt Leg/ Pedal 
 +1 Lt Leg/Ped  
+2 Rt Leg/Ped Orders: Duration:   Start:    5337 End:    2109 Total:   3.5 hours Dialyzer:   Dialyzer/Set Up Inspection: Eddie Gilbert (07/27/18 1716) K Bath:   Dialysate K (mEq/L): 2 (07/27/18 1716) Ca Bath:   Dialysate CA (mEq/L): 2.5 (07/27/18 1716) Na/Bicarb:   Dialysate NA (mEq/L): 140 (07/27/18 1716) Target Fluid Removal:   5000 ml. Access Type & Location:   Rt subclavian HD catheter Labs   Results reviewed Obtained/Reviewed Critical Results Called   Date when labs were drawn- 
Hgb-   
HGB Date Value Ref Range Status 07/27/2018 7.9 (L) 11.5 - 16.0 g/dL Final  
 
K-   
Potassium Date Value Ref Range Status 07/27/2018 4.1 3.5 - 5.1 mmol/L Final  
 
Ca-  
Calcium Date Value Ref Range Status 07/27/2018 7.4 (L) 8.5 - 10.1 MG/DL Final  
 
Bun-  
BUN Date Value Ref Range Status 07/27/2018 25 (H) 6 - 20 MG/DL Final  
 
Creat-  
Creatinine Date Value Ref Range Status 07/27/2018 5.31 (H) 0.55 - 1.02 MG/DL Final  
 
  
Medications/ Blood Products Given Name   Dose   Route and Time Albumin  12.5 g  IV @ 1900 Blood Volume Processed (BVP):    67.6 Net Fluid Removed:  4608 ml Comments Time Out Done: Yes @ [id number] Primary Nurse Rpt Pre: Chaparro Fernandez RN 
Primary Nurse Rpt Post: Irwin Ghosh RN 
Pt Education: 
Care Plan: Tx Summary:  Patient tolerated treatment very well. At the end of Hemodialysis treatment, all possible blood returned with NS 0.9%. Catheter ports flushed with 10 ml.  NS 0.9%. Instilled with NS 0.9% to dwell. A: 1.6 ml. V: 1.6 ml. Secured with red caps. Report given to Tiago Serra RN.------------Raquel Yusuf. Admiting Diagnosis: 
Pt's previous clinic- 
Consent signed - Informed Consent Verified: Yes (07/25/18 1415) Sinaita Consent - Yes Hepatitis Status-  Negative 7/25/18 Machine #- Machine Number: [id number] (07/27/18 8241) Telemetry status- 
Pre-dialysis wt. - Pre-Dialysis Weight: 131 kg (288 lb 12.8 oz) (07/25/18 9546)

## 2018-07-27 NOTE — PROGRESS NOTES
Bedside and Verbal shift change report given to Zachery Ugalde (oncoming nurse) by Jv Olson (offgoing nurse). Report included the following information SBAR, Kardex, Intake/Output and MAR.

## 2018-07-27 NOTE — PROGRESS NOTES
[address], [address] GI PROGRESS NOTE Jenny Lockhart, [address] office [phone] NP/PA in-hospital cell phone M-F until 4:30PM 
After 5PM or on weekends, please call  for physician on call NAME: Ferny Persaud :  1950 MRN:  [id number] Subjective: She had food wound debrided this morning, she reports some bleeding with that. She denies abdominal pain. She does have chronic intermittent nausea that is controlled with symptomatic treatment. She had a small bowel movement this morning, no melena or hematochezia. Objective: VITALS:  
Last 24hrs VS reviewed since prior progress note. Most recent are: 
Visit Vitals  BP (!) 120/39  Pulse 69  Temp 95.6 °F (35.3 °C)  Resp 16  
 Ht 6' 1\" (1.854 m)  Wt 137.8 kg (303 lb 12.7 oz)  SpO2 97%  BMI 40.08 kg/m2 PHYSICAL EXAM: 
General: Cooperative, no acute distress   
Neurologic:  Alert and oriented HEENT: EOMI, no scleral icterus Lungs:  CTA bilaterally anteriorly Heart:  S1 S2, regular rate and rhythm Abdomen: Soft, obese, non-distended, no tenderness, no guarding, no rebound. +Bowel sounds. Extremities: Warm; foot with wound vac Psych:   Not anxious or agitated. Lab Data Reviewed:  
 
Recent Results (from the past 24 hour(s)) CBC W/O DIFF Collection Time: 18 11:32 AM  
Result Value Ref Range WBC 9.0 3.6 - 11.0 K/uL  
 RBC 3.15 (L) 3.80 - 5.20 M/uL HGB 8.5 (L) 11.5 - 16.0 g/dL HCT 28.4 (L) 35.0 - 47.0 % MCV 90.2 80.0 - 99.0 FL  
 MCH 27.0 26.0 - 34.0 PG  
 MCHC 29.9 (L) 30.0 - 36.5 g/dL  
 RDW 16.6 (H) 11.5 - 14.5 % PLATELET 634 366 - 399 K/uL MPV 8.6 (L) 8.9 - 12.9 FL  
 NRBC 0.0 0  WBC ABSOLUTE NRBC 0.00 0.00 - 0.01 K/uL LD Collection Time: 18 11:32 AM  
Result Value Ref Range  81 - 246 U/L  
HAPTOGLOBIN Collection Time: 18 11:32 AM  
Result Value Ref Range Haptoglobin 199 30 - 200 mg/dL DIRECT & INDIRECT DANIEL Collection Time: 07/26/18 11:32 AM  
Result Value Ref Range FLOR Poly NEG Antibody screen NEG   
GLUCOSE, POC Collection Time: 07/26/18 11:33 AM  
Result Value Ref Range Glucose (POC) 192 (H) 65 - 100 mg/dL Performed by Chan 139, POC Collection Time: 07/26/18  4:10 PM  
Result Value Ref Range Glucose (POC) 152 (H) 65 - 100 mg/dL Performed by Chan 139, POC Collection Time: 07/26/18  9:26 PM  
Result Value Ref Range Glucose (POC) 171 (H) 65 - 100 mg/dL Performed by Cuca Bunn Collection Time: 07/27/18  4:04 AM  
Result Value Ref Range Vancomycin, random 21.4 UG/ML  
CBC WITH AUTOMATED DIFF Collection Time: 07/27/18  4:04 AM  
Result Value Ref Range WBC 7.4 3.6 - 11.0 K/uL  
 RBC 2.91 (L) 3.80 - 5.20 M/uL HGB 7.9 (L) 11.5 - 16.0 g/dL HCT 26.8 (L) 35.0 - 47.0 % MCV 92.1 80.0 - 99.0 FL  
 MCH 27.1 26.0 - 34.0 PG  
 MCHC 29.5 (L) 30.0 - 36.5 g/dL  
 RDW 16.6 (H) 11.5 - 14.5 % PLATELET 768 251 - 338 K/uL MPV 8.7 (L) 8.9 - 12.9 FL  
 NRBC 0.0 0  WBC ABSOLUTE NRBC 0.00 0.00 - 0.01 K/uL NEUTROPHILS 52 32 - 75 % LYMPHOCYTES 31 12 - 49 % MONOCYTES 11 5 - 13 % EOSINOPHILS 4 0 - 7 % BASOPHILS 1 0 - 1 % IMMATURE GRANULOCYTES 0 0.0 - 0.5 % ABS. NEUTROPHILS 3.9 1.8 - 8.0 K/UL  
 ABS. LYMPHOCYTES 2.3 0.8 - 3.5 K/UL  
 ABS. MONOCYTES 0.8 0.0 - 1.0 K/UL  
 ABS. EOSINOPHILS 0.3 0.0 - 0.4 K/UL  
 ABS. BASOPHILS 0.1 0.0 - 0.1 K/UL  
 ABS. IMM. GRANS. 0.0 0.00 - 0.04 K/UL  
 DF AUTOMATED METABOLIC PANEL, BASIC Collection Time: 07/27/18  4:04 AM  
Result Value Ref Range Sodium 135 (L) 136 - 145 mmol/L Potassium 4.1 3.5 - 5.1 mmol/L Chloride 98 97 - 108 mmol/L  
 CO2 28 21 - 32 mmol/L Anion gap 9 5 - 15 mmol/L Glucose 128 (H) 65 - 100 mg/dL BUN 25 (H) 6 - 20 MG/DL  Creatinine 5.31 (H) 0.55 - 1.02 MG/DL  
 BUN/Creatinine ratio 5 (L) 12 - 20 GFR est AA 10 (L) >60 ml/min/1.73m2 GFR est non-AA 8 (L) >60 ml/min/1.73m2 Calcium 7.4 (L) 8.5 - 10.1 MG/DL  
GLUCOSE, POC Collection Time: 07/27/18  6:31 AM  
Result Value Ref Range Glucose (POC) 135 (H) 65 - 100 mg/dL Performed by Jearl Meckel Assessment: · Acute blood loss anemia: Status post 4 units PRBCs. No reflux, upper abdominal pain, or melena. No NSAIDs. No anticoagulation. Hgb 7.9 · Status post right heel debridement with partial calcanectomy with ongoing bleeding from surgical site · History of end stage renal disease on MWF hemodialysis: nephrology following. Patient Active Problem List  
Diagnosis Code  Morbid obesity ([facility]) E66.01  
 Diabetes mellitus type 2, insulin dependent ([facility]) E11.9, Z79.4  
 HTN (hypertension) I10  
 Neuropathy G62.9  Arthritis M19.90  Abdominal pain, chronic, epigastric R10.13, G89.29  
 Serratia wound infection, 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 A49.8  Obstructive sleep apnea (adult) (pediatric) G47.33  
 Perineal abscess L02.215  Abscess of deep perineal space N34.0  ESRD (end stage renal disease) on dialysis ([facility]) N18.6, Z99.2  Perianal abscess K61.0  Type 2 diabetes mellitus with nephropathy ([facility]) E11.21  
 Fecal impaction ([facility] K56.41  
 ESRD needing dialysis ([facility] N18.6, Z99.2  Anemia D64.9  Bilateral leg edema R60.0  LFT elevation R79.89  Type 2 diabetes mellitus with hyperglycemia ([facility]) E11.65  Bilateral lower leg cellulitis L03.116, L03.115  
 Osteomyelitis of foot, right, acute ([facility]) M86.171  
 Hyponatremia E87.1  Sacral wound S31.000A  Ankle wound, left, initial encounter S91.002A  FARHAT on CPAP G47.33, Z99.89  
 Acute blood loss anemia D62  Foot osteomyelitis, right ([address] M86.9  Acute blood loss as cause of postoperative anemia D62 Plan: · Trend CBC and transfuse as necessary · No signs of active GI bleeding - will sign off, please call for questions or change in clinical status Signed By: Daryn Bucio NP   
 7/27/2018  12:46 PM 
  
 
 I have examined the patient. I have reviewed the chart and agree with the documentation recorded by the NP, including the assessment, treatment plan, and disposition.  
 
 
 
Can Cisneros MD

## 2018-07-27 NOTE — PROGRESS NOTES
Hematology Progress Note: 
 
Consult has been cancelled. Discussed with Dr. Gabriela Cabot and we do not need to see.  
 
 
 
Kasia Swain NP

## 2018-07-28 VITALS
RESPIRATION RATE: 16 BRPM | WEIGHT: 293 LBS | BODY MASS INDEX: 39.68 KG/M2 | HEART RATE: 83 BPM | HEIGHT: 72 IN | OXYGEN SATURATION: 95 % | TEMPERATURE: 97.8 F | DIASTOLIC BLOOD PRESSURE: 75 MMHG | SYSTOLIC BLOOD PRESSURE: 143 MMHG

## 2018-07-28 LAB
GLUCOSE BLD STRIP.AUTO-MCNC: 133 MG/DL (ref 65–100)
GLUCOSE BLD STRIP.AUTO-MCNC: 154 MG/DL (ref 65–100)
SERVICE CMNT-IMP: ABNORMAL
SERVICE CMNT-IMP: ABNORMAL

## 2018-07-28 PROCEDURE — 82962 GLUCOSE BLOOD TEST: CPT

## 2018-07-28 PROCEDURE — 3331090001 HH PPS REVENUE CREDIT

## 2018-07-28 PROCEDURE — 3331090002 HH PPS REVENUE DEBIT

## 2018-07-28 PROCEDURE — 74011250637 HC RX REV CODE- 250/637: Performed by: HOSPITALIST

## 2018-07-28 PROCEDURE — 74011250637 HC RX REV CODE- 250/637: Performed by: INTERNAL MEDICINE

## 2018-07-28 PROCEDURE — 74011000250 HC RX REV CODE- 250: Performed by: PODIATRIST

## 2018-07-28 PROCEDURE — 74011000258 HC RX REV CODE- 258: Performed by: PODIATRIST

## 2018-07-28 RX ORDER — OXYCODONE AND ACETAMINOPHEN 5; 325 MG/1; MG/1
1-2 TABLET ORAL
Qty: 5 TAB | Refills: 0 | Status: SHIPPED | OUTPATIENT
Start: 2018-07-28

## 2018-07-28 RX ADMIN — GABAPENTIN 100 MG: 100 CAPSULE ORAL at 11:05

## 2018-07-28 RX ADMIN — AZTREONAM 500 MG: 1 INJECTION, POWDER, LYOPHILIZED, FOR SOLUTION INTRAMUSCULAR; INTRAVENOUS at 11:04

## 2018-07-28 RX ADMIN — OXYCODONE HYDROCHLORIDE AND ACETAMINOPHEN 1000 MG: 500 TABLET ORAL at 11:05

## 2018-07-28 RX ADMIN — SEVELAMER CARBONATE 800 MG: 800 TABLET, FILM COATED ORAL at 11:05

## 2018-07-28 RX ADMIN — AZTREONAM 500 MG: 1 INJECTION, POWDER, LYOPHILIZED, FOR SOLUTION INTRAMUSCULAR; INTRAVENOUS at 00:57

## 2018-07-28 RX ADMIN — OXYCODONE AND ACETAMINOPHEN 2 TABLET: 5; 325 TABLET ORAL at 03:27

## 2018-07-28 RX ADMIN — NEPHROCAP 1 CAPSULE: 1 CAP ORAL at 09:00

## 2018-07-28 RX ADMIN — OXYCODONE AND ACETAMINOPHEN 2 TABLET: 5; 325 TABLET ORAL at 11:06

## 2018-07-28 NOTE — PROGRESS NOTES
Patient transferred via ambulance with all belongings, documents, MAR, discharge instructions and rx for percocet. Attempted another call to [facility] and still no one answers call at Nursing station. Able to get  at facility but no answer when call transferred.

## 2018-07-28 NOTE — PROGRESS NOTES
Third call attempted to  [facility] and continue to get no answer at nursing station. Charge nurse Nevin Cordova informed.

## 2018-07-28 NOTE — PROGRESS NOTES
Wound vac removed from right heel wound, wet to dry sterile dsg applied to site in prep for transfer to [facility]. Jacob catheter flushed with 20ml of NS and has blood return both ports. Dialysis catheter intact in right chest. Dsg on buttocks intact and was changed yesterday.

## 2018-07-28 NOTE — PROGRESS NOTES
Call from patient's son at [facility] at [phone] regarding the facility's inability to get a wound vac for this patient. These plans were made yesterday by our  Pearley Lanes. I spoke with Nancy Vazquez cell [phone], Director of Nursing and informed her of attempts to call report. She is working on getting the patient a wound vac. Also able to speak to patient's receiving nurse, Sylvain Salazar to give report on this patient. All documents, orders and an rx sent with the patient via ambulance service at discharge. Spoke again with patient's son, Yadira Nicole, at [phone] to update him on the conversation with the DON at [facility] and also that all these things were already arranged yesterday by our . I called the weekend , Rodrigo Wise, and informed him of these issues and gave him all numbers to reach the involved parties.

## 2018-07-28 NOTE — PROGRESS NOTES
5110-- Post Discharge Note: 
CM received call from the [address] [place] [address]. Per staff nurse, she received call from [facility] stating that wound vac was not available at the facility as anticipated. Staff nurse had noted 3 unsuccessful attempts to communicate with the facilty at the time of discharge. Staff nurse said that she spoke with Artie Goodell, Clotilde Parker on Duty cell [phone], who was engaged in addressing the issue. Per staff nurse, wet to dry dressings q8 hours would be a standard practice until the wound vac was placed. Staff nurse also spoke with son Dmitry Bass [phone] and gave him a thorough briefing and reassurance. However, the staff nurse said that son continued to be upset about the situation. FER spoke with Artie Goodell who said that efforts were being made to get a wound vac delivery from [facility], that a facility nurse with wound care nurse experience was on site to initiate the wet to dry dressing q8 hours procedure, and that she was in communication with her superiors to continue any additional problem solving. Ms Charles Guillen expressed confidence that the facility can manage the situation and the care of the patient. However, Ms Charles Guillen said that she is aware that the son is upset and that he may independently initiate action to return patient to the hospital here. FER spoke with Jose De Jesus Maldonado, [facility] Care Management Dept Manager, via phone, and verbal update was given.

## 2018-07-28 NOTE — DISCHARGE SUMMARY
Discharge Summary       PATIENT ID: Petty Maharaj  MRN: [id number]   YOB: 1950    DATE OF ADMISSION: 7/22/2018  8:20 AM    DATE OF DISCHARGE: 7/28/2018   PRIMARY CARE PROVIDER: Norman Lloyd MD     ATTENDING PHYSICIAN: Dr Elliot Smiley  DISCHARGING PROVIDER: Elliot Smiley MD    To contact this individual call [phone] and ask the  to page. If unavailable ask to be transferred the Adult Hospitalist Department. CONSULTATIONS: IP CONSULT TO PODIATRY  IP CONSULT TO NEPHROLOGY  IP CONSULT TO GASTROENTEROLOGY  IP CONSULT TO HOSPITALIST    PROCEDURES/SURGERIES: * No surgery found *    ADMITTING DIAGNOSES & HOSPITAL COURSE:   Acute blood loss anemia on underlying chronic anemia due to ESRD/GI bleed:  -Bleed from recently operated right heel. -S/p two unit pRBCs 7/22. Transfused with 2 units PRBC during HD 7/25  -Monitor CBC and transfuse as needed    Probable GI bleed  -has reported history of hemorrhoids  -Appreciate discussion with GI    S/p right heel debridement with partial calcanectomy on 7/12/18 for infected right heel wound and OM. -Had profuse bleeding from the surgical site after she put pressure on her foot,  She was 100% non weightbearing on her right foot after her surgery.   -Continue antibiotics aztreonam and vancomycin until 07/29/2018 .  -Podiatry cleared the patient for discharge    ESRD on HD  -Nephrology on board. DM type 2  -on Levemir/SSI at home  -Currently on SSI, blood sugars reasonable  -Monitor    Right hand possible steal syndrome  -Outpatient follow up    Peripheral neuropathy  -likely diabetic/renal  -on neurontin    HTN  -stable    Morbid obesity  -diet and exercise    FARHAT  -on CPAP    Anemia of chronic disease  -on Epo    Code status: full  DVT prophylaxis: scd        DISCHARGE DIAGNOSES / PLAN:      1. Right heel debridement  2.  Anemia       PENDING TEST RESULTS:   At the time of discharge the following test results are still pending: none    FOLLOW UP APPOINTMENTS:    Follow-up Information     Follow up With Details Comments [address]   [address] [address] Mingo Lopez MD In 3 days  [address]  [phone]             ADDITIONAL CARE RECOMMENDATIONS:   Follow up with PMD in 3-4 days  Follow up with Dr. Niya Gibson @ [address] in 1-2 weeks. Antibiotics to be stopped on 7/29    DIET: Diabetic Diet    ACTIVITY: Activity as tolerated and as recommended by Podiatry    Wound care:   1.) Apply traditional wound vac (not RAYMUNDO vac) to right heel ulcer and set vac to 125 mmHg continuous therapy.  Change vac dressing every other day or per facility protocol. 2.) Strict non weightbearing to right foot.  Keep multi-podus boot on right foot at all times. DISCHARGE MEDICATIONS:   See Medication Reconciliation Form      NOTIFY YOUR PHYSICIAN FOR ANY OF THE FOLLOWING:   Fever over 101 degrees for 24 hours. Chest pain, shortness of breath, fever, chills, nausea, vomiting, diarrhea, change in mentation, falling, weakness, bleeding. Severe pain or pain not relieved by medications. Or, any other signs or symptoms that you may have questions about.     DISPOSITION:    Home With:   OT  PT  HH  RN      xx SNF/Inpatient Rehab/LTAC    Independent/assisted living    Hospice    Other:       PATIENT CONDITION AT DISCHARGE:     Functional status    Poor    x Deconditioned     Independent      Cognition    x Lucid     Forgetful     Dementia      Catheters/lines (plus indication)    De Leon     PICC     PEG    x None      Code status   x  Full code     DNR      PHYSICAL EXAMINATION AT DISCHARGE:  Please see progress note    CHRONIC MEDICAL DIAGNOSES:  Problem List as of 7/28/2018  Date Reviewed: 7/19/2018          Codes Class Noted - Resolved    * (Principal)Acute blood loss anemia ICD-10-CM: D62  ICD-9-CM: 285.1  7/22/2018 - Present        Foot osteomyelitis, right ([facility] ICD-10-CM: M86.9  ICD-9-CM: 730.27  7/22/2018 - Present        Acute blood loss as cause of postoperative anemia ICD-10-CM: D62  ICD-9-CM: 285.1  7/22/2018 - Present        Osteomyelitis of foot, right, acute ([facility] ICD-10-CM: M86.171  ICD-9-CM: 730.07  7/19/2018 - Present        Hyponatremia ICD-10-CM: E87.1  ICD-9-CM: 276.1  7/19/2018 - Present        Sacral wound ICD-10-CM: S31.000A  ICD-9-CM: 959.19  7/19/2018 - Present        Ankle wound, left, initial encounter ICD-10-CM: S91.002A  ICD-9-CM: 891.0  7/19/2018 - Present        FARHAT on CPAP (Chronic) ICD-10-CM: G47.33, Z99.89  ICD-9-CM: 327.23, V46.8  7/19/2018 - Present        Anemia ICD-10-CM: D64.9  ICD-9-CM: 285.9  7/6/2018 - Present        Bilateral leg edema ICD-10-CM: R60.0  ICD-9-CM: 782.3  7/6/2018 - Present        LFT elevation ICD-10-CM: R79.89  ICD-9-CM: 790.6  7/6/2018 - Present        Type 2 diabetes mellitus with hyperglycemia ([facility] ICD-10-CM: E11.65  ICD-9-CM: 250.00  7/6/2018 - Present        Bilateral lower leg cellulitis ICD-10-CM: L03.116, L03.115  ICD-9-CM: 682.6  7/6/2018 - Present        Fecal impaction ([facility] ICD-10-CM: K56.41  ICD-9-CM: 560.32  2/13/2018 - Present        ESRD needing dialysis ([facility] ICD-10-CM: N18.6, Z99.2  ICD-9-CM: 585.6  2/13/2018 - Present        Type 2 diabetes mellitus with nephropathy ([facility] ICD-10-CM: E11.21  ICD-9-CM: 250.40, 583.81  1/31/2018 - Present        Perianal abscess ICD-10-CM: K61.0  ICD-9-CM: 256  10/26/2017 - Present        ESRD (end stage renal disease) on dialysis [facility]) ICD-10-CM: N18.6, Z99.2  ICD-9-CM: 585.6, V45.11  6/20/2017 - Present        Abscess of deep perineal space ICD-10-CM: N34.0  ICD-9-CM: 597.0  6/17/2017 - Present        Perineal abscess ICD-10-CM: L02.215  ICD-9-CM: 682.2  1/25/2017 - Present        Obstructive sleep apnea (adult) (pediatric) ICD-10-CM: G47.33  ICD-9-CM: 327.23  12/13/2011 - Present        Serratia wound infection, 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 ICD-10-CM: A49.8  ICD-9-CM: 041.85  6/14/2011 - Present        Abdominal pain, chronic, epigastric ICD-10-CM: R10.13, G89.29  ICD-9-CM: 789.06, 338.29  1/12/2011 - Present        Morbid obesity ([facility] ICD-10-CM: E66.01  ICD-9-CM: 278.01  10/14/2010 - Present    Overview Signed 6/20/2017 11:05 AM by Norah Latham NP     Body mass index is 36.4 kg/(m^2).                Diabetes mellitus type 2, insulin dependent ([facility] ICD-10-CM: E11.9, Z79.4  ICD-9-CM: 250.00, V58.67  10/14/2010 - Present        HTN (hypertension) ICD-10-CM: I10  ICD-9-CM: 401.9  10/14/2010 - Present        Neuropathy ICD-10-CM: G62.9  ICD-9-CM: 355.9  10/14/2010 - Present        Arthritis ICD-10-CM: M19.90  ICD-9-CM: 716.90  10/14/2010 - Present        RESOLVED: Weakness ICD-10-CM: R53.1  ICD-9-CM: 780.79  4/9/2018 - 4/11/2018        RESOLVED: Hyperkalemia ICD-10-CM: E87.5  ICD-9-CM: 276.7  2/13/2018 - 4/11/2018        RESOLVED: Anal cryptitis ICD-10-CM: K62.89  ICD-9-CM: 569.49  6/4/2012 - 7/21/2017        RESOLVED: s/p debridement of midline abd wound ICD-9-CM: Joaquina Ha  6/24/2011 - 7/21/2017              Greater than 35 minutes were spent with the patient on counseling and coordination of care    Signed:   Fernie Colvin MD  7/28/2018  8:56 AM

## 2018-07-28 NOTE — PROGRESS NOTES
Attempted to call report to [facility] AND [facility] for report and no one answers at nursing station. Will recall.

## 2018-07-28 NOTE — PROGRESS NOTES
Bedside and Verbal shift change report given to Bettina Martínez RN (oncoming nurse) by Edis San RN (offgoing nurse). Report included the following information SBAR, Kardex, ED Summary, OR Summary, Procedure Summary, Intake/Output, MAR and Recent Results. ]

## 2018-07-28 NOTE — DISCHARGE INSTRUCTIONS
Discharge SNF/Rehab Instructions/LTAC       PATIENT ID: Devante Sandhu  MRN: [id number]   YOB: 1950    DATE OF ADMISSION: 7/22/2018  8:20 AM    DATE OF DISCHARGE: 7/28/2018    PRIMARY CARE PROVIDER: Antonio Nogueira MD       ATTENDING PHYSICIAN: Caesar Ramos MD  DISCHARGING PROVIDER: Caesar Ramos MD     To contact this individual call [phone] and ask the  to page. If unavailable ask to be transferred the Adult Hospitalist Department. CONSULTATIONS: IP CONSULT TO PODIATRY  IP CONSULT TO NEPHROLOGY  IP CONSULT TO GASTROENTEROLOGY  IP CONSULT TO HOSPITALIST    PROCEDURES/SURGERIES: * No surgery found *    ADMITTING DIAGNOSES & HOSPITAL COURSE:   Acute blood loss anemia on underlying chronic anemia due to ESRD/GI bleed:  -Bleed from recently operated right heel. -S/p two unit pRBCs 7/22. Transfused with 2 units PRBC during HD 7/25  -Monitor CBC and transfuse as needed    Probable GI bleed  -has reported history of hemorrhoids  -Appreciate discussion with GI    S/p right heel debridement with partial calcanectomy on 7/12/18 for infected right heel wound and OM. -Had profuse bleeding from the surgical site after she put pressure on her foot,  She was 100% non weightbearing on her right foot after her surgery.   -Continue antibiotics aztreonam and vancomycin until 07/29/2018 .  -Podiatry cleared the patient for discharge    ESRD on HD  -Nephrology on board. DM type 2  -on Levemir/SSI at home  -Currently on SSI, blood sugars reasonable  -Monitor    Right hand possible steal syndrome  -Outpatient follow up    Peripheral neuropathy  -likely diabetic/renal  -on neurontin    HTN  -stable    Morbid obesity  -diet and exercise    FARHAT  -on CPAP    Anemia of chronic disease  -on Epo    Code status: full  DVT prophylaxis: scd        DISCHARGE DIAGNOSES / PLAN:      1. Right heel debridement  2.  Anemia       PENDING TEST RESULTS:   At the time of discharge the following test results are still pending: none    FOLLOW UP APPOINTMENTS:    Follow-up Information     Follow up With Details Comments [address]   [address] [address] Mingo Rivas MD In 3 days  [address]  [phone]             ADDITIONAL CARE RECOMMENDATIONS:   Follow up with PMD in 3-4 days  Follow up with Dr. Ko Taylor @ [address] in 1-2 weeks. Antibiotics to be stopped on 7/29    DIET: Diabetic Diet    ACTIVITY: Activity as tolerated and as recommended by Podiatry    Wound care:   1.) Apply traditional wound vac (not RAYMUNDO vac) to right heel ulcer and set vac to 125 mmHg continuous therapy.  Change vac dressing every other day or per facility protocol. 2.) Strict non weightbearing to right foot.  Keep multi-podus boot on right foot at all times. DISCHARGE MEDICATIONS:   See Medication Reconciliation Form      NOTIFY YOUR PHYSICIAN FOR ANY OF THE FOLLOWING:   Fever over 101 degrees for 24 hours. Chest pain, shortness of breath, fever, chills, nausea, vomiting, diarrhea, change in mentation, falling, weakness, bleeding. Severe pain or pain not relieved by medications. Or, any other signs or symptoms that you may have questions about.     DISPOSITION:    Home With:   OT  PT  HH  RN      xx SNF/Inpatient Rehab/LTAC    Independent/assisted living    Hospice    Other:       PATIENT CONDITION AT DISCHARGE:     Functional status    Poor    x Deconditioned     Independent      Cognition    x Lucid     Forgetful     Dementia      Catheters/lines (plus indication)    De Leon     PICC     PEG    x None      Code status   x  Full code     DNR      PHYSICAL EXAMINATION AT DISCHARGE:  Please see progress note      CHRONIC MEDICAL DIAGNOSES:  Problem List as of 7/28/2018  Date Reviewed: 7/19/2018          Codes Class Noted - Resolved    * (Principal)Acute blood loss anemia ICD-10-CM: D62  ICD-9-CM: 285.1 7/22/2018 - Present        Foot osteomyelitis, right ([facility] ICD-10-CM: M86.9  ICD-9-CM: 730.27  7/22/2018 - Present        Acute blood loss as cause of postoperative anemia ICD-10-CM: D62  ICD-9-CM: 285.1  7/22/2018 - Present        Osteomyelitis of foot, right, acute ([facility] ICD-10-CM: M86.171  ICD-9-CM: 730.07  7/19/2018 - Present        Hyponatremia ICD-10-CM: E87.1  ICD-9-CM: 276.1  7/19/2018 - Present        Sacral wound ICD-10-CM: S31.000A  ICD-9-CM: 959.19  7/19/2018 - Present        Ankle wound, left, initial encounter ICD-10-CM: S91.002A  ICD-9-CM: 891.0  7/19/2018 - Present        FARHAT on CPAP (Chronic) ICD-10-CM: G47.33, Z99.89  ICD-9-CM: 327.23, V46.8  7/19/2018 - Present        Anemia ICD-10-CM: D64.9  ICD-9-CM: 285.9  7/6/2018 - Present        Bilateral leg edema ICD-10-CM: R60.0  ICD-9-CM: 782.3  7/6/2018 - Present        LFT elevation ICD-10-CM: R79.89  ICD-9-CM: 790.6  7/6/2018 - Present        Type 2 diabetes mellitus with hyperglycemia ([facility] ICD-10-CM: E11.65  ICD-9-CM: 250.00  7/6/2018 - Present        Bilateral lower leg cellulitis ICD-10-CM: L03.116, L03.115  ICD-9-CM: 682.6  7/6/2018 - Present        Fecal impaction ([facility] ICD-10-CM: K56.41  ICD-9-CM: 560.32  2/13/2018 - Present        ESRD needing dialysis ([facility] ICD-10-CM: N18.6, Z99.2  ICD-9-CM: 585.6  2/13/2018 - Present        Type 2 diabetes mellitus with nephropathy ([address] ICD-10-CM: E11.21  ICD-9-CM: 250.40, 583.81  1/31/2018 - Present        Perianal abscess ICD-10-CM: K61.0  ICD-9-CM: 904  10/26/2017 - Present        ESRD (end stage renal disease) on dialysis [facility]) ICD-10-CM: N18.6, Z99.2  ICD-9-CM: 585.6, V45.11  6/20/2017 - Present        Abscess of deep perineal space ICD-10-CM: N34.0  ICD-9-CM: 597.0  6/17/2017 - Present        Perineal abscess ICD-10-CM: L02.215  ICD-9-CM: 682.2  1/25/2017 - Present        Obstructive sleep apnea (adult) (pediatric) ICD-10-CM: G47.33  ICD-9-CM: 327.23  12/13/2011 - Present        Serratia wound infection, 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 ICD-10-CM: A49.8  ICD-9-CM: 041.85  6/14/2011 - Present        Abdominal pain, chronic, epigastric ICD-10-CM: R10.13, G89.29  ICD-9-CM: 789.06, 338.29  1/12/2011 - Present        Morbid obesity ([facility] ICD-10-CM: E66.01  ICD-9-CM: 278.01  10/14/2010 - Present    Overview Signed 6/20/2017 11:05 AM by Ladonna Cramer NP     Body mass index is 36.4 kg/(m^2).                Diabetes mellitus type 2, insulin dependent ([facility] ICD-10-CM: E11.9, Z79.4  ICD-9-CM: 250.00, V58.67  10/14/2010 - Present        HTN (hypertension) ICD-10-CM: I10  ICD-9-CM: 401.9  10/14/2010 - Present        Neuropathy ICD-10-CM: G62.9  ICD-9-CM: 355.9  10/14/2010 - Present        Arthritis ICD-10-CM: M19.90  ICD-9-CM: 716.90  10/14/2010 - Present        RESOLVED: Weakness ICD-10-CM: R53.1  ICD-9-CM: 780.79  4/9/2018 - 4/11/2018        RESOLVED: Hyperkalemia ICD-10-CM: E87.5  ICD-9-CM: 276.7  2/13/2018 - 4/11/2018        RESOLVED: Anal cryptitis ICD-10-CM: K62.89  ICD-9-CM: 569.49  6/4/2012 - 7/21/2017        RESOLVED: s/p debridement of midline abd wound ICD-9-CM: Jessica Irving  6/24/2011 - 7/21/2017                CDMP Checked:   Yes x     PROBLEM LIST Updated:  Yes x         Signed:   Caesar Ramos MD  7/28/2018  8:54 AM

## 2018-07-28 NOTE — PROGRESS NOTES
Hospitalist Progress Note Paulino Stoner MD 
Answering service: [phone] OR [phone] from in house phone Date of Service:  2018 NAME:  Kun Flores :  1950 MRN:  [id number] Admission Summary:  
The patient is a 67-year-old -American female with past medical history of anemia, asthma, anxiety, end-stage renal disease on hemodialysis ,  and , chronic pain, depression, GERD. She presented to the ER from [facility] on account of profuse bleeding from the right heel surgical site. She reports that she woke up this morning. Patient was recently discharged from [facility] on 2018 after hospital admission for acute bilateral foot cellulitis with infected right heel wound requiring debridement of right heel ulcer with partial calcanectomy. She returned again to the ER stating that this morning she woke up and she went to the bedside commode and while she was on the commode, she noticed profuse bleeding from her right heel. The CNA notes this at the rehabilitation center came decided to call EMS. She denies associated lightheadedness, dizziness or syncope. Today in the emergency department, hemoglobin was 6.9 and she has been typed and crossed with 2 units of packed red blood and 1 unit of packed red blood cell transfusion is currently ongoing. She denies chest pain, shortness of breath, although she tells me that she feels swollen all over. She denies any fever, chills or rigors. 
  
Recent hospitalization as mentioned above, admitted on 2018 and discharged on the  for right heel infected wound with osteomyelitis. She was discharged home on aztreonam until 2018 and vancomycin. Interval history / Subjective:  
  F/u anemia No new issues No apparent bleeding 
wound vac in place No new issues Discharge today Assessment & Plan: Acute blood loss anemia on underlying chronic anemia due to ESRD/GI bleed: 
-Bleed from recently operated right heel. -S/p two unit pRBCs 7/22. Transfused with 2 units PRBC during HD 7/25 
-Monitor CBC and transfuse as needed Probable GI bleed 
-has reported history of hemorrhoids 
-Appreciate discussion with GI 
 
S/p right heel debridement with partial calcanectomy on 7/12/18 for infected right heel wound and OM. -Had profuse bleeding from the surgical site after she put pressure on her foot,  She was 100% non weightbearing on her right foot after her surgery.  
-Continue antibiotics aztreonam and vancomycin until 07/29/2018 . 
-Podiatry cleared the patient for discharge ESRD on HD 
-Nephrology on board. DM type 2 
-on Levemir/SSI at home 
-Currently on SSI, blood sugars reasonable 
-Monitor Right hand possible steal syndrome 
-Outpatient follow up Peripheral neuropathy 
-likely diabetic/renal 
-on neurontin HTN 
-stable Morbid obesity 
-diet and exercise FARHAT -on CPAP Anemia of chronic disease 
-on Epo Code status: full DVT prophylaxis: scd Plan: Discharge today Care Plan discussed with: Patient/Family and Nurse Disposition: as above Hospital Problems  Date Reviewed: 7/19/2018 Codes Class Noted POA * (Principal)Acute blood loss anemia ICD-10-CM: D62 
ICD-9-CM: 285.1  7/22/2018 Yes Foot osteomyelitis, right ([address] ICD-10-CM: M86.9 ICD-9-CM: 730.27  7/22/2018 Unknown Acute blood loss as cause of postoperative anemia ICD-10-CM: D62 
ICD-9-CM: 285.1  7/22/2018 Unknown Review of Systems: A comprehensive review of systems was negative except for that written in the HPI. Vital Signs:  
 Last 24hrs VS reviewed since prior progress note. Most recent are: 
Visit Vitals  /50 (BP 1 Location: Left arm, BP Patient Position: At rest)  Pulse 82  Temp 97.5 °F (36.4 °C)  Resp 16  
 Ht 6' 1\" (1.854 m)  Wt 139.6 kg (307 lb 11.2 oz)  SpO2 96%  BMI 40.6 kg/m2 Intake/Output Summary (Last 24 hours) at 07/28/18 2716 Last data filed at 07/27/18 2109 Gross per 24 hour Intake              530 ml Output             4608 ml Net            -4078 ml Physical Examination:  
 
 
     
Constitutional:  No acute distress, cooperative, pleasant   
ENT:  Oral mucous moist, oropharynx benign. Neck supple, Resp:  CTA bilaterally. No wheezing/rhonchi/rales. No accessory muscle use CV:  Regular rhythm, normal rate, no murmurs, gallops, rubs GI:  Soft, non distended, non tender. normoactive bowel sounds, no hepatosplenomegaly Musculoskeletal:  right foot dressing Neurologic:  Moves all extremities. AAOx3, CN II-XII reviewed Data Review:  
 Review and/or order of clinical lab test 
 
 
Labs:  
 
Recent Labs  
   07/27/18 
 0404  07/26/18 
 1132 WBC  7.4  9.0 HGB  7.9*  8.5* HCT  26.8*  28.4*  
PLT  211  247 Recent Labs  
   07/27/18 
 0404 NA  135* K  4.1 CL  98  
CO2  28 BUN  25* CREA  5.31* GLU  128* CA  7.4* No results for input(s): SGOT, GPT, ALT, AP, TBIL, TBILI, TP, ALB, GLOB, GGT, AML, LPSE in the last 72 hours. No lab exists for component: AMYP, HLPSE No results for input(s): INR, PTP, APTT in the last 72 hours. No lab exists for component: INREXT, INREXT No results for input(s): FE, TIBC, PSAT, FERR in the last 72 hours. No results found for: FOL, RBCF No results for input(s): PH, PCO2, PO2 in the last 72 hours. No results for input(s): CPK, CKNDX, TROIQ in the last 72 hours. No lab exists for component: CPKMB Lab Results Component Value Date/Time Cholesterol, total 144 04/10/2018 02:53 AM  
 HDL Cholesterol 66 04/10/2018 02:53 AM  
 LDL, calculated 67.6 04/10/2018 02:53 AM  
 Triglyceride 52 04/10/2018 02:53 AM  
 CHOL/HDL Ratio 2.2 04/10/2018 02:53 AM  
 
Lab Results Component Value Date/Time  Glucose (POC) 133 (H) 07/28/2018 06:31 AM  
 Glucose (POC) 125 (H) 07/27/2018 09:35 PM  
 Glucose (POC) 179 (H) 07/27/2018 04:29 PM  
 Glucose (POC) 148 (H) 07/27/2018 11:24 AM  
 Glucose (POC) 135 (H) 07/27/2018 06:31 AM  
 
Lab Results Component Value Date/Time Color YELLOW 10/27/2012 10:25 PM  
 Appearance CLEAR 10/27/2012 10:25 PM  
 Specific gravity 1.012 10/27/2012 10:25 PM  
 Specific gravity 1.015 08/30/2010 07:10 AM  
 pH (UA) 8.0 10/27/2012 10:25 PM  
 Protein 100 (A) 10/27/2012 10:25 PM  
 Glucose NEGATIVE  10/27/2012 10:25 PM  
 Ketone NEGATIVE  10/27/2012 10:25 PM  
 Bilirubin NEGATIVE  10/27/2012 10:25 PM  
 Urobilinogen 1.0 10/27/2012 10:25 PM  
 Nitrites NEGATIVE  10/27/2012 10:25 PM  
 Leukocyte Esterase TRACE (A) 10/27/2012 10:25 PM  
 Epithelial cells 0-5 10/27/2012 10:25 PM  
 Bacteria NEGATIVE  10/27/2012 10:25 PM  
 WBC 10-20 10/27/2012 10:25 PM  
 RBC 5-10 10/27/2012 10:25 PM  
 
 
 
Medications Reviewed:  
 
Current Facility-Administered Medications Medication Dose Route Frequency  aztreonam (AZACTAM) 500 mg in 0.9% sodium chloride 100 mL IVPB  500 mg IntraVENous Q12H  
 vancomycin (VANCOCIN) 500 mg in 0.9% sodium chloride (MBP/ADV) 100 mL  500 mg IntraVENous DIALYSIS MON, WED & FRI  diphenhydrAMINE (BENADRYL) 12.5 mg/5 mL oral elixir 12.5 mg  12.5 mg Oral Q6H PRN  
 gabapentin (NEURONTIN) capsule 100 mg  100 mg Oral BID  
 0.9% sodium chloride infusion 250 mL  250 mL IntraVENous PRN  
 midodrine (PROAMITINE) tablet 5 mg  5 mg Oral Once per day on Mon Wed Fri  
 oxyCODONE-acetaminophen (PERCOCET) 5-325 mg per tablet 1-2 Tab  1-2 Tab Oral Q4H PRN  
 albumin human 25% (BUMINATE) solution 50 g  50 g IntraVENous DIALYSIS PRN  
 alum-mag hydroxide-simeth (MYLANTA) oral suspension 30 mL  30 mL Oral Q4H PRN  
 ascorbic acid (vitamin C) (VITAMIN C) tablet 1,000 mg  1,000 mg Oral DAILY  B complex-vitaminC-folic acid (NEPHROCAP) cap  1 Cap Oral DAILY  cinacalcet (SENSIPAR) tablet 30 mg  30 mg Oral DAILY  fentaNYL citrate (PF) injection 50 mcg  50 mcg IntraVENous Q2H PRN  
 latanoprost (XALATAN) 0.005 % ophthalmic solution 1 Drop  1 Drop Both Eyes QHS  linaclotide (LINZESS) capsule 290 mcg (Patient Supplied)  290 mcg Oral ACB  nortriptyline (PAMELOR) capsule 50 mg  50 mg Oral QHS  ondansetron (ZOFRAN) injection 4 mg  4 mg IntraVENous Q6H PRN  polyethylene glycol (MIRALAX) packet 17 g  17 g Oral DAILY  polyethylene glycol (MIRALAX) packet 17 g  17 g Oral BID PRN  
 sevelamer carbonate (RENVELA) tab 800 mg  800 mg Oral TID WITH MEALS  simvastatin (ZOCOR) tablet 20 mg  20 mg Oral QHS  VANCOMYCIN INFORMATION NOTE   Other Rx Dosing/Monitoring  glucose chewable tablet 16 g  4 Tab Oral PRN  
 dextrose (D50W) injection syrg 12.5-25 g  12.5-25 g IntraVENous PRN  
 glucagon (GLUCAGEN) injection 1 mg  1 mg IntraMUSCular PRN  
 insulin lispro (HUMALOG) injection   SubCUTAneous AC&HS  epoetin joi (EPOGEN;PROCRIT) injection 20,000 Units  20,000 Units SubCUTAneous Q MON, WED & FRI  
 
______________________________________________________________________ EXPECTED LENGTH OF STAY: 3d 16h ACTUAL LENGTH OF STAY:          6 Jose M Becker MD

## 2018-07-29 PROCEDURE — 3331090002 HH PPS REVENUE DEBIT

## 2018-07-29 PROCEDURE — 3331090001 HH PPS REVENUE CREDIT

## 2018-07-30 PROCEDURE — 3331090001 HH PPS REVENUE CREDIT

## 2018-07-30 PROCEDURE — 3331090002 HH PPS REVENUE DEBIT

## 2018-07-31 PROCEDURE — 3331090002 HH PPS REVENUE DEBIT

## 2018-07-31 PROCEDURE — 3331090001 HH PPS REVENUE CREDIT

## 2018-08-01 PROCEDURE — 3331090001 HH PPS REVENUE CREDIT

## 2018-08-01 PROCEDURE — 3331090002 HH PPS REVENUE DEBIT

## 2018-08-02 PROCEDURE — 3331090002 HH PPS REVENUE DEBIT

## 2018-08-02 PROCEDURE — 3331090001 HH PPS REVENUE CREDIT

## 2018-08-03 PROCEDURE — 3331090002 HH PPS REVENUE DEBIT

## 2018-08-03 PROCEDURE — 3331090001 HH PPS REVENUE CREDIT

## 2018-08-04 PROCEDURE — 3331090002 HH PPS REVENUE DEBIT

## 2018-08-04 PROCEDURE — 3331090001 HH PPS REVENUE CREDIT

## 2018-08-05 PROCEDURE — 3331090002 HH PPS REVENUE DEBIT

## 2018-08-05 PROCEDURE — 3331090001 HH PPS REVENUE CREDIT

## 2018-08-06 PROCEDURE — 3331090002 HH PPS REVENUE DEBIT

## 2018-08-06 PROCEDURE — 3331090001 HH PPS REVENUE CREDIT

## 2018-08-07 ENCOUNTER — HOSPITAL ENCOUNTER (OUTPATIENT)
Dept: INTERVENTIONAL RADIOLOGY/VASCULAR | Age: 68
Discharge: HOME | End: 2018-08-07
Attending: RADIOLOGY | Admitting: RADIOLOGY

## 2018-08-07 PROCEDURE — 3331090001 HH PPS REVENUE CREDIT

## 2018-08-07 PROCEDURE — 3331090002 HH PPS REVENUE DEBIT

## 2018-08-07 NOTE — PROGRESS NOTES
Received from Nursing facility via ambulance stretcher for powerline removal. Taken directly into angio room # 1 for line removal. Order from Dr. Ute Elkins noted. Chintan RTR removed the line without difficulty and occlusive dressing on. Pt. Encouraged to rest for 2 hours related to upper body physical therapy. EMT's transporting back to facility.

## 2018-08-08 PROCEDURE — 3331090002 HH PPS REVENUE DEBIT

## 2018-08-08 PROCEDURE — 3331090001 HH PPS REVENUE CREDIT

## 2018-08-09 PROCEDURE — 3331090001 HH PPS REVENUE CREDIT

## 2018-08-09 PROCEDURE — 3331090002 HH PPS REVENUE DEBIT

## 2018-08-10 PROCEDURE — 3331090002 HH PPS REVENUE DEBIT

## 2018-08-10 PROCEDURE — 3331090001 HH PPS REVENUE CREDIT

## 2018-08-11 ENCOUNTER — HOSPITAL ENCOUNTER (EMERGENCY)
Age: 68
Discharge: NURSING, REHAB OR LONG-TERM CARE | End: 2018-08-12
Attending: EMERGENCY MEDICINE | Admitting: EMERGENCY MEDICINE
Payer: MEDICARE

## 2018-08-11 ENCOUNTER — APPOINTMENT (OUTPATIENT)
Dept: GENERAL RADIOLOGY | Age: 68
End: 2018-08-11
Attending: EMERGENCY MEDICINE
Payer: MEDICARE

## 2018-08-11 DIAGNOSIS — R07.89 CHEST WALL TENDERNESS: Primary | ICD-10-CM

## 2018-08-11 LAB
ALBUMIN SERPL-MCNC: 2.5 G/DL (ref 3.5–5)
ALBUMIN/GLOB SERPL: 0.4 {RATIO} (ref 1.1–2.2)
ALP SERPL-CCNC: 241 U/L (ref 45–117)
ALT SERPL-CCNC: 11 U/L (ref 12–78)
ANION GAP SERPL CALC-SCNC: 9 MMOL/L (ref 5–15)
AST SERPL-CCNC: 17 U/L (ref 15–37)
BASOPHILS # BLD: 0.1 K/UL (ref 0–0.1)
BASOPHILS NFR BLD: 1 % (ref 0–1)
BILIRUB SERPL-MCNC: 0.4 MG/DL (ref 0.2–1)
BNP SERPL-MCNC: 3392 PG/ML (ref 0–125)
BUN SERPL-MCNC: 47 MG/DL (ref 6–20)
BUN/CREAT SERPL: 7 (ref 12–20)
CALCIUM SERPL-MCNC: 8.3 MG/DL (ref 8.5–10.1)
CHLORIDE SERPL-SCNC: 92 MMOL/L (ref 97–108)
CO2 SERPL-SCNC: 30 MMOL/L (ref 21–32)
CREAT SERPL-MCNC: 6.37 MG/DL (ref 0.55–1.02)
DIFFERENTIAL METHOD BLD: ABNORMAL
EOSINOPHIL # BLD: 0.2 K/UL (ref 0–0.4)
EOSINOPHIL NFR BLD: 3 % (ref 0–7)
ERYTHROCYTE [DISTWIDTH] IN BLOOD BY AUTOMATED COUNT: 16.3 % (ref 11.5–14.5)
GLOBULIN SER CALC-MCNC: 6.2 G/DL (ref 2–4)
GLUCOSE SERPL-MCNC: 118 MG/DL (ref 65–100)
HCT VFR BLD AUTO: 31 % (ref 35–47)
HGB BLD-MCNC: 9 G/DL (ref 11.5–16)
IMM GRANULOCYTES # BLD: 0 K/UL (ref 0–0.04)
IMM GRANULOCYTES NFR BLD AUTO: 0 % (ref 0–0.5)
LYMPHOCYTES # BLD: 2 K/UL (ref 0.8–3.5)
LYMPHOCYTES NFR BLD: 26 % (ref 12–49)
MCH RBC QN AUTO: 26.1 PG (ref 26–34)
MCHC RBC AUTO-ENTMCNC: 29 G/DL (ref 30–36.5)
MCV RBC AUTO: 89.9 FL (ref 80–99)
MONOCYTES # BLD: 1 K/UL (ref 0–1)
MONOCYTES NFR BLD: 12 % (ref 5–13)
NEUTS SEG # BLD: 4.6 K/UL (ref 1.8–8)
NEUTS SEG NFR BLD: 58 % (ref 32–75)
NRBC # BLD: 0 K/UL (ref 0–0.01)
NRBC BLD-RTO: 0 PER 100 WBC
PLATELET # BLD AUTO: 293 K/UL (ref 150–400)
PMV BLD AUTO: 8.9 FL (ref 8.9–12.9)
POTASSIUM SERPL-SCNC: 4.1 MMOL/L (ref 3.5–5.1)
PROT SERPL-MCNC: 8.7 G/DL (ref 6.4–8.2)
RBC # BLD AUTO: 3.45 M/UL (ref 3.8–5.2)
SODIUM SERPL-SCNC: 131 MMOL/L (ref 136–145)
TROPONIN I SERPL-MCNC: <0.05 NG/ML
WBC # BLD AUTO: 8 K/UL (ref 3.6–11)

## 2018-08-11 PROCEDURE — 3331090001 HH PPS REVENUE CREDIT

## 2018-08-11 PROCEDURE — 80053 COMPREHEN METABOLIC PANEL: CPT | Performed by: EMERGENCY MEDICINE

## 2018-08-11 PROCEDURE — 74011250637 HC RX REV CODE- 250/637: Performed by: EMERGENCY MEDICINE

## 2018-08-11 PROCEDURE — 3331090002 HH PPS REVENUE DEBIT

## 2018-08-11 PROCEDURE — 85025 COMPLETE CBC W/AUTO DIFF WBC: CPT | Performed by: EMERGENCY MEDICINE

## 2018-08-11 PROCEDURE — 71046 X-RAY EXAM CHEST 2 VIEWS: CPT

## 2018-08-11 PROCEDURE — 99285 EMERGENCY DEPT VISIT HI MDM: CPT

## 2018-08-11 PROCEDURE — 36415 COLL VENOUS BLD VENIPUNCTURE: CPT | Performed by: EMERGENCY MEDICINE

## 2018-08-11 PROCEDURE — 83880 ASSAY OF NATRIURETIC PEPTIDE: CPT | Performed by: EMERGENCY MEDICINE

## 2018-08-11 PROCEDURE — 93005 ELECTROCARDIOGRAM TRACING: CPT

## 2018-08-11 PROCEDURE — 84484 ASSAY OF TROPONIN QUANT: CPT | Performed by: EMERGENCY MEDICINE

## 2018-08-11 RX ORDER — OXYCODONE AND ACETAMINOPHEN 5; 325 MG/1; MG/1
1 TABLET ORAL
Status: COMPLETED | OUTPATIENT
Start: 2018-08-11 | End: 2018-08-11

## 2018-08-11 RX ADMIN — OXYCODONE AND ACETAMINOPHEN 1 TABLET: 5; 325 TABLET ORAL at 23:31

## 2018-08-12 VITALS
TEMPERATURE: 98.7 F | RESPIRATION RATE: 11 BRPM | HEART RATE: 79 BPM | SYSTOLIC BLOOD PRESSURE: 160 MMHG | OXYGEN SATURATION: 98 % | HEIGHT: 72 IN | DIASTOLIC BLOOD PRESSURE: 76 MMHG

## 2018-08-12 LAB
ATRIAL RATE: 84 BPM
CALCULATED P AXIS, ECG09: 39 DEGREES
CALCULATED R AXIS, ECG10: -19 DEGREES
CALCULATED T AXIS, ECG11: 11 DEGREES
DIAGNOSIS, 93000: NORMAL
P-R INTERVAL, ECG05: 162 MS
Q-T INTERVAL, ECG07: 426 MS
QRS DURATION, ECG06: 92 MS
QTC CALCULATION (BEZET), ECG08: 503 MS
TROPONIN I SERPL-MCNC: <0.05 NG/ML
VENTRICULAR RATE, ECG03: 84 BPM

## 2018-08-12 PROCEDURE — 36415 COLL VENOUS BLD VENIPUNCTURE: CPT | Performed by: EMERGENCY MEDICINE

## 2018-08-12 PROCEDURE — 84484 ASSAY OF TROPONIN QUANT: CPT | Performed by: EMERGENCY MEDICINE

## 2018-08-12 NOTE — DISCHARGE INSTRUCTIONS
Musculoskeletal Chest Pain: Care Instructions  Your Care Instructions    Chest pain is not always a sign that something is wrong with your heart or that you have another serious problem. The doctor thinks your chest pain is caused by strained muscles or ligaments, inflamed chest cartilage, or another problem in your chest, rather than by your heart. You may need more tests to find the cause of your chest pain. Follow-up care is a key part of your treatment and safety. Be sure to make and go to all appointments, and call your doctor if you are having problems. It's also a good idea to know your test results and keep a list of the medicines you take. How can you care for yourself at home? · Take pain medicines exactly as directed. ¨ If the doctor gave you a prescription medicine for pain, take it as prescribed. ¨ If you are not taking a prescription pain medicine, ask your doctor if you can take an over-the-counter medicine. · Rest and protect the sore area. · Stop, change, or take a break from any activity that may be causing your pain or soreness. · Put ice or a cold pack on the sore area for 10 to 20 minutes at a time. Try to do this every 1 to 2 hours for the next 3 days (when you are awake) or until the swelling goes down. Put a thin cloth between the ice and your skin. · After 2 or 3 days, apply a heating pad set on low or a warm cloth to the area that hurts. Some doctors suggest that you go back and forth between hot and cold. · Do not wrap or tape your ribs for support. This may cause you to take smaller breaths, which could increase your risk of lung problems. · Mentholated creams such as Bengay or Icy Hot may soothe sore muscles. Follow the instructions on the package. · Follow your doctor's instructions for exercising. · Gentle stretching and massage may help you get better faster. Stretch slowly to the point just before pain begins, and hold the stretch for at least 15 to 30 seconds.  Do this 3 or 4 times a day. Stretch just after you have applied heat. · As your pain gets better, slowly return to your normal activities. Any increased pain may be a sign that you need to rest a while longer. When should you call for help? Call 911 anytime you think you may need emergency care. For example, call if:    · You have chest pain or pressure. This may occur with:  ¨ Sweating. ¨ Shortness of breath. ¨ Nausea or vomiting. ¨ Pain that spreads from the chest to the neck, jaw, or one or both shoulders or arms. ¨ Dizziness or lightheadedness. ¨ A fast or uneven pulse. After calling 911, chew 1 adult-strength aspirin. Wait for an ambulance. Do not try to drive yourself.     · You have sudden chest pain and shortness of breath, or you cough up blood.    Call your doctor now or seek immediate medical care if:    · You have any trouble breathing.     · Your chest pain gets worse.     · Your chest pain occurs consistently with exercise and is relieved by rest.    Watch closely for changes in your health, and be sure to contact your doctor if:    · Your chest pain does not get better after 1 week. Where can you learn more? Go to http://brandan-liang.info/. Enter V293 in the search box to learn more about \"Musculoskeletal Chest Pain: Care Instructions. \"  Current as of: November 20, 2017  Content Version: 11.7  © 4693-1735 [employer]. Care instructions adapted under license by [employer] (which disclaims liability or warranty for this information). If you have questions about a medical condition or this instruction, always ask your healthcare professional. [place] [address] any warranty or liability for your use of this information. Chest Pain: Care Instructions  Your Care Instructions    There are many things that can cause chest pain. Some are not serious and will get better on their own in a few days.  But some kinds of chest pain need more testing and treatment. Your doctor may have recommended a follow-up visit in the next 8 to 12 hours. If you are not getting better, you may need more tests or treatment. Even though your doctor has released you, you still need to watch for any problems. The doctor carefully checked you, but sometimes problems can develop later. If you have new symptoms or if your symptoms do not get better, get medical care right away. If you have worse or different chest pain or pressure that lasts more than 5 minutes or you passed out (lost consciousness), call 911 or seek other emergency help right away. A medical visit is only one step in your treatment. Even if you feel better, you still need to do what your doctor recommends, such as going to all suggested follow-up appointments and taking medicines exactly as directed. This will help you recover and help prevent future problems. How can you care for yourself at home? · Rest until you feel better. · Take your medicine exactly as prescribed. Call your doctor if you think you are having a problem with your medicine. · Do not drive after taking a prescription pain medicine. When should you call for help? Call 911 if:    · You passed out (lost consciousness).     · You have severe difficulty breathing.     · You have symptoms of a heart attack. These may include:  ¨ Chest pain or pressure, or a strange feeling in your chest.  ¨ Sweating. ¨ Shortness of breath. ¨ Nausea or vomiting. ¨ Pain, pressure, or a strange feeling in your back, neck, jaw, or upper belly or in one or both shoulders or arms. ¨ Lightheadedness or sudden weakness. ¨ A fast or irregular heartbeat. After you call 911, the  may tell you to chew 1 adult-strength or 2 to 4 low-dose aspirin. Wait for an ambulance.  Do not try to drive yourself.    Call your doctor today if:    · You have any trouble breathing.     · Your chest pain gets worse.     · You are dizzy or lightheaded, or you feel like you may faint.     · You are not getting better as expected.     · You are having new or different chest pain. Where can you learn more? Go to http://brandan-liang.info/. Enter A120 in the search box to learn more about \"Chest Pain: Care Instructions. \"  Current as of: November 20, 2017  Content Version: 11.7  © 1323-5483 [employer]. Care instructions adapted under license by [employer] (which disclaims liability or warranty for this information). If you have questions about a medical condition or this instruction, always ask your healthcare professional. [place] [address] any warranty or liability for your use of this information.

## 2018-08-12 NOTE — ED TRIAGE NOTES
Pt arrives via EMS from [facility] (rehab). Pt c/o chest pain that start this morning around 9am during physical therapy that radiated down left arm. Pt states chest pain has been a dull pain all day. EMS gave 324 ASA in route. EMS had pt on 6 liters for comfort. Pt gets dialysis MWF. Last dialysis was Friday and was a full cycle. EKG for EMS was NSR with a PVC.
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
## 2018-08-12 NOTE — ED NOTES
Placed call to [facility] for transport to [place]. ETA 60 minutes. Discharge instructions given to patient by provider with opportunity to ask questions. Pt verbalizing understanding. VSS. NAD. Attempted to call report to nursing facility at [phone] with no success. No one answers the phone.     1392: Pt left via [facility]

## 2018-08-14 ENCOUNTER — HOME CARE VISIT (OUTPATIENT)
Dept: HOME HEALTH SERVICES | Facility: HOME HEALTH | Age: 68
End: 2018-08-14

## 2018-08-27 ENCOUNTER — HOME HEALTH ADMISSION (OUTPATIENT)
Dept: HOME HEALTH SERVICES | Facility: HOME HEALTH | Age: 68
End: 2018-08-27
Payer: MEDICARE

## 2018-08-29 ENCOUNTER — HOME CARE VISIT (OUTPATIENT)
Dept: SCHEDULING | Facility: HOME HEALTH | Age: 68
End: 2018-08-29
Payer: MEDICARE

## 2018-08-29 PROCEDURE — 3331090001 HH PPS REVENUE CREDIT

## 2018-08-29 PROCEDURE — 3331090002 HH PPS REVENUE DEBIT

## 2018-08-29 PROCEDURE — G0299 HHS/HOSPICE OF RN EA 15 MIN: HCPCS

## 2018-08-29 PROCEDURE — 400013 HH SOC

## 2018-08-30 ENCOUNTER — HOSPITAL ENCOUNTER (OUTPATIENT)
Dept: WOUND CARE | Age: 68
Discharge: HOME | End: 2018-08-30
Payer: MEDICARE

## 2018-08-30 VITALS
DIASTOLIC BLOOD PRESSURE: 84 MMHG | TEMPERATURE: 98.2 F | HEART RATE: 88 BPM | SYSTOLIC BLOOD PRESSURE: 135 MMHG | HEIGHT: 72 IN | WEIGHT: 255 LBS | BODY MASS INDEX: 34.54 KG/M2

## 2018-08-30 PROBLEM — T81.31XA SURGICAL WOUND BREAKDOWN: Status: ACTIVE | Noted: 2018-08-30

## 2018-08-30 PROBLEM — L89.312 STAGE II PRESSURE ULCER OF RIGHT BUTTOCK (HCC): Status: ACTIVE | Noted: 2018-08-30

## 2018-08-30 PROCEDURE — 3331090002 HH PPS REVENUE DEBIT

## 2018-08-30 PROCEDURE — 99214 OFFICE O/P EST MOD 30 MIN: CPT

## 2018-08-30 PROCEDURE — 11043 DBRDMT MUSC&/FSCA 1ST 20/<: CPT

## 2018-08-30 PROCEDURE — 3331090001 HH PPS REVENUE CREDIT

## 2018-08-30 PROCEDURE — 74011000250 HC RX REV CODE- 250: Performed by: OTOLARYNGOLOGY

## 2018-08-30 RX ORDER — LIDOCAINE HYDROCHLORIDE 40 MG/ML
SOLUTION TOPICAL AS NEEDED
Status: DISCONTINUED | OUTPATIENT
Start: 2018-08-30 | End: 2018-09-03 | Stop reason: HOSPADM

## 2018-08-30 RX ADMIN — LIDOCAINE HYDROCHLORIDE: 40 SOLUTION TOPICAL at 09:01

## 2018-08-30 NOTE — H&P
[address] [facility] 
WOUND CARE HISTORY AND PHYSICAL Name:DAYNA CARRASQUILLO 
MR#: [id number] : 1950 ACCOUNT #: [de-identified] ADMIT DATE: 2018 HISTORY OF PRESENT ILLNESS:  The patient is a 69-year-old female who is being seen in followup after she had surgery on her right foot in July. She was admitted for bilateral lower extremity cellulitis at [facility] on . On ,  Dr. Nancy Sloan debrided her right heel and did a partial calcanectomy. MRIs revealed acute osteomyelitis and gas gangrene. Vascular studies showed no significant stenosis with normal PVRs. Wound culture and sensitivity revealed proteus, klebsiella, enterococcus, and MRSA. She had a wound VAC, but it was only a RAYMUNDO, which leaked and was removed since it caused an abrasion and wound on the dorsum of her right foot. Her most recent ejection fraction is 60%. Her most recent hemoglobin A1c is 9.1. HABITS:  She denies cigarettes, alcohol, or drugs. ALLERGIES:  LATEX, CELEBREX, IV CONTRAST DYE, CEPHALEXIN, LEVAQUIN, PENICILLIN. REVIEW OF SYSTEMS:  Diabetes mellitus, hypertension, chronic kidney disease, on dialysis 3 times a week, depression, congestive heart failure, anemia, and elevated cholesterol. CURRENT MEDICATIONS:  Oxycodone. She completed her IV antibiotics. Epogen, Protonix, gabapentin, nortriptyline, Flexeril, topical lidocaine, midodrine before dialysis, insulin, Prozac, Renvela, Linzess, and simvastatin. PHYSICAL EXAMINATION: 
GENERAL:  The patient presents in a wheelchair. She has always been in a wheelchair for many years. She is awake, alert, conversant, and delightful. VITAL SIGNS:  Her temperature is 98.2, pulse 88, respirations 16, blood pressure 135/84. HEAD AND NECK:  Pupils equal, round, respond to light and accommodation. Oral cavity, oropharynx, and neck are normal.  Ears were clear bilaterally with normal tympanic membranes. LUNGS:  Clear to auscultation and percussion. HEART:  Regular rate and rhythm without murmur. ABDOMEN:  Soft and nontender. UPPER EXTREMITIES:  Equal tone and strength bilaterally. BACK:  She has a 0.3 x 0.3 x 0.2 cm wound of the right buttocks. LOWER EXTREMITIES:  She has a wound to the left lateral ankle, which is quite small, 0.2 x 0.2 x 0.1 cm. The right heel has a large wound at 6.8 x 10.7 x 1 cm, which has muscle as the deep base. There is necrotic muscle and fat which is in need of debridement. There is a wound to the right dorsum of the foot, 1.9 x 2.5 x 0.1 cm, with exposed muscle, and again with necrotic tissue. I have recommended that the wounds be debrided. I explained the risks and benefits. The patient understands and wishes to proceed. Therefore, topical Xylocaine was applied for 10 minutes. With the use of a 5 mm ring curette, forceps, and iris scissors, all necrotic material was debrided down to underlying healthy bleeding muscle. Upon completion, there was no longer any residual necrotic tissue. The wounds were then scrubbed with Hibiclens followed by normal saline. ASSESSMENT AND PLAN:  For the right heel wound, we are going to continue Santyl every day until the wound VAC can be applied. Then, we will use Santyl followed by the wound VAC with black foam at 120 mmHg negative pressure continuous, to be changed twice a week. For the dorsum of the right foot, we are going to continue Santyl daily. For the left lateral ankle, we are going to use hydrogel every day, and for the right buttocks wound, we are going to use hydrogel every day. We are ordering a gel overlay for the patient's bed. I am ordering an air waffle foam pad for her to use whenever she is sitting in a wheelchair or at dialysis, or any other chair.   She understands that she should sit 1/2-inch above the surface of the chair and to let air out so that she is in a proper seating mode. She will cover the vinyl with fabric to allow air flow and to minimize maceration. She is going to float her heels and her ankles, and was instructed on how to do that. She is going to follow up in this office in 7 days. After that, she will be followed up every Monday afternoon after dialysis with Dr. Bryon Bowling. 
 
All questions were answered. CONDITION ON DISCHARGE:  Stable. MD IESHA Leon/RACHEL 
D: 08/30/2018 09:35    
T: 08/30/2018 10:05 JOB #: [id number]

## 2018-08-30 NOTE — WOUND CARE
Discharge Condition: stable Ambulatory Status: Wheelchair Discharge Destination:home Transportation: personal car Accompanied by: daughter

## 2018-08-30 NOTE — WOUND CARE
Visit Vitals  /84 (BP 1 Location: Left arm, BP Patient Position: Sitting)  Pulse 88  Temp 98.2 °F (36.8 °C)  Ht 6' 1\" (1.854 m)  Wt 115.7 kg (255 lb)  Breastfeeding No  
 BMI 33.64 kg/m2 08/30/18 4882 Wound Heel Right Date First Assessed/Time First Assessed: 08/30/18 0849   POA: Yes  Wound Type: Diabetic  Location: Heel  Orientation: Right Wound Length (cm) 6.8 cm Wound Width (cm) 10.7 cm Wound Depth (cm) 1 Wound Surface area (cm^2) 72.76 cm^2 Condition of Base Purple;Slough Wound Foot Dorsal;Right Date First Assessed/Time First Assessed: 08/30/18 0849   POA: Yes  Wound Type: Diabetic  Location: Foot  Orientation: Dorsal;Right Wound Length (cm) 1.9 cm Wound Width (cm) 2.5 cm Wound Depth (cm) 0.1 Wound Surface area (cm^2) 4.75 cm^2 Condition of Base [place];Slough Wound Buttocks Right Date First Assessed/Time First Assessed: 08/30/18 0858   POA: Yes  Wound Type: Diabetic;Pressure injury  Location: Buttocks  Orientation: Right Pressure Injury Stage ll Wound Length (cm) 0.3 cm Wound Width (cm) 0.3 cm Wound Depth (cm) 0.1 Wound Surface area (cm^2) 0.09 cm^2 Condition of Base Pink

## 2018-08-31 VITALS
HEIGHT: 72 IN | DIASTOLIC BLOOD PRESSURE: 68 MMHG | HEART RATE: 78 BPM | OXYGEN SATURATION: 98 % | WEIGHT: 255 LBS | TEMPERATURE: 96.7 F | SYSTOLIC BLOOD PRESSURE: 128 MMHG | BODY MASS INDEX: 34.54 KG/M2 | RESPIRATION RATE: 18 BRPM

## 2018-08-31 PROCEDURE — 3331090002 HH PPS REVENUE DEBIT

## 2018-08-31 PROCEDURE — 3331090001 HH PPS REVENUE CREDIT

## 2018-09-01 PROCEDURE — 3331090001 HH PPS REVENUE CREDIT

## 2018-09-01 PROCEDURE — 3331090002 HH PPS REVENUE DEBIT

## 2018-09-02 PROCEDURE — 3331090002 HH PPS REVENUE DEBIT

## 2018-09-02 PROCEDURE — 3331090001 HH PPS REVENUE CREDIT

## 2018-09-03 PROCEDURE — 3331090002 HH PPS REVENUE DEBIT

## 2018-09-03 PROCEDURE — 3331090001 HH PPS REVENUE CREDIT

## 2018-09-04 ENCOUNTER — HOME CARE VISIT (OUTPATIENT)
Dept: SCHEDULING | Facility: HOME HEALTH | Age: 68
End: 2018-09-04
Payer: MEDICARE

## 2018-09-04 ENCOUNTER — TELEPHONE (OUTPATIENT)
Dept: WOUND CARE | Age: 68
End: 2018-09-04

## 2018-09-04 VITALS
OXYGEN SATURATION: 99 % | TEMPERATURE: 97.8 F | HEART RATE: 74 BPM | SYSTOLIC BLOOD PRESSURE: 140 MMHG | RESPIRATION RATE: 18 BRPM | DIASTOLIC BLOOD PRESSURE: 80 MMHG

## 2018-09-04 PROCEDURE — G0299 HHS/HOSPICE OF RN EA 15 MIN: HCPCS

## 2018-09-04 PROCEDURE — 3331090002 HH PPS REVENUE DEBIT

## 2018-09-04 PROCEDURE — G0151 HHCP-SERV OF PT,EA 15 MIN: HCPCS

## 2018-09-04 PROCEDURE — 3331090001 HH PPS REVENUE CREDIT

## 2018-09-05 VITALS
OXYGEN SATURATION: 97 % | HEART RATE: 88 BPM | RESPIRATION RATE: 18 BRPM | DIASTOLIC BLOOD PRESSURE: 88 MMHG | TEMPERATURE: 97.9 F | SYSTOLIC BLOOD PRESSURE: 142 MMHG

## 2018-09-05 PROCEDURE — 3331090002 HH PPS REVENUE DEBIT

## 2018-09-05 PROCEDURE — 3331090001 HH PPS REVENUE CREDIT

## 2018-09-06 ENCOUNTER — HOME CARE VISIT (OUTPATIENT)
Dept: SCHEDULING | Facility: HOME HEALTH | Age: 68
End: 2018-09-06
Payer: MEDICARE

## 2018-09-06 ENCOUNTER — HOME CARE VISIT (OUTPATIENT)
Dept: HOME HEALTH SERVICES | Facility: HOME HEALTH | Age: 68
End: 2018-09-06
Payer: MEDICARE

## 2018-09-06 ENCOUNTER — HOSPITAL ENCOUNTER (OUTPATIENT)
Dept: WOUND CARE | Age: 68
Discharge: HOME | End: 2018-09-06
Payer: MEDICARE

## 2018-09-06 VITALS — HEART RATE: 92 BPM | TEMPERATURE: 98.1 F | DIASTOLIC BLOOD PRESSURE: 88 MMHG | SYSTOLIC BLOOD PRESSURE: 155 MMHG

## 2018-09-06 DIAGNOSIS — L89.312 STAGE II PRESSURE ULCER OF RIGHT BUTTOCK (HCC): ICD-10-CM

## 2018-09-06 PROCEDURE — 3331090001 HH PPS REVENUE CREDIT

## 2018-09-06 PROCEDURE — 74011000250 HC RX REV CODE- 250: Performed by: INTERNAL MEDICINE

## 2018-09-06 PROCEDURE — 3331090002 HH PPS REVENUE DEBIT

## 2018-09-06 PROCEDURE — 11042 DBRDMT SUBQ TIS 1ST 20SQCM/<: CPT

## 2018-09-06 PROCEDURE — G0299 HHS/HOSPICE OF RN EA 15 MIN: HCPCS

## 2018-09-06 RX ORDER — LIDOCAINE HYDROCHLORIDE 40 MG/ML
SOLUTION TOPICAL AS NEEDED
Status: DISCONTINUED | OUTPATIENT
Start: 2018-09-06 | End: 2018-09-10 | Stop reason: HOSPADM

## 2018-09-06 RX ADMIN — LIDOCAINE HYDROCHLORIDE: 40 SOLUTION TOPICAL at 08:36

## 2018-09-06 NOTE — PROGRESS NOTES
Discharge Condition:Stable Ambulatory Status:Wheelchair Discharge Destination:Home Transportation: Private auto Accompanied by: Daughter

## 2018-09-06 NOTE — WOUND CARE
Follow up for heel wound (post debridement) awaiting placement of Wound vac. No intercurrent illnesses, systemic symptoms or changes in meds. Noted some malodor and drainage. Alert, oriented and conversant. 98.1 92 rregular, 155/88 Wound Location: 
Etiology: pressure and venous stasis Size: per nursing notes 0.5 cm, 7 cm, 1.5 cm, 0.1 cm, 0.3 cm, 0.5 cm, 10.5 cm, 2 cm, 0.1 cm, 0.3 cm, 0.1, 1, 0.1, 0.1, 0.1 Margins: flat with some callosity Periwound: benign with signs of venous stasis Undermining: Of heel Tunneling or sinus: no 
Drainage: moderate Odor: none now Base:  granular, muscle with loose necrotic covering Probe to bone: no 
Crepitus or fluctuance: no 
Debridement today: With permission from the patient, topical lidocaine was applied and allowed to become effective. Heel debrided with 15 blade and forceps to muscle. Still some necrosis under the forefoot extension of the wound. Bleeding controlled with pressure and silver nitrate Tolerated well. Plans for wound vac - discussed with patient and daughter. RTC Patient knows we are available in the interim  for questions or developing erythema, edema, warmth or pain.

## 2018-09-06 NOTE — WOUND CARE
Visit Vitals  /88 (BP 1 Location: Left arm, BP Patient Position: Sitting)  Pulse 92  Temp 98.1 °F (36.7 °C)  
 
 
 09/06/18 0827 Wound Leg Lower Posterior;Right Date First Assessed/Time First Assessed: 09/06/18 0831   POA: No  Wound Type: Pressure injury  Location: Leg Lower  Orientation: Posterior;Right DRESSING STATUS Clean DRESSING TYPE Other (Comment) Wound Length (cm) 0.5 cm Wound Width (cm) 0.5 cm Wound Depth (cm) 0.1 Wound Surface area (cm^2) 0.25 cm^2 Condition of Base Eschar Drainage Amount  None Wound Odor None Periwound Skin Condition Intact Cleansing and Cleansing Agents  Normal saline Wound Heel Right Date First Assessed/Time First Assessed: 08/30/18 0849   POA: Yes  Wound Type: Diabetic  Location: Heel  Orientation: Right DRESSING STATUS Breakthrough drainage DRESSING TYPE Foam  
Wound Length (cm) 7 cm Wound Width (cm) 10.5 cm Wound Depth (cm) 1 Wound Surface area (cm^2) 73.5 cm^2 Condition of Base [place];Slough Drainage Amount  Large Drainage Color Serosanguinous Wound Odor Mild Periwound Skin Condition Intact Cleansing and Cleansing Agents  Normal saline Wound Foot Dorsal;Right Date First Assessed/Time First Assessed: 08/30/18 0849   POA: Yes  Wound Type: Diabetic  Location: Foot  Orientation: Dorsal;Right DRESSING STATUS Breakthrough drainage DRESSING TYPE Gauze Wound Length (cm) 1.5 cm Wound Width (cm) 2 cm Wound Depth (cm) 0.1 Wound Surface area (cm^2) 3 cm^2 Condition of Base [place];Slough Drainage Amount  Small Drainage Color Serosanguinous Wound Odor None Periwound Skin Condition Intact Cleansing and Cleansing Agents  Normal saline Wound Buttocks Right Date First Assessed/Time First Assessed: 08/30/18 0858   POA: Yes  Wound Type: Diabetic;Pressure injury  Location: Buttocks  Orientation: Right DRESSING STATUS Clean DRESSING TYPE Open to air Pressure Injury Stage ll Wound Length (cm) 0.1 cm  
 Wound Width (cm) 0.1 cm Wound Depth (cm) 0.1 Wound Surface area (cm^2) 0.01 cm^2 Condition of Base Pink Drainage Amount  None Wound Odor None Periwound Skin Condition Intact Cleansing and Cleansing Agents  Normal saline Wound Axilla Left;Lateral  
Date First Assessed/Time First Assessed: 08/30/18 0945   POA: Yes  Wound Type: Diabetic;Pressure injury  Location: Axilla  Orientation: Left;Lateral  
DRESSING STATUS Breakthrough drainage DRESSING TYPE Gauze Wound Length (cm) 0.3 cm Wound Width (cm) 0.3 cm Wound Depth (cm) 0.1 Wound Surface area (cm^2) 0.09 cm^2 Condition of [facility] Drainage Amount  Scant Drainage Color Serosanguinous Wound Odor None Periwound Skin Condition Intact Cleansing and Cleansing Agents  Normal saline

## 2018-09-07 VITALS
DIASTOLIC BLOOD PRESSURE: 88 MMHG | HEART RATE: 82 BPM | OXYGEN SATURATION: 99 % | RESPIRATION RATE: 18 BRPM | TEMPERATURE: 98.2 F | SYSTOLIC BLOOD PRESSURE: 142 MMHG

## 2018-09-07 PROCEDURE — 3331090001 HH PPS REVENUE CREDIT

## 2018-09-07 PROCEDURE — 3331090002 HH PPS REVENUE DEBIT

## 2018-09-08 PROCEDURE — 3331090002 HH PPS REVENUE DEBIT

## 2018-09-08 PROCEDURE — 3331090001 HH PPS REVENUE CREDIT

## 2018-09-09 PROCEDURE — 3331090001 HH PPS REVENUE CREDIT

## 2018-09-09 PROCEDURE — 3331090002 HH PPS REVENUE DEBIT

## 2018-09-10 ENCOUNTER — HOSPITAL ENCOUNTER (OUTPATIENT)
Dept: WOUND CARE | Age: 68
Discharge: HOME | End: 2018-09-10
Payer: MEDICARE

## 2018-09-10 ENCOUNTER — HOME CARE VISIT (OUTPATIENT)
Dept: HOME HEALTH SERVICES | Facility: HOME HEALTH | Age: 68
End: 2018-09-10
Payer: MEDICARE

## 2018-09-10 VITALS
TEMPERATURE: 99.6 F | SYSTOLIC BLOOD PRESSURE: 126 MMHG | RESPIRATION RATE: 16 BRPM | DIASTOLIC BLOOD PRESSURE: 77 MMHG | HEART RATE: 96 BPM

## 2018-09-10 PROCEDURE — 3331090002 HH PPS REVENUE DEBIT

## 2018-09-10 PROCEDURE — 3331090001 HH PPS REVENUE CREDIT

## 2018-09-10 PROCEDURE — 99215 OFFICE O/P EST HI 40 MIN: CPT

## 2018-09-10 PROCEDURE — 74011000250 HC RX REV CODE- 250: Performed by: PODIATRIST

## 2018-09-10 RX ORDER — LIDOCAINE HYDROCHLORIDE 40 MG/ML
SOLUTION TOPICAL
Status: COMPLETED | OUTPATIENT
Start: 2018-09-10 | End: 2018-09-10

## 2018-09-10 RX ADMIN — LIDOCAINE HYDROCHLORIDE: 40 SOLUTION TOPICAL at 14:00

## 2018-09-10 NOTE — WOUND CARE
09/10/18 1322 Wound Leg Lower Posterior;Right Date First Assessed/Time First Assessed: 09/06/18 0831   POA: No  Wound Type: Pressure injury  Location: Leg Lower  Orientation: Posterior;Right Wound Length (cm) 0.5 cm Wound Width (cm) 0.5 cm Wound Depth (cm) 0.1 Wound Surface area (cm^2) 0.25 cm^2 Condition of Base Eschar  
Condition of Edges Closed Drainage Amount  None Wound Odor None Periwound Skin Condition Intact Cleansing and Cleansing Agents  Normal saline Wound Heel Right Date First Assessed/Time First Assessed: 08/30/18 0849   POA: Yes  Wound Type: Diabetic  Location: Heel  Orientation: Right DRESSING STATUS Breakthrough drainage DRESSING TYPE Foam  
Wound Length (cm) 7.5 cm Wound Width (cm) 10.3 cm Wound Depth (cm) 1.5 Wound Surface area (cm^2) 77.25 cm^2 Direction of Undermining 4    oclock;7    oclock Depth of Undermining (cm) 4.3 Drainage Amount  Large Drainage Color Serosanguinous Wound Odor Foul Periwound Skin Condition Macerated Cleansing and Cleansing Agents  Normal saline Wound Foot Dorsal;Right Date First Assessed/Time First Assessed: 08/30/18 0849   POA: Yes  Wound Type: Diabetic  Location: Foot  Orientation: Dorsal;Right DRESSING STATUS Breakthrough drainage DRESSING TYPE Gauze;Transparent film Wound Length (cm) 2 cm Wound Width (cm) 2.1 cm Wound Depth (cm) 0.1 Wound Surface area (cm^2) 4.2 cm^2 Condition of Base Pink;Slough Condition of Edges Rolled/curled Drainage Amount  Small Drainage Color Serous Wound Odor None Periwound Skin Condition Intact Cleansing and Cleansing Agents  Normal saline Wound Ankle Left;Lateral  
Date First Assessed/Time First Assessed: 08/30/18 0945   POA: Yes  Wound Type: Diabetic;Pressure injury  Location: Ankle  Orientation: Left;Lateral  
DRESSING STATUS Intact DRESSING TYPE Gauze Wound Length (cm) 0.1 cm Wound Width (cm) 0.1 cm Wound Depth (cm) 0.1 Wound Surface area (cm^2) 0.01 cm^2 Condition of Base (new pink epithelium) Drainage Amount  None Wound Odor None Periwound Skin Condition Intact Cleansing and Cleansing Agents  Normal saline Visit Vitals  /77  Pulse 96  Temp 99.6 °F (37.6 °C)  Resp 16

## 2018-09-10 NOTE — PROGRESS NOTES
[facility] 
WOUND CARE PROGRESS NOTE Name:DAYNA CARRASQUILLO 
MR#: [id number] : 1950 ACCOUNT #: [de-identified] DATE OF SERVICE: 09/10/2018 TREATING PHYSICIAN:  Deidra Mejia DPM 
 
SUBJECTIVE:  Patient presents today with her daughter for followup to a right diabetic heel ulcer. She is status post right heel wound debridement with partial calcanectomy approximately 2 months ago. Patient is currently at home. She has been back from rehab and now at home for 2 weeks. Denies any nausea, vomiting, fevers, night sweat, chills. States she has finished all of her antibiotics. The daughter states that wound care is coming out twice a week. PHYSICAL EXAMINATION: 
VITAL SIGNS:  Stable. The patient is afebrile. RIGHT FOOT:  There is a 7.5 x 10.3 x 1.5 cm wound along the plantar aspect of the right heel. The wound has a granular base, but there is some undermining noted in the 4 o'clock and 7 o'clock areas. There is no purulent drainage. There is a mild odor. There is mild dependent edema in the right lower extremity. DP and PT pulses are nonpalpable. Protective sensation is diminished. There is also a 2 x 2.1 x 0.1 cm wound along the anterior aspect of the right ankle/foot. There are no acute signs of infection from this wound. ASSESSMENT: 
1. Right diabetic foot ulcers x2 (status post debridement of right heel ulcer with partial calcanectomy 2 months ago). 2.  Diabetes with peripheral neuropathy. PLAN: 
1.  I had a long discussion with the patient and her daughter this afternoon. Clinically, the heel wound, which is the more worrisome of the wounds, is gradually granulating in. We will continue using the wound VAC and I emphasized the importance of remaining nonweightbearing on the right foot. The patient's daughter states that the patient has a tendency to put weight on the heel even though she has been instructed not to.   The patient does have a Multi-Podus boot to help protect the heel and is to help the patient transfer, but I reiterated that she is not allowed to put full weight on that heel for walking purposes. 2.  We will continue Santyl to the dorsal foot/ankle ulcer. 3.  Patient to follow up with me in 1 week. We will also look to see if we can get the patient approved for hyperbaric oxygen therapy as well. VIKA Farley / Ravin Newby 
D: 09/10/2018 14:31 T: 09/10/2018 14:46 JOB #: [id number]

## 2018-09-11 ENCOUNTER — HOME CARE VISIT (OUTPATIENT)
Dept: HOME HEALTH SERVICES | Facility: HOME HEALTH | Age: 68
End: 2018-09-11
Payer: MEDICARE

## 2018-09-11 ENCOUNTER — HOME CARE VISIT (OUTPATIENT)
Dept: SCHEDULING | Facility: HOME HEALTH | Age: 68
End: 2018-09-11
Payer: MEDICARE

## 2018-09-11 VITALS
DIASTOLIC BLOOD PRESSURE: 70 MMHG | HEART RATE: 77 BPM | OXYGEN SATURATION: 96 % | SYSTOLIC BLOOD PRESSURE: 122 MMHG | TEMPERATURE: 97.9 F

## 2018-09-11 VITALS
SYSTOLIC BLOOD PRESSURE: 104 MMHG | TEMPERATURE: 97.5 F | RESPIRATION RATE: 18 BRPM | HEART RATE: 85 BPM | DIASTOLIC BLOOD PRESSURE: 62 MMHG | OXYGEN SATURATION: 97 %

## 2018-09-11 VITALS
DIASTOLIC BLOOD PRESSURE: 61 MMHG | OXYGEN SATURATION: 92 % | SYSTOLIC BLOOD PRESSURE: 118 MMHG | TEMPERATURE: 97.9 F | HEART RATE: 77 BPM

## 2018-09-11 PROCEDURE — 3331090001 HH PPS REVENUE CREDIT

## 2018-09-11 PROCEDURE — G0300 HHS/HOSPICE OF LPN EA 15 MIN: HCPCS

## 2018-09-11 PROCEDURE — G0152 HHCP-SERV OF OT,EA 15 MIN: HCPCS

## 2018-09-11 PROCEDURE — 3331090002 HH PPS REVENUE DEBIT

## 2018-09-11 PROCEDURE — G0157 HHC PT ASSISTANT EA 15: HCPCS

## 2018-09-12 PROCEDURE — 3331090001 HH PPS REVENUE CREDIT

## 2018-09-12 PROCEDURE — 3331090002 HH PPS REVENUE DEBIT

## 2018-09-13 ENCOUNTER — HOME CARE VISIT (OUTPATIENT)
Dept: HOME HEALTH SERVICES | Facility: HOME HEALTH | Age: 68
End: 2018-09-13
Payer: MEDICARE

## 2018-09-13 PROCEDURE — 3331090002 HH PPS REVENUE DEBIT

## 2018-09-13 PROCEDURE — 3331090001 HH PPS REVENUE CREDIT

## 2018-09-14 PROCEDURE — 3331090001 HH PPS REVENUE CREDIT

## 2018-09-14 PROCEDURE — 3331090002 HH PPS REVENUE DEBIT

## 2018-09-15 PROCEDURE — 3331090002 HH PPS REVENUE DEBIT

## 2018-09-15 PROCEDURE — 3331090001 HH PPS REVENUE CREDIT

## 2018-09-16 PROCEDURE — 3331090002 HH PPS REVENUE DEBIT

## 2018-09-16 PROCEDURE — 3331090001 HH PPS REVENUE CREDIT

## 2018-09-17 PROCEDURE — 3331090002 HH PPS REVENUE DEBIT

## 2018-09-17 PROCEDURE — 3331090001 HH PPS REVENUE CREDIT

## 2018-09-18 PROCEDURE — 3331090002 HH PPS REVENUE DEBIT

## 2018-09-18 PROCEDURE — 3331090001 HH PPS REVENUE CREDIT

## 2018-09-19 PROCEDURE — 3331090002 HH PPS REVENUE DEBIT

## 2018-09-19 PROCEDURE — 3331090001 HH PPS REVENUE CREDIT

## 2018-09-20 PROCEDURE — 3331090002 HH PPS REVENUE DEBIT

## 2018-09-20 PROCEDURE — 3331090001 HH PPS REVENUE CREDIT

## 2018-09-21 PROCEDURE — 3331090001 HH PPS REVENUE CREDIT

## 2018-09-21 PROCEDURE — 3331090002 HH PPS REVENUE DEBIT

## 2018-09-22 PROCEDURE — 3331090001 HH PPS REVENUE CREDIT

## 2018-09-22 PROCEDURE — 3331090002 HH PPS REVENUE DEBIT

## 2018-09-23 PROCEDURE — 3331090002 HH PPS REVENUE DEBIT

## 2018-09-23 PROCEDURE — 3331090001 HH PPS REVENUE CREDIT

## 2018-09-24 PROCEDURE — 3331090001 HH PPS REVENUE CREDIT

## 2018-09-24 PROCEDURE — 3331090002 HH PPS REVENUE DEBIT

## 2018-09-25 PROCEDURE — 3331090002 HH PPS REVENUE DEBIT

## 2018-09-25 PROCEDURE — 3331090001 HH PPS REVENUE CREDIT

## 2018-09-26 PROCEDURE — 3331090002 HH PPS REVENUE DEBIT

## 2018-09-26 PROCEDURE — 3331090001 HH PPS REVENUE CREDIT

## 2018-09-27 PROCEDURE — 3331090001 HH PPS REVENUE CREDIT

## 2018-09-27 PROCEDURE — 3331090002 HH PPS REVENUE DEBIT

## 2018-09-28 PROCEDURE — 3331090001 HH PPS REVENUE CREDIT

## 2018-09-28 PROCEDURE — 3331090002 HH PPS REVENUE DEBIT

## 2018-09-29 PROCEDURE — 3331090001 HH PPS REVENUE CREDIT

## 2018-09-29 PROCEDURE — 3331090002 HH PPS REVENUE DEBIT

## 2018-09-30 PROCEDURE — 3331090002 HH PPS REVENUE DEBIT

## 2018-09-30 PROCEDURE — 3331090001 HH PPS REVENUE CREDIT

## 2018-10-01 PROCEDURE — 3331090001 HH PPS REVENUE CREDIT

## 2018-10-01 PROCEDURE — 3331090002 HH PPS REVENUE DEBIT

## 2018-10-02 PROCEDURE — 3331090001 HH PPS REVENUE CREDIT

## 2018-10-02 PROCEDURE — 3331090002 HH PPS REVENUE DEBIT

## 2018-10-03 PROCEDURE — 3331090001 HH PPS REVENUE CREDIT

## 2018-10-03 PROCEDURE — 3331090002 HH PPS REVENUE DEBIT

## 2018-10-04 PROCEDURE — 3331090001 HH PPS REVENUE CREDIT

## 2018-10-04 PROCEDURE — 3331090002 HH PPS REVENUE DEBIT

## 2018-10-05 PROCEDURE — 3331090002 HH PPS REVENUE DEBIT

## 2018-10-05 PROCEDURE — 3331090001 HH PPS REVENUE CREDIT

## 2018-10-06 PROCEDURE — 3331090002 HH PPS REVENUE DEBIT

## 2018-10-06 PROCEDURE — 3331090001 HH PPS REVENUE CREDIT

## 2018-10-07 PROCEDURE — 3331090001 HH PPS REVENUE CREDIT

## 2018-10-07 PROCEDURE — 3331090002 HH PPS REVENUE DEBIT

## 2018-10-08 PROCEDURE — 3331090002 HH PPS REVENUE DEBIT

## 2018-10-08 PROCEDURE — 3331090001 HH PPS REVENUE CREDIT

## 2018-10-09 PROCEDURE — 3331090002 HH PPS REVENUE DEBIT

## 2018-10-09 PROCEDURE — 3331090001 HH PPS REVENUE CREDIT

## 2018-10-10 PROCEDURE — 3331090001 HH PPS REVENUE CREDIT

## 2018-10-10 PROCEDURE — 3331090002 HH PPS REVENUE DEBIT

## 2018-10-11 PROCEDURE — 3331090002 HH PPS REVENUE DEBIT

## 2018-10-11 PROCEDURE — 3331090001 HH PPS REVENUE CREDIT

## 2018-10-12 PROCEDURE — 3331090001 HH PPS REVENUE CREDIT

## 2018-10-12 PROCEDURE — 3331090002 HH PPS REVENUE DEBIT

## 2018-10-13 PROCEDURE — 3331090001 HH PPS REVENUE CREDIT

## 2018-10-13 PROCEDURE — 3331090002 HH PPS REVENUE DEBIT

## 2018-10-14 PROCEDURE — 3331090002 HH PPS REVENUE DEBIT

## 2018-10-14 PROCEDURE — 3331090001 HH PPS REVENUE CREDIT

## 2018-10-15 PROCEDURE — 3331090001 HH PPS REVENUE CREDIT

## 2018-10-15 PROCEDURE — 3331090002 HH PPS REVENUE DEBIT

## 2018-10-16 PROCEDURE — 3331090002 HH PPS REVENUE DEBIT

## 2018-10-16 PROCEDURE — 3331090001 HH PPS REVENUE CREDIT

## 2018-10-17 PROCEDURE — 3331090002 HH PPS REVENUE DEBIT

## 2018-10-17 PROCEDURE — 3331090001 HH PPS REVENUE CREDIT

## 2018-10-18 PROCEDURE — 3331090001 HH PPS REVENUE CREDIT

## 2018-10-18 PROCEDURE — 3331090002 HH PPS REVENUE DEBIT

## 2018-10-19 PROCEDURE — 3331090002 HH PPS REVENUE DEBIT

## 2018-10-19 PROCEDURE — 3331090001 HH PPS REVENUE CREDIT

## 2018-10-20 PROCEDURE — 3331090001 HH PPS REVENUE CREDIT

## 2018-10-20 PROCEDURE — 3331090002 HH PPS REVENUE DEBIT

## 2018-10-21 PROCEDURE — 3331090001 HH PPS REVENUE CREDIT

## 2018-10-21 PROCEDURE — 3331090002 HH PPS REVENUE DEBIT

## 2018-10-22 PROCEDURE — 3331090002 HH PPS REVENUE DEBIT

## 2018-10-22 PROCEDURE — 3331090001 HH PPS REVENUE CREDIT

## 2018-10-23 PROCEDURE — 3331090001 HH PPS REVENUE CREDIT

## 2018-10-23 PROCEDURE — 3331090002 HH PPS REVENUE DEBIT

## 2018-10-24 PROCEDURE — 3331090002 HH PPS REVENUE DEBIT

## 2018-10-24 PROCEDURE — 3331090001 HH PPS REVENUE CREDIT

## 2018-10-25 PROCEDURE — 3331090002 HH PPS REVENUE DEBIT

## 2018-10-25 PROCEDURE — 3331090001 HH PPS REVENUE CREDIT

## 2018-10-26 PROCEDURE — 3331090001 HH PPS REVENUE CREDIT

## 2018-10-26 PROCEDURE — 3331090002 HH PPS REVENUE DEBIT

## 2018-10-27 PROCEDURE — 3331090001 HH PPS REVENUE CREDIT

## 2018-10-27 PROCEDURE — 3331090002 HH PPS REVENUE DEBIT

## 2019-12-09 NOTE — Clinical Note
Incision and drainage of right perineal abscess; 1/2 hour; outpatient; general anesthesia
Detail Level: Simple
Additional Notes: Healing acne lesions vs early bcc - pictures taken today - recommend vaseline daily - will recheck at follow up visit.

## 2020-05-22 ENCOUNTER — HOME HEALTH ADMISSION (OUTPATIENT)
Dept: HOME HEALTH SERVICES | Facility: HOME HEALTH | Age: 70
End: 2020-05-22
Payer: MEDICARE

## 2020-05-29 ENCOUNTER — HOME CARE VISIT (OUTPATIENT)
Dept: SCHEDULING | Facility: HOME HEALTH | Age: 70
End: 2020-05-29
Payer: MEDICARE

## 2020-05-29 PROCEDURE — A6212 FOAM DRG <=16 SQ IN W/BORDER: HCPCS

## 2020-05-29 PROCEDURE — 3331090001 HH PPS REVENUE CREDIT

## 2020-05-29 PROCEDURE — A6260 WOUND CLEANSER ANY TYPE/SIZE: HCPCS

## 2020-05-29 PROCEDURE — 3331090002 HH PPS REVENUE DEBIT

## 2020-05-29 PROCEDURE — G0299 HHS/HOSPICE OF RN EA 15 MIN: HCPCS

## 2020-05-29 PROCEDURE — A4216 STERILE WATER/SALINE, 10 ML: HCPCS

## 2020-05-29 PROCEDURE — 400013 HH SOC

## 2020-05-30 PROCEDURE — 3331090001 HH PPS REVENUE CREDIT

## 2020-05-30 PROCEDURE — 3331090002 HH PPS REVENUE DEBIT

## 2020-05-31 PROCEDURE — 3331090001 HH PPS REVENUE CREDIT

## 2020-05-31 PROCEDURE — 3331090002 HH PPS REVENUE DEBIT

## 2020-06-01 PROCEDURE — 3331090002 HH PPS REVENUE DEBIT

## 2020-06-01 PROCEDURE — 3331090001 HH PPS REVENUE CREDIT

## 2020-06-02 ENCOUNTER — HOME CARE VISIT (OUTPATIENT)
Dept: HOME HEALTH SERVICES | Facility: HOME HEALTH | Age: 70
End: 2020-06-02
Payer: MEDICARE

## 2020-06-02 ENCOUNTER — HOME CARE VISIT (OUTPATIENT)
Dept: SCHEDULING | Facility: HOME HEALTH | Age: 70
End: 2020-06-02
Payer: MEDICARE

## 2020-06-02 VITALS
RESPIRATION RATE: 16 BRPM | OXYGEN SATURATION: 99 % | HEART RATE: 71 BPM | DIASTOLIC BLOOD PRESSURE: 60 MMHG | SYSTOLIC BLOOD PRESSURE: 105 MMHG | TEMPERATURE: 98.1 F

## 2020-06-02 VITALS
HEART RATE: 63 BPM | DIASTOLIC BLOOD PRESSURE: 78 MMHG | TEMPERATURE: 97.2 F | OXYGEN SATURATION: 98 % | SYSTOLIC BLOOD PRESSURE: 128 MMHG

## 2020-06-02 VITALS
HEART RATE: 78 BPM | SYSTOLIC BLOOD PRESSURE: 90 MMHG | TEMPERATURE: 97.8 F | OXYGEN SATURATION: 100 % | DIASTOLIC BLOOD PRESSURE: 50 MMHG | RESPIRATION RATE: 18 BRPM

## 2020-06-02 PROCEDURE — G0299 HHS/HOSPICE OF RN EA 15 MIN: HCPCS

## 2020-06-02 PROCEDURE — G0152 HHCP-SERV OF OT,EA 15 MIN: HCPCS

## 2020-06-02 PROCEDURE — 3331090001 HH PPS REVENUE CREDIT

## 2020-06-02 PROCEDURE — A6212 FOAM DRG <=16 SQ IN W/BORDER: HCPCS

## 2020-06-02 PROCEDURE — 3331090002 HH PPS REVENUE DEBIT

## 2020-06-03 ENCOUNTER — HOME CARE VISIT (OUTPATIENT)
Dept: SCHEDULING | Facility: HOME HEALTH | Age: 70
End: 2020-06-03
Payer: MEDICARE

## 2020-06-03 VITALS
TEMPERATURE: 97 F | RESPIRATION RATE: 16 BRPM | OXYGEN SATURATION: 98 % | DIASTOLIC BLOOD PRESSURE: 60 MMHG | SYSTOLIC BLOOD PRESSURE: 110 MMHG | HEART RATE: 78 BPM

## 2020-06-03 PROCEDURE — G0300 HHS/HOSPICE OF LPN EA 15 MIN: HCPCS

## 2020-06-03 PROCEDURE — 3331090002 HH PPS REVENUE DEBIT

## 2020-06-03 PROCEDURE — G0151 HHCP-SERV OF PT,EA 15 MIN: HCPCS

## 2020-06-03 PROCEDURE — 3331090001 HH PPS REVENUE CREDIT

## 2020-06-04 VITALS
SYSTOLIC BLOOD PRESSURE: 110 MMHG | OXYGEN SATURATION: 98 % | DIASTOLIC BLOOD PRESSURE: 60 MMHG | HEART RATE: 78 BPM | TEMPERATURE: 97 F

## 2020-06-04 PROCEDURE — 3331090002 HH PPS REVENUE DEBIT

## 2020-06-04 PROCEDURE — 3331090001 HH PPS REVENUE CREDIT

## 2020-06-05 ENCOUNTER — HOME CARE VISIT (OUTPATIENT)
Dept: SCHEDULING | Facility: HOME HEALTH | Age: 70
End: 2020-06-05
Payer: MEDICARE

## 2020-06-05 VITALS
OXYGEN SATURATION: 92 % | HEART RATE: 70 BPM | DIASTOLIC BLOOD PRESSURE: 72 MMHG | SYSTOLIC BLOOD PRESSURE: 138 MMHG | TEMPERATURE: 97.5 F

## 2020-06-05 PROCEDURE — G0157 HHC PT ASSISTANT EA 15: HCPCS

## 2020-06-05 PROCEDURE — 3331090001 HH PPS REVENUE CREDIT

## 2020-06-05 PROCEDURE — 3331090002 HH PPS REVENUE DEBIT

## 2020-06-05 PROCEDURE — G0299 HHS/HOSPICE OF RN EA 15 MIN: HCPCS

## 2020-06-06 PROCEDURE — 3331090001 HH PPS REVENUE CREDIT

## 2020-06-06 PROCEDURE — 3331090002 HH PPS REVENUE DEBIT

## 2020-06-07 PROCEDURE — 3331090002 HH PPS REVENUE DEBIT

## 2020-06-07 PROCEDURE — 3331090001 HH PPS REVENUE CREDIT

## 2020-06-08 ENCOUNTER — HOME CARE VISIT (OUTPATIENT)
Dept: SCHEDULING | Facility: HOME HEALTH | Age: 70
End: 2020-06-08
Payer: MEDICARE

## 2020-06-08 VITALS
DIASTOLIC BLOOD PRESSURE: 78 MMHG | OXYGEN SATURATION: 95 % | HEART RATE: 74 BPM | SYSTOLIC BLOOD PRESSURE: 130 MMHG | TEMPERATURE: 97.3 F

## 2020-06-08 PROCEDURE — G0300 HHS/HOSPICE OF LPN EA 15 MIN: HCPCS

## 2020-06-08 PROCEDURE — G0152 HHCP-SERV OF OT,EA 15 MIN: HCPCS

## 2020-06-08 PROCEDURE — 3331090001 HH PPS REVENUE CREDIT

## 2020-06-08 PROCEDURE — 3331090002 HH PPS REVENUE DEBIT

## 2020-06-09 ENCOUNTER — HOME CARE VISIT (OUTPATIENT)
Dept: SCHEDULING | Facility: HOME HEALTH | Age: 70
End: 2020-06-09
Payer: MEDICARE

## 2020-06-09 VITALS
OXYGEN SATURATION: 99 % | SYSTOLIC BLOOD PRESSURE: 140 MMHG | TEMPERATURE: 97.7 F | HEART RATE: 65 BPM | DIASTOLIC BLOOD PRESSURE: 69 MMHG

## 2020-06-09 PROCEDURE — 3331090001 HH PPS REVENUE CREDIT

## 2020-06-09 PROCEDURE — G0157 HHC PT ASSISTANT EA 15: HCPCS

## 2020-06-09 PROCEDURE — 3331090002 HH PPS REVENUE DEBIT

## 2020-06-10 ENCOUNTER — HOME CARE VISIT (OUTPATIENT)
Dept: SCHEDULING | Facility: HOME HEALTH | Age: 70
End: 2020-06-10
Payer: MEDICARE

## 2020-06-10 VITALS
SYSTOLIC BLOOD PRESSURE: 137 MMHG | OXYGEN SATURATION: 98 % | BODY MASS INDEX: 33.3 KG/M2 | RESPIRATION RATE: 17 BRPM | WEIGHT: 252.43 LBS | TEMPERATURE: 97.7 F | HEART RATE: 77 BPM | DIASTOLIC BLOOD PRESSURE: 72 MMHG

## 2020-06-10 VITALS
HEART RATE: 75 BPM | TEMPERATURE: 96.8 F | DIASTOLIC BLOOD PRESSURE: 70 MMHG | SYSTOLIC BLOOD PRESSURE: 130 MMHG | OXYGEN SATURATION: 94 %

## 2020-06-10 PROCEDURE — 3331090002 HH PPS REVENUE DEBIT

## 2020-06-10 PROCEDURE — 3331090001 HH PPS REVENUE CREDIT

## 2020-06-10 PROCEDURE — G0152 HHCP-SERV OF OT,EA 15 MIN: HCPCS

## 2020-06-10 PROCEDURE — G0300 HHS/HOSPICE OF LPN EA 15 MIN: HCPCS

## 2020-06-11 PROCEDURE — 3331090002 HH PPS REVENUE DEBIT

## 2020-06-11 PROCEDURE — 3331090001 HH PPS REVENUE CREDIT

## 2020-06-12 ENCOUNTER — HOME CARE VISIT (OUTPATIENT)
Dept: SCHEDULING | Facility: HOME HEALTH | Age: 70
End: 2020-06-12
Payer: MEDICARE

## 2020-06-12 VITALS
HEART RATE: 70 BPM | DIASTOLIC BLOOD PRESSURE: 62 MMHG | SYSTOLIC BLOOD PRESSURE: 132 MMHG | TEMPERATURE: 98 F | OXYGEN SATURATION: 98 %

## 2020-06-12 PROCEDURE — 3331090002 HH PPS REVENUE DEBIT

## 2020-06-12 PROCEDURE — G0157 HHC PT ASSISTANT EA 15: HCPCS

## 2020-06-12 PROCEDURE — G0300 HHS/HOSPICE OF LPN EA 15 MIN: HCPCS

## 2020-06-12 PROCEDURE — 3331090001 HH PPS REVENUE CREDIT

## 2020-06-13 PROCEDURE — 3331090002 HH PPS REVENUE DEBIT

## 2020-06-13 PROCEDURE — 3331090001 HH PPS REVENUE CREDIT

## 2020-06-14 VITALS
HEART RATE: 67 BPM | OXYGEN SATURATION: 99 % | DIASTOLIC BLOOD PRESSURE: 66 MMHG | TEMPERATURE: 98 F | SYSTOLIC BLOOD PRESSURE: 132 MMHG | RESPIRATION RATE: 16 BRPM

## 2020-06-14 VITALS
TEMPERATURE: 98 F | HEART RATE: 70 BPM | RESPIRATION RATE: 16 BRPM | OXYGEN SATURATION: 98 % | DIASTOLIC BLOOD PRESSURE: 62 MMHG | SYSTOLIC BLOOD PRESSURE: 132 MMHG

## 2020-06-14 PROCEDURE — 3331090002 HH PPS REVENUE DEBIT

## 2020-06-14 PROCEDURE — 3331090001 HH PPS REVENUE CREDIT

## 2020-06-15 ENCOUNTER — HOME CARE VISIT (OUTPATIENT)
Dept: HOME HEALTH SERVICES | Facility: HOME HEALTH | Age: 70
End: 2020-06-15
Payer: MEDICARE

## 2020-06-15 ENCOUNTER — HOME CARE VISIT (OUTPATIENT)
Dept: SCHEDULING | Facility: HOME HEALTH | Age: 70
End: 2020-06-15
Payer: MEDICARE

## 2020-06-15 VITALS
DIASTOLIC BLOOD PRESSURE: 65 MMHG | HEART RATE: 78 BPM | OXYGEN SATURATION: 97 % | SYSTOLIC BLOOD PRESSURE: 127 MMHG | RESPIRATION RATE: 18 BRPM | TEMPERATURE: 97.3 F

## 2020-06-15 PROCEDURE — G0300 HHS/HOSPICE OF LPN EA 15 MIN: HCPCS

## 2020-06-15 PROCEDURE — G0152 HHCP-SERV OF OT,EA 15 MIN: HCPCS

## 2020-06-15 PROCEDURE — 3331090002 HH PPS REVENUE DEBIT

## 2020-06-15 PROCEDURE — 3331090001 HH PPS REVENUE CREDIT

## 2020-06-16 ENCOUNTER — HOME CARE VISIT (OUTPATIENT)
Dept: SCHEDULING | Facility: HOME HEALTH | Age: 70
End: 2020-06-16
Payer: MEDICARE

## 2020-06-16 VITALS
TEMPERATURE: 97.5 F | DIASTOLIC BLOOD PRESSURE: 68 MMHG | HEART RATE: 73 BPM | SYSTOLIC BLOOD PRESSURE: 128 MMHG | RESPIRATION RATE: 17 BRPM | OXYGEN SATURATION: 97 %

## 2020-06-16 PROCEDURE — G0157 HHC PT ASSISTANT EA 15: HCPCS

## 2020-06-16 PROCEDURE — 3331090001 HH PPS REVENUE CREDIT

## 2020-06-16 PROCEDURE — 3331090002 HH PPS REVENUE DEBIT

## 2020-06-17 ENCOUNTER — HOME CARE VISIT (OUTPATIENT)
Dept: HOME HEALTH SERVICES | Facility: HOME HEALTH | Age: 70
End: 2020-06-17
Payer: MEDICARE

## 2020-06-17 ENCOUNTER — HOME CARE VISIT (OUTPATIENT)
Dept: SCHEDULING | Facility: HOME HEALTH | Age: 70
End: 2020-06-17
Payer: MEDICARE

## 2020-06-17 VITALS
HEART RATE: 72 BPM | TEMPERATURE: 98.2 F | OXYGEN SATURATION: 98 % | SYSTOLIC BLOOD PRESSURE: 110 MMHG | DIASTOLIC BLOOD PRESSURE: 60 MMHG | RESPIRATION RATE: 18 BRPM

## 2020-06-17 VITALS
OXYGEN SATURATION: 99 % | SYSTOLIC BLOOD PRESSURE: 124 MMHG | DIASTOLIC BLOOD PRESSURE: 60 MMHG | TEMPERATURE: 97.7 F | HEART RATE: 69 BPM

## 2020-06-17 PROCEDURE — 3331090001 HH PPS REVENUE CREDIT

## 2020-06-17 PROCEDURE — 3331090002 HH PPS REVENUE DEBIT

## 2020-06-17 PROCEDURE — G0152 HHCP-SERV OF OT,EA 15 MIN: HCPCS

## 2020-06-18 ENCOUNTER — HOME CARE VISIT (OUTPATIENT)
Dept: SCHEDULING | Facility: HOME HEALTH | Age: 70
End: 2020-06-18
Payer: MEDICARE

## 2020-06-18 VITALS — OXYGEN SATURATION: 98 % | TEMPERATURE: 98 F | HEART RATE: 82 BPM

## 2020-06-18 PROCEDURE — 3331090001 HH PPS REVENUE CREDIT

## 2020-06-18 PROCEDURE — 3331090002 HH PPS REVENUE DEBIT

## 2020-06-18 PROCEDURE — G0157 HHC PT ASSISTANT EA 15: HCPCS

## 2020-06-19 ENCOUNTER — HOME CARE VISIT (OUTPATIENT)
Dept: SCHEDULING | Facility: HOME HEALTH | Age: 70
End: 2020-06-19
Payer: MEDICARE

## 2020-06-19 PROCEDURE — 3331090002 HH PPS REVENUE DEBIT

## 2020-06-19 PROCEDURE — 3331090001 HH PPS REVENUE CREDIT

## 2020-06-19 PROCEDURE — G0300 HHS/HOSPICE OF LPN EA 15 MIN: HCPCS

## 2020-06-20 PROCEDURE — 3331090001 HH PPS REVENUE CREDIT

## 2020-06-20 PROCEDURE — 3331090002 HH PPS REVENUE DEBIT

## 2020-06-21 VITALS
RESPIRATION RATE: 16 BRPM | TEMPERATURE: 97.9 F | HEART RATE: 70 BPM | DIASTOLIC BLOOD PRESSURE: 66 MMHG | OXYGEN SATURATION: 98 % | SYSTOLIC BLOOD PRESSURE: 126 MMHG

## 2020-06-21 PROCEDURE — 3331090001 HH PPS REVENUE CREDIT

## 2020-06-21 PROCEDURE — 3331090002 HH PPS REVENUE DEBIT

## 2020-06-22 ENCOUNTER — HOME CARE VISIT (OUTPATIENT)
Dept: SCHEDULING | Facility: HOME HEALTH | Age: 70
End: 2020-06-22
Payer: MEDICARE

## 2020-06-22 PROCEDURE — 3331090001 HH PPS REVENUE CREDIT

## 2020-06-22 PROCEDURE — G0300 HHS/HOSPICE OF LPN EA 15 MIN: HCPCS

## 2020-06-22 PROCEDURE — 3331090002 HH PPS REVENUE DEBIT

## 2020-06-23 ENCOUNTER — HOME CARE VISIT (OUTPATIENT)
Dept: SCHEDULING | Facility: HOME HEALTH | Age: 70
End: 2020-06-23
Payer: MEDICARE

## 2020-06-23 VITALS
OXYGEN SATURATION: 97 % | DIASTOLIC BLOOD PRESSURE: 68 MMHG | TEMPERATURE: 97.5 F | SYSTOLIC BLOOD PRESSURE: 140 MMHG | HEART RATE: 83 BPM

## 2020-06-23 PROCEDURE — G0157 HHC PT ASSISTANT EA 15: HCPCS

## 2020-06-23 PROCEDURE — 3331090001 HH PPS REVENUE CREDIT

## 2020-06-23 PROCEDURE — 3331090002 HH PPS REVENUE DEBIT

## 2020-06-24 VITALS
OXYGEN SATURATION: 98 % | RESPIRATION RATE: 16 BRPM | DIASTOLIC BLOOD PRESSURE: 60 MMHG | HEART RATE: 70 BPM | TEMPERATURE: 98.2 F | SYSTOLIC BLOOD PRESSURE: 132 MMHG

## 2020-06-24 PROCEDURE — 3331090002 HH PPS REVENUE DEBIT

## 2020-06-24 PROCEDURE — 3331090001 HH PPS REVENUE CREDIT

## 2020-06-25 PROCEDURE — 3331090002 HH PPS REVENUE DEBIT

## 2020-06-25 PROCEDURE — 3331090001 HH PPS REVENUE CREDIT

## 2020-06-26 ENCOUNTER — HOME CARE VISIT (OUTPATIENT)
Dept: SCHEDULING | Facility: HOME HEALTH | Age: 70
End: 2020-06-26
Payer: MEDICARE

## 2020-06-26 VITALS — SYSTOLIC BLOOD PRESSURE: 127 MMHG | TEMPERATURE: 97.6 F | DIASTOLIC BLOOD PRESSURE: 82 MMHG

## 2020-06-26 VITALS
DIASTOLIC BLOOD PRESSURE: 72 MMHG | OXYGEN SATURATION: 93 % | SYSTOLIC BLOOD PRESSURE: 125 MMHG | HEART RATE: 83 BPM | TEMPERATURE: 97.2 F

## 2020-06-26 PROCEDURE — 3331090001 HH PPS REVENUE CREDIT

## 2020-06-26 PROCEDURE — G0300 HHS/HOSPICE OF LPN EA 15 MIN: HCPCS

## 2020-06-26 PROCEDURE — 3331090002 HH PPS REVENUE DEBIT

## 2020-06-26 PROCEDURE — G0152 HHCP-SERV OF OT,EA 15 MIN: HCPCS

## 2020-06-26 PROCEDURE — G0157 HHC PT ASSISTANT EA 15: HCPCS

## 2020-06-27 VITALS
HEART RATE: 71 BPM | OXYGEN SATURATION: 98 % | TEMPERATURE: 98 F | SYSTOLIC BLOOD PRESSURE: 132 MMHG | RESPIRATION RATE: 17 BRPM | DIASTOLIC BLOOD PRESSURE: 67 MMHG

## 2020-06-27 PROCEDURE — 3331090002 HH PPS REVENUE DEBIT

## 2020-06-27 PROCEDURE — 3331090001 HH PPS REVENUE CREDIT

## 2020-06-28 PROCEDURE — 3331090001 HH PPS REVENUE CREDIT

## 2020-06-28 PROCEDURE — 3331090002 HH PPS REVENUE DEBIT

## 2020-06-29 ENCOUNTER — HOME CARE VISIT (OUTPATIENT)
Dept: SCHEDULING | Facility: HOME HEALTH | Age: 70
End: 2020-06-29
Payer: MEDICARE

## 2020-06-29 PROCEDURE — 3331090001 HH PPS REVENUE CREDIT

## 2020-06-29 PROCEDURE — 400013 HH SOC

## 2020-06-29 PROCEDURE — 3331090002 HH PPS REVENUE DEBIT

## 2020-06-29 PROCEDURE — G0300 HHS/HOSPICE OF LPN EA 15 MIN: HCPCS

## 2020-06-30 ENCOUNTER — HOME CARE VISIT (OUTPATIENT)
Dept: SCHEDULING | Facility: HOME HEALTH | Age: 70
End: 2020-06-30
Payer: MEDICARE

## 2020-06-30 PROCEDURE — G0151 HHCP-SERV OF PT,EA 15 MIN: HCPCS

## 2020-06-30 PROCEDURE — 3331090001 HH PPS REVENUE CREDIT

## 2020-06-30 PROCEDURE — 3331090002 HH PPS REVENUE DEBIT

## 2020-06-30 PROCEDURE — 400013 HH SOC

## 2020-07-01 ENCOUNTER — HOME CARE VISIT (OUTPATIENT)
Dept: SCHEDULING | Facility: HOME HEALTH | Age: 70
End: 2020-07-01
Payer: MEDICARE

## 2020-07-01 VITALS
SYSTOLIC BLOOD PRESSURE: 118 MMHG | DIASTOLIC BLOOD PRESSURE: 58 MMHG | OXYGEN SATURATION: 97 % | HEART RATE: 75 BPM | TEMPERATURE: 96.7 F

## 2020-07-01 PROCEDURE — 3331090001 HH PPS REVENUE CREDIT

## 2020-07-01 PROCEDURE — 3331090002 HH PPS REVENUE DEBIT

## 2020-07-01 PROCEDURE — G0300 HHS/HOSPICE OF LPN EA 15 MIN: HCPCS

## 2020-07-02 PROCEDURE — 3331090002 HH PPS REVENUE DEBIT

## 2020-07-02 PROCEDURE — 3331090001 HH PPS REVENUE CREDIT

## 2020-07-03 ENCOUNTER — HOME CARE VISIT (OUTPATIENT)
Dept: SCHEDULING | Facility: HOME HEALTH | Age: 70
End: 2020-07-03
Payer: MEDICARE

## 2020-07-03 PROCEDURE — 3331090002 HH PPS REVENUE DEBIT

## 2020-07-03 PROCEDURE — 3331090001 HH PPS REVENUE CREDIT

## 2020-07-03 PROCEDURE — G0157 HHC PT ASSISTANT EA 15: HCPCS

## 2020-07-04 VITALS
DIASTOLIC BLOOD PRESSURE: 65 MMHG | TEMPERATURE: 97.5 F | HEART RATE: 69 BPM | OXYGEN SATURATION: 98 % | SYSTOLIC BLOOD PRESSURE: 140 MMHG

## 2020-07-04 PROCEDURE — 3331090001 HH PPS REVENUE CREDIT

## 2020-07-04 PROCEDURE — 3331090002 HH PPS REVENUE DEBIT

## 2020-07-05 VITALS
RESPIRATION RATE: 16 BRPM | HEART RATE: 69 BPM | OXYGEN SATURATION: 97 % | TEMPERATURE: 97.9 F | SYSTOLIC BLOOD PRESSURE: 134 MMHG | DIASTOLIC BLOOD PRESSURE: 66 MMHG

## 2020-07-05 VITALS
SYSTOLIC BLOOD PRESSURE: 124 MMHG | RESPIRATION RATE: 16 BRPM | OXYGEN SATURATION: 97 % | TEMPERATURE: 98.2 F | HEART RATE: 70 BPM | DIASTOLIC BLOOD PRESSURE: 67 MMHG

## 2020-07-05 PROCEDURE — 3331090002 HH PPS REVENUE DEBIT

## 2020-07-05 PROCEDURE — 3331090001 HH PPS REVENUE CREDIT

## 2020-07-06 ENCOUNTER — HOME CARE VISIT (OUTPATIENT)
Dept: SCHEDULING | Facility: HOME HEALTH | Age: 70
End: 2020-07-06
Payer: MEDICARE

## 2020-07-06 PROCEDURE — G0300 HHS/HOSPICE OF LPN EA 15 MIN: HCPCS

## 2020-07-06 PROCEDURE — 3331090001 HH PPS REVENUE CREDIT

## 2020-07-06 PROCEDURE — 3331090002 HH PPS REVENUE DEBIT

## 2020-07-07 ENCOUNTER — HOME CARE VISIT (OUTPATIENT)
Dept: SCHEDULING | Facility: HOME HEALTH | Age: 70
End: 2020-07-07
Payer: MEDICARE

## 2020-07-07 VITALS
SYSTOLIC BLOOD PRESSURE: 136 MMHG | HEART RATE: 74 BPM | OXYGEN SATURATION: 96 % | RESPIRATION RATE: 17 BRPM | DIASTOLIC BLOOD PRESSURE: 60 MMHG | TEMPERATURE: 97.9 F

## 2020-07-07 PROCEDURE — 3331090001 HH PPS REVENUE CREDIT

## 2020-07-07 PROCEDURE — 3331090002 HH PPS REVENUE DEBIT

## 2020-07-07 PROCEDURE — G0157 HHC PT ASSISTANT EA 15: HCPCS

## 2020-07-08 ENCOUNTER — HOME CARE VISIT (OUTPATIENT)
Dept: SCHEDULING | Facility: HOME HEALTH | Age: 70
End: 2020-07-08
Payer: MEDICARE

## 2020-07-08 VITALS
RESPIRATION RATE: 16 BRPM | SYSTOLIC BLOOD PRESSURE: 131 MMHG | TEMPERATURE: 98.1 F | OXYGEN SATURATION: 98 % | HEART RATE: 67 BPM | DIASTOLIC BLOOD PRESSURE: 67 MMHG

## 2020-07-08 PROCEDURE — G0300 HHS/HOSPICE OF LPN EA 15 MIN: HCPCS

## 2020-07-08 PROCEDURE — 3331090001 HH PPS REVENUE CREDIT

## 2020-07-08 PROCEDURE — 3331090002 HH PPS REVENUE DEBIT

## 2020-07-09 PROCEDURE — 3331090002 HH PPS REVENUE DEBIT

## 2020-07-09 PROCEDURE — 3331090001 HH PPS REVENUE CREDIT

## 2020-07-10 ENCOUNTER — HOME CARE VISIT (OUTPATIENT)
Dept: SCHEDULING | Facility: HOME HEALTH | Age: 70
End: 2020-07-10
Payer: MEDICARE

## 2020-07-10 VITALS
TEMPERATURE: 96.9 F | DIASTOLIC BLOOD PRESSURE: 78 MMHG | SYSTOLIC BLOOD PRESSURE: 140 MMHG | OXYGEN SATURATION: 97 % | HEART RATE: 74 BPM

## 2020-07-10 PROCEDURE — G0157 HHC PT ASSISTANT EA 15: HCPCS

## 2020-07-10 PROCEDURE — 3331090001 HH PPS REVENUE CREDIT

## 2020-07-10 PROCEDURE — 3331090002 HH PPS REVENUE DEBIT

## 2020-07-11 PROCEDURE — 3331090001 HH PPS REVENUE CREDIT

## 2020-07-11 PROCEDURE — 3331090002 HH PPS REVENUE DEBIT

## 2020-07-12 VITALS
DIASTOLIC BLOOD PRESSURE: 67 MMHG | RESPIRATION RATE: 16 BRPM | OXYGEN SATURATION: 98 % | SYSTOLIC BLOOD PRESSURE: 137 MMHG | TEMPERATURE: 98.2 F | HEART RATE: 77 BPM

## 2020-07-12 PROCEDURE — 3331090001 HH PPS REVENUE CREDIT

## 2020-07-12 PROCEDURE — 3331090002 HH PPS REVENUE DEBIT

## 2020-07-13 PROCEDURE — 3331090002 HH PPS REVENUE DEBIT

## 2020-07-13 PROCEDURE — 3331090001 HH PPS REVENUE CREDIT

## 2020-07-14 PROCEDURE — 3331090001 HH PPS REVENUE CREDIT

## 2020-07-14 PROCEDURE — 3331090002 HH PPS REVENUE DEBIT

## 2020-07-15 ENCOUNTER — HOME CARE VISIT (OUTPATIENT)
Dept: HOME HEALTH SERVICES | Facility: HOME HEALTH | Age: 70
End: 2020-07-15
Payer: MEDICARE

## 2020-07-15 ENCOUNTER — HOME CARE VISIT (OUTPATIENT)
Dept: SCHEDULING | Facility: HOME HEALTH | Age: 70
End: 2020-07-15
Payer: MEDICARE

## 2020-07-15 VITALS
SYSTOLIC BLOOD PRESSURE: 128 MMHG | OXYGEN SATURATION: 97 % | HEART RATE: 74 BPM | DIASTOLIC BLOOD PRESSURE: 70 MMHG | TEMPERATURE: 97.9 F

## 2020-07-15 PROCEDURE — 3331090002 HH PPS REVENUE DEBIT

## 2020-07-15 PROCEDURE — 3331090001 HH PPS REVENUE CREDIT

## 2020-07-15 PROCEDURE — G0157 HHC PT ASSISTANT EA 15: HCPCS

## 2020-07-15 PROCEDURE — G0300 HHS/HOSPICE OF LPN EA 15 MIN: HCPCS

## 2020-07-16 PROCEDURE — 3331090002 HH PPS REVENUE DEBIT

## 2020-07-16 PROCEDURE — 3331090001 HH PPS REVENUE CREDIT

## 2020-07-17 ENCOUNTER — HOME CARE VISIT (OUTPATIENT)
Dept: SCHEDULING | Facility: HOME HEALTH | Age: 70
End: 2020-07-17
Payer: MEDICARE

## 2020-07-17 ENCOUNTER — HOME CARE VISIT (OUTPATIENT)
Dept: HOME HEALTH SERVICES | Facility: HOME HEALTH | Age: 70
End: 2020-07-17
Payer: MEDICARE

## 2020-07-17 PROCEDURE — 3331090001 HH PPS REVENUE CREDIT

## 2020-07-17 PROCEDURE — G0157 HHC PT ASSISTANT EA 15: HCPCS

## 2020-07-17 PROCEDURE — G0300 HHS/HOSPICE OF LPN EA 15 MIN: HCPCS

## 2020-07-17 PROCEDURE — 3331090002 HH PPS REVENUE DEBIT

## 2020-07-18 PROCEDURE — 3331090002 HH PPS REVENUE DEBIT

## 2020-07-18 PROCEDURE — 3331090001 HH PPS REVENUE CREDIT

## 2020-07-19 VITALS
TEMPERATURE: 98 F | HEART RATE: 71 BPM | DIASTOLIC BLOOD PRESSURE: 66 MMHG | OXYGEN SATURATION: 98 % | SYSTOLIC BLOOD PRESSURE: 131 MMHG | RESPIRATION RATE: 16 BRPM

## 2020-07-19 PROCEDURE — 3331090001 HH PPS REVENUE CREDIT

## 2020-07-19 PROCEDURE — 3331090002 HH PPS REVENUE DEBIT

## 2020-07-20 VITALS
SYSTOLIC BLOOD PRESSURE: 125 MMHG | RESPIRATION RATE: 18 BRPM | HEART RATE: 75 BPM | OXYGEN SATURATION: 97 % | TEMPERATURE: 98.1 F | DIASTOLIC BLOOD PRESSURE: 76 MMHG

## 2020-07-20 PROCEDURE — 3331090002 HH PPS REVENUE DEBIT

## 2020-07-20 PROCEDURE — 3331090001 HH PPS REVENUE CREDIT

## 2020-07-21 ENCOUNTER — HOME CARE VISIT (OUTPATIENT)
Dept: SCHEDULING | Facility: HOME HEALTH | Age: 70
End: 2020-07-21
Payer: MEDICARE

## 2020-07-21 VITALS
SYSTOLIC BLOOD PRESSURE: 130 MMHG | RESPIRATION RATE: 18 BRPM | DIASTOLIC BLOOD PRESSURE: 65 MMHG | OXYGEN SATURATION: 98 % | TEMPERATURE: 98 F | HEART RATE: 74 BPM

## 2020-07-21 PROCEDURE — 3331090001 HH PPS REVENUE CREDIT

## 2020-07-21 PROCEDURE — 3331090002 HH PPS REVENUE DEBIT

## 2020-07-21 PROCEDURE — G0157 HHC PT ASSISTANT EA 15: HCPCS

## 2020-07-21 NOTE — PROGRESS NOTES
Bedside shift change report given to Vidhya (oncoming nurse) by Aníbal Dacosta (offgoing nurse). Report included the following information SBAR, Kardex, Intake/Output, MAR and Recent Results. Margareth Agrawal Patient Age: 87 year old  MESSAGE: Interpreting service used: No      IM/FP- Letter Request- Letter of Medical Necessity       Reason form is needed:  To move mailbox from across the street, to attach it to the house.     Caller is requesting form to be mailed to Samantha Agrawal, [address] [place] [id number]     Additional Information:   Daughter states it is unsafe for her to walk down the driveway and cross the street.  Please include conditions that make it difficult to get her mail (age is not a condition).    Caller advise form requests may take 5-7 business days to complete.    Route message to non-clinical PSR pool.     WEIGHT AND HEIGHT:   Wt Readings from Last 1 Encounters:   10/22/19 75.7 kg (166 lb 12.8 oz)     Ht Readings from Last 1 Encounters:   10/22/19 5' 4\" (1.626 m)     BMI Readings from Last 1 Encounters:   10/22/19 28.63 kg/m²       ALLERGIES: no known allergies.  Current Outpatient Medications   Medication   • HYDROcodone-acetaminophen (NORCO) 5-325 MG per tablet   • ELIQUIS 2.5 MG Tab   • Ferrous Sulfate (IRON) 325 (65 Fe) MG Tab   • metoPROLOL succinate (TOPROL-XL) 50 MG 24 hr tablet   • furosemide (LASIX) 20 MG tablet   • amLODIPine (NORVASC) 10 MG tablet   • ergocalciferol (DRISDOL) 41121 units capsule   • aspirin 81 MG tablet   • Probiotic Cap     No current facility-administered medications for this visit.      PHARMACY to use: Shown below          Pharmacy preference(s) on file:   [id number] - [place], [place] - [address] [id number] [address] [address]  Phone: [phone] Fax: [phone]      CALL BACK INFO: Ok to leave response (including medical information) with family member or on answering machine  ROUTING: Patient's physician/staff        PCP: Genaro Guevara MD         INS: Payor: MEDICARE / Plan: PARTA AND B / Product Type: MEDICARE   PATIENT ADDRESS:  [address] [place] [address]

## 2020-07-22 ENCOUNTER — HOME CARE VISIT (OUTPATIENT)
Dept: SCHEDULING | Facility: HOME HEALTH | Age: 70
End: 2020-07-22
Payer: MEDICARE

## 2020-07-22 PROCEDURE — 3331090002 HH PPS REVENUE DEBIT

## 2020-07-22 PROCEDURE — G0300 HHS/HOSPICE OF LPN EA 15 MIN: HCPCS

## 2020-07-22 PROCEDURE — 3331090001 HH PPS REVENUE CREDIT

## 2020-07-23 PROCEDURE — 3331090001 HH PPS REVENUE CREDIT

## 2020-07-23 PROCEDURE — 3331090002 HH PPS REVENUE DEBIT

## 2020-07-24 ENCOUNTER — HOME CARE VISIT (OUTPATIENT)
Dept: SCHEDULING | Facility: HOME HEALTH | Age: 70
End: 2020-07-24
Payer: MEDICARE

## 2020-07-24 PROCEDURE — G0299 HHS/HOSPICE OF RN EA 15 MIN: HCPCS

## 2020-07-24 PROCEDURE — 3331090001 HH PPS REVENUE CREDIT

## 2020-07-24 PROCEDURE — G0151 HHCP-SERV OF PT,EA 15 MIN: HCPCS

## 2020-07-24 PROCEDURE — 3331090002 HH PPS REVENUE DEBIT

## 2020-07-25 VITALS
TEMPERATURE: 97.1 F | RESPIRATION RATE: 16 BRPM | DIASTOLIC BLOOD PRESSURE: 60 MMHG | SYSTOLIC BLOOD PRESSURE: 110 MMHG | OXYGEN SATURATION: 98 % | HEART RATE: 74 BPM

## 2020-07-25 PROCEDURE — 3331090001 HH PPS REVENUE CREDIT

## 2020-07-25 PROCEDURE — 3331090002 HH PPS REVENUE DEBIT

## 2020-07-26 VITALS
TEMPERATURE: 98.1 F | HEART RATE: 72 BPM | SYSTOLIC BLOOD PRESSURE: 132 MMHG | OXYGEN SATURATION: 98 % | DIASTOLIC BLOOD PRESSURE: 69 MMHG | RESPIRATION RATE: 18 BRPM

## 2020-07-26 PROCEDURE — 3331090002 HH PPS REVENUE DEBIT

## 2020-07-26 PROCEDURE — 3331090001 HH PPS REVENUE CREDIT

## 2020-07-27 ENCOUNTER — HOME CARE VISIT (OUTPATIENT)
Dept: SCHEDULING | Facility: HOME HEALTH | Age: 70
End: 2020-07-27
Payer: MEDICARE

## 2020-07-27 PROCEDURE — 3331090001 HH PPS REVENUE CREDIT

## 2020-07-27 PROCEDURE — 3331090003 HH PPS REVENUE ADJ

## 2020-07-27 PROCEDURE — 3331090002 HH PPS REVENUE DEBIT

## 2020-07-27 PROCEDURE — G0157 HHC PT ASSISTANT EA 15: HCPCS

## 2020-07-27 PROCEDURE — G0300 HHS/HOSPICE OF LPN EA 15 MIN: HCPCS

## 2020-07-28 VITALS
SYSTOLIC BLOOD PRESSURE: 124 MMHG | RESPIRATION RATE: 18 BRPM | DIASTOLIC BLOOD PRESSURE: 68 MMHG | TEMPERATURE: 98.2 F | HEART RATE: 79 BPM | OXYGEN SATURATION: 97 %

## 2020-07-28 PROCEDURE — 3331090002 HH PPS REVENUE DEBIT

## 2020-07-28 PROCEDURE — 3331090001 HH PPS REVENUE CREDIT

## 2020-07-29 VITALS
HEART RATE: 73 BPM | TEMPERATURE: 98.1 F | OXYGEN SATURATION: 98 % | SYSTOLIC BLOOD PRESSURE: 129 MMHG | DIASTOLIC BLOOD PRESSURE: 69 MMHG | RESPIRATION RATE: 16 BRPM

## 2020-07-29 PROCEDURE — 3331090002 HH PPS REVENUE DEBIT

## 2020-07-29 PROCEDURE — 3331090001 HH PPS REVENUE CREDIT

## 2020-07-30 ENCOUNTER — HOME CARE VISIT (OUTPATIENT)
Dept: SCHEDULING | Facility: HOME HEALTH | Age: 70
End: 2020-07-30
Payer: MEDICARE

## 2020-07-30 PROCEDURE — 3331090002 HH PPS REVENUE DEBIT

## 2020-07-30 PROCEDURE — 400014 HH F/U

## 2020-07-30 PROCEDURE — 3331090001 HH PPS REVENUE CREDIT

## 2020-07-30 PROCEDURE — G0300 HHS/HOSPICE OF LPN EA 15 MIN: HCPCS

## 2020-07-31 ENCOUNTER — HOME CARE VISIT (OUTPATIENT)
Dept: SCHEDULING | Facility: HOME HEALTH | Age: 70
End: 2020-07-31
Payer: MEDICARE

## 2020-07-31 ENCOUNTER — HOME CARE VISIT (OUTPATIENT)
Dept: HOME HEALTH SERVICES | Facility: HOME HEALTH | Age: 70
End: 2020-07-31
Payer: MEDICARE

## 2020-07-31 VITALS
OXYGEN SATURATION: 92 % | HEART RATE: 76 BPM | RESPIRATION RATE: 18 BRPM | TEMPERATURE: 97.1 F | SYSTOLIC BLOOD PRESSURE: 163 MMHG | DIASTOLIC BLOOD PRESSURE: 89 MMHG

## 2020-07-31 PROCEDURE — 3331090002 HH PPS REVENUE DEBIT

## 2020-07-31 PROCEDURE — 3331090001 HH PPS REVENUE CREDIT

## 2020-07-31 PROCEDURE — G0300 HHS/HOSPICE OF LPN EA 15 MIN: HCPCS

## 2020-08-01 PROCEDURE — 3331090001 HH PPS REVENUE CREDIT

## 2020-08-01 PROCEDURE — 3331090002 HH PPS REVENUE DEBIT

## 2020-08-02 VITALS
TEMPERATURE: 98.1 F | OXYGEN SATURATION: 98 % | HEART RATE: 77 BPM | SYSTOLIC BLOOD PRESSURE: 135 MMHG | DIASTOLIC BLOOD PRESSURE: 69 MMHG | RESPIRATION RATE: 18 BRPM

## 2020-08-02 PROCEDURE — 3331090001 HH PPS REVENUE CREDIT

## 2020-08-02 PROCEDURE — 3331090002 HH PPS REVENUE DEBIT

## 2020-08-03 PROCEDURE — 3331090002 HH PPS REVENUE DEBIT

## 2020-08-03 PROCEDURE — 3331090001 HH PPS REVENUE CREDIT

## 2020-08-04 ENCOUNTER — HOME CARE VISIT (OUTPATIENT)
Dept: SCHEDULING | Facility: HOME HEALTH | Age: 70
End: 2020-08-04
Payer: MEDICARE

## 2020-08-04 PROCEDURE — 3331090002 HH PPS REVENUE DEBIT

## 2020-08-04 PROCEDURE — 3331090001 HH PPS REVENUE CREDIT

## 2020-08-04 PROCEDURE — G0157 HHC PT ASSISTANT EA 15: HCPCS

## 2020-08-05 ENCOUNTER — HOME CARE VISIT (OUTPATIENT)
Dept: SCHEDULING | Facility: HOME HEALTH | Age: 70
End: 2020-08-05
Payer: MEDICARE

## 2020-08-05 PROCEDURE — 3331090001 HH PPS REVENUE CREDIT

## 2020-08-05 PROCEDURE — 3331090002 HH PPS REVENUE DEBIT

## 2020-08-05 PROCEDURE — G0300 HHS/HOSPICE OF LPN EA 15 MIN: HCPCS

## 2020-08-06 PROCEDURE — 3331090001 HH PPS REVENUE CREDIT

## 2020-08-06 PROCEDURE — 3331090002 HH PPS REVENUE DEBIT

## 2020-08-07 ENCOUNTER — HOME CARE VISIT (OUTPATIENT)
Dept: SCHEDULING | Facility: HOME HEALTH | Age: 70
End: 2020-08-07
Payer: MEDICARE

## 2020-08-07 PROCEDURE — 3331090001 HH PPS REVENUE CREDIT

## 2020-08-07 PROCEDURE — G0157 HHC PT ASSISTANT EA 15: HCPCS

## 2020-08-07 PROCEDURE — 3331090002 HH PPS REVENUE DEBIT

## 2020-08-07 PROCEDURE — G0300 HHS/HOSPICE OF LPN EA 15 MIN: HCPCS

## 2020-08-08 PROCEDURE — 3331090002 HH PPS REVENUE DEBIT

## 2020-08-08 PROCEDURE — 3331090001 HH PPS REVENUE CREDIT

## 2020-08-09 VITALS
DIASTOLIC BLOOD PRESSURE: 74 MMHG | SYSTOLIC BLOOD PRESSURE: 138 MMHG | TEMPERATURE: 97.8 F | HEART RATE: 74 BPM | RESPIRATION RATE: 17 BRPM | OXYGEN SATURATION: 97 %

## 2020-08-09 VITALS
DIASTOLIC BLOOD PRESSURE: 72 MMHG | HEART RATE: 74 BPM | OXYGEN SATURATION: 97 % | SYSTOLIC BLOOD PRESSURE: 139 MMHG | TEMPERATURE: 98.2 F | RESPIRATION RATE: 17 BRPM

## 2020-08-09 PROCEDURE — 3331090001 HH PPS REVENUE CREDIT

## 2020-08-09 PROCEDURE — 3331090002 HH PPS REVENUE DEBIT

## 2020-08-10 ENCOUNTER — HOME CARE VISIT (OUTPATIENT)
Dept: SCHEDULING | Facility: HOME HEALTH | Age: 70
End: 2020-08-10
Payer: MEDICARE

## 2020-08-10 PROCEDURE — 3331090001 HH PPS REVENUE CREDIT

## 2020-08-10 PROCEDURE — G0157 HHC PT ASSISTANT EA 15: HCPCS

## 2020-08-10 PROCEDURE — 3331090002 HH PPS REVENUE DEBIT

## 2020-08-11 PROCEDURE — 3331090002 HH PPS REVENUE DEBIT

## 2020-08-11 PROCEDURE — 3331090001 HH PPS REVENUE CREDIT

## 2020-08-12 ENCOUNTER — HOME CARE VISIT (OUTPATIENT)
Dept: SCHEDULING | Facility: HOME HEALTH | Age: 70
End: 2020-08-12
Payer: MEDICARE

## 2020-08-12 VITALS
RESPIRATION RATE: 17 BRPM | HEART RATE: 70 BPM | TEMPERATURE: 97.9 F | DIASTOLIC BLOOD PRESSURE: 90 MMHG | OXYGEN SATURATION: 99 % | SYSTOLIC BLOOD PRESSURE: 175 MMHG

## 2020-08-12 PROCEDURE — G0300 HHS/HOSPICE OF LPN EA 15 MIN: HCPCS

## 2020-08-12 PROCEDURE — 3331090001 HH PPS REVENUE CREDIT

## 2020-08-12 PROCEDURE — 3331090002 HH PPS REVENUE DEBIT

## 2020-08-13 ENCOUNTER — APPOINTMENT (OUTPATIENT)
Dept: VASCULAR SURGERY | Age: 70
End: 2020-08-13
Attending: EMERGENCY MEDICINE
Payer: MEDICARE

## 2020-08-13 ENCOUNTER — HOSPITAL ENCOUNTER (EMERGENCY)
Age: 70
Discharge: HOME | End: 2020-08-13
Attending: EMERGENCY MEDICINE | Admitting: EMERGENCY MEDICINE
Payer: MEDICARE

## 2020-08-13 VITALS
HEART RATE: 72 BPM | SYSTOLIC BLOOD PRESSURE: 148 MMHG | WEIGHT: 245 LBS | RESPIRATION RATE: 16 BRPM | HEIGHT: 72 IN | BODY MASS INDEX: 33.18 KG/M2 | DIASTOLIC BLOOD PRESSURE: 76 MMHG | TEMPERATURE: 97.9 F | OXYGEN SATURATION: 98 %

## 2020-08-13 DIAGNOSIS — M79.606 PAIN OF LOWER EXTREMITY, UNSPECIFIED LATERALITY: Primary | ICD-10-CM

## 2020-08-13 PROCEDURE — 99283 EMERGENCY DEPT VISIT LOW MDM: CPT

## 2020-08-13 PROCEDURE — 96372 THER/PROPH/DIAG INJ SC/IM: CPT

## 2020-08-13 PROCEDURE — 3331090002 HH PPS REVENUE DEBIT

## 2020-08-13 PROCEDURE — 93971 EXTREMITY STUDY: CPT

## 2020-08-13 PROCEDURE — 3331090001 HH PPS REVENUE CREDIT

## 2020-08-13 PROCEDURE — 74011250636 HC RX REV CODE- 250/636: Performed by: EMERGENCY MEDICINE

## 2020-08-13 RX ORDER — MORPHINE SULFATE 2 MG/ML
4 INJECTION, SOLUTION INTRAMUSCULAR; INTRAVENOUS ONCE
Status: COMPLETED | OUTPATIENT
Start: 2020-08-13 | End: 2020-08-13

## 2020-08-13 RX ADMIN — MORPHINE SULFATE 4 MG: 2 INJECTION, SOLUTION INTRAMUSCULAR; INTRAVENOUS at 15:58
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
## 2020-08-13 NOTE — ED TRIAGE NOTES
Pt states that she developed left leg pain from top of her foot up to her left hip. Pt states that she has a history of blood clots and sciatica. Pt received dialysis yesterday.

## 2020-08-13 NOTE — ED NOTES
Discharge instructions provided. Pt verbalized understanding. Pt assisted into wheelchair and out of ED with VSS and no signs of distress.

## 2020-08-14 ENCOUNTER — HOME CARE VISIT (OUTPATIENT)
Dept: SCHEDULING | Facility: HOME HEALTH | Age: 70
End: 2020-08-14
Payer: MEDICARE

## 2020-08-14 PROCEDURE — 3331090002 HH PPS REVENUE DEBIT

## 2020-08-14 PROCEDURE — G0300 HHS/HOSPICE OF LPN EA 15 MIN: HCPCS

## 2020-08-14 PROCEDURE — 3331090001 HH PPS REVENUE CREDIT

## 2020-08-15 PROCEDURE — 3331090002 HH PPS REVENUE DEBIT

## 2020-08-15 PROCEDURE — 3331090001 HH PPS REVENUE CREDIT

## 2020-08-16 PROCEDURE — 3331090002 HH PPS REVENUE DEBIT

## 2020-08-16 PROCEDURE — 3331090001 HH PPS REVENUE CREDIT

## 2020-08-17 ENCOUNTER — HOME CARE VISIT (OUTPATIENT)
Dept: SCHEDULING | Facility: HOME HEALTH | Age: 70
End: 2020-08-17
Payer: MEDICARE

## 2020-08-17 VITALS
OXYGEN SATURATION: 97 % | SYSTOLIC BLOOD PRESSURE: 134 MMHG | DIASTOLIC BLOOD PRESSURE: 67 MMHG | HEART RATE: 71 BPM | RESPIRATION RATE: 17 BRPM | TEMPERATURE: 98.2 F

## 2020-08-17 PROCEDURE — 3331090002 HH PPS REVENUE DEBIT

## 2020-08-17 PROCEDURE — G0300 HHS/HOSPICE OF LPN EA 15 MIN: HCPCS

## 2020-08-17 PROCEDURE — 3331090001 HH PPS REVENUE CREDIT

## 2020-08-18 ENCOUNTER — HOME CARE VISIT (OUTPATIENT)
Dept: SCHEDULING | Facility: HOME HEALTH | Age: 70
End: 2020-08-18
Payer: MEDICARE

## 2020-08-18 PROCEDURE — 3331090002 HH PPS REVENUE DEBIT

## 2020-08-18 PROCEDURE — G0157 HHC PT ASSISTANT EA 15: HCPCS

## 2020-08-18 PROCEDURE — 3331090001 HH PPS REVENUE CREDIT

## 2020-08-19 ENCOUNTER — HOME CARE VISIT (OUTPATIENT)
Dept: SCHEDULING | Facility: HOME HEALTH | Age: 70
End: 2020-08-19
Payer: MEDICARE

## 2020-08-19 PROCEDURE — 3331090002 HH PPS REVENUE DEBIT

## 2020-08-19 PROCEDURE — 3331090001 HH PPS REVENUE CREDIT

## 2020-08-19 PROCEDURE — G0300 HHS/HOSPICE OF LPN EA 15 MIN: HCPCS

## 2020-08-19 PROCEDURE — G0151 HHCP-SERV OF PT,EA 15 MIN: HCPCS

## 2020-08-20 VITALS
OXYGEN SATURATION: 98 % | TEMPERATURE: 98.1 F | HEART RATE: 71 BPM | SYSTOLIC BLOOD PRESSURE: 138 MMHG | DIASTOLIC BLOOD PRESSURE: 73 MMHG | RESPIRATION RATE: 17 BRPM

## 2020-08-20 PROCEDURE — 3331090001 HH PPS REVENUE CREDIT

## 2020-08-20 PROCEDURE — 3331090002 HH PPS REVENUE DEBIT

## 2020-08-21 ENCOUNTER — HOME CARE VISIT (OUTPATIENT)
Dept: SCHEDULING | Facility: HOME HEALTH | Age: 70
End: 2020-08-21
Payer: MEDICARE

## 2020-08-21 PROCEDURE — G0300 HHS/HOSPICE OF LPN EA 15 MIN: HCPCS

## 2020-08-21 PROCEDURE — 3331090002 HH PPS REVENUE DEBIT

## 2020-08-21 PROCEDURE — 3331090001 HH PPS REVENUE CREDIT

## 2020-08-22 PROCEDURE — 3331090001 HH PPS REVENUE CREDIT

## 2020-08-22 PROCEDURE — 3331090002 HH PPS REVENUE DEBIT

## 2020-08-23 PROCEDURE — 3331090002 HH PPS REVENUE DEBIT

## 2020-08-23 PROCEDURE — 3331090001 HH PPS REVENUE CREDIT

## 2020-08-24 ENCOUNTER — HOME CARE VISIT (OUTPATIENT)
Dept: SCHEDULING | Facility: HOME HEALTH | Age: 70
End: 2020-08-24
Payer: MEDICARE

## 2020-08-24 VITALS
OXYGEN SATURATION: 98 % | HEART RATE: 71 BPM | SYSTOLIC BLOOD PRESSURE: 133 MMHG | DIASTOLIC BLOOD PRESSURE: 72 MMHG | TEMPERATURE: 98.1 F | RESPIRATION RATE: 16 BRPM

## 2020-08-24 VITALS
DIASTOLIC BLOOD PRESSURE: 73 MMHG | SYSTOLIC BLOOD PRESSURE: 138 MMHG | RESPIRATION RATE: 16 BRPM | OXYGEN SATURATION: 98 % | OXYGEN SATURATION: 98 % | TEMPERATURE: 98.1 F | HEART RATE: 71 BPM | TEMPERATURE: 98 F | HEART RATE: 70 BPM | DIASTOLIC BLOOD PRESSURE: 69 MMHG | RESPIRATION RATE: 17 BRPM | SYSTOLIC BLOOD PRESSURE: 140 MMHG

## 2020-08-24 PROCEDURE — G0300 HHS/HOSPICE OF LPN EA 15 MIN: HCPCS

## 2020-08-24 PROCEDURE — 3331090001 HH PPS REVENUE CREDIT

## 2020-08-24 PROCEDURE — 3331090002 HH PPS REVENUE DEBIT

## 2020-08-25 ENCOUNTER — HOME CARE VISIT (OUTPATIENT)
Dept: SCHEDULING | Facility: HOME HEALTH | Age: 70
End: 2020-08-25
Payer: MEDICARE

## 2020-08-25 PROCEDURE — G0157 HHC PT ASSISTANT EA 15: HCPCS

## 2020-08-25 PROCEDURE — 3331090001 HH PPS REVENUE CREDIT

## 2020-08-25 PROCEDURE — 3331090002 HH PPS REVENUE DEBIT

## 2020-08-26 ENCOUNTER — HOME CARE VISIT (OUTPATIENT)
Dept: SCHEDULING | Facility: HOME HEALTH | Age: 70
End: 2020-08-26
Payer: MEDICARE

## 2020-08-26 PROCEDURE — G0300 HHS/HOSPICE OF LPN EA 15 MIN: HCPCS

## 2020-08-26 PROCEDURE — 3331090003 HH PPS REVENUE ADJ

## 2020-08-26 PROCEDURE — 3331090002 HH PPS REVENUE DEBIT

## 2020-08-26 PROCEDURE — 3331090001 HH PPS REVENUE CREDIT

## 2020-08-27 VITALS
RESPIRATION RATE: 18 BRPM | SYSTOLIC BLOOD PRESSURE: 131 MMHG | DIASTOLIC BLOOD PRESSURE: 72 MMHG | HEART RATE: 70 BPM | TEMPERATURE: 98.2 F | OXYGEN SATURATION: 98 %

## 2020-08-27 PROCEDURE — 3331090002 HH PPS REVENUE DEBIT

## 2020-08-27 PROCEDURE — 3331090001 HH PPS REVENUE CREDIT

## 2020-08-28 ENCOUNTER — HOME CARE VISIT (OUTPATIENT)
Dept: SCHEDULING | Facility: HOME HEALTH | Age: 70
End: 2020-08-28
Payer: MEDICARE

## 2020-08-28 PROCEDURE — 3331090001 HH PPS REVENUE CREDIT

## 2020-08-28 PROCEDURE — 400014 HH F/U

## 2020-08-28 PROCEDURE — G0300 HHS/HOSPICE OF LPN EA 15 MIN: HCPCS

## 2020-08-28 PROCEDURE — 3331090002 HH PPS REVENUE DEBIT

## 2020-08-29 PROCEDURE — 3331090001 HH PPS REVENUE CREDIT

## 2020-08-29 PROCEDURE — 3331090002 HH PPS REVENUE DEBIT

## 2020-08-30 VITALS
TEMPERATURE: 97.9 F | DIASTOLIC BLOOD PRESSURE: 72 MMHG | SYSTOLIC BLOOD PRESSURE: 143 MMHG | RESPIRATION RATE: 18 BRPM | HEART RATE: 76 BPM | OXYGEN SATURATION: 98 %

## 2020-08-30 VITALS
DIASTOLIC BLOOD PRESSURE: 69 MMHG | OXYGEN SATURATION: 98 % | HEART RATE: 81 BPM | TEMPERATURE: 97.9 F | SYSTOLIC BLOOD PRESSURE: 130 MMHG | RESPIRATION RATE: 16 BRPM

## 2020-08-30 PROCEDURE — 3331090002 HH PPS REVENUE DEBIT

## 2020-08-30 PROCEDURE — 3331090001 HH PPS REVENUE CREDIT

## 2020-08-31 ENCOUNTER — HOME CARE VISIT (OUTPATIENT)
Dept: SCHEDULING | Facility: HOME HEALTH | Age: 70
End: 2020-08-31
Payer: MEDICARE

## 2020-08-31 PROCEDURE — G0300 HHS/HOSPICE OF LPN EA 15 MIN: HCPCS

## 2020-08-31 PROCEDURE — 3331090002 HH PPS REVENUE DEBIT

## 2020-08-31 PROCEDURE — 3331090001 HH PPS REVENUE CREDIT

## 2020-09-01 ENCOUNTER — HOME CARE VISIT (OUTPATIENT)
Dept: SCHEDULING | Facility: HOME HEALTH | Age: 70
End: 2020-09-01
Payer: MEDICARE

## 2020-09-01 VITALS
SYSTOLIC BLOOD PRESSURE: 130 MMHG | OXYGEN SATURATION: 98 % | TEMPERATURE: 96.8 F | HEART RATE: 70 BPM | RESPIRATION RATE: 18 BRPM | DIASTOLIC BLOOD PRESSURE: 78 MMHG

## 2020-09-01 PROCEDURE — 3331090001 HH PPS REVENUE CREDIT

## 2020-09-01 PROCEDURE — G0157 HHC PT ASSISTANT EA 15: HCPCS

## 2020-09-01 PROCEDURE — 3331090002 HH PPS REVENUE DEBIT

## 2020-09-02 ENCOUNTER — HOME CARE VISIT (OUTPATIENT)
Dept: SCHEDULING | Facility: HOME HEALTH | Age: 70
End: 2020-09-02
Payer: MEDICARE

## 2020-09-02 PROCEDURE — 3331090001 HH PPS REVENUE CREDIT

## 2020-09-02 PROCEDURE — G0300 HHS/HOSPICE OF LPN EA 15 MIN: HCPCS

## 2020-09-02 PROCEDURE — 3331090002 HH PPS REVENUE DEBIT

## 2020-09-03 PROCEDURE — 3331090002 HH PPS REVENUE DEBIT

## 2020-09-03 PROCEDURE — 3331090001 HH PPS REVENUE CREDIT

## 2020-09-04 ENCOUNTER — HOME CARE VISIT (OUTPATIENT)
Dept: SCHEDULING | Facility: HOME HEALTH | Age: 70
End: 2020-09-04
Payer: MEDICARE

## 2020-09-04 PROCEDURE — 3331090002 HH PPS REVENUE DEBIT

## 2020-09-04 PROCEDURE — 3331090001 HH PPS REVENUE CREDIT

## 2020-09-04 PROCEDURE — G0300 HHS/HOSPICE OF LPN EA 15 MIN: HCPCS

## 2020-09-05 PROCEDURE — 3331090002 HH PPS REVENUE DEBIT

## 2020-09-05 PROCEDURE — 3331090001 HH PPS REVENUE CREDIT

## 2020-09-06 VITALS
SYSTOLIC BLOOD PRESSURE: 129 MMHG | TEMPERATURE: 98.1 F | RESPIRATION RATE: 18 BRPM | OXYGEN SATURATION: 97 % | HEART RATE: 70 BPM | DIASTOLIC BLOOD PRESSURE: 69 MMHG | OXYGEN SATURATION: 98 % | DIASTOLIC BLOOD PRESSURE: 71 MMHG | HEART RATE: 80 BPM | SYSTOLIC BLOOD PRESSURE: 131 MMHG | TEMPERATURE: 97.8 F | RESPIRATION RATE: 17 BRPM

## 2020-09-06 PROCEDURE — 3331090002 HH PPS REVENUE DEBIT

## 2020-09-06 PROCEDURE — 3331090001 HH PPS REVENUE CREDIT

## 2020-09-07 ENCOUNTER — HOME CARE VISIT (OUTPATIENT)
Dept: HOME HEALTH SERVICES | Facility: HOME HEALTH | Age: 70
End: 2020-09-07
Payer: MEDICARE

## 2020-09-07 PROCEDURE — 3331090001 HH PPS REVENUE CREDIT

## 2020-09-07 PROCEDURE — 3331090002 HH PPS REVENUE DEBIT

## 2020-09-08 ENCOUNTER — HOME CARE VISIT (OUTPATIENT)
Dept: SCHEDULING | Facility: HOME HEALTH | Age: 70
End: 2020-09-08
Payer: MEDICARE

## 2020-09-08 PROCEDURE — 3331090002 HH PPS REVENUE DEBIT

## 2020-09-08 PROCEDURE — G0157 HHC PT ASSISTANT EA 15: HCPCS

## 2020-09-08 PROCEDURE — 3331090001 HH PPS REVENUE CREDIT

## 2020-09-09 PROCEDURE — 3331090001 HH PPS REVENUE CREDIT

## 2020-09-09 PROCEDURE — 3331090002 HH PPS REVENUE DEBIT

## 2020-09-09 NOTE — TELEPHONE ENCOUNTER
9/9/2020       RE: Christina Tietz  [address] [place] [address]     Dear Colleague,    Thank you for referring your patient, Christina Tietz, to the [facility] EMG at [facility]. Please see a copy of my visit note below.    [facility]  Electrodiagnostic Laboratory    Nerve Conduction & EMG Report          Patient:       Christina Tietz  Patient ID:    [id number]  Gender:        Female  YOB: 1979  Age:           41 Years 3 Months        History & Examination:  Christina Tietz is a 41 year old woman with a prior history of tethered cord and Chiari malformation, both treated surgically, and a current history of muscle spasms or dystonia in a broad distribution but prominently affecting the neck and shoulder, where she has been treated recently with botulinum toxin injections. She is referred for further evaluation of these symptoms and for consideration of radiculopathy in particular.     Techniques:   Motor conduction studies were done with surface recording electrodes. Sensory conduction studies were performed with surface electrodes, unless indicated otherwise by (n), designating the use of subdermal recording electrodes. Temperature was monitored and recorded throughout the study. Upper extremities were maintained at a temperature of 32 degrees Centigrade or higher.  Lower extremities were maintained at a temperature of 31degrees Centigrade or higher. EMG was done with a concentric needle electrode.        Results:  Bilateral sural, bilateral peroneal, and left median, ulnar, and radial sensory conduction studies were normal. Bilateral tibial, bilateral peroneal, and left median motor conduction studies were normal. A left ulnar motor conduction study demonstrated moderate conduction slowing across the elbow. A left tibial F-response study was normal. Electromyography demonstrated fibrillation potentials at the C7 paraspinal level and was otherwise  I returned Santosh call. 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 She states they are currently coming out twice a week and daughter is changing wound dressing 5 days a week. She states she would still come out twice a week. I told her wound care special;ist is coming to [address] office visit and would discuss it them sonny. normal.     Interpretation:  This is a mildly abnormal study, demonstrating electrophysiologic evidence of the followin. No definite evidence of lumbosacral or cervical radiculopathy. The findings at the C7 paraspinal level could be seen in the setting of axonal injury to posterior primary rami of cervical roots but, perhaps more likely, may relate to treatment with botulinum toxin.  2. No evidence of polyneuropathy.  3. Mild to moderate left ulnar neuropathy at the elbow.       Harjit Ford M.D.         Sensory NCS      Nerve / Sites Rec. Site Onset Peak NP Amp Ref. PP Amp Dist Angelo Ref. Temp     ms ms  V  V  V cm m/s m/s  C   L MEDIAN - Dig II      Dig II Wrist 2.92 3.80 17.7 10.0 16.1 14 48.0 48.0 31.9   L ULNAR - Dig V      Dig V Wrist 2.60 3.28 14.5 8.0 15.0 12.5 48.0 48.0 32.4   L RADIAL - Snuff      Forearm Snuff 2.29 2.92 60.6 15.0 76.0 12.5 54.5 48.0 31.5   R SURAL - Lat Mall 60      Calf Ankle 2.97 3.70 22.5 5.0 20.4 14 47.2 38.0 31.1   L SURAL - Lat Mall 60      Calf Ankle 2.71 3.54 22.0 5.0 22.4 14 51.7 38.0 30.6   R SUP PERONEAL      Lat Leg Bennett 2.40 3.23 25.7  23.9 12.5 52.2 38.0 28.3   L SUP PERONEAL      Lat Leg Bennett 2.40 3.18 24.9  29.5 12.5 52.2 38.0 30.8       Motor NCS      Nerve / Sites Rec. Site Lat Ref. Amp Ref. Rel Amp Dist Angelo Ref. Dur. Area Temp.     ms ms mV mV % cm m/s m/s ms %  C   L MEDIAN - APB      Wrist APB 4.22 4.40 9.8 5.0 100 8   6.15 100 32      Elbow APB 7.86  9.8  100 20 54.9 48.0 6.93 101 32.1   L ULNAR - ADM      Wrist ADM 3.28 3.50 15.9 5.0 100 8   7.34 100 31.7      B.Elbow ADM 7.03  15.2  95.6 20 53.3 48.0 6.93 90 31.6      A.Elbow ADM 9.38  14.7  92.6 8 34.1 48.0 7.08 90.5 31.5   R DEEP PERONEAL - EDB      Ankle EDB 3.65 6.00 9.1 2.5 100 8   6.56 100 31.2      FibHead EDB 11.82  7.4  82 34 41.6 38.0 7.50 88.6 31.3      Pop Fos EDB 13.49  7.4  81.6 9 54.0 38.0 7.66 88.6 31.3   L DEEP PERONEAL - EDB      Ankle EDB 4.95 6.00 5.6 2.5 100 8   6.93 100 31.2   R TIBIAL -  AH      Ankle AH 3.44 6.00 21.4 4.0 100 8   6.35 100 31.1   L TIBIAL - AH      Ankle AH 3.18 6.00 16.9 4.0 100 8   6.41 100 28.8      Pop Fos AH 12.08  9.3  54.8 39 43.8 38.0 7.24 68.6 28.7       F  Wave      Nerve Min F Lat Max F Lat Mean FLat Temp.    ms ms ms  C   L TIBIAL 49.06 49.69 49.43 31.1       EMG Summary Table     Spontaneous MUAP Recruitment    IA Fib PSW Fasc H.F. Amp Dur. PPP Pattern   L. TIB ANTERIOR N None None None None N N N N   L. GASTROCN (MED) N None None None None N N N N   L. VAST LATERALIS N None None None None N N N N   L. PRON TERES N None None None None N N N N   L. FIRST D INTEROSS N None None None None N N N N   L. EXT DIG COMM N None None None None N N N N   L. BICEPS N None None None None N N N N   L. DELTOID N None None None None N N N N   L. C7 PSP 1+ 2+ None None None N N N N                                        Again, thank you for allowing me to participate in the care of your patient.      Sincerely,    Harjit Ford MD

## 2020-09-10 PROCEDURE — 3331090002 HH PPS REVENUE DEBIT

## 2020-09-10 PROCEDURE — 3331090001 HH PPS REVENUE CREDIT

## 2020-09-11 ENCOUNTER — HOME CARE VISIT (OUTPATIENT)
Dept: SCHEDULING | Facility: HOME HEALTH | Age: 70
End: 2020-09-11
Payer: MEDICARE

## 2020-09-11 VITALS
OXYGEN SATURATION: 98 % | DIASTOLIC BLOOD PRESSURE: 72 MMHG | TEMPERATURE: 98 F | SYSTOLIC BLOOD PRESSURE: 108 MMHG | RESPIRATION RATE: 20 BRPM | HEART RATE: 71 BPM

## 2020-09-11 PROCEDURE — G0300 HHS/HOSPICE OF LPN EA 15 MIN: HCPCS

## 2020-09-11 PROCEDURE — 3331090002 HH PPS REVENUE DEBIT

## 2020-09-11 PROCEDURE — 3331090001 HH PPS REVENUE CREDIT

## 2020-09-12 PROCEDURE — 3331090001 HH PPS REVENUE CREDIT

## 2020-09-12 PROCEDURE — 3331090002 HH PPS REVENUE DEBIT

## 2020-09-13 PROCEDURE — 3331090002 HH PPS REVENUE DEBIT

## 2020-09-13 PROCEDURE — 3331090001 HH PPS REVENUE CREDIT

## 2020-09-14 ENCOUNTER — HOME CARE VISIT (OUTPATIENT)
Dept: SCHEDULING | Facility: HOME HEALTH | Age: 70
End: 2020-09-14
Payer: MEDICARE

## 2020-09-14 PROCEDURE — 3331090001 HH PPS REVENUE CREDIT

## 2020-09-14 PROCEDURE — G0300 HHS/HOSPICE OF LPN EA 15 MIN: HCPCS

## 2020-09-14 PROCEDURE — 3331090002 HH PPS REVENUE DEBIT

## 2020-09-15 ENCOUNTER — HOME CARE VISIT (OUTPATIENT)
Dept: SCHEDULING | Facility: HOME HEALTH | Age: 70
End: 2020-09-15
Payer: MEDICARE

## 2020-09-15 VITALS
SYSTOLIC BLOOD PRESSURE: 131 MMHG | HEART RATE: 71 BPM | TEMPERATURE: 97.8 F | DIASTOLIC BLOOD PRESSURE: 62 MMHG | OXYGEN SATURATION: 96 %

## 2020-09-15 PROCEDURE — 3331090001 HH PPS REVENUE CREDIT

## 2020-09-15 PROCEDURE — G0157 HHC PT ASSISTANT EA 15: HCPCS

## 2020-09-15 PROCEDURE — 3331090002 HH PPS REVENUE DEBIT

## 2020-09-16 PROCEDURE — 3331090002 HH PPS REVENUE DEBIT

## 2020-09-16 PROCEDURE — 3331090001 HH PPS REVENUE CREDIT

## 2020-09-17 VITALS
DIASTOLIC BLOOD PRESSURE: 71 MMHG | RESPIRATION RATE: 16 BRPM | TEMPERATURE: 97.7 F | HEART RATE: 71 BPM | SYSTOLIC BLOOD PRESSURE: 133 MMHG | OXYGEN SATURATION: 97 %

## 2020-09-17 PROCEDURE — 3331090001 HH PPS REVENUE CREDIT

## 2020-09-17 PROCEDURE — 3331090002 HH PPS REVENUE DEBIT

## 2020-09-18 ENCOUNTER — HOME CARE VISIT (OUTPATIENT)
Dept: HOME HEALTH SERVICES | Facility: HOME HEALTH | Age: 70
End: 2020-09-18
Payer: MEDICARE

## 2020-09-18 PROCEDURE — 3331090001 HH PPS REVENUE CREDIT

## 2020-09-18 PROCEDURE — 3331090002 HH PPS REVENUE DEBIT

## 2020-09-19 PROCEDURE — 3331090001 HH PPS REVENUE CREDIT

## 2020-09-19 PROCEDURE — 3331090002 HH PPS REVENUE DEBIT

## 2020-09-20 PROCEDURE — 3331090001 HH PPS REVENUE CREDIT

## 2020-09-20 PROCEDURE — 3331090002 HH PPS REVENUE DEBIT

## 2020-09-21 ENCOUNTER — HOME CARE VISIT (OUTPATIENT)
Dept: SCHEDULING | Facility: HOME HEALTH | Age: 70
End: 2020-09-21
Payer: MEDICARE

## 2020-09-21 PROCEDURE — G0300 HHS/HOSPICE OF LPN EA 15 MIN: HCPCS

## 2020-09-21 PROCEDURE — 3331090001 HH PPS REVENUE CREDIT

## 2020-09-21 PROCEDURE — 3331090002 HH PPS REVENUE DEBIT

## 2020-09-22 VITALS
DIASTOLIC BLOOD PRESSURE: 69 MMHG | TEMPERATURE: 97.7 F | HEART RATE: 70 BPM | RESPIRATION RATE: 16 BRPM | OXYGEN SATURATION: 98 % | SYSTOLIC BLOOD PRESSURE: 134 MMHG

## 2020-09-22 PROCEDURE — 3331090001 HH PPS REVENUE CREDIT

## 2020-09-22 PROCEDURE — 3331090002 HH PPS REVENUE DEBIT

## 2020-09-23 ENCOUNTER — HOME CARE VISIT (OUTPATIENT)
Dept: HOME HEALTH SERVICES | Facility: HOME HEALTH | Age: 70
End: 2020-09-23
Payer: MEDICARE

## 2020-09-23 ENCOUNTER — HOME CARE VISIT (OUTPATIENT)
Dept: SCHEDULING | Facility: HOME HEALTH | Age: 70
End: 2020-09-23
Payer: MEDICARE

## 2020-09-23 PROCEDURE — 3331090001 HH PPS REVENUE CREDIT

## 2020-09-23 PROCEDURE — 3331090002 HH PPS REVENUE DEBIT

## 2020-09-24 VITALS
DIASTOLIC BLOOD PRESSURE: 88 MMHG | TEMPERATURE: 97.8 F | SYSTOLIC BLOOD PRESSURE: 132 MMHG | HEART RATE: 88 BPM | OXYGEN SATURATION: 98 % | RESPIRATION RATE: 12 BRPM

## 2020-09-24 PROCEDURE — 3331090001 HH PPS REVENUE CREDIT

## 2020-09-24 PROCEDURE — 3331090002 HH PPS REVENUE DEBIT

## 2020-09-25 ENCOUNTER — HOME CARE VISIT (OUTPATIENT)
Dept: SCHEDULING | Facility: HOME HEALTH | Age: 70
End: 2020-09-25
Payer: MEDICARE

## 2020-09-25 PROCEDURE — 3331090001 HH PPS REVENUE CREDIT

## 2020-09-25 PROCEDURE — 3331090002 HH PPS REVENUE DEBIT

## 2020-09-25 PROCEDURE — G0299 HHS/HOSPICE OF RN EA 15 MIN: HCPCS

## 2020-09-26 PROCEDURE — 3331090001 HH PPS REVENUE CREDIT

## 2020-09-26 PROCEDURE — 3331090002 HH PPS REVENUE DEBIT

## 2020-09-27 PROCEDURE — 3331090001 HH PPS REVENUE CREDIT

## 2020-09-27 PROCEDURE — 3331090002 HH PPS REVENUE DEBIT

## 2020-09-28 PROCEDURE — 3331090001 HH PPS REVENUE CREDIT

## 2020-09-28 PROCEDURE — 3331090002 HH PPS REVENUE DEBIT

## 2020-09-29 VITALS
TEMPERATURE: 97.8 F | HEART RATE: 76 BPM | SYSTOLIC BLOOD PRESSURE: 125 MMHG | OXYGEN SATURATION: 97 % | DIASTOLIC BLOOD PRESSURE: 74 MMHG

## 2020-09-29 PROCEDURE — 3331090001 HH PPS REVENUE CREDIT

## 2020-09-29 PROCEDURE — 3331090002 HH PPS REVENUE DEBIT

## 2020-09-30 ENCOUNTER — HOME CARE VISIT (OUTPATIENT)
Dept: HOME HEALTH SERVICES | Facility: HOME HEALTH | Age: 70
End: 2020-09-30
Payer: MEDICARE

## 2020-09-30 PROCEDURE — 3331090002 HH PPS REVENUE DEBIT

## 2020-09-30 PROCEDURE — 3331090001 HH PPS REVENUE CREDIT

## 2020-10-01 PROCEDURE — 3331090001 HH PPS REVENUE CREDIT

## 2020-10-01 PROCEDURE — 3331090002 HH PPS REVENUE DEBIT

## 2020-10-02 ENCOUNTER — HOME CARE VISIT (OUTPATIENT)
Dept: SCHEDULING | Facility: HOME HEALTH | Age: 70
End: 2020-10-02
Payer: MEDICARE

## 2020-10-02 PROCEDURE — 3331090002 HH PPS REVENUE DEBIT

## 2020-10-02 PROCEDURE — 3331090001 HH PPS REVENUE CREDIT

## 2020-10-02 PROCEDURE — G0300 HHS/HOSPICE OF LPN EA 15 MIN: HCPCS

## 2020-10-02 PROCEDURE — 400014 HH F/U

## 2020-10-03 PROCEDURE — 3331090002 HH PPS REVENUE DEBIT

## 2020-10-03 PROCEDURE — 3331090001 HH PPS REVENUE CREDIT

## 2020-10-04 PROCEDURE — 3331090001 HH PPS REVENUE CREDIT

## 2020-10-04 PROCEDURE — 3331090002 HH PPS REVENUE DEBIT

## 2020-10-05 ENCOUNTER — HOME CARE VISIT (OUTPATIENT)
Dept: SCHEDULING | Facility: HOME HEALTH | Age: 70
End: 2020-10-05
Payer: MEDICARE

## 2020-10-05 VITALS
DIASTOLIC BLOOD PRESSURE: 69 MMHG | RESPIRATION RATE: 17 BRPM | SYSTOLIC BLOOD PRESSURE: 129 MMHG | TEMPERATURE: 97.8 F | OXYGEN SATURATION: 98 % | HEART RATE: 71 BPM

## 2020-10-05 PROCEDURE — 3331090001 HH PPS REVENUE CREDIT

## 2020-10-05 PROCEDURE — G0300 HHS/HOSPICE OF LPN EA 15 MIN: HCPCS

## 2020-10-05 PROCEDURE — 3331090002 HH PPS REVENUE DEBIT

## 2020-10-06 PROCEDURE — 3331090002 HH PPS REVENUE DEBIT

## 2020-10-06 PROCEDURE — 3331090001 HH PPS REVENUE CREDIT

## 2020-10-07 PROCEDURE — 3331090001 HH PPS REVENUE CREDIT

## 2020-10-07 PROCEDURE — 3331090002 HH PPS REVENUE DEBIT

## 2020-10-08 VITALS
TEMPERATURE: 97.7 F | SYSTOLIC BLOOD PRESSURE: 132 MMHG | RESPIRATION RATE: 16 BRPM | OXYGEN SATURATION: 99 % | DIASTOLIC BLOOD PRESSURE: 66 MMHG | HEART RATE: 79 BPM

## 2020-10-08 PROCEDURE — 3331090002 HH PPS REVENUE DEBIT

## 2020-10-08 PROCEDURE — 3331090001 HH PPS REVENUE CREDIT

## 2020-10-09 ENCOUNTER — HOME CARE VISIT (OUTPATIENT)
Dept: SCHEDULING | Facility: HOME HEALTH | Age: 70
End: 2020-10-09
Payer: MEDICARE

## 2020-10-09 PROCEDURE — G0300 HHS/HOSPICE OF LPN EA 15 MIN: HCPCS

## 2020-10-09 PROCEDURE — 3331090001 HH PPS REVENUE CREDIT

## 2020-10-09 PROCEDURE — 3331090002 HH PPS REVENUE DEBIT

## 2020-10-10 PROCEDURE — 3331090001 HH PPS REVENUE CREDIT

## 2020-10-10 PROCEDURE — 3331090002 HH PPS REVENUE DEBIT

## 2020-10-11 PROCEDURE — 3331090002 HH PPS REVENUE DEBIT

## 2020-10-11 PROCEDURE — 3331090001 HH PPS REVENUE CREDIT

## 2020-10-12 PROCEDURE — 3331090001 HH PPS REVENUE CREDIT

## 2020-10-12 PROCEDURE — 3331090002 HH PPS REVENUE DEBIT

## 2020-10-13 VITALS
OXYGEN SATURATION: 97 % | SYSTOLIC BLOOD PRESSURE: 127 MMHG | TEMPERATURE: 98 F | RESPIRATION RATE: 17 BRPM | DIASTOLIC BLOOD PRESSURE: 67 MMHG | HEART RATE: 70 BPM

## 2020-10-13 PROCEDURE — 3331090001 HH PPS REVENUE CREDIT

## 2020-10-13 PROCEDURE — 3331090002 HH PPS REVENUE DEBIT

## 2020-10-14 ENCOUNTER — HOME CARE VISIT (OUTPATIENT)
Dept: HOME HEALTH SERVICES | Facility: HOME HEALTH | Age: 70
End: 2020-10-14
Payer: MEDICARE

## 2020-10-14 PROCEDURE — 3331090002 HH PPS REVENUE DEBIT

## 2020-10-14 PROCEDURE — 3331090001 HH PPS REVENUE CREDIT

## 2020-10-15 PROCEDURE — 3331090001 HH PPS REVENUE CREDIT

## 2020-10-15 PROCEDURE — 3331090002 HH PPS REVENUE DEBIT

## 2020-10-16 ENCOUNTER — HOME CARE VISIT (OUTPATIENT)
Dept: SCHEDULING | Facility: HOME HEALTH | Age: 70
End: 2020-10-16
Payer: MEDICARE

## 2020-10-16 PROCEDURE — G0300 HHS/HOSPICE OF LPN EA 15 MIN: HCPCS

## 2020-10-16 PROCEDURE — 3331090002 HH PPS REVENUE DEBIT

## 2020-10-16 PROCEDURE — 3331090001 HH PPS REVENUE CREDIT

## 2020-10-17 PROCEDURE — 3331090002 HH PPS REVENUE DEBIT

## 2020-10-17 PROCEDURE — 3331090001 HH PPS REVENUE CREDIT

## 2020-10-18 PROCEDURE — 3331090002 HH PPS REVENUE DEBIT

## 2020-10-18 PROCEDURE — 3331090001 HH PPS REVENUE CREDIT

## 2020-10-19 VITALS
RESPIRATION RATE: 17 BRPM | HEART RATE: 72 BPM | OXYGEN SATURATION: 97 % | SYSTOLIC BLOOD PRESSURE: 132 MMHG | DIASTOLIC BLOOD PRESSURE: 68 MMHG | TEMPERATURE: 97.8 F

## 2020-10-19 PROCEDURE — 3331090001 HH PPS REVENUE CREDIT

## 2020-10-19 PROCEDURE — 3331090002 HH PPS REVENUE DEBIT

## 2020-10-20 PROCEDURE — 3331090001 HH PPS REVENUE CREDIT

## 2020-10-20 PROCEDURE — 3331090002 HH PPS REVENUE DEBIT

## 2020-10-21 ENCOUNTER — HOME CARE VISIT (OUTPATIENT)
Dept: SCHEDULING | Facility: HOME HEALTH | Age: 70
End: 2020-10-21
Payer: MEDICARE

## 2020-10-21 PROCEDURE — G0300 HHS/HOSPICE OF LPN EA 15 MIN: HCPCS

## 2020-10-21 PROCEDURE — 3331090002 HH PPS REVENUE DEBIT

## 2020-10-21 PROCEDURE — 3331090001 HH PPS REVENUE CREDIT

## 2020-10-22 PROCEDURE — 3331090002 HH PPS REVENUE DEBIT

## 2020-10-22 PROCEDURE — 3331090001 HH PPS REVENUE CREDIT

## 2020-10-23 ENCOUNTER — HOME CARE VISIT (OUTPATIENT)
Dept: SCHEDULING | Facility: HOME HEALTH | Age: 70
End: 2020-10-23
Payer: MEDICARE

## 2020-10-23 PROCEDURE — 3331090001 HH PPS REVENUE CREDIT

## 2020-10-23 PROCEDURE — G0300 HHS/HOSPICE OF LPN EA 15 MIN: HCPCS

## 2020-10-23 PROCEDURE — 3331090002 HH PPS REVENUE DEBIT

## 2020-10-24 PROCEDURE — 3331090001 HH PPS REVENUE CREDIT

## 2020-10-24 PROCEDURE — 3331090002 HH PPS REVENUE DEBIT

## 2020-10-25 VITALS
OXYGEN SATURATION: 97 % | DIASTOLIC BLOOD PRESSURE: 67 MMHG | TEMPERATURE: 97.8 F | HEART RATE: 72 BPM | RESPIRATION RATE: 18 BRPM | SYSTOLIC BLOOD PRESSURE: 127 MMHG

## 2020-10-25 PROCEDURE — 3331090001 HH PPS REVENUE CREDIT

## 2020-10-25 PROCEDURE — 3331090002 HH PPS REVENUE DEBIT

## 2020-10-26 ENCOUNTER — HOME CARE VISIT (OUTPATIENT)
Dept: SCHEDULING | Facility: HOME HEALTH | Age: 70
End: 2020-10-26
Payer: MEDICARE

## 2020-10-26 PROCEDURE — G0300 HHS/HOSPICE OF LPN EA 15 MIN: HCPCS

## 2020-10-26 PROCEDURE — 3331090002 HH PPS REVENUE DEBIT

## 2020-10-26 PROCEDURE — 3331090001 HH PPS REVENUE CREDIT

## 2020-10-26 PROCEDURE — 400014 HH F/U

## 2020-10-27 PROCEDURE — 3331090001 HH PPS REVENUE CREDIT

## 2020-10-27 PROCEDURE — 3331090002 HH PPS REVENUE DEBIT

## 2020-10-28 VITALS
HEART RATE: 80 BPM | DIASTOLIC BLOOD PRESSURE: 75 MMHG | RESPIRATION RATE: 17 BRPM | OXYGEN SATURATION: 97 % | TEMPERATURE: 97.8 F | SYSTOLIC BLOOD PRESSURE: 136 MMHG

## 2020-10-28 PROCEDURE — 3331090001 HH PPS REVENUE CREDIT

## 2020-10-28 PROCEDURE — 3331090002 HH PPS REVENUE DEBIT

## 2020-10-29 VITALS
SYSTOLIC BLOOD PRESSURE: 134 MMHG | TEMPERATURE: 97.9 F | OXYGEN SATURATION: 98 % | DIASTOLIC BLOOD PRESSURE: 69 MMHG | RESPIRATION RATE: 16 BRPM | HEART RATE: 74 BPM

## 2020-10-29 PROCEDURE — 3331090001 HH PPS REVENUE CREDIT

## 2020-10-29 PROCEDURE — 3331090002 HH PPS REVENUE DEBIT

## 2020-10-30 ENCOUNTER — HOME CARE VISIT (OUTPATIENT)
Dept: HOME HEALTH SERVICES | Facility: HOME HEALTH | Age: 70
End: 2020-10-30
Payer: MEDICARE

## 2020-10-30 PROCEDURE — 3331090001 HH PPS REVENUE CREDIT

## 2020-10-30 PROCEDURE — 3331090002 HH PPS REVENUE DEBIT

## 2020-10-31 PROCEDURE — 3331090002 HH PPS REVENUE DEBIT

## 2020-10-31 PROCEDURE — 3331090001 HH PPS REVENUE CREDIT

## 2020-11-01 PROCEDURE — 3331090002 HH PPS REVENUE DEBIT

## 2020-11-01 PROCEDURE — 3331090001 HH PPS REVENUE CREDIT

## 2020-11-02 ENCOUNTER — HOME CARE VISIT (OUTPATIENT)
Dept: SCHEDULING | Facility: HOME HEALTH | Age: 70
End: 2020-11-02
Payer: MEDICARE

## 2020-11-02 VITALS
OXYGEN SATURATION: 98 % | RESPIRATION RATE: 16 BRPM | HEART RATE: 71 BPM | DIASTOLIC BLOOD PRESSURE: 77 MMHG | SYSTOLIC BLOOD PRESSURE: 137 MMHG | TEMPERATURE: 97.9 F

## 2020-11-02 PROCEDURE — 3331090002 HH PPS REVENUE DEBIT

## 2020-11-02 PROCEDURE — G0300 HHS/HOSPICE OF LPN EA 15 MIN: HCPCS

## 2020-11-02 PROCEDURE — 3331090001 HH PPS REVENUE CREDIT

## 2020-11-03 PROCEDURE — 3331090001 HH PPS REVENUE CREDIT

## 2020-11-03 PROCEDURE — 3331090002 HH PPS REVENUE DEBIT

## 2020-11-04 ENCOUNTER — HOME CARE VISIT (OUTPATIENT)
Dept: SCHEDULING | Facility: HOME HEALTH | Age: 70
End: 2020-11-04
Payer: MEDICARE

## 2020-11-04 PROCEDURE — 3331090001 HH PPS REVENUE CREDIT

## 2020-11-04 PROCEDURE — 3331090002 HH PPS REVENUE DEBIT

## 2020-11-04 PROCEDURE — G0300 HHS/HOSPICE OF LPN EA 15 MIN: HCPCS

## 2020-11-05 PROCEDURE — 3331090002 HH PPS REVENUE DEBIT

## 2020-11-05 PROCEDURE — 3331090001 HH PPS REVENUE CREDIT

## 2020-11-06 PROCEDURE — 3331090001 HH PPS REVENUE CREDIT

## 2020-11-06 PROCEDURE — 3331090002 HH PPS REVENUE DEBIT

## 2020-11-07 PROCEDURE — 3331090001 HH PPS REVENUE CREDIT

## 2020-11-07 PROCEDURE — 3331090002 HH PPS REVENUE DEBIT

## 2020-11-08 VITALS
TEMPERATURE: 97.7 F | SYSTOLIC BLOOD PRESSURE: 131 MMHG | DIASTOLIC BLOOD PRESSURE: 64 MMHG | OXYGEN SATURATION: 98 % | RESPIRATION RATE: 16 BRPM | HEART RATE: 71 BPM

## 2020-11-08 PROCEDURE — 3331090002 HH PPS REVENUE DEBIT

## 2020-11-08 PROCEDURE — 3331090001 HH PPS REVENUE CREDIT

## 2020-11-09 PROCEDURE — 3331090001 HH PPS REVENUE CREDIT

## 2020-11-09 PROCEDURE — 3331090002 HH PPS REVENUE DEBIT

## 2020-11-10 PROCEDURE — 3331090001 HH PPS REVENUE CREDIT

## 2020-11-10 PROCEDURE — 3331090002 HH PPS REVENUE DEBIT

## 2020-11-11 ENCOUNTER — HOME CARE VISIT (OUTPATIENT)
Dept: HOME HEALTH SERVICES | Facility: HOME HEALTH | Age: 70
End: 2020-11-11
Payer: MEDICARE

## 2020-11-11 PROCEDURE — 3331090002 HH PPS REVENUE DEBIT

## 2020-11-11 PROCEDURE — 3331090001 HH PPS REVENUE CREDIT

## 2020-11-12 ENCOUNTER — HOME CARE VISIT (OUTPATIENT)
Dept: SCHEDULING | Facility: HOME HEALTH | Age: 70
End: 2020-11-12
Payer: MEDICARE

## 2020-11-12 PROCEDURE — G0300 HHS/HOSPICE OF LPN EA 15 MIN: HCPCS

## 2020-11-12 PROCEDURE — 3331090002 HH PPS REVENUE DEBIT

## 2020-11-12 PROCEDURE — 3331090001 HH PPS REVENUE CREDIT

## 2020-11-13 PROCEDURE — 3331090002 HH PPS REVENUE DEBIT

## 2020-11-13 PROCEDURE — 3331090001 HH PPS REVENUE CREDIT

## 2020-11-14 VITALS
DIASTOLIC BLOOD PRESSURE: 67 MMHG | HEART RATE: 81 BPM | TEMPERATURE: 98 F | SYSTOLIC BLOOD PRESSURE: 132 MMHG | RESPIRATION RATE: 16 BRPM | OXYGEN SATURATION: 17 %

## 2020-11-14 PROCEDURE — 3331090002 HH PPS REVENUE DEBIT

## 2020-11-14 PROCEDURE — 3331090001 HH PPS REVENUE CREDIT

## 2020-11-15 PROCEDURE — 3331090001 HH PPS REVENUE CREDIT

## 2020-11-15 PROCEDURE — 3331090002 HH PPS REVENUE DEBIT

## 2020-11-16 ENCOUNTER — HOME CARE VISIT (OUTPATIENT)
Dept: SCHEDULING | Facility: HOME HEALTH | Age: 70
End: 2020-11-16
Payer: MEDICARE

## 2020-11-16 PROCEDURE — 3331090002 HH PPS REVENUE DEBIT

## 2020-11-16 PROCEDURE — 3331090001 HH PPS REVENUE CREDIT

## 2020-11-16 PROCEDURE — G0300 HHS/HOSPICE OF LPN EA 15 MIN: HCPCS

## 2020-11-17 PROCEDURE — 3331090002 HH PPS REVENUE DEBIT

## 2020-11-17 PROCEDURE — 3331090001 HH PPS REVENUE CREDIT

## 2020-11-18 ENCOUNTER — HOME CARE VISIT (OUTPATIENT)
Dept: SCHEDULING | Facility: HOME HEALTH | Age: 70
End: 2020-11-18
Payer: MEDICARE

## 2020-11-18 VITALS
OXYGEN SATURATION: 98 % | DIASTOLIC BLOOD PRESSURE: 70 MMHG | SYSTOLIC BLOOD PRESSURE: 132 MMHG | TEMPERATURE: 97.8 F | RESPIRATION RATE: 16 BRPM | HEART RATE: 69 BPM

## 2020-11-18 PROCEDURE — 3331090001 HH PPS REVENUE CREDIT

## 2020-11-18 PROCEDURE — 3331090002 HH PPS REVENUE DEBIT

## 2020-11-19 PROCEDURE — 3331090001 HH PPS REVENUE CREDIT

## 2020-11-19 PROCEDURE — 3331090002 HH PPS REVENUE DEBIT

## 2020-11-20 PROCEDURE — 3331090002 HH PPS REVENUE DEBIT

## 2020-11-20 PROCEDURE — 3331090001 HH PPS REVENUE CREDIT

## 2020-11-21 PROCEDURE — 3331090001 HH PPS REVENUE CREDIT

## 2020-11-21 PROCEDURE — 3331090002 HH PPS REVENUE DEBIT

## 2020-11-22 PROCEDURE — 3331090002 HH PPS REVENUE DEBIT

## 2020-11-22 PROCEDURE — 3331090001 HH PPS REVENUE CREDIT
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
## 2021-09-27 ENCOUNTER — APPOINTMENT (OUTPATIENT)
Dept: GENERAL RADIOLOGY | Age: 71
End: 2021-09-27
Attending: STUDENT IN AN ORGANIZED HEALTH CARE EDUCATION/TRAINING PROGRAM
Payer: MEDICARE

## 2021-09-27 ENCOUNTER — HOSPITAL ENCOUNTER (EMERGENCY)
Age: 71
Discharge: HOME | End: 2021-09-27
Attending: STUDENT IN AN ORGANIZED HEALTH CARE EDUCATION/TRAINING PROGRAM
Payer: MEDICARE

## 2021-09-27 VITALS
HEIGHT: 72 IN | OXYGEN SATURATION: 100 % | WEIGHT: 262.6 LBS | BODY MASS INDEX: 35.57 KG/M2 | SYSTOLIC BLOOD PRESSURE: 137 MMHG | RESPIRATION RATE: 24 BRPM | DIASTOLIC BLOOD PRESSURE: 69 MMHG | TEMPERATURE: 98.8 F | HEART RATE: 82 BPM

## 2021-09-27 DIAGNOSIS — R06.02 SOB (SHORTNESS OF BREATH): ICD-10-CM

## 2021-09-27 DIAGNOSIS — R20.2 PARESTHESIA AND PAIN OF RIGHT EXTREMITY: ICD-10-CM

## 2021-09-27 DIAGNOSIS — M79.609 PARESTHESIA AND PAIN OF RIGHT EXTREMITY: ICD-10-CM

## 2021-09-27 DIAGNOSIS — M48.02 CERVICAL STENOSIS OF SPINAL CANAL: Primary | ICD-10-CM

## 2021-09-27 PROCEDURE — 71045 X-RAY EXAM CHEST 1 VIEW: CPT

## 2021-09-27 PROCEDURE — 99285 EMERGENCY DEPT VISIT HI MDM: CPT

## 2021-09-27 PROCEDURE — 74011250637 HC RX REV CODE- 250/637: Performed by: STUDENT IN AN ORGANIZED HEALTH CARE EDUCATION/TRAINING PROGRAM

## 2021-09-27 PROCEDURE — 74011250636 HC RX REV CODE- 250/636: Performed by: STUDENT IN AN ORGANIZED HEALTH CARE EDUCATION/TRAINING PROGRAM

## 2021-09-27 PROCEDURE — 96374 THER/PROPH/DIAG INJ IV PUSH: CPT

## 2021-09-27 RX ORDER — OXYCODONE HYDROCHLORIDE 5 MG/1
10 TABLET ORAL
Status: COMPLETED | OUTPATIENT
Start: 2021-09-27 | End: 2021-09-27

## 2021-09-27 RX ORDER — MORPHINE SULFATE 4 MG/ML
4 INJECTION INTRAVENOUS
Status: COMPLETED | OUTPATIENT
Start: 2021-09-27 | End: 2021-09-27

## 2021-09-27 RX ORDER — ACETAMINOPHEN 325 MG/1
650 TABLET ORAL ONCE
Status: COMPLETED | OUTPATIENT
Start: 2021-09-27 | End: 2021-09-27

## 2021-09-27 RX ADMIN — OXYCODONE 10 MG: 5 TABLET ORAL at 11:59

## 2021-09-27 RX ADMIN — ACETAMINOPHEN 650 MG: 325 TABLET ORAL at 11:59

## 2021-09-27 RX ADMIN — MORPHINE SULFATE 4 MG: 4 INJECTION INTRAVENOUS at 07:49

## 2021-09-27 NOTE — PROGRESS NOTES
CARE MANAGEMENT NOTE      Provider asked CM to see if Pt could return to dialysis center today. CM spoke with Kody Rajput at [facility] ([phone]). Kody Rajput states they could see Pt today if she can get in before 12pm. CM spoke with daughter who states their normal transportation is unable to assist today. CM called [address] who states they are unable to assist until 1pm. Daughter asked if Pt can be seen tomorrow. CM spoke with Kody Rajput again. [facility] can see Pt on 2nd shift tomorrow (10am). Daughter is in agreement and will have transportation arranged. Provider updated and in agreement. Pt needs transportation home. CM made arrangements through [facility] for 1:30pm. CM spoke with Anna. CM send PCS in [employer].      _____________________________________  SHANNON Huang - Care Management  9/27/2021   10:32 AM

## 2021-09-27 NOTE — ED NOTES
Dialysis patient. MWF. Pt has fistula in [facility]. States L arm is no longer used.   Positive for bruit and thrill in R arm

## 2021-09-27 NOTE — ED NOTES
Pt states 10/10 back pain. Has been told previously that it is nerve damage. This intense pain has been going on for about 2 weeks. States pain goes along R side.
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
## 2022-03-18 PROBLEM — E11.21 TYPE 2 DIABETES MELLITUS WITH NEPHROPATHY (HCC): Status: ACTIVE | Noted: 2018-01-31

## 2022-03-19 PROBLEM — M86.9 FOOT OSTEOMYELITIS, RIGHT (HCC): Status: ACTIVE | Noted: 2018-07-22

## 2022-03-19 PROBLEM — E11.65 TYPE 2 DIABETES MELLITUS WITH HYPERGLYCEMIA (HCC): Status: ACTIVE | Noted: 2018-07-06

## 2022-03-19 PROBLEM — T81.31XA SURGICAL WOUND BREAKDOWN: Status: ACTIVE | Noted: 2018-08-30

## 2022-03-19 PROBLEM — N18.6 ESRD (END STAGE RENAL DISEASE) ON DIALYSIS (HCC): Status: ACTIVE | Noted: 2017-06-20

## 2022-03-19 PROBLEM — Z99.2 ESRD NEEDING DIALYSIS (HCC): Status: ACTIVE | Noted: 2018-02-13

## 2022-03-19 PROBLEM — E87.1 HYPONATREMIA: Status: ACTIVE | Noted: 2018-07-19

## 2022-03-19 PROBLEM — M86.171 OSTEOMYELITIS OF FOOT, RIGHT, ACUTE (HCC): Status: ACTIVE | Noted: 2018-07-19

## 2022-03-19 PROBLEM — D62 ACUTE BLOOD LOSS AS CAUSE OF POSTOPERATIVE ANEMIA: Status: ACTIVE | Noted: 2018-07-22

## 2022-03-19 PROBLEM — Z99.89 OSA ON CPAP: Status: ACTIVE | Noted: 2018-07-19

## 2022-03-19 PROBLEM — G47.33 OSA ON CPAP: Status: ACTIVE | Noted: 2018-07-19

## 2022-03-19 PROBLEM — N34.0 ABSCESS OF DEEP PERINEAL SPACE: Status: ACTIVE | Noted: 2017-06-17

## 2022-03-19 PROBLEM — L03.116 BILATERAL LOWER LEG CELLULITIS: Status: ACTIVE | Noted: 2018-07-06

## 2022-03-19 PROBLEM — R60.0 BILATERAL LEG EDEMA: Status: ACTIVE | Noted: 2018-07-06

## 2022-03-19 PROBLEM — R79.89 LFT ELEVATION: Status: ACTIVE | Noted: 2018-07-06

## 2022-03-19 PROBLEM — L02.215 PERINEAL ABSCESS: Status: ACTIVE | Noted: 2017-01-25

## 2022-03-19 PROBLEM — S91.002A ANKLE WOUND, LEFT, INITIAL ENCOUNTER: Status: ACTIVE | Noted: 2018-07-19

## 2022-03-19 PROBLEM — K61.0 PERIANAL ABSCESS: Status: ACTIVE | Noted: 2017-10-26

## 2022-03-19 PROBLEM — Z99.2 ESRD (END STAGE RENAL DISEASE) ON DIALYSIS (HCC): Status: ACTIVE | Noted: 2017-06-20

## 2022-03-19 PROBLEM — L03.115 BILATERAL LOWER LEG CELLULITIS: Status: ACTIVE | Noted: 2018-07-06

## 2022-03-19 PROBLEM — S31.000A SACRAL WOUND: Status: ACTIVE | Noted: 2018-07-19

## 2022-03-19 PROBLEM — N18.6 ESRD NEEDING DIALYSIS (HCC): Status: ACTIVE | Noted: 2018-02-13

## 2022-03-20 PROBLEM — D64.9 ANEMIA: Status: ACTIVE | Noted: 2018-07-06

## 2022-03-20 PROBLEM — L89.312 STAGE II PRESSURE ULCER OF RIGHT BUTTOCK (HCC): Status: ACTIVE | Noted: 2018-08-30

## 2022-03-20 PROBLEM — D62 ACUTE BLOOD LOSS ANEMIA: Status: ACTIVE | Noted: 2018-07-22

## 2022-03-20 PROBLEM — K56.41 FECAL IMPACTION (HCC): Status: ACTIVE | Noted: 2018-02-13

## 2022-07-06 NOTE — PROGRESS NOTES
Heart Failure Podiatry  -Pt seen & examined at bedside. Pt's son at bedside as well.  -Vascular studies show vessel calcification, which is common with DM and ESRD. Will consult vasc sx to assess circulation.  -Will order MRI to assess for osteo in right heel.   If MRI is positive, Pt will need debridement with partial calcanectomy.    -Continue antibxs  -Will follow

## 2023-04-12 NOTE — PROGRESS NOTES
Problem: Mobility Impaired (Adult and Pediatric) Goal: *Acute Goals and Plan of Care (Insert Text) Physical Therapy Goals Initiated 7/23/2018 1. Patient will move from supine to sit and sit to supine  and roll side to side in bed with modified independence within 7 day(s). 2.  Patient will transfer from bed to chair and chair to bed with modified independence using the least restrictive device within 7 day(s). physical Therapy TREATMENT Patient: Jo Lucio (73 y.o. female) Date: 7/27/2018 Diagnosis: Foot osteomyelitis, right ([address] Acute blood loss as cause of postoperative anemia Acute blood loss as cause of postoperative anemia Acute blood loss anemia Precautions: Fall, Contact, NWB Chart, physical therapy assessment, plan of care and goals were reviewed. ASSESSMENT: 
Patient remains highly motivated to exercise and mobilize as much as she can. She did report feeling \"not good all over\" but wanted to exercise while sitting EOB. She worked on trunk, LE and UE strengthening exercises in sitting and provided written exercises to family and patient. Her bed mobility is improving, as well. Recommend she limit her transfers to bedside commode and back and 1 trip to the wheelchair and back at this time while she is still building her strength to maintain NWB on the RLE. Otherwise, she should be encouraged to sit EOB for short times and perform strengthening exercises. She remains appropriate for SNF rehab to increase her strength and enable transition home. Progression toward goals: 
[x]    Improving appropriately and progressing toward goals 
[]    Improving slowly and progressing toward goals 
[]    Not making progress toward goals and plan of care will be adjusted PLAN: 
Patient continues to benefit from skilled intervention to address the above impairments. Continue treatment per established plan of care. Discharge Recommendations:  Rajeev Macdonald To  Equipment Recommendations for Discharge: To be determined in rehab SUBJECTIVE:  
Patient stated I want to try something, I want to get better.  OBJECTIVE DATA SUMMARY:  
Critical Behavior: 
Neurologic State: Alert Orientation Level: Oriented X4 Cognition: Appropriate for age attention/concentration Safety/Judgement: Good awareness of safety precautions Functional Mobility Training: 
Bed Mobility: 
  
Supine to Sit: Contact guard assistance Sit to Supine: Moderate assistance (for LEs) Scooting: Contact guard assistance Transfers: 
Sit to Stand:  (unable to progress with standing due to NWB status) Bed to Chair: Contact guard assistance (sliding board to w/c with mod A to setup transfer) Balance: 
Sitting: Intact Standing:  (unable to progress with standing due to NWB status) Ambulation/Gait Training: 
  
  
  
  
  
  
  
  
  
  
  
  
  
  
  
  
  
  
Stairs: 
  
  
   
 
Neuro Re-Education: 
 
Therapeutic Exercises:  
Partial sit to stand, RLE lift and abduct, lateral lean with overhead reach, trunk flexion and extension, all performed sitting EOB Pain: 
Pain Scale 1: Numeric (0 - 10) Pain Intensity 1: 7 Activity Tolerance:  
Good Please refer to the flowsheet for vital signs taken during this treatment. After treatment:  
[]    Patient left in no apparent distress sitting up in chair 
[x]    Patient left in no apparent distress in bed 
[x]    Call bell left within reach [x]    Nursing notified 
[x]    Caregiver present 
[]    Bed alarm activated COMMUNICATION/COLLABORATION:  
The patients plan of care was discussed with: Registered Nurse Bishnu Sparrow, PT Time Calculation: 30 mins

## 2023-05-16 RX ORDER — NORTRIPTYLINE HYDROCHLORIDE 25 MG/1
50 CAPSULE ORAL NIGHTLY
COMMUNITY

## 2023-05-16 RX ORDER — HYDROCODONE BITARTRATE AND ACETAMINOPHEN 7.5; 325 MG/1; MG/1
1 TABLET ORAL 3 TIMES DAILY PRN
COMMUNITY
Start: 2020-09-11

## 2023-05-16 RX ORDER — INSULIN LISPRO 100 [IU]/ML
INJECTION, SOLUTION INTRAVENOUS; SUBCUTANEOUS
COMMUNITY

## 2023-05-16 RX ORDER — MIDODRINE HYDROCHLORIDE 5 MG/1
TABLET ORAL
COMMUNITY
Start: 2018-04-18

## 2023-05-16 RX ORDER — ALBUMIN (HUMAN) 12.5 G/50ML
SOLUTION INTRAVENOUS
COMMUNITY
Start: 2018-07-19

## 2023-05-16 RX ORDER — PSEUDOEPHEDRINE HCL 30 MG
TABLET ORAL 2 TIMES DAILY
COMMUNITY

## 2023-05-16 RX ORDER — LATANOPROST 50 UG/ML
1 SOLUTION/ DROPS OPHTHALMIC
COMMUNITY
Start: 2017-04-20

## 2023-05-16 RX ORDER — CINACALCET 30 MG/1
30 TABLET, FILM COATED ORAL DAILY
COMMUNITY

## 2023-05-16 RX ORDER — SEVELAMER CARBONATE 800 MG/1
TABLET, FILM COATED ORAL
COMMUNITY
Start: 2018-01-02

## 2023-05-16 RX ORDER — POLYETHYLENE GLYCOL 3350 17 G/17G
POWDER, FOR SOLUTION ORAL 2 TIMES DAILY PRN
COMMUNITY

## 2023-05-16 RX ORDER — FEXOFENADINE HCL 180 MG/1
TABLET ORAL
COMMUNITY

## 2023-05-16 RX ORDER — FLUOXETINE HYDROCHLORIDE 20 MG/1
20 CAPSULE ORAL DAILY
COMMUNITY
Start: 2018-01-02

## 2023-05-16 RX ORDER — GABAPENTIN 100 MG/1
CAPSULE ORAL 2 TIMES DAILY
COMMUNITY
Start: 2018-07-06

## 2023-05-16 RX ORDER — DOXYCYCLINE 100 MG/1
TABLET ORAL 2 TIMES DAILY
COMMUNITY

## 2023-05-16 RX ORDER — ASPIRIN 81 MG/1
81 TABLET, CHEWABLE ORAL DAILY
COMMUNITY
Start: 2018-04-18

## 2023-05-16 RX ORDER — OXYCODONE HYDROCHLORIDE AND ACETAMINOPHEN 5; 325 MG/1; MG/1
1-2 TABLET ORAL EVERY 4 HOURS PRN
COMMUNITY
Start: 2018-07-28

## 2023-05-16 RX ORDER — ASCORBIC ACID 500 MG
1000 TABLET ORAL DAILY
COMMUNITY

## 2023-05-16 RX ORDER — PANTOPRAZOLE SODIUM 40 MG/1
TABLET, DELAYED RELEASE ORAL
COMMUNITY
Start: 2018-07-19

## 2023-05-16 RX ORDER — SIMVASTATIN 20 MG
20 TABLET ORAL NIGHTLY
COMMUNITY

## 2023-12-19 NOTE — TELEPHONE ENCOUNTER
Stephanie would like to speak with Dr. Dmitry Chino nurse, the patient has a boil and because of where it is located she wants to know if Dr. Rupinder Soni would look at it or if would he rather see her gynecologist before she books an actual appt. No

## 2024-01-17 NOTE — PROGRESS NOTES
ID Progress Note  2017    Subjective:     No new complaints, pain about the same    Objective:     Antibiotics:  1. Azactam  2. Flagyl      Vitals:   Visit Vitals    /66 (BP 1 Location: Right arm, BP Patient Position: At rest)    Pulse 64    Temp 97.8 °F (36.6 °C)    Resp 18    Ht 6' 1.75\" (1.873 m)    Wt 131.4 kg (289 lb 11 oz)    SpO2 97%    Breastfeeding No    BMI 37.45 kg/m2        Tmax:  Temp (24hrs), Av.9 °F (36.6 °C), Min:97.7 °F (36.5 °C), Max:98.1 °F (36.7 °C)      Exam:  Lungs:  clear to auscultation bilaterally  Heart:  regular rate and rhythm  Abdomen:  soft, non-tender. Bowel sounds normal. No masses,  no organomegaly  [place]-wound area is softer    Labs:      Recent Labs      17   1119  17   0431   WBC  4.3  4.7   HGB  9.4*  10.9*   PLT  228  255   BUN  52*   --    CREA  6.02*   --        Cultures:     Lab Results   Component Value Date/Time    Specimen Description: URINE 10/27/2012 08:15 PM    Specimen Description: BLOOD 2011 07:40 AM    Specimen Description: URINE 2010 07:10 AM     Lab Results   Component Value Date/Time    Culture result: LIGHT  MIXED COMMENSAL [place]   2017 12:40 AM    Culture result:  2017 12:40 AM     LIGHT  ANAEROBIC GRAM NEGATIVE RODS  BETA LACTAMASE POSITIVE      Culture result: NO GROWTH 6 DAYS 02/10/2017 04:43 PM       Radiology:     Line/Insert Date:           Assessment:     1. Perianal abscess  2. ESRD  3. Debility     Objective:     1. Continue current therapy  2.  Course of fluconazole    Radha Rey MD Pt contacted Call Center requested refill of their medication.        Medication Name: baclofen      Dosage of Med: 10 mg      Frequency of Med: 3 x per day      Remaining Medication: unknown      Pharmacy and Location: [facility]/pharmacy #[id number] [address] [phone]         Pt. Preferred Callback Phone Number: [phone]      Thank you.    Pt was told that he had 1 refill left but pharmacy is saying he doesn't have any refills       PLEASE ADVISE PATIENTS:    REFILL REQUESTS WILL BE PROCESSED WITHIN 24-48 HOURS.

## 2024-09-03 NOTE — ED NOTES
Patient reassessed at this time. Attempted to get POC Chem 8, however sample clotted and was unable to be processed. Will re-draw when patient returns from doppler. Awake/Alert/Cooperative
